# Patient Record
Sex: MALE | Race: WHITE | NOT HISPANIC OR LATINO | Employment: OTHER | URBAN - METROPOLITAN AREA
[De-identification: names, ages, dates, MRNs, and addresses within clinical notes are randomized per-mention and may not be internally consistent; named-entity substitution may affect disease eponyms.]

---

## 2020-01-26 ENCOUNTER — HOSPITAL ENCOUNTER (EMERGENCY)
Facility: CLINIC | Age: 65
Discharge: HOME | End: 2020-01-26
Attending: EMERGENCY MEDICINE | Admitting: EMERGENCY MEDICINE
Payer: MEDICARE

## 2020-01-26 VITALS
WEIGHT: 136 LBS | HEIGHT: 67 IN | BODY MASS INDEX: 21.35 KG/M2 | DIASTOLIC BLOOD PRESSURE: 72 MMHG | RESPIRATION RATE: 16 BRPM | OXYGEN SATURATION: 99 % | SYSTOLIC BLOOD PRESSURE: 134 MMHG | TEMPERATURE: 97.8 F

## 2020-01-26 DIAGNOSIS — R19.7 DIARRHEA, UNSPECIFIED TYPE: ICD-10-CM

## 2020-01-26 LAB
ANION GAP SERPL CALCULATED.3IONS-SCNC: 2 MMOL/L (ref 3–14)
BASOPHILS # BLD AUTO: 0 10E9/L (ref 0–0.2)
BASOPHILS NFR BLD AUTO: 0.1 %
BUN SERPL-MCNC: 18 MG/DL (ref 7–30)
C DIFF TOX B STL QL: POSITIVE
CALCIUM SERPL-MCNC: 9 MG/DL (ref 8.5–10.1)
CHLORIDE SERPL-SCNC: 107 MMOL/L (ref 94–109)
CO2 SERPL-SCNC: 29 MMOL/L (ref 20–32)
CREAT SERPL-MCNC: 0.67 MG/DL (ref 0.66–1.25)
DIFFERENTIAL METHOD BLD: ABNORMAL
EOSINOPHIL # BLD AUTO: 0.3 10E9/L (ref 0–0.7)
EOSINOPHIL NFR BLD AUTO: 2.2 %
ERYTHROCYTE [DISTWIDTH] IN BLOOD BY AUTOMATED COUNT: 14.9 % (ref 10–15)
GFR SERPL CREATININE-BSD FRML MDRD: >90 ML/MIN/{1.73_M2}
GLUCOSE SERPL-MCNC: 95 MG/DL (ref 70–99)
HCT VFR BLD AUTO: 37.1 % (ref 40–53)
HGB BLD-MCNC: 12.1 G/DL (ref 13.3–17.7)
IMM GRANULOCYTES # BLD: 0 10E9/L (ref 0–0.4)
IMM GRANULOCYTES NFR BLD: 0.3 %
LYMPHOCYTES # BLD AUTO: 0.9 10E9/L (ref 0.8–5.3)
LYMPHOCYTES NFR BLD AUTO: 7.6 %
MCH RBC QN AUTO: 29.4 PG (ref 26.5–33)
MCHC RBC AUTO-ENTMCNC: 32.6 G/DL (ref 31.5–36.5)
MCV RBC AUTO: 90 FL (ref 78–100)
MONOCYTES # BLD AUTO: 1.3 10E9/L (ref 0–1.3)
MONOCYTES NFR BLD AUTO: 10.8 %
NEUTROPHILS # BLD AUTO: 9.4 10E9/L (ref 1.6–8.3)
NEUTROPHILS NFR BLD AUTO: 79 %
NRBC # BLD AUTO: 0 10*3/UL
NRBC BLD AUTO-RTO: 0 /100
PLATELET # BLD AUTO: 291 10E9/L (ref 150–450)
POTASSIUM SERPL-SCNC: 4.1 MMOL/L (ref 3.4–5.3)
RBC # BLD AUTO: 4.12 10E12/L (ref 4.4–5.9)
SODIUM SERPL-SCNC: 138 MMOL/L (ref 133–144)
SPECIMEN SOURCE: ABNORMAL
WBC # BLD AUTO: 11.9 10E9/L (ref 4–11)

## 2020-01-26 PROCEDURE — 99284 EMERGENCY DEPT VISIT MOD MDM: CPT | Mod: 25

## 2020-01-26 PROCEDURE — 80048 BASIC METABOLIC PNL TOTAL CA: CPT | Performed by: EMERGENCY MEDICINE

## 2020-01-26 PROCEDURE — 87493 C DIFF AMPLIFIED PROBE: CPT | Performed by: EMERGENCY MEDICINE

## 2020-01-26 PROCEDURE — 25800030 ZZH RX IP 258 OP 636: Performed by: EMERGENCY MEDICINE

## 2020-01-26 PROCEDURE — 85025 COMPLETE CBC W/AUTO DIFF WBC: CPT | Performed by: EMERGENCY MEDICINE

## 2020-01-26 PROCEDURE — 96360 HYDRATION IV INFUSION INIT: CPT

## 2020-01-26 RX ADMIN — SODIUM CHLORIDE 1000 ML: 9 INJECTION, SOLUTION INTRAVENOUS at 14:54

## 2020-01-26 ASSESSMENT — ENCOUNTER SYMPTOMS
FEVER: 0
DIARRHEA: 1
RECTAL PAIN: 0
COUGH: 0
BLOOD IN STOOL: 0
VOMITING: 0
ABDOMINAL PAIN: 0

## 2020-01-26 ASSESSMENT — MIFFLIN-ST. JEOR: SCORE: 1370.52

## 2020-01-26 NOTE — ED NOTES
Bed: ED20  Expected date: 1/26/20  Expected time: 2:01 PM  Means of arrival: Ambulance  Comments:  425 64m loose stools   ETA 1412

## 2020-01-26 NOTE — ED TRIAGE NOTES
"Patient prolonging discharge process.  Frequently using up to commode or urinal.  Then laying down.  Saying he had to use the restroom but then he did not.  Patient was in no apparent distress.  A very small liquidy stool was sent to the lab.  Patient ambulatory, independent.  Gait steady.    Needing frequent reminding to stay on task.  Patient asking on he can get home.  Then tells me the phone number he calls for his transport.  Then tells me does not know how to get home.  Reminds him he told me how he normally gets around.  Then oh, ok.  Patient seems to be purposefully prolonging his stay.  Unsure why.  Does not actually request any specific needs.  Asked patient if needs anything.  States \"no, but you seem to be needing something\".  Gently reminded patient he is in the ED & lots of patients always need to be seen.  States \"oh, that makes sense ma'am\".  Patient kind, pleasant during entire ED stay...  Instructed patient he will be called if he has any abnormal results from his stool sample.  States understanding.   "

## 2020-01-26 NOTE — ED PROVIDER NOTES
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 He underwent cystoscopy with urethral stent and retrograde pyelogram on 1/21 at [facility]. He who presents with diarrhea that began this afternoon. The patient states that he was recently placed on antibiotics, Cefpodoxime, after the procedure. He states that he has had 4-5 loose stools this afternoon, but had normal stools this morning. He denies any fever, nausea, cough, abdominal pain, or blood in stool or urine.   He reports that he called the nurse line regarding his diarrhea and they recommended he go to the emergency room.  He arrived by EMS.      Allergies:  Asprin  Acetaminophen  Diphenhydramine    Medications:    Cefpodoxime  Pyridium  Ditropan  Senokot  Cogentin  Zocor  Flomax  Vistril    Past Medical History:    Hydronephrosis of left kidney  Encounter for antineoplastic chemotherapy  Liver lesion  Bone lesion   Pulmonary nodules  Schizoaffective disorder, bipolar type  Dyslipidemia  Cataracts  Anemia  Hx of carcinoma of bladder  Stricture of male urethra  CAD    Past Surgical History:    Facial reconstruction  Bladder transurethral tumor resection   Cystoscopy with retrogrades  Urethral stent  Urethral dilation  Bladder transurethral tumor resection  Cystoscopy with retrogrades  Soo cath placement  Cystoscopy flexible  Bladder biopsy    Family History:    History reviewed. No pertinent family history.     Social History:  Smoking status: Former  Alcohol use: No  Drug use: No  The patient presents to the emergency department via EMS alone  PCP: Ricco Mcclure  Marital Status:  Single [1]      Review of Systems   Constitutional: Negative for fever.   Respiratory: Negative for cough.    Gastrointestinal: Positive for diarrhea. Negative for abdominal pain, blood in stool, rectal pain and vomiting.   All other systems reviewed and are  "negative.      Physical Exam     Patient Vitals for the past 24 hrs:   BP Temp Temp src Heart Rate Resp SpO2 Height Weight   01/26/20 1417 134/72 97.8  F (36.6  C) Oral 91 16 99 % 1.702 m (5' 7\") 61.7 kg (136 lb)     Physical Exam   Constitutional:  Patient is oriented to person, place, and time. They appear well-developed and well-nourished.   HENT:   Mouth/Throat:   Oropharynx is clear and moist.   Eyes:    Conjunctivae normal and EOM are normal. Pupils are equal, round, and reactive to light.   Neck:    Normal range of motion.   Cardiovascular: Normal rate, regular rhythm and normal heart sounds.  Exam reveals no gallop and no friction rub.  No murmur heard.  Pulmonary/Chest:  Effort normal and breath sounds normal. Patient has no wheezes. Patient has no rales.   Abdominal:   Soft. Bowel sounds are normal. Patient exhibits no mass. There is no tenderness. There is no rebound and no guarding.   Musculoskeletal:  Normal range of motion. Patient exhibits no edema.   Neurological:   Patient is alert and oriented to person, place, and time. Patient has normal strength. No cranial nerve deficit or sensory deficit. GCS 15 patient has a speak stutter.   Skin:   Skin is warm and dry. No rash noted. No erythema.   Psychiatric:   Patient has a normal mood a      Emergency Department Course   Laboratory:  Laboratory findings were communicated with the patient who voiced understanding of the findings.    Clostridium difficile toxin B PCR: pending    CBC: WBC 11.9 (H), HGB 12.1 (L) o/w WNL ()    BMP: Anion Gap: 2 (L) WNL (Creatinine 0.67)    Interventions:  1454 NS 1L IV Bolus    Emergency Department Course:  Past medical records, nursing notes, and vitals reviewed.  1410: I performed an exam of the patient and obtained history, as documented above.     IV inserted and blood drawn.    1545: I rechecked the patient. I reviewed the results with the Patient and answered all related questions prior to discharge. "     Findings and plan explained to the Patient. Patient discharged home with instructions regarding supportive care, medications, and reasons to return. The importance of close follow-up was reviewed.  Impression & Plan   Medical Decision Making:  Harpreet Mcelroy is a 64 year old male presenting with diarrhea that just started this afternoon, he did have normal bowel movements this morning. He has no abdominal pain and he is urinating without difficulty after his recent cystoscopy. The nurse line apparently told him to come to the emergency department. On my exam here he is vitally normal, he has no pain. Basic blood work was obtained and he has no evidence of renal failure or electrolyte abnormalities. His WBC count is a little elevated and hemoglobin is a little down, but nothing critical. I suspect this is antibiotic associated diarrhea, Vs C Diff.  There is nothing that he acutely needs to be in the hospital for. He did provide a stool sample and was advised that if this lab was normal, we will call him tomorrow. He will call his PCP tomorrow for follow up. He was hold his antibiotics at this time.     Diagnosis:    ICD-10-CM    1. Diarrhea, unspecified type R19.7 Clostridium difficile toxin B PCR       Disposition:  Discharged to home.    Kathryn Cerna  1/26/2020    EMERGENCY DEPARTMENT  Scribe Disclosure:  I, Kathryn Cerna, am serving as a scribe at 2:10 PM on 1/26/2020 to document services personally performed by Zandra Tovar MD based on my observations and the provider's statements to me.        Zandra Tovar MD  01/28/20 0619

## 2020-01-26 NOTE — ED AVS SNAPSHOT
Emergency Department  [address] [place] [address]  Phone:  [phone]  Fax:  [phone]                                    Harpreet Mcelroy   MRN: [id number]    Department:   Emergency Department   Date of Visit:  1/26/2020           After Visit Summary Signature Page    I have received my discharge instructions, and my questions have been answered. I have discussed any challenges I see with this plan with the nurse or doctor.    ..........................................................................................................................................  Patient/Patient Representative Signature      ..........................................................................................................................................  Patient Representative Print Name and Relationship to Patient    ..................................................               ................................................  Date                                   Time    ..........................................................................................................................................  Reviewed by Signature/Title    ...................................................              ..............................................  Date                                               Time          22EPIC Rev 08/18

## 2020-01-26 NOTE — DISCHARGE INSTRUCTIONS
Follow-up with your doctor tomorrow.  Your diarrhea is most likely associated with your antibiotics.  The stool sample is to tell us whether you have something called C. difficile which is a bacteria that requires different antibiotics.

## 2020-01-27 ENCOUNTER — TELEPHONE (OUTPATIENT)
Dept: EMERGENCY MEDICINE | Facility: CLINIC | Age: 65
End: 2020-01-27

## 2020-01-27 DIAGNOSIS — A04.72 COLITIS DUE TO CLOSTRIDIUM DIFFICILE: ICD-10-CM

## 2020-01-27 RX ORDER — VANCOMYCIN HYDROCHLORIDE 125 MG/1
125 CAPSULE ORAL 4 TIMES DAILY
Qty: 56 CAPSULE | Refills: 0 | Status: SHIPPED | OUTPATIENT
Start: 2020-01-27 | End: 2020-02-10

## 2020-01-27 NOTE — TELEPHONE ENCOUNTER
[facility] [Adult-Male]    Oral ED lab result protocol used  Clostridium difficile    Reason for call  Notify of lab results, assess symptoms,  review ED providers recommendations/discharge instructions (if necessary) and advise per ED lab result f/u protocol    Lab Result (including Rx patient on, if applicable)  Final Clostridium difficile toxin B PCR is POSITIVE.  Toxin producing Clostridium difficile target DNA sequences detected, presumed positive for Clostridium difficile toxin B.   Rx (Flagyl or Vancomycin) prescribed upon discharge from the Oral ED/UC:  No  If No Rx, advise and/or treat per Oral ED Lab Result protocol.    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 He underwent cystoscopy with urethral stent and retrograde pyelogram on 1/21 at [facility]. He who presents with diarrhea that began this afternoon. The patient states that he was recently placed on antibiotics, Cefpodoxime. He states that he has had 4-5 loose stools, but had normal stools this morning. He denies any fever, nausea, cough, abdominal pain, or blood in stool.    Significant Medical hx, if applicable (i.e. CKD, diabetes) See above   Allergies Allergies   Allergen Reactions     Aspirin       Weight, if applicable Wt Readings from Last 2 Encounters:   01/26/20 61.7 kg (136 lb)   06/29/15 57.2 kg (126 lb)      Coumadin/Warfarin [Yes /No] No   Creatinine Level (mg/dl) Creatinine   Date Value Ref Range Status   01/26/2020 0.67 0.66 - 1.25 mg/dL Final      Creatinine clearance (ml/min), if applicable Serum creatinine: 0.67 mg/dL 01/26/20 1450  Estimated creatinine clearance: 98.5 mL/min   ED providers Impression and Plan (applicable information) Harpreet Mcelroy is a 64  year old male presenting with diarrhea that just started this afternoon, he did have normal bowel movements this morning. He has no abdominal pain and he is urinating without difficulty after his recent cystoscopy. The nurse line apparently told him to come to the emergency department. On my exam here he is vitally normal, he has no pain. Basic blood work was obtained and he has no evidence of renal failure or electrolyte abnormalities. His WBC count is a little elevated and hemoglobin is a little down, but nothing critical. I suspect this is antibiotic associated diarrhea, not CDIF There is nothing that he acutely needs to be in the hospital for. He did provide a stool sample and was advised that if this lab was normal, we will call him tomorrow. He will call his PCP tomorrow for follow up. He was hold his antibiotics at this time.    ED diagnosis Diarrhea, unspecified type   ED provider Zandra Tovar MD      RN Assessment (Patient s current Symptoms), include time called.  [Insert Left message here if message left]  11:43AM: Patient returned call. (Patient is very hard to understand over the phone). He states he is still having diarrhea, but less than yesterday. States that he called his clinic today and has an appointment to see his PCP tomorrow.     RN Recommendations/Instructions per [place] ED lab result protocol  Patient notified of lab result and treatment recommendations.  Rx for Vancomycin (VANCOCIN HCL) 125 MG capsule, 1 capsule (125 mg) by mouth 4 times daily for 14 days sent to [Pharmacy - [facility] in [place]].  RN reviewed information about Clostridium Difficile from protocol patient instructions and Epic reference.  Patient states he has not had cdiff before.   Advised to keep his appointment with his PCP for tomorrow morning and to return to the ED if symptoms worsen or change or he has any other concerns.   Patient states understanding of the information given and has no further  questions.        Please Contact your PCP clinic or return to the Emergency department if your:    Symptoms worsen or other concerning symptom's.    PCP follow-up Questions asked: YES         [RN Name]  Adriana Ordoñez RN  [facility] RN  Lung Nodule and ED Lab Result RN  Epic pool (ED late result f/u RN): P [id number]  FV INCIDENTAL RADIOLOGY F/U NURSES: P [id number]  # [phone]      Copy of Lab result  Clostridium difficile toxin B PCR [[id number]] (Order [id number])   Order Requisition     Clostridium difficile toxin B PCR (Order #[id number]) on 1/26/20   Exam Information     Exam Date Exam Time Accession # Results    1/26/20  4:31 PM [id number]    Specimen Information: Feces        Component Value Flag Ref Range Units Status Collected Lab   Specimen Description Feces     Final 01/26/2020  4:31 PM [facility]   C Diff Toxin B PCR Positive  Abnormal   NEG^Negative  Final 01/26/2020  4:31 PM 51   Comment:   Positive: Toxin producing C. difficile target DNA sequences detected, presumed    positive for C. difficile toxin B. C. difficile (Requires Enteric Isolation).       Clostridium difficile (Requires Enteric Isolation)   FDA approved assay performed using [employer] GeneXpert real-time PCR.

## 2020-07-12 ENCOUNTER — APPOINTMENT (OUTPATIENT)
Dept: ULTRASOUND IMAGING | Facility: CLINIC | Age: 65
End: 2020-07-12
Attending: EMERGENCY MEDICINE
Payer: MEDICARE

## 2020-07-12 ENCOUNTER — APPOINTMENT (OUTPATIENT)
Dept: CT IMAGING | Facility: CLINIC | Age: 65
End: 2020-07-12
Attending: EMERGENCY MEDICINE
Payer: MEDICARE

## 2020-07-12 ENCOUNTER — HOSPITAL ENCOUNTER (EMERGENCY)
Facility: CLINIC | Age: 65
Discharge: NURSING, REHAB OR LONG-TERM CARE | End: 2020-07-12
Attending: EMERGENCY MEDICINE | Admitting: EMERGENCY MEDICINE
Payer: MEDICARE

## 2020-07-12 VITALS
DIASTOLIC BLOOD PRESSURE: 79 MMHG | HEART RATE: 88 BPM | OXYGEN SATURATION: 99 % | SYSTOLIC BLOOD PRESSURE: 142 MMHG | TEMPERATURE: 97.5 F | RESPIRATION RATE: 16 BRPM

## 2020-07-12 DIAGNOSIS — R07.89 ATYPICAL CHEST PAIN: ICD-10-CM

## 2020-07-12 LAB
ANION GAP SERPL CALCULATED.3IONS-SCNC: 7 MMOL/L (ref 3–14)
BASOPHILS # BLD AUTO: 0 10E9/L (ref 0–0.2)
BASOPHILS NFR BLD AUTO: 0.2 %
BUN SERPL-MCNC: 14 MG/DL (ref 7–30)
CALCIUM SERPL-MCNC: 9 MG/DL (ref 8.5–10.1)
CHLORIDE SERPL-SCNC: 106 MMOL/L (ref 94–109)
CO2 SERPL-SCNC: 27 MMOL/L (ref 20–32)
CREAT SERPL-MCNC: 0.77 MG/DL (ref 0.66–1.25)
D DIMER PPP FEU-MCNC: 0.9 UG/ML FEU (ref 0–0.5)
DIFFERENTIAL METHOD BLD: NORMAL
EOSINOPHIL # BLD AUTO: 0.2 10E9/L (ref 0–0.7)
EOSINOPHIL NFR BLD AUTO: 2.6 %
ERYTHROCYTE [DISTWIDTH] IN BLOOD BY AUTOMATED COUNT: 14.7 % (ref 10–15)
GFR SERPL CREATININE-BSD FRML MDRD: >90 ML/MIN/{1.73_M2}
GLUCOSE SERPL-MCNC: 88 MG/DL (ref 70–99)
HCT VFR BLD AUTO: 42.6 % (ref 40–53)
HGB BLD-MCNC: 13.5 G/DL (ref 13.3–17.7)
IMM GRANULOCYTES # BLD: 0 10E9/L (ref 0–0.4)
IMM GRANULOCYTES NFR BLD: 0.2 %
LYMPHOCYTES # BLD AUTO: 1 10E9/L (ref 0.8–5.3)
LYMPHOCYTES NFR BLD AUTO: 11.4 %
MCH RBC QN AUTO: 29.7 PG (ref 26.5–33)
MCHC RBC AUTO-ENTMCNC: 31.7 G/DL (ref 31.5–36.5)
MCV RBC AUTO: 94 FL (ref 78–100)
MONOCYTES # BLD AUTO: 0.7 10E9/L (ref 0–1.3)
MONOCYTES NFR BLD AUTO: 7.5 %
NEUTROPHILS # BLD AUTO: 7 10E9/L (ref 1.6–8.3)
NEUTROPHILS NFR BLD AUTO: 78.1 %
NRBC # BLD AUTO: 0 10*3/UL
NRBC BLD AUTO-RTO: 0 /100
PLATELET # BLD AUTO: 291 10E9/L (ref 150–450)
POTASSIUM SERPL-SCNC: 3.7 MMOL/L (ref 3.4–5.3)
RBC # BLD AUTO: 4.54 10E12/L (ref 4.4–5.9)
SODIUM SERPL-SCNC: 140 MMOL/L (ref 133–144)
TROPONIN I SERPL-MCNC: <0.015 UG/L (ref 0–0.04)
TROPONIN I SERPL-MCNC: <0.015 UG/L (ref 0–0.04)
WBC # BLD AUTO: 8.9 10E9/L (ref 4–11)

## 2020-07-12 PROCEDURE — 25000125 ZZHC RX 250: Performed by: EMERGENCY MEDICINE

## 2020-07-12 PROCEDURE — 25000128 H RX IP 250 OP 636: Performed by: EMERGENCY MEDICINE

## 2020-07-12 PROCEDURE — 93005 ELECTROCARDIOGRAM TRACING: CPT

## 2020-07-12 PROCEDURE — 80048 BASIC METABOLIC PNL TOTAL CA: CPT | Performed by: EMERGENCY MEDICINE

## 2020-07-12 PROCEDURE — 99285 EMERGENCY DEPT VISIT HI MDM: CPT | Mod: 25

## 2020-07-12 PROCEDURE — 93970 EXTREMITY STUDY: CPT

## 2020-07-12 PROCEDURE — 71275 CT ANGIOGRAPHY CHEST: CPT

## 2020-07-12 PROCEDURE — 85025 COMPLETE CBC W/AUTO DIFF WBC: CPT | Performed by: EMERGENCY MEDICINE

## 2020-07-12 PROCEDURE — 85379 FIBRIN DEGRADATION QUANT: CPT | Performed by: EMERGENCY MEDICINE

## 2020-07-12 PROCEDURE — 84484 ASSAY OF TROPONIN QUANT: CPT | Performed by: EMERGENCY MEDICINE

## 2020-07-12 RX ORDER — ACETAMINOPHEN 325 MG/1
650 TABLET ORAL ONCE
Status: DISCONTINUED | OUTPATIENT
Start: 2020-07-12 | End: 2020-07-12 | Stop reason: HOSPADM

## 2020-07-12 RX ORDER — IOPAMIDOL 755 MG/ML
500 INJECTION, SOLUTION INTRAVASCULAR ONCE
Status: COMPLETED | OUTPATIENT
Start: 2020-07-12 | End: 2020-07-12

## 2020-07-12 RX ADMIN — IOPAMIDOL 62 ML: 755 INJECTION, SOLUTION INTRAVENOUS at 10:42

## 2020-07-12 RX ADMIN — SODIUM CHLORIDE 84 ML: 9 INJECTION, SOLUTION INTRAVENOUS at 10:42

## 2020-07-12 ASSESSMENT — ENCOUNTER SYMPTOMS
COUGH: 1
FEVER: 0
SHORTNESS OF BREATH: 1

## 2020-07-12 NOTE — ED NOTES
Patient given boxed lunch. Patient walked to bathroom and back with cane. Awaiting wheelchair ride home.

## 2020-07-12 NOTE — DISCHARGE INSTRUCTIONS
Consider using a warm pack to the chest wall or ibuprofen if you are having persistent pain.    If pain is worsening or changing or if you develop new concerns you should return to the emergency department.  Otherwise, follow-up with your primary care provider this week to ensure you are still doing well.

## 2020-07-12 NOTE — ED PROVIDER NOTES
History     Chief Complaint:  Chest Pain    The history is provided by the patient.      Harpreet Mcelroy is a 64 year old male with a history of mental illness and bladder cancer who presents with chest pain. Mr. Mcelroy developed sharp, central chest pain just after taking a shower this morning. His pain is a 2/10 while resting and increases to 8/10 with movement. He has also had some slight shortness of breath.  He has had weeks of cough which he believes is related to a pollen allergy. He has had no fever. He has leg swelling. Today his left leg is more swollen than the right which he remarks is unusual as his right leg is generally more swollen than his left. He has no further concerns during initial interview.    Allergies:  Aspirin  Pollen  Acetaminophen  Diphenhydramine  Lactose    Medications:    Naprosyn  Cogentin  Zyprexa  Zocor  Pyridium  Ditropan  Senokot    Past Medical History:    Anxiety  Bipolar 1 disorder  Cancer  Depressive disorder  Coronary artery disease   Anemia  Bladder cancer  Pulmonary nodules  Bone lesion  Liver lesion  Paranoid schizophrenia    Past Surgical History:    Facial reconstruction  Bladder tumor resection x2  Cystoscopy x5  Ureteral stent placement  Urethral dilatation x5  Port-a-cath placement  Bladder biopsy    Family History:    Sister: schizophrenia    Social History:  The patient was accompanied to the ED by EMS.  Smoking Status: Former  Smokeless Tobacco: Never  Alcohol Use: No  Marital Status:  Single   Lives in [facility].  Ambulates with a cane.    Review of Systems   Constitutional: Negative for fever.   Respiratory: Positive for cough and shortness of breath.    Cardiovascular: Positive for chest pain and leg swelling.   All other systems reviewed and are negative.    Physical Exam     Patient Vitals for the past 24 hrs:   BP Temp Temp src Pulse Heart Rate Resp SpO2   07/12/20 1310 (!) 142/79 -- -- 88 -- 16 99 %   07/12/20 1200 (!) 148/71 -- --  84 -- -- 99 %   07/12/20 1145 (!) 143/78 -- -- 91 -- -- 98 %   07/12/20 1115 (!) 158/86 -- -- 85 -- -- 99 %   07/12/20 1000 (!) 159/88 -- -- 91 94 -- 99 %   07/12/20 0945 (!) 159/86 -- -- 97 100 -- 97 %   07/12/20 0930 (!) 159/81 -- -- 92 89 20 98 %   07/12/20 0915 (!) 152/83 -- -- 87 88 11 98 %   07/12/20 0900 (!) 166/88 -- -- 91 89 12 98 %   07/12/20 0848 (!) 167/88 97.5  F (36.4  C) Oral -- 91 16 97 %     Physical Exam  General: Well-developed and well-nourished. Well appearing middle aged  man. Cooperative.  Head:  Atraumatic.  Eyes:  Conjunctivae, lids, and sclerae are normal.  Neck:  Supple. Normal range of motion.  CV:  Regular rate and rhythm. Normal heart sounds with no murmurs, rubs, or gallops detected.  Resp:  No respiratory distress. Clear to auscultation bilaterally without decreased breath sounds, wheezing, rales, or rhonchi.  GI:  Soft. Non-distended. Non-tender.    MS:  Normal ROM.  Trace right lower extremity edema with 1-2+ left lower extremity edema.  Very mild tenderness to palpation across the mid anterior chest. Right anterior superior chest wall Port-a-Cath.  Skin:  Warm. Non-diaphoretic. No pallor.  Neuro:  Awake. A&Ox3. Normal strength. Dysarthric speech.  Psych: Normal mood and affect.   Vitals reviewed.    Emergency Department Course   EKG  Indication: Chest pain  Time: 0850  Rate 92 bpm. DC interval 164. QRS duration 118. QT/QTc 390/482.   Normal sinus rhythm  Right bundle branch block    No acute ST changes.  No significant changes as compared to prior, dated 01/16/20.  Incomplete right bundle branch block present on EKG in CareEverywhere 1/16/20.     Imaging:  Radiographic findings were communicated with the patient who voiced understanding of the findings.    US Lower Extremity Venous Duplex Bilateral  No DVT demonstrated.  Reading per radiology.    CT Chest Pulmonary Embolism W Contrast  1. No pulmonary embolism demonstrated.  2. No thoracic aortic dissection or aneurysm.    Reading per radiology.    Laboratory:  CBC: WNL. (WBC 8.9, HGB 13.5, )   BMP: AWNL (Creatinine 0.77)    Troponin (Collected 0854): <0.015  Troponin (Collected 1115): <0.015     D Dimer (Collected 0854): 0.9 (H)     Emergency Department Course:  Past medical records, nursing notes, and vitals reviewed.    0905 I performed an exam of the patient as documented above.     EKG obtained in the ED, see results above.   IV was inserted and blood was drawn for laboratory testing, results above.  The patient was sent for a lower extremity US and chest CT while in the emergency department, results above.     1149 I rechecked the patient and discussed the results of his workup thus far. He reports that his chest pain is improved and states that he only feels it when hunching over. He is comfortable with being discharged to home.     1227 I rechecked the patient who reported that he was feeling a little lightheaded, but improved.    Harpreet passed a road test without significant lightheadedness or dizziness.    Findings and plan explained to the patient. Patient discharged home with instructions regarding supportive care, medications, and reasons to return. The importance of close follow-up was reviewed.     I personally reviewed the laboratory and imaging results with the patient and answered all related questions prior to discharge.    Impression & Plan      Medical Decision Making:  Harpreet is a 64-year-old man who developed chest pain earlier this morning that is worsened with movement.  He has some associated shortness of breath, but denies other concerns aside from a cough which he attributes to a pollen allergy and has been present for weeks.  On exam, he appears well with minimal reproducible chest wall tenderness.  He does have lower extremity edema with left greater than right.  He states this is abnormal for him.  Fortunately, EKG is reassuring without acute ST changes or arrhythmias.  2 troponins obtained 2  hours apart are both negative.  He has quite atypical pain which actually improved during his ER course without intervention and ACS is considered highly unlikely.  At the time of discharge he states pain is present only when he hunches forward.  Fortunately, and despite his lower extremity edema, there are no DVTs demonstrated on ultrasound.  D-dimer was minimally elevated so he was also sent for CT pulmonary embolism scan.  This reveals no PE, aortic pathology, or other issues such as pneumonia.  The remainder of his laboratory studies are unremarkable.  He required no interventions while under my care and on repeat evaluation is comfortable with plan for discharge as he is overall feeling improved.  He did remark he had some lightheadedness though, again, his work-up is unremarkable and he was able to pass a road test assisted only by his cane without significant lightheadedness or concerns.  I recommended he consider a warm pack to his chest wall or ibuprofen if he has persistent pain though he understands to return if he is having significant worsening or change in pain or if he develops new concerns.  Otherwise he will follow-up with his primary care provider.  All questions answered.    Diagnosis:    ICD-10-CM    1. Atypical chest pain  R07.89        Disposition:  discharged home    Discharge Medications:  None      Pancho SOTELO, am serving as a scribe on 7/12/2020 at 9:07 AM to personally document services performed by Julissa Burgess MD based on my observations and the provider's statements to me.   Pancho Cuevas  7/12/2020   [facility] EMERGENCY DEPARTMENT       Julissa Burgess MD  07/12/20 1500

## 2020-07-12 NOTE — ED AVS SNAPSHOT
[facility] Emergency Department  [address]  [facility] [address]  Phone:  [phone]  Fax:  [phone]                                    Harpreet Mcelroy   MRN: [id number]    Department:  [facility] Emergency Department   Date of Visit:  7/12/2020           After Visit Summary Signature Page    I have received my discharge instructions, and my questions have been answered. I have discussed any challenges I see with this plan with the nurse or doctor.    ..........................................................................................................................................  Patient/Patient Representative Signature      ..........................................................................................................................................  Patient Representative Print Name and Relationship to Patient    ..................................................               ................................................  Date                                   Time    ..........................................................................................................................................  Reviewed by Signature/Title    ...................................................              ..............................................  Date                                               Time          22EPIC Rev 08/18

## 2020-07-12 NOTE — ED TRIAGE NOTES
Patient arrives via EMS from care facility with c/o chest pain that started this AM after taking a shower. Patient reports the pain is intermittent and more so with movement. Chest pain reproducible with rub midsternal. VSS. A&Ox4.

## 2020-07-13 ENCOUNTER — RECORDS - HEALTHEAST (OUTPATIENT)
Dept: LAB | Facility: CLINIC | Age: 65
End: 2020-07-13

## 2020-07-13 LAB — INTERPRETATION ECG - MUSE: NORMAL

## 2020-07-14 LAB
ALBUMIN SERPL-MCNC: 3.8 G/DL (ref 3.5–5)
ALP SERPL-CCNC: 131 U/L (ref 45–120)
ALT SERPL W P-5'-P-CCNC: 20 U/L (ref 0–45)
ANION GAP SERPL CALCULATED.3IONS-SCNC: 5 MMOL/L (ref 5–18)
AST SERPL W P-5'-P-CCNC: 21 U/L (ref 0–40)
BASOPHILS # BLD AUTO: 0 THOU/UL (ref 0–0.2)
BASOPHILS NFR BLD AUTO: 0 % (ref 0–2)
BILIRUB SERPL-MCNC: 0.4 MG/DL (ref 0–1)
BUN SERPL-MCNC: 13 MG/DL (ref 8–22)
CALCIUM SERPL-MCNC: 10 MG/DL (ref 8.5–10.5)
CHLORIDE BLD-SCNC: 99 MMOL/L (ref 98–107)
CHOLEST SERPL-MCNC: 155 MG/DL
CO2 SERPL-SCNC: 27 MMOL/L (ref 22–31)
CREAT SERPL-MCNC: 0.79 MG/DL (ref 0.7–1.3)
EOSINOPHIL # BLD AUTO: 0.3 THOU/UL (ref 0–0.4)
EOSINOPHIL NFR BLD AUTO: 4 % (ref 0–6)
ERYTHROCYTE [DISTWIDTH] IN BLOOD BY AUTOMATED COUNT: 15.3 % (ref 11–14.5)
FASTING STATUS PATIENT QL REPORTED: NORMAL
GFR SERPL CREATININE-BSD FRML MDRD: >60 ML/MIN/1.73M2
GLUCOSE BLD-MCNC: 69 MG/DL (ref 70–125)
HBA1C MFR BLD: 5.2 %
HCT VFR BLD AUTO: 38.9 % (ref 40–54)
HDLC SERPL-MCNC: 68 MG/DL
HGB BLD-MCNC: 12.2 G/DL (ref 14–18)
LDLC SERPL CALC-MCNC: 75 MG/DL
LYMPHOCYTES # BLD AUTO: 1.1 THOU/UL (ref 0.8–4.4)
LYMPHOCYTES NFR BLD AUTO: 16 % (ref 20–40)
MCH RBC QN AUTO: 29.6 PG (ref 27–34)
MCHC RBC AUTO-ENTMCNC: 31.4 G/DL (ref 32–36)
MCV RBC AUTO: 94 FL (ref 80–100)
MONOCYTES # BLD AUTO: 0.7 THOU/UL (ref 0–0.9)
MONOCYTES NFR BLD AUTO: 10 % (ref 2–10)
NEUTROPHILS # BLD AUTO: 4.8 THOU/UL (ref 2–7.7)
NEUTROPHILS NFR BLD AUTO: 69 % (ref 50–70)
PLATELET # BLD AUTO: 307 THOU/UL (ref 140–440)
PMV BLD AUTO: 10.3 FL (ref 8.5–12.5)
POTASSIUM BLD-SCNC: 3.5 MMOL/L (ref 3.5–5)
PROT SERPL-MCNC: 7.4 G/DL (ref 6–8)
PROT SERPL-MCNC: 7.4 G/DL (ref 6–8)
RBC # BLD AUTO: 4.12 MILL/UL (ref 4.4–6.2)
SODIUM SERPL-SCNC: 131 MMOL/L (ref 136–145)
T4 TOTAL - HISTORICAL: 11.5 UG/DL (ref 4.5–13)
TRIGL SERPL-MCNC: 58 MG/DL
TSH SERPL DL<=0.005 MIU/L-ACNC: 1.2 UIU/ML (ref 0.3–5)
WBC: 6.9 THOU/UL (ref 4–11)

## 2020-08-16 ENCOUNTER — RECORDS - HEALTHEAST (OUTPATIENT)
Dept: LAB | Facility: CLINIC | Age: 65
End: 2020-08-16

## 2020-08-16 LAB
ALBUMIN UR-MCNC: ABNORMAL MG/DL
APPEARANCE UR: ABNORMAL
BACTERIA #/AREA URNS HPF: ABNORMAL HPF
BILIRUB UR QL STRIP: NEGATIVE
COLOR UR AUTO: YELLOW
GLUCOSE UR STRIP-MCNC: NEGATIVE MG/DL
HGB UR QL STRIP: ABNORMAL
KETONES UR STRIP-MCNC: NEGATIVE MG/DL
LEUKOCYTE ESTERASE UR QL STRIP: ABNORMAL
NITRATE UR QL: NEGATIVE
PH UR STRIP: 6.5 [PH] (ref 4.5–8)
RBC #/AREA URNS AUTO: ABNORMAL HPF
SP GR UR STRIP: 1.03 (ref 1–1.03)
SQUAMOUS #/AREA URNS AUTO: ABNORMAL LPF
UROBILINOGEN UR STRIP-ACNC: ABNORMAL
WBC #/AREA URNS AUTO: >100 HPF
WBC CLUMPS #/AREA URNS HPF: PRESENT /[HPF]

## 2020-10-13 ENCOUNTER — RECORDS - HEALTHEAST (OUTPATIENT)
Dept: LAB | Facility: CLINIC | Age: 65
End: 2020-10-13

## 2020-10-15 LAB — BACTERIA SPEC CULT: NORMAL

## 2020-11-28 ENCOUNTER — HOSPITAL ENCOUNTER (EMERGENCY)
Facility: CLINIC | Age: 65
Discharge: HOME | End: 2020-11-28
Attending: EMERGENCY MEDICINE | Admitting: EMERGENCY MEDICINE
Payer: MEDICARE

## 2020-11-28 VITALS
OXYGEN SATURATION: 98 % | TEMPERATURE: 98.4 F | HEART RATE: 118 BPM | RESPIRATION RATE: 16 BRPM | DIASTOLIC BLOOD PRESSURE: 95 MMHG | SYSTOLIC BLOOD PRESSURE: 149 MMHG

## 2020-11-28 DIAGNOSIS — T83.9XXA PROBLEM WITH FOLEY CATHETER, INITIAL ENCOUNTER (H): ICD-10-CM

## 2020-11-28 LAB
APTT PPP: 28 SEC (ref 22–37)
BASOPHILS # BLD AUTO: 0 10E9/L (ref 0–0.2)
BASOPHILS NFR BLD AUTO: 0.3 %
DIFFERENTIAL METHOD BLD: ABNORMAL
EOSINOPHIL # BLD AUTO: 0.1 10E9/L (ref 0–0.7)
EOSINOPHIL NFR BLD AUTO: 2.3 %
ERYTHROCYTE [DISTWIDTH] IN BLOOD BY AUTOMATED COUNT: 14.2 % (ref 10–15)
HCT VFR BLD AUTO: 35.3 % (ref 40–53)
HGB BLD-MCNC: 11.3 G/DL (ref 13.3–17.7)
IMM GRANULOCYTES # BLD: 0 10E9/L (ref 0–0.4)
IMM GRANULOCYTES NFR BLD: 0.3 %
INR PPP: 0.99 (ref 0.86–1.14)
LYMPHOCYTES # BLD AUTO: 1.1 10E9/L (ref 0.8–5.3)
LYMPHOCYTES NFR BLD AUTO: 17 %
MCH RBC QN AUTO: 29.6 PG (ref 26.5–33)
MCHC RBC AUTO-ENTMCNC: 32 G/DL (ref 31.5–36.5)
MCV RBC AUTO: 92 FL (ref 78–100)
MONOCYTES # BLD AUTO: 0.5 10E9/L (ref 0–1.3)
MONOCYTES NFR BLD AUTO: 8.8 %
NEUTROPHILS # BLD AUTO: 4.4 10E9/L (ref 1.6–8.3)
NEUTROPHILS NFR BLD AUTO: 71.3 %
NRBC # BLD AUTO: 0 10*3/UL
NRBC BLD AUTO-RTO: 0 /100
PLATELET # BLD AUTO: 363 10E9/L (ref 150–450)
RBC # BLD AUTO: 3.82 10E12/L (ref 4.4–5.9)
WBC # BLD AUTO: 6.2 10E9/L (ref 4–11)

## 2020-11-28 PROCEDURE — 99283 EMERGENCY DEPT VISIT LOW MDM: CPT

## 2020-11-28 PROCEDURE — 85610 PROTHROMBIN TIME: CPT | Performed by: EMERGENCY MEDICINE

## 2020-11-28 PROCEDURE — 85730 THROMBOPLASTIN TIME PARTIAL: CPT | Performed by: EMERGENCY MEDICINE

## 2020-11-28 PROCEDURE — 85025 COMPLETE CBC W/AUTO DIFF WBC: CPT | Performed by: EMERGENCY MEDICINE

## 2020-11-28 ASSESSMENT — ENCOUNTER SYMPTOMS
COLOR CHANGE: 1
COUGH: 0
FEVER: 0
VOMITING: 0
CHILLS: 0
SHORTNESS OF BREATH: 0
NAUSEA: 0

## 2020-11-28 NOTE — ED AVS SNAPSHOT
[facility]  [address]  [facility] [address]  Phone: [phone]  Fax: [phone]                                    Harpreet Mcelroy   MRN: [id number]    Department: [facility]   Date of Visit: 11/28/2020           After Visit Summary Signature Page    I have received my discharge instructions, and my questions have been answered. I have discussed any challenges I see with this plan with the nurse or doctor.    ..........................................................................................................................................  Patient/Patient Representative Signature      ..........................................................................................................................................  Patient Representative Print Name and Relationship to Patient    ..................................................               ................................................  Date                                   Time    ..........................................................................................................................................  Reviewed by Signature/Title    ...................................................              ..............................................  Date                                               Time          22EPIC Rev 08/18

## 2020-11-28 NOTE — ED NOTES
Replaced pt's oscar catheter leg bag and leg adhesive device.   Instructed on use, all questions answered at this time. Pt verbalized understanding.

## 2020-11-28 NOTE — ED TRIAGE NOTES
Patient presents to the ED reporting a crack in the leg bag of his catheter. Also states the adhesive of the catheter securing device is no longer sticking to his thigh. Denies other complaints.

## 2020-11-28 NOTE — ED PROVIDER NOTES
History     Chief Complaint:  Catheter Problem    The history is provided by the patient (room 20).     Harpreet Mcelroy is a 65 year old male with a history of bladder cancer, no longer undergoing chemotherapy, s/p bladder surgery resulting in a catheter being placed, and CAD, who presents for evaluation of a crake at the top of his leg bag and adhesive of the securing device no longer sticking to his leg. Patient was also noted to have dry, cracked skin on his hands, shins, and feet since yesterday.    Patient denies fever, chills, cough, shortness of breath, nausea, vomiting, or any other symptoms.     Allergies:  Lactose  Aspirin  Cucumber Extract  Diphenhydramine  Pollen Extract  Acetaminophen    Medications:   Naproxen  Ferrous sulfate  Zyprexa  Zocor  Flomax  Cogentin  Zyprexa    Medical History:   CAD  Ureteral obstruction  Carcinoma of urinary bladder  Cataracts  Dyslipidemia  Schizoaffective disorder  Pulmonary nodules  Bone lesion  Liver lesion  Hydronephrosis of left kidney  Anxiety  Bipolar 1 disorder  Depression    Surgical History   Facial reconstruction  Bladder transurethral tumor resection x2  Cystoscopy with retrogrades x2  Ureteral stent placement  Urethral dilation x5  Soo-cath placement  Cystoscopy flexible  Bladder biopsy  Cystoscopy rigid x5  Urethrotomy perineal    Family History:   No past pertinent family history.    Social History:  Smoking status: Former  Alcohol use: Negative  Drug use: Negative  Marital Status: Single  PCP: [facility]     Review of Systems   Constitutional: Negative for chills and fever.   Respiratory: Negative for cough and shortness of breath.    Gastrointestinal: Negative for nausea and vomiting.   Genitourinary:        (+) catheter problem   Skin: Positive for color change and rash (bilateral hands, shins, and feet).         Physical Exam     Patient Vitals for the past 24 hrs:   BP Temp Pulse Resp SpO2   11/28/20 1630 -- -- -- 16 --   11/28/20  1529 (!) 149/95 98.4  F (36.9  C) 118 12 98 %       Physical Exam  Constitutional: Alert, leg bag is leaking.   HENT:    Nose: Nose normal.    Mouth/Throat: Oropharynx is clear, mucous membranes are moist  Eyes: EOM are normal. Pupils are equal, round, and reactive to light.   CV: Regular rate and rhythm, no murmurs, rubs or gallops.  Resp: Clear lungs to auscultation, all lung fields. Normal respiratory effort.   GI: Soft, non-distended. There is no tenderness. No rebound or guarding.  MSK: Normal range of motion. No deformity.  Neurological:   A/Ox3  5/5 strength is symmetric to the upper and lower extremities;   Sensation intact to light touch throughout the upper and lower extremities;   Skin: Skin is warm and dry. Petechiae to bilateral hands, shins, and feet.    Emergency Department Course   Laboratory:  Laboratory findings were communicated with the patient who voiced understanding of the findings.    CBC: WBC: 6.2, HGB: 11.3 (L), PLT: 363    INR: 0.99  PTT: 28     Emergency Department Course:  Past medical records, nursing notes, and vitals reviewed.    3:35 PM I performed an exam of the patient as documented above.     IV was inserted and blood was drawn for laboratory testing, results above.    1620 I rechecked the patient and discussed the results of his workup thus far.     Findings and plan explained to the Patient. Patient discharged home with instructions regarding supportive care, medications, and reasons to return. The importance of close follow-up was reviewed.     I personally reviewed the laboratory results with the Patient and answered all related questions prior to discharge.     Impression & Plan   Medical Decision Makin year old male who comes in because his leg bag is leaking; however, his skin started to have a rash yesterday as well. Leg bag replaced and is no longer leaking. Labs including CBC, INR, PTT obtained due to concern for petechiae and thrombocytopenia. Platelet count and  coags normal. Patient feeling well. Patient discharged home. Patient expressed understanding and was in agreement with the plan and discharge instructions which included reasons to return and follow-up.      Diagnosis:    ICD-10-CM    1. Problem with Hernandez catheter, initial encounter (H)  T83.9XXA        Disposition:  Discharged to home.    Scribe Disclosure:  DEEPAK, Kanika Anne, am serving as a scribe on 11/28/2020 at 3:35 PM to personally document services performed by Lb Arora DO based on my observations and the provider's statements to me.      Lb Arora DO  11/28/20 7546

## 2021-05-26 ENCOUNTER — RECORDS - HEALTHEAST (OUTPATIENT)
Dept: LAB | Facility: CLINIC | Age: 66
End: 2021-05-26

## 2021-05-26 LAB
SARS-COV-2 PCR COMMENT: NORMAL
SARS-COV-2 RNA SPEC QL NAA+PROBE: NEGATIVE
SARS-COV-2 VIRUS SPECIMEN SOURCE: NORMAL

## 2021-06-07 ENCOUNTER — RECORDS - HEALTHEAST (OUTPATIENT)
Dept: LAB | Facility: CLINIC | Age: 66
End: 2021-06-07

## 2021-06-11 ENCOUNTER — RECORDS - HEALTHEAST (OUTPATIENT)
Dept: LAB | Facility: CLINIC | Age: 66
End: 2021-06-11

## 2021-06-11 LAB
ALBUMIN UR-MCNC: NEGATIVE G/DL
APPEARANCE UR: CLEAR
BACTERIA #/AREA URNS HPF: ABNORMAL /[HPF]
BILIRUB UR QL STRIP: NEGATIVE
COLOR UR AUTO: ABNORMAL
GLUCOSE UR STRIP-MCNC: NEGATIVE MG/DL
HGB UR QL STRIP: ABNORMAL
KETONES UR STRIP-MCNC: NEGATIVE MG/DL
LEUKOCYTE ESTERASE UR QL STRIP: ABNORMAL
NITRATE UR QL: NEGATIVE
PH UR STRIP: 7 [PH] (ref 5–8)
RBC URINE: 3 HPF
SP GR UR STRIP: 1 (ref 1–1.03)
SQUAMOUS EPITHELIAL: 0 /HPF
UROBILINOGEN UR STRIP-ACNC: ABNORMAL
WBC URINE: 31 HPF

## 2021-06-13 LAB — BACTERIA SPEC CULT: NORMAL

## 2021-11-15 PROCEDURE — U0005 INFEC AGEN DETEC AMPLI PROBE: HCPCS | Mod: ORL | Performed by: UROLOGY

## 2021-11-16 ENCOUNTER — LAB REQUISITION (OUTPATIENT)
Dept: LAB | Facility: CLINIC | Age: 66
End: 2021-11-16
Payer: COMMERCIAL

## 2021-11-16 DIAGNOSIS — Z03.818 ENCOUNTER FOR OBSERVATION FOR SUSPECTED EXPOSURE TO OTHER BIOLOGICAL AGENTS RULED OUT: ICD-10-CM

## 2021-11-17 LAB
SARS-COV-2 RNA RESP QL NAA+PROBE: NORMAL
SARS-COV-2 RNA RESP QL NAA+PROBE: NOT DETECTED

## 2021-12-01 ENCOUNTER — HOSPITAL ENCOUNTER (EMERGENCY)
Facility: CLINIC | Age: 66
Discharge: HOME | End: 2021-12-01
Attending: PHYSICIAN ASSISTANT | Admitting: PHYSICIAN ASSISTANT
Payer: COMMERCIAL

## 2021-12-01 VITALS
OXYGEN SATURATION: 97 % | SYSTOLIC BLOOD PRESSURE: 151 MMHG | HEIGHT: 68 IN | DIASTOLIC BLOOD PRESSURE: 103 MMHG | BODY MASS INDEX: 25.16 KG/M2 | RESPIRATION RATE: 18 BRPM | WEIGHT: 166 LBS | TEMPERATURE: 98.1 F | HEART RATE: 101 BPM

## 2021-12-01 DIAGNOSIS — T83.9XXA FOLEY CATHETER PROBLEM, INITIAL ENCOUNTER (H): ICD-10-CM

## 2021-12-01 PROCEDURE — 99283 EMERGENCY DEPT VISIT LOW MDM: CPT

## 2021-12-01 PROCEDURE — 51702 INSERT TEMP BLADDER CATH: CPT

## 2021-12-01 ASSESSMENT — MIFFLIN-ST. JEOR: SCORE: 1512.47

## 2021-12-01 NOTE — ED PROVIDER NOTES
"  History     Chief Complaint:  Catheter Problem      HPI   Harpreet Mcelroy is a 66 year old male who presents with catheter problem.  The patient has had a catheter in for several weeks secondary to bladder cancer.  He underwent cystoscopy with urethral stent placement on 11/18.  No other recent bladder or pelvic surgeries.  His current catheter had been leaking at home.  By the time of my evaluation nursing has already replaced this and he has a new catheter in which is draining well.  He denies fever, chills, vomiting, back pain, pelvic or abdominal pain.    Review of Systems   All other systems reviewed and are negative.    Allergies:  Lactose  Aspirin  Cucumber Extract  Diphenhydramine  Pollen Extract  Acetaminophen      Medications:    Cogentin  Hydroxyzine  Olanzapine  Loperamide    Past Medical History:    Past Medical History:   Diagnosis Date     Anxiety      Bipolar 1 disorder (H)      Cancer (H)      Depressive disorder      Psychiatric diagnosis      There are no problems to display for this patient.       Past Surgical History:    Cystoscopy w/bladder biopsy  TURP  Facial reconstruction    Family History:    Depression  Bladder cancer    Social History:  Presents alone    Physical Exam     Patient Vitals for the past 24 hrs:   BP Temp Temp src Pulse Resp SpO2 Height Weight   12/01/21 1402 (!) 151/103 98.1  F (36.7  C) Oral 101 18 97 % 1.727 m (5' 8\") 75.3 kg (166 lb)       Physical Exam  General: Awake, alert, pleasant, non-toxic.  Head:  Scalp is NC/AT  Eyes:  Conjunctiva normal, PERRL  ENT:  The external nose and ears are normal.   Neck:  Normal range of motion without rigidity.  CV:  Regular rate and rhythm    No pathologic murmur, rubs, or gallops.  Resp:  Breath sounds are clear bilaterally.     Non-labored, no retractions or accessory muscle use  Abdomen: Abdomen is soft, no distension, no tenderness, no masses. No CVA tenderness.  :  New oscar in place at meatus.  Light yellow urine " draining well into leg bag.  No bleeding, or leakage around oscar.  MS:  No lower extremity edema or asymmetric calf swelling.   Skin:  Warm and dry, No rash or lesions noted.  Neuro: Alert and oriented x3.  GCS 15 No gross motor deficits.  No facial asymmetry.  Psych: Awake. Alert. Normal affect. Appropriate interactions.    Emergency Department Course     Emergency Department Course:    Reviewed:  I reviewed nursing notes, vitals, past history and care everywhere    Assessments:  1500 I obtained history and examined the patient as noted above.     Disposition:  The patient was discharged to home.    Impression & Plan      Medical Decision Makin-year-old male presents with catheter bag leakage problem.  By the time of my evaluation nursing is already replaced this and he is a new Oscar in place which is draining well.  He recently had a ureteral stent exchange though no other recent surgeries or contraindications to replacement that I can find.  He does not have any symptoms of UTI.  There is no indication for lab work or imaging.  His catheter is functioning and draining well.  Return precautions given for new or worsening symptoms.  He already has follow-up with his urologist on Friday scheduled.    Diagnosis:    ICD-10-CM    1. Oscar catheter problem, initial encounter (H)  T83.9XXA        Discharge Medications:  New Prescriptions    No medications on file         Scribe Disclosure:  Michael SOTELO PA-C, am serving as a scribe at 2:55 PM on 2021 to document services personally performed by Michael Juarez PA-C based on my observations and the provider's statements to me.      Michael Juarez PA-C  21 0932

## 2021-12-01 NOTE — ED TRIAGE NOTES
Pt presents for evaluation of urinary leg bag leaking since yesterday. Pt noticed his bed wet from a tear in the leg bag and has also had the oscar catheter tube come off of the port on the leg beg. Catheter is supposed to come out on Friday.

## 2022-03-24 ENCOUNTER — LAB REQUISITION (OUTPATIENT)
Dept: LAB | Facility: CLINIC | Age: 67
End: 2022-03-24
Payer: COMMERCIAL

## 2022-03-24 DIAGNOSIS — R30.0 DYSURIA: ICD-10-CM

## 2022-03-24 PROCEDURE — 87086 URINE CULTURE/COLONY COUNT: CPT | Mod: ORL | Performed by: UROLOGY

## 2022-03-26 LAB — BACTERIA UR CULT: NORMAL

## 2022-05-25 ENCOUNTER — LAB REQUISITION (OUTPATIENT)
Dept: LAB | Facility: CLINIC | Age: 67
End: 2022-05-25
Payer: COMMERCIAL

## 2022-05-25 DIAGNOSIS — Z79.899 OTHER LONG TERM (CURRENT) DRUG THERAPY: ICD-10-CM

## 2022-05-26 LAB
ALBUMIN SERPL-MCNC: 3.4 G/DL (ref 3.5–5)
ALP SERPL-CCNC: 130 U/L (ref 45–120)
ALT SERPL W P-5'-P-CCNC: 18 U/L (ref 0–45)
ANION GAP SERPL CALCULATED.3IONS-SCNC: 7 MMOL/L (ref 5–18)
AST SERPL W P-5'-P-CCNC: 15 U/L (ref 0–40)
BASOPHILS # BLD AUTO: 0 10E3/UL (ref 0–0.2)
BASOPHILS NFR BLD AUTO: 0 %
BILIRUB SERPL-MCNC: 0.3 MG/DL (ref 0–1)
BUN SERPL-MCNC: 19 MG/DL (ref 8–22)
CALCIUM SERPL-MCNC: 9.3 MG/DL (ref 8.5–10.5)
CHLORIDE BLD-SCNC: 107 MMOL/L (ref 98–107)
CHOLEST SERPL-MCNC: 139 MG/DL
CO2 SERPL-SCNC: 29 MMOL/L (ref 22–31)
CREAT SERPL-MCNC: 0.92 MG/DL (ref 0.7–1.3)
EOSINOPHIL # BLD AUTO: 0.3 10E3/UL (ref 0–0.7)
EOSINOPHIL NFR BLD AUTO: 4 %
ERYTHROCYTE [DISTWIDTH] IN BLOOD BY AUTOMATED COUNT: 14.5 % (ref 10–15)
FASTING STATUS PATIENT QL REPORTED: NORMAL
FASTING STATUS PATIENT QL REPORTED: NORMAL
GFR SERPL CREATININE-BSD FRML MDRD: >90 ML/MIN/1.73M2
GLUCOSE BLD-MCNC: 86 MG/DL (ref 70–125)
GLUCOSE BLD-MCNC: 86 MG/DL (ref 70–125)
HBA1C MFR BLD: 5.1 %
HCT VFR BLD AUTO: 40.1 % (ref 40–53)
HDLC SERPL-MCNC: 46 MG/DL
HGB BLD-MCNC: 12.8 G/DL (ref 13.3–17.7)
IMM GRANULOCYTES # BLD: 0 10E3/UL
IMM GRANULOCYTES NFR BLD: 0 %
LDLC SERPL CALC-MCNC: 74 MG/DL
LYMPHOCYTES # BLD AUTO: 1.4 10E3/UL (ref 0.8–5.3)
LYMPHOCYTES NFR BLD AUTO: 20 %
MCH RBC QN AUTO: 29.5 PG (ref 26.5–33)
MCHC RBC AUTO-ENTMCNC: 31.9 G/DL (ref 31.5–36.5)
MCV RBC AUTO: 92 FL (ref 78–100)
MONOCYTES # BLD AUTO: 0.5 10E3/UL (ref 0–1.3)
MONOCYTES NFR BLD AUTO: 7 %
NEUTROPHILS # BLD AUTO: 4.9 10E3/UL (ref 1.6–8.3)
NEUTROPHILS NFR BLD AUTO: 69 %
NRBC # BLD AUTO: 0 10E3/UL
NRBC BLD AUTO-RTO: 0 /100
PLATELET # BLD AUTO: 269 10E3/UL (ref 150–450)
POTASSIUM BLD-SCNC: 4.3 MMOL/L (ref 3.5–5)
PROT SERPL-MCNC: 6.5 G/DL (ref 6–8)
RBC # BLD AUTO: 4.34 10E6/UL (ref 4.4–5.9)
SODIUM SERPL-SCNC: 143 MMOL/L (ref 136–145)
T4 FREE SERPL-MCNC: 1 NG/DL (ref 0.7–1.8)
TRIGL SERPL-MCNC: 95 MG/DL
TSH SERPL DL<=0.005 MIU/L-ACNC: 1.26 UIU/ML (ref 0.3–5)
WBC # BLD AUTO: 7.1 10E3/UL (ref 4–11)

## 2022-05-26 PROCEDURE — 85025 COMPLETE CBC W/AUTO DIFF WBC: CPT | Mod: ORL | Performed by: NURSE PRACTITIONER

## 2022-05-26 PROCEDURE — 84443 ASSAY THYROID STIM HORMONE: CPT | Mod: ORL | Performed by: NURSE PRACTITIONER

## 2022-05-26 PROCEDURE — 84439 ASSAY OF FREE THYROXINE: CPT | Mod: ORL | Performed by: NURSE PRACTITIONER

## 2022-05-26 PROCEDURE — P9603 ONE-WAY ALLOW PRORATED MILES: HCPCS | Mod: ORL | Performed by: NURSE PRACTITIONER

## 2022-05-26 PROCEDURE — 80053 COMPREHEN METABOLIC PANEL: CPT | Mod: ORL | Performed by: NURSE PRACTITIONER

## 2022-05-26 PROCEDURE — 83036 HEMOGLOBIN GLYCOSYLATED A1C: CPT | Mod: ORL | Performed by: NURSE PRACTITIONER

## 2022-05-26 PROCEDURE — 36415 COLL VENOUS BLD VENIPUNCTURE: CPT | Mod: ORL | Performed by: NURSE PRACTITIONER

## 2022-05-26 PROCEDURE — 80061 LIPID PANEL: CPT | Mod: ORL | Performed by: NURSE PRACTITIONER

## 2023-02-15 ENCOUNTER — LAB REQUISITION (OUTPATIENT)
Dept: LAB | Facility: CLINIC | Age: 68
End: 2023-02-15
Payer: COMMERCIAL

## 2023-02-15 DIAGNOSIS — R31.9 HEMATURIA, UNSPECIFIED: ICD-10-CM

## 2023-02-16 PROCEDURE — 81001 URINALYSIS AUTO W/SCOPE: CPT | Mod: ORL | Performed by: FAMILY MEDICINE

## 2023-02-16 PROCEDURE — 87086 URINE CULTURE/COLONY COUNT: CPT | Mod: ORL | Performed by: FAMILY MEDICINE

## 2023-04-03 ENCOUNTER — LAB REQUISITION (OUTPATIENT)
Dept: LAB | Facility: CLINIC | Age: 68
End: 2023-04-03
Payer: COMMERCIAL

## 2023-04-03 DIAGNOSIS — R30.0 DYSURIA: ICD-10-CM

## 2023-04-03 LAB
ALBUMIN UR-MCNC: 30 MG/DL
APPEARANCE UR: ABNORMAL
BACTERIA #/AREA URNS HPF: ABNORMAL /HPF
BILIRUB UR QL STRIP: NEGATIVE
COLOR UR AUTO: YELLOW
GLUCOSE UR STRIP-MCNC: NEGATIVE MG/DL
HGB UR QL STRIP: ABNORMAL
KETONES UR STRIP-MCNC: NEGATIVE MG/DL
LEUKOCYTE ESTERASE UR QL STRIP: ABNORMAL
MUCOUS THREADS #/AREA URNS LPF: PRESENT /LPF
NITRATE UR QL: NEGATIVE
PH UR STRIP: 6.5 [PH] (ref 5–7)
RBC URINE: 26 /HPF
SP GR UR STRIP: 1.01 (ref 1–1.03)
SQUAMOUS EPITHELIAL: 1 /HPF
TRANSITIONAL EPI: 8 /HPF
UROBILINOGEN UR STRIP-MCNC: NORMAL MG/DL
WBC CLUMPS #/AREA URNS HPF: PRESENT /HPF
WBC URINE: >182 /HPF

## 2023-04-03 PROCEDURE — 81001 URINALYSIS AUTO W/SCOPE: CPT | Mod: ORL | Performed by: FAMILY MEDICINE

## 2023-04-03 PROCEDURE — 87086 URINE CULTURE/COLONY COUNT: CPT | Mod: ORL | Performed by: FAMILY MEDICINE

## 2023-04-04 LAB — BACTERIA UR CULT: NORMAL

## 2023-05-31 ENCOUNTER — LAB REQUISITION (OUTPATIENT)
Dept: LAB | Facility: CLINIC | Age: 68
End: 2023-05-31
Payer: COMMERCIAL

## 2023-05-31 DIAGNOSIS — Z79.899 OTHER LONG TERM (CURRENT) DRUG THERAPY: ICD-10-CM

## 2023-06-01 LAB
ALBUMIN SERPL BCG-MCNC: 4 G/DL (ref 3.5–5.2)
ALP SERPL-CCNC: 99 U/L (ref 40–129)
ALT SERPL W P-5'-P-CCNC: 21 U/L (ref 10–50)
ANION GAP SERPL CALCULATED.3IONS-SCNC: 13 MMOL/L (ref 7–15)
AST SERPL W P-5'-P-CCNC: 22 U/L (ref 10–50)
BASOPHILS # BLD AUTO: 0 10E3/UL (ref 0–0.2)
BASOPHILS NFR BLD AUTO: 0 %
BILIRUB SERPL-MCNC: 0.2 MG/DL
BUN SERPL-MCNC: 23.7 MG/DL (ref 8–23)
CALCIUM SERPL-MCNC: 9.7 MG/DL (ref 8.8–10.2)
CHLORIDE SERPL-SCNC: 102 MMOL/L (ref 98–107)
CHOLEST SERPL-MCNC: 144 MG/DL
CREAT SERPL-MCNC: 1.06 MG/DL (ref 0.67–1.17)
DEPRECATED HCO3 PLAS-SCNC: 25 MMOL/L (ref 22–29)
EOSINOPHIL # BLD AUTO: 0.3 10E3/UL (ref 0–0.7)
EOSINOPHIL NFR BLD AUTO: 3 %
ERYTHROCYTE [DISTWIDTH] IN BLOOD BY AUTOMATED COUNT: 15.1 % (ref 10–15)
GFR SERPL CREATININE-BSD FRML MDRD: 77 ML/MIN/1.73M2
GLUCOSE SERPL-MCNC: 112 MG/DL (ref 70–99)
HBA1C MFR BLD: 5.6 %
HCT VFR BLD AUTO: 39.2 % (ref 40–53)
HDLC SERPL-MCNC: 48 MG/DL
HGB BLD-MCNC: 11.9 G/DL (ref 13.3–17.7)
IMM GRANULOCYTES # BLD: 0 10E3/UL
IMM GRANULOCYTES NFR BLD: 0 %
LDLC SERPL CALC-MCNC: 66 MG/DL
LYMPHOCYTES # BLD AUTO: 1.9 10E3/UL (ref 0.8–5.3)
LYMPHOCYTES NFR BLD AUTO: 23 %
MCH RBC QN AUTO: 29 PG (ref 26.5–33)
MCHC RBC AUTO-ENTMCNC: 30.4 G/DL (ref 31.5–36.5)
MCV RBC AUTO: 95 FL (ref 78–100)
MONOCYTES # BLD AUTO: 0.6 10E3/UL (ref 0–1.3)
MONOCYTES NFR BLD AUTO: 7 %
NEUTROPHILS # BLD AUTO: 5.5 10E3/UL (ref 1.6–8.3)
NEUTROPHILS NFR BLD AUTO: 67 %
NONHDLC SERPL-MCNC: 96 MG/DL
NRBC # BLD AUTO: 0 10E3/UL
NRBC BLD AUTO-RTO: 0 /100
PLATELET # BLD AUTO: 293 10E3/UL (ref 150–450)
POTASSIUM SERPL-SCNC: 4.6 MMOL/L (ref 3.4–5.3)
PROT SERPL-MCNC: 7.2 G/DL (ref 6.4–8.3)
RBC # BLD AUTO: 4.11 10E6/UL (ref 4.4–5.9)
SODIUM SERPL-SCNC: 140 MMOL/L (ref 136–145)
T4 FREE SERPL-MCNC: 1.5 NG/DL (ref 0.9–1.7)
TRIGL SERPL-MCNC: 150 MG/DL
TSH SERPL DL<=0.005 MIU/L-ACNC: 1.38 UIU/ML (ref 0.3–4.2)
WBC # BLD AUTO: 8.3 10E3/UL (ref 4–11)

## 2023-06-01 PROCEDURE — 84439 ASSAY OF FREE THYROXINE: CPT | Mod: ORL | Performed by: NURSE PRACTITIONER

## 2023-06-01 PROCEDURE — P9604 ONE-WAY ALLOW PRORATED TRIP: HCPCS | Mod: ORL | Performed by: NURSE PRACTITIONER

## 2023-06-01 PROCEDURE — 84443 ASSAY THYROID STIM HORMONE: CPT | Mod: ORL | Performed by: NURSE PRACTITIONER

## 2023-06-01 PROCEDURE — 36415 COLL VENOUS BLD VENIPUNCTURE: CPT | Mod: ORL | Performed by: NURSE PRACTITIONER

## 2023-06-01 PROCEDURE — 85025 COMPLETE CBC W/AUTO DIFF WBC: CPT | Mod: ORL | Performed by: NURSE PRACTITIONER

## 2023-06-01 PROCEDURE — 80061 LIPID PANEL: CPT | Mod: ORL | Performed by: NURSE PRACTITIONER

## 2023-06-01 PROCEDURE — 80053 COMPREHEN METABOLIC PANEL: CPT | Mod: ORL | Performed by: NURSE PRACTITIONER

## 2023-06-01 PROCEDURE — 83036 HEMOGLOBIN GLYCOSYLATED A1C: CPT | Mod: ORL | Performed by: NURSE PRACTITIONER

## 2023-07-20 ENCOUNTER — LAB REQUISITION (OUTPATIENT)
Dept: LAB | Facility: CLINIC | Age: 68
End: 2023-07-20
Payer: COMMERCIAL

## 2023-07-20 DIAGNOSIS — R30.0 DYSURIA: ICD-10-CM

## 2023-07-20 DIAGNOSIS — R35.0 FREQUENCY OF MICTURITION: ICD-10-CM

## 2023-07-20 LAB
ALBUMIN UR-MCNC: 20 MG/DL
APPEARANCE UR: ABNORMAL
BILIRUB UR QL STRIP: NEGATIVE
COLOR UR AUTO: ABNORMAL
GLUCOSE UR STRIP-MCNC: NEGATIVE MG/DL
HGB UR QL STRIP: ABNORMAL
KETONES UR STRIP-MCNC: NEGATIVE MG/DL
LEUKOCYTE ESTERASE UR QL STRIP: ABNORMAL
NITRATE UR QL: NEGATIVE
PH UR STRIP: 7 [PH] (ref 5–7)
RBC URINE: 4 /HPF
SP GR UR STRIP: 1.01 (ref 1–1.03)
SPERM #/AREA URNS HPF: PRESENT /HPF
UROBILINOGEN UR STRIP-MCNC: NORMAL MG/DL
WBC CLUMPS #/AREA URNS HPF: PRESENT /HPF
WBC URINE: >182 /HPF

## 2023-07-20 PROCEDURE — 87086 URINE CULTURE/COLONY COUNT: CPT | Mod: ORL | Performed by: FAMILY MEDICINE

## 2023-07-20 PROCEDURE — 81001 URINALYSIS AUTO W/SCOPE: CPT | Mod: ORL | Performed by: FAMILY MEDICINE

## 2023-07-22 LAB — BACTERIA UR CULT: NORMAL

## 2023-07-23 ENCOUNTER — HOSPITAL ENCOUNTER (EMERGENCY)
Facility: CLINIC | Age: 68
Discharge: HOME | End: 2023-07-23
Attending: EMERGENCY MEDICINE | Admitting: EMERGENCY MEDICINE
Payer: COMMERCIAL

## 2023-07-23 VITALS
HEIGHT: 69 IN | HEART RATE: 105 BPM | DIASTOLIC BLOOD PRESSURE: 76 MMHG | RESPIRATION RATE: 28 BRPM | TEMPERATURE: 98 F | SYSTOLIC BLOOD PRESSURE: 141 MMHG | OXYGEN SATURATION: 96 % | BODY MASS INDEX: 24.73 KG/M2 | WEIGHT: 167 LBS

## 2023-07-23 DIAGNOSIS — N30.00 ACUTE CYSTITIS WITHOUT HEMATURIA: ICD-10-CM

## 2023-07-23 DIAGNOSIS — R45.1 AGITATION: ICD-10-CM

## 2023-07-23 LAB
ALBUMIN UR-MCNC: 30 MG/DL
ANION GAP SERPL CALCULATED.3IONS-SCNC: 11 MMOL/L (ref 7–15)
APPEARANCE UR: ABNORMAL
BACTERIA #/AREA URNS HPF: ABNORMAL /HPF
BASOPHILS # BLD AUTO: 0 10E3/UL (ref 0–0.2)
BASOPHILS NFR BLD AUTO: 0 %
BILIRUB UR QL STRIP: NEGATIVE
BUN SERPL-MCNC: 19.9 MG/DL (ref 8–23)
CALCIUM SERPL-MCNC: 9.5 MG/DL (ref 8.8–10.2)
CHLORIDE SERPL-SCNC: 104 MMOL/L (ref 98–107)
COLOR UR AUTO: YELLOW
CREAT SERPL-MCNC: 0.97 MG/DL (ref 0.67–1.17)
DEPRECATED HCO3 PLAS-SCNC: 27 MMOL/L (ref 22–29)
EOSINOPHIL # BLD AUTO: 0.1 10E3/UL (ref 0–0.7)
EOSINOPHIL NFR BLD AUTO: 1 %
ERYTHROCYTE [DISTWIDTH] IN BLOOD BY AUTOMATED COUNT: 14.5 % (ref 10–15)
GFR SERPL CREATININE-BSD FRML MDRD: 86 ML/MIN/1.73M2
GLUCOSE SERPL-MCNC: 112 MG/DL (ref 70–99)
GLUCOSE UR STRIP-MCNC: NEGATIVE MG/DL
HCT VFR BLD AUTO: 36.2 % (ref 40–53)
HGB BLD-MCNC: 11.8 G/DL (ref 13.3–17.7)
HGB UR QL STRIP: ABNORMAL
HOLD SPECIMEN: NORMAL
HOLD SPECIMEN: NORMAL
IMM GRANULOCYTES # BLD: 0 10E3/UL
IMM GRANULOCYTES NFR BLD: 0 %
KETONES UR STRIP-MCNC: NEGATIVE MG/DL
LEUKOCYTE ESTERASE UR QL STRIP: ABNORMAL
LYMPHOCYTES # BLD AUTO: 1.2 10E3/UL (ref 0.8–5.3)
LYMPHOCYTES NFR BLD AUTO: 12 %
MCH RBC QN AUTO: 29.8 PG (ref 26.5–33)
MCHC RBC AUTO-ENTMCNC: 32.6 G/DL (ref 31.5–36.5)
MCV RBC AUTO: 91 FL (ref 78–100)
MONOCYTES # BLD AUTO: 0.8 10E3/UL (ref 0–1.3)
MONOCYTES NFR BLD AUTO: 8 %
NEUTROPHILS # BLD AUTO: 7.6 10E3/UL (ref 1.6–8.3)
NEUTROPHILS NFR BLD AUTO: 79 %
NITRATE UR QL: NEGATIVE
NRBC # BLD AUTO: 0 10E3/UL
NRBC BLD AUTO-RTO: 0 /100
PH UR STRIP: 7 [PH] (ref 5–7)
PLATELET # BLD AUTO: 288 10E3/UL (ref 150–450)
POTASSIUM SERPL-SCNC: 3.9 MMOL/L (ref 3.4–5.3)
RBC # BLD AUTO: 3.96 10E6/UL (ref 4.4–5.9)
RBC URINE: 14 /HPF
SODIUM SERPL-SCNC: 142 MMOL/L (ref 136–145)
SP GR UR STRIP: 1 (ref 1–1.03)
UROBILINOGEN UR STRIP-MCNC: NORMAL MG/DL
WBC # BLD AUTO: 9.7 10E3/UL (ref 4–11)
WBC CLUMPS #/AREA URNS HPF: PRESENT /HPF
WBC URINE: >182 /HPF

## 2023-07-23 PROCEDURE — 93005 ELECTROCARDIOGRAM TRACING: CPT

## 2023-07-23 PROCEDURE — 250N000013 HC RX MED GY IP 250 OP 250 PS 637: Performed by: EMERGENCY MEDICINE

## 2023-07-23 PROCEDURE — 80048 BASIC METABOLIC PNL TOTAL CA: CPT | Performed by: EMERGENCY MEDICINE

## 2023-07-23 PROCEDURE — 81001 URINALYSIS AUTO W/SCOPE: CPT | Performed by: EMERGENCY MEDICINE

## 2023-07-23 PROCEDURE — 87086 URINE CULTURE/COLONY COUNT: CPT | Performed by: EMERGENCY MEDICINE

## 2023-07-23 PROCEDURE — 36415 COLL VENOUS BLD VENIPUNCTURE: CPT | Performed by: EMERGENCY MEDICINE

## 2023-07-23 PROCEDURE — 99284 EMERGENCY DEPT VISIT MOD MDM: CPT

## 2023-07-23 PROCEDURE — 85025 COMPLETE CBC W/AUTO DIFF WBC: CPT | Performed by: EMERGENCY MEDICINE

## 2023-07-23 RX ORDER — CEPHALEXIN 500 MG/1
500 CAPSULE ORAL ONCE
Status: COMPLETED | OUTPATIENT
Start: 2023-07-23 | End: 2023-07-23

## 2023-07-23 RX ORDER — CEPHALEXIN 500 MG/1
500 CAPSULE ORAL 2 TIMES DAILY
Qty: 14 CAPSULE | Refills: 0 | Status: SHIPPED | OUTPATIENT
Start: 2023-07-23 | End: 2023-07-30

## 2023-07-23 RX ADMIN — CEPHALEXIN 500 MG: 500 CAPSULE ORAL at 17:11

## 2023-07-23 RX ADMIN — MICONAZOLE NITRATE: 20 POWDER TOPICAL at 15:23

## 2023-07-23 ASSESSMENT — ACTIVITIES OF DAILY LIVING (ADL)
ADLS_ACUITY_SCORE: 35
ADLS_ACUITY_SCORE: 35
ADLS_ACUITY_SCORE: 39

## 2023-07-23 NOTE — ED TRIAGE NOTES
"Pt presents via EMS for evaluation of agitation. Pt resides in a AL facility. Hx of schizophrenia. Per staff, pt has been refusing cares, locking doors and not acting like himself since yesterday. No known injuries. Pt has a hx of speech and gait abnormalities and schizophrenia. EMS tried to call family, but no answer. EMS spoke to on-call RN and was given approval to bring pt in for evaluation. Pt c/o generalized pain with movement. Denies urinary symptoms. When asked why he is here, pt states \"because they wouldn't listen to me\". Then when asked again pt states \"for a med change\". On-call RN number is [phone].      "

## 2023-07-23 NOTE — ED PROVIDER NOTES
"  History     Chief Complaint:  Agitation       HPI   Harpreet Mcelroy is a 67 year old male with a past medical history significant for schizophrenia, urethral stricture, bladder cancer who presents to the ED via/accompanied by EMS with a chief complaint of agitation not listening to staff.  Patient reports that he desponded watch TV and wanted to be left alone and got upset because the staff and was sent.  He denies any other complaints such as chest pain, shortness of breath, abdominal pain..    History somewhat limited given speech impediment.    Independent Historian: History provided by EMS and the patient.    Review of External Notes: See MDM    ROS:  Review of Systems  Full ROS completed and negative other than pertinent positives and negatives noted in HPI    Allergies:  Lactose  Aspirin  Cucumber Extract  Diphenhydramine  Pollen Extract  Acetaminophen     Medications:    cephALEXin (KEFLEX) 500 MG capsule        Past Medical History:    Past Medical History:   Diagnosis Date     Anxiety      Bipolar 1 disorder (H)      Cancer (H)      Depressive disorder      Psychiatric diagnosis        Past Surgical History:    No past surgical history on file.     Family History:    family history is not on file.    Social History:   reports that he has quit smoking. He uses smokeless tobacco. He reports that he does not currently use alcohol. He reports that he does not use drugs.  PCP: [facility]     Physical Exam     Patient Vitals for the past 24 hrs:   BP Temp Temp src Pulse Resp SpO2 Height Weight   07/23/23 1800 (!) 141/76 -- -- 95 15 -- -- --   07/23/23 1700 (!) 145/91 -- -- 94 12 96 % -- --   07/23/23 1530 -- -- -- -- 29 98 % -- --   07/23/23 1520 -- -- -- 108 -- 96 % -- --   07/23/23 1450 -- -- -- -- 20 -- -- --   07/23/23 1333 (!) 152/89 -- -- 106 20 -- -- --   07/23/23 1142 (!) 168/107 98  F (36.7  C) Oral 117 20 99 % 1.753 m (5' 9\") 75.8 kg (167 lb)        Physical Exam  Constitutional: " Well developed, nontox appearance  Head: Atraumatic.   Mouth/Throat: Oropharynx is clear and moist.   Neck:  no stridor  Eyes: no scleral icterus  Cardiovascular: RRR, 2+ bilat radial pulses  Pulmonary/Chest: nml resp effort  Abdominal: ND, soft, NT, no rebound or guarding   Ext: Warm, well perfused, no edema  Neurological: A&O, symmetric facies, moves ext x4  Skin: Skin is warm and dry.   Psychiatric: Calm and cooperative, quality watching TV, interactive on exam  Nursing note and vitals reviewed.    Emergency Department Course   ECG:  ECG results from 07/23/23   EKG 12 lead     Value    Systolic Blood Pressure     Diastolic Blood Pressure     Ventricular Rate 110    Atrial Rate 110    WA Interval 172    QRS Duration 108        QTc 492    P Axis 74    R AXIS 52    T Axis 37    Interpretation ECG      Sinus tachycardia  Otherwise normal ECG  When compared with ECG of 12-JUL-2020 08:50,  Right bundle branch block is no longer Present         Imaging:  No orders to display        Report per radiology unless otherwise specified in report or noted in MDM    Laboratory:  Labs Ordered and Resulted from Time of ED Arrival to Time of ED Departure   ROUTINE UA WITH MICROSCOPIC REFLEX TO CULTURE - Abnormal       Result Value    Color Urine Yellow      Appearance Urine Slightly Cloudy (*)     Glucose Urine Negative      Bilirubin Urine Negative      Ketones Urine Negative      Specific Gravity Urine 1.005      Blood Urine Moderate (*)     pH Urine 7.0      Protein Albumin Urine 30 (*)     Urobilinogen Urine Normal      Nitrite Urine Negative      Leukocyte Esterase Urine Large (*)     Bacteria Urine Few (*)     WBC Clumps Urine Present (*)     RBC Urine 14 (*)     WBC Urine >182 (*)    BASIC METABOLIC PANEL - Abnormal    Sodium 142      Potassium 3.9      Chloride 104      Carbon Dioxide (CO2) 27      Anion Gap 11      Urea Nitrogen 19.9      Creatinine 0.97      Calcium 9.5      Glucose 112 (*)     GFR Estimate 86      CBC WITH PLATELETS AND DIFFERENTIAL - Abnormal    WBC Count 9.7      RBC Count 3.96 (*)     Hemoglobin 11.8 (*)     Hematocrit 36.2 (*)     MCV 91      MCH 29.8      MCHC 32.6      RDW 14.5      Platelet Count 288      % Neutrophils 79      % Lymphocytes 12      % Monocytes 8      % Eosinophils 1      % Basophils 0      % Immature Granulocytes 0      NRBCs per 100 WBC 0      Absolute Neutrophils 7.6      Absolute Lymphocytes 1.2      Absolute Monocytes 0.8      Absolute Eosinophils 0.1      Absolute Basophils 0.0      Absolute Immature Granulocytes 0.0      Absolute NRBCs 0.0     URINE CULTURE        Procedures       Emergency Department Course & Assessments:             Interventions:  Medications   miconazole (MICATIN) 2 % powder ( Topical $Given 23 1525)   cephALEXin (KEFLEX) capsule 500 mg (500 mg Oral $Given 23 7968)        Independent Interpretation (X-rays, CTs, rhythm strip):  See MDM    Consultations/Discussion of Management or Tests:  None    Social Determinants of Health affecting care:  See MDM    Disposition:  The patient was discharged to home.     Impression & Plan    CMS Diagnoses: None  Medical Decision Makin year old male presenting w/ mild agitation    Social determinants affecting patient's health include:  Schizophrenia and age increasing risk for presentation to the emergency department     I reviewed medical records from  for medicine office visit on 3/2/2023    DDx includes transient behavioral disturbance NOS, electrolyte abnormality, infection such as UTI.  Patient appears calm and cooperative in the emergency department he has no complaints.  He reports that he want to be left alone and the staff was not listening at his living facility.  He is not appear acutely delirious.  Doubt meningitis, encephalitis, acute CVA.  Labs significant for urinalysis concerning for infection but the patient has had similar appearing urinalyses with no bacterial isolate.  Given his age, he  will be treated with Keflex with urine culture pending.  Given his calm and cooperative demeanor in the emergency department, I do not feel emergent evaluation by DEC is indicated and he is likely safe for discharge back to his living facility.  Nursing staff spoke to the patient's living facility are comfortable taking him back.  The patient is subsequently discharged with a prescription for Keflex. Written recommendations given regarding follow up with PCP and return to the emergency department as needed for new or worsening symptoms.  Pt counseled on all results, disposition and diagnosis.  They are understanding and agreeable to plan. Patient discharged in stable condition.          Critical Care time:  was 0 minutes for this patient excluding procedures.      Diagnosis:    ICD-10-CM    1. Agitation  R45.1       2. Acute cystitis without hematuria  N30.00            Discharge Medications:  New Prescriptions    CEPHALEXIN (KEFLEX) 500 MG CAPSULE    Take 1 capsule (500 mg) by mouth 2 times daily for 7 days        7/23/2023   David Meyer MD Vaughn, Christopher E, MD  07/23/23 5399

## 2023-07-24 LAB
ATRIAL RATE - MUSE: 110 BPM
DIASTOLIC BLOOD PRESSURE - MUSE: NORMAL MMHG
INTERPRETATION ECG - MUSE: NORMAL
P AXIS - MUSE: 74 DEGREES
PR INTERVAL - MUSE: 172 MS
QRS DURATION - MUSE: 108 MS
QT - MUSE: 364 MS
QTC - MUSE: 492 MS
R AXIS - MUSE: 52 DEGREES
SYSTOLIC BLOOD PRESSURE - MUSE: NORMAL MMHG
T AXIS - MUSE: 37 DEGREES
VENTRICULAR RATE- MUSE: 110 BPM

## 2023-07-25 LAB — BACTERIA UR CULT: NORMAL

## 2024-04-24 ENCOUNTER — LAB REQUISITION (OUTPATIENT)
Dept: LAB | Facility: CLINIC | Age: 69
End: 2024-04-24
Payer: COMMERCIAL

## 2024-04-24 DIAGNOSIS — Z79.899 OTHER LONG TERM (CURRENT) DRUG THERAPY: ICD-10-CM

## 2024-04-25 LAB
BASOPHILS # BLD AUTO: 0 10E3/UL (ref 0–0.2)
BASOPHILS NFR BLD AUTO: 1 %
EOSINOPHIL # BLD AUTO: 0.2 10E3/UL (ref 0–0.7)
EOSINOPHIL NFR BLD AUTO: 4 %
ERYTHROCYTE [DISTWIDTH] IN BLOOD BY AUTOMATED COUNT: 14.8 % (ref 10–15)
HBA1C MFR BLD: 5.5 %
HCT VFR BLD AUTO: 35.7 % (ref 40–53)
HGB BLD-MCNC: 11.4 G/DL (ref 13.3–17.7)
IMM GRANULOCYTES # BLD: 0 10E3/UL
IMM GRANULOCYTES NFR BLD: 0 %
LYMPHOCYTES # BLD AUTO: 1.5 10E3/UL (ref 0.8–5.3)
LYMPHOCYTES NFR BLD AUTO: 24 %
MCH RBC QN AUTO: 29.1 PG (ref 26.5–33)
MCHC RBC AUTO-ENTMCNC: 31.9 G/DL (ref 31.5–36.5)
MCV RBC AUTO: 91 FL (ref 78–100)
MONOCYTES # BLD AUTO: 0.5 10E3/UL (ref 0–1.3)
MONOCYTES NFR BLD AUTO: 8 %
NEUTROPHILS # BLD AUTO: 4 10E3/UL (ref 1.6–8.3)
NEUTROPHILS NFR BLD AUTO: 63 %
NRBC # BLD AUTO: 0 10E3/UL
NRBC BLD AUTO-RTO: 0 /100
PLATELET # BLD AUTO: 246 10E3/UL (ref 150–450)
RBC # BLD AUTO: 3.92 10E6/UL (ref 4.4–5.9)
WBC # BLD AUTO: 6.3 10E3/UL (ref 4–11)

## 2024-04-25 PROCEDURE — P9604 ONE-WAY ALLOW PRORATED TRIP: HCPCS | Mod: ORL | Performed by: NURSE PRACTITIONER

## 2024-04-25 PROCEDURE — 84439 ASSAY OF FREE THYROXINE: CPT | Mod: ORL | Performed by: NURSE PRACTITIONER

## 2024-04-25 PROCEDURE — 36415 COLL VENOUS BLD VENIPUNCTURE: CPT | Mod: ORL | Performed by: NURSE PRACTITIONER

## 2024-04-25 PROCEDURE — 85025 COMPLETE CBC W/AUTO DIFF WBC: CPT | Mod: ORL | Performed by: NURSE PRACTITIONER

## 2024-04-25 PROCEDURE — 80053 COMPREHEN METABOLIC PANEL: CPT | Mod: ORL | Performed by: NURSE PRACTITIONER

## 2024-04-25 PROCEDURE — 80061 LIPID PANEL: CPT | Mod: ORL | Performed by: NURSE PRACTITIONER

## 2024-04-25 PROCEDURE — 84443 ASSAY THYROID STIM HORMONE: CPT | Mod: ORL | Performed by: NURSE PRACTITIONER

## 2024-04-25 PROCEDURE — 83036 HEMOGLOBIN GLYCOSYLATED A1C: CPT | Mod: ORL | Performed by: NURSE PRACTITIONER

## 2024-04-26 LAB
ALBUMIN SERPL BCG-MCNC: 3.9 G/DL (ref 3.5–5.2)
ALP SERPL-CCNC: 93 U/L (ref 40–150)
ALT SERPL W P-5'-P-CCNC: 20 U/L (ref 0–70)
ANION GAP SERPL CALCULATED.3IONS-SCNC: 10 MMOL/L (ref 7–15)
AST SERPL W P-5'-P-CCNC: 20 U/L (ref 0–45)
BILIRUB SERPL-MCNC: 0.3 MG/DL
BUN SERPL-MCNC: 22 MG/DL (ref 8–23)
CALCIUM SERPL-MCNC: 9.5 MG/DL (ref 8.8–10.2)
CHLORIDE SERPL-SCNC: 109 MMOL/L (ref 98–107)
CHOLEST SERPL-MCNC: 122 MG/DL
CREAT SERPL-MCNC: 1.13 MG/DL (ref 0.67–1.17)
DEPRECATED HCO3 PLAS-SCNC: 27 MMOL/L (ref 22–29)
EGFRCR SERPLBLD CKD-EPI 2021: 71 ML/MIN/1.73M2
FASTING STATUS PATIENT QL REPORTED: NO
GLUCOSE SERPL-MCNC: 80 MG/DL (ref 70–99)
HDLC SERPL-MCNC: 48 MG/DL
LDLC SERPL CALC-MCNC: 50 MG/DL
NONHDLC SERPL-MCNC: 74 MG/DL
POTASSIUM SERPL-SCNC: 4.3 MMOL/L (ref 3.4–5.3)
PROT SERPL-MCNC: 6.5 G/DL (ref 6.4–8.3)
SODIUM SERPL-SCNC: 146 MMOL/L (ref 135–145)
T4 FREE SERPL-MCNC: 1.33 NG/DL (ref 0.9–1.7)
TRIGL SERPL-MCNC: 122 MG/DL
TSH SERPL DL<=0.005 MIU/L-ACNC: 1.71 UIU/ML (ref 0.3–4.2)

## 2024-05-23 ENCOUNTER — LAB REQUISITION (OUTPATIENT)
Dept: LAB | Facility: CLINIC | Age: 69
End: 2024-05-23
Payer: COMMERCIAL

## 2024-05-23 DIAGNOSIS — R39.89 OTHER SYMPTOMS AND SIGNS INVOLVING THE GENITOURINARY SYSTEM: ICD-10-CM

## 2024-05-23 LAB
ALBUMIN UR-MCNC: 50 MG/DL
APPEARANCE UR: ABNORMAL
BACTERIA #/AREA URNS HPF: ABNORMAL /HPF
BILIRUB UR QL STRIP: NEGATIVE
COLOR UR AUTO: ABNORMAL
GLUCOSE UR STRIP-MCNC: NEGATIVE MG/DL
HGB UR QL STRIP: ABNORMAL
KETONES UR STRIP-MCNC: NEGATIVE MG/DL
LEUKOCYTE ESTERASE UR QL STRIP: ABNORMAL
NITRATE UR QL: NEGATIVE
PH UR STRIP: 7.5 [PH] (ref 5–7)
RBC URINE: 22 /HPF
SP GR UR STRIP: 1.01 (ref 1–1.03)
UROBILINOGEN UR STRIP-MCNC: NORMAL MG/DL
WBC CLUMPS #/AREA URNS HPF: PRESENT /HPF
WBC URINE: >182 /HPF

## 2024-05-23 PROCEDURE — 81001 URINALYSIS AUTO W/SCOPE: CPT | Mod: ORL | Performed by: UROLOGY

## 2024-06-05 ENCOUNTER — MEDICAL CORRESPONDENCE (OUTPATIENT)
Dept: HEALTH INFORMATION MANAGEMENT | Facility: CLINIC | Age: 69
End: 2024-06-05

## 2024-06-05 ENCOUNTER — APPOINTMENT (OUTPATIENT)
Dept: CT IMAGING | Facility: CLINIC | Age: 69
DRG: 885 | End: 2024-06-05
Attending: STUDENT IN AN ORGANIZED HEALTH CARE EDUCATION/TRAINING PROGRAM
Payer: COMMERCIAL

## 2024-06-05 ENCOUNTER — HOSPITAL ENCOUNTER (INPATIENT)
Facility: CLINIC | Age: 69
LOS: 11 days | Discharge: NURSING, REHAB OR LONG-TERM CARE | DRG: 885 | End: 2024-06-18
Attending: EMERGENCY MEDICINE | Admitting: HOSPITALIST
Payer: COMMERCIAL

## 2024-06-05 DIAGNOSIS — F30.9 MANIA (H): ICD-10-CM

## 2024-06-05 DIAGNOSIS — N13.39 OTHER HYDRONEPHROSIS: ICD-10-CM

## 2024-06-05 DIAGNOSIS — N39.0 ACUTE UTI: ICD-10-CM

## 2024-06-05 LAB
ALBUMIN SERPL BCG-MCNC: 4.1 G/DL (ref 3.5–5.2)
ALP SERPL-CCNC: 96 U/L (ref 40–150)
ALT SERPL W P-5'-P-CCNC: 25 U/L (ref 0–70)
ANION GAP SERPL CALCULATED.3IONS-SCNC: 14 MMOL/L (ref 7–15)
AST SERPL W P-5'-P-CCNC: 23 U/L (ref 0–45)
BASOPHILS # BLD AUTO: 0 10E3/UL (ref 0–0.2)
BASOPHILS NFR BLD AUTO: 0 %
BILIRUB SERPL-MCNC: 0.2 MG/DL
BUN SERPL-MCNC: 21.2 MG/DL (ref 8–23)
CALCIUM SERPL-MCNC: 10.1 MG/DL (ref 8.8–10.2)
CHLORIDE SERPL-SCNC: 101 MMOL/L (ref 98–107)
CREAT SERPL-MCNC: 1.08 MG/DL (ref 0.67–1.17)
DEPRECATED HCO3 PLAS-SCNC: 26 MMOL/L (ref 22–29)
EGFRCR SERPLBLD CKD-EPI 2021: 75 ML/MIN/1.73M2
EOSINOPHIL # BLD AUTO: 0.2 10E3/UL (ref 0–0.7)
EOSINOPHIL NFR BLD AUTO: 2 %
ERYTHROCYTE [DISTWIDTH] IN BLOOD BY AUTOMATED COUNT: 14.5 % (ref 10–15)
ETHANOL SERPL-MCNC: <0.01 G/DL
GLUCOSE SERPL-MCNC: 112 MG/DL (ref 70–99)
HCT VFR BLD AUTO: 35.9 % (ref 40–53)
HGB BLD-MCNC: 11.9 G/DL (ref 13.3–17.7)
IMM GRANULOCYTES # BLD: 0 10E3/UL
IMM GRANULOCYTES NFR BLD: 0 %
LYMPHOCYTES # BLD AUTO: 1.4 10E3/UL (ref 0.8–5.3)
LYMPHOCYTES NFR BLD AUTO: 18 %
MCH RBC QN AUTO: 29.5 PG (ref 26.5–33)
MCHC RBC AUTO-ENTMCNC: 33.1 G/DL (ref 31.5–36.5)
MCV RBC AUTO: 89 FL (ref 78–100)
MONOCYTES # BLD AUTO: 0.6 10E3/UL (ref 0–1.3)
MONOCYTES NFR BLD AUTO: 8 %
NEUTROPHILS # BLD AUTO: 5.6 10E3/UL (ref 1.6–8.3)
NEUTROPHILS NFR BLD AUTO: 72 %
NRBC # BLD AUTO: 0 10E3/UL
NRBC BLD AUTO-RTO: 0 /100
PLATELET # BLD AUTO: 298 10E3/UL (ref 150–450)
POTASSIUM SERPL-SCNC: 3.5 MMOL/L (ref 3.4–5.3)
PROT SERPL-MCNC: 7.1 G/DL (ref 6.4–8.3)
RBC # BLD AUTO: 4.04 10E6/UL (ref 4.4–5.9)
SODIUM SERPL-SCNC: 141 MMOL/L (ref 135–145)
WBC # BLD AUTO: 7.8 10E3/UL (ref 4–11)

## 2024-06-05 PROCEDURE — 250N000013 HC RX MED GY IP 250 OP 250 PS 637: Performed by: STUDENT IN AN ORGANIZED HEALTH CARE EDUCATION/TRAINING PROGRAM

## 2024-06-05 PROCEDURE — 96365 THER/PROPH/DIAG IV INF INIT: CPT

## 2024-06-05 PROCEDURE — 93005 ELECTROCARDIOGRAM TRACING: CPT

## 2024-06-05 PROCEDURE — 99222 1ST HOSP IP/OBS MODERATE 55: CPT | Performed by: NURSE PRACTITIONER

## 2024-06-05 PROCEDURE — 96372 THER/PROPH/DIAG INJ SC/IM: CPT | Performed by: STUDENT IN AN ORGANIZED HEALTH CARE EDUCATION/TRAINING PROGRAM

## 2024-06-05 PROCEDURE — G0378 HOSPITAL OBSERVATION PER HR: HCPCS

## 2024-06-05 PROCEDURE — 82040 ASSAY OF SERUM ALBUMIN: CPT | Performed by: EMERGENCY MEDICINE

## 2024-06-05 PROCEDURE — 250N000011 HC RX IP 250 OP 636: Performed by: STUDENT IN AN ORGANIZED HEALTH CARE EDUCATION/TRAINING PROGRAM

## 2024-06-05 PROCEDURE — 82077 ASSAY SPEC XCP UR&BREATH IA: CPT | Performed by: EMERGENCY MEDICINE

## 2024-06-05 PROCEDURE — 36415 COLL VENOUS BLD VENIPUNCTURE: CPT | Performed by: EMERGENCY MEDICINE

## 2024-06-05 PROCEDURE — 96375 TX/PRO/DX INJ NEW DRUG ADDON: CPT

## 2024-06-05 PROCEDURE — 250N000011 HC RX IP 250 OP 636

## 2024-06-05 PROCEDURE — 99285 EMERGENCY DEPT VISIT HI MDM: CPT | Mod: 25

## 2024-06-05 PROCEDURE — 258N000003 HC RX IP 258 OP 636: Performed by: STUDENT IN AN ORGANIZED HEALTH CARE EDUCATION/TRAINING PROGRAM

## 2024-06-05 PROCEDURE — 74176 CT ABD & PELVIS W/O CONTRAST: CPT

## 2024-06-05 PROCEDURE — 82374 ASSAY BLOOD CARBON DIOXIDE: CPT | Performed by: EMERGENCY MEDICINE

## 2024-06-05 PROCEDURE — 96361 HYDRATE IV INFUSION ADD-ON: CPT

## 2024-06-05 PROCEDURE — 85004 AUTOMATED DIFF WBC COUNT: CPT | Performed by: EMERGENCY MEDICINE

## 2024-06-05 RX ORDER — OLANZAPINE 10 MG/2ML
10 INJECTION, POWDER, FOR SOLUTION INTRAMUSCULAR ONCE
Status: COMPLETED | OUTPATIENT
Start: 2024-06-05 | End: 2024-06-05

## 2024-06-05 RX ORDER — LORAZEPAM 2 MG/ML
1 INJECTION INTRAMUSCULAR ONCE
Status: COMPLETED | OUTPATIENT
Start: 2024-06-05 | End: 2024-06-05

## 2024-06-05 RX ORDER — OLANZAPINE 10 MG/2ML
INJECTION, POWDER, FOR SOLUTION INTRAMUSCULAR
Status: COMPLETED
Start: 2024-06-05 | End: 2024-06-05

## 2024-06-05 RX ORDER — LEVOFLOXACIN 500 MG/1
500 TABLET, FILM COATED ORAL ONCE
Status: DISCONTINUED | OUTPATIENT
Start: 2024-06-05 | End: 2024-06-05

## 2024-06-05 RX ORDER — LEVOFLOXACIN 5 MG/ML
500 INJECTION, SOLUTION INTRAVENOUS ONCE
Status: COMPLETED | OUTPATIENT
Start: 2024-06-05 | End: 2024-06-05

## 2024-06-05 RX ORDER — DROPERIDOL 2.5 MG/ML
5 INJECTION, SOLUTION INTRAMUSCULAR; INTRAVENOUS ONCE
Status: COMPLETED | OUTPATIENT
Start: 2024-06-05 | End: 2024-06-05

## 2024-06-05 RX ORDER — OLANZAPINE 5 MG/1
10 TABLET, ORALLY DISINTEGRATING ORAL ONCE
Status: COMPLETED | OUTPATIENT
Start: 2024-06-05 | End: 2024-06-05

## 2024-06-05 RX ADMIN — OLANZAPINE 10 MG: 10 INJECTION, POWDER, FOR SOLUTION INTRAMUSCULAR at 13:52

## 2024-06-05 RX ADMIN — DROPERIDOL 5 MG: 2.5 INJECTION, SOLUTION INTRAMUSCULAR; INTRAVENOUS at 16:13

## 2024-06-05 RX ADMIN — LEVOFLOXACIN 500 MG: 500 INJECTION, SOLUTION INTRAVENOUS at 22:08

## 2024-06-05 RX ADMIN — SODIUM CHLORIDE 1000 ML: 9 INJECTION, SOLUTION INTRAVENOUS at 17:29

## 2024-06-05 RX ADMIN — LORAZEPAM 1 MG: 2 INJECTION INTRAMUSCULAR; INTRAVENOUS at 18:17

## 2024-06-05 ASSESSMENT — ACTIVITIES OF DAILY LIVING (ADL)
ADLS_ACUITY_SCORE: 35

## 2024-06-05 NOTE — ED TRIAGE NOTES
Pt comes from [facility] facility. Per EMS, Pt lives on the independent side and but receives some help. Pt has a history paranoid schizophrenia and was recently diagnosed with a UTI.      EMS was called because Pt was verbally hostile toward staff.     When EMS arrived, Pt was initially cooperative. However, he eventually did lock himself in his apartment and force was needed to open the door.     Pt did arrive in 4 point restraints from EMS. Prior to receiving report from EMS, Pt was kicking.       [facility] [place] reports they do not know when he last took psych meds or when he last took ABX for a UTI.

## 2024-06-05 NOTE — ED NOTES
"RN went to place cardia monitor on Pt. He is yelling \"get outta here. You are aren't a doctor. I know who you are. You're Anish Taylor.\"     RN tried to place monitor on Pt. He is pulling blanket over his chest and is not allowing access to chest.     Dr. Nicole updated.   "

## 2024-06-05 NOTE — ED NOTES
"Pt is continuing to yell at staff \"Fuck you faggot. Get out of my room.\" He will not allow a cardiac monitor to be placed. The sitter outside the room has also tired to place cardiac leads. Pt will has delayed his care, I have tried to explain this to him. No evidence of learning.     When the Pt is not stimulated, he is fine. If you talk/interact with him, he is verbally hostile and fighting restraints.   "

## 2024-06-05 NOTE — ED PROVIDER NOTES
Emergency Department Note      History of Present Illness     Chief Complaint  Agitation    HPI  Harpreet Mcelroy is a 68 year old male with history of schizophrenia and bipolar disorder as well as ureteral obstruction s/p ureteral stent, hydronephrosis, perineal urethrostomy who presents via EMS for evaluation of altered mental status. Per EMS, the patient has been acting manic and has stopped taking his prescription of Levaquin for a UTI. Before being placed in a room, he was given 10 mg Zyprexa due to being combative.     Independent Historian  None    Review of External Notes  May 24, 2024, urology note for episodes of jaki presumably secondary to urinary tract infection.  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  CAD  Hydronephrosis, left  Liver lesion  Pulmonary nodules  Schizoaffective disorder  Ureteral obstruction     Medications  Atorvastatin  Benztropine  Buspirone  Bisacodyl  Hydroxyzine  Loperamide  Levofloxacin     Surgical History   Facial reconstruction, post traumatic injury  Transurethral resection of bladder tumor  Cystoscopy with stent, left  Cataract, left    Physical Exam   Patient Vitals for the past 24 hrs:   BP Pulse Resp SpO2   06/05/24 2202 -- -- -- 96 %   06/05/24 1800 -- -- 20 --   06/05/24 1758 (!) 158/106 -- -- 98 %   06/05/24 1700 (!) 141/73 98 -- 96 %   06/05/24 1633 -- -- -- 97 %   06/05/24 1632 -- -- -- 97 %   06/05/24 1631 -- -- -- 97 %   06/05/24 1629 -- -- -- 97 %   06/05/24 1628 -- -- -- 96 %   06/05/24 1627 -- -- -- 98 %   06/05/24 1626 -- -- -- 96 %   06/05/24 1619 (!) 148/74 98 -- 96 %   06/05/24 1559 (!) 140/75 98 -- 98 %   06/05/24 1507 -- -- -- 95 %   06/05/24 1506 -- -- -- 97 %   06/05/24 1505 -- -- -- 96 %   06/05/24 1503 -- -- -- 97 %   06/05/24 1502 -- -- -- 97 %   06/05/24 1501 -- -- -- 98 %   06/05/24 1459 -- -- -- 97 %   06/05/24 1430  (!) 151/75 -- -- 98 %   06/05/24 1424 -- -- 20 --   06/05/24 1422 -- 106 -- 96 %   06/05/24 1421 -- -- -- 93 %   06/05/24 1420 -- -- -- 94 %   06/05/24 1419 -- -- -- 92 %   06/05/24 1417 -- -- -- 96 %   06/05/24 1416 -- -- -- 94 %   06/05/24 1415 -- -- -- 96 %   06/05/24 1414 -- -- -- 98 %   06/05/24 1413 -- -- -- 97 %   06/05/24 1412 -- -- -- 93 %   06/05/24 1410 -- -- -- 94 %   06/05/24 1409 -- -- -- 95 %   06/05/24 1408 -- -- -- 95 %   06/05/24 1407 -- -- -- 93 %   06/05/24 1405 -- -- -- 97 %   06/05/24 1404 -- -- -- 98 %   06/05/24 1400 -- -- 16 --     Physical Exam  GENERAL: Patient agitated, disheveled, verbally abusive.  In restraints.  HEAD: Atraumatic.  NECK: No rigidity  EYE: PERRL  CV: RRR, no murmurs rubs or gallops  PULM: CTAB with good aeration; no retractions, rales, rhonchi, or wheezing  ABD: Soft, nontender, nondistended, no guarding  DERM: No rash. Skin warm and dry  EXTREMITY: Moving all extremities without difficulty.  Psych: Not cooperative.    Diagnostics   Lab Results   Labs Ordered and Resulted from Time of ED Arrival to Time of ED Departure   COMPREHENSIVE METABOLIC PANEL - Abnormal       Result Value    Sodium 141      Potassium 3.5      Carbon Dioxide (CO2) 26      Anion Gap 14      Urea Nitrogen 21.2      Creatinine 1.08      GFR Estimate 75      Calcium 10.1      Chloride 101      Glucose 112 (*)     Alkaline Phosphatase 96      AST 23      ALT 25      Protein Total 7.1      Albumin 4.1      Bilirubin Total 0.2     CBC WITH PLATELETS AND DIFFERENTIAL - Abnormal    WBC Count 7.8      RBC Count 4.04 (*)     Hemoglobin 11.9 (*)     Hematocrit 35.9 (*)     MCV 89      MCH 29.5      MCHC 33.1      RDW 14.5      Platelet Count 298      % Neutrophils 72      % Lymphocytes 18      % Monocytes 8      % Eosinophils 2      % Basophils 0      % Immature Granulocytes 0      NRBCs per 100 WBC 0      Absolute Neutrophils 5.6      Absolute Lymphocytes 1.4      Absolute Monocytes 0.6      Absolute  Eosinophils 0.2      Absolute Basophils 0.0      Absolute Immature Granulocytes 0.0      Absolute NRBCs 0.0     ETHYL ALCOHOL LEVEL - Normal    Alcohol ethyl <0.01     ROUTINE UA WITH MICROSCOPIC REFLEX TO CULTURE       Imaging  CT Abdomen Pelvis w/o Contrast   Final Result   IMPRESSION:    1.  Severe bilateral hydroureteronephrosis with an indwelling appropriately positioned double-J ureteral stent on the left. The left ureter remains markedly dilated to the distal left ureter. Left-sided hydronephrosis and hydroureter could be due to a    renal stricture. The right ureter remains dilated to the ureterovesical orifice. There are no urinary tract stones.   2.  Left renal atrophy with left cortical renal scarring.           ECG  ECG taken at 1615, ECG read at 1519  Sinus rhythm with premature atrial complexes  Incomplete right bundle branch block   Rate 95 bpm. WI interval 184 ms. QRS duration 108 ms. QT/QTc 358/449 ms. P-R-T axes 79 49 49.     Independent Interpretation  None  ED Course    Medications Administered  Medications   levofloxacin (LEVAQUIN) infusion 500 mg (500 mg Intravenous $New Bag 6/5/24 2208)   OLANZapine (zyPREXA) injection 10 mg (10 mg Intramuscular $Given 6/5/24 1352)   droPERidol (INAPSINE) injection 5 mg (5 mg Intramuscular $Given 6/5/24 1613)   OLANZapine zydis (zyPREXA) ODT tab 10 mg (10 mg Oral Not Given 6/5/24 1729)   sodium chloride 0.9% BOLUS 1,000 mL (0 mLs Intravenous Stopped 6/5/24 1818)   LORazepam (ATIVAN) injection 1 mg (1 mg Intravenous $Given 6/5/24 1817)       Procedures  Procedures     Discussion of Management  Admitting HospitalistUsha for Young  UrologyTye    Social Determinants of Health adding to complexity of care  None    ED Course  ED Course as of 06/05/24 2229 Wed Jun 05, 2024   1405 I obtained history and examined the patient as noted above.    1909 I attempted to call all of the patient's listed contacts and did not reach anyone.   2028 I spoke with   Tye from urology.   2138 I spoke with Shu Kerr APC for Dr. Levine of the hospitalist service regarding admission.     Medical Decision Making / Diagnosis   CMS Diagnoses: None    MIPS     None    MDM  Harpreet Mcelroy is a 68 year old male with history of bipolar disorder, schizophrenia, recurrent UTI and ureteral stent and hydronephrosis, presents with jaki.  Differential diagnosis-considered infection, medication noncompliance, acute psychosis, and others.  Patient was agitated and verbally hostile.  He required physical restraints.  Patient was given olanzapine but was persistently agitated so given droperidol.  Was persistently agitated so was given Ativan and subsequently calmed down.  Patient has recurrent history of jaki episodes triggered by untreated UTI.  Patient is supposed to be on levofloxacin but has not been taking it.  Given IV levofloxacin here and IV fluids.  Ordered CT scan showing appropriately placed ureteral stent on the left but he has severe bilateral hydroureteronephrosis.  Luckily, patient is spontaneously voiding.  Postvoid residuals in the 300s but that was a significant period time after his last urination.  I spoke with urology from [place] who states that patient has a perineal urethra so he cannot have a Hernandez placed through the penile urethra, it must go through the perineal urethra if he requires it due to retention.  Current plan is for serial bladder scans and serial creatinines to ensure no obstruction.  If patient is obstructing then Hernandez will be required.    In regards to his jaki, consulted DEC.  Ultimately discussed with hospitalist team due to patient requiring medical treatment in addition to his psychiatric treatment.      Turned over to Dr. Vásquez pending hospitalist team assessment.     Disposition  The patient was admitted to the hospital.     ICD-10 Codes:    ICD-10-CM    1. Acute UTI  N39.0       2. Other hydronephrosis  N13.39       3. Jaki (H)   F30.9              Scribe Disclosure:  I, Amy Martinez, am serving as a scribe at 3:51 PM on 6/5/2024 to document services personally performed by Jae Nicole MD based on my observations and the provider's statements to me.        Jae Nicole MD  06/05/24 6241

## 2024-06-05 NOTE — ED NOTES
"Pt asked sitter to for RN. I went into room. Pt yelled \"get lost.\" And then he tried to raise his arms, he is restrained, as though he wanted to hit me.     "

## 2024-06-06 LAB
ANION GAP SERPL CALCULATED.3IONS-SCNC: 8 MMOL/L (ref 7–15)
ATRIAL RATE - MUSE: 95 BPM
BUN SERPL-MCNC: 19.4 MG/DL (ref 8–23)
CALCIUM SERPL-MCNC: 8.7 MG/DL (ref 8.8–10.2)
CHLORIDE SERPL-SCNC: 107 MMOL/L (ref 98–107)
CREAT SERPL-MCNC: 0.89 MG/DL (ref 0.67–1.17)
DEPRECATED HCO3 PLAS-SCNC: 26 MMOL/L (ref 22–29)
DIASTOLIC BLOOD PRESSURE - MUSE: NORMAL MMHG
EGFRCR SERPLBLD CKD-EPI 2021: >90 ML/MIN/1.73M2
ERYTHROCYTE [DISTWIDTH] IN BLOOD BY AUTOMATED COUNT: 14.9 % (ref 10–15)
GLUCOSE BLDC GLUCOMTR-MCNC: 90 MG/DL (ref 70–99)
GLUCOSE SERPL-MCNC: 105 MG/DL (ref 70–99)
HCT VFR BLD AUTO: 32.1 % (ref 40–53)
HGB BLD-MCNC: 10.5 G/DL (ref 13.3–17.7)
INTERPRETATION ECG - MUSE: NORMAL
MCH RBC QN AUTO: 29 PG (ref 26.5–33)
MCHC RBC AUTO-ENTMCNC: 32.7 G/DL (ref 31.5–36.5)
MCV RBC AUTO: 89 FL (ref 78–100)
P AXIS - MUSE: 79 DEGREES
PLATELET # BLD AUTO: 244 10E3/UL (ref 150–450)
POTASSIUM SERPL-SCNC: 3.7 MMOL/L (ref 3.4–5.3)
PR INTERVAL - MUSE: 184 MS
QRS DURATION - MUSE: 108 MS
QT - MUSE: 358 MS
QTC - MUSE: 449 MS
R AXIS - MUSE: 49 DEGREES
RBC # BLD AUTO: 3.62 10E6/UL (ref 4.4–5.9)
SODIUM SERPL-SCNC: 141 MMOL/L (ref 135–145)
SYSTOLIC BLOOD PRESSURE - MUSE: NORMAL MMHG
T AXIS - MUSE: 49 DEGREES
VENTRICULAR RATE- MUSE: 95 BPM
WBC # BLD AUTO: 6.6 10E3/UL (ref 4–11)

## 2024-06-06 PROCEDURE — 250N000013 HC RX MED GY IP 250 OP 250 PS 637: Performed by: NURSE PRACTITIONER

## 2024-06-06 PROCEDURE — 250N000013 HC RX MED GY IP 250 OP 250 PS 637: Performed by: INTERNAL MEDICINE

## 2024-06-06 PROCEDURE — 36415 COLL VENOUS BLD VENIPUNCTURE: CPT | Performed by: NURSE PRACTITIONER

## 2024-06-06 PROCEDURE — 96376 TX/PRO/DX INJ SAME DRUG ADON: CPT

## 2024-06-06 PROCEDURE — 258N000003 HC RX IP 258 OP 636: Mod: JZ | Performed by: NURSE PRACTITIONER

## 2024-06-06 PROCEDURE — 250N000011 HC RX IP 250 OP 636: Mod: JZ | Performed by: NURSE PRACTITIONER

## 2024-06-06 PROCEDURE — 250N000013 HC RX MED GY IP 250 OP 250 PS 637: Performed by: HOSPITALIST

## 2024-06-06 PROCEDURE — 96372 THER/PROPH/DIAG INJ SC/IM: CPT | Performed by: HOSPITALIST

## 2024-06-06 PROCEDURE — 96361 HYDRATE IV INFUSION ADD-ON: CPT

## 2024-06-06 PROCEDURE — 85027 COMPLETE CBC AUTOMATED: CPT | Performed by: NURSE PRACTITIONER

## 2024-06-06 PROCEDURE — G0378 HOSPITAL OBSERVATION PER HR: HCPCS

## 2024-06-06 PROCEDURE — 250N000011 HC RX IP 250 OP 636: Mod: JZ | Performed by: HOSPITALIST

## 2024-06-06 PROCEDURE — 99233 SBSQ HOSP IP/OBS HIGH 50: CPT | Performed by: HOSPITALIST

## 2024-06-06 PROCEDURE — 80048 BASIC METABOLIC PNL TOTAL CA: CPT | Performed by: NURSE PRACTITIONER

## 2024-06-06 PROCEDURE — 82962 GLUCOSE BLOOD TEST: CPT

## 2024-06-06 RX ORDER — FLUTICASONE PROPIONATE 50 MCG
1 SPRAY, SUSPENSION (ML) NASAL DAILY
Status: DISCONTINUED | OUTPATIENT
Start: 2024-06-06 | End: 2024-06-18 | Stop reason: HOSPADM

## 2024-06-06 RX ORDER — NALOXONE HYDROCHLORIDE 0.4 MG/ML
0.2 INJECTION, SOLUTION INTRAMUSCULAR; INTRAVENOUS; SUBCUTANEOUS
Status: DISCONTINUED | OUTPATIENT
Start: 2024-06-06 | End: 2024-06-18 | Stop reason: HOSPADM

## 2024-06-06 RX ORDER — OLANZAPINE 10 MG/1
20 TABLET ORAL AT BEDTIME
Status: DISCONTINUED | OUTPATIENT
Start: 2024-06-06 | End: 2024-06-06

## 2024-06-06 RX ORDER — BUSPIRONE HYDROCHLORIDE 15 MG/1
30 TABLET ORAL 2 TIMES DAILY
Status: DISCONTINUED | OUTPATIENT
Start: 2024-06-06 | End: 2024-06-18 | Stop reason: HOSPADM

## 2024-06-06 RX ORDER — BUSPIRONE HYDROCHLORIDE 10 MG/1
30 TABLET ORAL 2 TIMES DAILY
Status: DISCONTINUED | OUTPATIENT
Start: 2024-06-06 | End: 2024-06-06

## 2024-06-06 RX ORDER — BENZTROPINE MESYLATE 1 MG/1
2 TABLET ORAL AT BEDTIME
Status: DISCONTINUED | OUTPATIENT
Start: 2024-06-06 | End: 2024-06-08

## 2024-06-06 RX ORDER — AMOXICILLIN 250 MG
2 CAPSULE ORAL 2 TIMES DAILY PRN
Status: DISCONTINUED | OUTPATIENT
Start: 2024-06-06 | End: 2024-06-18 | Stop reason: HOSPADM

## 2024-06-06 RX ORDER — TRAMADOL HYDROCHLORIDE 50 MG/1
50 TABLET ORAL EVERY 6 HOURS PRN
Status: DISCONTINUED | OUTPATIENT
Start: 2024-06-06 | End: 2024-06-06

## 2024-06-06 RX ORDER — TAMSULOSIN HYDROCHLORIDE 0.4 MG/1
0.4 CAPSULE ORAL DAILY
Status: DISCONTINUED | OUTPATIENT
Start: 2024-06-06 | End: 2024-06-18 | Stop reason: HOSPADM

## 2024-06-06 RX ORDER — MULTIPLE VITAMINS W/ MINERALS TAB 9MG-400MCG
1 TAB ORAL DAILY
COMMUNITY

## 2024-06-06 RX ORDER — OLANZAPINE 10 MG/1
10 TABLET ORAL 2 TIMES DAILY
COMMUNITY

## 2024-06-06 RX ORDER — POLYETHYLENE GLYCOL 3350 17 G/17G
17 POWDER, FOR SOLUTION ORAL 2 TIMES DAILY PRN
Status: DISCONTINUED | OUTPATIENT
Start: 2024-06-06 | End: 2024-06-18 | Stop reason: HOSPADM

## 2024-06-06 RX ORDER — LEVOFLOXACIN 500 MG/1
500 TABLET, FILM COATED ORAL DAILY
Status: ON HOLD | COMMUNITY
End: 2024-06-18

## 2024-06-06 RX ORDER — LEVOFLOXACIN 5 MG/ML
500 INJECTION, SOLUTION INTRAVENOUS EVERY 24 HOURS
Status: DISCONTINUED | OUTPATIENT
Start: 2024-06-06 | End: 2024-06-11 | Stop reason: ALTCHOICE

## 2024-06-06 RX ORDER — PROCHLORPERAZINE MALEATE 5 MG
5 TABLET ORAL EVERY 6 HOURS PRN
Status: DISCONTINUED | OUTPATIENT
Start: 2024-06-06 | End: 2024-06-18 | Stop reason: HOSPADM

## 2024-06-06 RX ORDER — TAMSULOSIN HYDROCHLORIDE 0.4 MG/1
0.4 CAPSULE ORAL DAILY
COMMUNITY

## 2024-06-06 RX ORDER — FLUTICASONE PROPIONATE 50 MCG
1 SPRAY, SUSPENSION (ML) NASAL DAILY
COMMUNITY

## 2024-06-06 RX ORDER — OLANZAPINE 10 MG/1
10 TABLET ORAL 2 TIMES DAILY
Status: DISCONTINUED | OUTPATIENT
Start: 2024-06-06 | End: 2024-06-06

## 2024-06-06 RX ORDER — ATORVASTATIN CALCIUM 40 MG/1
40 TABLET, FILM COATED ORAL DAILY
COMMUNITY

## 2024-06-06 RX ORDER — LORAZEPAM 0.5 MG/1
0.5 TABLET ORAL EVERY 4 HOURS PRN
Status: DISCONTINUED | OUTPATIENT
Start: 2024-06-06 | End: 2024-06-18 | Stop reason: HOSPADM

## 2024-06-06 RX ORDER — HYDRALAZINE HYDROCHLORIDE 20 MG/ML
10 INJECTION INTRAMUSCULAR; INTRAVENOUS EVERY 4 HOURS PRN
Status: DISCONTINUED | OUTPATIENT
Start: 2024-06-06 | End: 2024-06-18 | Stop reason: HOSPADM

## 2024-06-06 RX ORDER — ONDANSETRON 4 MG/1
4 TABLET, ORALLY DISINTEGRATING ORAL EVERY 6 HOURS PRN
Status: DISCONTINUED | OUTPATIENT
Start: 2024-06-06 | End: 2024-06-18 | Stop reason: HOSPADM

## 2024-06-06 RX ORDER — DOCUSATE SODIUM 100 MG/1
100 CAPSULE, LIQUID FILLED ORAL 2 TIMES DAILY
Status: DISCONTINUED | OUTPATIENT
Start: 2024-06-06 | End: 2024-06-18 | Stop reason: HOSPADM

## 2024-06-06 RX ORDER — SENNOSIDES 8.6 MG
1 TABLET ORAL 2 TIMES DAILY
COMMUNITY

## 2024-06-06 RX ORDER — ENOXAPARIN SODIUM 100 MG/ML
40 INJECTION SUBCUTANEOUS EVERY 24 HOURS
Status: DISCONTINUED | OUTPATIENT
Start: 2024-06-06 | End: 2024-06-11

## 2024-06-06 RX ORDER — OLANZAPINE 10 MG/1
20 TABLET ORAL AT BEDTIME
Status: DISCONTINUED | OUTPATIENT
Start: 2024-06-06 | End: 2024-06-10

## 2024-06-06 RX ORDER — HYDRALAZINE HYDROCHLORIDE 10 MG/1
10 TABLET, FILM COATED ORAL EVERY 4 HOURS PRN
Status: DISCONTINUED | OUTPATIENT
Start: 2024-06-06 | End: 2024-06-18 | Stop reason: HOSPADM

## 2024-06-06 RX ORDER — AMOXICILLIN 250 MG
1 CAPSULE ORAL 2 TIMES DAILY PRN
Status: DISCONTINUED | OUTPATIENT
Start: 2024-06-06 | End: 2024-06-18 | Stop reason: HOSPADM

## 2024-06-06 RX ORDER — NALOXONE HYDROCHLORIDE 0.4 MG/ML
0.4 INJECTION, SOLUTION INTRAMUSCULAR; INTRAVENOUS; SUBCUTANEOUS
Status: DISCONTINUED | OUTPATIENT
Start: 2024-06-06 | End: 2024-06-18 | Stop reason: HOSPADM

## 2024-06-06 RX ORDER — OLANZAPINE 10 MG/1
10 TABLET ORAL 2 TIMES DAILY
Status: DISCONTINUED | OUTPATIENT
Start: 2024-06-06 | End: 2024-06-10

## 2024-06-06 RX ORDER — BUSPIRONE HYDROCHLORIDE 30 MG/1
30 TABLET ORAL 2 TIMES DAILY
COMMUNITY

## 2024-06-06 RX ORDER — NAPROXEN 250 MG/1
250 TABLET ORAL DAILY PRN
Status: ON HOLD | COMMUNITY
End: 2024-06-18

## 2024-06-06 RX ORDER — SODIUM CHLORIDE, SODIUM LACTATE, POTASSIUM CHLORIDE, CALCIUM CHLORIDE 600; 310; 30; 20 MG/100ML; MG/100ML; MG/100ML; MG/100ML
INJECTION, SOLUTION INTRAVENOUS CONTINUOUS
Status: DISCONTINUED | OUTPATIENT
Start: 2024-06-06 | End: 2024-06-11

## 2024-06-06 RX ORDER — BENZTROPINE MESYLATE 2 MG/1
2 TABLET ORAL AT BEDTIME
Status: ON HOLD | COMMUNITY
End: 2024-06-18

## 2024-06-06 RX ORDER — SENNOSIDES 8.6 MG
1 TABLET ORAL 2 TIMES DAILY
Status: DISCONTINUED | OUTPATIENT
Start: 2024-06-06 | End: 2024-06-18 | Stop reason: HOSPADM

## 2024-06-06 RX ORDER — IBUPROFEN 400 MG/1
400 TABLET, FILM COATED ORAL EVERY 6 HOURS PRN
Status: DISPENSED | OUTPATIENT
Start: 2024-06-06 | End: 2024-06-06

## 2024-06-06 RX ORDER — LORAZEPAM 2 MG/ML
.5-1 INJECTION INTRAMUSCULAR EVERY 4 HOURS PRN
Status: DISCONTINUED | OUTPATIENT
Start: 2024-06-06 | End: 2024-06-18 | Stop reason: HOSPADM

## 2024-06-06 RX ORDER — BENZTROPINE MESYLATE 1 MG/1
2 TABLET ORAL AT BEDTIME
Status: DISCONTINUED | OUTPATIENT
Start: 2024-06-06 | End: 2024-06-06

## 2024-06-06 RX ORDER — OLANZAPINE 20 MG/1
20 TABLET ORAL AT BEDTIME
Status: ON HOLD | COMMUNITY
End: 2024-06-18

## 2024-06-06 RX ORDER — DOCUSATE SODIUM 100 MG/1
100 CAPSULE, LIQUID FILLED ORAL 2 TIMES DAILY
COMMUNITY

## 2024-06-06 RX ORDER — ONDANSETRON 2 MG/ML
4 INJECTION INTRAMUSCULAR; INTRAVENOUS EVERY 6 HOURS PRN
Status: DISCONTINUED | OUTPATIENT
Start: 2024-06-06 | End: 2024-06-18 | Stop reason: HOSPADM

## 2024-06-06 RX ORDER — IBUPROFEN 400 MG/1
400 TABLET, FILM COATED ORAL EVERY 6 HOURS PRN
Status: DISCONTINUED | OUTPATIENT
Start: 2024-06-06 | End: 2024-06-06

## 2024-06-06 RX ORDER — HYDROXYZINE HYDROCHLORIDE 10 MG/1
10 TABLET, FILM COATED ORAL EVERY 6 HOURS PRN
Status: DISCONTINUED | OUTPATIENT
Start: 2024-06-06 | End: 2024-06-18 | Stop reason: HOSPADM

## 2024-06-06 RX ORDER — PROCHLORPERAZINE 25 MG
12.5 SUPPOSITORY, RECTAL RECTAL EVERY 12 HOURS PRN
Status: DISCONTINUED | OUTPATIENT
Start: 2024-06-06 | End: 2024-06-18 | Stop reason: HOSPADM

## 2024-06-06 RX ORDER — NAPROXEN 250 MG/1
250 TABLET ORAL DAILY PRN
Status: DISCONTINUED | OUTPATIENT
Start: 2024-06-06 | End: 2024-06-08

## 2024-06-06 RX ADMIN — SENNOSIDES 1 TABLET: 8.6 TABLET, FILM COATED ORAL at 11:42

## 2024-06-06 RX ADMIN — SODIUM CHLORIDE, POTASSIUM CHLORIDE, SODIUM LACTATE AND CALCIUM CHLORIDE: 600; 310; 30; 20 INJECTION, SOLUTION INTRAVENOUS at 00:46

## 2024-06-06 RX ADMIN — SODIUM CHLORIDE, POTASSIUM CHLORIDE, SODIUM LACTATE AND CALCIUM CHLORIDE: 600; 310; 30; 20 INJECTION, SOLUTION INTRAVENOUS at 22:08

## 2024-06-06 RX ADMIN — BUSPIRONE HYDROCHLORIDE 30 MG: 15 TABLET ORAL at 11:42

## 2024-06-06 RX ADMIN — SENNOSIDES 1 TABLET: 8.6 TABLET, FILM COATED ORAL at 20:58

## 2024-06-06 RX ADMIN — TRAMADOL HYDROCHLORIDE 50 MG: 50 TABLET, COATED ORAL at 08:45

## 2024-06-06 RX ADMIN — OLANZAPINE 20 MG: 10 TABLET, FILM COATED ORAL at 21:51

## 2024-06-06 RX ADMIN — TAMSULOSIN HYDROCHLORIDE 0.4 MG: 0.4 CAPSULE ORAL at 11:42

## 2024-06-06 RX ADMIN — SODIUM CHLORIDE, POTASSIUM CHLORIDE, SODIUM LACTATE AND CALCIUM CHLORIDE: 600; 310; 30; 20 INJECTION, SOLUTION INTRAVENOUS at 11:43

## 2024-06-06 RX ADMIN — LEVOFLOXACIN 500 MG: 500 INJECTION, SOLUTION INTRAVENOUS at 21:01

## 2024-06-06 RX ADMIN — OLANZAPINE 10 MG: 10 TABLET, FILM COATED ORAL at 20:58

## 2024-06-06 RX ADMIN — DOCUSATE SODIUM 100 MG: 100 CAPSULE, LIQUID FILLED ORAL at 20:58

## 2024-06-06 RX ADMIN — BENZTROPINE MESYLATE 2 MG: 1 TABLET ORAL at 21:51

## 2024-06-06 RX ADMIN — LORAZEPAM 0.5 MG: 0.5 TABLET ORAL at 08:54

## 2024-06-06 RX ADMIN — LORAZEPAM 0.5 MG: 0.5 TABLET ORAL at 14:00

## 2024-06-06 RX ADMIN — BUSPIRONE HYDROCHLORIDE 30 MG: 15 TABLET ORAL at 20:58

## 2024-06-06 RX ADMIN — ENOXAPARIN SODIUM 40 MG: 40 INJECTION SUBCUTANEOUS at 14:01

## 2024-06-06 RX ADMIN — DOCUSATE SODIUM 100 MG: 100 CAPSULE, LIQUID FILLED ORAL at 11:42

## 2024-06-06 RX ADMIN — IBUPROFEN 400 MG: 400 TABLET, FILM COATED ORAL at 03:49

## 2024-06-06 RX ADMIN — OLANZAPINE 10 MG: 10 TABLET, FILM COATED ORAL at 11:42

## 2024-06-06 ASSESSMENT — ACTIVITIES OF DAILY LIVING (ADL)
ADLS_ACUITY_SCORE: 45
ADLS_ACUITY_SCORE: 46
ADLS_ACUITY_SCORE: 31
ADLS_ACUITY_SCORE: 50
ADLS_ACUITY_SCORE: 47
ADLS_ACUITY_SCORE: 35
ADLS_ACUITY_SCORE: 46
ADLS_ACUITY_SCORE: 38
ADLS_ACUITY_SCORE: 47
ADLS_ACUITY_SCORE: 50
ADLS_ACUITY_SCORE: 45
ADLS_ACUITY_SCORE: 50
ADLS_ACUITY_SCORE: 46
ADLS_ACUITY_SCORE: 50
ADLS_ACUITY_SCORE: 38
ADLS_ACUITY_SCORE: 50
ADLS_ACUITY_SCORE: 50
ADLS_ACUITY_SCORE: 46
ADLS_ACUITY_SCORE: 35

## 2024-06-06 NOTE — ED NOTES
Patient has not been seen by MD on medical unit.   It is not know if this patient will need to stay on medical unit.  We would not be able to make full recommendations for disposition if patient is in need of staying on medical unit at this time.       DEC order remains open.  ED will call when patient condition more fully vetted for next disposition opportunity (stay in medical vs discharge options) .

## 2024-06-06 NOTE — ED NOTES
[facility]  ED Nurse Handoff Report    ED Chief complaint: Agitation  . ED Diagnosis:   Final diagnoses:   Acute UTI   Other hydronephrosis   Carol (H)       Allergies:   Allergies   Allergen Reactions    Lactose Anxiety     Lactose intolerance    Aspirin     Cucumber Extract Unknown     pickles    Diphenhydramine Other (See Comments)     Lock jaw. Could not open mouth    Pollen Extract Other (See Comments)     Sneezing and congestion    Acetaminophen Rash     Fixed drug reaction       Code Status: Full Code    Activity level - Baseline/Home:  standby.  Activity Level - Current:   assist of 1.   Lift room needed: No.   Bariatric: No   Needed: No   Isolation: No.   Infection: Not Applicable.     Respiratory status: Room air    Vital Signs (within 30 minutes):   Vitals:    06/05/24 1700 06/05/24 1758 06/05/24 1800 06/05/24 2202   BP: (!) 141/73 (!) 158/106     Pulse: 98      Resp:   20    SpO2: 96% 98%  96%       Cardiac Rhythm:  ,      Pain level:    Patient confused: Yes.   Patient Falls Risk: bed/chair alarm on, nonskid shoes/slippers when out of bed, arm band in place, patient and family education, assistive device/personal items within reach, activity supervised, mobility aid in reach, room door open, and toileting schedule implemented.   Elimination Status: Has voided     Patient Report - Initial Complaint: Agitation/AMS.   Focused Assessment: Harpreet Mcelroy is a 68 year old male with history of schizophrenia and bipolar disorder as well as ureteral obstruction s/p ureteral stent, hydronephrosis, perineal urethrostomy who presents via EMS for evaluation of altered mental status. Per EMS, the patient has been acting manic and has stopped taking his prescription of Levaquin for a UTI. Before being placed in a room, he was given 10 mg Zyprexa due to being combative.     Abnormal Results:   Labs Ordered and Resulted from Time of ED Arrival to Time of ED Departure    COMPREHENSIVE METABOLIC PANEL - Abnormal       Result Value    Sodium 141      Potassium 3.5      Carbon Dioxide (CO2) 26      Anion Gap 14      Urea Nitrogen 21.2      Creatinine 1.08      GFR Estimate 75      Calcium 10.1      Chloride 101      Glucose 112 (*)     Alkaline Phosphatase 96      AST 23      ALT 25      Protein Total 7.1      Albumin 4.1      Bilirubin Total 0.2     CBC WITH PLATELETS AND DIFFERENTIAL - Abnormal    WBC Count 7.8      RBC Count 4.04 (*)     Hemoglobin 11.9 (*)     Hematocrit 35.9 (*)     MCV 89      MCH 29.5      MCHC 33.1      RDW 14.5      Platelet Count 298      % Neutrophils 72      % Lymphocytes 18      % Monocytes 8      % Eosinophils 2      % Basophils 0      % Immature Granulocytes 0      NRBCs per 100 WBC 0      Absolute Neutrophils 5.6      Absolute Lymphocytes 1.4      Absolute Monocytes 0.6      Absolute Eosinophils 0.2      Absolute Basophils 0.0      Absolute Immature Granulocytes 0.0      Absolute NRBCs 0.0     ETHYL ALCOHOL LEVEL - Normal    Alcohol ethyl <0.01     ROUTINE UA WITH MICROSCOPIC REFLEX TO CULTURE        CT Abdomen Pelvis w/o Contrast   Final Result   IMPRESSION:    1.  Severe bilateral hydroureteronephrosis with an indwelling appropriately positioned double-J ureteral stent on the left. The left ureter remains markedly dilated to the distal left ureter. Left-sided hydronephrosis and hydroureter could be due to a    renal stricture. The right ureter remains dilated to the ureterovesical orifice. There are no urinary tract stones.   2.  Left renal atrophy with left cortical renal scarring.             Treatments provided: See MAR  Family Comments: n/a  OBS brochure/video discussed/provided to patient:  Yes  ED Medications:   Medications   OLANZapine (zyPREXA) injection 10 mg (10 mg Intramuscular $Given 6/5/24 4868)   droPERidol (INAPSINE) injection 5 mg (5 mg Intramuscular $Given 6/5/24 1613)   OLANZapine zydis (zyPREXA) ODT tab 10 mg (10 mg Oral Not Given  6/5/24 1729)   sodium chloride 0.9% BOLUS 1,000 mL (0 mLs Intravenous Stopped 6/5/24 1818)   LORazepam (ATIVAN) injection 1 mg (1 mg Intravenous $Given 6/5/24 1817)   levofloxacin (LEVAQUIN) infusion 500 mg (0 mg Intravenous Stopped 6/5/24 2307)       Drips infusing:  No  For the majority of the shift this patient was Green  Interventions performed were n/a.    Sepsis treatment initiated: No    Cares/treatment/interventions/medications to be completed following ED care: n/a    ED Nurse Name: Patt Clayton RN  11:15 PM    RECEIVING UNIT ED HANDOFF REVIEW    Above ED Nurse Handoff Report was reviewed: Yes  Reviewed by: Masha Pretty RN on June 6, 2024 at 2:11 AM   I Shi called the ED to inform them the note was read: Yes

## 2024-06-06 NOTE — CONSULTS
Care Management Initial Consult    General Information  Assessment completed with: Care Team Member, Family, RN Janee,  Bessie  Type of CM/SW Visit: Initial Assessment    Primary Care Provider verified and updated as needed: Yes   Readmission within the last 30 days:     Reason for Consult: discharge planning  Advance Care Planning: Advance Care Planning Reviewed: no concerns identified        Communication Assessment  Patient's communication style: spoken language (English or Bilingual)    Hearing Difficulty or Deaf: no   Wear Glasses or Blind: no    Cognitive  Cognitive/Neuro/Behavioral: .WDL except, orientation  Level of Consciousness: confused  Arousal Level: opens eyes spontaneously  Orientation: disoriented to, place, time, situation  Mood/Behavior: agitated  Best Language: 0 - No aphasia  Speech: spontaneous, slurred    Living Environment:   People in home: facility resident     Current living Arrangements: assisted living  Name of Facility: [facility]   Able to return to prior arrangements: yes     Family/Social Support:  Care provided by: other (see comments) ([facility] staff)  Provides care for: no one, unable/limited ability to care for self  Marital Status: Single             Description of Support System: Uninvolved    Support Assessment: Lacks necessary supervision and assistance    Current Resources:   Patient receiving home care services: No    Employment/Financial:  Employment Status: retired      Financial Concerns: none   Referral to Financial Worker: No     Does the patient's insurance plan have a 3 day qualifying hospital stay waiver?  No    Lifestyle & Psychosocial Needs:  Social Determinants of Health     Food Insecurity: No Food Insecurity (3/2/2023)    [facility], [facility]    Hunger Vital Sign     Worried About Running Out of Food in the Last Year: Never true     Ran Out of Food in the Last Year: Never true   Depression: Not at risk (3/2/2023)     "Received from [facility], [facility]    PHQ-2     PHQ-2 Subtotal: 0   Housing Stability: Not on file   Tobacco Use: Medium Risk (2/14/2024)    Received from [facility]    Patient History     Smoking Tobacco Use: Former     Smokeless Tobacco Use: Never     Passive Exposure: Not on file   Financial Resource Strain: Not on file   Alcohol Use: Not on file   Transportation Needs: Not on file   Physical Activity: Not on file   Interpersonal Safety: Not on file   Stress: Not on file   Social Connections: Not on file   Health Literacy: Not on file     Functional Status:  Prior to admission patient needed assistance:   Patient was admitted under observation status for UTI, Carol.     Patient not appropriate for SW assessment this date. HIRAM placed call to [facility], [phone], spoke with RN Janee. She reports that patient was resided in [facility] since March of 2020. Patient receives assistance with housekeeping, meals, medication administration, and bathing. She reports that patient's baseline is quiet and reserved. They have noticed a significant decline in patient's MH symptoms within the past week where patient pocketing/hiding medications and then ultimately refusing meds altogether, accusing the facility of poisoning him. They have followed up with patient's OP Psychiatrist with no new updated orders or recs.     HIRAM placed call to patient's contacts. Arianna ([phone]) listed as relation \"other\", number not in services. Patient's secondary contacted is Vanessa listed as \"other\" ([phone]), VM is for a [facility] through [facility]. Did not leave .     Facility has pt's siblings Bessie and Liu Mcelroy listed in their records, [phone]. Facility RN was going to check if they have POA/HCD paperwork on file and send to SW if so. HIRAM placed call to [phone], spoke with pt's sister Bessie who lives with patient's brother Liu. Was not able to obtain any additional information. " Pt's sister reports that they live in [place] and speak to patient on the phone occasionally but don't visit patient at his facility and haven't seen him in quite some time.    Psych consult pending.     Mental Health Status:  Mental Health Status: No Current Concerns       Chemical Dependency Status:  Chemical Dependency Status: No Current Concerns           Values/Beliefs:  Spiritual, Cultural Beliefs, Anabaptism Practices, Values that affect care: yes             Additional Information:  Plan: Awaiting psych eval and recs. SW will continue to follow and assist with discharge planning as needed.     LIANNA Cat, [facility]   Inpatient Care Coordination  [facility]   [phone]

## 2024-06-06 NOTE — H&P
[facility]    History and Physical - Hospitalist Service       Date of Admission:  6/5/2024    Assessment & Plan      Harpreet Mcelroy is a 68 year old male admitted on 6/5/2024 AMS and UTI. PMHx significant for schizophrenia and bipolar disorder as well as ureteral obstruction s/p ureteral stent, hydronephrosis, perineal urethrostomy. Per EMS, the patient has been acting manic and has stopped taking his prescription of Levaquin for a UTI. Pt required droperiodol, ativan, and zyprexa given in ED for behaviors. [facility] was consulted by the ER physician who recommended serial creatinines and bladder scans.     #Urinary tract infection  with history of ureteral obstruction, s/p stent, hydronephrosis, and perineal urethrostomy.- Non-adherent with PTA Levaquin.  Followed by [facility].  Due for stent exchange on 8/13/2024.   - Continue IV Levaquin 500 mg daily (Levaquin likely choosen based on history of Stenotrophomonas maltophilia).   - Obtain urine sample for UA and reflex culture.  - Bladder scan q 6 hours and PRN  - Continue tamsulosin.  - If PVR >500 mL, place oscar catheter  - CBC and BMP in AM    #Delirium in the setting of UTI.  Hx of schizophrenia and bipolar disorder.    - Bedside attendant  - Allow sleep throughout night if VSS  - PRN Ativan for anxiety, agitation  - Delirium precautions  - Fall precautions  - Awaiting medication reconciliation.  Appears to be on buspirone 30 mg PO BID, olanzapine 10 mg po bid and 20 mg at HS, benztropine 2 mg at HS, and hydroxyzine 10 mg QID prn anxiety. Will hold the above tonight but plan on resuming in the AM.  Have ordered PRN medications for delirium/agitation.        Diet:  Regular diet. IVF. Replete electrolytes as needed.  DVT Prophylaxis: Pneumatic Compression Devices  Oscar Catheter: Not present  Lines: None     Cardiac Monitoring: ACTIVE order. Indication: QTc prolonging medication (48 hours)  Code Status:  Full  "code    Clinically Significant Risk Factors Present on Admission                                         Disposition Plan  per clinical course.    Medically Ready for Discharge: Anticipated Today         The patient's care was discussed with the Bedside Nurse and Patient.    ANGIE Gallegos  [facility]  ECHO Guaman CNP   Hospitalist Service  [facility]  Securely message with Vocera (more info)  Text page via [employer] Paging/Directory         Attestation: 6/5/2024 1747  \"I was present with the student who participated in the service and in the documentation of the note. I have verified the history and personally performed the physical exam and medical decision-making. I agree with the assessment and plan of care as documented in the note.\"     ECHO Guaman CNP    -------------------------------------------------------------------------------------  Chief Complaint   Altered mental status    History is obtained from the electronic health record and emergency department physician    History of Present Illness   Harpreet Mcelroy is a 68 year old male with history of schizophrenia and bipolar disorder as well as ureteral obstruction s/p ureteral stent, hydronephrosis, perineal urethrostomy who presented to  ED for evaluation of altered mental status. Per EMS, the patient has been acting manic and has stopped taking his prescription of Levaquin for a UTI. Pt required droperiodol, ativan, and zyprexa given in ED.     Ed course:   Imaging:  CT Abd/Pelvis IMPRESSION:   \"1.  Severe bilateral hydroureteronephrosis with an indwelling appropriately positioned double-J ureteral stent on the left. The left ureter remains markedly dilated to the distal left ureter. Left-sided hydronephrosis and hydroureter could be due to a   renal stricture. The right ureter remains dilated to the ureterovesical orifice. There are no urinary tract stones.  2.  Left renal atrophy with " "left cortical renal scarring.\"      Past Medical History    Past Medical History:   Diagnosis Date    Anxiety     Bipolar 1 disorder (H)     Cancer (H)     Depressive disorder     Psychiatric diagnosis        Past Surgical History   No past surgical history on file.    Prior to Admission Medications   None        Review of Systems    The 10 point Review of Systems is negative other than noted in the HPI or here.     Social History   I have reviewed this patient's social history and updated it with pertinent information if needed.  Social History     Tobacco Use    Smoking status: Former    Smokeless tobacco: Current   Substance Use Topics    Alcohol use: Not Currently    Drug use: Never         Family History     Unable to obtain due to: altered mental status      Allergies   Allergies   Allergen Reactions    Lactose Anxiety     Lactose intolerance    Aspirin     Cucumber Extract Unknown     pickles    Diphenhydramine Other (See Comments)     Lock jaw. Could not open mouth    Pollen Extract Other (See Comments)     Sneezing and congestion    Acetaminophen Rash     Fixed drug reaction        Physical Exam   Vital Signs:     BP: (!) 158/106 Pulse: 98   Resp: 20 SpO2: 96 % O2 Device: None (Room air)    Weight: 0 lbs 0 oz    GEN:  Resting in bed. Drowsy. Awakens to voice. NAD. Not engaging in conversation.  NECK: Supple, no mass or thyromegaly   CV:  Regular rate and rhythm, no murmur or JVD.  S1 + S2 noted, no S3 or S4.  LUNGS: Clear to auscultation bilaterally without rales/rhonchi/wheezing/retractions.  Symmetric chest rise on inhalation noted.  ABD: Active bowel sounds, soft, non-tender/non-distended.  No rebound/guarding/rigidity.  SKIN: Dry to touch, no exanthems noted in the visualized areas.  Neurologic: Grossly intact,non focal.   Neuropsychiatric: unable to assess  General:   Orientation: unable to assess      Medical Decision Making       60 MINUTES SPENT BY ME on the date of service doing chart review, " history, exam, documentation & further activities per the note.      Data     I have personally reviewed the following data over the past 24 hrs:    7.8  \   11.9 (L)   / 298     141 101 21.2 /  112 (H)   3.5 26 1.08 \     ALT: 25 AST: 23 AP: 96 TBILI: 0.2   ALB: 4.1 TOT PROTEIN: 7.1 LIPASE: N/A       Imaging results reviewed over the past 24 hrs:   Recent Results (from the past 24 hour(s))   CT Abdomen Pelvis w/o Contrast    Narrative    EXAM: CT ABDOMEN PELVIS W/O CONTRAST  LOCATION: [facility]  DATE: 6/5/2024    INDICATION: History of recurrent ureteral stent issues. Stopped taking antibiotics. Evaluate for stent issue vs infection.  COMPARISON: None.  TECHNIQUE: CT scan of the abdomen and pelvis was performed without IV contrast. Multiplanar reformats were obtained. Dose reduction techniques were used.  CONTRAST: None.    FINDINGS:   LOWER CHEST: Normal.    HEPATOBILIARY: 7 mm low-dense lesion involves the left lobe of the liver which is too small to characterize but likely a cyst or hemangioma.    PANCREAS: Fatty infiltration of the pancreas.    SPLEEN: Normal.    ADRENAL GLANDS: Normal.    KIDNEYS/BLADDER: Left-sided double-J ureteral stents in place, in appropriate position. There is moderate to severe bilateral hydroureteronephrosis without obstructing stone. The left ureter is dilated to the distal left ureter and the right ureter is   dilated to the ureterovesical orifice. There is some atrophy and cortical scarring involving the left kidney.    BOWEL: Moderate amount of stool is present throughout the colon. No bowel obstruction or bowel inflammation. There is no evidence of appendicitis.    LYMPH NODES: Normal.    VASCULATURE: Normal caliber aorta with diffuse atherosclerotic calcification.    PELVIC ORGANS: Normal.    MUSCULOSKELETAL: No suspicious osseous lesions.      Impression    IMPRESSION:   1.  Severe bilateral hydroureteronephrosis with an indwelling appropriately  positioned double-J ureteral stent on the left. The left ureter remains markedly dilated to the distal left ureter. Left-sided hydronephrosis and hydroureter could be due to a   renal stricture. The right ureter remains dilated to the ureterovesical orifice. There are no urinary tract stones.  2.  Left renal atrophy with left cortical renal scarring.

## 2024-06-06 NOTE — PHARMACY-ADMISSION MEDICATION HISTORY
Pharmacist Admission Medication History    Admission medication history is complete. The information provided in this note is only as accurate as the sources available at the time of the update.    Information Source(s): Hospital records and CareEverywhere/SureScripts via N/A    Pertinent Information: Medication list was updated to the best of writer's ability. Patient unwilling to participate in interview. His facility stated that he manages his medications and they don't have a MAR. This list was based on most recent clinic/hospital visits, and sure scripts data. Last doses are unknown.     Changes made to PTA medication list:  Added: All medications  Deleted: None  Changed: None    Allergies reviewed with patient and updates made in EHR: unable to assess    Medication History Completed By: Robinson Dawkins [facility] 6/6/2024 11:13 AM    PTA Med List   Medication Sig Last Dose    atorvastatin (LIPITOR) 40 MG tablet Take 40 mg by mouth daily Unknown    benztropine (COGENTIN) 2 MG tablet Take 2 mg by mouth at bedtime Unknown    busPIRone HCl (BUSPAR) 30 MG tablet Take 30 mg by mouth 2 times daily Unknown    docusate sodium (COLACE) 100 MG capsule Take 100 mg by mouth 2 times daily Unknown    fluticasone (FLONASE) 50 MCG/ACT nasal spray Spray 1 spray into both nostrils daily Unknown    levofloxacin (LEVAQUIN) 500 MG tablet Take 500 mg by mouth daily Unknown    multivitamin w/minerals (THERA-VIT-M) tablet Take 1 tablet by mouth daily Unknown    naproxen (NAPROSYN) 250 MG tablet Take 250 mg by mouth daily as needed for moderate pain Unknown    OLANZapine (ZYPREXA) 10 MG tablet Take 10 mg by mouth 2 times daily Unknown    OLANZapine (ZYPREXA) 20 MG tablet Take 20 mg by mouth at bedtime Unknown    sennosides (SENOKOT) 8.6 MG tablet Take 1 tablet by mouth 2 times daily Unknown    tamsulosin (FLOMAX) 0.4 MG capsule Take 0.4 mg by mouth daily Unknown

## 2024-06-06 NOTE — CONSULTS
6/5/2024  Harprete Mcelroy 1955     Writer consulted with ED RN/staff,  on this date at 8:30pm. It was determined that pt would not benefit from assessment at this time due to Pt unable able to participate in assessment. Patient is sleeping and unable to participate in the assessment despite ED staff trying to wake him up and encourage him to speak with writer.     ED will call DEC at [phone] when pt is ready, alert and able to participate in assessment.       Faustina Moreno, CATSW

## 2024-06-06 NOTE — PLAN OF CARE
"Goal Outcome Evaluation:      Plan of Care Reviewed With: patient    Overall Patient Progress: no changeOverall Patient Progress: no change    Outcome Evaluation: Pt c/o low back pain. Tramodol given    Pt is alert to self, confused.  Pt is hard to understand, garbled speech and no teeth makes it hard. Pt is tolerating regular diet. Incontinent of bladder, bladder scanned 586 trying to get pt on toilet to urinate in hat, not cooperating. Will try again. LR at 100 ml/hr. DEC to see pt today in person, by end of day.    Problem: Adult Inpatient Plan of Care  Goal: Plan of Care Review  Description: The Plan of Care Review/Shift note should be completed every shift.  The Outcome Evaluation is a brief statement about your assessment that the patient is improving, declining, or no change.  This information will be displayed automatically on your shift  note.  Outcome: Not Progressing  Flowsheets (Taken 6/6/2024 1113)  Outcome Evaluation: Pt c/o low back pain. Tramodol given  Plan of Care Reviewed With: patient  Overall Patient Progress: no change  Goal: Patient-Specific Goal (Individualized)  Description: You can add care plan individualizations to a care plan. Examples of Individualization might be:  \"Parent requests to be called daily at 9am for status\", \"I have a hard time hearing out of my right ear\", or \"Do not touch me to wake me up as it startles  me\".  Outcome: Not Progressing  Goal: Absence of Hospital-Acquired Illness or Injury  Outcome: Not Progressing  Intervention: Identify and Manage Fall Risk  Recent Flowsheet Documentation  Taken 6/6/2024 0915 by Justus Foote, RN  Safety Promotion/Fall Prevention:   activity supervised   bedside attendant   assistive device/personal items within reach   room door open   lighting adjusted   clutter free environment maintained   supervised activity   safety round/check completed   nonskid shoes/slippers when out of bed   mobility aid in reach  Intervention: Prevent Skin " Injury  Recent Flowsheet Documentation  Taken 6/6/2024 0845 by Justus Foote, RN  Body Position: position changed independently  Intervention: Prevent Infection  Recent Flowsheet Documentation  Taken 6/6/2024 0845 by Justus Foote, RN  Infection Prevention:   hand hygiene promoted   rest/sleep promoted  Goal: Optimal Comfort and Wellbeing  Outcome: Not Progressing  Goal: Readiness for Transition of Care  Outcome: Not Progressing     Problem: Seclusion/Restraint, Behavioral  Goal: Seclusion/Behavioral Restraint Goal: Absence of Harm or Injury  Outcome: Not Progressing  Intervention: Protect Skin and Joint Integrity  Recent Flowsheet Documentation  Taken 6/6/2024 0845 by Justus Foote, RN  Body Position: position changed independently     Problem: Urinary Diversion  Goal: Effective Urinary Elimination  Outcome: Not Progressing

## 2024-06-06 NOTE — PROGRESS NOTES
"Trying to get pt up on the toilet to void, for UA. Pt getting agitated because he has to repeat himself. He said \"Quit badgering me\" will try again in a few mins    Went to put in Hernandez pt had voided a large amount and small BM. Cleaned up and changed.  Did another bladder scanned for 586.      "

## 2024-06-06 NOTE — ED NOTES
6/5/2024  Harpreet Mcelroy 1955     Writer consulted with ED provider Dr. Levine, and Charge Nurse,  on this date at  11:00 AM. It was determined that pt would not benefit from assessment at this time due to provider requesting that pt be on by on-site provider, due to pt speech being difficult to hear/understand. Provider and Nurse aware of delay to pt being seen, due to wanting an on-site provider. If no longer wanting in-person, pt may be seen by telemedicine DEC .     ED will call DEC at [phone] when pt is ready and able to participate in assessment.       Edith Jose, [facility]

## 2024-06-06 NOTE — PLAN OF CARE
"Goal Outcome Evaluation:      Plan of Care Reviewed With: patient    Overall Patient Progress: no changeOverall Patient Progress: no change    Outcome Evaluation: Pt c/o low back pain. Tramodol given    Pt is alert to self, confused.  Pt is hard to understand, garbled speech and no teeth makes it hard. Pt is tolerating regular diet. Incontinent of bladder, bladder scanned 586 trying to get pt on toilet to urinate in hat, not cooperating. Will try again. LR at 100 ml/hr. DEC to see pt today.      Problem: Adult Inpatient Plan of Care  Goal: Plan of Care Review  Description: The Plan of Care Review/Shift note should be completed every shift.  The Outcome Evaluation is a brief statement about your assessment that the patient is improving, declining, or no change.  This information will be displayed automatically on your shift  note.  Outcome: Not Progressing  Flowsheets (Taken 6/6/2024 1113)  Outcome Evaluation: Pt c/o low back pain. Tramodol given  Plan of Care Reviewed With: patient  Overall Patient Progress: no change  Goal: Patient-Specific Goal (Individualized)  Description: You can add care plan individualizations to a care plan. Examples of Individualization might be:  \"Parent requests to be called daily at 9am for status\", \"I have a hard time hearing out of my right ear\", or \"Do not touch me to wake me up as it startles  me\".  Outcome: Not Progressing  Goal: Absence of Hospital-Acquired Illness or Injury  Outcome: Not Progressing  Intervention: Identify and Manage Fall Risk  Recent Flowsheet Documentation  Taken 6/6/2024 0845 by Justus Foote, RN  Safety Promotion/Fall Prevention:   activity supervised   bedside attendant   assistive device/personal items within reach   room door open   lighting adjusted   clutter free environment maintained   supervised activity   safety round/check completed   nonskid shoes/slippers when out of bed   mobility aid in reach  Intervention: Prevent Skin Injury  Recent Flowsheet " Documentation  Taken 6/6/2024 0845 by Justus Foote, RN  Body Position: position changed independently  Intervention: Prevent Infection  Recent Flowsheet Documentation  Taken 6/6/2024 0845 by Justus Foote, RN  Infection Prevention:   hand hygiene promoted   rest/sleep promoted  Goal: Optimal Comfort and Wellbeing  Outcome: Not Progressing  Goal: Readiness for Transition of Care  Outcome: Not Progressing     Problem: Seclusion/Restraint, Behavioral  Goal: Seclusion/Behavioral Restraint Goal: Absence of Harm or Injury  Outcome: Not Progressing  Intervention: Protect Skin and Joint Integrity  Recent Flowsheet Documentation  Taken 6/6/2024 0845 by Justus Foote, RN  Body Position: position changed independently     Problem: Urinary Diversion  Goal: Effective Urinary Elimination  Outcome: Not Progressing

## 2024-06-06 NOTE — ED PROVIDER NOTES
Hx BPD, kidney infections with stent in place, was supposed to be on abx but stopped which worsens his jaki. Now has severe bilateral hydronephrosis,  reviewed with Urology, watching if voiding spontaneously, if retaining then needs oscar.     Liu Vásquez MD  [employer]  10:09 PM 06/05/24        Liu Vásquez MD  06/06/24 0047

## 2024-06-06 NOTE — CONSULTS
DEC Consult Order placed by Silver Levine MD, at 0726 today.      Medical record reviewed, including care management initial consult today by LIANNA Cat.      Consulted with Clinical Supervisor FRANCES Real. Pt not appropriate for DEC assessment today due to confusion and disorientation most likely related to acute medical condition.     IP Psychiatry consult ordered. Contacted [facility] Psychiatry coordinator who advised that a consulting psychiatrist is next expected to be on-site at [facility] Saturday, 6/8.   Limited availability for psychiatry consults via telehealth Friday, 6/8. Call [phone] with questions.     Discussed in-person with charge RN on [facility] OBS (second floor). Discussed via phone with Dr. Silver Levine.     DEC Consult acknowledged and completed.     ANA SANCHEZ M.Ed., [facility], LADC  Licensed Mental Health Professional  Triage and Transition Services - [phone]    [facility] workstation [phone]  [facility] iPhone [phone]

## 2024-06-06 NOTE — PROGRESS NOTES
Cross Cover    Called for back pain and request for apap  Ordered apap and alerted to allergy, d/w RN who d/w patient who confirmed apap allergy (can't sleep when he takes it), requesting ibuprofen     Renal function wnl   Her with UTI - complicated in setting of hx of ureteral obstruction with stent   CT with appropriately positioned bilateral stents but positive for bilateral hydroureter     Single dose ibuprofen ordered for tonight

## 2024-06-06 NOTE — PROGRESS NOTES
"[facility]    Medicine Progress Note - Hospitalist Service    Date of Admission:  6/5/2024    Assessment & Plan   Harpreet Mcelroy is a 68 year old male with history of schizophrenia and bipolar disorder, invasive bladder cancer, ureteral obstruction s/p ureteral stent, hydronephrosis, perineal urethrostomy, lives at [facility] admitted on 6/5/2024 with behavioral changes and UTI (after his paranoia caused him to stop taking his antibiotics)    Urinary tract infection, complicated    - was prescribed Levaquin by his [facility] group recently (Stenotrophomonas maltophilia in past)    - I do not see any recent cultures    - UA done here, but no culture, I have ordered this    - will continue Levaquin    - per his care facility, they found out he had been hiding his meds and was not taking them due to paranoia of them being poisoned      Invasive bladder cancer  history of ureteral obstruction  s/p stent, hydronephrosis, and perineal urethrostomy    - follows at [facility]    - last stent exchange was in March, due on 8/13/2024    - [facility] was consulted by the ER physician who recommended serial creatinines and bladder scans    - cont flomax    Acute paranoia  Schizophrenia  Bipolar disorder    - I spoke with his facility: patient has had increasing paranoia x 1 month    - sometimes this is related to a UTI    - they have contacted his Psychiatrist multiple times who recommended they \"monitor\"    - has not been taking meds due to thinking they are \"poson\"    - pt required droperiodol, ativan, and zyprexa given in ED for behaviors    - has sitter    - cont home meds (buspirone 30 mg PO BID, olanzapine 10 mg po bid and 20 mg at HS, benztropine 2 mg at HS, and hydroxyzine 10 mg QID prn anxiety)    - Psych/DEC to see     Invasive bladder cancer    - managed with bladder-sparing trimodal therapy, bulbar urethral stricture    - s/p perineal urethrostomy, and left hydronephrosis " managed with a chronic indwelling ureteral stent     - follows at [facility]    - last stent exchange was in March, next in August    Back pain    - chronic    - uses naprosyn at home, cont    Called his facility and spoke to nurse. They state he does not have family involved. I called his contacts. One was a wrong number. The other was a nurse     Observation Goals: -diagnostic tests and consults completed and resulted, -vital signs normal or at patient baseline, -adequate pain control on oral analgesics, -returns to baseline functional status, -safe disposition plan has been identified, Nurse to notify provider when observation goals have been met and patient is ready for discharge.  Diet: Regular Diet Adult    DVT Prophylaxis: Enoxaparin (Lovenox) SQ  Hernandez Catheter: Not present  Lines: None     Cardiac Monitoring: None  Code Status: Full Code      Clinically Significant Risk Factors Present on Admission                     # Anemia: based on hgb <11      Disposition Plan     Medically Ready for Discharge: Ready Now. Needs Psych plan  Silver Levine MD  Hospitalist Service  [facility]  Securely message with [email or web address] (more info)  Text page via [employer] Paging/Directory   ______________________________________________________________________    Interval History   I assumed care of the patient today.  He is in the bed.  He is calm.  He is difficult to understand because he speaks quickly and he mumbles.  He states he has some back pain.  He tells me is chronic.  No other new symptoms.  He is eating and drinking.  He tells me he wants coffee.  He tells me having to repeat himself makes him anxious.    Physical Exam   Vital Signs: Temp: 97.6  F (36.4  C) Temp src: Oral BP: 130/69 Pulse: 81   Resp: 16 SpO2: 98 % O2 Device: None (Room air)    Weight: 0 lbs 0 oz    Constitutional: awake, alert, cooperative, no apparent distress, and appears stated age  Eyes: Lids and lashes normal, pupils equal, round and  reactive to light, extra ocular muscles intact, sclera clear, conjunctiva normal  ENT: missing teeth  Respiratory: No increased work of breathing, good air exchange, clear to auscultation bilaterally, no crackles or wheezing  Cardiovascular: Normal apical impulse, regular rate and rhythm, normal S1 and S2, no S3 or S4, and no murmur noted  GI: No scars, normal bowel sounds, soft, non-distended, non-tender, no masses palpated, no hepatosplenomegally    Medical Decision Making       40 MINUTES SPENT BY ME on the date of service doing chart review, history, exam, documentation & further activities per the note.      Data     I have personally reviewed the following data over the past 24 hrs:    6.6  \   10.5 (L)   / 244     141 107 19.4 /  105 (H)   3.7 26 0.89 \     ALT: 25 AST: 23 AP: 96 TBILI: 0.2   ALB: 4.1 TOT PROTEIN: 7.1 LIPASE: N/A       Imaging results reviewed over the past 24 hrs:   Recent Results (from the past 24 hour(s))   CT Abdomen Pelvis w/o Contrast    Narrative    EXAM: CT ABDOMEN PELVIS W/O CONTRAST  LOCATION: [facility]  DATE: 6/5/2024    INDICATION: History of recurrent ureteral stent issues. Stopped taking antibiotics. Evaluate for stent issue vs infection.  COMPARISON: None.  TECHNIQUE: CT scan of the abdomen and pelvis was performed without IV contrast. Multiplanar reformats were obtained. Dose reduction techniques were used.  CONTRAST: None.    FINDINGS:   LOWER CHEST: Normal.    HEPATOBILIARY: 7 mm low-dense lesion involves the left lobe of the liver which is too small to characterize but likely a cyst or hemangioma.    PANCREAS: Fatty infiltration of the pancreas.    SPLEEN: Normal.    ADRENAL GLANDS: Normal.    KIDNEYS/BLADDER: Left-sided double-J ureteral stents in place, in appropriate position. There is moderate to severe bilateral hydroureteronephrosis without obstructing stone. The left ureter is dilated to the distal left ureter and the right ureter is   dilated  to the ureterovesical orifice. There is some atrophy and cortical scarring involving the left kidney.    BOWEL: Moderate amount of stool is present throughout the colon. No bowel obstruction or bowel inflammation. There is no evidence of appendicitis.    LYMPH NODES: Normal.    VASCULATURE: Normal caliber aorta with diffuse atherosclerotic calcification.    PELVIC ORGANS: Normal.    MUSCULOSKELETAL: No suspicious osseous lesions.      Impression    IMPRESSION:   1.  Severe bilateral hydroureteronephrosis with an indwelling appropriately positioned double-J ureteral stent on the left. The left ureter remains markedly dilated to the distal left ureter. Left-sided hydronephrosis and hydroureter could be due to a   renal stricture. The right ureter remains dilated to the ureterovesical orifice. There are no urinary tract stones.  2.  Left renal atrophy with left cortical renal scarring.

## 2024-06-06 NOTE — PLAN OF CARE
[facility]    ED Boarding Nurse Handoff Addendum Report:    Date/time: 6/6/2024, 2:09 AM    Activity Level: in bed    Fall Risk: Yes:  sitter at bedside    Active Infusions: LR @ 100 mL    Current Meds Due: see MAR    Current care needs:     Oxygen requirements (liters/min and/or FiO2): no    Respiratory status: Room air    Vital signs (within last 30 minutes):    Vitals:    06/05/24 1758 06/05/24 1800 06/05/24 2202 06/06/24 0200   BP: (!) 158/106      Pulse:    80   Resp:  20  18   SpO2: 98%  96% 98%       Focused assessment within last 30 minutes:    Received pt around 0028. Pt has been asleep. Blood sugar was 90. Sitter at bedside.      ED Boarding Nurse name: Zachariah Coombs RN

## 2024-06-06 NOTE — PLAN OF CARE
ROOM # 229    Living Situation [facility]  Facility name:  : ?    Activity level at baseline: Ind  Activity level on admit: Ax2    Who will be transporting you at discharge:     Patient registered to observation; given Patient Bill of Rights; given the opportunity to ask questions about observation status and their plan of care.  Patient has been oriented to the observation room, bathroom and call light is in place.    Discussed discharge goals and expectations with patient/family.

## 2024-06-06 NOTE — PLAN OF CARE
"  Goal Outcome Evaluation:      Plan of Care Reviewed With: patient      Pt is Alert to self only. Pt cooperative but irritable at times. Sitter at bedside. Regular diet. LR running @ 100 ml/hr. Ibuprofen given for lower back pain. Pt incontinent of urine. . Consults: DEC     Outcome Evaluation: Ibuprofen given for back pain      Problem: Adult Inpatient Plan of Care  Goal: Plan of Care Review  Description: The Plan of Care Review/Shift note should be completed every shift.  The Outcome Evaluation is a brief statement about your assessment that the patient is improving, declining, or no change.  This information will be displayed automatically on your shift  note.  Outcome: Progressing  Flowsheets (Taken 6/6/2024 0536)  Outcome Evaluation: Ibuprofen given for back pain  Plan of Care Reviewed With: patient  Goal: Patient-Specific Goal (Individualized)  Description: You can add care plan individualizations to a care plan. Examples of Individualization might be:  \"Parent requests to be called daily at 9am for status\", \"I have a hard time hearing out of my right ear\", or \"Do not touch me to wake me up as it startles  me\".  Outcome: Progressing  Goal: Absence of Hospital-Acquired Illness or Injury  Outcome: Progressing  Goal: Optimal Comfort and Wellbeing  Outcome: Progressing  Goal: Readiness for Transition of Care  Outcome: Progressing     "

## 2024-06-06 NOTE — PHARMACY-CONSULT NOTE
Pharmacy Delirium Chart Review    Upon chart review, the following medications may contribute to possible patient delirium:    Lorazepam/benzos: Confusion/delirium  Tramadol/opioids: Confusion/delirium  Benztropine: Confusion   Buspirone: Confusion (2% incidence)  Olanzapine: Confusion (4% incidence)    Please consult unit pharmacist with further questions.    KRISTINA VARGAS RP

## 2024-06-07 PROCEDURE — 250N000011 HC RX IP 250 OP 636: Mod: JZ | Performed by: NURSE PRACTITIONER

## 2024-06-07 PROCEDURE — 99233 SBSQ HOSP IP/OBS HIGH 50: CPT | Performed by: HOSPITALIST

## 2024-06-07 PROCEDURE — 96372 THER/PROPH/DIAG INJ SC/IM: CPT | Performed by: HOSPITALIST

## 2024-06-07 PROCEDURE — 120N000001 HC R&B MED SURG/OB

## 2024-06-07 PROCEDURE — G0378 HOSPITAL OBSERVATION PER HR: HCPCS

## 2024-06-07 PROCEDURE — 250N000013 HC RX MED GY IP 250 OP 250 PS 637: Performed by: NURSE PRACTITIONER

## 2024-06-07 PROCEDURE — 96361 HYDRATE IV INFUSION ADD-ON: CPT

## 2024-06-07 PROCEDURE — 250N000013 HC RX MED GY IP 250 OP 250 PS 637: Performed by: HOSPITALIST

## 2024-06-07 PROCEDURE — 250N000011 HC RX IP 250 OP 636: Mod: JZ | Performed by: HOSPITALIST

## 2024-06-07 PROCEDURE — 258N000003 HC RX IP 258 OP 636: Mod: JZ | Performed by: NURSE PRACTITIONER

## 2024-06-07 RX ADMIN — OLANZAPINE 10 MG: 10 TABLET, FILM COATED ORAL at 19:46

## 2024-06-07 RX ADMIN — LORAZEPAM 0.5 MG: 0.5 TABLET ORAL at 14:11

## 2024-06-07 RX ADMIN — SODIUM CHLORIDE, POTASSIUM CHLORIDE, SODIUM LACTATE AND CALCIUM CHLORIDE: 600; 310; 30; 20 INJECTION, SOLUTION INTRAVENOUS at 09:11

## 2024-06-07 RX ADMIN — BUSPIRONE HYDROCHLORIDE 30 MG: 15 TABLET ORAL at 11:17

## 2024-06-07 RX ADMIN — DOCUSATE SODIUM 100 MG: 100 CAPSULE, LIQUID FILLED ORAL at 11:17

## 2024-06-07 RX ADMIN — OLANZAPINE 10 MG: 10 TABLET, FILM COATED ORAL at 11:17

## 2024-06-07 RX ADMIN — BUSPIRONE HYDROCHLORIDE 30 MG: 15 TABLET ORAL at 19:46

## 2024-06-07 RX ADMIN — OLANZAPINE 20 MG: 10 TABLET, FILM COATED ORAL at 22:27

## 2024-06-07 RX ADMIN — SODIUM CHLORIDE, POTASSIUM CHLORIDE, SODIUM LACTATE AND CALCIUM CHLORIDE: 600; 310; 30; 20 INJECTION, SOLUTION INTRAVENOUS at 19:49

## 2024-06-07 RX ADMIN — BENZTROPINE MESYLATE 2 MG: 1 TABLET ORAL at 22:26

## 2024-06-07 RX ADMIN — SENNOSIDES 1 TABLET: 8.6 TABLET, FILM COATED ORAL at 11:16

## 2024-06-07 RX ADMIN — ENOXAPARIN SODIUM 40 MG: 40 INJECTION SUBCUTANEOUS at 14:11

## 2024-06-07 RX ADMIN — TAMSULOSIN HYDROCHLORIDE 0.4 MG: 0.4 CAPSULE ORAL at 11:18

## 2024-06-07 RX ADMIN — LEVOFLOXACIN 500 MG: 500 INJECTION, SOLUTION INTRAVENOUS at 21:24

## 2024-06-07 ASSESSMENT — ACTIVITIES OF DAILY LIVING (ADL)
ADLS_ACUITY_SCORE: 50
ADLS_ACUITY_SCORE: 49
ADLS_ACUITY_SCORE: 50
ADLS_ACUITY_SCORE: 49
ADLS_ACUITY_SCORE: 45
ADLS_ACUITY_SCORE: 50
ADLS_ACUITY_SCORE: 45
ADLS_ACUITY_SCORE: 50
ADLS_ACUITY_SCORE: 45
ADLS_ACUITY_SCORE: 50
ADLS_ACUITY_SCORE: 45
ADLS_ACUITY_SCORE: 50
ADLS_ACUITY_SCORE: 45
ADLS_ACUITY_SCORE: 49
ADLS_ACUITY_SCORE: 45
ADLS_ACUITY_SCORE: 50
ADLS_ACUITY_SCORE: 49
ADLS_ACUITY_SCORE: 49
ADLS_ACUITY_SCORE: 45

## 2024-06-07 NOTE — UTILIZATION REVIEW
Admission Status; Secondary Review Determination       Under the authority of the Utilization Management Committee, the utilization review process indicated a secondary review on the above patient. The review outcome is based on review of the medical records, discussions with staff, and applying clinical experience noted on the date of the review.     (x) Inpatient Status Appropriate - This patient's medical care is consistent with medical management for inpatient care and reasonable inpatient medical practice.     RATIONALE FOR DETERMINATION      Patient requires inpatient admission versus short stay observation or outpatient treatment for the following reasons:         The Patient is 67 y/o  man with PMHx significant for schizophrenia and bipolar disorder, invasive bladder cancer, ureteral obstruction s/p ureteral stent, hydronephrosis, perineal urethrostomy, lives at [facility] admitted on 6/5/2024 with behavioral changes and UTI.    The mental health has been declining for the last month, the patient has been hiding the medications because he is thinking they are poisoned.  During this hospitalization he required sitter one-to-one and he continues to refuse cares and medications.  Psychiatry consult is pending    The patient has a complicated UTI in top of his chronic urology problems: Invasive bladder cancer, history of ureteral obstruction, s/p stent, hydronephrosis, and perineal urethrostomy for which she is requiring IV Levaquin      68-year-old man with complicated UTI in top of chronic urologic problems requires IV antibiotics/Levaquin and he also requires sitter one-to-one because he is refusing care and medications secondary to worsening symptoms of schizophrenia and bipolar disorder.    The expected length of stay at the time of admission was more than 2 nights because of the severity of illness, intensity of service provided, and risk for adverse outcome. Inpatient admission is appropriate.      Dr Silver Levine Notified     This document was produced using voice recognition software       The information on this document is developed by the utilization review team in order for the business office to ensure compliance. This only denotes the appropriateness of proper admission status and does not reflect the quality of care rendered.   The definitions of Inpatient Status and Observation Status used in making the determination above are those provided in the CMS Coverage Manual, Chapter 1 and Chapter 6, section 70.4.   Sincerely,   MARGARITO MICHAEL MD   Utilization Review  Physician Advisor  [facility]

## 2024-06-07 NOTE — PROGRESS NOTES
"[facility]    Medicine Progress Note - Hospitalist Service    Date of Admission:  6/5/2024    Assessment & Plan   Harpreet Mcelroy is a 68 year old male with history of schizophrenia and bipolar disorder, invasive bladder cancer, ureteral obstruction s/p ureteral stent, hydronephrosis, perineal urethrostomy, lives at [facility] admitted on 6/5/2024 with behavioral changes and UTI (after his paranoia caused him to stop taking his antibiotics)    Medically stable  Primary issues are Psych: paranoia in regards to different staff trying to poison him with food/meds    Urinary tract infection, complicated    - was prescribed Levaquin by his [facility] group recently (Stenotrophomonas maltophilia in past)    - I do not see any recent cultures    - UA done here, but no culture, I have ordered this (we have not been able to collect due to incontinence, un cooperation, and altered anatomy)     - will continue Levaquin (Day 3)    - per his care facility, they found out he had been hiding his meds and was not taking them due to paranoia of them being poisoned      Invasive bladder cancer  history of ureteral obstruction  s/p stent, hydronephrosis, and perineal urethrostomy    - follows at [facility]    - last stent exchange was in March, due on 8/13/2024    - [facility] was consulted by the ER physician who recommended serial creatinines and bladder scans    - cont flomax    - patient does retain some urine, but is incontinent. He has altered anatomy so Urology would need to place oscar if we needed one. I would just check occasional POVs and not worry about retention unless he stops urinating/has >750cc in his bladder    Acute paranoia  Schizophrenia  Bipolar disorder    - I spoke with his facility: patient has had increasing paranoia x 1 month    - sometimes this is related to a UTI    - they have contacted his Psychiatrist multiple times who recommended they \"monitor\"    - has not been " "taking meds due to thinking they are \"poison\"    - pt required droperiodol, ativan, and zyprexa given in ED for behaviors    - cont home meds (buspirone 30 mg PO BID, olanzapine 10 mg po bid and 20 mg at HS, benztropine 2 mg at HS, and hydroxyzine 10 mg QID prn anxiety)    - sitter discontinued    - Psych needs to see patient. I called them They do not have a provider to see him today     Invasive bladder cancer    - managed with bladder-sparing trimodal therapy, bulbar urethral stricture    - s/p perineal urethrostomy, and left hydronephrosis managed with a chronic indwelling ureteral stent     - follows at [facility]    - last stent exchange was in March, next in August    Back pain    - chronic    - uses naprosyn at home, cont    Called his facility and spoke to nurse. They state he does not have family involved. I called his contacts. One was a wrong number. The other was a nurse     Observation Goals: -diagnostic tests and consults completed and resulted, -vital signs normal or at patient baseline, -adequate pain control on oral analgesics, -returns to baseline functional status, -safe disposition plan has been identified, Nurse to notify provider when observation goals have been met and patient is ready for discharge.  Diet: Mechanical/Dental Soft Diet    DVT Prophylaxis: Enoxaparin (Lovenox) SQ  Hernandez Catheter: Not present  Lines: None     Cardiac Monitoring: None  Code Status: Full Code      Clinically Significant Risk Factors Present on Admission                     # Anemia: based on hgb <11      Disposition Plan     Medically Ready for Discharge: Ready Now. Needs Psych plan  Silver Levine MD  Hospitalist Service  [facility]  Securely message with [employer] (more info)  Text page via [employer] Paging/Directory   ______________________________________________________________________    Interval History     Patient in bed with arms crossed. He mumbles so he is difficult to understand. He tells " "me he is not going to eat or drink because nobody has allowed him to \"get cleaned up\". I indicated that I spoke to his nurse who had reported to me he wanted to and agreed to eat. He tells me I don't understand and asks me to leave.     Physical Exam   Vital Signs: Temp: 97.7  F (36.5  C) Temp src: Oral BP: 122/59 Pulse: 78   Resp: 18 SpO2: 97 % O2 Device: None (Room air)    Weight: 163 lbs 12.83 oz    Constitutional: awake, alert,   Eyes: Lids and lashes normal, pupils equal, round and reactive to light, extra ocular muscles intact, sclera clear, conjunctiva normal  ENT: missing teeth    Medical Decision Making       40 MINUTES SPENT BY ME on the date of service doing chart review, history, exam, documentation & further activities per the note.      Data         Imaging results reviewed over the past 24 hrs:   No results found for this or any previous visit (from the past 24 hour(s)).    "

## 2024-06-07 NOTE — PLAN OF CARE
PRIMARY DIAGNOSIS: AMS  OUTPATIENT/OBSERVATION GOALS TO BE MET BEFORE DISCHARGE:  ADLs back to baseline: No    Activity and level of assistance: assist x 1    Pain status: Improved-controlled with oral pain medications.    Return to near baseline physical activity: No     Discharge Planner Nurse   Safe discharge environment identified: No  Barriers to discharge: Yes       Entered by: Lacy Crawford RN 06/07/2024 12:01 AM    Patient is resting comfortably in bed, disoriented to place, time, and situation, incontinent of urine, IVF @ 100, psych following       Please review provider order for any additional goals.   Nurse to notify provider when observation goals have been met and patient is ready for discharge.

## 2024-06-07 NOTE — PLAN OF CARE
PRIMARY DIAGNOSIS: AMS, UTI  OUTPATIENT/OBSERVATION GOALS TO BE MET BEFORE DISCHARGE:  ADLs back to baseline: No    Activity and level of assistance: assist x 1    Pain status: Pain free.    Return to near baseline physical activity: No     Discharge Planner Nurse   Safe discharge environment identified: No  Barriers to discharge: Yes       Entered by: Lacy Crawford RN 06/07/2024 5:41 AM    Patient resting comfortably in bed, currently denies pain, pleasantly confused, incontinent of urine, IVF @ 100, psych following, SW following       Please review provider order for any additional goals.   Nurse to notify provider when observation goals have been met and patient is ready for discharge.

## 2024-06-07 NOTE — PLAN OF CARE
"PRIMARY DIAGNOSIS: Altered Mental Status  OUTPATIENT/OBSERVATION GOALS TO BE MET BEFORE DISCHARGE:  ADLs back to baseline: No    Activity and level of assistance: Assist x 1 W/G    Pain status: Pain free.    Return to near baseline physical activity: No     Discharge Planner Nurse   Safe discharge environment identified: No  Barriers to discharge: Yes       Entered by: Sheila Snow RN 06/06/2024     Patient A & O x 1, self only. Lung Sounds CTA, BS active, LBM this evening 6/6. Pt is assist x 2 with W/G/lift. Tolerating regular diet and po meds. Denies pain/SOB/NV, patient is very confused and forgetful. Mental Health saw patient and recommended pycodey to eval him, psych will be on site 6/8. PIV to right arm infusing LR @ 100 ml/hr. Patient incontinent of bladder x 2 large, beddings had to be changed, PVR 65 ml. Will continue to monitor and provide supportive cares.    /56 (BP Location: Left arm)   Pulse 84   Temp 97.9  F (36.6  C) (Oral)   Resp 18   Wt 74.3 kg (163 lb 12.8 oz)   SpO2 95%   BMI 24.19 kg/m       Please review provider order for any additional goals.   Nurse to notify provider when observation goals have been met and patient is ready for discharge.    Problem: Adult Inpatient Plan of Care  Goal: Plan of Care Review  Description: The Plan of Care Review/Shift note should be completed every shift.  The Outcome Evaluation is a brief statement about your assessment that the patient is improving, declining, or no change.  This information will be displayed automatically on your shift  note.  6/6/2024 2336 by Sheila Snow, RN  Outcome: Progressing  6/6/2024 2153 by Sheila Snow, RN  Outcome: Progressing  Goal: Patient-Specific Goal (Individualized)  Description: You can add care plan individualizations to a care plan. Examples of Individualization might be:  \"Parent requests to be called daily at 9am for status\", \"I have a hard time hearing out of my right ear\", or \"Do not touch me to " "wake me up as it startles  me\".  6/6/2024 2336 by Sheila Snow RN  Outcome: Progressing  6/6/2024 2153 by Sheila Snow RN  Outcome: Progressing  Goal: Absence of Hospital-Acquired Illness or Injury  6/6/2024 2336 by Sheila Snow RN  Outcome: Progressing  6/6/2024 2153 by Sheila Snow RN  Outcome: Progressing  Intervention: Identify and Manage Fall Risk  Recent Flowsheet Documentation  Taken 6/6/2024 2027 by Sheila Snow RN  Safety Promotion/Fall Prevention:   activity supervised   clutter free environment maintained   lighting adjusted   mobility aid in reach   nonskid shoes/slippers when out of bed   room near nurse's station  Intervention: Prevent Skin Injury  Recent Flowsheet Documentation  Taken 6/6/2024 2027 by Sheila Snow RN  Body Position: position changed independently  Intervention: Prevent and Manage VTE (Venous Thromboembolism) Risk  Recent Flowsheet Documentation  Taken 6/6/2024 2027 by Sheila Snow RN  VTE Prevention/Management: SCDs (sequential compression devices) off  Intervention: Prevent Infection  Recent Flowsheet Documentation  Taken 6/6/2024 2027 by Sheila Snow RN  Infection Prevention:   rest/sleep promoted   hand hygiene promoted   cohorting utilized   personal protective equipment utilized  Goal: Optimal Comfort and Wellbeing  6/6/2024 2336 by Sheila Snow RN  Outcome: Progressing  6/6/2024 2153 by Sheila Snow RN  Outcome: Progressing  Goal: Readiness for Transition of Care  6/6/2024 2336 by Sheila Snow RN  Outcome: Progressing  6/6/2024 2153 by Sheila Snow RN  Outcome: Progressing     Problem: Seclusion/Restraint, Behavioral  Goal: Seclusion/Behavioral Restraint Goal: Absence of Harm or Injury  6/6/2024 2336 by Sheila Snow RN  Outcome: Progressing  6/6/2024 2153 by Sheila Snow RN  Outcome: Progressing  Intervention: Protect Skin and Joint Integrity  Recent Flowsheet Documentation  Taken 6/6/2024 2027 by Gwendolyn" Sheila PITTMAN, RN  Body Position: position changed independently     Problem: Urinary Diversion  Goal: Effective Urinary Elimination  6/6/2024 2336 by Sheila Snow, RN  Outcome: Progressing  6/6/2024 2153 by Sheila Snow, RN  Outcome: Progressing

## 2024-06-08 LAB
ANION GAP SERPL CALCULATED.3IONS-SCNC: 10 MMOL/L (ref 7–15)
BUN SERPL-MCNC: 14.5 MG/DL (ref 8–23)
CALCIUM SERPL-MCNC: 9 MG/DL (ref 8.8–10.2)
CHLORIDE SERPL-SCNC: 109 MMOL/L (ref 98–107)
CREAT SERPL-MCNC: 1.08 MG/DL (ref 0.67–1.17)
DEPRECATED HCO3 PLAS-SCNC: 24 MMOL/L (ref 22–29)
EGFRCR SERPLBLD CKD-EPI 2021: 75 ML/MIN/1.73M2
GLUCOSE SERPL-MCNC: 84 MG/DL (ref 70–99)
POTASSIUM SERPL-SCNC: 4.1 MMOL/L (ref 3.4–5.3)
SODIUM SERPL-SCNC: 143 MMOL/L (ref 135–145)

## 2024-06-08 PROCEDURE — 250N000013 HC RX MED GY IP 250 OP 250 PS 637: Performed by: HOSPITALIST

## 2024-06-08 PROCEDURE — 36415 COLL VENOUS BLD VENIPUNCTURE: CPT | Performed by: HOSPITALIST

## 2024-06-08 PROCEDURE — 250N000011 HC RX IP 250 OP 636: Mod: JZ | Performed by: PHYSICIAN ASSISTANT

## 2024-06-08 PROCEDURE — 250N000013 HC RX MED GY IP 250 OP 250 PS 637: Performed by: PHYSICIAN ASSISTANT

## 2024-06-08 PROCEDURE — 80048 BASIC METABOLIC PNL TOTAL CA: CPT | Performed by: HOSPITALIST

## 2024-06-08 PROCEDURE — 99233 SBSQ HOSP IP/OBS HIGH 50: CPT | Performed by: PHYSICIAN ASSISTANT

## 2024-06-08 PROCEDURE — 250N000013 HC RX MED GY IP 250 OP 250 PS 637: Performed by: NURSE PRACTITIONER

## 2024-06-08 PROCEDURE — 250N000013 HC RX MED GY IP 250 OP 250 PS 637: Performed by: PSYCHIATRY & NEUROLOGY

## 2024-06-08 PROCEDURE — 120N000001 HC R&B MED SURG/OB

## 2024-06-08 PROCEDURE — 250N000011 HC RX IP 250 OP 636: Mod: JZ | Performed by: NURSE PRACTITIONER

## 2024-06-08 RX ORDER — IBUPROFEN 400 MG/1
400 TABLET, FILM COATED ORAL EVERY 6 HOURS PRN
Status: DISCONTINUED | OUTPATIENT
Start: 2024-06-08 | End: 2024-06-18 | Stop reason: HOSPADM

## 2024-06-08 RX ORDER — BENZTROPINE MESYLATE 1 MG/1
1 TABLET ORAL AT BEDTIME
Status: DISCONTINUED | OUTPATIENT
Start: 2024-06-08 | End: 2024-06-18 | Stop reason: HOSPADM

## 2024-06-08 RX ORDER — HALOPERIDOL 5 MG/ML
2 INJECTION INTRAMUSCULAR 3 TIMES DAILY
Status: DISCONTINUED | OUTPATIENT
Start: 2024-06-08 | End: 2024-06-11

## 2024-06-08 RX ADMIN — SENNOSIDES 1 TABLET: 8.6 TABLET, FILM COATED ORAL at 10:09

## 2024-06-08 RX ADMIN — BENZTROPINE MESYLATE 1 MG: 1 TABLET ORAL at 21:58

## 2024-06-08 RX ADMIN — SENNOSIDES 1 TABLET: 8.6 TABLET, FILM COATED ORAL at 19:51

## 2024-06-08 RX ADMIN — HYDROXYZINE HYDROCHLORIDE 10 MG: 10 TABLET ORAL at 20:55

## 2024-06-08 RX ADMIN — LEVOFLOXACIN 500 MG: 500 INJECTION, SOLUTION INTRAVENOUS at 20:55

## 2024-06-08 RX ADMIN — OLANZAPINE 10 MG: 10 TABLET, FILM COATED ORAL at 19:50

## 2024-06-08 RX ADMIN — IBUPROFEN 400 MG: 400 TABLET ORAL at 17:01

## 2024-06-08 RX ADMIN — BUSPIRONE HYDROCHLORIDE 30 MG: 15 TABLET ORAL at 10:09

## 2024-06-08 RX ADMIN — TAMSULOSIN HYDROCHLORIDE 0.4 MG: 0.4 CAPSULE ORAL at 10:10

## 2024-06-08 RX ADMIN — BUSPIRONE HYDROCHLORIDE 30 MG: 15 TABLET ORAL at 19:50

## 2024-06-08 RX ADMIN — LORAZEPAM 0.5 MG: 0.5 TABLET ORAL at 11:52

## 2024-06-08 RX ADMIN — OLANZAPINE 20 MG: 10 TABLET, FILM COATED ORAL at 21:58

## 2024-06-08 RX ADMIN — HALOPERIDOL LACTATE 2 MG: 5 INJECTION, SOLUTION INTRAMUSCULAR at 17:01

## 2024-06-08 RX ADMIN — OLANZAPINE 10 MG: 10 TABLET, FILM COATED ORAL at 10:10

## 2024-06-08 RX ADMIN — DOCUSATE SODIUM 100 MG: 100 CAPSULE, LIQUID FILLED ORAL at 19:51

## 2024-06-08 ASSESSMENT — ACTIVITIES OF DAILY LIVING (ADL)
ADLS_ACUITY_SCORE: 45
ADLS_ACUITY_SCORE: 45
ADLS_ACUITY_SCORE: 46
ADLS_ACUITY_SCORE: 45

## 2024-06-08 NOTE — PLAN OF CARE
"Shift from [id number]     Inpatient Progress Note:  For complete assessment see flow sheet documentation.     Orientation: AO to self  Pain status: Pt noted some pain to low back, improved with repositioning  Activity: A1 in bed, A2 with walker out of bed  Resp: WDL  Cardiac: WDL  GI: WDL  : Incontinent  LDA: PIV in L AC  Infusions:  mL/hr    Diet: Mechanical soft diet  Safety: bed alarm on, call light within reach  Consults: Psych  Discharge Plan: TBD    Goal Outcome Evaluation:      Plan of Care Reviewed With: patient    Overall Patient Progress: no changeOverall Patient Progress: no change    Outcome Evaluation: Pt notes low back pain, pleasant, taking medication.    Problem: Adult Inpatient Plan of Care  Goal: Plan of Care Review  Description: The Plan of Care Review/Shift note should be completed every shift.  The Outcome Evaluation is a brief statement about your assessment that the patient is improving, declining, or no change.  This information will be displayed automatically on your shift  note.  6/7/2024 2332 by Sera Reyez RN  Outcome: Progressing  6/7/2024 2331 by Sera Reyez RN  Outcome: Progressing  Flowsheets (Taken 6/7/2024 2331)  Outcome Evaluation: Pt notes low back pain, pleasant, taking medication.  Plan of Care Reviewed With: patient  Overall Patient Progress: no change  Goal: Patient-Specific Goal (Individualized)  Description: You can add care plan individualizations to a care plan. Examples of Individualization might be:  \"Parent requests to be called daily at 9am for status\", \"I have a hard time hearing out of my right ear\", or \"Do not touch me to wake me up as it startles  me\".  6/7/2024 2332 by Sera Reyez, RN  Outcome: Progressing  6/7/2024 2331 by Sera Reyez, RN  Outcome: Progressing  Goal: Absence of Hospital-Acquired Illness or Injury  6/7/2024 2332 by Sera Reyez RN  Outcome: Progressing  6/7/2024 2331 by Sera Reyez, RN  Outcome: " Progressing  Intervention: Identify and Manage Fall Risk  Recent Flowsheet Documentation  Taken 6/7/2024 1613 by Sera Reyez RN  Safety Promotion/Fall Prevention:   activity supervised   assistive device/personal items within reach   clutter free environment maintained   increased rounding and observation   lighting adjusted   mobility aid in reach   nonskid shoes/slippers when out of bed   safety round/check completed  Intervention: Prevent Skin Injury  Recent Flowsheet Documentation  Taken 6/7/2024 1613 by Sera Reyez RN  Body Position: supine, head elevated  Intervention: Prevent and Manage VTE (Venous Thromboembolism) Risk  Recent Flowsheet Documentation  Taken 6/7/2024 1613 by Sera Reyez RN  VTE Prevention/Management: SCDs (sequential compression devices) off  Intervention: Prevent Infection  Recent Flowsheet Documentation  Taken 6/7/2024 1613 by Sera Reyez RN  Infection Prevention: rest/sleep promoted  Goal: Optimal Comfort and Wellbeing  6/7/2024 2332 by Sera Reyez RN  Outcome: Progressing  6/7/2024 2331 by Sera Reyez RN  Outcome: Progressing  Goal: Readiness for Transition of Care  6/7/2024 2332 by Sera Reyez RN  Outcome: Progressing  6/7/2024 2331 by Sera Reyez RN  Outcome: Progressing     Problem: Seclusion/Restraint, Behavioral  Goal: Seclusion/Behavioral Restraint Goal: Absence of Harm or Injury  6/7/2024 2332 by Sera Reyez RN  Outcome: Progressing  6/7/2024 2331 by Sera Reyez, RN  Outcome: Progressing  Intervention: Protect Skin and Joint Integrity  Recent Flowsheet Documentation  Taken 6/7/2024 1613 by Sera Reyez RN  Body Position: supine, head elevated     Problem: Urinary Diversion  Goal: Effective Urinary Elimination  6/7/2024 2332 by Sera Reyez RN  Outcome: Progressing  6/7/2024 2331 by Sera Reyez RN  Outcome: Progressing

## 2024-06-08 NOTE — PROGRESS NOTES
"Patient refusing all morning medication. States the nurse is an \"imposter\" and trying to give the patient \"dope\".  Writer encouraged patient to take his medication. Patient continued to get agitated and verbalizing distrust of staff.   "

## 2024-06-08 NOTE — PLAN OF CARE
"Care from [id number]      Inpatient Progress Note:  For complete assessment see flow sheet documentation.       /59 (BP Location: Left arm)   Pulse 75   Temp 98.9  F (37.2  C) (Oral)   Resp 17   Wt 74.3 kg (163 lb 12.8 oz)   SpO2 96%   BMI 24.19 kg/m         Neuro: Pt refused all assessments from the writer, asked for the writer to leave the room and to stop bothering him.   Vital Signs: Refused   Pain/Comfort: Denied any pain   Diet/po intake: Tolerating a regular diet   Output: Incontinent of urine and stool, did allow NA to help with brief change,   Activity/Ambulation: Has not been out of bed on this shift, was changed ax of 1 in bed.   Pertinent assessments: Refused all assessments   Infusions:    Major Shift Events: Refused male nurse and male support staff in room.  Plan (Upcoming Events): SW following obtaining guardianship.       Will continue with POC.          Will continue to monitor and provide cares.      Goal Outcome Evaluation:                        Problem: Adult Inpatient Plan of Care  Goal: Plan of Care Review  Description: The Plan of Care Review/Shift note should be completed every shift.  The Outcome Evaluation is a brief statement about your assessment that the patient is improving, declining, or no change.  This information will be displayed automatically on your shift  note.  Outcome: Not Progressing  Goal: Patient-Specific Goal (Individualized)  Description: You can add care plan individualizations to a care plan. Examples of Individualization might be:  \"Parent requests to be called daily at 9am for status\", \"I have a hard time hearing out of my right ear\", or \"Do not touch me to wake me up as it startles  me\".  Outcome: Not Progressing  Goal: Absence of Hospital-Acquired Illness or Injury  Outcome: Not Progressing  Goal: Optimal Comfort and Wellbeing  Outcome: Not Progressing  Goal: Readiness for Transition of Care  Outcome: Not Progressing     "

## 2024-06-08 NOTE — PROGRESS NOTES
"[facility]  Hospitalist Progress Note  Aisha Orantes PA-C 06/08/2024    Reason for Stay (Diagnosis): paranoia          Assessment and Plan:      Harpreet Mcelroy is a 68 year old male with history of schizophrenia and bipolar disorder, invasive bladder cancer, ureteral obstruction s/p ureteral stent, hydronephrosis, perineal urethrostomy, lives at [facility] admitted on 6/5/2024 with behavioral changes and UTI (after his paranoia caused him to stop taking his antibiotics)     Medically stable  Primary issues are Psych: paranoia in regards to different staff trying to poison him with food/meds  Seen by psyche 6/8 - recommend scheduled IV haldol TID to see if he would be more compliant to take psyche meds   (I did change this cont with hold parameter if pt is too sedated or becomes consistently compliant with med taking. See Psyche notes)     Acute paranoia  Schizophrenia  Bipolar disorder    - I spoke with his facility: patient has had increasing paranoia x 1 month    - sometimes this is related to a UTI    - they have contacted his Psychiatrist multiple times who recommended they \"monitor\"    - has not been taking meds due to thinking they are \"poison\"    - pt required droperiodol, ativan, and zyprexa given in ED for behaviors    - cont home meds (buspirone 30 mg PO BID, olanzapine 10 mg po bid and 20 mg at HS, benztropine 2 mg at HS, and hydroxyzine 10 mg QID prn anxiety)    - sitter discontinued    - Psych saw pt, recommend scheduled IV haldol to see if he would be more complaint. Possibly change to oral haldol as outpt?     - Recommended possible geriatric psyche but wonder if his MS would improved after UTI is treated (however I'm not so confident that will happen)     Urinary tract infection, complicated    - was prescribed Levaquin by his [facility] recently (Stenotrophomonas maltophilia in past)    - I do not see any recent cultures    - UA done here, but no culture, I have " ordered this (we have not been able to collect due to incontinence, un cooperation, and altered anatomy)     - will continue Levaquin (Day 4/10)    - per his care facility, they found out he had been hiding his meds and was not taking them due to paranoia of them being poisoned      history of ureteral obstruction  s/p stent, hydronephrosis, and perineal urethrostomy    - [place] [facility] [place]    - last stent exchange was in March, due on 8/13/2024    - [facility] was consulted by the ER physician who recommended serial creatinines and bladder scans    - cont flomax    - patient does retain some urine, but is incontinent. He has altered anatomy so Urology would need to place oscar if we needed one. I would just check occasional POVs and not worry about retention unless he stops urinating/has >750cc in his bladder     Invasive bladder cancer    - managed with bladder-sparing trimodal therapy, bulbar urethral stricture    - s/p perineal urethrostomy, and left hydronephrosis managed with a chronic indwelling ureteral stent     - [place] at [facility]    - last stent exchange was in March, next in August     Back pain    - chronic    - uses naprosyn at home, cont     Called his facility and spoke to nurse. They state he does not have family involved. I called his contacts. One was a wrong number. The other was a nurse      Diet: Mechanical/Dental Soft Diet    DVT Prophylaxis: Enoxaparin (Lovenox) SQ  Oscar Catheter: Not present  Lines: None     Cardiac Monitoring: None  Code Status: Full Code          Clinically Significant Risk Factors Present on Admission                    # Anemia: based on hgb <11      Disposition Plan     Medically Ready for Discharge: Ready Now. Strongly believe that he should go to Geriatric psyche for further medication adjustment         Interval History (Subjective):      Gets very suspicious of some ppl  Thinks his nurse is trying to poison him today  Calm and accepting to different staff                    Physical Exam:      Last Vital Signs:  /64 (BP Location: Left arm)   Pulse 80   Temp 98  F (36.7  C) (Oral)   Resp 17   Wt 74.3 kg (163 lb 12.8 oz)   SpO2 97%   BMI 24.19 kg/m      Constitutional: Awake, alert, cooperative with me, no apparent distress     Respiratory: Clear to auscultation bilaterally, no crackles or wheezing   Cardiovascular: Regular rate and rhythm, normal S1 and S2, and no murmur noted   Abdomen: Normal bowel sounds, soft, non-distended, non-tender   Skin: No rashes, no cyanosis, dry to touch   Neuro: Alert and oriented x3, no weakness, numbness, memory loss   Extremities: No edema, normal range of motion   Other(s):        All other systems: Negative          Medications:      All current medications were reviewed with changes reflected in problem list.         Data:      All new lab and imaging data was reviewed.   Labs:       Lab Results   Component Value Date     06/08/2024     06/06/2024     06/05/2024     07/12/2020     01/26/2020    Lab Results   Component Value Date    CHLORIDE 109 06/08/2024    CHLORIDE 107 06/06/2024    CHLORIDE 101 06/05/2024    CHLORIDE 107 05/26/2022    CHLORIDE 99 07/14/2020    CHLORIDE 106 07/12/2020    CHLORIDE 107 01/26/2020    Lab Results   Component Value Date    BUN 14.5 06/08/2024    BUN 19.4 06/06/2024    BUN 21.2 06/05/2024    BUN 19 05/26/2022    BUN 13 07/14/2020    BUN 14 07/12/2020    BUN 18 01/26/2020      Lab Results   Component Value Date    POTASSIUM 4.1 06/08/2024    POTASSIUM 3.7 06/06/2024    POTASSIUM 3.5 06/05/2024    POTASSIUM 4.3 05/26/2022    POTASSIUM 3.5 07/14/2020    POTASSIUM 3.7 07/12/2020    POTASSIUM 4.1 01/26/2020    Lab Results   Component Value Date    CO2 24 06/08/2024    CO2 26 06/06/2024    CO2 26 06/05/2024    CO2 29 05/26/2022    CO2 27 07/14/2020    CO2 27 07/12/2020    CO2 29 01/26/2020    Lab Results   Component Value Date    CR 1.08 06/08/2024    CR 0.89 06/06/2024     CR 1.08 06/05/2024    CR 0.77 07/12/2020    CR 0.67 01/26/2020        Recent Labs   Lab 06/06/24  0610 06/05/24  1416   WBC 6.6 7.8   HGB 10.5* 11.9*   HCT 32.1* 35.9*   MCV 89 89    298      Imaging:   Results for orders placed or performed during the hospital encounter of 06/05/24   CT Abdomen Pelvis w/o Contrast    Narrative    EXAM: CT ABDOMEN PELVIS W/O CONTRAST  LOCATION: [facility]  DATE: 6/5/2024    INDICATION: History of recurrent ureteral stent issues. Stopped taking antibiotics. Evaluate for stent issue vs infection.  COMPARISON: None.  TECHNIQUE: CT scan of the abdomen and pelvis was performed without IV contrast. Multiplanar reformats were obtained. Dose reduction techniques were used.  CONTRAST: None.    FINDINGS:   LOWER CHEST: Normal.    HEPATOBILIARY: 7 mm low-dense lesion involves the left lobe of the liver which is too small to characterize but likely a cyst or hemangioma.    PANCREAS: Fatty infiltration of the pancreas.    SPLEEN: Normal.    ADRENAL GLANDS: Normal.    KIDNEYS/BLADDER: Left-sided double-J ureteral stents in place, in appropriate position. There is moderate to severe bilateral hydroureteronephrosis without obstructing stone. The left ureter is dilated to the distal left ureter and the right ureter is   dilated to the ureterovesical orifice. There is some atrophy and cortical scarring involving the left kidney.    BOWEL: Moderate amount of stool is present throughout the colon. No bowel obstruction or bowel inflammation. There is no evidence of appendicitis.    LYMPH NODES: Normal.    VASCULATURE: Normal caliber aorta with diffuse atherosclerotic calcification.    PELVIC ORGANS: Normal.    MUSCULOSKELETAL: No suspicious osseous lesions.      Impression    IMPRESSION:   1.  Severe bilateral hydroureteronephrosis with an indwelling appropriately positioned double-J ureteral stent on the left. The left ureter remains markedly dilated to the distal left  ureter. Left-sided hydronephrosis and hydroureter could be due to a   renal stricture. The right ureter remains dilated to the ureterovesical orifice. There are no urinary tract stones.  2.  Left renal atrophy with left cortical renal scarring.

## 2024-06-08 NOTE — CONSULTS
"Psychiatry Initial Consultation    ID/HPI:  The patient is a 69 yo with h/o SADO, bipolar type, Dementia , multiple medical issues including past bladder ca, who is resident of [facility] and sent in for one month of decline in function, increased memory issues, and recent change in behavior, refusal of medications including an ABX for his UTI.  Had f/unit(s) with urology due to hydronephrosis.  He became agitated in ED , was given droperidol, ativan and zyprexa Im . Expresses that the staff at NH was trying to 'poison' him .  He has no current pain complaint, had eaten this a.m. 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  He could not identify his contacts as sisters vs friends.  He denies depression , \"I'm good'.  Limited historian.      Past Psychiatric History:  No known suicide attempts, CD.  Has h/o SADO, has been on disability.  Has h/o past delirium.     PMH:  TURP, bladder ca, CAD, anemia    Allergies aspirin, benadryl tylenol    VSS Temp 989, Pulse 80, /64, RR 17, Weight 74.3kg    FH unknown    SH as above.  No listed guardian.  He is full code    MSE:  Alert, sitting up in bed, bit kyphotic.  Pleasant with me.  Oriented to June for month but not year \"I don't know\".  No apparent hallucinations.  No significant tremor.  No depression. Insight and Judgement impaired.  He is unclear why he is in hospital.  Paranoia continues with fear of poisoning.  No expressed s/I or h/I.   No SIB.    Diagnoses SADO, bipolar type, Neurocognitive Disorder, senile onset with behavior problems, rule out superimposed delirium with UTI, on ABX    Plan:  Added short term IV haldol scheduled and hopefully will more consistently take his zyprexa.  Observe for improvement.  Consider change over to PO haldol vs seroquel.  Transfer to geriatric psychiatry may be option depending on his level of nursing needs.  Call prn.  Reconsult psychiatry Monday or prn.         "

## 2024-06-09 PROCEDURE — 250N000011 HC RX IP 250 OP 636: Mod: JZ | Performed by: HOSPITALIST

## 2024-06-09 PROCEDURE — 250N000013 HC RX MED GY IP 250 OP 250 PS 637: Performed by: PSYCHIATRY & NEUROLOGY

## 2024-06-09 PROCEDURE — 99232 SBSQ HOSP IP/OBS MODERATE 35: CPT | Performed by: HOSPITALIST

## 2024-06-09 PROCEDURE — 250N000011 HC RX IP 250 OP 636: Performed by: PHYSICIAN ASSISTANT

## 2024-06-09 PROCEDURE — 250N000013 HC RX MED GY IP 250 OP 250 PS 637: Performed by: PHYSICIAN ASSISTANT

## 2024-06-09 PROCEDURE — 120N000001 HC R&B MED SURG/OB

## 2024-06-09 PROCEDURE — 250N000011 HC RX IP 250 OP 636: Mod: JZ | Performed by: NURSE PRACTITIONER

## 2024-06-09 PROCEDURE — 258N000003 HC RX IP 258 OP 636: Mod: JZ | Performed by: NURSE PRACTITIONER

## 2024-06-09 PROCEDURE — 250N000013 HC RX MED GY IP 250 OP 250 PS 637: Performed by: HOSPITALIST

## 2024-06-09 PROCEDURE — 250N000013 HC RX MED GY IP 250 OP 250 PS 637: Performed by: NURSE PRACTITIONER

## 2024-06-09 RX ADMIN — LEVOFLOXACIN 500 MG: 500 INJECTION, SOLUTION INTRAVENOUS at 21:46

## 2024-06-09 RX ADMIN — OLANZAPINE 10 MG: 10 TABLET, FILM COATED ORAL at 19:49

## 2024-06-09 RX ADMIN — TAMSULOSIN HYDROCHLORIDE 0.4 MG: 0.4 CAPSULE ORAL at 11:11

## 2024-06-09 RX ADMIN — BUSPIRONE HYDROCHLORIDE 30 MG: 15 TABLET ORAL at 11:11

## 2024-06-09 RX ADMIN — BENZTROPINE MESYLATE 1 MG: 1 TABLET ORAL at 21:46

## 2024-06-09 RX ADMIN — HYDROXYZINE HYDROCHLORIDE 10 MG: 10 TABLET ORAL at 05:48

## 2024-06-09 RX ADMIN — HALOPERIDOL LACTATE 2 MG: 5 INJECTION, SOLUTION INTRAMUSCULAR at 18:43

## 2024-06-09 RX ADMIN — HALOPERIDOL LACTATE 2 MG: 5 INJECTION, SOLUTION INTRAMUSCULAR at 08:29

## 2024-06-09 RX ADMIN — OLANZAPINE 10 MG: 10 TABLET, FILM COATED ORAL at 11:11

## 2024-06-09 RX ADMIN — ENOXAPARIN SODIUM 40 MG: 40 INJECTION SUBCUTANEOUS at 19:50

## 2024-06-09 RX ADMIN — IBUPROFEN 400 MG: 400 TABLET ORAL at 03:28

## 2024-06-09 RX ADMIN — SODIUM CHLORIDE, POTASSIUM CHLORIDE, SODIUM LACTATE AND CALCIUM CHLORIDE: 600; 310; 30; 20 INJECTION, SOLUTION INTRAVENOUS at 14:40

## 2024-06-09 RX ADMIN — BUSPIRONE HYDROCHLORIDE 30 MG: 15 TABLET ORAL at 19:49

## 2024-06-09 RX ADMIN — OLANZAPINE 20 MG: 10 TABLET, FILM COATED ORAL at 21:46

## 2024-06-09 RX ADMIN — HALOPERIDOL LACTATE 2 MG: 5 INJECTION, SOLUTION INTRAMUSCULAR at 21:45

## 2024-06-09 RX ADMIN — IBUPROFEN 400 MG: 400 TABLET ORAL at 13:24

## 2024-06-09 RX ADMIN — SODIUM CHLORIDE, POTASSIUM CHLORIDE, SODIUM LACTATE AND CALCIUM CHLORIDE: 600; 310; 30; 20 INJECTION, SOLUTION INTRAVENOUS at 02:47

## 2024-06-09 ASSESSMENT — ACTIVITIES OF DAILY LIVING (ADL)
ADLS_ACUITY_SCORE: 53
ADLS_ACUITY_SCORE: 46
ADLS_ACUITY_SCORE: 49
ADLS_ACUITY_SCORE: 49
ADLS_ACUITY_SCORE: 47
ADLS_ACUITY_SCORE: 49
ADLS_ACUITY_SCORE: 46
ADLS_ACUITY_SCORE: 47
ADLS_ACUITY_SCORE: 46
ADLS_ACUITY_SCORE: 49
ADLS_ACUITY_SCORE: 46
ADLS_ACUITY_SCORE: 49
ADLS_ACUITY_SCORE: 49
ADLS_ACUITY_SCORE: 53
ADLS_ACUITY_SCORE: 46
ADLS_ACUITY_SCORE: 49
ADLS_ACUITY_SCORE: 53
ADLS_ACUITY_SCORE: 53
ADLS_ACUITY_SCORE: 49
ADLS_ACUITY_SCORE: 46

## 2024-06-09 NOTE — PROGRESS NOTES
"[facility]    Medicine Progress Note - Hospitalist Service    Date of Admission:  6/5/2024    Assessment & Plan   Harpreet Mcelroy is a 68 year old male with history of schizophrenia and bipolar disorder, invasive bladder cancer, ureteral obstruction s/p ureteral stent, hydronephrosis, perineal urethrostomy, lives at [facility] admitted on 6/5/2024 with behavioral changes and UTI (after his paranoia caused him to stop taking his antibiotics)     Medically stable  Primary issues are Psych: paranoia in regards to different staff trying to poison him with food/meds  Seen by psyche 6/8 - recommend scheduled IV haldol TID to see if he would be more compliant to take psyche meds   (I did change this cont with hold parameter if pt is too sedated or becomes consistently compliant with med taking. See Psyche notes)      Acute paranoia  Schizophrenia  Bipolar disorder    - his facility reports: patient has had increasing paranoia x 1 month    - sometimes this is related to a UTI    - they have contacted his Psychiatrist multiple times who recommended they \"monitor\"    - has not been taking meds due to thinking they are \"poison\"    - pt required droperiodol, ativan, and zyprexa given in ED for behaviors    - cont home meds (buspirone 30 mg PO BID, olanzapine 10 mg po bid and 20 mg at HS, benztropine 2 mg at HS, and hydroxyzine 10 mg QID prn anxiety)    - sitter discontinued    - Psych saw pt, recommend scheduled IV haldol to see if he would be more complaint. Possibly change to oral haldol as outpt?     - Recommended possible geriatric psyche but wonder if his MS would improved after UTI is treated      Urinary tract infection, complicated    - was prescribed Levaquin by his [facility] recently (Stenotrophomonas maltophilia in past)    - No recent cultures available    - UA done here, but not culture    - continue Levaquin (Day 5/10)    - per his care facility, they found out he had been " hiding his meds and was not taking them due to paranoia of them being poisoned      history of ureteral obstruction  s/p stent, hydronephrosis, and perineal urethrostomy    - [place] [facility]    - last stent exchange was in March, due on 8/13/2024    - [facility] was consulted by the ER physician who recommended serial creatinines and bladder scans    - cont flomax    - patient does retain some urine, but is incontinent. He has altered anatomy so Urology would need to place oscar if we needed one. I would just check occasional POVs and not worry about retention unless he stops urinating/has >750cc in his bladder     Invasive bladder cancer    - managed with bladder-sparing trimodal therapy, bulbar urethral stricture    - s/p perineal urethrostomy, and left hydronephrosis managed with a chronic indwelling ureteral stent     - follows at [facility]    - last stent exchange was in March, next in August     Back pain    - chronic    - uses naprosyn at home, cont           Diet: Mechanical/Dental Soft Diet    DVT Prophylaxis: Enoxaparin (Lovenox) SQ  Oscar Catheter: Not present  Lines: None     Cardiac Monitoring: None  Code Status: Full Code      Clinically Significant Risk Factors             # Financial/Environmental Concerns: none         Disposition Plan     Medically Ready for Discharge: Anticipated in 2-4 Days             Bill Lovett MD  Hospitalist Service  [facility]  Securely message with [employer] (more info)  Text page via [facility] Paging/Directory   ______________________________________________________________________    Interval History   Denies symptoms.  He is having breakfast at the moment of my visit. During nighttime no incident.  Has had good eye contact with me, cooperative and friendly.    Physical Exam   Vital Signs: Temp: 98  F (36.7  C) Temp src: Oral BP: (!) 147/74 Pulse: 84   Resp: 16 SpO2: 98 % O2 Device: None (Room air)    Weight: 163 lbs 12.83  oz    Constitutional: awake, alert, cooperative, no apparent distress, and appears stated age  Respiratory: No increased work of breathing, good air exchange, clear to auscultation bilaterally, no crackles or wheezing  Cardiovascular: Normal apical impulse, regular rate and rhythm, normal S1 and S2, no S3 or S4, and no murmur noted    Medical Decision Making       35 MINUTES SPENT BY ME on the date of service doing chart review, history, exam, documentation & further activities per the note.      Data         Imaging results reviewed over the past 24 hrs:   No results found for this or any previous visit (from the past 24 hour(s)).

## 2024-06-09 NOTE — PLAN OF CARE
19:00-07:30    Patient was compliant with care, took medications without any issues. No paranoia noted. Encouraged oral fluid intake when awake. Asleep in between cares. On continuous IV fluids. Incontinent of urine. Psychiatry following,    Goal Outcome Evaluation:      Plan of Care Reviewed With: patient    Overall Patient Progress: improvingOverall Patient Progress: improving    Outcome Evaluation: patient verbalized he's feeling better      Problem: Adult Inpatient Plan of Care  Goal: Plan of Care Review  Description: The Plan of Care Review/Shift note should be completed every shift.  The Outcome Evaluation is a brief statement about your assessment that the patient is improving, declining, or no change.  This information will be displayed automatically on your shift  note.  Flowsheets (Taken 6/9/2024 0594)  Outcome Evaluation: patient verbalized he's feeling better  Plan of Care Reviewed With: patient  Overall Patient Progress: improving  Goal: Absence of Hospital-Acquired Illness or Injury  Intervention: Identify and Manage Fall Risk  Recent Flowsheet Documentation  Taken 6/8/2024 2034 by Sybil Maher RN  Safety Promotion/Fall Prevention: activity supervised  Intervention: Prevent Skin Injury  Recent Flowsheet Documentation  Taken 6/9/2024 0249 by Sybil Maher RN  Body Position: position changed independently  Taken 6/8/2024 2206 by Sybil Maher RN  Body Position:   refuses positioning   position maintained  Intervention: Prevent and Manage VTE (Venous Thromboembolism) Risk  Recent Flowsheet Documentation  Taken 6/8/2024 2034 by Sybil Maher RN  VTE Prevention/Management: SCDs (sequential compression devices) off  Intervention: Prevent Infection  Recent Flowsheet Documentation  Taken 6/8/2024 2034 by Sybil Maher RN  Infection Prevention:   rest/sleep promoted   single patient room provided  Goal: Optimal Comfort and Wellbeing  Intervention: Monitor Pain and Promote  Comfort  Recent Flowsheet Documentation  Taken 6/8/2024 2055 by Sybil Maher, RN  Pain Management Interventions: medication (see MAR)     Problem: Seclusion/Restraint, Behavioral  Goal: Seclusion/Behavioral Restraint Goal: Absence of Harm or Injury  Intervention: Protect Skin and Joint Integrity  Recent Flowsheet Documentation  Taken 6/9/2024 0249 by Sybil Maher, RN  Body Position: position changed independently  Taken 6/8/2024 2206 by Sybil Maher RN  Body Position:   refuses positioning   position maintained

## 2024-06-09 NOTE — PROGRESS NOTES
"Patient refusing AM medication due to mistrust toward staff. Haldol given at 0830. Multiple attempts to revisit AM medications. Patient continues to refuse medication stating \"go to [facility], stop doping me\". Writer to continue offering medications.      Update 1115 - After multiple attempts by different staff members, 0800 medications were administered.   "

## 2024-06-09 NOTE — PLAN OF CARE
"INPATIENT CARE PLAN PROGRESS NOTE 8565-1972:     Patient refusing many nursing cares. Assessment limited by patient cooperation.  Patient favors certain staff for cares and compliance with medication.  Incontinent of B&B, turns in bed, A-1.  Ibuprofen given for lower back pain.  Psychiatry following.        /65 (BP Location: Left arm)   Pulse 78   Temp 98  F (36.7  C) (Oral)   Resp 16   Wt 74.3 kg (163 lb 12.8 oz)   SpO2 96%   BMI 24.19 kg/m          Plan of Care Reviewed With: patient    Overall Patient Progress: no changeOverall Patient Progress: no change    Outcome Evaluation: Scheduled Haldol given to increase compliance of taking PO medications.      Problem: Adult Inpatient Plan of Care  Goal: Plan of Care Review  Description: The Plan of Care Review/Shift note should be completed every shift.  The Outcome Evaluation is a brief statement about your assessment that the patient is improving, declining, or no change.  This information will be displayed automatically on your shift  note.  Outcome: Progressing  Flowsheets (Taken 6/8/2024 1904)  Outcome Evaluation: Scheduled Haldol given to increase compliance of taking PO medications.  Plan of Care Reviewed With: patient  Overall Patient Progress: no change  Goal: Patient-Specific Goal (Individualized)  Description: You can add care plan individualizations to a care plan. Examples of Individualization might be:  \"Parent requests to be called daily at 9am for status\", \"I have a hard time hearing out of my right ear\", or \"Do not touch me to wake me up as it startles  me\".  Outcome: Progressing  Goal: Absence of Hospital-Acquired Illness or Injury  Outcome: Progressing  Intervention: Identify and Manage Fall Risk  Recent Flowsheet Documentation  Taken 6/8/2024 0800 by Elaina Garcia RN  Safety Promotion/Fall Prevention:   activity supervised   assistive device/personal items within reach   clutter free environment maintained   increased rounding and " observation   lighting adjusted   mobility aid in reach   nonskid shoes/slippers when out of bed   safety round/check completed  Intervention: Prevent Skin Injury  Recent Flowsheet Documentation  Taken 6/8/2024 0800 by Elaina Garcia RN  Body Position: supine, head elevated  Intervention: Prevent and Manage VTE (Venous Thromboembolism) Risk  Recent Flowsheet Documentation  Taken 6/8/2024 0800 by Elaina Garcia RN  VTE Prevention/Management: SCDs (sequential compression devices) off  Goal: Optimal Comfort and Wellbeing  Outcome: Progressing  Intervention: Monitor Pain and Promote Comfort  Recent Flowsheet Documentation  Taken 6/8/2024 1655 by Elaina Garcia RN  Pain Management Interventions:   medication (see MAR)   cold applied  Taken 6/8/2024 1056 by Elaina Garcia RN  Pain Management Interventions:   declines   repositioned  Goal: Readiness for Transition of Care  Outcome: Progressing

## 2024-06-10 LAB
ANION GAP SERPL CALCULATED.3IONS-SCNC: 8 MMOL/L (ref 7–15)
BASOPHILS # BLD AUTO: 0 10E3/UL (ref 0–0.2)
BASOPHILS NFR BLD AUTO: 1 %
BUN SERPL-MCNC: 15.3 MG/DL (ref 8–23)
CALCIUM SERPL-MCNC: 9.2 MG/DL (ref 8.8–10.2)
CHLORIDE SERPL-SCNC: 111 MMOL/L (ref 98–107)
CREAT SERPL-MCNC: 0.94 MG/DL (ref 0.67–1.17)
DEPRECATED HCO3 PLAS-SCNC: 25 MMOL/L (ref 22–29)
EGFRCR SERPLBLD CKD-EPI 2021: 89 ML/MIN/1.73M2
EOSINOPHIL # BLD AUTO: 0.5 10E3/UL (ref 0–0.7)
EOSINOPHIL NFR BLD AUTO: 8 %
ERYTHROCYTE [DISTWIDTH] IN BLOOD BY AUTOMATED COUNT: 15.2 % (ref 10–15)
GLUCOSE SERPL-MCNC: 97 MG/DL (ref 70–99)
HCT VFR BLD AUTO: 35.5 % (ref 40–53)
HGB BLD-MCNC: 11.4 G/DL (ref 13.3–17.7)
IMM GRANULOCYTES # BLD: 0 10E3/UL
IMM GRANULOCYTES NFR BLD: 0 %
LYMPHOCYTES # BLD AUTO: 1.3 10E3/UL (ref 0.8–5.3)
LYMPHOCYTES NFR BLD AUTO: 21 %
MCH RBC QN AUTO: 29.1 PG (ref 26.5–33)
MCHC RBC AUTO-ENTMCNC: 32.1 G/DL (ref 31.5–36.5)
MCV RBC AUTO: 91 FL (ref 78–100)
MONOCYTES # BLD AUTO: 0.6 10E3/UL (ref 0–1.3)
MONOCYTES NFR BLD AUTO: 10 %
NEUTROPHILS # BLD AUTO: 3.7 10E3/UL (ref 1.6–8.3)
NEUTROPHILS NFR BLD AUTO: 60 %
NRBC # BLD AUTO: 0 10E3/UL
NRBC BLD AUTO-RTO: 0 /100
PLATELET # BLD AUTO: 260 10E3/UL (ref 150–450)
POTASSIUM SERPL-SCNC: 4.1 MMOL/L (ref 3.4–5.3)
RBC # BLD AUTO: 3.92 10E6/UL (ref 4.4–5.9)
SODIUM SERPL-SCNC: 144 MMOL/L (ref 135–145)
WBC # BLD AUTO: 6.1 10E3/UL (ref 4–11)

## 2024-06-10 PROCEDURE — 85025 COMPLETE CBC W/AUTO DIFF WBC: CPT | Performed by: HOSPITALIST

## 2024-06-10 PROCEDURE — 250N000011 HC RX IP 250 OP 636: Mod: JZ | Performed by: NURSE PRACTITIONER

## 2024-06-10 PROCEDURE — 250N000013 HC RX MED GY IP 250 OP 250 PS 637: Performed by: HOSPITALIST

## 2024-06-10 PROCEDURE — 250N000013 HC RX MED GY IP 250 OP 250 PS 637: Performed by: PHYSICIAN ASSISTANT

## 2024-06-10 PROCEDURE — 80048 BASIC METABOLIC PNL TOTAL CA: CPT | Performed by: HOSPITALIST

## 2024-06-10 PROCEDURE — 36415 COLL VENOUS BLD VENIPUNCTURE: CPT | Performed by: HOSPITALIST

## 2024-06-10 PROCEDURE — 120N000001 HC R&B MED SURG/OB

## 2024-06-10 PROCEDURE — 99232 SBSQ HOSP IP/OBS MODERATE 35: CPT | Performed by: HOSPITALIST

## 2024-06-10 PROCEDURE — 250N000013 HC RX MED GY IP 250 OP 250 PS 637: Performed by: PSYCHIATRY & NEUROLOGY

## 2024-06-10 PROCEDURE — 250N000011 HC RX IP 250 OP 636: Mod: JZ | Performed by: HOSPITALIST

## 2024-06-10 PROCEDURE — 99233 SBSQ HOSP IP/OBS HIGH 50: CPT

## 2024-06-10 PROCEDURE — 250N000011 HC RX IP 250 OP 636: Mod: JZ | Performed by: PHYSICIAN ASSISTANT

## 2024-06-10 PROCEDURE — 258N000003 HC RX IP 258 OP 636: Mod: JZ | Performed by: NURSE PRACTITIONER

## 2024-06-10 RX ORDER — OLANZAPINE 10 MG/1
10 TABLET ORAL AT BEDTIME
Status: DISCONTINUED | OUTPATIENT
Start: 2024-06-11 | End: 2024-06-18 | Stop reason: HOSPADM

## 2024-06-10 RX ORDER — OLANZAPINE 10 MG/1
10 TABLET ORAL 2 TIMES DAILY
Status: DISCONTINUED | OUTPATIENT
Start: 2024-06-11 | End: 2024-06-18 | Stop reason: HOSPADM

## 2024-06-10 RX ADMIN — OLANZAPINE 10 MG: 10 TABLET, FILM COATED ORAL at 21:26

## 2024-06-10 RX ADMIN — HALOPERIDOL LACTATE 2 MG: 5 INJECTION, SOLUTION INTRAMUSCULAR at 08:21

## 2024-06-10 RX ADMIN — BENZTROPINE MESYLATE 1 MG: 1 TABLET ORAL at 21:27

## 2024-06-10 RX ADMIN — SODIUM CHLORIDE, POTASSIUM CHLORIDE, SODIUM LACTATE AND CALCIUM CHLORIDE: 600; 310; 30; 20 INJECTION, SOLUTION INTRAVENOUS at 04:14

## 2024-06-10 RX ADMIN — HALOPERIDOL LACTATE 2 MG: 5 INJECTION, SOLUTION INTRAMUSCULAR at 13:58

## 2024-06-10 RX ADMIN — LEVOFLOXACIN 500 MG: 500 INJECTION, SOLUTION INTRAVENOUS at 21:33

## 2024-06-10 RX ADMIN — BUSPIRONE HYDROCHLORIDE 30 MG: 15 TABLET ORAL at 08:17

## 2024-06-10 RX ADMIN — OLANZAPINE 20 MG: 10 TABLET, FILM COATED ORAL at 21:27

## 2024-06-10 RX ADMIN — OLANZAPINE 10 MG: 10 TABLET, FILM COATED ORAL at 08:17

## 2024-06-10 RX ADMIN — SODIUM CHLORIDE, POTASSIUM CHLORIDE, SODIUM LACTATE AND CALCIUM CHLORIDE: 600; 310; 30; 20 INJECTION, SOLUTION INTRAVENOUS at 13:58

## 2024-06-10 RX ADMIN — BUSPIRONE HYDROCHLORIDE 30 MG: 15 TABLET ORAL at 21:27

## 2024-06-10 RX ADMIN — TRAMADOL HYDROCHLORIDE 25 MG: 50 TABLET, COATED ORAL at 10:56

## 2024-06-10 RX ADMIN — ENOXAPARIN SODIUM 40 MG: 40 INJECTION SUBCUTANEOUS at 21:37

## 2024-06-10 RX ADMIN — MENTHOL 1 PATCH: 205.5 PATCH TOPICAL at 11:55

## 2024-06-10 RX ADMIN — IBUPROFEN 400 MG: 400 TABLET ORAL at 21:50

## 2024-06-10 RX ADMIN — TAMSULOSIN HYDROCHLORIDE 0.4 MG: 0.4 CAPSULE ORAL at 08:25

## 2024-06-10 ASSESSMENT — ACTIVITIES OF DAILY LIVING (ADL)
TOILETING: 1-->ASSISTANCE (EQUIPMENT/PERSON) NEEDED (NOT DEVELOPMENTALLY APPROPRIATE)
ADLS_ACUITY_SCORE: 53
ADLS_ACUITY_SCORE: 57
DIFFICULTY_COMMUNICATING: NO
WALKING_OR_CLIMBING_STAIRS_DIFFICULTY: YES
WALKING_OR_CLIMBING_STAIRS: AMBULATION DIFFICULTY, REQUIRES EQUIPMENT;AMBULATION DIFFICULTY, ASSISTANCE 1 PERSON
WEAR_GLASSES_OR_BLIND: NO
EATING/SWALLOWING: OTHER (SEE COMMENTS)
DRESSING/BATHING_DIFFICULTY: YES
CONCENTRATING,_REMEMBERING_OR_MAKING_DECISIONS_DIFFICULTY: YES
ADLS_ACUITY_SCORE: 53
ADLS_ACUITY_SCORE: 57
ADLS_ACUITY_SCORE: 57
TOILETING_ISSUES: YES
ADLS_ACUITY_SCORE: 57
FALL_HISTORY_WITHIN_LAST_SIX_MONTHS: NO
TOILETING: 1-->ASSISTANCE (EQUIPMENT/PERSON) NEEDED
ADLS_ACUITY_SCORE: 57
ADLS_ACUITY_SCORE: 53
DRESSING/BATHING: BATHING DIFFICULTY, REQUIRES EQUIPMENT;BATHING DIFFICULTY, ASSISTANCE 1 PERSON
ADLS_ACUITY_SCORE: 57
ADLS_ACUITY_SCORE: 57
EQUIPMENT_CURRENTLY_USED_AT_HOME: WALKER, ROLLING
ADLS_ACUITY_SCORE: 57
ADLS_ACUITY_SCORE: 53
ADLS_ACUITY_SCORE: 57
ADLS_ACUITY_SCORE: 57
ADLS_ACUITY_SCORE: 53
DIFFICULTY_EATING/SWALLOWING: YES
HEARING_DIFFICULTY_OR_DEAF: NO
ADLS_ACUITY_SCORE: 57
ADLS_ACUITY_SCORE: 53
DOING_ERRANDS_INDEPENDENTLY_DIFFICULTY: OTHER (SEE COMMENTS)
ADLS_ACUITY_SCORE: 53
CHANGE_IN_FUNCTIONAL_STATUS_SINCE_ONSET_OF_CURRENT_ILLNESS/INJURY: YES
TOILETING_ASSISTANCE: TOILETING DIFFICULTY, REQUIRES EQUIPMENT;TOILETING DIFFICULTY, ASSISTANCE 1 PERSON
ADLS_ACUITY_SCORE: 57
ADLS_ACUITY_SCORE: 53
ADLS_ACUITY_SCORE: 53

## 2024-06-10 ASSESSMENT — ABNORMAL INVOLUNTARY MOVEMENT SCALE (AIMS)
AIMS_SEVERITY: NONE, NORMAL
AIMS_PATIENT_INCAPACITATION: NONE, NORMAL
LIPS_PARIETAL: NONE, NORMAL
JAW: NONE, NORMAL
TONGUE: NONE, NORMAL
NECK_SHOULDER_HIPS: NONE, NORMAL
AIMS_DISAPPEAR_SLEEPING: NO
LOWER_BODY_EXTREMITIES: NONE, NORMAL
FACIAL_EXPRESSION_MUSCLES: NONE, NORMAL
CURRENT_PROBLEMS_TEETH_DENTURES: NO
AIMS_PATIENT_AWARENESS: NO AWARENESS
UPPER_BODY_EXTREMITIES: NONE, NORMAL
EDENTIA: NO

## 2024-06-10 NOTE — PLAN OF CARE
"Patient was alert and oriented x4 this morning and very pleasant and then around 1000 became very very  paranoid. Patient took his 0800 medications, but not trusting anyone now. Patient requested something for pain and this writer said, \"Do you want Ibuprofen?\" Patient states,  \"I am allergic to that.\" Patient then states he is allergic to Tylenol. This writer asked patient if he would like Ultram for his back pain and patient states, \"You're an imposter! You are not who you are! Step away from my bed and get away from me!\" Showed patient my badge and explained to him that I am a nurse, but patient responds \"You're an imposter!\" Updated Dr. Lovett.    1048 walked back into patients room with support staff and support staff introduced this writer to patient and patient back to nice and friendly.   BP (!) 148/76 (BP Location: Left arm)   Pulse 72   Temp 97.9  F (36.6  C) (Oral)   Resp 17   Wt 74.3 kg (163 lb 12.8 oz)   SpO2 98%   BMI 24.19 kg/m      Goal Outcome Evaluation:           Overall Patient Progress: no changeOverall Patient Progress: no change    Outcome Evaluation: very paranoid      "

## 2024-06-10 NOTE — PLAN OF CARE
"Shift from [id number]     Inpatient Progress Note:  For complete assessment see flow sheet documentation.     Orientation: AO to self  Pain status: Pt noted some pain to low back, improved with repositioning  Activity: A1 in bed, A2 with walker out of bed  Resp: WDL  Cardiac: WDL  GI: Incontinent  : Incontinent  LDA: PIV in L AC  Infusions:  mL/hr    Diet: Mechanical soft diet  Safety: bed alarm on, call light within reach  Consults: Psych  Discharge Plan: TBD    Goal Outcome Evaluation:      Plan of Care Reviewed With: patient    Overall Patient Progress: no changeOverall Patient Progress: no change    Outcome Evaluation: Pt was compliant with cares and medications this shift.    Problem: Adult Inpatient Plan of Care  Goal: Plan of Care Review  Description: The Plan of Care Review/Shift note should be completed every shift.  The Outcome Evaluation is a brief statement about your assessment that the patient is improving, declining, or no change.  This information will be displayed automatically on your shift  note.  Outcome: Progressing  Flowsheets (Taken 6/10/2024 0619)  Outcome Evaluation: Pt was compliant with cares and medications this shift.  Plan of Care Reviewed With: patient  Overall Patient Progress: no change  Goal: Patient-Specific Goal (Individualized)  Description: You can add care plan individualizations to a care plan. Examples of Individualization might be:  \"Parent requests to be called daily at 9am for status\", \"I have a hard time hearing out of my right ear\", or \"Do not touch me to wake me up as it startles  me\".  Outcome: Progressing  Goal: Absence of Hospital-Acquired Illness or Injury  Outcome: Progressing  Intervention: Identify and Manage Fall Risk  Recent Flowsheet Documentation  Taken 6/9/2024 1949 by Sera Reyez, RN  Safety Promotion/Fall Prevention:   activity supervised   assistive device/personal items within reach   clutter free environment maintained   increased rounding and " observation   nonskid shoes/slippers when out of bed   lighting adjusted   mobility aid in reach   safety round/check completed  Intervention: Prevent Skin Injury  Recent Flowsheet Documentation  Taken 6/9/2024 1949 by Sera Reyez RN  Body Position:   supine, head elevated   weight shifting  Intervention: Prevent and Manage VTE (Venous Thromboembolism) Risk  Recent Flowsheet Documentation  Taken 6/9/2024 1949 by Sera Reyez, RN  VTE Prevention/Management: SCDs (sequential compression devices) off  Intervention: Prevent Infection  Recent Flowsheet Documentation  Taken 6/9/2024 1949 by Sera Reyez, RN  Infection Prevention:   rest/sleep promoted   single patient room provided  Goal: Optimal Comfort and Wellbeing  Outcome: Progressing  Intervention: Monitor Pain and Promote Comfort  Recent Flowsheet Documentation  Taken 6/9/2024 1949 by Sera Reyez RN  Pain Management Interventions: repositioned  Goal: Readiness for Transition of Care  Outcome: Progressing     Problem: Seclusion/Restraint, Behavioral  Goal: Seclusion/Behavioral Restraint Goal: Absence of Harm or Injury  Outcome: Progressing  Intervention: Protect Skin and Joint Integrity  Recent Flowsheet Documentation  Taken 6/9/2024 1949 by Sera Reyez, RN  Body Position:   supine, head elevated   weight shifting     Problem: Urinary Diversion  Goal: Effective Urinary Elimination  Outcome: Progressing

## 2024-06-10 NOTE — PROVIDER NOTIFICATION
Patient has been calm and cooperative. Patient has been taking medications with no issues. Patient is incontinent of bladder and bowel and is a check and change PRN. 2 assist with walker and gait belt when up. Levaquin for UTI. Icy hot patch placed on lower back for back pain 8/10. Ultram also given for back pain. LR infusing at 100 ml/hr.  Plan: Psych to see.   BP (!) 154/78 (BP Location: Left arm)   Pulse 71   Temp 98  F (36.7  C) (Oral)   Resp 16   Wt 74.3 kg (163 lb 12.8 oz)   SpO2 98%   BMI 24.19 kg/m

## 2024-06-10 NOTE — CONSULTS
"      Psychiatry Consultation; Follow up              Reason for Consult, requesting source:    Psychosis follow-up     Requesting source: Silver Levine    Labs and imaging reviewed, attempted to call RN x 2 but was unable to reach them. Paged Dr. Lovett with recommendations.                Interim history:    Per initial psych consult by Dr. Cooper on 6/8/2024:  The patient is a 67 yo with h/o SADO, bipolar type, Dementia , multiple medical issues including past bladder ca, who is resident of [facility] and sent in for one month of decline in function, increased memory issues, and recent change in behavior, refusal of medications including an ABX for his UTI. Had f/unit(s) with urology due to hydronephrosis. He became agitated in ED , was given droperidol, ativan and zyprexa Im . Expresses that the staff at NH was trying to 'poison' him . He has no current pain complaint, had eaten this a.m. 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 He could not identify his contacts as sisters vs friends. He denies depression , \"I'm good'. Limited historian.      Plan:  Added short term IV haldol scheduled and hopefully will more consistently take his zyprexa.  Observe for improvement.  Consider change over to PO haldol vs seroquel.  Transfer to geriatric psychiatry may be option depending on his level of nursing needs.  Call prn.  Reconsult psychiatry Monday or prn.       Psychiatry asked to reconsult for psychosis follow-up. Today, the patient was sitting in bed when I went to meet with him. He was eating lunch. He tells me he is \"good.\" He says he lives at a facility. He denies SI/HI/AH/VH. When asked about his medications he says that \"she was trying to give me street drugs.\" When asked if he still thought this was the case, he says \"no, that's fixed now.\" He says he wants to eat his lunch and I can come back tomorrow if he wants to talk more. Per record review, " patient did appear to initially refuse medications the morning of 6/9/24 however was agreeable to take them following multiple attempts by staff. It is noted that patient appears to favor various staff which at times aids and hinders medication compliance. Today, patient has been compliant with medications however does appear to be confused at times. Paranoia appears to wax and wane. Overall patient does appear to be less agitated when compared to initial presentation on 6/5/2024.        Current Medications:     Current Facility-Administered Medications   Medication Dose Route Frequency Provider Last Rate Last Admin    benztropine (COGENTIN) tablet 1 mg  1 mg Oral At Bedtime Ana Lilia Cooper MD   1 mg at 06/09/24 2146    busPIRone (BUSPAR) tablet 30 mg  30 mg Oral BID Silver Levine MD   30 mg at 06/10/24 0817    docusate sodium (COLACE) capsule 100 mg  100 mg Oral BID Silver Levine MD   100 mg at 06/08/24 1951    enoxaparin ANTICOAGULANT (LOVENOX) injection 40 mg  40 mg Subcutaneous Q24H Silver Levine MD   40 mg at 06/09/24 1950    [Held by provider] fluticasone (FLONASE) 50 MCG/ACT spray 1 spray  1 spray Both Nostrils Daily Silver Levine MD        haloperidol lactate (HALDOL) injection 2 mg  2 mg Intravenous TID Aisha Orantes PA-C   2 mg at 06/10/24 1358    levofloxacin (LEVAQUIN) infusion 500 mg  500 mg Intravenous Q24H Kendrick Luis APRN  mL/hr at 06/09/24 2146 500 mg at 06/09/24 2146    OLANZapine (zyPREXA) tablet 10 mg  10 mg Oral BID Silver Levine MD   10 mg at 06/10/24 0817    OLANZapine (zyPREXA) tablet 20 mg  20 mg Oral At Bedtime Silver Levine MD   20 mg at 06/09/24 2146    sennosides (SENOKOT) tablet 1 tablet  1 tablet Oral BID Silver Levine MD   1 tablet at 06/08/24 1951    tamsulosin (FLOMAX) capsule 0.4 mg  0.4 mg Oral Daily Silver Levine MD   0.4 mg at 06/10/24 0825              MSE:   Appearance: adequately groomed, dressed in hospital  "scrubs, and appeared as age stated  Attitude:  somewhat cooperative  Eye Contact:  poor , focused on eating   Mood:   \"I'm good\"  Affect:  mood congruent and intensity is blunted  Speech:  mumbling  Psychomotor Behavior:  no evidence of tardive dyskinesia, dystonia, or tics  Thought Process:  linear  Associations:  no loose associations  Thought Content:  no evidence of suicidal ideation or homicidal ideation and patient did report feeling as though staff were trying to give him street drugs in the past, denies this currently yet paranoia cannot be definitely ruled out   Insight:  limited  Judgement:  limited  Oriented to:   person, place- could not identify which hospital he is at   Attention Span and Concentration:  limited  Recent and Remote Memory:  limited    Vital signs:  Temp: 98  F (36.7  C) Temp src: Oral BP: (!) 154/78 Pulse: 71   Resp: 16 SpO2: 98 % O2 Device: None (Room air)     Weight: 74.3 kg (163 lb 12.8 oz) (Taken from 3/2024)  Estimated body mass index is 24.19 kg/m  as calculated from the following:    Height as of 7/23/23: 1.753 m (5' 9\").    Weight as of this encounter: 74.3 kg (163 lb 12.8 oz).              DSM-5 Diagnosis:     Schizoaffective disorder, bipolar type   Medication nonadherence  Paranoia   UTI with delirium   Neurocognitive disorder            Assessment:   Patient is a 68 year old male with the above noted dx in addition to hx of invasive bladder cancer, ureteral obstruction s/p ureteral stent, hydronephrosis, and perineal urethrostomy who presented to the ED on 6/5/24 from his assisted living with worsening paranoia in the context of a UTI. Record review indicates that patient's paranoia caused him to stop taking his medication, psychotropics and antibiotics further worsening his UTI. Rapid onset of psychosis sx is likely a combination of psychotropic medication nonadherence coupled with delirium from UTI. Patient was initially quite agitated however this does seem to have " gradually improved. Patient remains paranoid at times and with vacillating confusion. Patient has been more compliant with medications and is no longer requiring a sitter. Patient was initially assessed by Dr. Cooper and IV haldol was ordered to assist with medication compliance. Today, patient was pleasant and appears less paranoid when compared to previous reports. Patient is on a total daily dose of 40mg of olanzapine which does seem high for his age in addition to addition of haldol three times daily.  Will reduce olanzapine to 30mg total daily dosing.  from 6/5/24. Given that haldol has been scheduled 3x daily, updated EKG should be obtained when able. Anticipate mentation will continue to clear with additional antibiotic and olanzapine dosing. Ideally will decrease haldol as mentation clears to previous medication regimen and limit polypharmacy. If patient returns to baseline, consider return to previous living facility. If patient does not continue to improve, psychiatry can assist with reassessment to determine if patient would benefit from inpatient geriatric psychiatry stabilization.               Summary of Recommendations:   Patient is currently receiving 40mg of olanzapine total per day. This is higher than the usual recommended daily dosing of 30mg especially given his age. Given that patient is now also taking haldol, will reduce dosing to 10mg three time daily.   Continue IV haldol 2mg three times daily targeting paranoia, can switch to oral medication if needed.   Should haldol seem ineffective, consider trial of seroquel or risperidone   Obtain updated EKG when feasible as haldol and polypharmacy with antipsychotic medications can lead to prolonged QTC  Please reconsult psych if patient does not return to baseline to determine if inpatient geriatric psych placement is warranted vs returning to living facility and following with outpatient supports    ECHO Barragan CNP  Consult/Liaison  "[facility]    Contact information available via [facility] Paging/Directory  If I am not available, then [facility] CL line ([phone]) should know who is covering our consult service.   \"Much or all of the text in this note was generated through the use of Dragon Dictate voice to text software. Errors in spelling or words which appear to be out of contact are unintentional, may be present due having escaped editing\"          "

## 2024-06-10 NOTE — PLAN OF CARE
"Goal Outcome Evaluation:    Pt Confused, occasionally refusing cares. Alarm on for safety. HR reg, pulses palpable. RA, afebrile. Tolerating soft,  no N/V. Incontinent of urine, brief in place. Psych following. IVF infusing.      Plan of Care Reviewed With: patient    Overall Patient Progress: no changeOverall Patient Progress: no change    Outcome Evaluation: Continues to be confused    Problem: Adult Inpatient Plan of Care  Goal: Plan of Care Review  Description: The Plan of Care Review/Shift note should be completed every shift.  The Outcome Evaluation is a brief statement about your assessment that the patient is improving, declining, or no change.  This information will be displayed automatically on your shift  note.  6/10/2024 1857 by Hali Ibarra RN  Outcome: Not Progressing  6/10/2024 1857 by Hali Ibarra RN  Outcome: Not Progressing  Flowsheets (Taken 6/10/2024 1857)  Outcome Evaluation: Continues to be confused  Plan of Care Reviewed With: patient  Overall Patient Progress: no change  6/10/2024 1853 by Hali Ibarra RN  Outcome: Not Progressing  Flowsheets (Taken 6/10/2024 1853)  Outcome Evaluation: Pt continues to be confused  Plan of Care Reviewed With: patient  Overall Patient Progress: no change  6/10/2024 1851 by Hali Ibarra RN  Outcome: Not Progressing  Goal: Patient-Specific Goal (Individualized)  Description: You can add care plan individualizations to a care plan. Examples of Individualization might be:  \"Parent requests to be called daily at 9am for status\", \"I have a hard time hearing out of my right ear\", or \"Do not touch me to wake me up as it startles  me\".  6/10/2024 1857 by Hali Ibarra, RN  Outcome: Not Progressing  6/10/2024 1857 by Hali Ibarra RN  Outcome: Not Progressing  6/10/2024 1853 by Hali Ibarra RN  Outcome: Not Progressing  6/10/2024 1851 by Hali Ibarra RN  Outcome: Not Progressing  Goal: Absence of Hospital-Acquired " Illness or Injury  6/10/2024 1857 by Hali Ibarra RN  Outcome: Not Progressing  6/10/2024 1857 by Hali Ibarra RN  Outcome: Not Progressing  6/10/2024 1853 by Hali Ibarra RN  Outcome: Not Progressing  6/10/2024 1851 by Hali Ibarra RN  Outcome: Not Progressing  Intervention: Identify and Manage Fall Risk  Recent Flowsheet Documentation  Taken 6/10/2024 1605 by Hali Ibarra RN  Safety Promotion/Fall Prevention:   activity supervised   lighting adjusted   clutter free environment maintained   safety round/check completed   nonskid shoes/slippers when out of bed   mobility aid in reach   patient and family education  Intervention: Prevent Skin Injury  Recent Flowsheet Documentation  Taken 6/10/2024 1605 by Hali Ibarra RN  Body Position: supine  Intervention: Prevent Infection  Recent Flowsheet Documentation  Taken 6/10/2024 1605 by Hali Ibarra RN  Infection Prevention: rest/sleep promoted  Goal: Optimal Comfort and Wellbeing  6/10/2024 1857 by Hali Ibarra RN  Outcome: Not Progressing  6/10/2024 1857 by Hali Ibarra RN  Outcome: Not Progressing  6/10/2024 1853 by Hali Ibarra RN  Outcome: Not Progressing  6/10/2024 1851 by Hali Ibarra RN  Outcome: Not Progressing  Goal: Readiness for Transition of Care  6/10/2024 1857 by Hali Ibarra RN  Outcome: Not Progressing  6/10/2024 1857 by Hali Ibarra RN  Outcome: Not Progressing  6/10/2024 1853 by Hali Ibarra RN  Outcome: Not Progressing  6/10/2024 1851 by Hali Ibarra RN  Outcome: Not Progressing     Problem: Seclusion/Restraint, Behavioral  Goal: Seclusion/Behavioral Restraint Goal: Absence of Harm or Injury  6/10/2024 1857 by Hali Ibarra RN  Outcome: Not Progressing  6/10/2024 1857 by Hali Ibarra RN  Outcome: Not Progressing  6/10/2024 1853 by Hali Ibarra RN  Outcome: Not Progressing  6/10/2024 1851 by Hali Ibarra RN  Outcome: Not  Progressing  Intervention: Protect Skin and Joint Integrity  Recent Flowsheet Documentation  Taken 6/10/2024 1605 by Hali Ibarra, RN  Body Position: supine     Problem: Urinary Diversion  Goal: Effective Urinary Elimination  6/10/2024 1857 by Hali Ibarra RN  Outcome: Not Progressing  6/10/2024 1857 by Hali Ibarra RN  Outcome: Not Progressing  6/10/2024 1853 by Hali Ibarra RN  Outcome: Not Progressing  6/10/2024 1851 by Hali Ibarra RN  Outcome: Not Progressing     Problem: Comorbidity Management  Goal: Maintenance of Behavioral Health Symptom Control  6/10/2024 1857 by Hali Ibarra RN  Outcome: Not Progressing  6/10/2024 1857 by Hali Ibarra RN  Outcome: Not Progressing  6/10/2024 1853 by Hali Ibarra RN  Outcome: Not Progressing  Intervention: Maintain Behavioral Health Symptom Control  Recent Flowsheet Documentation  Taken 6/10/2024 1605 by Hali Ibarra RN  Medication Review/Management: medications reviewed

## 2024-06-10 NOTE — PROGRESS NOTES
"[facility]    Medicine Progress Note - Hospitalist Service    Date of Admission:  6/5/2024    Assessment & Plan   Harpreet Mcelroy is a 68 year old male with history of schizophrenia and bipolar disorder, invasive bladder cancer, ureteral obstruction s/p ureteral stent, hydronephrosis, perineal urethrostomy, lives at [facility] admitted on 6/5/2024 with behavioral changes and UTI (after his paranoia caused him to stop taking his antibiotics)     Medically stable  Primary issues are Psych: paranoia in regards to different staff trying to poison him with food/meds  Seen by psyche 6/8 - recommend scheduled IV haldol TID to see if he would be more compliant to take psyche meds   (I did change this cont with hold parameter if pt is too sedated or becomes consistently compliant with med taking. See Psyche notes)      Acute paranoia  Schizophrenia  Bipolar disorder    - his facility reports: patient has had increasing paranoia x 1 month    - sometimes this is related to a UTI    - they have contacted his Psychiatrist multiple times who recommended they \"monitor\"    - has not been taking meds due to thinking they are \"poison\"    - pt required droperiodol, ativan, and zyprexa given in ED for behaviors    - cont home meds (buspirone 30 mg PO BID, olanzapine 10 mg po bid and 20 mg at HS, benztropine 2 mg at HS, and hydroxyzine 10 mg QID prn anxiety)    - sitter discontinued    - Psych saw pt, recommend scheduled IV haldol to see if he would be more complaint. Possibly change to oral haldol as outpt?     - Recommended possible geriatric psyche but wonder if his MS would improved after UTI is treated      -Consult for follow-up requested today as per psych plan.    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)    - No recent cultures available    - UA done here, but not culture    - continue Levaquin (Day " 5/10)    - per his care facility, they found out he had been hiding his meds and was not taking them due to paranoia of them being poisoned      history of ureteral obstruction  s/p stent, hydronephrosis, and perineal urethrostomy    - follows at [facility]    - last stent exchange was in March, due on 8/13/2024    - [facility] was consulted by the ER physician who recommended serial creatinines and bladder scans    - cont flomax    - patient does retain some urine, but is incontinent. He has altered anatomy so Urology would need to place oscar if we needed one. I would just check occasional POVs and not worry about retention unless he stops urinating/has >750cc in his bladder     Invasive bladder cancer    - managed with bladder-sparing trimodal therapy, bulbar urethral stricture    - s/p perineal urethrostomy, and left hydronephrosis managed with a chronic indwelling ureteral stent     - follows at [facility]    - last stent exchange was in March, next in August     Back pain    - chronic    - uses naprosyn at home, cont    -Reports pain is worsening, I offered to menthol patch and as needed tramadol.  He accepted.           Diet: Mechanical/Dental Soft Diet    DVT Prophylaxis: Enoxaparin (Lovenox) SQ  Oscar Catheter: Not present  Lines: None     Cardiac Monitoring: None  Code Status: Full Code      Clinically Significant Risk Factors             # Financial/Environmental Concerns: none         Disposition Plan     Medically Ready for Discharge: Anticipated in 2-4 Days             Bill Lovett MD  Hospitalist Service  [facility]  Securely message with [employer] (more info)  Text page via [employer] Paging/Directory   ______________________________________________________________________    Interval History   Complaining of worsening of his left loin/lumbar pain.  This morning he has been mistrustful of the staff. During nighttime no incident.  We had a friendly.,  He agreed to try tramadol and  menthol patch for pain       6Physical Exam   Vital Signs: Temp: 97.9  F (36.6  C) Temp src: Oral BP: (!) 148/76 Pulse: 72   Resp: 17 SpO2: 98 % O2 Device: None (Room air)    Weight: 163 lbs 12.83 oz    Constitutional: awake, alert, cooperative, no apparent distress, and appears stated age  Respiratory: No increased work of breathing, good air exchange, clear to auscultation bilaterally, no crackles or wheezing  Cardiovascular: Normal apical impulse, regular rate and rhythm, normal S1 and S2, no S3 or S4, and no murmur noted    Medical Decision Making       36 MINUTES SPENT BY ME on the date of service doing chart review, history, exam, documentation & further activities per the note.      Data     I have personally reviewed the following data over the past 24 hrs:    6.1  \   11.4 (L)   / 260     144 111 (H) 15.3 /  97   4.1 25 0.94 \       Imaging results reviewed over the past 24 hrs:   No results found for this or any previous visit (from the past 24 hour(s)).

## 2024-06-10 NOTE — PLAN OF CARE
"INPATIENT CARE PLAN PROGRESS NOTE [id number]:      Patient continues to refuse many nursing cares. Assessment limited by patient cooperation.  Patient continues to favor certain staff for cares and compliance with medication. Scheduled IV Haldol given x 2 to help patient with medication compliance (Zyprexa).  Incontinent of B&B, turns in bed, A-1.  Ibuprofen, ICE packs and repositioning given for lower back pain.  Psychiatry following.        Goal Outcome Evaluation:    Overall Patient Progress: no changeOverall Patient Progress: no change    Outcome Evaluation: Patient continues to refuse nursing cares and medicaitons.              Problem: Adult Inpatient Plan of Care  Goal: Plan of Care Review  Description: The Plan of Care Review/Shift note should be completed every shift.  The Outcome Evaluation is a brief statement about your assessment that the patient is improving, declining, or no change.  This information will be displayed automatically on your shift  note.  Outcome: Not Progressing  Flowsheets (Taken 6/9/2024 1916)  Outcome Evaluation: Patient continues to refuse nursing cares and medicaitons.  Plan of Care Reviewed With: patient  Overall Patient Progress: no change  Goal: Patient-Specific Goal (Individualized)  Description: You can add care plan individualizations to a care plan. Examples of Individualization might be:  \"Parent requests to be called daily at 9am for status\", \"I have a hard time hearing out of my right ear\", or \"Do not touch me to wake me up as it startles  me\".  Outcome: Not Progressing  Goal: Absence of Hospital-Acquired Illness or Injury  Outcome: Not Progressing  Intervention: Identify and Manage Fall Risk  Recent Flowsheet Documentation  Taken 6/9/2024 0800 by Elaina Garcia RN  Safety Promotion/Fall Prevention:   activity supervised   assistive device/personal items within reach   clutter free environment maintained   increased rounding and observation   lighting adjusted   mobility " aid in reach   nonskid shoes/slippers when out of bed   safety round/check completed  Intervention: Prevent Skin Injury  Recent Flowsheet Documentation  Taken 6/9/2024 0800 by Elaina Garcia RN  Body Position: supine, head elevated  Intervention: Prevent and Manage VTE (Venous Thromboembolism) Risk  Recent Flowsheet Documentation  Taken 6/9/2024 0800 by Elaina Garcia RN  VTE Prevention/Management: SCDs (sequential compression devices) off  Goal: Optimal Comfort and Wellbeing  Outcome: Not Progressing  Goal: Readiness for Transition of Care  Outcome: Not Progressing

## 2024-06-11 ENCOUNTER — APPOINTMENT (OUTPATIENT)
Dept: PHYSICAL THERAPY | Facility: CLINIC | Age: 69
DRG: 885 | End: 2024-06-11
Attending: NURSE PRACTITIONER
Payer: COMMERCIAL

## 2024-06-11 PROCEDURE — 250N000013 HC RX MED GY IP 250 OP 250 PS 637: Performed by: HOSPITALIST

## 2024-06-11 PROCEDURE — 250N000013 HC RX MED GY IP 250 OP 250 PS 637: Performed by: PSYCHIATRY & NEUROLOGY

## 2024-06-11 PROCEDURE — 250N000013 HC RX MED GY IP 250 OP 250 PS 637: Performed by: NURSE PRACTITIONER

## 2024-06-11 PROCEDURE — 250N000013 HC RX MED GY IP 250 OP 250 PS 637

## 2024-06-11 PROCEDURE — 93010 ELECTROCARDIOGRAM REPORT: CPT | Performed by: INTERNAL MEDICINE

## 2024-06-11 PROCEDURE — 97530 THERAPEUTIC ACTIVITIES: CPT | Mod: GP | Performed by: PHYSICAL THERAPIST

## 2024-06-11 PROCEDURE — 120N000001 HC R&B MED SURG/OB

## 2024-06-11 PROCEDURE — 99232 SBSQ HOSP IP/OBS MODERATE 35: CPT | Performed by: NURSE PRACTITIONER

## 2024-06-11 PROCEDURE — 97161 PT EVAL LOW COMPLEX 20 MIN: CPT | Mod: GP | Performed by: PHYSICAL THERAPIST

## 2024-06-11 PROCEDURE — 250N000013 HC RX MED GY IP 250 OP 250 PS 637: Performed by: PHYSICIAN ASSISTANT

## 2024-06-11 RX ORDER — LEVOFLOXACIN 500 MG/1
500 TABLET, FILM COATED ORAL DAILY
Status: DISCONTINUED | OUTPATIENT
Start: 2024-06-11 | End: 2024-06-14

## 2024-06-11 RX ORDER — HALOPERIDOL 1 MG/1
2 TABLET ORAL 3 TIMES DAILY
Status: DISCONTINUED | OUTPATIENT
Start: 2024-06-11 | End: 2024-06-18 | Stop reason: HOSPADM

## 2024-06-11 RX ORDER — HALOPERIDOL 5 MG/ML
2 INJECTION INTRAMUSCULAR 3 TIMES DAILY
Status: DISCONTINUED | OUTPATIENT
Start: 2024-06-11 | End: 2024-06-18 | Stop reason: HOSPADM

## 2024-06-11 RX ADMIN — BUSPIRONE HYDROCHLORIDE 30 MG: 15 TABLET ORAL at 09:00

## 2024-06-11 RX ADMIN — OLANZAPINE 10 MG: 10 TABLET, FILM COATED ORAL at 17:31

## 2024-06-11 RX ADMIN — HALOPERIDOL 2 MG: 1 TABLET ORAL at 12:22

## 2024-06-11 RX ADMIN — BUSPIRONE HYDROCHLORIDE 30 MG: 15 TABLET ORAL at 19:06

## 2024-06-11 RX ADMIN — HALOPERIDOL 2 MG: 1 TABLET ORAL at 19:06

## 2024-06-11 RX ADMIN — HYDROXYZINE HYDROCHLORIDE 10 MG: 10 TABLET ORAL at 12:23

## 2024-06-11 RX ADMIN — TAMSULOSIN HYDROCHLORIDE 0.4 MG: 0.4 CAPSULE ORAL at 09:00

## 2024-06-11 RX ADMIN — MENTHOL 1 PATCH: 205.5 PATCH TOPICAL at 12:28

## 2024-06-11 RX ADMIN — OLANZAPINE 10 MG: 10 TABLET, FILM COATED ORAL at 22:28

## 2024-06-11 RX ADMIN — TRAMADOL HYDROCHLORIDE 25 MG: 50 TABLET, COATED ORAL at 09:00

## 2024-06-11 RX ADMIN — OLANZAPINE 10 MG: 10 TABLET, FILM COATED ORAL at 09:00

## 2024-06-11 RX ADMIN — TRAMADOL HYDROCHLORIDE 25 MG: 50 TABLET, COATED ORAL at 17:31

## 2024-06-11 RX ADMIN — LEVOFLOXACIN 500 MG: 500 TABLET, FILM COATED ORAL at 09:00

## 2024-06-11 RX ADMIN — IBUPROFEN 400 MG: 400 TABLET ORAL at 19:06

## 2024-06-11 RX ADMIN — HYDROXYZINE HYDROCHLORIDE 10 MG: 10 TABLET ORAL at 17:31

## 2024-06-11 RX ADMIN — BENZTROPINE MESYLATE 1 MG: 1 TABLET ORAL at 22:28

## 2024-06-11 ASSESSMENT — ACTIVITIES OF DAILY LIVING (ADL)
ADLS_ACUITY_SCORE: 57
ADLS_ACUITY_SCORE: 57
ADLS_ACUITY_SCORE: 58
ADLS_ACUITY_SCORE: 59
ADLS_ACUITY_SCORE: 57
ADLS_ACUITY_SCORE: 58
ADLS_ACUITY_SCORE: 59
ADLS_ACUITY_SCORE: 58
ADLS_ACUITY_SCORE: 57
ADLS_ACUITY_SCORE: 57
ADLS_ACUITY_SCORE: 58
ADLS_ACUITY_SCORE: 59

## 2024-06-11 NOTE — PLAN OF CARE
Care from [id number]    Inpatient Progress Note:  For complete assessment see flow sheet documentation.    BP (!) 147/77 (BP Location: Left arm)   Pulse 73   Temp 97.7  F (36.5  C) (Oral)   Resp 14   Wt 74.3 kg (163 lb 12.8 oz)   SpO2 98%   BMI 24.19 kg/m         Patient currently resting comfortably in bed, denies pain at this time, incontinent of urine and stool, lost IV access over night- refused to have new IV placed at this time. Psych following, SW also following, patient exhibiting paranoia at times      Problem: Adult Inpatient Plan of Care  Goal: Absence of Hospital-Acquired Illness or Injury  Intervention: Identify and Manage Fall Risk  Recent Flowsheet Documentation  Taken 6/11/2024 0045 by Lacy Crawford RN  Safety Promotion/Fall Prevention: activity supervised  Intervention: Prevent Skin Injury  Recent Flowsheet Documentation  Taken 6/11/2024 0045 by Lacy Crawford RN  Body Position: position changed independently  Intervention: Prevent Infection  Recent Flowsheet Documentation  Taken 6/11/2024 0045 by Lacy Crawford RN  Infection Prevention: rest/sleep promoted    Problem: Seclusion/Restraint, Behavioral  Goal: Seclusion/Behavioral Restraint Goal: Absence of Harm or Injury  Intervention: Protect Skin and Joint Integrity  Recent Flowsheet Documentation  Taken 6/11/2024 0045 by Lacy Crawford RN  Body Position: position changed independently     Problem: Comorbidity Management  Goal: Maintenance of Behavioral Health Symptom Control  Intervention: Maintain Behavioral Health Symptom Control  Recent Flowsheet Documentation  Taken 6/11/2024 0045 by Lacy Crawford RN  Medication Review/Management: medications reviewed

## 2024-06-11 NOTE — PROGRESS NOTES
Care Management Follow Up    Length of Stay (days): 4    Expected Discharge Date: 06/11/2024     Concerns to be Addressed: discharge planning     Patient plan of care discussed at interdisciplinary rounds: Yes    Anticipated Discharge Disposition:  Return to [facility]    Referrals Placed by CM/SW:  none  Private pay costs discussed: Not applicable    Additional Information:  SW following for discharge planning.     Pt has been seen by psych on 6/8 and 6/10. Psych provided medication recs. No clear recommendation for inpatient psych at this time.     Per RN admission note, pt is independent at baseline. Current nursing notes indicate pt is an Ax2. Discussed with bedside RN who states plan is to switch from IV to oral meds this afternoon.      left for JULIO CÉSAR Weller p:[phone] at [facility] requesting return call to discuss discharge plan.     ADDENDUM:     Received return call from Janee p:[phone], director of nursing at [facility]. Janee states she is the one that needs to approve of pt returning and she is out today. Janee has concerns about pt's mobility as they are penitentiary and he is independent at baseline. HIRAM noted PT has been consulted to evaluate pt. Janee requested to be kept updated on recommendations.     Social work will continue to follow and assist with discharge planning as needed.    AUGUSTINE Felix, W  Care Coordination  [phone]    AUGUSTINE Sawyer

## 2024-06-11 NOTE — PROGRESS NOTES
"[facility]    Medicine Progress Note - Hospitalist Service    Date of Admission:  6/5/2024    Assessment & Plan   Harpreet Mcelroy is a 68 year old male with history of schizophrenia and bipolar disorder, invasive bladder cancer, ureteral obstruction s/p ureteral stent, hydronephrosis, perineal urethrostomy, lives at [facility] admitted on 6/5/2024 with behavioral changes and UTI (after his paranoia caused him to stop taking his antibiotics)       Interval events:  Tolerating PO well.  Encourage PO medications.      Acute paranoia  Schizophrenia  Bipolar disorder  His facility reports: patient has had increasing paranoia x 1 month. Sometimes this is related to a UTI. They have contacted his Psychiatrist multiple times who recommended they \"monitor.\" He has not been taking meds due to thinking they are \"poison.\"  Pt required droperiodol, ativan, and zyprexa given in ED for behaviors. Continue home meds (buspirone 30 mg PO BID, olanzapine 10 mg po bid and 20 mg at HS, benztropine 2 mg at HS, and hydroxyzine 10 mg QID prn anxiety). Sitter discontinued more than 24 hours.   - Change IV Haldol to PO Haldol.  Check EKG if he will allow given several medications can cause QTc prolongation.      Urinary tract infection, complicated. He was prescribed Levaquin by his [facility] group recently (Stenotrophomonas maltophilia in past).  Per his care facility, they found out he had been hiding his meds and was not taking them due to paranoia of them being poisoned. No recent cultures available. UA done here, but not culture.  - Continue Levaquin (Day 6/10)    History of ureteral obstruction  s/p stent, hydronephrosis, and perineal urethrostomy  Follows at [facility]. Last stent exchange was in March, due on 8/13/2024.  [facility] was consulted by the ER physician who recommended serial creatinines and bladder scans  - Continue flomax       Invasive bladder cancer. Managed with " bladder-sparing trimodal therapy, bulbar urethral stricture. S/P perineal urethrostomy, and left hydronephrosis managed with a chronic indwelling ureteral stent. Follows at [facility]  - last stent exchange was in March, next in August     Back pain. Chronic uses naprosyn at home, cont  - PRN tramadol.          Diet: Mechanical/Dental Soft Diet    DVT Prophylaxis: discontinue Lovenox.  Mechanical DVTp.  OOB to chair and ambulate TID.    Hernandez Catheter: Not present  Lines: None     Cardiac Monitoring: None  Code Status: Full Code      Clinically Significant Risk Factors             # Financial/Environmental Concerns: none         Disposition Plan     Medically Ready for Discharge: Anticipated in 2-4 Days             ECHO Guaman CNP  Hospitalist Service  [facility]  Securely message with [employer] (more info)  Text page via [employer] Paging/Directory   ______________________________________________________________________    Interval History   No acute events overnight.  VSS.  Resting comfortably.   Denies any new issues.       6Physical Exam   Vital Signs: Temp: 97.6  F (36.4  C) Temp src: Oral BP: (!) 141/69 Pulse: 78   Resp: 16 SpO2: 98 % O2 Device: None (Room air)    Weight: 163 lbs 12.83 oz    Constitutional: awake, alert, cooperative, no apparent distress, and appears stated age  Respiratory: No increased work of breathing, good air exchange, clear to auscultation bilaterally, no crackles or wheezing  Cardiovascular: Normal apical impulse, regular rate and rhythm, normal S1 and S2, no S3 or S4, and no murmur noted    Medical Decision Making       30 MINUTES SPENT BY ME on the date of service doing chart review, history, exam, documentation & further activities per the note.      Data         Imaging results reviewed over the past 24 hrs:   No results found for this or any previous visit (from the past 24 hour(s)).

## 2024-06-11 NOTE — PLAN OF CARE
"Patient is alert, but confused. VS WNL and documented on the FS. Lung sounds clear. Active bowel sounds with LBM this morning. Incontinent of both urine and bowel. SL. Mechanical soft diet (needs someone to feed him). Rating back pain an 8/10 and was treated with Ultram this morning. Tried to get patient up to ambulate, but was to weak (NP placed order for PT). Patient continues to have paranoia with staff. Continues on Levaquin for a UTI. Patient has been compliant with taking medications.   Plan: PT consult.     BP (!) 147/77 (BP Location: Left arm)   Pulse 73   Temp 97.7  F (36.5  C) (Oral)   Resp 14   Wt 74.3 kg (163 lb 12.8 oz)   SpO2 98%   BMI 24.19 kg/m      Problem: Adult Inpatient Plan of Care  Goal: Plan of Care Review  Description: The Plan of Care Review/Shift note should be completed every shift.  The Outcome Evaluation is a brief statement about your assessment that the patient is improving, declining, or no change.  This information will be displayed automatically on your shift  note.  Outcome: Progressing  Flowsheets (Taken 6/11/2024 1203)  Outcome Evaluation: still having paranoia  Overall Patient Progress: no change  Goal: Patient-Specific Goal (Individualized)  Description: You can add care plan individualizations to a care plan. Examples of Individualization might be:  \"Parent requests to be called daily at 9am for status\", \"I have a hard time hearing out of my right ear\", or \"Do not touch me to wake me up as it startles  me\".  Outcome: Progressing  Goal: Absence of Hospital-Acquired Illness or Injury  Outcome: Progressing  Intervention: Identify and Manage Fall Risk  Recent Flowsheet Documentation  Taken 6/11/2024 1100 by Marzena Goodman, RN  Safety Promotion/Fall Prevention:   activity supervised   nonskid shoes/slippers when out of bed  Intervention: Prevent Skin Injury  Recent Flowsheet Documentation  Taken 6/11/2024 0900 by Marzena Goodman, RN  Body Position: position changed " independently  Intervention: Prevent and Manage VTE (Venous Thromboembolism) Risk  Recent Flowsheet Documentation  Taken 6/11/2024 1100 by Marzena Goodman RN  VTE Prevention/Management: SCDs (sequential compression devices) off  Intervention: Prevent Infection  Recent Flowsheet Documentation  Taken 6/11/2024 1100 by Marzena Goodman RN  Infection Prevention: rest/sleep promoted  Goal: Optimal Comfort and Wellbeing  Outcome: Progressing  Intervention: Monitor Pain and Promote Comfort  Recent Flowsheet Documentation  Taken 6/11/2024 0900 by Marzena Goodman RN  Pain Management Interventions: (Ultram) medication (see MAR)  Goal: Readiness for Transition of Care  Outcome: Progressing   Goal Outcome Evaluation:           Overall Patient Progress: no changeOverall Patient Progress: no change    Outcome Evaluation: still having paranoia

## 2024-06-11 NOTE — PROGRESS NOTES
06/11/24 1300   Appointment Info   Signing Clinician's Name / Credentials (PT) Alisa Nichols, PT   Living Environment   Living Environment Comments Pt states he lives in a house with his wife and family.  Per chart pt lives at [place] [facility]   Self-Care   Current Activity Tolerance fair   Equipment Currently Used at Home cane, straight;walker, rolling   Fall history within last six months yes   Number of times patient has fallen within last six months 2   Activity/Exercise/Self-Care Comment Pt unreliable historian.  Writer unsure of PLOF.  Pt states he has a cane and walker.   General Information   Onset of Illness/Injury or Date of Surgery 06/05/24   Referring Physician Dr. Kendrick Luis   Patient/Family Therapy Goals Statement (PT) Pt didn't state   Pertinent History of Current Problem (include personal factors and/or comorbidities that impact the POC) Per chart review: Harpreet Mcelroy is a 68 year old male with history of schizophrenia and bipolar disorder, invasive bladder cancer, ureteral obstruction s/p ureteral stent, hydronephrosis, perineal urethrostomy, lives at [facility] admitted on 6/5/2024 with behavioral changes and UTI (after his paranoia caused him to stop taking his antibiotics)   Existing Precautions/Restrictions fall   General Observations Pt lying in bed.  Vitals in supine: /74, HR 79bpm, SpO2 98% on RA   Cognition   Affect/Mental Status (Cognition) flat/blunted affect   Orientation Status (Cognition) oriented to;person;place;time   Follows Commands (Cognition) follows one-step commands;50-74% accuracy   Safety Deficit (Cognition) moderate deficit;at risk behavior observed;impulsivity;insight into deficits/self-awareness;judgment;problem-solving   Cognitive Status Comments Pt oriented to date, being in hospital and name.  Pt needing lots of cueing to sequence all mobility.  Decreased safety and impulsivity noted with pt attempting to stand with therapist not next to pt.   Pain  Assessment   Patient Currently in Pain Yes, see Vital Sign flowsheet  (R hip and back pain - pt reports from prior falls)   Integumentary/Edema   Integumentary/Edema Comments See nurses notes, age related skin changes   Posture    Posture Forward head position;Protracted shoulders   Range of Motion (ROM)   ROM Comment tight hamstrings B with R SLR to ~ 60 deg and L to 45 deg   Strength (Manual Muscle Testing)   Strength Comments Global mms weakness and deconditioning.  B hip flex 3/5   Bed Mobility   Comment, (Bed Mobility) sup > sit Min A   Transfers   Comment, (Transfers) sit <> stand to FWW Min A   Gait/Stairs (Locomotion)   Comment, (Gait/Stairs) Pt took 4-5 small steps at FWW to pivot to chair with Min A at trunk   Balance   Balance Comments Impaired dynamic standing balance requiring B UE support on FWW and CGA at trunk for safe mobility   Sensory Examination   Sensory Perception Comments Pt reports numbness andk tingling B hands   Coordination   Coordination no deficits were identified   Muscle Tone   Muscle Tone no deficits were identified   Clinical Impression   Criteria for Skilled Therapeutic Intervention Yes, treatment indicated   PT Diagnosis (PT) Impaired functional mobility   Influenced by the following impairments Decreased strength and activity tolerance, impaired balance, impaired cognition, low back and R hip pain   Functional limitations due to impairments Increased falls risk, assisted mobility and ADLs, unable to amb functional household distances   Clinical Presentation (PT Evaluation Complexity) stable   Clinical Presentation Rationale Pt medically stable with multiple comorbidities   Clinical Decision Making (Complexity) low complexity   Planned Therapy Interventions (PT) balance training;bed mobility training;gait training;home exercise program;patient/family education;ROM (range of motion);strengthening;stretching;transfer training;progressive activity/exercise;risk factor education;home  program guidelines   Risk & Benefits of therapy have been explained evaluation/treatment results reviewed;care plan/treatment goals reviewed;risks/benefits reviewed;current/potential barriers reviewed;participants voiced agreement with care plan;participants included;patient   PT Total Evaluation Time   PT Farnaz Low Complexity Minutes (89607) 10   Physical Therapy Goals   PT Frequency 5x/week   PT Predicted Duration/Target Date for Goal Attainment 06/14/24   PT Goals Bed Mobility;Transfers;Gait   PT: Bed Mobility Supervision/stand-by assist;Supine to/from sit   PT: Transfers Supervision/stand-by assist;Sit to/from stand;Assistive device   PT: Gait Supervision/stand-by assist;Rolling walker;100 feet   PT Discharge Planning   PT Plan Progress bed mobility and gait distance with WC in tow, repeated STS, LE strengthening   PT Discharge Recommendation (DC Rec) Transitional Care Facility   PT Rationale for DC Rec Pt poor historian. Per chart pt lives at [facility].   Pt appears to be below his reported baseline.  Pt currently requires Min/Mod A with bed mobility and Min A for transfers and amb 5' forward/back with FWW.   Pt demonstrates decreased safety awareness and impulsivity with mobility.   Recommend  Memory care TCU to increase functional strength and progress safety and independence with all mobility and determine best long term discharge  location as he may be more appropriate in a memory care setting.   PT Brief overview of current status Min/Mod A bed mobility,  Min A stand pivot with FWW to chair   Total Session Time   Total Session Time (sum of timed and untimed services) 10

## 2024-06-11 NOTE — PLAN OF CARE
"Pt A&Ox3, disoriented to situation. VSS on RA. Incontinent of urine. IV levaquin given.   Problem: Adult Inpatient Plan of Care  Goal: Plan of Care Review  Description: The Plan of Care Review/Shift note should be completed every shift.  The Outcome Evaluation is a brief statement about your assessment that the patient is improving, declining, or no change.  This information will be displayed automatically on your shift  note.  Outcome: Not Progressing  Flowsheets (Taken 6/10/2024 2306)  Outcome Evaluation: calm and compliant with medicastions.  Plan of Care Reviewed With: patient  Goal: Patient-Specific Goal (Individualized)  Description: You can add care plan individualizations to a care plan. Examples of Individualization might be:  \"Parent requests to be called daily at 9am for status\", \"I have a hard time hearing out of my right ear\", or \"Do not touch me to wake me up as it startles  me\".  Outcome: Not Progressing  Goal: Absence of Hospital-Acquired Illness or Injury  Outcome: Not Progressing  Intervention: Identify and Manage Fall Risk  Recent Flowsheet Documentation  Taken 6/10/2024 2100 by Masha Pretty, RN  Safety Promotion/Fall Prevention:   activity supervised   safety round/check completed   nonskid shoes/slippers when out of bed  Goal: Optimal Comfort and Wellbeing  Outcome: Not Progressing  Goal: Readiness for Transition of Care  Outcome: Not Progressing       Goal Outcome Evaluation:      Plan of Care Reviewed With: patient          Outcome Evaluation: calm and compliant with medicastions.      "

## 2024-06-12 LAB
ATRIAL RATE - MUSE: 88 BPM
DIASTOLIC BLOOD PRESSURE - MUSE: NORMAL MMHG
INTERPRETATION ECG - MUSE: NORMAL
P AXIS - MUSE: 80 DEGREES
PR INTERVAL - MUSE: 158 MS
QRS DURATION - MUSE: 104 MS
QT - MUSE: 378 MS
QTC - MUSE: 457 MS
R AXIS - MUSE: 46 DEGREES
SYSTOLIC BLOOD PRESSURE - MUSE: NORMAL MMHG
T AXIS - MUSE: 46 DEGREES
VENTRICULAR RATE- MUSE: 88 BPM

## 2024-06-12 PROCEDURE — 250N000013 HC RX MED GY IP 250 OP 250 PS 637

## 2024-06-12 PROCEDURE — 99232 SBSQ HOSP IP/OBS MODERATE 35: CPT | Performed by: NURSE PRACTITIONER

## 2024-06-12 PROCEDURE — 250N000013 HC RX MED GY IP 250 OP 250 PS 637: Performed by: PHYSICIAN ASSISTANT

## 2024-06-12 PROCEDURE — 250N000013 HC RX MED GY IP 250 OP 250 PS 637: Performed by: NURSE PRACTITIONER

## 2024-06-12 PROCEDURE — 120N000001 HC R&B MED SURG/OB

## 2024-06-12 PROCEDURE — 250N000013 HC RX MED GY IP 250 OP 250 PS 637: Performed by: HOSPITALIST

## 2024-06-12 PROCEDURE — 250N000013 HC RX MED GY IP 250 OP 250 PS 637: Performed by: PSYCHIATRY & NEUROLOGY

## 2024-06-12 RX ADMIN — LEVOFLOXACIN 500 MG: 500 TABLET, FILM COATED ORAL at 08:23

## 2024-06-12 RX ADMIN — OLANZAPINE 10 MG: 10 TABLET, FILM COATED ORAL at 08:24

## 2024-06-12 RX ADMIN — TRAMADOL HYDROCHLORIDE 25 MG: 50 TABLET, COATED ORAL at 14:39

## 2024-06-12 RX ADMIN — OLANZAPINE 10 MG: 10 TABLET, FILM COATED ORAL at 21:46

## 2024-06-12 RX ADMIN — HALOPERIDOL 2 MG: 1 TABLET ORAL at 17:46

## 2024-06-12 RX ADMIN — BENZTROPINE MESYLATE 1 MG: 1 TABLET ORAL at 21:46

## 2024-06-12 RX ADMIN — HALOPERIDOL 2 MG: 1 TABLET ORAL at 05:33

## 2024-06-12 RX ADMIN — IBUPROFEN 400 MG: 400 TABLET ORAL at 02:11

## 2024-06-12 RX ADMIN — DOCUSATE SODIUM 100 MG: 100 CAPSULE, LIQUID FILLED ORAL at 20:03

## 2024-06-12 RX ADMIN — HALOPERIDOL 2 MG: 1 TABLET ORAL at 12:20

## 2024-06-12 RX ADMIN — OLANZAPINE 10 MG: 10 TABLET, FILM COATED ORAL at 16:05

## 2024-06-12 RX ADMIN — DOCUSATE SODIUM 100 MG: 100 CAPSULE, LIQUID FILLED ORAL at 08:24

## 2024-06-12 RX ADMIN — HYDROXYZINE HYDROCHLORIDE 10 MG: 10 TABLET ORAL at 14:39

## 2024-06-12 RX ADMIN — BUSPIRONE HYDROCHLORIDE 30 MG: 15 TABLET ORAL at 20:03

## 2024-06-12 RX ADMIN — HYDROXYZINE HYDROCHLORIDE 10 MG: 10 TABLET ORAL at 02:11

## 2024-06-12 RX ADMIN — SENNOSIDES 1 TABLET: 8.6 TABLET, FILM COATED ORAL at 08:23

## 2024-06-12 RX ADMIN — TAMSULOSIN HYDROCHLORIDE 0.4 MG: 0.4 CAPSULE ORAL at 08:23

## 2024-06-12 RX ADMIN — BUSPIRONE HYDROCHLORIDE 30 MG: 15 TABLET ORAL at 08:23

## 2024-06-12 ASSESSMENT — ACTIVITIES OF DAILY LIVING (ADL)
ADLS_ACUITY_SCORE: 57
ADLS_ACUITY_SCORE: 57
ADLS_ACUITY_SCORE: 61
ADLS_ACUITY_SCORE: 61
ADLS_ACUITY_SCORE: 56
ADLS_ACUITY_SCORE: 61
ADLS_ACUITY_SCORE: 57
ADLS_ACUITY_SCORE: 56
ADLS_ACUITY_SCORE: 56
ADLS_ACUITY_SCORE: 57
ADLS_ACUITY_SCORE: 57
ADLS_ACUITY_SCORE: 56
ADLS_ACUITY_SCORE: 57
ADLS_ACUITY_SCORE: 56
ADLS_ACUITY_SCORE: 61
ADLS_ACUITY_SCORE: 61
ADLS_ACUITY_SCORE: 56
ADLS_ACUITY_SCORE: 57
ADLS_ACUITY_SCORE: 61
ADLS_ACUITY_SCORE: 57
ADLS_ACUITY_SCORE: 57

## 2024-06-12 NOTE — PROGRESS NOTES
"[facility]    Medicine Progress Note - Hospitalist Service    Date of Admission:  6/5/2024    Assessment & Plan   Harpreet Mcelroy is a 68 year old male with history of schizophrenia and bipolar disorder, invasive bladder cancer, ureteral obstruction s/p ureteral stent, hydronephrosis, perineal urethrostomy, lives at [facility] admitted on 6/5/2024 with behavioral changes and UTI (after his paranoia caused him to stop taking his antibiotics)       Interval events:  Tolerating PO well.  Encourage PO medications.  Pending discharge to TCU.     Acute paranoia  Schizophrenia  Bipolar disorder  His facility reports: patient has had increasing paranoia x 1 month. Sometimes this is related to a UTI. They have contacted his Psychiatrist multiple times who recommended they \"monitor.\" He has not been taking meds due to thinking they are \"poison.\"  Pt required droperiodol, ativan, and zyprexa given in ED for behaviors. Continue home meds (buspirone 30 mg PO BID, olanzapine 10 mg po bid and 20 mg at HS, benztropine 2 mg at HS, and hydroxyzine 10 mg QID prn anxiety). Sitter discontinued more than 24 hours.   - Tolerating PO Haldol.  Repeat EKG with normal QTc.      Urinary tract infection, complicated. He was prescribed Levaquin by his [facility] group recently (Stenotrophomonas maltophilia in past).  Per his care facility, they found out he had been hiding his meds and was not taking them due to paranoia of them being poisoned. No recent cultures available. UA done here, but not culture.  - Continue Levaquin (Day 7/10)    History of ureteral obstruction  s/p stent, hydronephrosis, and perineal urethrostomy  Follows at [facility]. Last stent exchange was in March, due on 8/13/2024.  [facility] was consulted by the ER physician who recommended serial creatinines and bladder scans  - Continue flomax       Invasive bladder cancer. Managed with bladder-sparing trimodal therapy, bulbar urethral " stricture. S/P perineal urethrostomy, and left hydronephrosis managed with a chronic indwelling ureteral stent. Follows at [facility]  - last stent exchange was in March, next in August     Back pain. Chronic uses naprosyn at home, cont  - PRN tramadol.          Diet: Mechanical/Dental Soft Diet    DVT Prophylaxis: discontinue Lovenox.  Mechanical DVTp.  OOB to chair and ambulate TID.    Hernandez Catheter: Not present  Lines: None     Cardiac Monitoring: None  Code Status: Full Code      Clinically Significant Risk Factors             # Financial/Environmental Concerns: none         Disposition Plan  medically stable for discharge when TCU found.     Medically Ready for Discharge: Ready Now             ECHO Guaman CNP  Hospitalist Service  [facility]  Securely message with [employer] (more info)  Text page via "[employer] Paging/Directory   ______________________________________________________________________    Interval History   No acute events overnight.  VSS.  Resting comfortably.   Denies any new issues.  Pending discharge to TCU.       6Physical Exam   Vital Signs: Temp: 98.1  F (36.7  C) Temp src: Axillary BP: 136/89 Pulse: 114   Resp: 16 SpO2: 99 % O2 Device: None (Room air)    Weight: 163 lbs 12.83 oz    Constitutional: awake, alert, cooperative, no apparent distress, and appears stated age  Respiratory: No increased work of breathing, good air exchange, clear to auscultation bilaterally, no crackles or wheezing  Cardiovascular: Normal apical impulse, regular rate and rhythm, normal S1 and S2, no S3 or S4, and no murmur noted  Abd:  Soft, non -tender, non distended. NABS.    Ext:  DUNHAM. CMS intact. CR < 3 seconds.    Neuro: AxOx2-3.  FC.  Agitated at times but redirectable and safe.     Medical Decision Making       35 MINUTES SPENT BY ME on the date of service doing chart review, history, exam, documentation & further activities per the note.      Data         Imaging results reviewed over  the past 24 hrs:   No results found for this or any previous visit (from the past 24 hour(s)).

## 2024-06-12 NOTE — PLAN OF CARE
"Goal Outcome Evaluation:      Plan of Care Reviewed With: patient          Outcome Evaluation: TCU. Up in chair with 1 assist gait belt walker. Incontinent of large amounts of urine. Attempted external cath but it did not work well.  Incontinent of stool. Lower glute area down to upper back of thighs has blotchy blanchable redness. Mepi to coccyx to prevent breakdown. Needs encouragement to get up and 2 assist to walk steps in room. Has been pleasant with RN today.      Problem: Adult Inpatient Plan of Care  Goal: Plan of Care Review  Description: The Plan of Care Review/Shift note should be completed every shift.  The Outcome Evaluation is a brief statement about your assessment that the patient is improving, declining, or no change.  This information will be displayed automatically on your shift  note.  Outcome: Progressing  Flowsheets (Taken 6/12/2024 1810)  Outcome Evaluation: TCU. Up in chair with 1 assist gait belt walker. Incontinent of large amounts of urine. Attempted external cath but it did not work well.  Incontinent of stool. Lower glute area down to upper back of thighs has blotchy blanchable redness. Mepi to coccyx to prevent breakdown. Needs encouragement to get up and 2 assist to walk steps in room. Has been pleasant with RN today.  Plan of Care Reviewed With: patient  Goal: Patient-Specific Goal (Individualized)  Description: You can add care plan individualizations to a care plan. Examples of Individualization might be:  \"Parent requests to be called daily at 9am for status\", \"I have a hard time hearing out of my right ear\", or \"Do not touch me to wake me up as it startles  me\".  Outcome: Progressing  Goal: Absence of Hospital-Acquired Illness or Injury  Outcome: Progressing  Intervention: Identify and Manage Fall Risk  Recent Flowsheet Documentation  Taken 6/12/2024 1018 by Carol Evans RN  Safety Promotion/Fall Prevention:   activity supervised   nonskid shoes/slippers when out of bed   " safety round/check completed  Intervention: Prevent Skin Injury  Recent Flowsheet Documentation  Taken 6/12/2024 1018 by Carol Evans, RN  Device Skin Pressure Protection: absorbent pad utilized/changed  Intervention: Prevent and Manage VTE (Venous Thromboembolism) Risk  Recent Flowsheet Documentation  Taken 6/12/2024 1018 by Carol Evans, RN  VTE Prevention/Management: SCDs (sequential compression devices) off  Goal: Optimal Comfort and Wellbeing  Outcome: Progressing  Goal: Readiness for Transition of Care  Outcome: Progressing     Problem: Seclusion/Restraint, Behavioral  Goal: Seclusion/Behavioral Restraint Goal: Absence of Harm or Injury  Outcome: Progressing     Problem: Urinary Diversion  Goal: Effective Urinary Elimination  Outcome: Progressing     Problem: Comorbidity Management  Goal: Maintenance of Behavioral Health Symptom Control  Outcome: Progressing

## 2024-06-12 NOTE — PROGRESS NOTES
Care Management Follow Up    Expected Discharge Date: 06/13/2024     Concerns to be Addressed: Discharge planning     Patient plan of care discussed at interdisciplinary rounds: Yes    Anticipated Discharge Disposition: TCU     Anticipated Discharge Services: PT/OT     Patient/family educated on Medicare website which has current facility and service quality ratings: Yes   Education Provided on the Discharge Plan: Yes   Patient/Family in Agreement with the Plan: Yes     Referrals Placed by CM/SW: Skilled Nursing Facility   Private pay costs discussed: Not applicable    Additional Information:  PT has evaluated patient and recommend TCU. SW met with patient and discussed recs. He appears agreeable to TCU. Referrals sent.     SW placed call to [facility], [phone]. Left VM for JULIO CÉSAR Weller with an update on discharge plans.     SW will continue to follow.     1310: [facility] has clinically accepted. Patient updated. [id number]. Patient will require an OBRA Level 2 screening prior to discharge. SW emailed [facility] contact for screenings, they will assign the case for assessment in 24-48 hours. SW attempted to update patient's sister Bessie via phone, [phone]. Phone kept ringing and no option to leave voicemail.    LIANNA Cat, [facility]   Inpatient Care Coordination  [facility]   [phone]

## 2024-06-12 NOTE — PLAN OF CARE
"  Pt A&O, disoriented to situation. VSS. Mech soft diet. Pt stating multiple times \"he is dehydrated\", having increase thirst. Pt incontinent of urine. Encouraged pt to use urinal or to try and get up to bathroom. PT recommending TCU. SW following.     Goal Outcome Evaluation:      Plan of Care Reviewed With: patient    Overall Patient Progress: no changeOverall Patient Progress: no change    Outcome Evaluation: PT recommending TCU.      Problem: Adult Inpatient Plan of Care  Goal: Plan of Care Review  Description: The Plan of Care Review/Shift note should be completed every shift.  The Outcome Evaluation is a brief statement about your assessment that the patient is improving, declining, or no change.  This information will be displayed automatically on your shift  note.  Outcome: Progressing  Flowsheets (Taken 6/12/2024 0615)  Outcome Evaluation: PT recommending TCU.  Plan of Care Reviewed With: patient  Overall Patient Progress: no change  Goal: Patient-Specific Goal (Individualized)  Description: You can add care plan individualizations to a care plan. Examples of Individualization might be:  \"Parent requests to be called daily at 9am for status\", \"I have a hard time hearing out of my right ear\", or \"Do not touch me to wake me up as it startles  me\".  Outcome: Progressing  Goal: Absence of Hospital-Acquired Illness or Injury  Outcome: Progressing  Intervention: Identify and Manage Fall Risk  Recent Flowsheet Documentation  Taken 6/12/2024 0000 by Masha Pretty RN  Safety Promotion/Fall Prevention:   activity supervised   nonskid shoes/slippers when out of bed   safety round/check completed  Intervention: Prevent Skin Injury  Recent Flowsheet Documentation  Taken 6/12/2024 0000 by Masha Pretty RN  Device Skin Pressure Protection: absorbent pad utilized/changed  Intervention: Prevent and Manage VTE (Venous Thromboembolism) Risk  Recent Flowsheet Documentation  Taken 6/12/2024 0000 by Masha Pretty RN  VTE " Prevention/Management: SCDs (sequential compression devices) off  Goal: Optimal Comfort and Wellbeing  Outcome: Progressing  Goal: Readiness for Transition of Care  Outcome: Progressing

## 2024-06-12 NOTE — PLAN OF CARE
"PRIMARY DIAGNOSIS: Carol  OUTPATIENT/OBSERVATION GOALS TO BE MET BEFORE DISCHARGE:  ADLs back to baseline: No    Activity and level of assistance: Ax1 with walker    Pain status: Improved-controlled with oral pain medications.    Return to near baseline physical activity: No     Discharge Planner Nurse   Safe discharge environment identified: No  Barriers to discharge: Yes       Entered by: Fatou Ortega RN 06/11/2024 9:32 PM     Please review provider order for any additional goals.   Nurse to notify provider when observation goals have been met and patient is ready for discharge.    Temp: 97.8  F (36.6  C) Temp src: Oral BP: (!) 150/82 Pulse: 84   Resp: 16 SpO2: 98 % O2 Device: None (Room air)       Disoriented to situation. Up Ax1 with walker and gait belt. Incontinent of both urine and stool requiring frequent changes. No IV access. Complained of right hip pain, prn atarax/ultram/ibuprofen given- effective. Plan- PT recommending TCU- SW following for discharge planning, oral levaquin daily.     Problem: Adult Inpatient Plan of Care  Goal: Plan of Care Review  Description: The Plan of Care Review/Shift note should be completed every shift.  The Outcome Evaluation is a brief statement about your assessment that the patient is improving, declining, or no change.  This information will be displayed automatically on your shift  note.  Outcome: Progressing  Goal: Patient-Specific Goal (Individualized)  Description: You can add care plan individualizations to a care plan. Examples of Individualization might be:  \"Parent requests to be called daily at 9am for status\", \"I have a hard time hearing out of my right ear\", or \"Do not touch me to wake me up as it startles  me\".  Outcome: Progressing  Goal: Absence of Hospital-Acquired Illness or Injury  Outcome: Progressing  Intervention: Identify and Manage Fall Risk  Recent Flowsheet Documentation  Taken 6/11/2024 1711 by Fatou Ortega RN  Safety Promotion/Fall Prevention:   " safety round/check completed   nonskid shoes/slippers when out of bed  Goal: Optimal Comfort and Wellbeing  Outcome: Progressing  Intervention: Monitor Pain and Promote Comfort  Recent Flowsheet Documentation  Taken 6/11/2024 1956 by Fatou Ortega, RN  Pain Management Interventions: declines  Taken 6/11/2024 1857 by Fatou Ortega, RN  Pain Management Interventions: medication (see MAR)  Taken 6/11/2024 1718 by Fatou Ortega RN  Pain Management Interventions: medication (see MAR)  Goal: Readiness for Transition of Care  Outcome: Progressing     Problem: Seclusion/Restraint, Behavioral  Goal: Seclusion/Behavioral Restraint Goal: Absence of Harm or Injury  Outcome: Progressing     Problem: Urinary Diversion  Goal: Effective Urinary Elimination  Outcome: Progressing     Problem: Comorbidity Management  Goal: Maintenance of Behavioral Health Symptom Control  Outcome: Progressing  Intervention: Maintain Behavioral Health Symptom Control  Recent Flowsheet Documentation  Taken 6/11/2024 1719 by Fatou Ortega, RN  Medication Review/Management: medications reviewed

## 2024-06-13 PROCEDURE — 250N000013 HC RX MED GY IP 250 OP 250 PS 637: Performed by: HOSPITALIST

## 2024-06-13 PROCEDURE — 120N000001 HC R&B MED SURG/OB

## 2024-06-13 PROCEDURE — 250N000013 HC RX MED GY IP 250 OP 250 PS 637: Performed by: PHYSICIAN ASSISTANT

## 2024-06-13 PROCEDURE — 250N000013 HC RX MED GY IP 250 OP 250 PS 637

## 2024-06-13 PROCEDURE — 250N000013 HC RX MED GY IP 250 OP 250 PS 637: Performed by: PSYCHIATRY & NEUROLOGY

## 2024-06-13 PROCEDURE — 250N000013 HC RX MED GY IP 250 OP 250 PS 637: Performed by: NURSE PRACTITIONER

## 2024-06-13 PROCEDURE — 99232 SBSQ HOSP IP/OBS MODERATE 35: CPT | Performed by: NURSE PRACTITIONER

## 2024-06-13 RX ADMIN — OLANZAPINE 10 MG: 10 TABLET, FILM COATED ORAL at 15:43

## 2024-06-13 RX ADMIN — BUSPIRONE HYDROCHLORIDE 30 MG: 15 TABLET ORAL at 19:33

## 2024-06-13 RX ADMIN — IBUPROFEN 400 MG: 400 TABLET ORAL at 19:33

## 2024-06-13 RX ADMIN — OLANZAPINE 10 MG: 10 TABLET, FILM COATED ORAL at 08:34

## 2024-06-13 RX ADMIN — LEVOFLOXACIN 500 MG: 500 TABLET, FILM COATED ORAL at 08:34

## 2024-06-13 RX ADMIN — HALOPERIDOL 2 MG: 1 TABLET ORAL at 11:49

## 2024-06-13 RX ADMIN — TRAMADOL HYDROCHLORIDE 25 MG: 50 TABLET, COATED ORAL at 21:46

## 2024-06-13 RX ADMIN — HALOPERIDOL 2 MG: 1 TABLET ORAL at 17:57

## 2024-06-13 RX ADMIN — TAMSULOSIN HYDROCHLORIDE 0.4 MG: 0.4 CAPSULE ORAL at 08:34

## 2024-06-13 RX ADMIN — OLANZAPINE 10 MG: 10 TABLET, FILM COATED ORAL at 21:46

## 2024-06-13 RX ADMIN — TRAMADOL HYDROCHLORIDE 25 MG: 50 TABLET, COATED ORAL at 06:48

## 2024-06-13 RX ADMIN — BENZTROPINE MESYLATE 1 MG: 1 TABLET ORAL at 21:47

## 2024-06-13 RX ADMIN — TRAMADOL HYDROCHLORIDE 25 MG: 50 TABLET, COATED ORAL at 15:43

## 2024-06-13 RX ADMIN — DOCUSATE SODIUM 100 MG: 100 CAPSULE, LIQUID FILLED ORAL at 08:34

## 2024-06-13 RX ADMIN — HALOPERIDOL 2 MG: 1 TABLET ORAL at 05:29

## 2024-06-13 RX ADMIN — SENNOSIDES 1 TABLET: 8.6 TABLET, FILM COATED ORAL at 08:34

## 2024-06-13 RX ADMIN — IBUPROFEN 400 MG: 400 TABLET ORAL at 12:07

## 2024-06-13 RX ADMIN — BUSPIRONE HYDROCHLORIDE 30 MG: 15 TABLET ORAL at 08:34

## 2024-06-13 ASSESSMENT — ACTIVITIES OF DAILY LIVING (ADL)
ADLS_ACUITY_SCORE: 61
ADLS_ACUITY_SCORE: 60
ADLS_ACUITY_SCORE: 59
ADLS_ACUITY_SCORE: 60
ADLS_ACUITY_SCORE: 59
ADLS_ACUITY_SCORE: 60
ADLS_ACUITY_SCORE: 59
ADLS_ACUITY_SCORE: 60
ADLS_ACUITY_SCORE: 59
ADLS_ACUITY_SCORE: 60
ADLS_ACUITY_SCORE: 59
ADLS_ACUITY_SCORE: 59
ADLS_ACUITY_SCORE: 60
ADLS_ACUITY_SCORE: 59
ADLS_ACUITY_SCORE: 60
ADLS_ACUITY_SCORE: 59
ADLS_ACUITY_SCORE: 59

## 2024-06-13 NOTE — PROGRESS NOTES
Care Management Follow Up    Expected Discharge Date: 06/17/2024     Concerns to be Addressed: Discharge planning     Patient plan of care discussed at interdisciplinary rounds: Yes    Anticipated Discharge Disposition: [facility]     Anticipated Discharge Services: PT/OT      Patient/family educated on Medicare website which has current facility and service quality ratings: Yes   Education Provided on the Discharge Plan: Yes   Patient/Family in Agreement with the Plan: Yes     Referrals Placed by CM/SW: Skilled Nursing   Private pay costs discussed: Not applicable    Additional Information:  Plan for [facility] pending OBRA level 2 screening. Awaiting call from [facility] when this is scheduled to be completed.  WC vs stretcher to be arranged at discharge.     JULIO CÉSAR Casillas from [facility] visited pt in hospital today. Met with SW and updated that they can accept pt back after TCU when medically stable.    SW will continue to follow.     1605: Left VM for Fernanda ATKINS at [facility] requesting an update on OBRA level 2 screening    LIANNA Cat, [facility]   Inpatient Care Coordination  [facility]   [phone]

## 2024-06-13 NOTE — PLAN OF CARE
"Care from [id number]    Inpatient Progress Note:  For complete assessment see flow sheet documentation.    BP (!) 148/76 (BP Location: Left arm)   Pulse 80   Temp 98.6  F (37  C) (Oral)   Resp 20   Wt 74.3 kg (163 lb 12.8 oz)   SpO2 97%   BMI 24.19 kg/m       Patient with intermittent confusion, currently denies pain, up with assist x 2, incontinent of urine and stool, SW following for placement      Goal Outcome Evaluation:      Plan of Care Reviewed With: patient    Overall Patient Progress: improving    Outcome Evaluation: PT rec TCU      Problem: Adult Inpatient Plan of Care  Goal: Plan of Care Review  Description: The Plan of Care Review/Shift note should be completed every shift.  The Outcome Evaluation is a brief statement about your assessment that the patient is improving, declining, or no change.  This information will be displayed automatically on your shift  note.  Outcome: Progressing  Flowsheets (Taken 6/13/2024 0604)  Outcome Evaluation: PT rec TCU  Plan of Care Reviewed With: patient  Overall Patient Progress: improving  Goal: Patient-Specific Goal (Individualized)  Description: You can add care plan individualizations to a care plan. Examples of Individualization might be:  \"Parent requests to be called daily at 9am for status\", \"I have a hard time hearing out of my right ear\", or \"Do not touch me to wake me up as it startles  me\".  Outcome: Progressing  Goal: Absence of Hospital-Acquired Illness or Injury  Outcome: Progressing  Intervention: Identify and Manage Fall Risk  Recent Flowsheet Documentation  Taken 6/13/2024 0100 by Lacy Crawford, RN  Safety Promotion/Fall Prevention: activity supervised  Intervention: Prevent Infection  Recent Flowsheet Documentation  Taken 6/13/2024 0100 by Lacy Crawford, RN  Infection Prevention: rest/sleep promoted  Goal: Optimal Comfort and Wellbeing  Outcome: Progressing  Goal: Readiness for Transition of Care  Outcome: Progressing     Problem: " Seclusion/Restraint, Behavioral  Goal: Seclusion/Behavioral Restraint Goal: Absence of Harm or Injury  Outcome: Progressing     Problem: Urinary Diversion  Goal: Effective Urinary Elimination  Outcome: Progressing     Problem: Comorbidity Management  Goal: Maintenance of Behavioral Health Symptom Control  Outcome: Progressing  Intervention: Maintain Behavioral Health Symptom Control  Recent Flowsheet Documentation  Taken 6/13/2024 0100 by Lacy Crawford, RN  Medication Review/Management: medications reviewed

## 2024-06-13 NOTE — PLAN OF CARE
"Goal Outcome Evaluation:      Plan of Care Reviewed With: patient    Overall Patient Progress: improvingOverall Patient Progress: improving    Outcome Evaluation: Await discharge plan    BP (!) 150/86 (BP Location: Left arm)   Pulse 85   Temp 98.6  F (37  C) (Oral)   Resp 18   Wt 74.3 kg (163 lb 12.8 oz)   SpO2 96%   BMI 24.19 kg/m      On RA.    Pertinent Assessments: Disoriented to situation. Speech difficult to understand at times. Endorses BLE numbness/tingling. Inc of bowel and bladder. Up with 1A gb/w. Denies pain this shift.   Major Shift Events: None  Treatment Plan: TCU accepted patient, await [facility] assessment. CM following. Pain mgmt as needed.                                                                         Problem: Adult Inpatient Plan of Care  Goal: Plan of Care Review  Description: The Plan of Care Review/Shift note should be completed every shift.  The Outcome Evaluation is a brief statement about your assessment that the patient is improving, declining, or no change.  This information will be displayed automatically on your shift  note.  Outcome: Progressing  Flowsheets (Taken 6/13/2024 1059)  Outcome Evaluation: Await discharge plan  Plan of Care Reviewed With: patient  Overall Patient Progress: improving  Goal: Patient-Specific Goal (Individualized)  Description: You can add care plan individualizations to a care plan. Examples of Individualization might be:  \"Parent requests to be called daily at 9am for status\", \"I have a hard time hearing out of my right ear\", or \"Do not touch me to wake me up as it startles  me\".  Outcome: Progressing  Goal: Absence of Hospital-Acquired Illness or Injury  Outcome: Progressing  Intervention: Identify and Manage Fall Risk  Recent Flowsheet Documentation  Taken 6/13/2024 0166 by Amy Puente, RN  Safety Promotion/Fall Prevention:   activity supervised   nonskid shoes/slippers when out of bed   patient and family education   supervised " activity   safety round/check completed  Intervention: Prevent Skin Injury  Recent Flowsheet Documentation  Taken 6/13/2024 0827 by Amy Puente RN  Body Position: position changed independently  Intervention: Prevent and Manage VTE (Venous Thromboembolism) Risk  Recent Flowsheet Documentation  Taken 6/13/2024 0827 by Amy Puente RN  VTE Prevention/Management: SCDs (sequential compression devices) off  Intervention: Prevent Infection  Recent Flowsheet Documentation  Taken 6/13/2024 0827 by Amy Puente RN  Infection Prevention: rest/sleep promoted  Goal: Optimal Comfort and Wellbeing  Outcome: Progressing  Goal: Readiness for Transition of Care  Outcome: Progressing     Problem: Seclusion/Restraint, Behavioral  Goal: Seclusion/Behavioral Restraint Goal: Absence of Harm or Injury  Outcome: Progressing  Intervention: Protect Skin and Joint Integrity  Recent Flowsheet Documentation  Taken 6/13/2024 0827 by Amy Puente RN  Body Position: position changed independently     Problem: Urinary Diversion  Goal: Effective Urinary Elimination  Outcome: Progressing     Problem: Comorbidity Management  Goal: Maintenance of Behavioral Health Symptom Control  Outcome: Progressing  Intervention: Maintain Behavioral Health Symptom Control  Recent Flowsheet Documentation  Taken 6/13/2024 0827 by Amy Puente RN  Medication Review/Management: medications reviewed

## 2024-06-13 NOTE — PROGRESS NOTES
"[facility]    Medicine Progress Note - Hospitalist Service    Date of Admission:  6/5/2024    Assessment & Plan   Harpreet Mcelroy is a 68 year old male with history of schizophrenia and bipolar disorder, invasive bladder cancer, ureteral obstruction s/p ureteral stent, hydronephrosis, perineal urethrostomy, lives at [facility] admitted on 6/5/2024 with behavioral changes and UTI (after his paranoia caused him to stop taking his antibiotics)       Interval events:  Tolerating PO well.  Encourage PO medications.  Pending discharge to TCU.     Acute paranoia  Schizophrenia  Bipolar disorder  His facility reports: patient has had increasing paranoia x 1 month. Sometimes this is related to a UTI. They have contacted his Psychiatrist multiple times who recommended they \"monitor.\" He has not been taking meds due to thinking they are \"poison.\"  Pt required droperiodol, ativan, and zyprexa given in ED for behaviors. Continue home meds (buspirone 30 mg PO BID, olanzapine 10 mg po bid and 20 mg at HS, benztropine 2 mg at HS, and hydroxyzine 10 mg QID prn anxiety). Sitter discontinued more than 24 hours.   - Tolerating PO Haldol.  Repeat EKG with normal QTc.      Urinary tract infection, complicated. He was prescribed Levaquin by his [facility] group recently (Stenotrophomonas maltophilia in past).  Per his care facility, they found out he had been hiding his meds and was not taking them due to paranoia of them being poisoned. No recent cultures available. UA done here, but not culture.  - Continue Levaquin (Day 7/10)    History of ureteral obstruction  s/p stent, hydronephrosis, and perineal urethrostomy  Follows at [facility]. Last stent exchange was in March, due on 8/13/2024.  [facility] [facility] was consulted by the ER physician who recommended serial creatinines and bladder scans  - Continue flomax       Invasive bladder cancer. Managed with bladder-sparing trimodal therapy, bulbar urethral " stricture. S/P perineal urethrostomy, and left hydronephrosis managed with a chronic indwelling ureteral stent. Follows at [facility]  - last stent exchange was in March, next in August     Back pain. Chronic uses naprosyn at home, cont  - PRN tramadol.       Diet: Mechanical/Dental Soft Diet    DVT Prophylaxis: discontinue Lovenox.  Mechanical DVTp.  OOB to chair and ambulate TID.    Hernandez Catheter: Not present  Lines: None     Cardiac Monitoring: None  Code Status: Full Code      Clinically Significant Risk Factors             # Financial/Environmental Concerns: none         Disposition Plan  medically stable for discharge when TCU found.     Medically Ready for Discharge: Ready Now    ECHO Delgado CNP  Hospitalist Service  [facility]  Securely message with [employer] (more info)  Text page via [employer] Paging/Directory   ______________________________________________________________________    Interval History   No acute events overnight.  VSS.  Resting comfortably.   Denies any new issues.  Pending discharge to TCU.       6Physical Exam   Vital Signs: Temp: (!) 96.6  F (35.9  C) Temp src: Axillary BP: (!) 142/67 Pulse: 80   Resp: 16 SpO2: 99 % O2 Device: None (Room air)    Weight: 163 lbs 12.83 oz    Constitutional: awake, alert, cooperative, no apparent distress, and appears stated age  Respiratory: No increased work of breathing, good air exchange, clear to auscultation bilaterally, no crackles or wheezing  Cardiovascular: Normal apical impulse, regular rate and rhythm, normal S1 and S2, no S3 or S4, and no murmur noted  Abd:  Soft, non -tender, non distended. NABS.    Ext:  DUNHAM. CMS intact. CR < 3 seconds.    Neuro: AxOx2-3.  FC.  Agitated at times but redirectable and safe.     Medical Decision Making       35 MINUTES SPENT BY ME on the date of service doing chart review, history, exam, documentation & further activities per the note.      Data         Imaging results reviewed over  the past 24 hrs:   No results found for this or any previous visit (from the past 24 hour(s)).

## 2024-06-14 ENCOUNTER — APPOINTMENT (OUTPATIENT)
Dept: PHYSICAL THERAPY | Facility: CLINIC | Age: 69
DRG: 885 | End: 2024-06-14
Payer: COMMERCIAL

## 2024-06-14 LAB
ALBUMIN SERPL BCG-MCNC: 3.4 G/DL (ref 3.5–5.2)
ALP SERPL-CCNC: 80 U/L (ref 40–150)
ALT SERPL W P-5'-P-CCNC: 48 U/L (ref 0–70)
ANION GAP SERPL CALCULATED.3IONS-SCNC: 12 MMOL/L (ref 7–15)
AST SERPL W P-5'-P-CCNC: ABNORMAL U/L
BILIRUB SERPL-MCNC: 0.2 MG/DL
BUN SERPL-MCNC: 24.6 MG/DL (ref 8–23)
CALCIUM SERPL-MCNC: 9.3 MG/DL (ref 8.8–10.2)
CHLORIDE SERPL-SCNC: 101 MMOL/L (ref 98–107)
CREAT SERPL-MCNC: 1.02 MG/DL (ref 0.67–1.17)
DEPRECATED HCO3 PLAS-SCNC: 24 MMOL/L (ref 22–29)
EGFRCR SERPLBLD CKD-EPI 2021: 81 ML/MIN/1.73M2
ERYTHROCYTE [DISTWIDTH] IN BLOOD BY AUTOMATED COUNT: 14.7 % (ref 10–15)
GLUCOSE SERPL-MCNC: 89 MG/DL (ref 70–99)
HCT VFR BLD AUTO: 35.6 % (ref 40–53)
HGB BLD-MCNC: 11.7 G/DL (ref 13.3–17.7)
MAGNESIUM SERPL-MCNC: 2 MG/DL (ref 1.7–2.3)
MCH RBC QN AUTO: 29.4 PG (ref 26.5–33)
MCHC RBC AUTO-ENTMCNC: 32.9 G/DL (ref 31.5–36.5)
MCV RBC AUTO: 89 FL (ref 78–100)
PLATELET # BLD AUTO: 263 10E3/UL (ref 150–450)
POTASSIUM SERPL-SCNC: 4.7 MMOL/L (ref 3.4–5.3)
PROT SERPL-MCNC: 6.4 G/DL (ref 6.4–8.3)
RBC # BLD AUTO: 3.98 10E6/UL (ref 4.4–5.9)
SODIUM SERPL-SCNC: 137 MMOL/L (ref 135–145)
WBC # BLD AUTO: 7.6 10E3/UL (ref 4–11)

## 2024-06-14 PROCEDURE — 250N000013 HC RX MED GY IP 250 OP 250 PS 637: Performed by: HOSPITALIST

## 2024-06-14 PROCEDURE — 83735 ASSAY OF MAGNESIUM: CPT | Performed by: NURSE PRACTITIONER

## 2024-06-14 PROCEDURE — 85014 HEMATOCRIT: CPT | Performed by: NURSE PRACTITIONER

## 2024-06-14 PROCEDURE — 250N000013 HC RX MED GY IP 250 OP 250 PS 637

## 2024-06-14 PROCEDURE — 250N000013 HC RX MED GY IP 250 OP 250 PS 637: Performed by: PSYCHIATRY & NEUROLOGY

## 2024-06-14 PROCEDURE — 36415 COLL VENOUS BLD VENIPUNCTURE: CPT | Performed by: NURSE PRACTITIONER

## 2024-06-14 PROCEDURE — 82040 ASSAY OF SERUM ALBUMIN: CPT | Performed by: NURSE PRACTITIONER

## 2024-06-14 PROCEDURE — 250N000013 HC RX MED GY IP 250 OP 250 PS 637: Performed by: NURSE PRACTITIONER

## 2024-06-14 PROCEDURE — 99232 SBSQ HOSP IP/OBS MODERATE 35: CPT | Performed by: NURSE PRACTITIONER

## 2024-06-14 PROCEDURE — 97530 THERAPEUTIC ACTIVITIES: CPT | Mod: GP

## 2024-06-14 PROCEDURE — 120N000001 HC R&B MED SURG/OB

## 2024-06-14 RX ORDER — LEVOFLOXACIN 500 MG/1
500 TABLET, FILM COATED ORAL DAILY
Status: COMPLETED | OUTPATIENT
Start: 2024-06-15 | End: 2024-06-15

## 2024-06-14 RX ADMIN — OLANZAPINE 10 MG: 10 TABLET, FILM COATED ORAL at 16:16

## 2024-06-14 RX ADMIN — HALOPERIDOL 2 MG: 1 TABLET ORAL at 05:22

## 2024-06-14 RX ADMIN — LEVOFLOXACIN 500 MG: 500 TABLET, FILM COATED ORAL at 07:38

## 2024-06-14 RX ADMIN — OLANZAPINE 10 MG: 10 TABLET, FILM COATED ORAL at 22:28

## 2024-06-14 RX ADMIN — TRAMADOL HYDROCHLORIDE 25 MG: 50 TABLET, COATED ORAL at 05:22

## 2024-06-14 RX ADMIN — TAMSULOSIN HYDROCHLORIDE 0.4 MG: 0.4 CAPSULE ORAL at 07:38

## 2024-06-14 RX ADMIN — BUSPIRONE HYDROCHLORIDE 30 MG: 15 TABLET ORAL at 07:38

## 2024-06-14 RX ADMIN — DOCUSATE SODIUM 100 MG: 100 CAPSULE, LIQUID FILLED ORAL at 22:28

## 2024-06-14 RX ADMIN — SENNOSIDES 1 TABLET: 8.6 TABLET, FILM COATED ORAL at 22:29

## 2024-06-14 RX ADMIN — HALOPERIDOL 2 MG: 1 TABLET ORAL at 11:18

## 2024-06-14 RX ADMIN — HALOPERIDOL 2 MG: 1 TABLET ORAL at 17:52

## 2024-06-14 RX ADMIN — BUSPIRONE HYDROCHLORIDE 30 MG: 15 TABLET ORAL at 22:29

## 2024-06-14 RX ADMIN — TRAMADOL HYDROCHLORIDE 25 MG: 50 TABLET, COATED ORAL at 22:30

## 2024-06-14 RX ADMIN — OLANZAPINE 10 MG: 10 TABLET, FILM COATED ORAL at 07:38

## 2024-06-14 RX ADMIN — BENZTROPINE MESYLATE 1 MG: 1 TABLET ORAL at 22:29

## 2024-06-14 ASSESSMENT — ACTIVITIES OF DAILY LIVING (ADL)
ADLS_ACUITY_SCORE: 59
ADLS_ACUITY_SCORE: 60
ADLS_ACUITY_SCORE: 59
ADLS_ACUITY_SCORE: 60
ADLS_ACUITY_SCORE: 59
ADLS_ACUITY_SCORE: 60
ADLS_ACUITY_SCORE: 59
ADLS_ACUITY_SCORE: 60
ADLS_ACUITY_SCORE: 60
ADLS_ACUITY_SCORE: 59
ADLS_ACUITY_SCORE: 60
ADLS_ACUITY_SCORE: 59
ADLS_ACUITY_SCORE: 60

## 2024-06-14 NOTE — PROGRESS NOTES
Care Management Follow Up    Expected Discharge Date: 06/17/2024     Concerns to be Addressed: Discharge planning     Patient plan of care discussed at interdisciplinary rounds: Yes    Anticipated Discharge Disposition: [facility]     Anticipated Discharge Services: PT/OT    Patient/family educated on Medicare website which has current facility and service quality ratings: Yes   Education Provided on the Discharge Plan: Yes   Patient/Family in Agreement with the Plan: Yes     Referrals Placed by CM/SW: Skilled Nursing Facility   Private pay costs discussed: transportation costs    Additional Information:  Plan for [facility], pending OBRA Level II screening.  WC transport to be arranged at discharge.    SW received call back from Ashanti ATKINS at [facility] ([phone]), stating that she has not yet received PAS information from the senior linkage line to be able to schedule the assessment for the OBRA level 2 screening. They advised that SW follow up with [facility]. Ashanti requested H&P and Psych consult note be sent to her at [email or web address]. to expedite assessment. HIRAM sent via secure email.     HIRAM placed call to the [facility], [phone], spoke with representative who states that because patient is on waivered services, the information had to be faxed from [facility] to [facility] who then may transfer the case to [facility] to complete. They advised that SW follow up with supervisor Adriana at [facility], [phone]. VM left requesting return call.     SW will continue to follow.     1410: SW received call back from Adriana at [facility]. They state that they have also not received the information from the [facility], and that [facility] will not be the ones completing the assessment and that SW should follow up with the [facility] to have the information sent to [facility]. HIRAM placed follow up call to  [facility] Linkage Line, [phone] for further assistance. They will consult with their supervisor and return call to      1445: HIRAM received call back from Noreen with the Senior Linkage Line ([phone]). They report that the screening information has to be faxed to [facility] as they are the [facility] of financial responsibility. If [facility] declines the assessment, they can send the request to [facility]. [facility] Line reports that it is against their policy to send the information directly to [facility]. They have faxed the request for the 3rd time to [facility] at (f) [phone].     HIRAM placed follow up call to Adriana at [facility], [phone]. Requested that she call  back with confirmation that she received the fax from the senior linkage line, and confirmation when she sends the information to [facility]    LIANNA Cat, [facility]   Inpatient Care Coordination  [facility]   [phone]

## 2024-06-14 NOTE — PLAN OF CARE
Patient has been pleasant and cooperative all day. No behaviors noted. VS WNL and documented on the FS. Lung sounds clear. LBM today. Incontinent of urine and is a check and change PRN. Soft diet and tolerating. 1 assist to change.   Plan: [facility] pending OBRA LEVEL II screening  BP (!) 145/88 (BP Location: Left arm)   Pulse 91   Temp 98.1  F (36.7  C) (Oral)   Resp 20   Wt 74.3 kg (163 lb 12.8 oz)   SpO2 99%   BMI 24.19 kg/m      Goal Outcome Evaluation:      Plan of Care Reviewed With: patient    Overall Patient Progress: no changeOverall Patient Progress: no change    Outcome Evaluation: need a level 2 screening

## 2024-06-14 NOTE — PROGRESS NOTES
"[facility]    Medicine Progress Note - Hospitalist Service    Date of Admission:  6/5/2024    Assessment & Plan   Harpreet Mcelroy is a 68 year old male with history of schizophrenia and bipolar disorder, invasive bladder cancer, ureteral obstruction s/p ureteral stent, hydronephrosis, perineal urethrostomy, lives at [facility] admitted on 6/5/2024 with behavioral changes and UTI (after his paranoia caused him to stop taking his antibiotics)       Interval events:  Tolerating PO well.  Encourage PO medications.  Pending discharge to TCU.     Acute paranoia  Schizophrenia  Bipolar disorder  His facility reports: patient has had increasing paranoia x 1 month. Sometimes this is related to a UTI. They have contacted his Psychiatrist multiple times who recommended they \"monitor.\" He has not been taking meds due to thinking they are \"poison.\"  Pt required droperiodol, ativan, and zyprexa given in ED for behaviors. Continue home meds (buspirone 30 mg PO BID, olanzapine 10 mg po bid and 20 mg at HS, benztropine 2 mg at HS, and hydroxyzine 10 mg QID prn anxiety). Sitter discontinued more than 24 hours.   - Tolerating PO Haldol.  Repeat EKG with normal QTc.      Urinary tract infection, complicated. He was prescribed Levaquin by his [facility] recently (Stenotrophomonas maltophilia in past).  Per his care facility, they found out he had been hiding his meds and was not taking them due to paranoia of them being poisoned. No recent cultures available. UA done here, but not culture.  - Treated with Levaquin from 6/5/24 - 6/15/24    History of ureteral obstruction  s/p stent, hydronephrosis, and perineal urethrostomy  Follows at [facility]. Last stent exchange was in March, due on 8/13/2024.  [facility] was consulted by the ER physician who recommended serial creatinines and bladder scans  - Continue flomax    Invasive bladder cancer. Managed with bladder-sparing trimodal therapy, " bulbar urethral stricture. S/P perineal urethrostomy, and left hydronephrosis managed with a chronic indwelling ureteral stent. [place] [facility]  - last stent exchange was in March, next in August 2024     Back pain. Chronic uses naprosyn at home, cont  - PRN tramadol.       Diet: Mechanical/Dental Soft Diet    DVT Prophylaxis: discontinue Lovenox.  Mechanical DVT prophylaxis.  OOB to chair and ambulate TID.    Hernandez Catheter: Not present  Lines: None     Cardiac Monitoring: None  Code Status: Full Code      Clinically Significant Risk Factors             # Financial/Environmental Concerns: none         Disposition Plan  medically stable for discharge when TCU found.     Medically Ready for Discharge: Ready Now    ECHO Delgado [facility]  Hospitalist Service  [facility]  Securely message with [employer] (more info)  Text page via [employer] Paging/Directory   ______________________________________________________________________    Interval History   No acute events overnight.  VSS.  Resting comfortably.   Denies any new issues.  Pending discharge to TCU.       6Physical Exam   Vital Signs: Temp: 97.6  F (36.4  C) Temp src: Oral BP: 127/83 Pulse: 101   Resp: 18 SpO2: 99 % O2 Device: None (Room air)    Weight: 163 lbs 12.83 oz    Constitutional: awake, alert, cooperative, no apparent distress, and appears stated age  Respiratory: No increased work of breathing, good air exchange, clear to auscultation bilaterally, no crackles or wheezing  Cardiovascular: Normal apical impulse, regular rate and rhythm, normal S1 and S2, no S3 or S4, and no murmur noted  Abd:  Soft, non -tender, non distended. NABS.    Ext:  DUNHAM. CMS intact. CR < 3 seconds.    Neuro: AxOx2-3.  FC.  Agitated at times but redirectable and safe.     Medical Decision Making       35 MINUTES SPENT BY ME on the date of service doing chart review, history, exam, documentation & further activities per the note.      Data     I have  personally reviewed the following data over the past 24 hrs:    7.6  \   11.7 (L)   / 263     137 101 24.6 (H) /  89   4.7 24 1.02 \     ALT: 48 AST: N/A AP: 80 TBILI: 0.2   ALB: 3.4 (L) TOT PROTEIN: 6.4 LIPASE: N/A       Imaging results reviewed over the past 24 hrs:   No results found for this or any previous visit (from the past 24 hour(s)).

## 2024-06-14 NOTE — PLAN OF CARE
"Shift from [id number]     Inpatient Progress Note:  For complete assessment see flow sheet documentation.     Orientation: AO to self  Pain status: Pt noted some pain to low back, improved with repositioning  Activity: A1 in bed, A2 with walker out of bed  Resp: WDL  Cardiac: WDL  GI: Incontinent  : Incontinent  LDA: No PIV in place  Infusions: SL  Diet: Mechanical soft diet  Safety: bed alarm on, call light within reach  Consults: PT  Discharge Plan: TCU when county assessment complete    Goal Outcome Evaluation:      Plan of Care Reviewed With: patient    Overall Patient Progress: improvingOverall Patient Progress: improving    Outcome Evaluation: Awaiting Country assessment    Problem: Adult Inpatient Plan of Care  Goal: Plan of Care Review  Description: The Plan of Care Review/Shift note should be completed every shift.  The Outcome Evaluation is a brief statement about your assessment that the patient is improving, declining, or no change.  This information will be displayed automatically on your shift  note.  Outcome: Progressing  Flowsheets (Taken 6/10/2024 0619)  Outcome Evaluation: Pt was compliant with cares and medications this shift.  Plan of Care Reviewed With: patient  Overall Patient Progress: no change  Goal: Patient-Specific Goal (Individualized)  Description: You can add care plan individualizations to a care plan. Examples of Individualization might be:  \"Parent requests to be called daily at 9am for status\", \"I have a hard time hearing out of my right ear\", or \"Do not touch me to wake me up as it startles  me\".  Outcome: Progressing  Goal: Absence of Hospital-Acquired Illness or Injury  Outcome: Progressing  Intervention: Identify and Manage Fall Risk  Recent Flowsheet Documentation  Taken 6/9/2024 1949 by Sera Reyez RN  Safety Promotion/Fall Prevention:   activity supervised   assistive device/personal items within reach   clutter free environment maintained   increased rounding and " observation   nonskid shoes/slippers when out of bed   lighting adjusted   mobility aid in reach   safety round/check completed  Intervention: Prevent Skin Injury  Recent Flowsheet Documentation  Taken 6/9/2024 1949 by Sera Reyez RN  Body Position:   supine, head elevated   weight shifting  Intervention: Prevent and Manage VTE (Venous Thromboembolism) Risk  Recent Flowsheet Documentation  Taken 6/9/2024 1949 by Sera Reyez, RN  VTE Prevention/Management: SCDs (sequential compression devices) off  Intervention: Prevent Infection  Recent Flowsheet Documentation  Taken 6/9/2024 1949 by Sera Reyez, RN  Infection Prevention:   rest/sleep promoted   single patient room provided  Goal: Optimal Comfort and Wellbeing  Outcome: Progressing  Intervention: Monitor Pain and Promote Comfort  Recent Flowsheet Documentation  Taken 6/9/2024 1949 by Sera Reyez RN  Pain Management Interventions: repositioned  Goal: Readiness for Transition of Care  Outcome: Progressing     Problem: Seclusion/Restraint, Behavioral  Goal: Seclusion/Behavioral Restraint Goal: Absence of Harm or Injury  Outcome: Progressing  Intervention: Protect Skin and Joint Integrity  Recent Flowsheet Documentation  Taken 6/9/2024 1949 by Sera Reyez, RN  Body Position:   supine, head elevated   weight shifting     Problem: Urinary Diversion  Goal: Effective Urinary Elimination  Outcome: Progressing

## 2024-06-14 NOTE — PLAN OF CARE
"Care from [id number]    Inpatient Progress Note:  For complete assessment see flow sheet documentation.    /67 (BP Location: Left arm)   Pulse 77   Temp 97.5  F (36.4  C) (Oral)   Resp 16   Wt 74.3 kg (163 lb 12.8 oz)   SpO2 98%   BMI 24.19 kg/m         Patient with intermittent confusion, tramadol given for back pain, incontinent of urine and stool, waiting on level 2 screening from the [facility] for TCU placement       Goal Outcome Evaluation:      Plan of Care Reviewed With: patient    Overall Patient Progress: improving    Outcome Evaluation: Waiting on placement      Problem: Seclusion/Restraint, Behavioral  Goal: Seclusion/Behavioral Restraint Goal: Absence of Harm or Injury  Outcome: Progressing     Problem: Urinary Diversion  Goal: Effective Urinary Elimination  Outcome: Progressing     Problem: Comorbidity Management  Goal: Maintenance of Behavioral Health Symptom Control  Outcome: Progressing  Intervention: Maintain Behavioral Health Symptom Control  Recent Flowsheet Documentation  Taken 6/14/2024 0100 by Lacy Crawford RN  Medication Review/Management: medications reviewed     Problem: Adult Inpatient Plan of Care  Goal: Plan of Care Review  Description: The Plan of Care Review/Shift note should be completed every shift.  The Outcome Evaluation is a brief statement about your assessment that the patient is improving, declining, or no change.  This information will be displayed automatically on your shift  note.  Outcome: Progressing  Flowsheets (Taken 6/14/2024 0607)  Outcome Evaluation: Waiting on placement  Plan of Care Reviewed With: patient  Overall Patient Progress: improving  Goal: Patient-Specific Goal (Individualized)  Description: You can add care plan individualizations to a care plan. Examples of Individualization might be:  \"Parent requests to be called daily at 9am for status\", \"I have a hard time hearing out of my right ear\", or \"Do not touch me to wake me up as it " "startles  me\".  Outcome: Progressing  Goal: Absence of Hospital-Acquired Illness or Injury  Outcome: Progressing  Intervention: Identify and Manage Fall Risk  Recent Flowsheet Documentation  Taken 6/14/2024 0100 by Lacy Crawford RN  Safety Promotion/Fall Prevention: activity supervised  Intervention: Prevent Infection  Recent Flowsheet Documentation  Taken 6/14/2024 0100 by Lacy Crawford, RN  Infection Prevention: rest/sleep promoted  Goal: Optimal Comfort and Wellbeing  Outcome: Progressing  Intervention: Monitor Pain and Promote Comfort  Recent Flowsheet Documentation  Taken 6/14/2024 0522 by Lacy Crawford, RN  Pain Management Interventions: medication (see MAR)  Goal: Readiness for Transition of Care  Outcome: Progressing         "

## 2024-06-14 NOTE — PROGRESS NOTES
BRIEF NUTRITION NOTE    Chart reviewed for LOS.   Reviewed wt hx, I/Os, medications, labs, and skin.   Pt is documented to be eating 100% of meals for most of their hospital stay, no intake documentation over the past couple of days. Pt is consistently ordering 3 meals/day and reported to be tolerating diet.   Wt appears to be stable, no wounds or edema is documented.   Noted pt is medically stable and ready to discharge to TCU once able to find placement.     RD will continue to stayed signed off at this time. RD will re-evaluate patient in 7-10 days per policy guidelines, unless consulted sooner.       Coni Yan MS, RD, LD  Clinical Dietitian  3rd floor/ICU: [phone]  All other floors: [phone]  Weekend/holiday: [phone]  Office: [phone]

## 2024-06-15 PROCEDURE — 250N000013 HC RX MED GY IP 250 OP 250 PS 637: Performed by: HOSPITALIST

## 2024-06-15 PROCEDURE — 120N000001 HC R&B MED SURG/OB

## 2024-06-15 PROCEDURE — 250N000013 HC RX MED GY IP 250 OP 250 PS 637: Performed by: PHYSICIAN ASSISTANT

## 2024-06-15 PROCEDURE — 250N000013 HC RX MED GY IP 250 OP 250 PS 637: Performed by: PSYCHIATRY & NEUROLOGY

## 2024-06-15 PROCEDURE — 250N000013 HC RX MED GY IP 250 OP 250 PS 637

## 2024-06-15 PROCEDURE — 250N000013 HC RX MED GY IP 250 OP 250 PS 637: Performed by: NURSE PRACTITIONER

## 2024-06-15 PROCEDURE — 93010 ELECTROCARDIOGRAM REPORT: CPT | Performed by: INTERNAL MEDICINE

## 2024-06-15 PROCEDURE — 99232 SBSQ HOSP IP/OBS MODERATE 35: CPT | Performed by: NURSE PRACTITIONER

## 2024-06-15 RX ADMIN — BUSPIRONE HYDROCHLORIDE 30 MG: 15 TABLET ORAL at 08:40

## 2024-06-15 RX ADMIN — HALOPERIDOL 2 MG: 1 TABLET ORAL at 18:00

## 2024-06-15 RX ADMIN — QUETIAPINE FUMARATE 12.5 MG: 25 TABLET ORAL at 15:20

## 2024-06-15 RX ADMIN — OLANZAPINE 10 MG: 10 TABLET, FILM COATED ORAL at 15:19

## 2024-06-15 RX ADMIN — TRAMADOL HYDROCHLORIDE 25 MG: 50 TABLET, COATED ORAL at 06:53

## 2024-06-15 RX ADMIN — IBUPROFEN 400 MG: 400 TABLET ORAL at 21:26

## 2024-06-15 RX ADMIN — SENNOSIDES 1 TABLET: 8.6 TABLET, FILM COATED ORAL at 21:25

## 2024-06-15 RX ADMIN — DOCUSATE SODIUM 100 MG: 100 CAPSULE, LIQUID FILLED ORAL at 21:26

## 2024-06-15 RX ADMIN — LEVOFLOXACIN 500 MG: 500 TABLET, FILM COATED ORAL at 08:40

## 2024-06-15 RX ADMIN — BENZTROPINE MESYLATE 1 MG: 1 TABLET ORAL at 21:25

## 2024-06-15 RX ADMIN — HYDROXYZINE HYDROCHLORIDE 10 MG: 10 TABLET ORAL at 12:23

## 2024-06-15 RX ADMIN — TAMSULOSIN HYDROCHLORIDE 0.4 MG: 0.4 CAPSULE ORAL at 08:40

## 2024-06-15 RX ADMIN — BUSPIRONE HYDROCHLORIDE 30 MG: 15 TABLET ORAL at 21:25

## 2024-06-15 RX ADMIN — OLANZAPINE 10 MG: 10 TABLET, FILM COATED ORAL at 08:40

## 2024-06-15 RX ADMIN — TRAMADOL HYDROCHLORIDE 25 MG: 50 TABLET, COATED ORAL at 21:25

## 2024-06-15 RX ADMIN — LORAZEPAM 0.5 MG: 0.5 TABLET ORAL at 12:24

## 2024-06-15 RX ADMIN — HALOPERIDOL 2 MG: 1 TABLET ORAL at 06:53

## 2024-06-15 RX ADMIN — HALOPERIDOL 2 MG: 1 TABLET ORAL at 11:51

## 2024-06-15 RX ADMIN — TRAMADOL HYDROCHLORIDE 25 MG: 50 TABLET, COATED ORAL at 15:20

## 2024-06-15 RX ADMIN — OLANZAPINE 10 MG: 10 TABLET, FILM COATED ORAL at 21:25

## 2024-06-15 ASSESSMENT — ACTIVITIES OF DAILY LIVING (ADL)
ADLS_ACUITY_SCORE: 55

## 2024-06-15 NOTE — PROGRESS NOTES
"[facility]    Medicine Progress Note - Hospitalist Service    Date of Admission:  6/5/2024    Assessment & Plan   Harpreet Mcelroy is a 68 year old male with history of schizophrenia and bipolar disorder, invasive bladder cancer, ureteral obstruction s/p ureteral stent, hydronephrosis, perineal urethrostomy, lives at [facility] admitted on 6/5/2024 with behavioral changes and UTI (after his paranoia caused him to stop taking his antibiotics)       Interval events:  Tolerating PO well.  Encourage PO medications.  Pending discharge to TCU, waiting level II assessment.     Acute paranoia  Schizophrenia  Bipolar disorder  His facility reports: patient has had increasing paranoia x 1 month. Sometimes this is related to a UTI. They have contacted his Psychiatrist multiple times who recommended they \"monitor.\" He has not been taking meds due to thinking they are \"poison.\"  Pt required droperiodol, ativan, and zyprexa given in ED for behaviors. Continue home meds (buspirone 30 mg PO BID, olanzapine 10 mg po bid and 20 mg at HS, benztropine 2 mg at HS, and hydroxyzine 10 mg QID prn anxiety). Sitter discontinued more than 24 hours.   - Tolerating PO Haldol.  Repeat EKG with normal QTc.      Urinary tract infection, complicated. He was prescribed Levaquin by his [facility] recently (Stenotrophomonas maltophilia in past).  Per his care facility, they found out he had been hiding his meds and was not taking them due to paranoia of them being poisoned. No recent cultures available. UA done here, but not culture.  - Treated with Levaquin from 6/5/24 - 6/15/24    History of ureteral obstruction  s/p stent, hydronephrosis, and perineal urethrostomy  Follows at [facility]. Last stent exchange was in March, due on 8/13/2024.  [facility] was consulted by the ER physician who recommended serial creatinines and bladder scans  - Continue flomax    Invasive bladder cancer. Managed with " bladder-sparing trimodal therapy, bulbar urethral stricture. S/P perineal urethrostomy, and left hydronephrosis managed with a chronic indwelling ureteral stent. Follows at [facility]  - last stent exchange was in March, next in August 2024     Back pain. Chronic uses naprosyn at home, cont  - PRN tramadol.       Diet: Mechanical/Dental Soft Diet    DVT Prophylaxis: discontinue Lovenox.  Mechanical DVT prophylaxis.  OOB to chair and ambulate TID.    Hernandez Catheter: Not present  Lines: None     Cardiac Monitoring: None  Code Status: Full Code      Clinically Significant Risk Factors             # Financial/Environmental Concerns: none         Disposition Plan  medically stable for discharge when TCU found.     Medically Ready for Discharge: Ready Now    ECHO Delgado [facility]  Hospitalist Service  [facility]  Securely message with [employer] (more info)  Text page via [facility] Paging/Directory   ______________________________________________________________________    Interval History   No acute events overnight.  VSS.  Resting comfortably.   Denies any new issues.  Pending discharge to TCU.       6Physical Exam   Vital Signs: Temp: 97.5  F (36.4  C) Temp src: Oral BP: 133/74 Pulse: 81   Resp: 20 SpO2: 99 % O2 Device: None (Room air)    Weight: 163 lbs 12.83 oz    Constitutional: awake, alert, cooperative, no apparent distress, and appears stated age  Respiratory: No increased work of breathing, good air exchange, clear to auscultation bilaterally, no crackles or wheezing  Cardiovascular: Normal apical impulse, regular rate and rhythm, normal S1 and S2, no S3 or S4, and no murmur noted  Abd:  Soft, non -tender, non distended. NABS.    Ext:  DUNHAM. CMS intact. CR < 3 seconds.    Neuro: AxOx2-3.  FC.  Agitated at times but redirectable and safe.     Medical Decision Making       35 MINUTES SPENT BY ME on the date of service doing chart review, history, exam, documentation & further activities per the  note.      Data         Imaging results reviewed over the past 24 hrs:   No results found for this or any previous visit (from the past 24 hour(s)).

## 2024-06-15 NOTE — PLAN OF CARE
"Patient confused this morning. Patient states he is having bolts going thru his body and has been trying to remove them. Patient has been doing a lot of stretching and does not like to be disrupted (stretching can last 2 hrs or longer). Lung sounds clear. LBM yesterday. Incontinent of urine and is a check and change PRN. Soft diet and tolerating. 1 assist to change. 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 When patient is up patient will stop and say he is \"frozen\" and have to wait for him to be \"unfrozen\" to move again. Ativan given PRN for the visual and auditory hallucinations.   Plan: [facility] pending OBRA LEVEL II screening  Problem: Adult Inpatient Plan of Care  Goal: Plan of Care Review  Description: The Plan of Care Review/Shift note should be completed every shift.  The Outcome Evaluation is a brief statement about your assessment that the patient is improving, declining, or no change.  This information will be displayed automatically on your shift  note.  Outcome: Progressing  Flowsheets (Taken 6/15/2024 1227)  Outcome Evaluation: placement  Plan of Care Reviewed With: patient  Overall Patient Progress: no change  Goal: Patient-Specific Goal (Individualized)  Description: You can add care plan individualizations to a care plan. Examples of Individualization might be:  \"Parent requests to be called daily at 9am for status\", \"I have a hard time hearing out of my right ear\", or \"Do not touch me to wake me up as it startles  me\".  Outcome: Progressing  Goal: Absence of Hospital-Acquired Illness or Injury  Outcome: Progressing  Intervention: Identify and Manage Fall Risk  Recent Flowsheet Documentation  Taken 6/15/2024 1016 by Marzena Goodman, RN  Safety Promotion/Fall Prevention: activity supervised  Intervention: Prevent Skin Injury  Recent Flowsheet Documentation  Taken 6/15/2024 1016 by Marzena Goodman, RN  Device Skin Pressure Protection: absorbent pad utilized/changed  Taken " 6/15/2024 0900 by Marzena Goodman RN  Body Position: position changed independently  Intervention: Prevent and Manage VTE (Venous Thromboembolism) Risk  Recent Flowsheet Documentation  Taken 6/15/2024 1016 by Marzena Goodman RN  VTE Prevention/Management: patient refused intervention  Intervention: Prevent Infection  Recent Flowsheet Documentation  Taken 6/15/2024 1016 by Marzena Goodman RN  Infection Prevention: rest/sleep promoted  Goal: Optimal Comfort and Wellbeing  Outcome: Progressing  Goal: Readiness for Transition of Care  Outcome: Progressing   Goal Outcome Evaluation:      Plan of Care Reviewed With: patient    Overall Patient Progress: no changeOverall Patient Progress: no change    Outcome Evaluation: placement

## 2024-06-15 NOTE — PLAN OF CARE
"Goal Outcome Evaluation:      Plan of Care Reviewed With: patient    Overall Patient Progress: no changeOverall Patient Progress: no change    Outcome Evaluation: awaiting placement      Awaiting TCU. Pt continues to state he is very thirsty & drinks a lot of water,  Incontinent of large amounts of urine, check & change q2hrs & prn. Lower glute area down to upper back of thighs has blotchy, raised, blanchable redness. Speech garbled, A&Ox3, unaware of situation. SW following for TCU placement, will need level 2 screening by [facility] prior to discharging. Will cont w/ current POC.       Problem: Adult Inpatient Plan of Care  Goal: Plan of Care Review  Description: The Plan of Care Review/Shift note should be completed every shift.  The Outcome Evaluation is a brief statement about your assessment that the patient is improving, declining, or no change.  This information will be displayed automatically on your shift  note.  Outcome: Progressing  Flowsheets (Taken 6/15/2024 0532)  Outcome Evaluation: awaiting placement  Plan of Care Reviewed With: patient  Overall Patient Progress: no change  Goal: Patient-Specific Goal (Individualized)  Description: You can add care plan individualizations to a care plan. Examples of Individualization might be:  \"Parent requests to be called daily at 9am for status\", \"I have a hard time hearing out of my right ear\", or \"Do not touch me to wake me up as it startles  me\".  Outcome: Progressing  Goal: Absence of Hospital-Acquired Illness or Injury  Outcome: Progressing  Intervention: Identify and Manage Fall Risk  Recent Flowsheet Documentation  Taken 6/14/2024 2230 by Eileen Monsivais, RN  Safety Promotion/Fall Prevention: activity supervised  Intervention: Prevent Skin Injury  Recent Flowsheet Documentation  Taken 6/14/2024 2230 by Eileen Monsivais, RN  Body Position: (restless, moving around in bed) position changed independently  Goal: Optimal Comfort and Wellbeing  Outcome: " Progressing  Intervention: Monitor Pain and Promote Comfort  Recent Flowsheet Documentation  Taken 6/14/2024 2230 by Eileen Monsivais, RN  Pain Management Interventions: medication (see MAR)  Goal: Readiness for Transition of Care  Outcome: Progressing     Problem: Seclusion/Restraint, Behavioral  Goal: Seclusion/Behavioral Restraint Goal: Absence of Harm or Injury  Outcome: Progressing  Intervention: Protect Skin and Joint Integrity  Recent Flowsheet Documentation  Taken 6/14/2024 2230 by Eileen Monsivais, RN  Body Position: (restless, moving around in bed) position changed independently     Problem: Urinary Diversion  Goal: Effective Urinary Elimination  Outcome: Progressing     Problem: Comorbidity Management  Goal: Maintenance of Behavioral Health Symptom Control  Outcome: Progressing  Intervention: Maintain Behavioral Health Symptom Control  Recent Flowsheet Documentation  Taken 6/14/2024 2230 by Eileen Monsivais, RN  Medication Review/Management: medications reviewed

## 2024-06-16 PROCEDURE — 250N000013 HC RX MED GY IP 250 OP 250 PS 637: Performed by: HOSPITALIST

## 2024-06-16 PROCEDURE — 250N000013 HC RX MED GY IP 250 OP 250 PS 637: Performed by: NURSE PRACTITIONER

## 2024-06-16 PROCEDURE — 250N000013 HC RX MED GY IP 250 OP 250 PS 637: Performed by: PHYSICIAN ASSISTANT

## 2024-06-16 PROCEDURE — 250N000013 HC RX MED GY IP 250 OP 250 PS 637: Performed by: PSYCHIATRY & NEUROLOGY

## 2024-06-16 PROCEDURE — 120N000001 HC R&B MED SURG/OB

## 2024-06-16 PROCEDURE — 99232 SBSQ HOSP IP/OBS MODERATE 35: CPT | Performed by: NURSE PRACTITIONER

## 2024-06-16 PROCEDURE — 250N000013 HC RX MED GY IP 250 OP 250 PS 637

## 2024-06-16 RX ADMIN — OLANZAPINE 10 MG: 10 TABLET, FILM COATED ORAL at 16:11

## 2024-06-16 RX ADMIN — MENTHOL 1 PATCH: 205.5 PATCH TOPICAL at 13:42

## 2024-06-16 RX ADMIN — HYDROXYZINE HYDROCHLORIDE 10 MG: 10 TABLET ORAL at 13:42

## 2024-06-16 RX ADMIN — BUSPIRONE HYDROCHLORIDE 30 MG: 15 TABLET ORAL at 07:31

## 2024-06-16 RX ADMIN — TRAMADOL HYDROCHLORIDE 25 MG: 50 TABLET, COATED ORAL at 21:36

## 2024-06-16 RX ADMIN — QUETIAPINE FUMARATE 12.5 MG: 25 TABLET ORAL at 21:36

## 2024-06-16 RX ADMIN — HYDROXYZINE HYDROCHLORIDE 10 MG: 10 TABLET ORAL at 07:30

## 2024-06-16 RX ADMIN — HALOPERIDOL 2 MG: 1 TABLET ORAL at 18:28

## 2024-06-16 RX ADMIN — BENZTROPINE MESYLATE 1 MG: 1 TABLET ORAL at 21:35

## 2024-06-16 RX ADMIN — IBUPROFEN 400 MG: 400 TABLET ORAL at 21:36

## 2024-06-16 RX ADMIN — TRAMADOL HYDROCHLORIDE 25 MG: 50 TABLET, COATED ORAL at 07:30

## 2024-06-16 RX ADMIN — OLANZAPINE 10 MG: 10 TABLET, FILM COATED ORAL at 21:36

## 2024-06-16 RX ADMIN — TAMSULOSIN HYDROCHLORIDE 0.4 MG: 0.4 CAPSULE ORAL at 07:30

## 2024-06-16 RX ADMIN — OLANZAPINE 10 MG: 10 TABLET, FILM COATED ORAL at 07:30

## 2024-06-16 RX ADMIN — HALOPERIDOL 2 MG: 1 TABLET ORAL at 05:50

## 2024-06-16 RX ADMIN — TRAMADOL HYDROCHLORIDE 25 MG: 50 TABLET, COATED ORAL at 13:42

## 2024-06-16 RX ADMIN — SENNOSIDES 1 TABLET: 8.6 TABLET, FILM COATED ORAL at 21:36

## 2024-06-16 RX ADMIN — DOCUSATE SODIUM 100 MG: 100 CAPSULE, LIQUID FILLED ORAL at 21:36

## 2024-06-16 RX ADMIN — BUSPIRONE HYDROCHLORIDE 30 MG: 15 TABLET ORAL at 21:35

## 2024-06-16 RX ADMIN — HALOPERIDOL 2 MG: 1 TABLET ORAL at 12:05

## 2024-06-16 ASSESSMENT — ACTIVITIES OF DAILY LIVING (ADL)
ADLS_ACUITY_SCORE: 55

## 2024-06-16 NOTE — PROGRESS NOTES
"[facility]    Medicine Progress Note - Hospitalist Service    Date of Admission:  6/5/2024    Assessment & Plan   Harpreet Mcelroy is a 68 year old male with history of schizophrenia and bipolar disorder, invasive bladder cancer, ureteral obstruction s/p ureteral stent, hydronephrosis, perineal urethrostomy, lives at [facility] admitted on 6/5/2024 with behavioral changes and UTI (after his paranoia caused him to stop taking his antibiotics)      Interval events:  Tolerating PO well.  Encourage PO medications.  Pending discharge to TCU, waiting level II assessment.     Acute paranoia  Schizophrenia  Bipolar disorder  His facility reports: patient has had increasing paranoia x 1 month. Sometimes this is related to a UTI. They have contacted his Psychiatrist multiple times who recommended they \"monitor.\" He has not been taking meds due to thinking they are \"poison.\"  Pt required droperiodol, ativan, and zyprexa given in ED for behaviors. Continue home meds (buspirone 30 mg PO BID, olanzapine 10 mg po bid and 20 mg at HS, benztropine 2 mg at HS, and hydroxyzine 10 mg QID prn anxiety). Sitter discontinued on 6/10/24. Repeat EKG showed normal QTc.  Tolerating PO Haldol.    -Ativan as needed for agitation     Urinary tract infection, complicated. He was prescribed Levaquin by his [facility] group recently (Stenotrophomonas maltophilia in past).  Per his care facility, they found out he had been hiding his meds and was not taking them due to paranoia of them being poisoned. No recent cultures available. UA done here, but not culture.  - Treated with Levaquin from 6/5/24 - 6/15/24    History of ureteral obstruction  s/p stent, hydronephrosis, and perineal urethrostomy  Follows at [facility]. Last stent exchange was in March, due on 8/13/2024.  [facility] was consulted by the ER physician who recommended serial creatinines and bladder scans  - Continue flomax    Invasive bladder cancer. " Managed with bladder-sparing trimodal therapy, bulbar urethral stricture. S/P perineal urethrostomy, and left hydronephrosis managed with a chronic indwelling ureteral stent. Follows at [facility]  - last stent exchange was in March, next in August 2024     Back pain. Chronic uses naprosyn at home, cont  - PRN tramadol.       Diet: Mechanical/Dental Soft Diet    DVT Prophylaxis: discontinue Lovenox.  Mechanical DVT prophylaxis.  OOB to chair and ambulate TID.    Hernandez Catheter: Not present  Lines: None     Cardiac Monitoring: None  Code Status: Full Code      Clinically Significant Risk Factors             # Financial/Environmental Concerns: none         Disposition Plan  medically stable for discharge when TCU found.     Medically Ready for Discharge: Ready Now    ECHO Delgado CNP  Hospitalist Service  [facility]  Securely message with [employer] (more info)  Text page via [employer] Paging/Directory   ______________________________________________________________________    Interval History   No acute events overnight.  VSS.  Resting comfortably.   Denies any new issues.  Pending discharge to TCU.     6Physical Exam   Vital Signs: Temp: 97.9  F (36.6  C) Temp src: Oral BP: (!) 145/68 Pulse: 78   Resp: 16 SpO2: 99 % O2 Device: None (Room air)    Weight: 163 lbs 12.83 oz    Constitutional: awake, alert, cooperative, no apparent distress, and appears stated age  Respiratory: No increased work of breathing, good air exchange, clear to auscultation bilaterally, no crackles or wheezing  Cardiovascular: Normal apical impulse, regular rate and rhythm, normal S1 and S2, no S3 or S4, and no murmur noted  Abd:  Soft, non -tender, non distended. NABS.    Ext:  DUNHAM. CMS intact. CR < 3 seconds.    Neuro: AxOx2-3.  FC.  Agitated at times but redirectable and safe.     Medical Decision Making       35 MINUTES SPENT BY ME on the date of service doing chart review, history, exam, documentation & further  activities per the note.      Data         Imaging results reviewed over the past 24 hrs:   No results found for this or any previous visit (from the past 24 hour(s)).

## 2024-06-16 NOTE — PLAN OF CARE
"Patient confused and just mumbling (hard to understand).  VS WNL and documented on the FS. Lung sounds clear and patient is on RA. LBM yesterday. Incontinent of urine and is a check and change PRN. Soft diet and tolerating. 1 assist to change. 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 Patient needs lots of encouragement to get up and moving. PT to see today.  /68 (BP Location: Left arm, Patient Position: Semi-Ballard's, Cuff Size: Adult Regular)   Pulse 76   Temp 97.6  F (36.4  C) (Oral)   Resp 16   Wt 74.3 kg (163 lb 12.8 oz)   SpO2 99%   BMI 24.19 kg/m       Problem: Adult Inpatient Plan of Care  Goal: Plan of Care Review  Description: The Plan of Care Review/Shift note should be completed every shift.  The Outcome Evaluation is a brief statement about your assessment that the patient is improving, declining, or no change.  This information will be displayed automatically on your shift  note.  Outcome: Progressing  Flowsheets (Taken 6/16/2024 0740)  Outcome Evaluation: PT county assessment  Plan of Care Reviewed With: patient  Overall Patient Progress: no change  Goal: Patient-Specific Goal (Individualized)  Description: You can add care plan individualizations to a care plan. Examples of Individualization might be:  \"Parent requests to be called daily at 9am for status\", \"I have a hard time hearing out of my right ear\", or \"Do not touch me to wake me up as it startles  me\".  Outcome: Progressing  Goal: Absence of Hospital-Acquired Illness or Injury  Outcome: Progressing  Intervention: Identify and Manage Fall Risk  Recent Flowsheet Documentation  Taken 6/16/2024 0730 by Marzena Goodman, RN  Safety Promotion/Fall Prevention:   activity supervised   patient and family education   nonskid shoes/slippers when out of bed   assistive device/personal items within reach   mobility aid in reach  Intervention: Prevent Skin Injury  Recent Flowsheet Documentation  Taken 6/16/2024 0730 by Marzena Goodman, " RN  Body Position: position changed independently  Device Skin Pressure Protection: absorbent pad utilized/changed  Intervention: Prevent Infection  Recent Flowsheet Documentation  Taken 6/16/2024 0730 by Marzena Goodman RN  Infection Prevention:   rest/sleep promoted   single patient room provided  Goal: Optimal Comfort and Wellbeing  Outcome: Progressing  Intervention: Monitor Pain and Promote Comfort  Recent Flowsheet Documentation  Taken 6/16/2024 0730 by Marzena Goodman RN  Pain Management Interventions: (Ultram) medication (see MAR)  Goal: Readiness for Transition of Care  Outcome: Progressing   Goal Outcome Evaluation:      Plan of Care Reviewed With: patient    Overall Patient Progress: no changeOverall Patient Progress: no change    Outcome Evaluation: PT county assessment

## 2024-06-16 NOTE — PLAN OF CARE
"Incontinent of large amounts of urine, check & change q2hrs & prn. Lower glute area down to upper back of thighs has blotchy, raised, blanchable redness. A&Ox3, unaware of situation. Pain meds given for back & neck pain w/ good results, pt states pain is \"good\" this morning. SW following for TCU placement, will need level 2 screening by [facility] prior to discharging. Will cont w/ current POC.        Goal Outcome Evaluation:      Plan of Care Reviewed With: patient    Overall Patient Progress: improvingOverall Patient Progress: improving    Outcome Evaluation: awaiting [facility] assessment & placement for TCU      Problem: Adult Inpatient Plan of Care  Goal: Plan of Care Review  Description: The Plan of Care Review/Shift note should be completed every shift.  The Outcome Evaluation is a brief statement about your assessment that the patient is improving, declining, or no change.  This information will be displayed automatically on your shift  note.  Outcome: Progressing  Flowsheets (Taken 6/16/2024 0621)  Outcome Evaluation: awaiting [facility] assessment & placement for TCU  Plan of Care Reviewed With: patient  Overall Patient Progress: improving  Goal: Patient-Specific Goal (Individualized)  Description: You can add care plan individualizations to a care plan. Examples of Individualization might be:  \"Parent requests to be called daily at 9am for status\", \"I have a hard time hearing out of my right ear\", or \"Do not touch me to wake me up as it startles  me\".  Outcome: Progressing  Goal: Absence of Hospital-Acquired Illness or Injury  Outcome: Progressing  Intervention: Identify and Manage Fall Risk  Recent Flowsheet Documentation  Taken 6/15/2024 2125 by Eileen Monsivais, RN  Safety Promotion/Fall Prevention:   activity supervised   increased rounding and observation   clutter free environment maintained   patient and family education   supervised activity  Intervention: Prevent Skin Injury  Recent Flowsheet " Documentation  Taken 6/15/2024 2144 by Eileen Monsivais, RN  Body Position: position changed independently  Taken 6/15/2024 2125 by Eileen Monsivais, RN  Body Position: position changed independently  Goal: Optimal Comfort and Wellbeing  Outcome: Progressing  Intervention: Monitor Pain and Promote Comfort  Recent Flowsheet Documentation  Taken 6/15/2024 2125 by Eileen Monsivais, RN  Pain Management Interventions: medication (see MAR)  Goal: Readiness for Transition of Care  Outcome: Progressing     Problem: Urinary Diversion  Goal: Effective Urinary Elimination  Outcome: Progressing     Problem: Comorbidity Management  Goal: Maintenance of Behavioral Health Symptom Control  Outcome: Progressing  Intervention: Maintain Behavioral Health Symptom Control  Recent Flowsheet Documentation  Taken 6/15/2024 2125 by Eileen Monsivais, RN  Medication Review/Management: medications reviewed

## 2024-06-17 ENCOUNTER — APPOINTMENT (OUTPATIENT)
Dept: PHYSICAL THERAPY | Facility: CLINIC | Age: 69
DRG: 885 | End: 2024-06-17
Payer: COMMERCIAL

## 2024-06-17 LAB
ALBUMIN SERPL BCG-MCNC: 3.5 G/DL (ref 3.5–5.2)
ALP SERPL-CCNC: 83 U/L (ref 40–150)
ALT SERPL W P-5'-P-CCNC: 27 U/L (ref 0–70)
ANION GAP SERPL CALCULATED.3IONS-SCNC: 13 MMOL/L (ref 7–15)
AST SERPL W P-5'-P-CCNC: 20 U/L (ref 0–45)
ATRIAL RATE - MUSE: 84 BPM
BILIRUB SERPL-MCNC: 0.2 MG/DL
BUN SERPL-MCNC: 32 MG/DL (ref 8–23)
CALCIUM SERPL-MCNC: 9 MG/DL (ref 8.8–10.2)
CHLORIDE SERPL-SCNC: 101 MMOL/L (ref 98–107)
CREAT SERPL-MCNC: 1.11 MG/DL (ref 0.67–1.17)
DEPRECATED HCO3 PLAS-SCNC: 22 MMOL/L (ref 22–29)
DIASTOLIC BLOOD PRESSURE - MUSE: NORMAL MMHG
EGFRCR SERPLBLD CKD-EPI 2021: 73 ML/MIN/1.73M2
ERYTHROCYTE [DISTWIDTH] IN BLOOD BY AUTOMATED COUNT: 15.2 % (ref 10–15)
GLUCOSE SERPL-MCNC: 72 MG/DL (ref 70–99)
HCT VFR BLD AUTO: 35.6 % (ref 40–53)
HGB BLD-MCNC: 11.5 G/DL (ref 13.3–17.7)
INTERPRETATION ECG - MUSE: NORMAL
MAGNESIUM SERPL-MCNC: 2.1 MG/DL (ref 1.7–2.3)
MCH RBC QN AUTO: 29.5 PG (ref 26.5–33)
MCHC RBC AUTO-ENTMCNC: 32.3 G/DL (ref 31.5–36.5)
MCV RBC AUTO: 91 FL (ref 78–100)
P AXIS - MUSE: 85 DEGREES
PLATELET # BLD AUTO: 263 10E3/UL (ref 150–450)
POTASSIUM SERPL-SCNC: 4.4 MMOL/L (ref 3.4–5.3)
PR INTERVAL - MUSE: 176 MS
PROT SERPL-MCNC: 6.5 G/DL (ref 6.4–8.3)
QRS DURATION - MUSE: 104 MS
QT - MUSE: 392 MS
QTC - MUSE: 463 MS
R AXIS - MUSE: 55 DEGREES
RBC # BLD AUTO: 3.9 10E6/UL (ref 4.4–5.9)
SODIUM SERPL-SCNC: 136 MMOL/L (ref 135–145)
SYSTOLIC BLOOD PRESSURE - MUSE: NORMAL MMHG
T AXIS - MUSE: 44 DEGREES
VENTRICULAR RATE- MUSE: 84 BPM
WBC # BLD AUTO: 6 10E3/UL (ref 4–11)

## 2024-06-17 PROCEDURE — 250N000013 HC RX MED GY IP 250 OP 250 PS 637: Performed by: NURSE PRACTITIONER

## 2024-06-17 PROCEDURE — 250N000013 HC RX MED GY IP 250 OP 250 PS 637: Performed by: HOSPITALIST

## 2024-06-17 PROCEDURE — 99232 SBSQ HOSP IP/OBS MODERATE 35: CPT | Performed by: PHYSICIAN ASSISTANT

## 2024-06-17 PROCEDURE — 80053 COMPREHEN METABOLIC PANEL: CPT | Performed by: NURSE PRACTITIONER

## 2024-06-17 PROCEDURE — 250N000013 HC RX MED GY IP 250 OP 250 PS 637: Performed by: PHYSICIAN ASSISTANT

## 2024-06-17 PROCEDURE — 83735 ASSAY OF MAGNESIUM: CPT | Performed by: NURSE PRACTITIONER

## 2024-06-17 PROCEDURE — 85041 AUTOMATED RBC COUNT: CPT | Performed by: NURSE PRACTITIONER

## 2024-06-17 PROCEDURE — 36415 COLL VENOUS BLD VENIPUNCTURE: CPT | Performed by: NURSE PRACTITIONER

## 2024-06-17 PROCEDURE — 250N000013 HC RX MED GY IP 250 OP 250 PS 637

## 2024-06-17 PROCEDURE — 250N000013 HC RX MED GY IP 250 OP 250 PS 637: Performed by: PSYCHIATRY & NEUROLOGY

## 2024-06-17 PROCEDURE — 97530 THERAPEUTIC ACTIVITIES: CPT | Mod: GP

## 2024-06-17 PROCEDURE — 120N000001 HC R&B MED SURG/OB

## 2024-06-17 RX ADMIN — HALOPERIDOL 2 MG: 1 TABLET ORAL at 18:24

## 2024-06-17 RX ADMIN — QUETIAPINE FUMARATE 12.5 MG: 25 TABLET ORAL at 21:24

## 2024-06-17 RX ADMIN — IBUPROFEN 400 MG: 400 TABLET ORAL at 09:46

## 2024-06-17 RX ADMIN — OLANZAPINE 10 MG: 10 TABLET, FILM COATED ORAL at 16:38

## 2024-06-17 RX ADMIN — SENNOSIDES 1 TABLET: 8.6 TABLET, FILM COATED ORAL at 09:46

## 2024-06-17 RX ADMIN — TRAMADOL HYDROCHLORIDE 25 MG: 50 TABLET, COATED ORAL at 21:24

## 2024-06-17 RX ADMIN — DOCUSATE SODIUM 100 MG: 100 CAPSULE, LIQUID FILLED ORAL at 09:47

## 2024-06-17 RX ADMIN — OLANZAPINE 10 MG: 10 TABLET, FILM COATED ORAL at 21:24

## 2024-06-17 RX ADMIN — SENNOSIDES 1 TABLET: 8.6 TABLET, FILM COATED ORAL at 21:24

## 2024-06-17 RX ADMIN — OLANZAPINE 10 MG: 10 TABLET, FILM COATED ORAL at 09:46

## 2024-06-17 RX ADMIN — DOCUSATE SODIUM 100 MG: 100 CAPSULE, LIQUID FILLED ORAL at 21:24

## 2024-06-17 RX ADMIN — BUSPIRONE HYDROCHLORIDE 30 MG: 15 TABLET ORAL at 09:47

## 2024-06-17 RX ADMIN — BUSPIRONE HYDROCHLORIDE 30 MG: 15 TABLET ORAL at 21:24

## 2024-06-17 RX ADMIN — TAMSULOSIN HYDROCHLORIDE 0.4 MG: 0.4 CAPSULE ORAL at 09:47

## 2024-06-17 RX ADMIN — BENZTROPINE MESYLATE 1 MG: 1 TABLET ORAL at 21:24

## 2024-06-17 ASSESSMENT — ACTIVITIES OF DAILY LIVING (ADL)
ADLS_ACUITY_SCORE: 55
ADLS_ACUITY_SCORE: 56
ADLS_ACUITY_SCORE: 58
ADLS_ACUITY_SCORE: 56
ADLS_ACUITY_SCORE: 58
ADLS_ACUITY_SCORE: 56
ADLS_ACUITY_SCORE: 58
ADLS_ACUITY_SCORE: 56
ADLS_ACUITY_SCORE: 58
ADLS_ACUITY_SCORE: 58
ADLS_ACUITY_SCORE: 56
ADLS_ACUITY_SCORE: 58
ADLS_ACUITY_SCORE: 55
ADLS_ACUITY_SCORE: 58
ADLS_ACUITY_SCORE: 58
ADLS_ACUITY_SCORE: 56
ADLS_ACUITY_SCORE: 56
ADLS_ACUITY_SCORE: 55
ADLS_ACUITY_SCORE: 55
ADLS_ACUITY_SCORE: 56
ADLS_ACUITY_SCORE: 55
ADLS_ACUITY_SCORE: 58
ADLS_ACUITY_SCORE: 58

## 2024-06-17 NOTE — PLAN OF CARE
"Goal Outcome Evaluation:      Plan of Care Reviewed With: patient    Overall Patient Progress: no changeOverall Patient Progress: no change    Outcome Evaluation: VS monitored, garbled speech, up w/A1 and ww and gb, incontinent bladder, pt calm and cooperative, took pills fine, needs encouragement for OOB activity and to do things himself independently, awaiting [facility] assessment.    Problem: Adult Inpatient Plan of Care  Goal: Plan of Care Review  Description: The Plan of Care Review/Shift note should be completed every shift.  The Outcome Evaluation is a brief statement about your assessment that the patient is improving, declining, or no change.  This information will be displayed automatically on your shift  note.  Outcome: Progressing  Flowsheets (Taken 6/16/2024 2011)  Outcome Evaluation: VS monitored, garbled speech, up w/A1 and ww and gb, incontinent bladder, pt calm and cooperative, took pills fine, needs encouragement for OOB activity and to do things himself independently, awaiting [facility] assessment.  Plan of Care Reviewed With: patient  Overall Patient Progress: no change  Goal: Patient-Specific Goal (Individualized)  Description: You can add care plan individualizations to a care plan. Examples of Individualization might be:  \"Parent requests to be called daily at 9am for status\", \"I have a hard time hearing out of my right ear\", or \"Do not touch me to wake me up as it startles  me\".  Outcome: Progressing  Goal: Absence of Hospital-Acquired Illness or Injury  Outcome: Progressing  Intervention: Identify and Manage Fall Risk  Recent Flowsheet Documentation  Taken 6/16/2024 1615 by Yaneli Turner RN  Safety Promotion/Fall Prevention:   activity supervised   assistive device/personal items within reach   clutter free environment maintained   lighting adjusted   mobility aid in reach   nonskid shoes/slippers when out of bed   patient and family education   room organization consistent   safety round/check " completed   supervised activity   treat reversible contributory factors   treat underlying cause  Intervention: Prevent Skin Injury  Recent Flowsheet Documentation  Taken 6/16/2024 1615 by Yaneli Turner RN  Skin Protection: adhesive use limited  Device Skin Pressure Protection:   absorbent pad utilized/changed   adhesive use limited   tubing/devices free from skin contact  Intervention: Prevent and Manage VTE (Venous Thromboembolism) Risk  Recent Flowsheet Documentation  Taken 6/16/2024 1615 by Yaneli Turner RN  VTE Prevention/Management: patient refused intervention  Intervention: Prevent Infection  Recent Flowsheet Documentation  Taken 6/16/2024 1615 by Yaneli Turner RN  Infection Prevention:   hand hygiene promoted   rest/sleep promoted   single patient room provided  Goal: Optimal Comfort and Wellbeing  Outcome: Progressing  Goal: Readiness for Transition of Care  Outcome: Progressing     Problem: Urinary Diversion  Goal: Effective Urinary Elimination  Outcome: Progressing     Problem: Comorbidity Management  Goal: Maintenance of Behavioral Health Symptom Control  Outcome: Progressing  Intervention: Maintain Behavioral Health Symptom Control  Recent Flowsheet Documentation  Taken 6/16/2024 1615 by Yaneli Turner RN  Medication Review/Management: medications reviewed

## 2024-06-17 NOTE — PROGRESS NOTES
"[facility]    Medicine Progress Note - Hospitalist Service    Date of Admission:  6/5/2024    Assessment & Plan   Harpreet Mcelroy is a 68 year old male with history of schizophrenia and bipolar disorder, invasive bladder cancer, ureteral obstruction s/p ureteral stent, hydronephrosis, perineal urethrostomy, lives at [facility] admitted on 6/5/2024 with behavioral changes and UTI (after his paranoia caused him to stop taking his antibiotics)      Interval events:  Tolerating PO well.  Encourage PO medications.  Pending discharge to TCU, waiting level II assessment. No complaints today.    Acute paranoia  Schizophrenia  Bipolar disorder  His facility reports: patient has had increasing paranoia x 1 month. Sometimes this is related to a UTI. They have contacted his Psychiatrist multiple times who recommended they \"monitor.\" He has not been taking meds due to thinking they are \"poison.\"  Pt required droperiodol, ativan, and zyprexa given in ED for behaviors. Continue home meds (buspirone 30 mg PO BID, olanzapine 10 mg po bid and 20 mg at HS, benztropine 2 mg at HS, and hydroxyzine 10 mg QID prn anxiety). Sitter discontinued on 6/10/24. Repeat EKG showed normal QTc.  Tolerating PO Haldol.    -Ativan as needed for agitation     Urinary tract infection, complicated. He was prescribed Levaquin by his [facility] group recently (Stenotrophomonas maltophilia in past).  Per his care facility, they found out he had been hiding his meds and was not taking them due to paranoia of them being poisoned. No recent cultures available. UA done here, but not culture.  - Treated with Levaquin from 6/5/24 - 6/15/24    History of ureteral obstruction  s/p stent, hydronephrosis, and perineal urethrostomy  Follows at [facility]. Last stent exchange was in March, due on 8/13/2024.  [facility] group was consulted by the ER physician who recommended serial creatinines and bladder scans  - Continue " flomax    Invasive bladder cancer. Managed with bladder-sparing trimodal therapy, bulbar urethral stricture. S/P perineal urethrostomy, and left hydronephrosis managed with a chronic indwelling ureteral stent. Follows at [facility]  - last stent exchange was in March, next in August 2024     Back pain. Chronic uses naprosyn at home, cont  - PRN tramadol.       Diet: Mechanical/Dental Soft Diet    DVT Prophylaxis: discontinue Lovenox.  Mechanical DVT prophylaxis.  OOB to chair and ambulate TID.    Hernandez Catheter: Not present  Lines: None     Cardiac Monitoring: None  Code Status: Full Code            Diet: Mechanical/Dental Soft Diet    DVT Prophylaxis: Ambulate every shift  Hernandez Catheter: Not present  Lines: None     Cardiac Monitoring: None  Code Status: Full Code      Clinically Significant Risk Factors              # Hypoalbuminemia: Lowest albumin = 3.4 g/dL at 6/14/2024  8:15 AM, will monitor as appropriate                      # Financial/Environmental Concerns: none         Disposition Plan     Medically Ready for Discharge: Ready Now           The patient's care was discussed with the Bedside Nurse, Care Coordinator/, and Patient.    Farrah Long PA-C  Hospitalist Service  [facility]  Securely message with [employer] (more info)  Text page via [employer] Paging/Directory   ______________________________________________________________________    Interval History   No events overnight. Patient has no complaints for me    Physical Exam   Vital Signs: Temp: 97.4  F (36.3  C) Temp src: Oral BP: 111/70 Pulse: 77   Resp: 18 SpO2: 98 % O2 Device: None (Room air)    Weight: 163 lbs 12.83 oz    General Appearance: Alert and orientated x 3  Respiratory: CTAB  Cardiovascular: RRR without murmur  GI: Bowel sounds are present without tenderness  Skin: No rash or open sores      Medical Decision Making       45 MINUTES SPENT BY ME on the date of service doing chart review, history, exam,  documentation & further activities per the note.      Data     I have personally reviewed the following data over the past 24 hrs:    6.0  \   11.5 (L)   / 263     136 101 32.0 (H) /  72   4.4 22 1.11 \     ALT: 27 AST: 20 AP: 83 TBILI: 0.2   ALB: 3.5 TOT PROTEIN: 6.5 LIPASE: N/A       Imaging results reviewed over the past 24 hrs:   No results found for this or any previous visit (from the past 24 hour(s)).

## 2024-06-17 NOTE — PROGRESS NOTES
Care Management Follow Up    Length of Stay (days): 10    Expected Discharge Date: 06/20/2024     Concerns to be Addressed: discharge planning     Patient plan of care discussed at interdisciplinary rounds: Yes    Anticipated Discharge Disposition:  [facility]     Anticipated Discharge Services:  PT/OT  Anticipated Discharge DME:      Patient/Family in Agreement with the Plan:  yes    Referrals Placed by CM/SW:  skilled nursing facilities  Private pay costs discussed: transportation costs    Additional Information:  SW following for discharge planning and TCU placement.     Pt has been accepted at [facility], pending the county completing the Obra Level 2 screening assessment. Appears there is an issue with which [facility], [place] vs [place], should complete the screening. Appears since [place] is the [facility] of financial responsibility, they need to complete it or formally decline and then forward it to [facility].     HIRAM left VM for Adriana at [facility] p:[phone] inquiring on update - if she has received the fax from [facility] and if they plan to do the assessment or if they have sent it to [facility].     This case has been escalated to leadership for further assistance.     ADDENDUM @ 1110:     Received VM from Adriana at [facility] stating she sent the referral information to [facility] on Friday, 6/14.     HIRAM sent f/u email to Fernanda Octavio with [facility] inquiring if she has received the referral.     ADDENDUM @ 1400:    Received return email from Fernanda Octavio with [facility]. She has not yet received obra level 2 referral from [facility].     HIRAM placed call to [facility] p:[phone] and spoke with Gris. HIRAM inquired if now that [facility] has deferred the referral to [place], and [facility] clearly has fax issues, if the [facility] could send the referral information to [facility]. Per Gris, they cannot do that. It must come from [place].      HIRAM left another  for Adriana with [facility] requesting she re-send the referral information to [facility].     ADDENDUM @ 4017:    Received email from Fernanda Cunningham with [facility]. She has now received a copy and it is with their intake for processing. Once it is processed, she will assign it to a worker. Now that they have received the referral, [facility] technically has 9 days to complete it.     ADDENDUM @ 6195:    Received update that Do from [facility] has been assigned the obra level 2 screening. Per Do, pt is known to them therefore an in-person assessment is not needed. She plans to have the referral completed by tomorrow morning.     IHRAM spoke with Sarita in admissions at [facility]. They can accept tomorrow once they receive a copy of the assessment.     HIRAM to schedule transport.     Social work will continue to follow and assist with discharge planning as needed.    AUGUSTINE Felix, [facility]  Care Coordination  [phone]    AUGUSTINE Sawyer

## 2024-06-17 NOTE — PLAN OF CARE
"Incontinent of large amounts of urine, check & change q2hrs & prn. Lower glute area down to upper back of thighs has blotchy, raised, blanchable redness. A&Ox3, unaware of situation. Pain meds given for back & neck pain w/ good results. SW following for TCU placement, will need level 2 screening by county prior to discharging. Will cont w/ current POC.     Goal Outcome Evaluation:      Plan of Care Reviewed With: patient    Overall Patient Progress: no changeOverall Patient Progress: no change    Outcome Evaluation: awaiting placement      Problem: Adult Inpatient Plan of Care  Goal: Plan of Care Review  Description: The Plan of Care Review/Shift note should be completed every shift.  The Outcome Evaluation is a brief statement about your assessment that the patient is improving, declining, or no change.  This information will be displayed automatically on your shift  note.  Outcome: Progressing  Flowsheets (Taken 6/17/2024 0637)  Outcome Evaluation: awaiting placement  Plan of Care Reviewed With: patient  Overall Patient Progress: no change  Goal: Patient-Specific Goal (Individualized)  Description: You can add care plan individualizations to a care plan. Examples of Individualization might be:  \"Parent requests to be called daily at 9am for status\", \"I have a hard time hearing out of my right ear\", or \"Do not touch me to wake me up as it startles  me\".  Outcome: Progressing  Goal: Absence of Hospital-Acquired Illness or Injury  Outcome: Progressing  Intervention: Identify and Manage Fall Risk  Recent Flowsheet Documentation  Taken 6/16/2024 2135 by Eileen Monsivais, RN  Safety Promotion/Fall Prevention:   safety round/check completed   room organization consistent   room door open   clutter free environment maintained  Intervention: Prevent Skin Injury  Recent Flowsheet Documentation  Taken 6/16/2024 2136 by Eileen Monsivais, RN  Body Position:   position changed independently   supine, head elevated  Taken " 6/16/2024 2135 by Eileen Monsivais, RN  Body Position: position changed independently  Goal: Optimal Comfort and Wellbeing  Outcome: Progressing  Intervention: Monitor Pain and Promote Comfort  Recent Flowsheet Documentation  Taken 6/16/2024 2136 by Eileen Monsivais, RN  Pain Management Interventions: medication (see MAR)  Goal: Readiness for Transition of Care  Outcome: Progressing     Problem: Urinary Diversion  Goal: Effective Urinary Elimination  Outcome: Progressing     Problem: Comorbidity Management  Goal: Maintenance of Behavioral Health Symptom Control  Outcome: Progressing  Intervention: Maintain Behavioral Health Symptom Control  Recent Flowsheet Documentation  Taken 6/16/2024 2135 by Eileen Monsivais, RN  Medication Review/Management: medications reviewed

## 2024-06-17 NOTE — PLAN OF CARE
"INPATIENT CARE PLAN PROGRESS NOTE 0700 - 1500:     Pt A&O to self and place. Speech soft, intermittently garbled/incomprehensible. C/o int neck and back discomfort, PRN ibuprofen given. Tolerating mech soft diet. Pleasant, cooperative/compliant taking meds w/ this writer. Incontinence cares provided PRN. Up Ax1 w/ gait belt and walker, activity encouraged. Plan for discharge to TCU pending county assessment. SW following.    Goal Outcome Evaluation:      Plan of Care Reviewed With: patient    Overall Patient Progress: no changeOverall Patient Progress: no change    Outcome Evaluation: Disposition pending      Problem: Adult Inpatient Plan of Care  Goal: Plan of Care Review  Description: The Plan of Care Review/Shift note should be completed every shift.  The Outcome Evaluation is a brief statement about your assessment that the patient is improving, declining, or no change.  This information will be displayed automatically on your shift  note.  Outcome: Progressing  Flowsheets (Taken 6/17/2024 0525)  Outcome Evaluation: Disposition pending  Plan of Care Reviewed With: patient  Overall Patient Progress: no change  Goal: Patient-Specific Goal (Individualized)  Description: You can add care plan individualizations to a care plan. Examples of Individualization might be:  \"Parent requests to be called daily at 9am for status\", \"I have a hard time hearing out of my right ear\", or \"Do not touch me to wake me up as it startles  me\".  Outcome: Progressing  Goal: Absence of Hospital-Acquired Illness or Injury  Outcome: Progressing  Goal: Optimal Comfort and Wellbeing  Outcome: Progressing  Goal: Readiness for Transition of Care  Outcome: Progressing     "

## 2024-06-18 VITALS
TEMPERATURE: 97.4 F | HEART RATE: 75 BPM | OXYGEN SATURATION: 97 % | SYSTOLIC BLOOD PRESSURE: 124 MMHG | DIASTOLIC BLOOD PRESSURE: 66 MMHG | BODY MASS INDEX: 24.19 KG/M2 | WEIGHT: 163.8 LBS | RESPIRATION RATE: 15 BRPM

## 2024-06-18 PROCEDURE — 250N000013 HC RX MED GY IP 250 OP 250 PS 637: Performed by: HOSPITALIST

## 2024-06-18 PROCEDURE — 250N000013 HC RX MED GY IP 250 OP 250 PS 637: Performed by: PHYSICIAN ASSISTANT

## 2024-06-18 PROCEDURE — 99239 HOSP IP/OBS DSCHRG MGMT >30: CPT | Performed by: NURSE PRACTITIONER

## 2024-06-18 PROCEDURE — 250N000013 HC RX MED GY IP 250 OP 250 PS 637

## 2024-06-18 PROCEDURE — 250N000013 HC RX MED GY IP 250 OP 250 PS 637: Performed by: NURSE PRACTITIONER

## 2024-06-18 RX ORDER — LORAZEPAM 0.5 MG/1
0.5 TABLET ORAL EVERY 4 HOURS PRN
Qty: 15 TABLET | Refills: 0 | Status: SHIPPED | OUTPATIENT
Start: 2024-06-18

## 2024-06-18 RX ORDER — HYDRALAZINE HYDROCHLORIDE 10 MG/1
10 TABLET, FILM COATED ORAL EVERY 4 HOURS PRN
DISCHARGE
Start: 2024-06-18

## 2024-06-18 RX ORDER — HYDROXYZINE HYDROCHLORIDE 10 MG/1
10 TABLET, FILM COATED ORAL EVERY 6 HOURS PRN
DISCHARGE
Start: 2024-06-18

## 2024-06-18 RX ORDER — ONDANSETRON 4 MG/1
4 TABLET, ORALLY DISINTEGRATING ORAL EVERY 6 HOURS PRN
DISCHARGE
Start: 2024-06-18

## 2024-06-18 RX ORDER — OLANZAPINE 10 MG/1
10 TABLET ORAL AT BEDTIME
DISCHARGE
Start: 2024-06-18

## 2024-06-18 RX ORDER — IBUPROFEN 400 MG/1
400 TABLET, FILM COATED ORAL EVERY 6 HOURS PRN
DISCHARGE
Start: 2024-06-18

## 2024-06-18 RX ORDER — BENZTROPINE MESYLATE 1 MG/1
1 TABLET ORAL AT BEDTIME
DISCHARGE
Start: 2024-06-18

## 2024-06-18 RX ORDER — POLYETHYLENE GLYCOL 3350 17 G/17G
17 POWDER, FOR SOLUTION ORAL DAILY
DISCHARGE
Start: 2024-06-18

## 2024-06-18 RX ORDER — HALOPERIDOL 2 MG/1
2 TABLET ORAL 3 TIMES DAILY PRN
DISCHARGE
Start: 2024-06-18

## 2024-06-18 RX ORDER — TRAMADOL HYDROCHLORIDE 25 MG/1
25 TABLET, COATED ORAL EVERY 6 HOURS PRN
Qty: 10 TABLET | Refills: 0 | Status: SHIPPED | OUTPATIENT
Start: 2024-06-18

## 2024-06-18 RX ADMIN — HYDROXYZINE HYDROCHLORIDE 10 MG: 10 TABLET ORAL at 04:48

## 2024-06-18 RX ADMIN — TAMSULOSIN HYDROCHLORIDE 0.4 MG: 0.4 CAPSULE ORAL at 09:58

## 2024-06-18 RX ADMIN — OLANZAPINE 10 MG: 10 TABLET, FILM COATED ORAL at 09:59

## 2024-06-18 RX ADMIN — TRAMADOL HYDROCHLORIDE 25 MG: 50 TABLET, COATED ORAL at 04:48

## 2024-06-18 RX ADMIN — HYDROXYZINE HYDROCHLORIDE 10 MG: 10 TABLET ORAL at 12:27

## 2024-06-18 RX ADMIN — IBUPROFEN 400 MG: 400 TABLET ORAL at 10:00

## 2024-06-18 RX ADMIN — IBUPROFEN 400 MG: 400 TABLET ORAL at 00:09

## 2024-06-18 RX ADMIN — SENNOSIDES 1 TABLET: 8.6 TABLET, FILM COATED ORAL at 09:58

## 2024-06-18 RX ADMIN — BUSPIRONE HYDROCHLORIDE 30 MG: 15 TABLET ORAL at 09:58

## 2024-06-18 RX ADMIN — HALOPERIDOL 2 MG: 1 TABLET ORAL at 06:18

## 2024-06-18 ASSESSMENT — ACTIVITIES OF DAILY LIVING (ADL)
ADLS_ACUITY_SCORE: 56
ADLS_ACUITY_SCORE: 55
ADLS_ACUITY_SCORE: 56
ADLS_ACUITY_SCORE: 56

## 2024-06-18 NOTE — PLAN OF CARE
INPATIENT CARE PLAN PROGRESS NOTE & DISCHARGE 0700 - 1400:    Pt A&O to self and place. Speech soft, intermittently garbled/incomprehensible. C/o int neck and back discomfort, PRN ibuprofen given. Tolerating mech soft diet. Pleasant, cooperative/compliant taking meds w/ this writer. Incontinence cares provided PRN. Up Ax1 w/ gait belt and walker, activity encouraged. Plan for discharge to TCU this afternoon, 4400-4539.    Patient's After Visit Summary was reviewed with patient.   Patient verbalized understanding of After Visit Summary, recommended follow up and was given an opportunity to ask questions.   Discharge medications sent home with patient/family: YES - hard copy rx for tramadol & ativan sent with IATF packet with transport tech to TCU.  Discharged with transport tech.

## 2024-06-18 NOTE — PLAN OF CARE
"Physical Therapy Discharge Summary    Reason for therapy discharge:    Discharged to transitional care facility.    Progress towards therapy goal(s). See goals on Care Plan in [facility] electronic health record for goal details.  Goals not met.  Barriers to achieving goals:   discharge from facility.    Therapy recommendation(s):    Continued therapy is recommended.  Rationale/Recommendations:  Per prior PT recommendation, \"Patient performing short distance ambulation with CGA and FWW, no overt loss of balance but needing cues for walker management. Patient continues to have poor motivation for OOB mobility. Anticipate discharge to TCU vs transition to long term care given cognition and need for assist with all ADLs and mobility\".      "

## 2024-06-18 NOTE — PLAN OF CARE
"Vitals are Temp: 97.7  F (36.5  C) Temp src: Oral BP: 119/71 Pulse: 81   Resp: 16 SpO2: 97 %.  Patient is Disorientated to, Time, and Situation. They are 1 Assist with Gait Belt and Walker.  Pt is a mechanical soft diet.  They are complaining of 5/10 pain in their back.  Ultram given for pain.  Patient has no IV access. Denies nausea/dizziness/SOB. Patient mistrustful and suspicious of staff, increasing anxiety, PRN Seroquel administered. Incontinent of bladder. Redness to buttocks, sacral Mepilex in place.  SW following. Plan is to discharge to TCU when [facility] assessment completed, possibly tomorrow.     Goal Outcome Evaluation:      Plan of Care Reviewed With: patient    Overall Patient Progress: no change    Outcome Evaluation: TCU placement pending [facility] assessment. Posssible discharge 6/18.      Problem: Adult Inpatient Plan of Care  Goal: Plan of Care Review  Description: The Plan of Care Review/Shift note should be completed every shift.  The Outcome Evaluation is a brief statement about your assessment that the patient is improving, declining, or no change.  This information will be displayed automatically on your shift  note.  Outcome: Progressing  Flowsheets (Taken 6/17/2024 2225)  Outcome Evaluation: TCU placement pending [facility] assessment. Posssible discharge 6/18.  Plan of Care Reviewed With: patient  Overall Patient Progress: no change  Goal: Patient-Specific Goal (Individualized)  Description: You can add care plan individualizations to a care plan. Examples of Individualization might be:  \"Parent requests to be called daily at 9am for status\", \"I have a hard time hearing out of my right ear\", or \"Do not touch me to wake me up as it startles  me\".  Outcome: Progressing  Goal: Absence of Hospital-Acquired Illness or Injury  Outcome: Progressing  Intervention: Identify and Manage Fall Risk  Recent Flowsheet Documentation  Taken 6/17/2024 2124 by Kalli Sanchez RN  Safety Promotion/Fall " Prevention:   activity supervised   assistive device/personal items within reach   clutter free environment maintained   nonskid shoes/slippers when out of bed   room near nurse's station   safety round/check completed   supervised activity  Intervention: Prevent Skin Injury  Recent Flowsheet Documentation  Taken 6/17/2024 2124 by Kalli Sanchez RN  Body Position: position changed independently  Intervention: Prevent and Manage VTE (Venous Thromboembolism) Risk  Recent Flowsheet Documentation  Taken 6/17/2024 2124 by Kalli Sanchez RN  VTE Prevention/Management: SCDs (sequential compression devices) off  Goal: Optimal Comfort and Wellbeing  Outcome: Progressing  Intervention: Monitor Pain and Promote Comfort  Recent Flowsheet Documentation  Taken 6/17/2024 2124 by Kalil Sanchez RN  Pain Management Interventions: medication (see MAR)  Goal: Readiness for Transition of Care  Outcome: Progressing     Problem: Urinary Diversion  Goal: Effective Urinary Elimination  Outcome: Progressing     Problem: Comorbidity Management  Goal: Maintenance of Behavioral Health Symptom Control  Outcome: Progressing  Intervention: Maintain Behavioral Health Symptom Control  Recent Flowsheet Documentation  Taken 6/17/2024 2124 by Kalli Sanchez RN  Medication Review/Management: medications reviewed

## 2024-06-18 NOTE — DISCHARGE SUMMARY
[facility]  Hospitalist Discharge Summary      Date of Admission:  6/5/2024  Date of Discharge:  6/18/2024  Discharging Provider: ECHO Guaman CNP  Discharge Service: Hospitalist Service    Discharge Diagnoses   See below    Clinically Significant Risk Factors          Follow-ups Needed After Discharge   Follow-up Appointments     Follow Up and recommended labs and tests      Follow up with [facility] home physician.  No follow up labs or test are   needed.  Follow up with primary care provider in 3-4 weeks.  The following   labs/tests are recommended: BMP and CBC.  Follow up with [facility] as scheduled.  .            Unresulted Labs Ordered in the Past 30 Days of this Admission       No orders found from 5/6/2024 to 6/6/2024.        These results will be followed up by NA    Discharge Disposition   Discharged to home  Condition at discharge: Stable    Hospital Course   Discharge Diagnosis:        Acute paranoia    Schizophrenia    Bipolar disorder    Complicated urinary tract infection    History of ureteral obstruction s/p ureteral stent    Hydronephrosis    Invasive bladder cancer    Chronic back pain      History of Present Illness:  Harpreet Mcelroy, a 68-year-old male with a complex medical history including schizophrenia, bipolar disorder, invasive bladder cancer, ureteral obstruction s/p ureteral stent, hydronephrosis, and perineal urethrostomy, was admitted on 6/5/2024 from [facility] due to behavioral changes and a suspected urinary tract infection (UTI). It was reported by his facility that he had been experiencing increasing paranoia over the past month, which sometimes correlates with UTIs. The patient had not been taking his prescribed medications due to paranoia about them being poisoned.      Hospital Course:        Acute Paranoia, Schizophrenia, and Bipolar Disorder: The patient presented with significant paranoia and behavioral changes. He required  droperidol, lorazepam (Ativan), and olanzapine (Zyprexa) in the Emergency Department for acute management of his symptoms. Home medications were continued: buspirone 30 mg PO BID, olanzapine 10 mg PO BID and 20 mg at bedtime, benztropine 2 mg at bedtime, and hydroxyzine 10 mg QID PRN for anxiety. A sitter was discontinued on 6/10/2024. A repeat EKG showed a normal QTc interval. The patient tolerated oral haloperidol (Haldol) well. Of note, he does better with female providers and staff and reassurance.  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 Hydroxyzine 10 mg q6hrs PRN anxiety is also available. Benzotripine was decreased to 1 mg at HS.  Scheduled medications also include olanzapine 10 mg at HS and 10 mg BID during the day, buspirone 30 mg BID.     Urinary Tract Infection: The patient was treated empirically with Levaquin from 6/5/2024 to 6/15/2024 for a suspected UTI. He has a history of Stenotrophomonas maltophilia infection. It was discovered that he had been hiding his medications due to his paranoia. No recent cultures were available, but a urinalysis was performed during this admission, 5/23/24 and demonstrated large LE but and blood but negative nitrites.    History of Ureteral Obstruction and Hydronephrosis: The patient has a history of ureteral obstruction, managed with a stent, and left hydronephrosis. His last stent exchange was in March 2024, with the next exchange due on 8/13/2024. The [facility]rology group was consulted, and they recommended serial creatinine measurements and bladder scans. The patient was advised to continue taking Flomax. Of note, he has a hypospadias in the event that he needs catheterization.  No issues during the course of hospitalization.        Invasive Bladder Cancer: The  patient is under the care of [facility] for invasive bladder cancer, managed with bladder-sparing trimodal therapy. He has a chronic indwelling ureteral stent. The next stent exchange is scheduled for August 2024.      Chronic Back Pain: The patient has chronic back pain managed with naproxen at home. Tramadol was prescribed as needed for pain.        Discharge Medications: As noted below.       Follow-Up:    Psychiatry: Follow up with the patient's psychiatrist to monitor and adjust medications as needed.    Urology: Follow up at [facility] for stent exchange on 8/13/2024 and continue serial creatinine and bladder scans as recommended.    Primary Care: Regular follow-up with primary care physician for ongoing management of chronic conditions and medication compliance.      Discharge Instructions:    Educate the patient and caregivers about the importance of medication adherence and strategies to manage paranoia related to medication.    Monitor for signs and symptoms of UTI and behavioral changes.    Ensure the patient follows up with all scheduled appointments and adheres to prescribed medications.    Contact healthcare providers if there are any concerns or worsening symptoms.      Condition at Discharge: Stable      Discharge to: TCU    Consultations This Hospital Stay   DIAGNOSTIC EVALUATION CENTER (DEC) ASSESSMENT ORDER  PHARMACY IP CONSULT  CARE MANAGEMENT / SOCIAL WORK IP CONSULT  PSYCHIATRY IP CONSULT  PSYCHIATRY IP CONSULT  PHYSICAL THERAPY ADULT IP CONSULT  PHYSICAL THERAPY ADULT IP CONSULT  OCCUPATIONAL THERAPY ADULT IP CONSULT    Code Status   Full Code    Time Spent on this Encounter   I, ECHO Guaman CNP, personally saw the patient today and spent greater than 30 minutes discharging this patient.       ECHO Guaman CNP  [facility] OBSERVATION DEPT  [facility] [address]  Phone:  [phone]  ______________________________________________________________________    Physical Exam   Vital Signs: Temp: 97.4  F (36.3  C) Temp src: Oral BP: 124/66 Pulse: 75   Resp: 15 SpO2: 97 % O2 Device: None (Room air)    Weight: 163 lbs 12.83 oz    Constitutional: awake, alert, cooperative, no apparent distress, and appears stated age  Respiratory: No increased work of breathing, good air exchange, clear to auscultation bilaterally, no crackles or wheezing  Cardiovascular: Normal apical impulse, regular rate and rhythm, normal S1 and S2, no S3 or S4, and no murmur noted          Primary Care Physician   [facility]    Discharge Orders      General info for SNF    Length of Stay Estimate: Short Term Care: Estimated # of Days <30  Condition at Discharge: Stable  Level of care:skilled   Rehabilitation Potential: Fair  Admission H&P remains valid and up-to-date: Yes  Recent Chemotherapy: N/A  Use Nursing Home Standing Orders: Yes     Mantoux instructions    Give two-step Mantoux (PPD) Per Facility Policy Yes     Follow Up and recommended labs and tests    Follow up with Nursing home physician.  No follow up labs or test are needed.  Follow up with primary care provider in 3-4 weeks.  The following labs/tests are recommended: BMP and CBC.  Follow up with [facility] as scheduled.  .     Reason for your hospital stay    Schizophrenia  Bipolar disorder  UTI  Bladder cancer     Activity - Up with nursing assistance     Full Code     Physical Therapy Adult Consult    Evaluate and treat as clinically indicated.    Reason:  failure to thrive.  Weakness     Occupational Therapy Adult Consult    Evaluate and treat as clinically indicated.    Reason:  Failure to thrive.  Weakness.     Fall precautions     Diet    Follow this diet upon discharge: Orders Placed This Encounter      Mechanical/Dental Soft Diet       Significant Results and Procedures   Most Recent 3 CBC's:  Recent Labs   Lab Test  06/17/24  1145 06/14/24  0815 06/10/24  0612   WBC 6.0 7.6 6.1   HGB 11.5* 11.7* 11.4*   MCV 91 89 91    263 260     Most Recent 3 BMP's:  Recent Labs   Lab Test 06/17/24  1145 06/14/24  0815 06/10/24  0612    137 144   POTASSIUM 4.4 4.7 4.1   CHLORIDE 101 101 111*   CO2 22 24 25   BUN 32.0* 24.6* 15.3   CR 1.11 1.02 0.94   ANIONGAP 13 12 8   STEPHY 9.0 9.3 9.2   GLC 72 89 97     Most Recent 2 LFT's:  Recent Labs   Lab Test 06/17/24  1145 06/14/24  0815 06/05/24  1416   AST 20  --  23   ALT 27 48 25   ALKPHOS 83 80 96   BILITOTAL 0.2 0.2 0.2     7-Day Micro Results       No results found for the last 168 hours.          Most Recent TSH and T4:  Recent Labs   Lab Test 04/25/24  0746   TSH 1.71   T4 1.33     Most Recent 6 glucoses:  Recent Labs   Lab Test 06/17/24  1145 06/14/24  0815 06/10/24  0612 06/08/24  0532 06/06/24  0610 06/06/24  0028   GLC 72 89 97 84 105* 90     Most Recent Urinalysis:  Recent Labs   Lab Test 05/23/24  1500 02/16/23  0900 06/11/21  2000   COLOR Orange*   < > Light Yellow   APPEARANCE Cloudy*   < > Clear   URINEGLC Negative   < > Negative   URINEBILI Negative   < > Negative   URINEKETONE Negative   < > Negative   SG 1.008   < > 1.005   UBLD Large*   < > 0.1 mg/dL*   URINEPH 7.5*   < > 7.0   PROTEIN 50*   < > Negative   UROBILINOGEN  --   --  <2.0 mg/dL   NITRITE Negative   < > Negative   LEUKEST Large*   < > 250 Michelle/uL*   RBCU 22*   < > 3*   WBCU >182*   < > 31*    < > = values in this interval not displayed.   ,   Results for orders placed or performed during the hospital encounter of 06/05/24   CT Abdomen Pelvis w/o Contrast    Narrative    EXAM: CT ABDOMEN PELVIS W/O CONTRAST  LOCATION: [facility]  DATE: 6/5/2024    INDICATION: History of recurrent ureteral stent issues. Stopped taking antibiotics. Evaluate for stent issue vs infection.  COMPARISON: None.  TECHNIQUE: CT scan of the abdomen and pelvis was performed without IV contrast. Multiplanar reformats  were obtained. Dose reduction techniques were used.  CONTRAST: None.    FINDINGS:   LOWER CHEST: Normal.    HEPATOBILIARY: 7 mm low-dense lesion involves the left lobe of the liver which is too small to characterize but likely a cyst or hemangioma.    PANCREAS: Fatty infiltration of the pancreas.    SPLEEN: Normal.    ADRENAL GLANDS: Normal.    KIDNEYS/BLADDER: Left-sided double-J ureteral stents in place, in appropriate position. There is moderate to severe bilateral hydroureteronephrosis without obstructing stone. The left ureter is dilated to the distal left ureter and the right ureter is   dilated to the ureterovesical orifice. There is some atrophy and cortical scarring involving the left kidney.    BOWEL: Moderate amount of stool is present throughout the colon. No bowel obstruction or bowel inflammation. There is no evidence of appendicitis.    LYMPH NODES: Normal.    VASCULATURE: Normal caliber aorta with diffuse atherosclerotic calcification.    PELVIC ORGANS: Normal.    MUSCULOSKELETAL: No suspicious osseous lesions.      Impression    IMPRESSION:   1.  Severe bilateral hydroureteronephrosis with an indwelling appropriately positioned double-J ureteral stent on the left. The left ureter remains markedly dilated to the distal left ureter. Left-sided hydronephrosis and hydroureter could be due to a   renal stricture. The right ureter remains dilated to the ureterovesical orifice. There are no urinary tract stones.  2.  Left renal atrophy with left cortical renal scarring.         Discharge Medications   Current Discharge Medication List        START taking these medications    Details   haloperidol (HALDOL) 2 MG tablet Take 1 tablet (2 mg) by mouth 3 times daily as needed for agitation    Associated Diagnoses: Carol (H)      hydrALAZINE (APRESOLINE) 10 MG tablet Take 1 tablet (10 mg) by mouth every 4 hours as needed for high blood pressure (for systolic BP greater than 180 mmHg)    Associated Diagnoses:  Carol (H)      hydrOXYzine HCl (ATARAX) 10 MG tablet Take 1 tablet (10 mg) by mouth every 6 hours as needed for itching or anxiety    Associated Diagnoses: Carol (H)      ibuprofen (ADVIL/MOTRIN) 400 MG tablet Take 1 tablet (400 mg) by mouth every 6 hours as needed for inflammatory pain    Associated Diagnoses: Carol (H)      LORazepam (ATIVAN) 0.5 MG tablet Take 1 tablet (0.5 mg) by mouth every 4 hours as needed for muscle spasms, nausea or pain  Qty: 15 tablet, Refills: 0    Associated Diagnoses: Carol (H)      melatonin 1 MG TABS tablet Take 1 tablet (1 mg) by mouth nightly as needed for sleep    Associated Diagnoses: Carol (H)      menthol (ICY HOT) 5 % PTCH Apply 1 patch topically every 8 hours as needed for muscle soreness    Associated Diagnoses: Carol (H)      ondansetron (ZOFRAN ODT) 4 MG ODT tab Take 1 tablet (4 mg) by mouth every 6 hours as needed for nausea or vomiting    Associated Diagnoses: Carol (H)      polyethylene glycol (MIRALAX) 17 GM/Dose powder Take 17 g by mouth daily    Associated Diagnoses: Carol (H)      traMADol 25 MG TABS tablet Take 1 tablet (25 mg) by mouth every 6 hours as needed for moderate pain  Qty: 10 tablet, Refills: 0    Associated Diagnoses: Carol (H)           CONTINUE these medications which have CHANGED    Details   benztropine (COGENTIN) 1 MG tablet Take 1 tablet (1 mg) by mouth at bedtime    Comments: Dose reduced from 2mg to 1mg HS.  Associated Diagnoses: Carol (H)      !! OLANZapine (ZYPREXA) 10 MG tablet Take 1 tablet (10 mg) by mouth at bedtime    Associated Diagnoses: Carol (H)       !! - Potential duplicate medications found. Please discuss with provider.        CONTINUE these medications which have NOT CHANGED    Details   atorvastatin (LIPITOR) 40 MG tablet Take 40 mg by mouth daily      busPIRone HCl (BUSPAR) 30 MG tablet Take 30 mg by mouth 2 times daily      docusate sodium (COLACE) 100 MG capsule Take 100 mg by mouth 2 times daily      fluticasone (FLONASE) 50  MCG/ACT nasal spray Spray 1 spray into both nostrils daily      multivitamin w/minerals (THERA-VIT-M) tablet Take 1 tablet by mouth daily      !! OLANZapine (ZYPREXA) 10 MG tablet Take 10 mg by mouth 2 times daily      sennosides (SENOKOT) 8.6 MG tablet Take 1 tablet by mouth 2 times daily      tamsulosin (FLOMAX) 0.4 MG capsule Take 0.4 mg by mouth daily       !! - Potential duplicate medications found. Please discuss with provider.        STOP taking these medications       levofloxacin (LEVAQUIN) 500 MG tablet Comments:   Reason for Stopping:         naproxen (NAPROSYN) 250 MG tablet Comments:   Reason for Stopping:             Allergies   Allergies   Allergen Reactions    Lactose Anxiety     Lactose intolerance    Aspirin     Cucumber Extract Unknown     pickles    Diphenhydramine Other (See Comments)     Lock jaw. Could not open mouth    Pollen Extract Other (See Comments)     Sneezing and congestion    Acetaminophen Rash     Fixed drug reaction

## 2024-06-18 NOTE — PLAN OF CARE
"Shift from 3753-1232     Inpatient Progress Note:  For complete assessment see flow sheet documentation.     Orientation: AO x3 disoriented to situation  Pain status: Pt noted mild pain to low back  Activity: A1-2 with walker out of bed  Resp: WDL  Cardiac: WDL  GI: Incontinent  : Incontinent  LDA: No PIV in place  Infusions: SL  Diet: Mechanical soft diet  Safety: bed alarm on, call light within reach, chair alarm in place  Consults: PT  Discharge Plan: TCU when Community Health assessment complete     Goal Outcome Evaluation:      Plan of Care Reviewed With: patient    Overall Patient Progress: no changeOverall Patient Progress: no change    Outcome Evaluation: Pending Community Health assessment    Problem: Adult Inpatient Plan of Care  Goal: Plan of Care Review  Description: The Plan of Care Review/Shift note should be completed every shift.  The Outcome Evaluation is a brief statement about your assessment that the patient is improving, declining, or no change.  This information will be displayed automatically on your shift  note.  Outcome: Progressing  Flowsheets (Taken 6/17/2024 2000)  Outcome Evaluation: Pending Community Health assessment  Plan of Care Reviewed With: patient  Overall Patient Progress: no change  Goal: Patient-Specific Goal (Individualized)  Description: You can add care plan individualizations to a care plan. Examples of Individualization might be:  \"Parent requests to be called daily at 9am for status\", \"I have a hard time hearing out of my right ear\", or \"Do not touch me to wake me up as it startles  me\".  Outcome: Progressing  Goal: Absence of Hospital-Acquired Illness or Injury  Outcome: Progressing  Intervention: Identify and Manage Fall Risk  Recent Flowsheet Documentation  Taken 6/17/2024 1638 by Sera Reyez RN  Safety Promotion/Fall Prevention:   activity supervised   assistive device/personal items within reach   clutter free environment maintained   increased rounding and observation   lighting " adjusted   mobility aid in reach   nonskid shoes/slippers when out of bed   safety round/check completed  Intervention: Prevent Skin Injury  Recent Flowsheet Documentation  Taken 6/17/2024 1638 by Sera Reyez RN  Body Position: position maintained  Intervention: Prevent and Manage VTE (Venous Thromboembolism) Risk  Recent Flowsheet Documentation  Taken 6/17/2024 1638 by Sera Reyez, RN  VTE Prevention/Management: SCDs (sequential compression devices) off  Goal: Optimal Comfort and Wellbeing  Outcome: Progressing  Intervention: Monitor Pain and Promote Comfort  Recent Flowsheet Documentation  Taken 6/17/2024 1638 by Sera Reyez, RN  Pain Management Interventions: declines  Goal: Readiness for Transition of Care  Outcome: Progressing     Problem: Urinary Diversion  Goal: Effective Urinary Elimination  Outcome: Progressing     Problem: Comorbidity Management  Goal: Maintenance of Behavioral Health Symptom Control  Outcome: Progressing

## 2024-06-18 NOTE — PLAN OF CARE
"2034-0211    Inpatient Progress Note:     /72 (BP Location: Left arm)   Pulse 80   Temp 97.5  F (36.4  C) (Oral)   Resp 16   Wt 74.3 kg (163 lb 12.8 oz)   SpO2 98%   BMI 24.19 kg/m         Orientation: A&Ox2, disoriented to time and situation. Comes from [place]. Hx of BP, schizophrenia.  Pain status: Pt complains of 4/10 pain in back, Advil given.   Activity: Ax1 W GB   Resp: WDL, denies SOB  Cardiac: WDL, denies chest pain  :  X, incontinent of bladder  Skin: Mepilex of sacral.  LDA: No IV  [facility]  Consults: SW following  Discharge Plan: Plan to discharge to [facility] when county assessment completed. Possible discharge today,     Will continue to monitor and provide cares.     Estefania Herrera RN       Problem: Adult Inpatient Plan of Care  Goal: Plan of Care Review  Description: The Plan of Care Review/Shift note should be completed every shift.  The Outcome Evaluation is a brief statement about your assessment that the patient is improving, declining, or no change.  This information will be displayed automatically on your shift  note.  Outcome: Progressing  Flowsheets (Taken 6/18/2024 0335)  [facility]  Plan of Care Reviewed With: patient  Overall Patient Progress: improving  Goal: Patient-Specific Goal (Individualized)  Description: You can add care plan individualizations to a care plan. Examples of Individualization might be:  \"Parent requests to be called daily at 9am for status\", \"I have a hard time hearing out of my right ear\", or \"Do not touch me to wake me up as it startles  me\".  Outcome: Progressing  Goal: Absence of Hospital-Acquired Illness or Injury  Outcome: Progressing  Goal: Optimal Comfort and Wellbeing  Outcome: Progressing  Goal: Readiness for Transition of Care  Outcome: Progressing     Problem: Urinary Diversion  Goal: Effective Urinary Elimination  Outcome: Progressing     Problem: Comorbidity Management  Goal: Maintenance of " Behavioral Health Symptom Control  Outcome: Progressing   Goal Outcome Evaluation:      Plan of Care Reviewed With: patient    Overall Patient Progress: improvingOverall Patient Progress: improving    Outcome Evaluation: HIRAM TORRES

## 2024-06-18 NOTE — PROGRESS NOTES
Care Management Discharge Note    Discharge Date: 06/18/2024     Discharge Disposition:  [facility]    Discharge Services: PT/OT     Discharge DME:      Discharge Transportation: [facility]     Private pay costs discussed: transportation costs    PAS Confirmation Code:  [id number]     Education Provided on the Discharge Plan:  yes  Persons Notified of Discharge Plans: pt, nursing, NP, TCU admissions Sarita  Patient/Family in Agreement with the Plan:  yes    Handoff Referral Completed: No    Additional Information:  HIRAM following for discharge planning.     Updated by Do with [facility] that pt's [place] level 2 screening has been completed and a copy was faxed to the TCU.     Spoke with Sarita in admissions at [facility] who confirmed she received the assessment and they are able to accept pt today.     HIRAM arranged [facility] w/c ride for today between [id number].     Updated pt, nursing, and Sarita.     Signed discharge orders faxed to TCU.     AUGUSTINE Felix, LSW  Care Coordination  [phone]    AUGUSTINE Sawyer

## 2024-06-20 NOTE — PROGRESS NOTES
SW received VM from preadmission screening intake @ [facility], [phone], wanting to confirm patient's discharge date from [facility]. Returned call and left VM

## 2024-07-10 ENCOUNTER — LAB REQUISITION (OUTPATIENT)
Dept: LAB | Facility: CLINIC | Age: 69
End: 2024-07-10
Payer: COMMERCIAL

## 2024-07-10 DIAGNOSIS — N13.30 UNSPECIFIED HYDRONEPHROSIS: ICD-10-CM

## 2024-07-10 LAB
ALBUMIN UR-MCNC: 10 MG/DL
APPEARANCE UR: ABNORMAL
BACTERIA #/AREA URNS HPF: ABNORMAL /HPF
BILIRUB UR QL STRIP: NEGATIVE
COLOR UR AUTO: ABNORMAL
GLUCOSE UR STRIP-MCNC: NEGATIVE MG/DL
HGB UR QL STRIP: ABNORMAL
KETONES UR STRIP-MCNC: NEGATIVE MG/DL
LEUKOCYTE ESTERASE UR QL STRIP: ABNORMAL
NITRATE UR QL: NEGATIVE
PH UR STRIP: 7 [PH] (ref 5–7)
RBC URINE: 1 /HPF
SP GR UR STRIP: 1 (ref 1–1.03)
SQUAMOUS EPITHELIAL: <1 /HPF
UROBILINOGEN UR STRIP-MCNC: NORMAL MG/DL
WBC CLUMPS #/AREA URNS HPF: PRESENT /HPF
WBC URINE: >182 /HPF

## 2024-07-10 PROCEDURE — 87086 URINE CULTURE/COLONY COUNT: CPT | Mod: ORL | Performed by: UROLOGY

## 2024-07-10 PROCEDURE — 81001 URINALYSIS AUTO W/SCOPE: CPT | Mod: ORL | Performed by: UROLOGY

## 2024-07-12 LAB — BACTERIA UR CULT: NORMAL

## 2024-07-16 PROCEDURE — 81001 URINALYSIS AUTO W/SCOPE: CPT | Mod: ORL | Performed by: UROLOGY

## 2024-07-17 ENCOUNTER — LAB REQUISITION (OUTPATIENT)
Dept: LAB | Facility: CLINIC | Age: 69
End: 2024-07-17
Payer: COMMERCIAL

## 2024-07-17 DIAGNOSIS — Z87.440 PERSONAL HISTORY OF URINARY (TRACT) INFECTIONS: ICD-10-CM

## 2024-07-17 LAB
ALBUMIN UR-MCNC: NEGATIVE MG/DL
APPEARANCE UR: ABNORMAL
BILIRUB UR QL STRIP: NEGATIVE
COLOR UR AUTO: ABNORMAL
GLUCOSE UR STRIP-MCNC: NEGATIVE MG/DL
HGB UR QL STRIP: ABNORMAL
KETONES UR STRIP-MCNC: NEGATIVE MG/DL
LEUKOCYTE ESTERASE UR QL STRIP: ABNORMAL
NITRATE UR QL: NEGATIVE
PH UR STRIP: 7.5 [PH] (ref 5–7)
RBC URINE: <1 /HPF
SP GR UR STRIP: 1 (ref 1–1.03)
SQUAMOUS EPITHELIAL: <1 /HPF
TRANSITIONAL EPI: <1 /HPF
UROBILINOGEN UR STRIP-MCNC: NORMAL MG/DL
WBC URINE: 69 /HPF

## 2024-07-18 ENCOUNTER — LAB REQUISITION (OUTPATIENT)
Dept: LAB | Facility: CLINIC | Age: 69
End: 2024-07-18
Payer: COMMERCIAL

## 2024-07-18 DIAGNOSIS — Z87.440 PERSONAL HISTORY OF URINARY (TRACT) INFECTIONS: ICD-10-CM

## 2024-07-18 LAB
ALBUMIN UR-MCNC: 10 MG/DL
APPEARANCE UR: ABNORMAL
BACTERIA #/AREA URNS HPF: ABNORMAL /HPF
BILIRUB UR QL STRIP: NEGATIVE
COLOR UR AUTO: ABNORMAL
GLUCOSE UR STRIP-MCNC: NEGATIVE MG/DL
HGB UR QL STRIP: ABNORMAL
KETONES UR STRIP-MCNC: NEGATIVE MG/DL
LEUKOCYTE ESTERASE UR QL STRIP: ABNORMAL
MUCOUS THREADS #/AREA URNS LPF: PRESENT /LPF
NITRATE UR QL: NEGATIVE
PH UR STRIP: 7.5 [PH] (ref 5–7)
RBC URINE: 6 /HPF
SP GR UR STRIP: 1 (ref 1–1.03)
UROBILINOGEN UR STRIP-MCNC: NORMAL MG/DL
WBC CLUMPS #/AREA URNS HPF: PRESENT /HPF
WBC URINE: >182 /HPF

## 2024-07-18 PROCEDURE — 87086 URINE CULTURE/COLONY COUNT: CPT | Mod: ORL | Performed by: UROLOGY

## 2024-07-18 PROCEDURE — 81001 URINALYSIS AUTO W/SCOPE: CPT | Mod: ORL | Performed by: UROLOGY

## 2024-07-20 LAB — BACTERIA UR CULT: NORMAL

## 2024-09-11 ENCOUNTER — LAB REQUISITION (OUTPATIENT)
Dept: LAB | Facility: CLINIC | Age: 69
End: 2024-09-11
Payer: COMMERCIAL

## 2024-09-11 DIAGNOSIS — R30.9 PAINFUL MICTURITION, UNSPECIFIED: ICD-10-CM

## 2024-09-11 DIAGNOSIS — N39.0 URINARY TRACT INFECTION, SITE NOT SPECIFIED: ICD-10-CM

## 2024-09-12 ENCOUNTER — LAB REQUISITION (OUTPATIENT)
Dept: LAB | Facility: CLINIC | Age: 69
End: 2024-09-12
Payer: COMMERCIAL

## 2024-09-12 DIAGNOSIS — N39.0 URINARY TRACT INFECTION, SITE NOT SPECIFIED: ICD-10-CM

## 2024-09-12 DIAGNOSIS — R30.9 PAINFUL MICTURITION, UNSPECIFIED: ICD-10-CM

## 2024-09-12 LAB
ALBUMIN UR-MCNC: 30 MG/DL
APPEARANCE UR: ABNORMAL
BACTERIA #/AREA URNS HPF: ABNORMAL /HPF
BILIRUB UR QL STRIP: NEGATIVE
COLOR UR AUTO: YELLOW
GLUCOSE UR STRIP-MCNC: NEGATIVE MG/DL
HGB UR QL STRIP: ABNORMAL
KETONES UR STRIP-MCNC: NEGATIVE MG/DL
LEUKOCYTE ESTERASE UR QL STRIP: ABNORMAL
NITRATE UR QL: POSITIVE
PH UR STRIP: 7.5 [PH] (ref 5–7)
RBC URINE: 37 /HPF
SP GR UR STRIP: 1.01 (ref 1–1.03)
UROBILINOGEN UR STRIP-MCNC: NORMAL MG/DL
WBC CLUMPS #/AREA URNS HPF: PRESENT /HPF
WBC URINE: >182 /HPF

## 2024-09-12 PROCEDURE — 81001 URINALYSIS AUTO W/SCOPE: CPT | Mod: ORL | Performed by: PHYSICIAN ASSISTANT

## 2024-09-12 PROCEDURE — 87086 URINE CULTURE/COLONY COUNT: CPT | Mod: ORL | Performed by: PHYSICIAN ASSISTANT

## 2024-09-12 PROCEDURE — 87186 SC STD MICRODIL/AGAR DIL: CPT | Mod: ORL | Performed by: PHYSICIAN ASSISTANT

## 2024-09-14 LAB — BACTERIA UR CULT: ABNORMAL

## 2024-11-06 ENCOUNTER — LAB REQUISITION (OUTPATIENT)
Dept: LAB | Facility: CLINIC | Age: 69
End: 2024-11-06
Payer: COMMERCIAL

## 2024-11-06 DIAGNOSIS — R30.0 DYSURIA: ICD-10-CM

## 2024-11-06 LAB
ALBUMIN UR-MCNC: NEGATIVE MG/DL
APPEARANCE UR: CLEAR
BACTERIA #/AREA URNS HPF: ABNORMAL /HPF
BILIRUB UR QL STRIP: NEGATIVE
COLOR UR AUTO: ABNORMAL
GLUCOSE UR STRIP-MCNC: NEGATIVE MG/DL
HGB UR QL STRIP: ABNORMAL
KETONES UR STRIP-MCNC: NEGATIVE MG/DL
LEUKOCYTE ESTERASE UR QL STRIP: ABNORMAL
NITRATE UR QL: NEGATIVE
PH UR STRIP: 7.5 [PH] (ref 5–7)
RBC URINE: 3 /HPF
SP GR UR STRIP: 1 (ref 1–1.03)
UROBILINOGEN UR STRIP-MCNC: NORMAL MG/DL
WBC URINE: 150 /HPF

## 2024-11-06 PROCEDURE — 87086 URINE CULTURE/COLONY COUNT: CPT | Mod: ORL | Performed by: PHYSICIAN ASSISTANT

## 2024-11-06 PROCEDURE — 81001 URINALYSIS AUTO W/SCOPE: CPT | Mod: ORL | Performed by: PHYSICIAN ASSISTANT

## 2024-11-08 LAB — BACTERIA UR CULT: NORMAL

## 2024-12-19 ENCOUNTER — HOSPITAL ENCOUNTER (EMERGENCY)
Facility: CLINIC | Age: 69
End: 2024-12-19
Attending: SOCIAL WORKER
Payer: COMMERCIAL

## 2024-12-19 ENCOUNTER — APPOINTMENT (OUTPATIENT)
Dept: CT IMAGING | Facility: CLINIC | Age: 69
End: 2024-12-19
Attending: SOCIAL WORKER
Payer: COMMERCIAL

## 2024-12-19 ENCOUNTER — APPOINTMENT (OUTPATIENT)
Dept: GENERAL RADIOLOGY | Facility: CLINIC | Age: 69
End: 2024-12-19
Attending: SOCIAL WORKER
Payer: COMMERCIAL
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
## 2024-12-19 DIAGNOSIS — M25.551 RIGHT HIP PAIN: ICD-10-CM

## 2024-12-19 LAB
ANION GAP SERPL CALCULATED.3IONS-SCNC: 15 MMOL/L (ref 7–15)
ATRIAL RATE - MUSE: 82 BPM
BASOPHILS # BLD AUTO: 0 10E3/UL (ref 0–0.2)
BASOPHILS NFR BLD AUTO: 0 %
BUN SERPL-MCNC: 25.4 MG/DL (ref 8–23)
CALCIUM SERPL-MCNC: 9.6 MG/DL (ref 8.8–10.4)
CHLORIDE SERPL-SCNC: 106 MMOL/L (ref 98–107)
CREAT SERPL-MCNC: 0.99 MG/DL (ref 0.67–1.17)
DIASTOLIC BLOOD PRESSURE - MUSE: NORMAL MMHG
EGFRCR SERPLBLD CKD-EPI 2021: 83 ML/MIN/1.73M2
EOSINOPHIL # BLD AUTO: 0.1 10E3/UL (ref 0–0.7)
EOSINOPHIL NFR BLD AUTO: 2 %
ERYTHROCYTE [DISTWIDTH] IN BLOOD BY AUTOMATED COUNT: 15 % (ref 10–15)
GLUCOSE SERPL-MCNC: 101 MG/DL (ref 70–99)
HCO3 SERPL-SCNC: 22 MMOL/L (ref 22–29)
HCT VFR BLD AUTO: 34.3 % (ref 40–53)
HGB BLD-MCNC: 11.5 G/DL (ref 13.3–17.7)
IMM GRANULOCYTES # BLD: 0.1 10E3/UL
IMM GRANULOCYTES NFR BLD: 1 %
INTERPRETATION ECG - MUSE: NORMAL
LYMPHOCYTES # BLD AUTO: 1.1 10E3/UL (ref 0.8–5.3)
LYMPHOCYTES NFR BLD AUTO: 16 %
MCH RBC QN AUTO: 29 PG (ref 26.5–33)
MCHC RBC AUTO-ENTMCNC: 33.5 G/DL (ref 31.5–36.5)
MCV RBC AUTO: 87 FL (ref 78–100)
MONOCYTES # BLD AUTO: 0.6 10E3/UL (ref 0–1.3)
MONOCYTES NFR BLD AUTO: 9 %
NEUTROPHILS # BLD AUTO: 5.2 10E3/UL (ref 1.6–8.3)
NEUTROPHILS NFR BLD AUTO: 74 %
NRBC # BLD AUTO: 0 10E3/UL
NRBC BLD AUTO-RTO: 0 /100
P AXIS - MUSE: 52 DEGREES
PLAT MORPH BLD: NORMAL
PLATELET # BLD AUTO: 185 10E3/UL (ref 150–450)
POTASSIUM SERPL-SCNC: 4 MMOL/L (ref 3.4–5.3)
PR INTERVAL - MUSE: 158 MS
QRS DURATION - MUSE: 114 MS
QT - MUSE: 418 MS
QTC - MUSE: 488 MS
R AXIS - MUSE: 58 DEGREES
RBC # BLD AUTO: 3.96 10E6/UL (ref 4.4–5.9)
RBC MORPH BLD: NORMAL
SODIUM SERPL-SCNC: 143 MMOL/L (ref 135–145)
SYSTOLIC BLOOD PRESSURE - MUSE: NORMAL MMHG
T AXIS - MUSE: 44 DEGREES
VENTRICULAR RATE- MUSE: 82 BPM
WBC # BLD AUTO: 7.1 10E3/UL (ref 4–11)

## 2024-12-19 PROCEDURE — 85004 AUTOMATED DIFF WBC COUNT: CPT | Performed by: SOCIAL WORKER

## 2024-12-19 PROCEDURE — 70450 CT HEAD/BRAIN W/O DYE: CPT

## 2024-12-19 PROCEDURE — 73502 X-RAY EXAM HIP UNI 2-3 VIEWS: CPT

## 2024-12-19 PROCEDURE — 96360 HYDRATION IV INFUSION INIT: CPT | Mod: 59

## 2024-12-19 PROCEDURE — 258N000003 HC RX IP 258 OP 636: Performed by: EMERGENCY MEDICINE

## 2024-12-19 PROCEDURE — 80048 BASIC METABOLIC PNL TOTAL CA: CPT | Performed by: SOCIAL WORKER

## 2024-12-19 PROCEDURE — 70496 CT ANGIOGRAPHY HEAD: CPT

## 2024-12-19 PROCEDURE — 99285 EMERGENCY DEPT VISIT HI MDM: CPT | Mod: 25

## 2024-12-19 PROCEDURE — 85041 AUTOMATED RBC COUNT: CPT | Performed by: SOCIAL WORKER

## 2024-12-19 PROCEDURE — 250N000011 HC RX IP 250 OP 636: Performed by: SOCIAL WORKER

## 2024-12-19 PROCEDURE — 36415 COLL VENOUS BLD VENIPUNCTURE: CPT | Performed by: SOCIAL WORKER

## 2024-12-19 RX ORDER — IOPAMIDOL 755 MG/ML
500 INJECTION, SOLUTION INTRAVASCULAR ONCE
Status: COMPLETED | OUTPATIENT
Start: 2024-12-19 | End: 2024-12-19

## 2024-12-19 RX ADMIN — SODIUM CHLORIDE 1000 ML: 9 INJECTION, SOLUTION INTRAVENOUS at 21:36

## 2024-12-19 RX ADMIN — IOPAMIDOL 66 ML: 755 INJECTION, SOLUTION INTRAVENOUS at 17:10

## 2024-12-19 ASSESSMENT — ACTIVITIES OF DAILY LIVING (ADL)
ADLS_ACUITY_SCORE: 62
ADLS_ACUITY_SCORE: 62
ADLS_ACUITY_SCORE: 58
ADLS_ACUITY_SCORE: 62
ADLS_ACUITY_SCORE: 62
ADLS_ACUITY_SCORE: 58
ADLS_ACUITY_SCORE: 58
ADLS_ACUITY_SCORE: 62
ADLS_ACUITY_SCORE: 62
ADLS_ACUITY_SCORE: 58

## 2024-12-19 ASSESSMENT — COLUMBIA-SUICIDE SEVERITY RATING SCALE - C-SSRS
2. HAVE YOU ACTUALLY HAD ANY THOUGHTS OF KILLING YOURSELF IN THE PAST MONTH?: NO
1. IN THE PAST MONTH, HAVE YOU WISHED YOU WERE DEAD OR WISHED YOU COULD GO TO SLEEP AND NOT WAKE UP?: NO
6. HAVE YOU EVER DONE ANYTHING, STARTED TO DO ANYTHING, OR PREPARED TO DO ANYTHING TO END YOUR LIFE?: NO

## 2024-12-19 NOTE — ED PROVIDER NOTES
"  Emergency Department Note      History of Present Illness     Chief Complaint   Fall      HPI   Harpreet Mcelroy is a 69 year old male with history of hyperlipidemia, schizophrenia, borderline personality disorder, anxiety, carcinoma of bladder, ureteral stent in place of L kidney,  who presents to the ED via EMS for evaluation of a reported fall. The patient reports that he took a fall overnight after tripping over a wheelchair. He endorses right-sided hip pain. History is significantly limited due to the patient's speech.  He is intermittently intelligible and does follow commands.  He states that normally he walks with a cane or wheelchair.    Independent Historian   LUCAS William at facility: Please see ED course     Review of External Notes   I reviewed the office visit from 11/6/2024: \" D: LUCAS William is calling back again from the patients assisted living. She states that they faxed over the UC results again and she is checking the status of the antibiotic.   She states that the patient is agitated, incontinent x3 today and has pain with urination and generalized body pain. A: Contacted LUCAS Villaseñor from [facility] and she states that they have not received a fax with the results. She states that their fax machine is working but she will check it again.   R: Nataliia states that their fax machine and been having faxes going in and out also and they received a confirmation when they sent the fax. She will resend once more. FAX number verified. \"    Past Medical History     Medical History and Problem List   Anxiety  Bipolar 1 disorder  Carcinoma of bladder  Depressive disorder  CAD  Iron deficiency anemia  Hyperlipidemia    Medications   Atorvastatin  Cogentin  Buspar  Colace  Haloperidol  Hydralazine  Hydroxyzine  Lorazepam  Senokot  Tamsulosin  Tramadol   Zyprexa  Oxybutynin  Naproxen  Lamictal    Surgical History   The patient has no pertinent past surgical history.     Physical Exam     Patient Vitals for " the past 24 hrs:   BP Temp Temp src Pulse Resp SpO2   12/19/24 1704 137/77 -- -- -- -- --   12/19/24 1649 (!) 154/83 -- -- -- -- --   12/19/24 1634 (!) 157/86 -- -- -- -- --   12/19/24 1622 (!) 155/81 -- -- -- -- --   12/19/24 1607 (!) 168/87 -- -- -- -- --   12/19/24 1552 (!) 153/75 -- -- -- -- --   12/19/24 1537 (!) 147/87 -- -- -- -- --   12/19/24 1516 (!) 158/80 -- -- 85 -- --   12/19/24 1341 (!) 159/88 -- -- -- -- --   12/19/24 1326 (!) 165/86 -- -- -- -- --   12/19/24 1311 (!) 162/91 -- -- -- -- 100 %   12/19/24 1256 (!) 168/85 -- -- -- -- 100 %   12/19/24 1241 (!) 153/91 -- -- -- -- 100 %   12/19/24 1226 (!) 169/92 -- -- -- -- --   12/19/24 0903 (!) 177/89 98  F (36.7  C) Temporal 82 20 100 %     Physical Exam   General: Overall stable and nontoxic appearing  HEENT: Conjunctiva clear. PERRL.  Dry dark appearing fluids at the corner of the mouth and on his shirt.  No obvious signs of trauma over his head.  Neuro: Alert. ? Droop of right side of the face. Appears to be consistent with his picture on his packet from the facility.  No pronator drift.  Pupils equal round and reactive.  No nystagmus.  No neglect.  Speech is very hard to understand.   strength is equal bilaterally.  Dorsiflexion plantarflexion intact and equal bilaterally.  Patient is able to lift his legs off the bed equally.  Oriented to hospital, month, year  CV: Regular rate, regular rhythm, radial and DP pulses equal  Respiratory: No signs of respiratory distress, lungs clear to auscultation bilaterally   Abdomen: Soft, without rigidity or rebound throughout.  No grimacing with palpation.  MSK: No lower extremity swelling or tenderness. No TTP over the hip. No edema or erythema over the right hip.     Diagnostics     Lab Results   Labs Ordered and Resulted from Time of ED Arrival to Time of ED Departure   BASIC METABOLIC PANEL - Abnormal       Result Value    Sodium 143      Potassium 4.0      Chloride 106      Carbon Dioxide (CO2) 22       Anion Gap 15      Urea Nitrogen 25.4 (*)     Creatinine 0.99      GFR Estimate 83      Calcium 9.6      Glucose 101 (*)    CBC WITH PLATELETS AND DIFFERENTIAL - Abnormal    WBC Count 7.1      RBC Count 3.96 (*)     Hemoglobin 11.5 (*)     Hematocrit 34.3 (*)     MCV 87      MCH 29.0      MCHC 33.5      RDW 15.0      Platelet Count 185      % Neutrophils 74      % Lymphocytes 16      % Monocytes 9      % Eosinophils 2      % Basophils 0      % Immature Granulocytes 1      NRBCs per 100 WBC 0      Absolute Neutrophils 5.2      Absolute Lymphocytes 1.1      Absolute Monocytes 0.6      Absolute Eosinophils 0.1      Absolute Basophils 0.0      Absolute Immature Granulocytes 0.1      Absolute NRBCs 0.0     RBC AND PLATELET MORPHOLOGY    RBC Morphology Confirmed RBC Indices      Platelet Assessment        Value: Automated Count Confirmed. Platelet morphology is normal.   ROUTINE UA WITH MICROSCOPIC REFLEX TO CULTURE       Imaging   XR Pelvis w Hip Right 1 View   Final Result   Impression:      1.  Negative right hip and pelvis radiographs. No displaced fracture.   No nondisplaced fracture visualized. Normal hip joint alignment and   spacing. Mild-moderate degenerative disc and facet changes in the   lower lumbar spine.      LATA QUIROZ MD            SYSTEM ID:  [id number]      Head CT w/o contrast   Final Result   IMPRESSION:   No acute intracranial abnormality.         WANDY STOVALL MD            SYSTEM ID:  [id number]      CTA Head Neck with Contrast    (Results Pending)       EKG   EKG 1456  Sinus rhythm with incomplete RBBB  Appears unchanged from EKG from Miah 15 2024  Rate 82       Independent Interpretation   CT Head: No intracranial hemorrhage or midline shift.  X-ray pelvis/hip: No obvious fracture or dislocation      ED Course      Medications Administered   Medications - No data to display    Procedures   Procedures     Discussion of Management   None    ED Course   ED Course as of  12/19/24 1803   Thu Dec 19, 2024   1048 I obtained history and examined the patient as noted above.    1436 Spoke to Nataliia at facility.  She was not there when patient was brought to the ER.  She was not taking care of him overnight.  Per the staff report, patient may be self-reported seizure-like activity and was unable to stand.  There was no fall mentioned in the written staff report.  Patient called ambulance himself.  Nataliia notes that when patient when patient gets very anxious, he starts mumbling.  He does normally wear dentures.  She states that normally he walks with a cane, although yesterday she saw him in a wheelchair.. No report of recent illnesses, fever or vomiting. Nataliia says that pt was seen with a wheelchair yesterday.  She notes that at baseline he seems to lean to the left side and favors his left side.   1600 EKG 1456  Sinus rhythm witincomplete bundle branch block  Rate 82   Qtc 488       Additional Documentation  None    Medical Decision Making / Diagnosis     CMS Diagnoses: None    MIPS   None    MDM   Harpreet Mcelroy is a 69 year old male with history of bipolar disorder, anxiety, carcinoma of the bladder, anemia, who presents to the emergency department for possible fall versus seizure versus altered mental status.  On examination, patient is oriented x 3 and certainly able to follow commands although I have a very hard time discerning what he is saying.  He does not have his dentures in and it is very unclear to me whether this is baseline or not, as reportedly from EMS this was his baseline per staff that they met and per conversation with RN he does have noted speech difficulties when he becomes anxious.  He was able to stand and pivot with EMS.  There is no evidence of fracture or dislocation of the pelvis or hip.  He is able to move his extremities equally and there is no pronator drift present. CT head here without any evidence of bleed or acute findings and CTA shows  no acute findings either.  Laboratory workup is overall unremarkable.  He is still pending UA.  His EKG is unchanged from previous and he denies any chest pain to suggest ACS.  Anticipate if patient is able to ambulate at his baseline and his UA does not show convincing signs of infection, he can discharge back to his facility.    Disposition   Care of the patient was transferred to my colleague Dr. Gutierrez pending UA and ambulatory trial.      Diagnosis     ICD-10-CM    1. Fall, initial encounter  W19.XXXA       2. Slurred speech  R47.81            Discharge Medications   New Prescriptions    No medications on file         Scribe Disclosure:  [employer], am serving as a scribe at 10:14 AM on 12/19/2024 to document services personally performed by Haley Clifford MD based on my observations and the provider's statements to me.        Haley Clifford MD  12/19/24 1724       Haley Clifford MD  12/19/24 1803       Haley Clifford MD  12/19/24 1804

## 2024-12-19 NOTE — ED TRIAGE NOTES
[facility] arrives from [facility] in  via EMS. Pt had a witnessed fall this am. Not on thinners did not hit head. Pt is now endorsing R hip pain, no deformity noted per EMS. Was able to stand and pivot for EMS without issue. Pt has facial droop and slurred speech at baseline per facility staff. ABCS intact. BG 94 for EMS.     BP (!) 177/89   Pulse 82   Temp 98  F (36.7  C) (Temporal)   Resp 20   SpO2 100%        Triage Assessment (Adult)       Row Name 12/19/24 0902          Triage Assessment    Airway WDL WDL        Respiratory WDL    Respiratory WDL WDL        Peripheral/Neurovascular WDL    Peripheral Neurovascular WDL WDL        Cognitive/Neuro/Behavioral WDL    Cognitive/Neuro/Behavioral WDL WDL

## 2024-12-19 NOTE — DISCHARGE INSTRUCTIONS
You were seen in the emergency department after a fall or possible seizure.  Your exam and workup was overall reassuring, we did not see signs of an acute emergency at this time.    Please schedule a follow-up appointment with your primary care doctor within the week.    Come back to the emergency department if you start to have any fevers, pain with urination, inability to urinate, chest pain, if you have weakness on one side of your body, if you have what looks like seizure like activity, if you have vomiting and cannot keep anything down, or any other concerning symptoms.

## 2024-12-20 VITALS
TEMPERATURE: 97.8 F | SYSTOLIC BLOOD PRESSURE: 156 MMHG | OXYGEN SATURATION: 100 % | RESPIRATION RATE: 18 BRPM | DIASTOLIC BLOOD PRESSURE: 83 MMHG | HEART RATE: 77 BPM

## 2024-12-20 LAB
ATRIAL RATE - MUSE: 82 BPM
DIASTOLIC BLOOD PRESSURE - MUSE: NORMAL MMHG
INTERPRETATION ECG - MUSE: NORMAL
P AXIS - MUSE: 52 DEGREES
PR INTERVAL - MUSE: 158 MS
QRS DURATION - MUSE: 114 MS
QT - MUSE: 418 MS
QTC - MUSE: 488 MS
R AXIS - MUSE: 58 DEGREES
SYSTOLIC BLOOD PRESSURE - MUSE: NORMAL MMHG
T AXIS - MUSE: 44 DEGREES
VENTRICULAR RATE- MUSE: 82 BPM

## 2024-12-20 NOTE — ED PROVIDER NOTES
Patient is handed off to me pending urinalysis.    Patient was able to get up out of bed and take about 3 steps with a cane.  He does also have a wheelchair at his care home that he can use.    On reevaluation, patient has urinated into his diaper.  No urine was able to be obtained via catheterization.  Straight cath was attempted, but not successful.  Patient was bladder scanned and was found to have 450 mL.    On reevaluation, his diaper is soaking wet and his bladder scan shows that he has 350 mL in his bladder.  I think that he is able to urinate.  He is placed on a male pure wick. I have low suspicion for UTI, especially since his fall was mechanical.     He is given a liter of fluid.  I did review his chart, and patient did have a cystoscopy on 8/13/2024 for the proximal urethra was patent but tight.  They did use a 22 [place] scope.    After the fluid bolus, patient's bladder scanned and is 470 mL.  He is stood up at bedside.  Patient can stand and pivot without difficulty. He urinates on his own again.    I think because this was a mechanical fall, the patient is able to urinate and has urinated in his diaper here in the emergency department I do not think that he needs to stay for urinalysis. I also do not think he needs a oscar catheter. He will be advised that if he has fevers, pain with urination to return.  He can also follow-up with urology.  Patient is discharged back to his care home.         Renee Gutierrez MD  12/19/24 3191       Renee Gutierrez MD  12/19/24 8407

## 2024-12-20 NOTE — ED NOTES
Report given to Berenice ZAVALA ([phone]) at [facility]. No questions or concerns from this RN at this time. Rn aware that this pt will be returning via wheelchair.

## 2024-12-20 NOTE — ED NOTES
This RN and ED tech stood pt up and had him attempt to void for approximately 10 minutes. Pt unable to void. Bladder scan >470ml.

## 2024-12-23 ENCOUNTER — APPOINTMENT (OUTPATIENT)
Dept: CT IMAGING | Facility: CLINIC | Age: 69
End: 2024-12-23
Attending: EMERGENCY MEDICINE
Payer: COMMERCIAL

## 2024-12-23 ENCOUNTER — HOSPITAL ENCOUNTER (OUTPATIENT)
Facility: CLINIC | Age: 69
Setting detail: OBSERVATION
End: 2024-12-23
Attending: EMERGENCY MEDICINE | Admitting: INTERNAL MEDICINE
Payer: COMMERCIAL

## 2024-12-23 ENCOUNTER — APPOINTMENT (OUTPATIENT)
Dept: MRI IMAGING | Facility: CLINIC | Age: 69
End: 2024-12-23
Attending: EMERGENCY MEDICINE
Payer: COMMERCIAL

## 2024-12-23 DIAGNOSIS — G47.00 INSOMNIA, UNSPECIFIED TYPE: ICD-10-CM

## 2024-12-23 DIAGNOSIS — F20.9 SCHIZOPHRENIA, UNSPECIFIED TYPE (H): ICD-10-CM

## 2024-12-23 DIAGNOSIS — R47.81 SLURRED SPEECH: ICD-10-CM

## 2024-12-23 DIAGNOSIS — N40.0 BENIGN PROSTATIC HYPERPLASIA, UNSPECIFIED WHETHER LOWER URINARY TRACT SYMPTOMS PRESENT: ICD-10-CM

## 2024-12-23 DIAGNOSIS — F31.9 BIPOLAR AFFECTIVE DISORDER, REMISSION STATUS UNSPECIFIED (H): Primary | ICD-10-CM

## 2024-12-23 DIAGNOSIS — K59.00 CONSTIPATION, UNSPECIFIED CONSTIPATION TYPE: ICD-10-CM

## 2024-12-23 DIAGNOSIS — R11.0 NAUSEA: ICD-10-CM

## 2024-12-23 DIAGNOSIS — R29.6 FALLS FREQUENTLY: ICD-10-CM

## 2024-12-23 LAB
ALBUMIN SERPL BCG-MCNC: 4.1 G/DL (ref 3.5–5.2)
ALP SERPL-CCNC: 134 U/L (ref 40–150)
ALT SERPL W P-5'-P-CCNC: 138 U/L (ref 0–70)
AMMONIA PLAS-SCNC: 13 UMOL/L (ref 16–60)
ANION GAP SERPL CALCULATED.3IONS-SCNC: 14 MMOL/L (ref 7–15)
AST SERPL W P-5'-P-CCNC: ABNORMAL U/L
ATRIAL RATE - MUSE: 64 BPM
BASOPHILS # BLD AUTO: 0 10E3/UL (ref 0–0.2)
BASOPHILS NFR BLD AUTO: 0 %
BILIRUB SERPL-MCNC: 0.3 MG/DL
BUN SERPL-MCNC: 24.5 MG/DL (ref 8–23)
CALCIUM SERPL-MCNC: 9.7 MG/DL (ref 8.8–10.4)
CHLORIDE SERPL-SCNC: 101 MMOL/L (ref 98–107)
CREAT SERPL-MCNC: 0.98 MG/DL (ref 0.67–1.17)
DIASTOLIC BLOOD PRESSURE - MUSE: NORMAL MMHG
EGFRCR SERPLBLD CKD-EPI 2021: 84 ML/MIN/1.73M2
EOSINOPHIL # BLD AUTO: 0.5 10E3/UL (ref 0–0.7)
EOSINOPHIL NFR BLD AUTO: 9 %
ERYTHROCYTE [DISTWIDTH] IN BLOOD BY AUTOMATED COUNT: 15.9 % (ref 10–15)
GLUCOSE SERPL-MCNC: 98 MG/DL (ref 70–99)
HCO3 SERPL-SCNC: 24 MMOL/L (ref 22–29)
HCT VFR BLD AUTO: 34 % (ref 40–53)
HGB BLD-MCNC: 11.3 G/DL (ref 13.3–17.7)
IMM GRANULOCYTES # BLD: 0 10E3/UL
IMM GRANULOCYTES NFR BLD: 0 %
INR PPP: 0.96 (ref 0.85–1.15)
INTERPRETATION ECG - MUSE: NORMAL
LYMPHOCYTES # BLD AUTO: 1.2 10E3/UL (ref 0.8–5.3)
LYMPHOCYTES NFR BLD AUTO: 20 %
MCH RBC QN AUTO: 29.7 PG (ref 26.5–33)
MCHC RBC AUTO-ENTMCNC: 33.2 G/DL (ref 31.5–36.5)
MCV RBC AUTO: 89 FL (ref 78–100)
MONOCYTES # BLD AUTO: 0.5 10E3/UL (ref 0–1.3)
MONOCYTES NFR BLD AUTO: 8 %
NEUTROPHILS # BLD AUTO: 3.7 10E3/UL (ref 1.6–8.3)
NEUTROPHILS NFR BLD AUTO: 62 %
NRBC # BLD AUTO: 0 10E3/UL
NRBC BLD AUTO-RTO: 0 /100
P AXIS - MUSE: 80 DEGREES
PLATELET # BLD AUTO: 151 10E3/UL (ref 150–450)
POTASSIUM SERPL-SCNC: 4.4 MMOL/L (ref 3.4–5.3)
PR INTERVAL - MUSE: 196 MS
PROT SERPL-MCNC: 7.2 G/DL (ref 6.4–8.3)
QRS DURATION - MUSE: 128 MS
QT - MUSE: 458 MS
QTC - MUSE: 472 MS
R AXIS - MUSE: 57 DEGREES
RBC # BLD AUTO: 3.81 10E6/UL (ref 4.4–5.9)
SODIUM SERPL-SCNC: 139 MMOL/L (ref 135–145)
SYSTOLIC BLOOD PRESSURE - MUSE: NORMAL MMHG
T AXIS - MUSE: 41 DEGREES
TSH SERPL DL<=0.005 MIU/L-ACNC: 3 UIU/ML (ref 0.3–4.2)
VENTRICULAR RATE- MUSE: 64 BPM
WBC # BLD AUTO: 5.9 10E3/UL (ref 4–11)

## 2024-12-23 PROCEDURE — 99285 EMERGENCY DEPT VISIT HI MDM: CPT | Mod: 25

## 2024-12-23 PROCEDURE — 99223 1ST HOSP IP/OBS HIGH 75: CPT | Performed by: INTERNAL MEDICINE

## 2024-12-23 PROCEDURE — G0378 HOSPITAL OBSERVATION PER HR: HCPCS

## 2024-12-23 PROCEDURE — 70553 MRI BRAIN STEM W/O & W/DYE: CPT

## 2024-12-23 PROCEDURE — 70498 CT ANGIOGRAPHY NECK: CPT

## 2024-12-23 PROCEDURE — 85004 AUTOMATED DIFF WBC COUNT: CPT | Performed by: EMERGENCY MEDICINE

## 2024-12-23 PROCEDURE — 250N000011 HC RX IP 250 OP 636: Performed by: EMERGENCY MEDICINE

## 2024-12-23 PROCEDURE — 93005 ELECTROCARDIOGRAM TRACING: CPT

## 2024-12-23 PROCEDURE — A9585 GADOBUTROL INJECTION: HCPCS | Performed by: EMERGENCY MEDICINE

## 2024-12-23 PROCEDURE — 250N000009 HC RX 250: Performed by: EMERGENCY MEDICINE

## 2024-12-23 PROCEDURE — 255N000002 HC RX 255 OP 636: Performed by: EMERGENCY MEDICINE

## 2024-12-23 PROCEDURE — 70450 CT HEAD/BRAIN W/O DYE: CPT

## 2024-12-23 PROCEDURE — 80048 BASIC METABOLIC PNL TOTAL CA: CPT | Performed by: EMERGENCY MEDICINE

## 2024-12-23 PROCEDURE — 36415 COLL VENOUS BLD VENIPUNCTURE: CPT | Performed by: INTERNAL MEDICINE

## 2024-12-23 PROCEDURE — 84443 ASSAY THYROID STIM HORMONE: CPT | Performed by: EMERGENCY MEDICINE

## 2024-12-23 PROCEDURE — 36415 COLL VENOUS BLD VENIPUNCTURE: CPT | Performed by: EMERGENCY MEDICINE

## 2024-12-23 PROCEDURE — 85610 PROTHROMBIN TIME: CPT | Performed by: EMERGENCY MEDICINE

## 2024-12-23 PROCEDURE — 82374 ASSAY BLOOD CARBON DIOXIDE: CPT | Performed by: EMERGENCY MEDICINE

## 2024-12-23 PROCEDURE — 72125 CT NECK SPINE W/O DYE: CPT

## 2024-12-23 PROCEDURE — 82140 ASSAY OF AMMONIA: CPT | Performed by: INTERNAL MEDICINE

## 2024-12-23 RX ORDER — NAPROXEN 250 MG/1
250 TABLET ORAL 2 TIMES DAILY PRN
Status: ON HOLD | COMMUNITY
End: 2024-12-25

## 2024-12-23 RX ORDER — OXYBUTYNIN CHLORIDE 5 MG/1
5 TABLET ORAL 3 TIMES DAILY PRN
Status: ON HOLD | COMMUNITY
End: 2024-12-25

## 2024-12-23 RX ORDER — AMOXICILLIN 250 MG
1 CAPSULE ORAL 2 TIMES DAILY PRN
Status: ACTIVE | OUTPATIENT
Start: 2024-12-23

## 2024-12-23 RX ORDER — HYDROXYZINE HYDROCHLORIDE 10 MG/1
10 TABLET, FILM COATED ORAL
Status: ON HOLD | COMMUNITY

## 2024-12-23 RX ORDER — LOPERAMIDE HYDROCHLORIDE 2 MG/1
2 TABLET ORAL PRN
Status: ON HOLD | COMMUNITY

## 2024-12-23 RX ORDER — BISACODYL 10 MG
10 SUPPOSITORY, RECTAL RECTAL DAILY PRN
Status: ON HOLD | COMMUNITY

## 2024-12-23 RX ORDER — ONDANSETRON 2 MG/ML
4 INJECTION INTRAMUSCULAR; INTRAVENOUS EVERY 6 HOURS PRN
Status: ACTIVE | OUTPATIENT
Start: 2024-12-23

## 2024-12-23 RX ORDER — BENZTROPINE MESYLATE 1 MG/1
2 TABLET ORAL AT BEDTIME
Status: ON HOLD | COMMUNITY

## 2024-12-23 RX ORDER — HYDRALAZINE HYDROCHLORIDE 10 MG/1
10 TABLET, FILM COATED ORAL EVERY 4 HOURS PRN
Status: ACTIVE | OUTPATIENT
Start: 2024-12-23

## 2024-12-23 RX ORDER — IOPAMIDOL 755 MG/ML
500 INJECTION, SOLUTION INTRAVASCULAR ONCE
Status: COMPLETED | OUTPATIENT
Start: 2024-12-23 | End: 2024-12-23

## 2024-12-23 RX ORDER — POLYETHYLENE GLYCOL 3350 17 G/17G
17 POWDER, FOR SOLUTION ORAL 2 TIMES DAILY PRN
Status: ACTIVE | OUTPATIENT
Start: 2024-12-23

## 2024-12-23 RX ORDER — OLANZAPINE 20 MG/1
20 TABLET ORAL AT BEDTIME
Status: ON HOLD | COMMUNITY
End: 2024-12-25

## 2024-12-23 RX ORDER — GUAIFENESIN 200 MG/10ML
200 LIQUID ORAL EVERY 4 HOURS PRN
Status: ON HOLD | COMMUNITY

## 2024-12-23 RX ORDER — HYDRALAZINE HYDROCHLORIDE 20 MG/ML
10 INJECTION INTRAMUSCULAR; INTRAVENOUS EVERY 4 HOURS PRN
Status: ACTIVE | OUTPATIENT
Start: 2024-12-23

## 2024-12-23 RX ORDER — OLANZAPINE 5 MG/1
5 TABLET ORAL EVERY MORNING
Status: ON HOLD | COMMUNITY
End: 2024-12-25

## 2024-12-23 RX ORDER — AMOXICILLIN 250 MG
2 CAPSULE ORAL 2 TIMES DAILY PRN
Status: ACTIVE | OUTPATIENT
Start: 2024-12-23

## 2024-12-23 RX ORDER — GADOBUTROL 604.72 MG/ML
7.5 INJECTION INTRAVENOUS ONCE
Status: COMPLETED | OUTPATIENT
Start: 2024-12-23 | End: 2024-12-23

## 2024-12-23 RX ORDER — ONDANSETRON 4 MG/1
4 TABLET, ORALLY DISINTEGRATING ORAL EVERY 6 HOURS PRN
Status: ACTIVE | OUTPATIENT
Start: 2024-12-23

## 2024-12-23 RX ORDER — LAMOTRIGINE 100 MG/1
100 TABLET ORAL DAILY
Status: ON HOLD | COMMUNITY

## 2024-12-23 RX ORDER — PROCHLORPERAZINE MALEATE 5 MG/1
5 TABLET ORAL EVERY 6 HOURS PRN
Status: ACTIVE | OUTPATIENT
Start: 2024-12-23

## 2024-12-23 RX ORDER — LOPERAMIDE HYDROCHLORIDE 2 MG/1
2 CAPSULE ORAL 4 TIMES DAILY PRN
Status: ON HOLD | COMMUNITY

## 2024-12-23 RX ADMIN — GADOBUTROL 7.5 ML: 604.72 INJECTION INTRAVENOUS at 15:38

## 2024-12-23 RX ADMIN — SODIUM CHLORIDE 80 ML: 9 INJECTION, SOLUTION INTRAVENOUS at 13:12

## 2024-12-23 RX ADMIN — IOPAMIDOL 67 ML: 755 INJECTION, SOLUTION INTRAVENOUS at 13:12

## 2024-12-23 ASSESSMENT — ACTIVITIES OF DAILY LIVING (ADL)
ADLS_ACUITY_SCORE: 58
ADLS_ACUITY_SCORE: 53
ADLS_ACUITY_SCORE: 59
ADLS_ACUITY_SCORE: 58
ADLS_ACUITY_SCORE: 53
ADLS_ACUITY_SCORE: 53
ADLS_ACUITY_SCORE: 58

## 2024-12-23 NOTE — ED NOTES
[facility]  ED Nurse Handoff Report    ED Chief complaint: Fall  . ED Diagnosis:   Final diagnoses:   Slurred speech   Falls frequently       Allergies:   Allergies   Allergen Reactions    Lactose Anxiety     Lactose intolerance    Aspirin     Cucumber Extract Unknown     pickles    Diphenhydramine Other (See Comments)     Lock jaw. Could not open mouth    Pollen Extract Other (See Comments)     Sneezing and congestion    Acetaminophen Rash     Fixed drug reaction       Code Status: Full Code    Activity level - Baseline/Home:  standby.  Activity Level - Current:   in bed. Patient has not walked  Lift room needed: No.   Bariatric: No   Needed: No   Isolation: No.   Infection: Not Applicable.     Respiratory status: Room air    Vital Signs (within 30 minutes):   Vitals:    12/23/24 1024 12/23/24 1453   BP: (!) 158/76    Pulse: 66    Resp: 18    Temp:  (!) 89  F (31.7  C)   TempSrc:  Rectal   SpO2: 98%        Cardiac Rhythm:  ,      Pain level:    Patient confused: No.   Patient Falls Risk: nonskid shoes/slippers when out of bed, arm band in place, patient and family education, and assistive device/personal items within reach.   Elimination Status: Has voided, patient is incontinent, primofit on    Patient Report - Initial Complaint: Falls, weakness  Focused Assessment:     Neuro CognitiveCognitive/Neuro/Behavioral WDL: .WDL except (Pt found with slurred speech and falls. Assisted care states this is worse than normal. Pt able to follow commands at bedside.)Level of Consciousness: alertArousal Level: opens eyes spontaneouslySpeech: incoherentMood/Behavior: calm; cooperative       Abnormal Results:   Labs Ordered and Resulted from Time of ED Arrival to Time of ED Departure   COMPREHENSIVE METABOLIC PANEL - Abnormal       Result Value    Sodium 139      Potassium 4.4      Carbon Dioxide (CO2) 24      Anion Gap 14      Urea Nitrogen 24.5 (*)     Creatinine 0.98      GFR Estimate 84       Calcium 9.7      Chloride 101      Glucose 98      Alkaline Phosphatase 134      AST         (*)     Protein Total 7.2      Albumin 4.1      Bilirubin Total 0.3     CBC WITH PLATELETS AND DIFFERENTIAL - Abnormal    WBC Count 5.9      RBC Count 3.81 (*)     Hemoglobin 11.3 (*)     Hematocrit 34.0 (*)     MCV 89      MCH 29.7      MCHC 33.2      RDW 15.9 (*)     Platelet Count 151      % Neutrophils 62      % Lymphocytes 20      % Monocytes 8      % Eosinophils 9      % Basophils 0      % Immature Granulocytes 0      NRBCs per 100 WBC 0      Absolute Neutrophils 3.7      Absolute Lymphocytes 1.2      Absolute Monocytes 0.5      Absolute Eosinophils 0.5      Absolute Basophils 0.0      Absolute Immature Granulocytes 0.0      Absolute NRBCs 0.0     INR - Normal    INR 0.96     TSH WITH FREE T4 REFLEX - Normal    TSH 3.00     ROUTINE UA WITH MICROSCOPIC REFLEX TO CULTURE   AMMONIA        CTA Head Neck w Contrast   Final Result   IMPRESSION: No vascular occlusion findings intracranially. No   hemodynamically significant stenosis in the neck.         AMELIE MICHEL MD            SYSTEM ID:  [id number]      CT Cervical Spine w/o Contrast   Final Result   IMPRESSION: No acute cervical spine fracture.         AMELIE MICHEL MD            SYSTEM ID:  [id number]      CT Head w/o Contrast   Final Result   IMPRESSION:   No acute intracranial abnormality.         AMELIE MICHEL MD            SYSTEM ID:  [id number]      MR Brain w/o & w Contrast    (Results Pending)       Treatments provided: CT/MRI, UA  Family Comments: RN has attempted to contact patients siblings with no answer, have talked with Nataliia ZAVALA from facility  OBS brochure/video discussed/provided to patient:  Yes  ED Medications:   Medications   senna-docusate (SENOKOT-S/PERICOLACE) 8.6-50 MG per tablet 1 tablet (has no administration in time range)     Or   senna-docusate (SENOKOT-S/PERICOLACE) 8.6-50 MG per tablet 2 tablet (has no administration in time range)    ondansetron (ZOFRAN ODT) ODT tab 4 mg (has no administration in time range)     Or   ondansetron (ZOFRAN) injection 4 mg (has no administration in time range)   prochlorperazine (COMPAZINE) injection 5 mg (has no administration in time range)     Or   prochlorperazine (COMPAZINE) tablet 5 mg (has no administration in time range)   hydrALAZINE (APRESOLINE) tablet 10 mg (has no administration in time range)     Or   hydrALAZINE (APRESOLINE) injection 10 mg (has no administration in time range)   melatonin tablet 1 mg (has no administration in time range)   polyethylene glycol (MIRALAX) Packet 17 g (has no administration in time range)   CT SCAN FLUSH (80 mLs Intravenous $Given 12/23/24 1312)   iopamidol (ISOVUE-370) solution 500 mL (67 mLs Intravenous $Given 12/23/24 1312)       Drips infusing:  No  For the majority of the shift this patient was Green.   Interventions performed were N/A.    Sepsis treatment initiated: No    Cares/treatment/interventions/medications to be completed following ED care: Per MD order set, still need UA    ED Nurse Name: Mini Baker RN  2:58 PM     RECEIVING UNIT ED HANDOFF REVIEW    Above ED Nurse Handoff Report was reviewed: Yes  Reviewed by: Sweta Benton RN on December 23, 2024 at 3:18 PM   DEEPAK Osullivan called the ED to inform them the note was read: Yes

## 2024-12-23 NOTE — ED PROVIDER NOTES
Emergency Department Note      History of Present Illness     Chief Complaint   Fall      HPI   Harpreet Mcelroy is a 69 year old male with a history of hyperlipidemia, schizophrenia, borderline personality disorder, anxiety, carcinoma of bladder, and ureteral stent in place of L kidney who presents to the ED for an evaluation following a fall. The patient was recently seen in ED for reported fall. Since then, patient is reported to have fallen several times at the [facility]. He normally is able to ambulate with walker. Patient has garbled speech at baseline.     Per staff from [facility], they performed an assessment on Saturday 12/21 to which they could somewhat understand what the patient was saying. Today, they performed another assessment and couldn't understand him. Did note they he was still able to follow commands. They also report 3 unwitnessed falls since Friday 12/20. They were unsure if the patient hit his head and state he was always found in a sitting position. Report that they dispense medications, patient is not a blood thinner. Normally, patient walks with cane but within the past few days he hasn't been walking much.    Independent Historian   History is significantly limited due to the patient's slurred speech. He is able to follow commands.     Review of External Notes   Reviewed ED note from 12/19/2024 where patient was seen for falls. Noted that patient has slurred speech when anxious.     Past Medical History     Medical History and Problem List   Anxiety  Bipolar 1 disorder  Depressive disorder  Schizophrenia  Urinary retention  Carcinoma of bladder  Anemia  CAD  Hyperlipidemia     Medications   Atorvastatin  Benztropine  Buspirone  Docusate sodium  Haldol  Hydralazine  Hydroxyzine  Lorazepam  Zyprexa  Zofran  Senokot  Tamsulosin  Tramadol    Surgical History   Facial reconstruction  Bladder transurethral tumor resection  Cystoscopy  Ureteral stent  Cataract  extraction      Physical Exam     Patient Vitals for the past 24 hrs:   BP Pulse Resp SpO2   12/23/24 1024 (!) 158/76 66 18 98 %     Physical Exam  Constitutional:       Appearance: He is well-developed.   HENT:      Right Ear: External ear normal.      Left Ear: External ear normal.      Mouth/Throat:      Mouth: Mucous membranes are moist.      Pharynx: Oropharynx is clear. No oropharyngeal exudate or posterior oropharyngeal erythema.   Eyes:      General: No scleral icterus.     Extraocular Movements: Extraocular movements intact.      Conjunctiva/sclera: Conjunctivae normal.      Pupils: Pupils are equal, round, and reactive to light.   Neck:      Comments: No midline C spine TTP  Cardiovascular:      Rate and Rhythm: Normal rate and regular rhythm.      Heart sounds: Normal heart sounds. No murmur heard.     No friction rub. No gallop.   Pulmonary:      Effort: Pulmonary effort is normal. No respiratory distress.      Breath sounds: Normal breath sounds. No stridor. No wheezing, rhonchi or rales.   Abdominal:      General: Bowel sounds are normal. There is no distension.      Palpations: Abdomen is soft. There is no mass.      Tenderness: There is no abdominal tenderness.   Musculoskeletal:         General: Normal range of motion.      Cervical back: Normal range of motion and neck supple.   Skin:     General: Skin is warm and dry.      Capillary Refill: Capillary refill takes less than 2 seconds.      Findings: No rash.   Neurological:      Mental Status: He is alert.      Sensory: No sensory deficit.      Motor: No weakness.      Comments: Slurred speech. No facial asymmetry. Follows all commands. All other CN WNL.           Diagnostics     Lab Results   Labs Ordered and Resulted from Time of ED Arrival to Time of ED Departure   COMPREHENSIVE METABOLIC PANEL - Abnormal       Result Value    Sodium 139      Potassium 4.4      Carbon Dioxide (CO2) 24      Anion Gap 14      Urea Nitrogen 24.5 (*)     Creatinine  0.98      GFR Estimate 84      Calcium 9.7      Chloride 101      Glucose 98      Alkaline Phosphatase 134      AST         (*)     Protein Total 7.2      Albumin 4.1      Bilirubin Total 0.3     CBC WITH PLATELETS AND DIFFERENTIAL - Abnormal    WBC Count 5.9      RBC Count 3.81 (*)     Hemoglobin 11.3 (*)     Hematocrit 34.0 (*)     MCV 89      MCH 29.7      MCHC 33.2      RDW 15.9 (*)     Platelet Count 151      % Neutrophils 62      % Lymphocytes 20      % Monocytes 8      % Eosinophils 9      % Basophils 0      % Immature Granulocytes 0      NRBCs per 100 WBC 0      Absolute Neutrophils 3.7      Absolute Lymphocytes 1.2      Absolute Monocytes 0.5      Absolute Eosinophils 0.5      Absolute Basophils 0.0      Absolute Immature Granulocytes 0.0      Absolute NRBCs 0.0     INR - Normal    INR 0.96     TSH WITH FREE T4 REFLEX - Normal    TSH 3.00     ROUTINE UA WITH MICROSCOPIC REFLEX TO CULTURE       Imaging   CTA Head Neck w Contrast   Final Result   IMPRESSION: No vascular occlusion findings intracranially. No   hemodynamically significant stenosis in the neck.         AMELIE MICHEL MD            SYSTEM ID:  [id number]      CT Cervical Spine w/o Contrast   Final Result   IMPRESSION: No acute cervical spine fracture.         AMELIE MICHEL MD            SYSTEM ID:  [id number]      CT Head w/o Contrast   Final Result   IMPRESSION:   No acute intracranial abnormality.         AMELIE MICHEL MD            SYSTEM ID:  [id number]      MR Brain w/o & w Contrast    (Results Pending)       EKG   ECG taken at 1136, ECG read at 1143  Normal sinus rhythm  Nonspecific intraventricular block  Abnormal ECG   No prior ECG compared   Rate 64 bpm. MS interval 196 ms. QRS duration 128 ms. QT/QTc 458/472 ms. P-R-T axes 80 57 41.    Independent Interpretation   None    ED Course      Medications Administered   Medications   CT SCAN FLUSH (80 mLs Intravenous $Given 12/23/24 1312)   iopamidol (ISOVUE-370) solution 500 mL (67 mLs  Intravenous $Given 12/23/24 1312)       Procedures   Procedures     Discussion of Management   Admitting Hospitalist,     ED Course   ED Course as of 12/23/24 1408   Mon Dec 23, 2024   1020 I obtained history and examined the patient as noted above.     1122 Received more information from Care Facility regarding the patient.    1358 I spoke with Hospitalist services (Jay) regarding the patient for admission.        Additional Documentation  Social Determinants of Health: None    Medical Decision Making / Diagnosis     CMS Diagnoses: None    MIPS       None    MDM   Harpreet Mcelroy is a 69 year old male who presents from assisted living for above symptoms.  Patient has slurred speech and I cannot understand him on exam.  He does follow all commands and did not have any focal motor finding.  There is no facial asymmetry.  He denies pain.  Given his worsening symptoms, CT imaging was performed and repeated.  His imaging from 4 days ago did not show anything acute.  At this point repeat CT was also stable.  MRI is ordered.  Due to patient's frequent fall, he cannot return to assisted living situation.  Patient will require admission for placement at minimum.  We will see what the MRI looks like.  Possible to this may presents as a new stroke.  Patient will require admission.  Patient is admitted to the hospitalist service.  Inpatient team will follow up on MRI results.    Disposition   The patient was admitted to the hospital for observation.     Diagnosis     ICD-10-CM    1. Slurred speech  R47.81       2. Falls frequently  R29.6                Scribe Disclosure:  I, Marlee Lo, am serving as a scribe at 10:22 AM on 12/23/2024 to document services personally performed by Lisseth Harris MD based on my observations and the provider's statements to me.        Lisseth Harris MD  12/23/24 1412

## 2024-12-23 NOTE — H&P
[facility]    History and Physical - Hospitalist Service       Date of Admission:  12/23/2024  Primary Care Physician   [facility]  CONSULTANTS: Physical therapy, Occupational Therapy    Assessment & Plan      Harpreet Mcelroy is a 69 year old male who has a very complex psychiatric history with a history of being bipolar, anxiety, depression, schizophrenia, borderline personality, history of paranoia who also has a history of bladder cancer and ureteral stent ( Last replaced in 8/2024 from what I can tell in care everywhere at [facility]), hypertension, hyperlipidemia, previous noncompliance with medications is presenting from his [facility] with recurrent falls and worsening slurred speech.  Epic reviewed at length as the patient is unable to give me any kind of story.  Per the emergency room, patient has been falling more at his living facility being found on the floor.  He is following commands but has had worsening slurred speech.  Patient is actually moving all extremities.  He is able to move his head in all directions without pain.  Does not complain of really any symptoms.  Was just in the emergency room 12/19/2024 with falls and had a workup at that time that was negative.  In the emergency room here his vital signs are stable if not a little hypertensive.  He has no fever.  His BMP is normal, TSH is normal, and does not have a leukocytosis.  He underwent a head CT, CT of the C-spine, and CT angiogram of the head all of which were normal.  Patient has a brain MRI pending.  He also has a urinalysis pending which I would assume is going to be abnormal though I am not convinced that he has any symptoms of a UTI.  I expect his culture to probably be abnormal given his ureteral stent.  Patient is being admitted to observation.  Pharmacy to reconcile medications.  I suspect the polypharmacy has some underlying cause in his mentation.       Slurred speech  recurrent falls    Patient is presenting with worsening slurred speech at his living facility.  Not sure the etiology.  He is moving all extremities and I doubt a stroke at this time.  Polypharmacy could be playing a role and there is no signs or symptoms of infection.  He has no signs or symptoms of meningitis.  His ALT is slightly elevated so I will send off an ammonia.  Brain MRI is already been ordered in the emergency room.  I will hold all of his medicines overnight hopefully his symptoms will improve.  May need to get neurology involved depending on the results of the MRI. If a stroke not acute so no need to rush for stroke team. Symptoms have been going on for days Consider psych to see about medications.  Ca is also normal.     Recurrent falls  Physical therapy and occupational therapy to see. Is suppose to use a cane or walker and I am not sure about his compliance.  Polypharmacy could be playing a role. Stop his statin.  Consider changing his psych medications.      Polypharmacy  Pharm D to reconcile.  Will hold his home medications and see if his symptoms improve.  Consider getting psych to see to limit his psych medications.  I think stopping his statin is reasonable too.  He also has been on ditropan in the past and not sure if he still is taking but that can cause side effects too    BPH, bladder cancer, ureteral stent  Has been on flomax, ditropan in  the past. Pharm D to reconcile.  Has a stent in place, last replaced at [facility] 8/2024 that ii can see. Urine analysis is pending and I expect it to be abnormal, that does not mean he is infected.  Holding off on antibiotics.  No fever nor leukocytosis.  An infection would not explain slurred speech    Hypertension  Have as needed hydralazine    Hyperlipidemia, elevated ALT  Has been on a statin, will stop for now given his elevated Lfts and recurrent falls.   Risk may outweigh the benefit at this point.       Bipolar, anxiety, depression, schizophrenia,  paranoia history, borderline personality  This is his biggest issue.  Has been on buspar, haldol, atarax, atrivan, zyprexa, lamictal.  Not sure what he is actively taking.  Pharm d to see. Depending on what is found, may need to get psych involved to help wean down his medications if possible as they could be causing side effects.  He does not have a fever and vitals are stable, I doubt serotonin syndrome.     History of medication noncompliance  Patient in 6/2024, Kendrick Luis note, patient stopped his medications as he was paranoid.  I am not sure if he is even taking his medications now.  This puts him at risk for readmission, morbidity, and early mortality.       4Ms:  Polypharmacy: See above, patient is on many medications I suspect her contributing to side effects  Mentation:    Capacity unsure, as following commands but speech is slurred  Social support: lives a [facility], not sure of family dynamic  Mobility:  per chart uses a cane/walker  What is importmant:  N/A    Discussed plan of care with Dr Harris in the emergency room   Diet: Regular Diet Adult    DVT Prophylaxis: Pneumatic Compression Devices  Hernandez Catheter: Not present  Lines: None     Cardiac Monitoring: None    RESTRAINTS: not indicated  Code Status: Full Code per previous orders in Epic, patient unable to tell me himself his wishes but given his age will assume FULL code        This document was created using voice recognition technology.  Please excuse any typographical errors that may have occurred.  Please call with any questions.          Clinically Significant Risk Factors Present on Admission                 # Financial/Environmental Concerns:           Disposition Plan      Expected Discharge Date: 12/24/2024                  Yousif Thompson MD  Hospitalist Service    Securely message with [employer] (more info)  Text page via [facility] Paging/Directory   Medically Ready for Discharge: Anticipated Tomorrow  depending on resolution of symptoms      Length of stay: Anticipate less than 2  midnight hospitalization for evaluation and treatment of slurred speech,. falls          ______________________________________________________________________    Chief Complaint   Slurred speech, falls    History is not able to be obtained from the patient due to poor speech  Epic reviewed    History of Present Illness   Harpreet Mcelroy is a 69 year old male who has a very complex psychiatric history with a history of being bipolar, anxiety, depression, schizophrenia, borderline personality, history of paranoia who also has a history of bladder cancer and ureteral stent ( Last replaced in 8/2024 from what I can tell in care everywhere at [facility]), hypertension, hyperlipidemia, previous noncompliance with medications is presenting from his [facility] center with recurrent falls and worsening slurred speech.  Epic reviewed at length as the patient is unable to give me any kind of story.  Per the emergency room, patient has been falling more at his living facility being found on the floor.  He is following commands but has had worsening slurred speech.  Patient is actually moving all extremities.  He is able to move his head in all directions without pain.  Does not complain of really any symptoms.  Was just in the emergency room 12/19/2024 with falls and had a workup at that time that was negative.  In the emergency room here his vital signs are stable if not a little hypertensive.  He has no fever.  His BMP is normal, TSH is normal, and does not have a leukocytosis.  He underwent a head CT, CT of the C-spine, and CT angiogram of the head all of which were normal.  Patient has a brain MRI pending.  He also has a urinalysis pending which I would assume is going to be abnormal though I am not convinced that he has any symptoms of a UTI.  I expect his culture to probably be abnormal given his ureteral stent.  Patient is being admitted  to observation.  Pharmacy to reconcile medications.  I suspect the polypharmacy has some underlying cause in his mentation.    Past Medical History    Past Medical History:   Diagnosis Date    Anxiety     Bipolar 1 disorder (H)     Cancer (H)     Depressive disorder     Psychiatric diagnosis        Past Surgical History   No past surgical history on file.    Prior to Admission Medications    pharmacy to reconcile  Prior to Admission Medications   Prescriptions Last Dose Informant Patient Reported? Taking?   OLANZapine (ZYPREXA) 10 MG tablet 12/23/2024 Morning Other Yes Yes   Sig: Take 10 mg by mouth 2 times daily   OLANZapine (ZYPREXA) 20 MG tablet 12/22/2024 Bedtime  Yes Yes   Sig: Take 20 mg by mouth at bedtime. Take with 10mg tablet for a total HS dose of 30mg   OLANZapine (ZYPREXA) 5 MG tablet 12/23/2024 Morning  Yes Yes   Sig: Take 5 mg by mouth every morning. Take with 10mg tablet for a total AM dose of 15mg   artificial tears OINT ophthalmic ointment 12/22/2024 Bedtime  Yes Yes   Sig: Place Into the left eye at bedtime.   atorvastatin (LIPITOR) 40 MG tablet 12/22/2024 Evening Other Yes Yes   Sig: Take 40 mg by mouth daily   benztropine (COGENTIN) 1 MG tablet 12/22/2024 Evening  Yes Yes   Sig: Take 2 mg by mouth at bedtime.   bisacodyl (DULCOLAX) 10 MG suppository Unknown  Yes Yes   Sig: Place 10 mg rectally daily as needed for constipation.   busPIRone HCl (BUSPAR) 30 MG tablet 12/23/2024 Morning Other Yes Yes   Sig: Take 30 mg by mouth 2 times daily   docusate sodium (COLACE) 100 MG capsule Unknown Other Yes No   Sig: Take 100 mg by mouth 2 times daily as needed for constipation.   fluticasone (FLONASE) 50 MCG/ACT nasal spray Unknown Other Yes Yes   Sig: Spray 1 spray into both nostrils daily as needed.   guaiFENesin (ROBITUSSIN) 20 mg/mL liquid Past Month  Yes Yes   Sig: Take 200 mg by mouth every 4 hours as needed for cough.   hydrOXYzine HCl (ATARAX) 10 MG tablet Past Month  No Yes   Sig: Take 1 tablet  (10 mg) by mouth every 6 hours as needed for itching or anxiety   hydrOXYzine HCl (ATARAX) 10 MG tablet 12/22/2024 Noon  Yes Yes   Sig: Take 10 mg by mouth daily at 2 pm.   lamoTRIgine (LAMICTAL) 100 MG tablet 12/23/2024 Morning  Yes Yes   Sig: Take 100 mg by mouth daily.   loperamide (IMODIUM A-D) 2 MG tablet Unknown  Yes Yes   Sig: Take 2 mg by mouth as needed for diarrhea.   loperamide (IMODIUM) 2 MG capsule Unknown  Yes Yes   Sig: Take 2 mg by mouth 4 times daily as needed for diarrhea.   magnesium hydroxide (MILK OF MAGNESIA) 400 MG/5ML suspension Past Week  Yes Yes   Sig: Take 30 mLs by mouth daily as needed for constipation or heartburn.   menthol (ICY HOT) 5 % PTCH   No No   Sig: Apply 1 patch topically every 8 hours as needed for muscle soreness   menthol-zinc oxide (CALMOSEPTINE) 0.44-20.6 % OINT ointment Unknown  Yes Yes   Sig: Apply topically 4 times daily as needed for skin protection.   mineral oil enema Unknown  Yes Yes   Sig: Place 1 enema rectally once.   naproxen (NAPROSYN) 250 MG tablet Past Month  Yes Yes   Sig: Take 250 mg by mouth 2 times daily as needed for moderate pain.   oxyBUTYnin (DITROPAN) 5 MG tablet Past Week  Yes Yes   Sig: Take 5 mg by mouth 3 times daily as needed for bladder spasms.   polyethylene glycol (MIRALAX) 17 GM/Dose powder 12/23/2024 Morning  No Yes   Sig: Take 17 g by mouth daily   sennosides (SENOKOT) 8.6 MG tablet 12/23/2024 Morning Other Yes Yes   Sig: Take 1 tablet by mouth 2 times daily   tamsulosin (FLOMAX) 0.4 MG capsule 12/23/2024 Morning Other Yes Yes   Sig: Take 0.4 mg by mouth daily      Facility-Administered Medications: None        Allergies   Allergies   Allergen Reactions    Lactose Anxiety     Lactose intolerance    Aspirin     Cucumber Extract Unknown     pickles    Diphenhydramine Other (See Comments)     Lock jaw. Could not open mouth    Pollen Extract Other (See Comments)     Sneezing and congestion    Acetaminophen Rash     Fixed drug reaction         REVIEW OF SYSTEMS:  A comprehensive review of systems was Unobtainable due to the patient's speech    Physical Exam   Vital Signs:     BP: (!) 158/76 Pulse: 66   Resp: 18 SpO2: 98 % O2 Device: None (Room air)    Weight: 0 lbs 0 oz    General appearance: Patient is alert and unable to test orientation due to slurred speech, no apparent distress, pleasant and conversing normally, speaking in full sentences, appears older than  stated age, lying in bed in the emergency room, following commands appropriately  HEENT:  Atraumatic/normal cephalic, EOMI, Pupils equally round and reactive to light, sclera non-icteric, Mucous membranes are moist  NECK:  supple without bruit or lymphadenopathy  RESPIRATORY: Clear to auscultation bilateral, good air movement  CARDIOVASCULAR: Regular rate and rhythm, normal S1/S2, no murmurs  GASTROINTESTINAL: Non-distended, non-tender, soft, bowel sounds present throughout  NEUROLOGIC:  Cranial nerves II-XII intact, without any focal deficits,  moving all extremities to command,  strength is intact, patient having for slurred speech mumbling at times unable to understand where he says  EXTREMITIES:  Moves all extremities, no clubbing, cyanosis, nor edema  :  Mary not present         Data     I have personally reviewed the following data over the past 24 hrs:    5.9  \   11.3 (L)   / 151     139 101 24.5 (H) /  98   4.4 24 0.98 \     ALT: 138 (H) AST: N/A AP: 134 TBILI: 0.3   ALB: 4.1 TOT PROTEIN: 7.2 LIPASE: N/A     TSH: 3.00 T4: N/A A1C: N/A     INR:  0.96 PTT:  N/A   D-dimer:  N/A Fibrinogen:  N/A       Imaging:   Results for orders placed or performed during the hospital encounter of 12/23/24   CT Head w/o Contrast    Narrative    CT SCAN OF THE HEAD WITHOUT CONTRAST   12/23/2024 1:14 PM     HISTORY: fall    TECHNIQUE:  Axial images of the head and coronal reformations without  IV contrast material. Radiation dose for this scan was reduced using  automated exposure control,  adjustment of the mA and/or kV according  to patient size, or iterative reconstruction technique.    COMPARISON: 12/19/2024    FINDINGS: Stable prominent CSF space along the left frontal apex. No  acute intracranial hemorrhage. No hydrocephalus or extra-axial  hemorrhage. Mild chronic small vessel ischemic changes. Moderate  global cortical volume loss. Empty sella. No acute calvarial fracture.      Impression    IMPRESSION:   No acute intracranial abnormality.      AMELIE MICHEL MD         SYSTEM ID:  [id number]   CT Cervical Spine w/o Contrast    Narrative    CT CERVICAL SPINE WITHOUT CONTRAST 12/23/2024 1:15 PM     HISTORY: fall     TECHNIQUE: Axial images of the cervical spine were obtained without  intravenous contrast. Multiplanar reformations were performed.   Radiation dose for this scan was reduced using automated exposure  control, adjustment of the mA and/or kV according to patient size, or  iterative reconstruction technique.    COMPARISON: None.    FINDINGS: Vertebral body heights are maintained. Multilevel loss of  disc height in the cervical spine. Straightening of the normal  cervical curvature. No jumped or perched facets. Spinous processes are  intact. Dens interval and craniocervical junction are unremarkable.  Straightening of the normal cervical curvature. Multilevel anterior  osteophytic changes are present. No traumatic listhesis findings.    Visualized paraspinous tissues: Lung apices are unremarkable. Soft  tissues of the neck are grossly unremarkable.      Impression    IMPRESSION: No acute cervical spine fracture.      AMELIE MICHEL MD         SYSTEM ID:  [id number]   CTA Head Neck w Contrast    Narrative    CT ANGIOGRAM OF THE HEAD AND NECK WITH CONTRAST  12/23/2024 1:16 PM     HISTORY: slurred speech for 3 days    TECHNIQUE:  CT angiography with an injection of 67mL [id number]-[id number] IV  with scans through the head and neck. Images were transferred to a  separate 3-D workstation where multiplanar  reformations and 3-D images  were created. Estimates of carotid stenoses are made relative to the  distal internal carotid artery diameters except as noted. Radiation  dose for this scan was reduced using automated exposure control,  adjustment of the mA and/or kV according to patient size, or iterative  reconstruction technique.    COMPARISON: None.     CT HEAD FINDINGS: No contrast enhancing lesions. Cerebral blood flow  is grossly normal.     CT ANGIOGRAM HEAD FINDINGS: Fetal origin of the right PCA. The petrous  and cavernous segments of the internal carotid arteries are  unremarkable. Mildly hypoplastic right A1 segment. Azygos MINA.    CT ANGIOGRAM NECK FINDINGS:   2 vessel aortic arch     Right carotid artery: The right common and internal carotid arteries  are patent. Mild atheromatous changes involve the origin of the right  internal carotid artery without hemodynamically significant stenosis.     Left carotid artery: The left common and internal carotid arteries are  patent. Mild atheromatous changes involve the origin of the left  internal carotid artery without hemodynamically significant stenosis.     Vertebral arteries: Vertebral arteries are patent without evidence of  dissection. No significant stenosis. The vertebral artery on the left  is dominant. Diminutive right vertebral artery.    Other findings: None.       Impression    IMPRESSION: No vascular occlusion findings intracranially. No  hemodynamically significant stenosis in the neck.      AMELIE MICHEL MD         SYSTEM ID:  [id number]     Procedures: MRI pending of the brain that is already  I have personally have reviewed the patient's most up to date radiologic exams, labs, orders, and medications myself

## 2024-12-23 NOTE — ED TRIAGE NOTES
Pt arrives via EMS from a care facility. EMS states he was seen 5 days ago for a fall and since discharge has had decreased mobility with multiple falls. Staff states he has garbled speech at baseline, but has been declining since some time on Saturday. Speech is very garbled at bedside, RN cannot understand anything the patient is saying. Patient is alert and able to follow commands. VSS en route. EMS is unsure if the patient hit his head/is on a blood thinner/gives himself his own medications.      Triage Assessment (Adult)       Row Name 12/23/24 1026          Triage Assessment    Airway WDL WDL        Respiratory WDL    Respiratory WDL WDL        Peripheral/Neurovascular WDL    Peripheral Neurovascular WDL WDL        Cognitive/Neuro/Behavioral WDL    Cognitive/Neuro/Behavioral WDL X     Level of Consciousness alert     Arousal Level opens eyes spontaneously     Speech garbled;slurred

## 2024-12-23 NOTE — PHARMACY-ADMISSION MEDICATION HISTORY
Pharmacy Intern Admission Medication History    Admission medication history is complete. The information provided in this note is only as accurate as the sources available at the time of the update.    Information Source(s): [facility]- [phone]) MAR via fax     Pertinent Information:    - Olanzapine 15mg Qam (10mg + 5mg tablet) and 30mg at bedtime (10mg +20mg tablet)     Changes made to PTA medication list:  Added: Olanzapine 5mg QAM, Hydroxyzine scheduled, Lamotrigine, Systane, Imodium, Dulcolax suppository, Menthol ointment, Fleet enema, Robitussin, Milk of Mg, Naproxen, Oxybutynin  Deleted: Haloperidol, Hydralazine, Ibuprofen, Lorazepam, Melatonin, Multivitamin, Zofran, Tramadol  Changed:   Olanzapine 10mg HS-> 20mg HS   Docusate 100mg: BID-> BID PRN   Flonase daily-> PRN  Benztropine 1m tab daily-> 2 tab daily     Allergies reviewed with patient and updates made in EHR: no    Medication History Completed By: Domitila Mna RPH 2024 2:29 PM    PTA Med List   Medication Sig Last Dose/Taking    artificial tears OINT ophthalmic ointment Place Into the left eye at bedtime. 2024 Bedtime    atorvastatin (LIPITOR) 40 MG tablet Take 40 mg by mouth daily 2024 Evening    benztropine (COGENTIN) 1 MG tablet Take 2 mg by mouth at bedtime. 2024 Evening    bisacodyl (DULCOLAX) 10 MG suppository Place 10 mg rectally daily as needed for constipation. Unknown    busPIRone HCl (BUSPAR) 30 MG tablet Take 30 mg by mouth 2 times daily 2024 Morning    fluticasone (FLONASE) 50 MCG/ACT nasal spray Spray 1 spray into both nostrils daily as needed. Unknown    guaiFENesin (ROBITUSSIN) 20 mg/mL liquid Take 200 mg by mouth every 4 hours as needed for cough. Past Month    hydrOXYzine HCl (ATARAX) 10 MG tablet Take 10 mg by mouth daily at 2 pm. 2024 Noon    hydrOXYzine HCl (ATARAX) 10 MG tablet Take 1 tablet (10 mg) by mouth every 6 hours as needed for itching or anxiety Past Month     lamoTRIgine (LAMICTAL) 100 MG tablet Take 100 mg by mouth daily. 12/23/2024 Morning    loperamide (IMODIUM A-D) 2 MG tablet Take 2 mg by mouth as needed for diarrhea. Unknown    loperamide (IMODIUM) 2 MG capsule Take 2 mg by mouth 4 times daily as needed for diarrhea. Unknown    magnesium hydroxide (MILK OF MAGNESIA) 400 MG/5ML suspension Take 30 mLs by mouth daily as needed for constipation or heartburn. Past Week    menthol-zinc oxide (CALMOSEPTINE) 0.44-20.6 % OINT ointment Apply topically 4 times daily as needed for skin protection. Unknown    mineral oil enema Place 1 enema rectally once. Unknown    naproxen (NAPROSYN) 250 MG tablet Take 250 mg by mouth 2 times daily as needed for moderate pain. Past Month    OLANZapine (ZYPREXA) 10 MG tablet Take 10 mg by mouth 2 times daily 12/23/2024 Morning    OLANZapine (ZYPREXA) 20 MG tablet Take 20 mg by mouth at bedtime. Take with 10mg tablet for a total HS dose of 30mg 12/22/2024 Bedtime    OLANZapine (ZYPREXA) 5 MG tablet Take 5 mg by mouth every morning. Take with 10mg tablet for a total AM dose of 15mg 12/23/2024 Morning    oxyBUTYnin (DITROPAN) 5 MG tablet Take 5 mg by mouth 3 times daily as needed for bladder spasms. Past Week    polyethylene glycol (MIRALAX) 17 GM/Dose powder Take 17 g by mouth daily 12/23/2024 Morning    sennosides (SENOKOT) 8.6 MG tablet Take 1 tablet by mouth 2 times daily 12/23/2024 Morning    tamsulosin (FLOMAX) 0.4 MG capsule Take 0.4 mg by mouth daily 12/23/2024 Morning

## 2024-12-23 NOTE — PLAN OF CARE
ROOM # 223    Living Situation (if not independent, order SW consult):  Facility name: comes from [facility]   : Pt unable to say.     Activity level at baseline: walk with cane   Activity level on admit: x2   Who will be transporting you at discharge: Pt unable to say. Staff at facility recommend TCU because pt is not at baseline.     Patient registered to observation; given Patient Bill of Rights; given the opportunity to ask questions about observation status and their plan of care.  Patient has been oriented to the observation room, bathroom and call light is in place.    Discussed discharge goals and expectations with patient/family.

## 2024-12-23 NOTE — ED NOTES
RN spoke with LUCAS William from [facility] facility. She states the patient has had multiple falls in the past 5 days, each one is unwitnessed. Denies blood thinners. States the patient typically ambulates with a cane, has been unable to get out of bed without multiple people. Staff also noticed more slurred speech today. Last neuro exam was on Saturday, which was unremarkable then. Staff states he needs a TCU because he is not at his baseline. Staff has not been able to reach patients emergency contacts. RN also attempted Bessie and Lyle numbers with no answer. Nataliia currently trying to reach  to help with POA.

## 2024-12-24 PROCEDURE — G0378 HOSPITAL OBSERVATION PER HR: HCPCS

## 2024-12-24 PROCEDURE — 99233 SBSQ HOSP IP/OBS HIGH 50: CPT | Performed by: NURSE PRACTITIONER

## 2024-12-24 PROCEDURE — 250N000013 HC RX MED GY IP 250 OP 250 PS 637: Performed by: NURSE PRACTITIONER

## 2024-12-24 RX ORDER — LAMOTRIGINE 100 MG/1
100 TABLET ORAL DAILY
Status: DISPENSED | OUTPATIENT
Start: 2024-12-24

## 2024-12-24 RX ORDER — HYDROXYZINE HYDROCHLORIDE 10 MG/1
10 TABLET, FILM COATED ORAL DAILY
Status: DISPENSED | OUTPATIENT
Start: 2024-12-24

## 2024-12-24 RX ORDER — OLANZAPINE 10 MG/1
20 TABLET ORAL AT BEDTIME
Status: DISPENSED | OUTPATIENT
Start: 2024-12-24

## 2024-12-24 RX ORDER — BUSPIRONE HYDROCHLORIDE 15 MG/1
15 TABLET ORAL 2 TIMES DAILY
Status: DISPENSED | OUTPATIENT
Start: 2024-12-24

## 2024-12-24 RX ORDER — OLANZAPINE 5 MG/1
5 TABLET ORAL EVERY MORNING
Status: DISCONTINUED | OUTPATIENT
Start: 2024-12-24 | End: 2024-12-24

## 2024-12-24 RX ORDER — TAMSULOSIN HYDROCHLORIDE 0.4 MG/1
0.4 CAPSULE ORAL DAILY
Status: DISPENSED | OUTPATIENT
Start: 2024-12-24

## 2024-12-24 RX ORDER — HYDROXYZINE HYDROCHLORIDE 10 MG/1
10 TABLET, FILM COATED ORAL EVERY 6 HOURS PRN
Status: DISPENSED | OUTPATIENT
Start: 2024-12-24

## 2024-12-24 RX ORDER — ATORVASTATIN CALCIUM 40 MG/1
40 TABLET, FILM COATED ORAL DAILY
Status: DISCONTINUED | OUTPATIENT
Start: 2024-12-24 | End: 2024-12-24

## 2024-12-24 RX ORDER — BENZTROPINE MESYLATE 1 MG/1
2 TABLET ORAL AT BEDTIME
Status: DISPENSED | OUTPATIENT
Start: 2024-12-24

## 2024-12-24 RX ADMIN — HYDROXYZINE HYDROCHLORIDE 10 MG: 10 TABLET, FILM COATED ORAL at 13:11

## 2024-12-24 RX ADMIN — OLANZAPINE 7.5 MG: 5 TABLET, FILM COATED ORAL at 13:10

## 2024-12-24 RX ADMIN — OLANZAPINE 20 MG: 10 TABLET, FILM COATED ORAL at 22:36

## 2024-12-24 RX ADMIN — BUSPIRONE HYDROCHLORIDE 15 MG: 15 TABLET ORAL at 13:07

## 2024-12-24 RX ADMIN — BUSPIRONE HYDROCHLORIDE 15 MG: 15 TABLET ORAL at 19:55

## 2024-12-24 RX ADMIN — TAMSULOSIN HYDROCHLORIDE 0.4 MG: 0.4 CAPSULE ORAL at 13:07

## 2024-12-24 RX ADMIN — LAMOTRIGINE 100 MG: 100 TABLET ORAL at 13:08

## 2024-12-24 RX ADMIN — BENZTROPINE MESYLATE 2 MG: 1 TABLET ORAL at 22:36

## 2024-12-24 ASSESSMENT — ACTIVITIES OF DAILY LIVING (ADL)
ADLS_ACUITY_SCORE: 63
ADLS_ACUITY_SCORE: 61
ADLS_ACUITY_SCORE: 63
ADLS_ACUITY_SCORE: 53
ADLS_ACUITY_SCORE: 63
ADLS_ACUITY_SCORE: 53
ADLS_ACUITY_SCORE: 53
ADLS_ACUITY_SCORE: 63
ADLS_ACUITY_SCORE: 61
ADLS_ACUITY_SCORE: 63
ADLS_ACUITY_SCORE: 60
ADLS_ACUITY_SCORE: 53
ADLS_ACUITY_SCORE: 60
ADLS_ACUITY_SCORE: 63
DEPENDENT_IADLS:: INDEPENDENT
ADLS_ACUITY_SCORE: 61
ADLS_ACUITY_SCORE: 60
ADLS_ACUITY_SCORE: 63
ADLS_ACUITY_SCORE: 53
ADLS_ACUITY_SCORE: 53
ADLS_ACUITY_SCORE: 60
ADLS_ACUITY_SCORE: 61
ADLS_ACUITY_SCORE: 63
ADLS_ACUITY_SCORE: 61

## 2024-12-24 NOTE — PLAN OF CARE
PRIMARY DIAGNOSIS: GENERALIZED WEAKNESS    OUTPATIENT/OBSERVATION GOALS TO BE MET BEFORE DISCHARGE  1. Orthostatic performed: No    2. Tolerating PO medications:  NA    3. Return to near baseline physical activity: No    4. Cleared for discharge by consultants (if involved): No    Discharge Planner Nurse   Safe discharge environment identified: Yes  Barriers to discharge: Yes       Entered by: Aubrey Vazquez RN 12/24/2024 9:55 AM   BP (!) 143/65 (BP Location: Right arm)   Pulse 89   Temp (!) 96.5  F (35.8  C) (Temporal)   Resp 18   SpO2 98%   Pt is confused and hard to understand what pt saying due to slurred speech, SL, external cath in place, has scratch on upper and lower extremities, up assist one with gait belt and walker, reported no pain, needs order food and feeder.  Please review provider order for any additional goals.   Nurse to notify provider when observation goals have been met and patient is ready for discharge.Goal Outcome Evaluation:      Plan of Care Reviewed With: patient    Overall Patient Progress: no changeOverall Patient Progress: no change

## 2024-12-24 NOTE — PLAN OF CARE
PRIMARY DIAGNOSIS: SLURRED SPEECH/ GENERALIZED WEAKNESS     OUTPATIENT/OBSERVATION GOALS TO BE MET BEFORE DISCHARGE    1. Orthostatic performed: N/A     2. Tolerating PO medications: No medication given      3. Return to near baseline physical activity: No, pt has not been out of bed     4. Cleared for discharge by consultants (if involved): No        Discharge Planner Nurse  Safe discharge environment identified: No  Barriers to discharge: Yes       Entered by: King Lang RN 12/24/2024         Please review provider order for any additional goals.   Nurse to notify provider when observation goals have been met and patient is ready for discharge.     Patient is alert, unable to assess orientation due to slurred speech. Unable to assess level of pain, patient does not appear to be in pain. Patient has a bear warmer due to low body temperature. Regular diet. Redness at groin and perineum, red dots at bottom. Dressing on left shin intact. Purewick in place, did not suction output, all output ended up on the chester, UA pending. Patient was changed and made comfortable in bed.      Goal Outcome Evaluation:      Plan of Care Reviewed With: patient    Overall Patient Progress: no changeOverall Patient Progress: no change

## 2024-12-24 NOTE — PLAN OF CARE
PRIMARY DIAGNOSIS: SLURRED SPEECH/ GENERALIZED WEAKNESS     OUTPATIENT/OBSERVATION GOALS TO BE MET BEFORE DISCHARGE    1. Orthostatic performed: N/A     2. Tolerating PO medications: No medication given      3. Return to near baseline physical activity: No, pt has not been out of bed     4. Cleared for discharge by consultants (if involved): No        Discharge Planner Nurse  Safe discharge environment identified: No  Barriers to discharge: Yes       Entered by: King Lang RN 12/24/2024         Please review provider order for any additional goals.   Nurse to notify provider when observation goals have been met and patient is ready for discharge.     Patient is alert, unable to assess orientation due to slurred speech. Unable to assess level of pain, patient does not appear to be in pain. Patient has a bear warmer due to low body temperature. Regular diet. Redness at groin and perineum, red dots at bottom. Dressing on left shin intact. Purewick in place, did not suction output, all output ended up on the chester, UA pending. Patient is sleeping well, will continue to monitor.      Goal Outcome Evaluation:      Plan of Care Reviewed With: patient    Overall Patient Progress: no changeOverall Patient Progress: no change

## 2024-12-24 NOTE — PLAN OF CARE
PRIMARY DIAGNOSIS: SLURRED SPEECH/ GENERALIZED WEAKNESS    OUTPATIENT/OBSERVATION GOALS TO BE MET BEFORE DISCHARGE  1. Orthostatic performed: N/A    2. Tolerating PO medications: No medication given     3. Return to near baseline physical activity: No, pt has not been out of bed    4. Cleared for discharge by consultants (if involved): No    Discharge Planner Nurse   Safe discharge environment identified: No  Barriers to discharge: Yes       Entered by: King Lang RN 12/24/2024      Please review provider order for any additional goals.   Nurse to notify provider when observation goals have been met and patient is ready for discharge.    Patient is alert, unable to assess orientation due to slurred speech. Unable to assess level of pain, patient does not appear to be in pain. Patient has a bear warmer due to low body temperature. Regular diet. Redness at groin and perineum, red dots at bottom. Purewick in place, did not suction output, all output ended up on the chester, UA pending.     Goal Outcome Evaluation:      Plan of Care Reviewed With: patient    Overall Patient Progress: no changeOverall Patient Progress: no change

## 2024-12-24 NOTE — PROGRESS NOTES
[facility]    Medicine Progress Note - Hospitalist Service    Date of Admission:  12/23/2024    Assessment & Plan   Harpreet Mcelroy is a 69 year old male who has a very complex psychiatric history with a history of being bipolar, anxiety, depression, schizophrenia, borderline personality, history of paranoia who also has a history of bladder cancer and ureteral stent ( Last replaced in 8/2024 from what I can tell in care everywhere at [facility]), hypertension, hyperlipidemia, previous noncompliance with medications is presenting from his [facility] center with recurrent falls and worsening slurred speech.  Epic reviewed at length as the patient is unable to give me any kind of story.  Per the emergency room, patient has been falling more at his living facility being found on the floor.  He is following commands but has had worsening slurred speech.  Patient is actually moving all extremities.  He is able to move his head in all directions without pain.  Does not complain of really any symptoms.  Was just in the emergency room 12/19/2024 with falls and had a workup at that time that was negative.  In the emergency room here his vital signs are stable if not a little hypertensive.  He has no fever.  His BMP is normal, TSH is normal, and does not have a leukocytosis.  He underwent a head CT, CT of the C-spine, and CT angiogram of the head all of which were normal.  Patient has a brain MRI pending.  He also has a urinalysis pending which I would assume is going to be abnormal though I am not convinced that he has any symptoms of a UTI.  I expect his culture to probably be abnormal given his ureteral stent.  Patient is being admitted to observation.  Pharmacy to reconcile medications.  I suspect the polypharmacy has some underlying cause in his mentation.     Acute medical issues:  #Slurred speech  #Recurrent falls  #Polypharmacy    He is well known to me from admission earlier this year. He  presents from his care facility with worsening slurred speech.  Work up in the ED is overall reassuring -- no acute evidence of infection or CVA.  Likely multifactorial with concern for polypharmacy.   CT and MRI brain overall reassuring.  No evidence of acute CVA.  No meningeal s/s.  This morning he is more awake and interactive.  PTA medications were initially held.     Plan:  -- Admit to Obs  -- Likely related to polypharmacy.  Will reduce medications: decrease buspirone 15 mg BID, change Zyprexa to 7.5 mg in the AM and to 20 mg at HS.  Continue Cogentin.  Will monitor changes and reevaluate.  Keep today.  Depending on response may need to have Psychiatry weigh in.  Stop statin.  If Psychiatry needed will be able to see on 12/26/2024.  Continue other medications.  -- Will have nursing ambulate patient.  If needed will consult PT but will hold off.     ---------------------------------------------------------------  Chronic medical issue:   #BPH  #Bladder cancer  #Hx ureteral stent:    PTA Flomax, ditropan in the past.  Has a stent and it appears that it was last replaced by [facility] [place]24.  UA pending.  However, not exhibiting any symptoms to suggest acute infection.  Presuming that UA and UC will be abnormal.  No fever nor leukocytosis   -- Recommend holding off on abx until clear objective s/s of infection are demonstrated  -- Continue tamsulosin scheduled and decrease oxybutynin to 2.5 mg TID PRN (was 5 mg TID PRN).    # HTN: VSS.  PRN hydralazine.   #Bipolar, ARLYN/schizophrenia, paranoia, BPD: This is his rate limiting issue.  No evidence of serotonin syndrome.   -- Continue benztropine, decrease buspirone to 15 mg BID (was 30 mg BID), continue lamotrigine 100 mg daily, olanzapine reduced to 20 mg at HS (was 30 mg at HS), and decrease daily dose from 15 mg in the am to 7.5 mg. Continue PRN hydroxyzine.   #Hx of medication noncompliance: This too has been an issue in the past.  Appears to be living in a  facility -- [facility] and they manage medications.      Observation Goals: -diagnostic tests and consults completed and resulted, -vital signs normal or at patient baseline, -returns to baseline functional status, -safe disposition plan has been identified, Nurse to notify provider when observation goals have been met and patient is ready for discharge.  Diet: Regular Diet Adult    DVT Prophylaxis: Low Risk/Ambulatory with no VTE prophylaxis indicated  Hernandez Catheter: Not present  Lines: None     Cardiac Monitoring: None  Code Status: Full Code      Clinically Significant Risk Factors Present on Admission                                 # Financial/Environmental Concerns:           Social Drivers of Health    Food Insecurity: Unknown (12/23/2024)    Food Insecurity     Within the past 12 months, did you worry that your food would run out before you got money to buy more?: Patient unable to answer     Within the past 12 months, did the food you bought just not last and you didn t have money to get more?: Patient unable to answer   Housing Stability: Unknown (12/23/2024)    Housing Stability     Do you have housing? : Patient unable to answer     Are you worried about losing your housing?: Patient unable to answer   Tobacco Use: Medium Risk (8/5/2024)    Received from [facility]    Patient History     Smoking Tobacco Use: Former     Smokeless Tobacco Use: Never   Financial Resource Strain: Unknown (12/23/2024)    Financial Resource Strain     Within the past 12 months, have you or your family members you live with been unable to get utilities (heat, electricity) when it was really needed?: Patient unable to answer   Transportation Needs: Unknown (12/23/2024)    Transportation Needs     Within the past 12 months, has lack of transportation kept you from medical appointments, getting your medicines, non-medical meetings or appointments, work, or from getting things that you need?: Patient unable to  answer          Disposition Plan     Medically Ready for Discharge: Anticipated in 2-4 Days           The patient's care was discussed with the Bedside Nurse, Care Coordinator/, and Patient.    [facility] [employer]  Hospitalist Service  [facility]  Securely message with [email or web address] (more info)  Text page via [facility] Paging/Directory   ______________________________________________________________________    Interval History   Assumed care of patient.  He is well known to me from previous admission.   More awake and alert. Follows commands.    No CP or SOB.      Physical Exam   Vital Signs: Temp: (!) 96.5  F (35.8  C) Temp src: Temporal BP: (!) 143/65 Pulse: 89   Resp: 18 SpO2: 98 % O2 Device: None (Room air)    Weight: 0 lbs 0 oz    GEN:   Alert, oriented x 2, appears comfortable, NAD.  NECK:   Supple ,no mass or thyromegaly   HEENT:  Normocephalic/atraumatic, no scleral icterus, no nasal discharge, mouth moist.  CV:   Regular rate and rhythm, no murmur or JVD.  S1 + S2 noted, no S3 or S4.  LUNGS:   Clear to auscultation bilaterally without rales/rhonchi/wheezing/retractions.  Symmetric chest rise on inhalation noted.  ABD:   Active bowel sounds, soft, non-tender/non-distended.  No rebound/guarding/rigidity.  EXT:   No edema.  No cyanosis.  No joint synovitis noted.  SKIN:   Dry to touch, no exanthems noted in the visualized areas.  Neurologic: Grossly intact,non focal. Spine:   Neuropsychiatric:  General: normal, calm and normal eye contact  Level of consciousness: alert / normal  Affect: normal  Orientation: oriented to self, place,       Medical Decision Making       60 MINUTES SPENT BY ME on the date of service doing chart review, history, exam, documentation & further activities per the note.      Data   ------------------------- PAST 24 HR DATA REVIEWED -----------------------------------------------        Imaging results reviewed over the past 24 hrs:   Recent Results  (from the past 24 hours)   CT Head w/o Contrast    Narrative    CT SCAN OF THE HEAD WITHOUT CONTRAST   12/23/2024 1:14 PM     HISTORY: fall    TECHNIQUE:  Axial images of the head and coronal reformations without  IV contrast material. Radiation dose for this scan was reduced using  automated exposure control, adjustment of the mA and/or kV according  to patient size, or iterative reconstruction technique.    COMPARISON: 12/19/2024    FINDINGS: Stable prominent CSF space along the left frontal apex. No  acute intracranial hemorrhage. No hydrocephalus or extra-axial  hemorrhage. Mild chronic small vessel ischemic changes. Moderate  global cortical volume loss. Empty sella. No acute calvarial fracture.      Impression    IMPRESSION:   No acute intracranial abnormality.      AMELIE MICHEL MD         SYSTEM ID:  [id number]   CT Cervical Spine w/o Contrast    Narrative    CT CERVICAL SPINE WITHOUT CONTRAST 12/23/2024 1:15 PM     HISTORY: fall     TECHNIQUE: Axial images of the cervical spine were obtained without  intravenous contrast. Multiplanar reformations were performed.   Radiation dose for this scan was reduced using automated exposure  control, adjustment of the mA and/or kV according to patient size, or  iterative reconstruction technique.    COMPARISON: None.    FINDINGS: Vertebral body heights are maintained. Multilevel loss of  disc height in the cervical spine. Straightening of the normal  cervical curvature. No jumped or perched facets. Spinous processes are  intact. Dens interval and craniocervical junction are unremarkable.  Straightening of the normal cervical curvature. Multilevel anterior  osteophytic changes are present. No traumatic listhesis findings.    Visualized paraspinous tissues: Lung apices are unremarkable. Soft  tissues of the neck are grossly unremarkable.      Impression    IMPRESSION: No acute cervical spine fracture.      AMELIE MICHEL MD         SYSTEM ID:  [id number]   CTA Head Neck w  Contrast    Narrative    CT ANGIOGRAM OF THE HEAD AND NECK WITH CONTRAST  12/23/2024 1:16 PM     HISTORY: slurred speech for 3 days    TECHNIQUE:  CT angiography with an injection of 67mL [id number]-[id number] IV  with scans through the head and neck. Images were transferred to a  separate 3-D workstation where multiplanar reformations and 3-D images  were created. Estimates of carotid stenoses are made relative to the  distal internal carotid artery diameters except as noted. Radiation  dose for this scan was reduced using automated exposure control,  adjustment of the mA and/or kV according to patient size, or iterative  reconstruction technique.    COMPARISON: None.     CT HEAD FINDINGS: No contrast enhancing lesions. Cerebral blood flow  is grossly normal.     CT ANGIOGRAM HEAD FINDINGS: Fetal origin of the right PCA. The petrous  and cavernous segments of the internal carotid arteries are  unremarkable. Mildly hypoplastic right A1 segment. Azygos MINA.    CT ANGIOGRAM NECK FINDINGS:   2 vessel aortic arch     Right carotid artery: The right common and internal carotid arteries  are patent. Mild atheromatous changes involve the origin of the right  internal carotid artery without hemodynamically significant stenosis.     Left carotid artery: The left common and internal carotid arteries are  patent. Mild atheromatous changes involve the origin of the left  internal carotid artery without hemodynamically significant stenosis.     Vertebral arteries: Vertebral arteries are patent without evidence of  dissection. No significant stenosis. The vertebral artery on the left  is dominant. Diminutive right vertebral artery.    Other findings: None.       Impression    IMPRESSION: No vascular occlusion findings intracranially. No  hemodynamically significant stenosis in the neck.      AMELIE MICHEL MD         SYSTEM ID:  [id number]   MR Brain w/o & w Contrast    Narrative    MRI BRAIN WITHOUT AND WITH CONTRAST  12/23/2024 4:12 PM      HISTORY:  slurred speech     TECHNIQUE:  Multiplanar, multisequence MRI of the brain without and  with 7.5mL Gadavist     COMPARISON: CT brain from the same day.     FINDINGS: No restricted diffusion to suggest acute ischemia. Stable  asymmetric global cortical volume loss with prominent CSF space near  the left frontal cranial apex. Mild chronic small vessel ischemic  changes. Major vascular flow voids are unremarkable. Midline  structures demonstrate an empty sella configuration. Mild degenerative  changes at the craniocervical junction.    Postcontrast imaging demonstrates no abnormal enhancement.  Susceptibility imaging is negative.    Scattered anterior ethmoid sinus disease. Mastoids are clear.      Impression    IMPRESSION:  No restricted diffusion to suggest acute ischemia. No  abnormal enhancement. Chronic changes as detailed above.      AMELIE MICHEL MD         SYSTEM ID:  [id number]

## 2024-12-24 NOTE — CONSULTS
Care Management Initial Consult    General Information  Assessment completed with: Care Team Member,    Type of CM/SW Visit: Initial Assessment    Primary Care Provider verified and updated as needed: Yes   Readmission within the last 30 days: no previous admission in last 30 days      Reason for Consult: discharge planning  Advance Care Planning:            Communication Assessment  Patient's communication style: spoken language (English or Bilingual)    Hearing Difficulty or Deaf: no   Wear Glasses or Blind: no (No pt does look when you at you when you talk to them.)    Cognitive  Cognitive/Neuro/Behavioral: .WDL except, orientation  Level of Consciousness: alert  Arousal Level: opens eyes spontaneously  Orientation:  (slurred speeech,unable to understand pt)  Mood/Behavior: calm, cooperative     Speech: slurred, incoherent    Living Environment:   People in home: facility resident     Current living Arrangements: assisted living      Able to return to prior arrangements: no       Family/Social Support:  Care provided by: self, other (see comments)  Provides care for: no one, unable/limited ability to care for self  Marital Status: Single  Support system: Facility resident(s)/Staff          Description of Support System: Involved    Support Assessment: Lacks adequate emotional support    Current Resources:   Patient receiving home care services: No        Community Resources:    Equipment currently used at home: cane, straight  Supplies currently used at home:      Employment/Financial:  Employment Status:          Financial Concerns:             Does the patient's insurance plan have a 3 day qualifying hospital stay waiver?  Yes     Which insurance plan 3 day waiver is available? Alternative insurance waiver    Will the waiver be used for post-acute placement? Undetermined at this time    Lifestyle & Psychosocial Needs:  Social Drivers of Health     Food Insecurity: Unknown (12/23/2024)    Food Insecurity      Within the past 12 months, did you worry that your food would run out before you got money to buy more?: Patient unable to answer     Within the past 12 months, did the food you bought just not last and you didn t have money to get more?: Patient unable to answer   Depression: Not at risk (3/2/2023)    [facility]    PHQ-2     PHQ-2 Subtotal: 0   Housing Stability: Unknown (12/23/2024)    Housing Stability     Do you have housing? : Patient unable to answer     Are you worried about losing your housing?: Patient unable to answer   Tobacco Use: Medium Risk (8/5/2024)    Received from [facility]    Patient History     Smoking Tobacco Use: Former     Smokeless Tobacco Use: Never     Passive Exposure: Not on file   Financial Resource Strain: Unknown (12/23/2024)    Financial Resource Strain     Within the past 12 months, have you or your family members you live with been unable to get utilities (heat, electricity) when it was really needed?: Patient unable to answer   Alcohol Use: Not on file   Transportation Needs: Unknown (12/23/2024)    Transportation Needs     Within the past 12 months, has lack of transportation kept you from medical appointments, getting your medicines, non-medical meetings or appointments, work, or from getting things that you need?: Patient unable to answer   Physical Activity: Not on file   Interpersonal Safety: Not on file   Stress: Not on file   Social Connections: Not on file   Health Literacy: Not on file       Functional Status:  Prior to admission patient needed assistance:   Dependent ADLs:: Ambulation-cane  Dependent IADLs:: Independent  Assesssment of Functional Status: Not at baseline with ADL Functioning    Mental Health Status:          Chemical Dependency Status:                Values/Beliefs:  Spiritual, Cultural Beliefs, Synagogue Practices, Values that affect care:                 Discussed  Partnership in Safe Discharge  "Planning  document with patient/family: No    Additional Information:    SW spoke with RN at [facility] (P: [phone] F: [phone]) who reports that pt is independent with a cane at baseline. Pt receives assist with medications and showers. RN reports that they cannot accommodate AX-1 for pt and would need pt to go to TCU if not back to baseline. They state that slurred speech is normal for pt but it was far more difficult to understand him in the past few days. They report that they have no contacts for the pt except a sister who does not answer the phone. Pt has a [facility] [employer] Charleen ARORA (P: [phone]) who takes the pt on outings a couple of days per week. If pt is back to baseline he can return before 1400 during the week, not on [employer]. They use [facility] in [place].     SW met with pt at the bedside who was very difficult to understand. Pt did answer questions with yes/no and told this writer to \"have a [place]ry [place]\". He did say that SW could contact any family.     SW attempted to call pt sister Bessie (P: [phone]), was not able to leave .     Next Steps: Follow for discharge planning.     Gladis Levine/LIANNA Richard, [facility]  Inpatient Care Coordination  Emergency Room /Float  [phone]    Gladis Levine, [facility]     "

## 2024-12-24 NOTE — PLAN OF CARE
PRIMARY DIAGNOSIS: GENERALIZED WEAKNESS    OUTPATIENT/OBSERVATION GOALS TO BE MET BEFORE DISCHARGE  1. Orthostatic performed: N/A    2. Tolerating PO medications: No, did not give any medication     3. Return to near baseline physical activity: No    4. Cleared for discharge by consultants (if involved): No    Discharge Planner Nurse   Safe discharge environment identified: No  Barriers to discharge: Yes          Please review provider order for any additional goals.   Nurse to notify provider when observation goals have been met and patient is ready for discharge.     Please review provider order for any additional goals.   Nurse to notify provider when observation goals have been met and patient is ready for discharge.    Goal Outcome Evaluation:      Plan of Care Reviewed With: patient    Overall Patient Progress: no changeOverall Patient Progress: no change    Outcome Evaluation: Unable to assess. Pt pain is unable to assess but pt does make faces. Pt on tele. Pt does have some red spots on skin. Pt does have extrnal catheter on for UA. Pt is regular diet. Pt does have a bandage on left shin. Pt is nonverbal and at base line does slurred speech.

## 2024-12-24 NOTE — PLAN OF CARE
PRIMARY DIAGNOSIS: GENERALIZED WEAKNESS    OUTPATIENT/OBSERVATION GOALS TO BE MET BEFORE DISCHARGE  1. Orthostatic performed: No    2. Tolerating PO medications:  NA    3. Return to near baseline physical activity: No    4. Cleared for discharge by consultants (if involved): No    Discharge Planner Nurse   Safe discharge environment identified: Yes  Barriers to discharge: Yes       Entered by: Aubrey Vazquez RN 12/24/2024 5:22 PM   /60 (BP Location: Left arm)   Pulse 83   Temp 98.3  F (36.8  C) (Oral)   Resp 16   SpO2 97%   Pt is confused and hard to understand what pt saying due to slurred speech, SL, has scratch on upper and lower extremities, up assist one with gait belt and walker, ambulated in the hole way and tolerated well, reported pain and scheduled pain med's were given, needs order food and feeder.  Please review provider order for any additional goals.   Nurse to notify provider when observation goals have been met and patient is ready for discharge.Goal Outcome Evaluation:      Plan of Care Reviewed With: patient    Overall Patient Progress: no changeOverall Patient Progress: no change

## 2024-12-25 PROCEDURE — 250N000013 HC RX MED GY IP 250 OP 250 PS 637: Performed by: NURSE PRACTITIONER

## 2024-12-25 PROCEDURE — G0378 HOSPITAL OBSERVATION PER HR: HCPCS

## 2024-12-25 PROCEDURE — 99233 SBSQ HOSP IP/OBS HIGH 50: CPT | Performed by: NURSE PRACTITIONER

## 2024-12-25 RX ORDER — OLANZAPINE 7.5 MG/1
7.5 TABLET, FILM COATED ORAL DAILY
Qty: 30 TABLET | Refills: 3 | Status: SHIPPED | OUTPATIENT
Start: 2024-12-26

## 2024-12-25 RX ORDER — OXYBUTYNIN CHLORIDE 2.5 MG/1
2.5 TABLET ORAL 3 TIMES DAILY PRN
Qty: 30 TABLET | Refills: 1 | Status: SHIPPED | OUTPATIENT
Start: 2024-12-25

## 2024-12-25 RX ORDER — POLYETHYLENE GLYCOL 3350 17 G/17G
17 POWDER, FOR SOLUTION ORAL DAILY
Qty: 510 G | Status: SHIPPED | OUTPATIENT
Start: 2024-12-25

## 2024-12-25 RX ORDER — BUSPIRONE HYDROCHLORIDE 30 MG/1
15 TABLET ORAL 2 TIMES DAILY
Status: SHIPPED
Start: 2024-12-25

## 2024-12-25 RX ORDER — ONDANSETRON 4 MG/1
4 TABLET, ORALLY DISINTEGRATING ORAL EVERY 6 HOURS PRN
Qty: 30 TABLET | Refills: 1 | Status: SHIPPED | OUTPATIENT
Start: 2024-12-25

## 2024-12-25 RX ORDER — OLANZAPINE 20 MG/1
20 TABLET ORAL AT BEDTIME
Qty: 30 TABLET | Refills: 3 | Status: SHIPPED | OUTPATIENT
Start: 2024-12-25

## 2024-12-25 RX ADMIN — HYDROXYZINE HYDROCHLORIDE 10 MG: 10 TABLET, FILM COATED ORAL at 11:32

## 2024-12-25 RX ADMIN — HYDROXYZINE HYDROCHLORIDE 10 MG: 10 TABLET, FILM COATED ORAL at 14:18

## 2024-12-25 RX ADMIN — LAMOTRIGINE 100 MG: 100 TABLET ORAL at 07:43

## 2024-12-25 RX ADMIN — BUSPIRONE HYDROCHLORIDE 15 MG: 15 TABLET ORAL at 21:08

## 2024-12-25 RX ADMIN — TAMSULOSIN HYDROCHLORIDE 0.4 MG: 0.4 CAPSULE ORAL at 07:42

## 2024-12-25 RX ADMIN — OLANZAPINE 7.5 MG: 5 TABLET, FILM COATED ORAL at 07:43

## 2024-12-25 RX ADMIN — BENZTROPINE MESYLATE 2 MG: 1 TABLET ORAL at 22:35

## 2024-12-25 RX ADMIN — OLANZAPINE 20 MG: 10 TABLET, FILM COATED ORAL at 22:35

## 2024-12-25 RX ADMIN — BUSPIRONE HYDROCHLORIDE 15 MG: 15 TABLET ORAL at 07:42

## 2024-12-25 ASSESSMENT — ACTIVITIES OF DAILY LIVING (ADL)
ADLS_ACUITY_SCORE: 63
ADLS_ACUITY_SCORE: 63
ADLS_ACUITY_SCORE: 69
ADLS_ACUITY_SCORE: 63
ADLS_ACUITY_SCORE: 65
ADLS_ACUITY_SCORE: 63
ADLS_ACUITY_SCORE: 69
ADLS_ACUITY_SCORE: 65
ADLS_ACUITY_SCORE: 63
ADLS_ACUITY_SCORE: 65

## 2024-12-25 NOTE — PLAN OF CARE
"Goal Outcome Evaluation:      Plan of Care Reviewed With: patient    Overall Patient Progress: no changeOverall Patient Progress: no change     PRIMARY DIAGNOSIS: GENERALIZED WEAKNESS    OUTPATIENT/OBSERVATION GOALS TO BE MET BEFORE DISCHARGE  1. Orthostatic performed: No    2. Tolerating PO medications: Yes    3. Return to near baseline physical activity: No    4. Cleared for discharge by consultants (if involved): No    Discharge Planner Nurse   Safe discharge environment identified: No  Barriers to discharge: Yes       Entered by: Aubrey Vazquez RN 12/25/2024 5:58 PM   /55 (BP Location: Left arm)   Pulse 76   Temp 97.3  F (36.3  C) (Oral)   Resp 18   SpO2 98%   Pt is confused and hard to understand what pt saying due to slurred speech, SL, has scratch on upper and lower extremities, up assist one with gait belt and walker, needs help with ordering food, ambulated in hole way with walker and gait belt tolerated well.    Please review provider order for any additional goals.   Nurse to notify provider when observation goals have been met and patient is ready for discharge.      Problem: Adult Inpatient Plan of Care  Goal: Plan of Care Review  Description: The Plan of Care Review/Shift note should be completed every shift.  The Outcome Evaluation is a brief statement about your assessment that the patient is improving, declining, or no change.  This information will be displayed automatically on your shift  note.  Outcome: Progressing  Flowsheets (Taken 12/25/2024 0849)  Plan of Care Reviewed With: patient  Overall Patient Progress: no change  Goal: Patient-Specific Goal (Individualized)  Description: You can add care plan individualizations to a care plan. Examples of Individualization might be:  \"Parent requests to be called daily at 9am for status\", \"I have a hard time hearing out of my right ear\", or \"Do not touch me to wake me up as it startles  me\".  Outcome: Progressing  Goal: Absence of " Hospital-Acquired Illness or Injury  Outcome: Progressing  Intervention: Identify and Manage Fall Risk  Recent Flowsheet Documentation  Taken 12/25/2024 0800 by Aubrey Vazquez RN  Safety Promotion/Fall Prevention:   activity supervised   safety round/check completed   room organization consistent   clutter free environment maintained   patient and family education   nonskid shoes/slippers when out of bed   room near nurse's station  Intervention: Prevent Skin Injury  Recent Flowsheet Documentation  Taken 12/25/2024 0800 by Aubrey Vazquez RN  Body Position: supine, head elevated  Intervention: Prevent and Manage VTE (Venous Thromboembolism) Risk  Recent Flowsheet Documentation  Taken 12/25/2024 0800 by Aubrey Vazquez RN  VTE Prevention/Management: SCDs off (sequential compression devices)  Intervention: Prevent Infection  Recent Flowsheet Documentation  Taken 12/25/2024 0800 by Aubrey Vazquez RN  Infection Prevention:   hand hygiene promoted   rest/sleep promoted   single patient room provided   equipment surfaces disinfected  Goal: Optimal Comfort and Wellbeing  Outcome: Progressing  Goal: Readiness for Transition of Care  Outcome: Progressing

## 2024-12-25 NOTE — PLAN OF CARE
"Goal Outcome Evaluation:      Plan of Care Reviewed With: patient    Overall Patient Progress: no changeOverall Patient Progress: no change     PRIMARY DIAGNOSIS: GENERALIZED WEAKNESS    OUTPATIENT/OBSERVATION GOALS TO BE MET BEFORE DISCHARGE  1. Orthostatic performed: No    2. Tolerating PO medications: Yes    3. Return to near baseline physical activity: No    4. Cleared for discharge by consultants (if involved): No    Discharge Planner Nurse   Safe discharge environment identified: No  Barriers to discharge: Yes       Entered by: Aubrey Vazquez RN 12/25/2024 8:49 AM   /57 (BP Location: Right arm, Patient Position: Supine, Cuff Size: Adult Regular)   Pulse 67   Temp (!) 93.8  F (34.3  C) (Oral)   Resp 16   SpO2 98%   Pt is confused and hard to understand what pt saying due to slurred speech, SL, has scratch on upper and lower extremities, up assist one with gait belt and walker, needs help with ordering food.   Please review provider order for any additional goals.   Nurse to notify provider when observation goals have been met and patient is ready for discharge.      Problem: Adult Inpatient Plan of Care  Goal: Plan of Care Review  Description: The Plan of Care Review/Shift note should be completed every shift.  The Outcome Evaluation is a brief statement about your assessment that the patient is improving, declining, or no change.  This information will be displayed automatically on your shift  note.  Outcome: Progressing  Flowsheets (Taken 12/25/2024 0849)  Plan of Care Reviewed With: patient  Overall Patient Progress: no change  Goal: Patient-Specific Goal (Individualized)  Description: You can add care plan individualizations to a care plan. Examples of Individualization might be:  \"Parent requests to be called daily at 9am for status\", \"I have a hard time hearing out of my right ear\", or \"Do not touch me to wake me up as it startles  me\".  Outcome: Progressing  Goal: Absence of " Hospital-Acquired Illness or Injury  Outcome: Progressing  Intervention: Identify and Manage Fall Risk  Recent Flowsheet Documentation  Taken 12/25/2024 0800 by Aubrey Vazquez RN  Safety Promotion/Fall Prevention:   activity supervised   safety round/check completed   room organization consistent   clutter free environment maintained   patient and family education   nonskid shoes/slippers when out of bed   room near nurse's station  Intervention: Prevent Skin Injury  Recent Flowsheet Documentation  Taken 12/25/2024 0800 by Aubrey Vazquez RN  Body Position: supine, head elevated  Intervention: Prevent and Manage VTE (Venous Thromboembolism) Risk  Recent Flowsheet Documentation  Taken 12/25/2024 0800 by Aubrey Vazquez RN  VTE Prevention/Management: SCDs off (sequential compression devices)  Intervention: Prevent Infection  Recent Flowsheet Documentation  Taken 12/25/2024 0800 by Aubrey Vazquez RN  Infection Prevention:   hand hygiene promoted   rest/sleep promoted   single patient room provided   equipment surfaces disinfected  Goal: Optimal Comfort and Wellbeing  Outcome: Progressing  Goal: Readiness for Transition of Care  Outcome: Progressing

## 2024-12-25 NOTE — PLAN OF CARE
"Goal Outcome Evaluation:      Plan of Care Reviewed With: patient    Overall Patient Progress: no changeOverall Patient Progress: no change     PRIMARY DIAGNOSIS: GENERALIZED WEAKNESS    OUTPATIENT/OBSERVATION GOALS TO BE MET BEFORE DISCHARGE  1. Orthostatic performed: No    2. Tolerating PO medications: Yes    3. Return to near baseline physical activity: No    4. Cleared for discharge by consultants (if involved): No    Discharge Planner Nurse   Safe discharge environment identified: No  Barriers to discharge: Yes       Entered by: Aubrey Vazquez RN 12/25/2024 11:57 AM   /55 (BP Location: Left arm)   Pulse 74   Temp 98.2  F (36.8  C) (Oral)   Resp 18   SpO2 99%   Pt is confused and hard to understand what pt saying due to slurred speech, SL, has scratch on upper and lower extremities, up assist one with gait belt and walker, needs help with ordering food, ambulated in hole way with walker and gait belt tolerated well.    Please review provider order for any additional goals.   Nurse to notify provider when observation goals have been met and patient is ready for discharge.      Problem: Adult Inpatient Plan of Care  Goal: Plan of Care Review  Description: The Plan of Care Review/Shift note should be completed every shift.  The Outcome Evaluation is a brief statement about your assessment that the patient is improving, declining, or no change.  This information will be displayed automatically on your shift  note.  Outcome: Progressing  Flowsheets (Taken 12/25/2024 0849)  Plan of Care Reviewed With: patient  Overall Patient Progress: no change  Goal: Patient-Specific Goal (Individualized)  Description: You can add care plan individualizations to a care plan. Examples of Individualization might be:  \"Parent requests to be called daily at 9am for status\", \"I have a hard time hearing out of my right ear\", or \"Do not touch me to wake me up as it startles  me\".  Outcome: Progressing  Goal: Absence of " Hospital-Acquired Illness or Injury  Outcome: Progressing  Intervention: Identify and Manage Fall Risk  Recent Flowsheet Documentation  Taken 12/25/2024 0800 by Aubrey Vazquez RN  Safety Promotion/Fall Prevention:   activity supervised   safety round/check completed   room organization consistent   clutter free environment maintained   patient and family education   nonskid shoes/slippers when out of bed   room near nurse's station  Intervention: Prevent Skin Injury  Recent Flowsheet Documentation  Taken 12/25/2024 0800 by Aubrey Vazquez RN  Body Position: supine, head elevated  Intervention: Prevent and Manage VTE (Venous Thromboembolism) Risk  Recent Flowsheet Documentation  Taken 12/25/2024 0800 by Aubrey Vazquez RN  VTE Prevention/Management: SCDs off (sequential compression devices)  Intervention: Prevent Infection  Recent Flowsheet Documentation  Taken 12/25/2024 0800 by Aubrey Vazquez RN  Infection Prevention:   hand hygiene promoted   rest/sleep promoted   single patient room provided   equipment surfaces disinfected  Goal: Optimal Comfort and Wellbeing  Outcome: Progressing  Goal: Readiness for Transition of Care  Outcome: Progressing

## 2024-12-25 NOTE — PLAN OF CARE
PRIMARY DIAGNOSIS: WORSENING SLURRED SPEECH   OUTPATIENT/OBSERVATION GOALS TO BE MET BEFORE DISCHARGE:  1. Pain Status: Improved-controlled with oral pain medications.    2. Return to near baseline physical activity: No    3. Cleared for discharge by consultants (if involved): No    Discharge Planner Nurse   Safe discharge environment identified: Yes  Barriers to discharge: Yes       Entered by: DEMETRI BLAKCWELL RN 12/25/2024 12:24 AM   Vital signs:  Temp: 98.4  F (36.9  C) Temp src: Oral BP: (!) 154/65 Pulse: 80   Resp: 16 SpO2: 97 % O2 Device: None (Room air)Please review provider order for any additional goals.   Nurse to notify provider when observation goals have been met and patient is ready for discharge.Goal Outcome Evaluation:      Plan of Care Reviewed With: patient    Overall Patient Progress: no changeOverall Patient Progress: no change    Outcome Evaluation: Unable to assess orientation. unable to understand what pt is talking due to slurred speech. calm and coopreative with care. Right knee abrasion/bleeding. Dressing changed. Assist of x1 with a cane and gait belt. Regular diet. Good appetite. PIV saline locked. Bed/chair alarm on. Call light in reach.     Problem: Adult Inpatient Plan of Care  Goal: Plan of Care Review  Description: The Plan of Care Review/Shift note should be completed every shift.  The Outcome Evaluation is a brief statement about your assessment that the patient is improving, declining, or no change.  This information will be displayed automatically on your shift  note.  12/25/2024 0024 by Demetri Blackwell RN  Outcome: Progressing  Flowsheets (Taken 12/25/2024 0024)  Plan of Care Reviewed With: patient  Overall Patient Progress: no change  12/24/2024 2104 by Demetri Blackwell RN  Outcome: Progressing  Flowsheets (Taken 12/24/2024 2104)  Outcome Evaluation: Unable to assess orientation. unable to understand what pt is talking due to slurred speech. calm and coopreative  "with care.  Plan of Care Reviewed With: patient  Overall Patient Progress: no change  Goal: Patient-Specific Goal (Individualized)  Description: You can add care plan individualizations to a care plan. Examples of Individualization might be:  \"Parent requests to be called daily at 9am for status\", \"I have a hard time hearing out of my right ear\", or \"Do not touch me to wake me up as it startles  me\".  12/25/2024 0024 by Sigifredo Hamilton RN  Outcome: Progressing  12/24/2024 2104 by Sigifredo Hamilton RN  Outcome: Progressing  Goal: Absence of Hospital-Acquired Illness or Injury  12/25/2024 0024 by Sigifredo Hamilton RN  Outcome: Progressing  12/24/2024 2104 by Sigifredo Hamilton RN  Outcome: Progressing  Intervention: Identify and Manage Fall Risk  Recent Flowsheet Documentation  Taken 12/24/2024 2100 by Sigifredo Hamilton RN  Safety Promotion/Fall Prevention:   activity supervised   clutter free environment maintained   increased rounding and observation   increase visualization of patient   lighting adjusted   mobility aid in reach   nonskid shoes/slippers when out of bed   room door open   room near nurse's station   room organization consistent   safety round/check completed  Intervention: Prevent and Manage VTE (Venous Thromboembolism) Risk  Recent Flowsheet Documentation  Taken 12/24/2024 2100 by Sigifredo Hamilton RN  VTE Prevention/Management: SCDs off (sequential compression devices)  Goal: Optimal Comfort and Wellbeing  12/25/2024 0024 by Sigifredo Hamilton RN  Outcome: Progressing  12/24/2024 2104 by Sigifredo Hamilton RN  Outcome: Progressing  Goal: Readiness for Transition of Care  12/25/2024 0024 by Sigifredo Hamilton RN  Outcome: Progressing  12/24/2024 2104 by Sigifredo Hamilton RN  Outcome: Progressing         "

## 2024-12-25 NOTE — PLAN OF CARE
PRIMARY DIAGNOSIS: Worsening Slurred Speech  OUTPATIENT/OBSERVATION GOALS TO BE MET BEFORE DISCHARGE:  1. Pain Status: Improved-controlled with oral pain medications.    2. Return to near baseline physical activity: No    3. Cleared for discharge by consultants (if involved): No    Discharge Planner Nurse   Safe discharge environment identified: Yes  Barriers to discharge: Yes       Entered by: DEMETRI BLACKWELL RN 12/25/2024 4:44 AM   Vital signs:  Temp: 98.5  F (36.9  C) Temp src: Oral BP: 119/60 Pulse: 71   Resp: 16 SpO2: 96 % O2 Device: None (Room air) Please review provider order for any additional goals.   Nurse to notify provider when observation goals have been met and patient is ready for discharge.Goal Outcome Evaluation:      Plan of Care Reviewed With: patient    Overall Patient Progress: no changeOverall Patient Progress: no change     Unable to assess orientation. unable to understand what pt is saying due to slurred speech. calm and coopreative with care. Right knee abrasion/bleeding. Dressing changed. Assist of x1 with a cane and gait belt. PT eval pending. Regular diet. Good appetite. PIV saline locked. Bed/chair alarm on. Call light in reach.     Problem: Adult Inpatient Plan of Care  Goal: Plan of Care Review  Description: The Plan of Care Review/Shift note should be completed every shift.  The Outcome Evaluation is a brief statement about your assessment that the patient is improving, declining, or no change.  This information will be displayed automatically on your shift  note.  12/25/2024 0444 by Demetri Blackwell RN  Outcome: Progressing  Flowsheets (Taken 12/25/2024 0444)  Plan of Care Reviewed With: patient  Overall Patient Progress: no change  12/25/2024 0024 by Demetri Blackwell RN  Outcome: Progressing  Flowsheets (Taken 12/25/2024 0024)  Plan of Care Reviewed With: patient  Overall Patient Progress: no change  12/24/2024 2104 by Demetri Blackwell RN  Outcome:  "Progressing  Flowsheets (Taken 12/24/2024 2104)  Outcome Evaluation: Unable to assess orientation. unable to understand what pt is talking due to slurred speech. calm and coopreative with care.  Plan of Care Reviewed With: patient  Overall Patient Progress: no change  Goal: Patient-Specific Goal (Individualized)  Description: You can add care plan individualizations to a care plan. Examples of Individualization might be:  \"Parent requests to be called daily at 9am for status\", \"I have a hard time hearing out of my right ear\", or \"Do not touch me to wake me up as it startles  me\".  12/25/2024 0444 by Sigifredo Hamilton RN  Outcome: Progressing  12/25/2024 0024 by Sigifredo Hamilton RN  Outcome: Progressing  12/24/2024 2104 by Sigifredo Hamilton RN  Outcome: Progressing  Goal: Absence of Hospital-Acquired Illness or Injury  12/25/2024 0444 by Sigifredo Hamilton RN  Outcome: Progressing  12/25/2024 0024 by Sigifredo Hamilton RN  Outcome: Progressing  12/24/2024 2104 by Sigifredo Hamilton RN  Outcome: Progressing  Intervention: Identify and Manage Fall Risk  Recent Flowsheet Documentation  Taken 12/24/2024 2100 by Sigifredo Hamilton RN  Safety Promotion/Fall Prevention:   activity supervised   clutter free environment maintained   increased rounding and observation   increase visualization of patient   lighting adjusted   mobility aid in reach   nonskid shoes/slippers when out of bed   room door open   room near nurse's station   room organization consistent   safety round/check completed  Intervention: Prevent and Manage VTE (Venous Thromboembolism) Risk  Recent Flowsheet Documentation  Taken 12/24/2024 2100 by Sigifredo Hamilton RN  VTE Prevention/Management: SCDs off (sequential compression devices)  Goal: Optimal Comfort and Wellbeing  12/25/2024 0444 by Sigifredo Hamilton RN  Outcome: Progressing  12/25/2024 0024 by Sigifredo Hamilton RN  Outcome: Progressing  12/24/2024 2104 by Alfonso" Sigifredo ATKINS RN  Outcome: Progressing  Goal: Readiness for Transition of Care  12/25/2024 0444 by Sigifredo Hamilton RN  Outcome: Progressing  12/25/2024 0024 by Sigifredo Hamilton RN  Outcome: Progressing  12/24/2024 2104 by Sigifredo Hamilton RN  Outcome: Progressing

## 2024-12-25 NOTE — PLAN OF CARE
PRIMARY DIAGNOSIS: WORSENING SLURRED SPEECH  OUTPATIENT/OBSERVATION GOALS TO BE MET BEFORE DISCHARGE:  1. Pain Status: Improved-controlled with oral pain medications.    2. Return to near baseline physical activity: No    3. Cleared for discharge by consultants (if involved): No    Discharge Planner Nurse   Safe discharge environment identified: Yes  Barriers to discharge: Yes       Entered by: DEMETRI BLACKWELL RN 12/24/2024    Vital signs:  Temp: 98.1  F (36.7  C) Temp src: Oral BP: 119/65 Pulse: 85   Resp: 16 SpO2: 96 % O2 Device: None (Room air) Please review provider order for any additional goals.   Nurse to notify provider when observation goals have been met and patient is ready for discharge.Goal Outcome Evaluation:      Plan of Care Reviewed With: patient    Overall Patient Progress: no changeOverall Patient Progress: no change     Incontinent of bladder.Outcome Evaluation: Unable to assess orientation. unable to understand what pt is saying  due to slurred speech. calm and coopreative with care. Right knee abrasion/bleeding. Dressing changed. Assist of x1 with a cane and gait belt. Regular diet. Good appetite. PIV saline locked. Bed/chair alarm on. Call light in reach.     Problem: Adult Inpatient Plan of Care  Goal: Plan of Care Review  Description: The Plan of Care Review/Shift note should be completed every shift.  The Outcome Evaluation is a brief statement about your assessment that the patient is improving, declining, or no change.  This information will be displayed automatically on your shift  note.  Outcome: Progressing  Flowsheets (Taken 12/24/2024 2104)  Outcome Evaluation: Unable to assess orientation. unable to understand what pt is talking due to slurred speech. calm and coopreative with care.  Plan of Care Reviewed With: patient  Overall Patient Progress: no change  Goal: Patient-Specific Goal (Individualized)  Description: You can add care plan individualizations to a care plan.  "Examples of Individualization might be:  \"Parent requests to be called daily at 9am for status\", \"I have a hard time hearing out of my right ear\", or \"Do not touch me to wake me up as it startles  me\".  Outcome: Progressing  Goal: Absence of Hospital-Acquired Illness or Injury  Outcome: Progressing  Goal: Optimal Comfort and Wellbeing  Outcome: Progressing  Goal: Readiness for Transition of Care  Outcome: Progressing           "

## 2024-12-26 ENCOUNTER — APPOINTMENT (OUTPATIENT)
Dept: PHYSICAL THERAPY | Facility: CLINIC | Age: 69
End: 2024-12-26
Attending: NURSE PRACTITIONER
Payer: COMMERCIAL

## 2024-12-26 VITALS
OXYGEN SATURATION: 97 % | SYSTOLIC BLOOD PRESSURE: 116 MMHG | TEMPERATURE: 97.3 F | HEART RATE: 91 BPM | DIASTOLIC BLOOD PRESSURE: 68 MMHG | RESPIRATION RATE: 18 BRPM

## 2024-12-26 PROCEDURE — 250N000013 HC RX MED GY IP 250 OP 250 PS 637: Performed by: PHYSICIAN ASSISTANT

## 2024-12-26 PROCEDURE — 250N000013 HC RX MED GY IP 250 OP 250 PS 637: Performed by: NURSE PRACTITIONER

## 2024-12-26 PROCEDURE — 99232 SBSQ HOSP IP/OBS MODERATE 35: CPT | Performed by: PHYSICIAN ASSISTANT

## 2024-12-26 PROCEDURE — G0378 HOSPITAL OBSERVATION PER HR: HCPCS

## 2024-12-26 PROCEDURE — 97161 PT EVAL LOW COMPLEX 20 MIN: CPT | Mod: GP | Performed by: PHYSICAL THERAPIST

## 2024-12-26 RX ORDER — NALOXONE HYDROCHLORIDE 0.4 MG/ML
0.4 INJECTION, SOLUTION INTRAMUSCULAR; INTRAVENOUS; SUBCUTANEOUS
Status: ACTIVE | OUTPATIENT
Start: 2024-12-26

## 2024-12-26 RX ORDER — LIDOCAINE 4 G/G
1 PATCH TOPICAL
Status: ACTIVE | OUTPATIENT
Start: 2024-12-27

## 2024-12-26 RX ORDER — NALOXONE HYDROCHLORIDE 0.4 MG/ML
0.2 INJECTION, SOLUTION INTRAMUSCULAR; INTRAVENOUS; SUBCUTANEOUS
Status: ACTIVE | OUTPATIENT
Start: 2024-12-26

## 2024-12-26 RX ADMIN — LAMOTRIGINE 100 MG: 100 TABLET ORAL at 08:21

## 2024-12-26 RX ADMIN — TAMSULOSIN HYDROCHLORIDE 0.4 MG: 0.4 CAPSULE ORAL at 08:21

## 2024-12-26 RX ADMIN — OLANZAPINE 7.5 MG: 5 TABLET, FILM COATED ORAL at 08:21

## 2024-12-26 RX ADMIN — BUSPIRONE HYDROCHLORIDE 15 MG: 15 TABLET ORAL at 08:21

## 2024-12-26 RX ADMIN — HYDROXYZINE HYDROCHLORIDE 10 MG: 10 TABLET, FILM COATED ORAL at 12:32

## 2024-12-26 RX ADMIN — BENZTROPINE MESYLATE 2 MG: 1 TABLET ORAL at 21:45

## 2024-12-26 RX ADMIN — DICLOFENAC SODIUM 2 G: 10 GEL TOPICAL at 17:44

## 2024-12-26 RX ADMIN — BUSPIRONE HYDROCHLORIDE 15 MG: 15 TABLET ORAL at 20:14

## 2024-12-26 RX ADMIN — DICLOFENAC SODIUM 2 G: 10 GEL TOPICAL at 20:15

## 2024-12-26 RX ADMIN — DICLOFENAC SODIUM 2 G: 10 GEL TOPICAL at 12:30

## 2024-12-26 RX ADMIN — HYDROXYZINE HYDROCHLORIDE 10 MG: 10 TABLET, FILM COATED ORAL at 05:57

## 2024-12-26 RX ADMIN — OLANZAPINE 20 MG: 10 TABLET, FILM COATED ORAL at 21:45

## 2024-12-26 ASSESSMENT — ACTIVITIES OF DAILY LIVING (ADL)
ADLS_ACUITY_SCORE: 69

## 2024-12-26 NOTE — PROGRESS NOTES
"   12/26/24 0842   Appointment Info   Signing Clinician's Name / Credentials (PT) Linette Kunz DPT   Quick Adds   Quick Adds Certification   Living Environment   Living Environment Comments With pt's baseline slurred speech, difficult to gather all PLOF info; per SW call to pt's facility \"pt is independent with a cane at baseline. Pt receives assist with medications and showers\"   Self-Care   Usual Activity Tolerance moderate   Current Activity Tolerance fair   Equipment Currently Used at Home cane, straight   Fall history within last six months yes   Number of times patient has fallen within last six months   (unknown but per H&P has had recurrent falls)   General Information   Onset of Illness/Injury or Date of Surgery 12/23/24   Referring Physician Kendrick Luis APRN CNP   Patient/Family Therapy Goals Statement (PT) none stated   Pertinent History of Current Problem (include personal factors and/or comorbidities that impact the POC) 69 year old male who has a very complex psychiatric history with a history of being bipolar, anxiety, depression, schizophrenia, borderline personality, history of paranoia who also has a history of bladder cancer and ureteral stent (last replaced in 8/2024 from what we can tell in care everywhere at [facility]), hypertension, hyperlipidemia, previous noncompliance with medications is presenting from his [facility] center with recurrent falls and worsening slurred speech   Existing Precautions/Restrictions fall   General Observations sitting in recliner, NAD   Cognition   Affect/Mental Status (Cognition) unable/difficult to assess   Orientation Status (Cognition) oriented x 3   Follows Commands (Cognition) follows one-step commands   Pain Assessment   Patient Currently in Pain   (reports no pain at rest, with ambulation, points to R hip and states \"pain unbearable\")   Integumentary/Edema   Integumentary/Edema no deficits were identifed   Posture    Posture Forward head " position;Protracted shoulders;Kyphosis;Scoliosis   Range of Motion (ROM)   Range of Motion ROM is WFL   Strength (Manual Muscle Testing)   Strength (Manual Muscle Testing) Deficits observed during functional mobility   Bed Mobility   Comment, (Bed Mobility) in chair at beginning and end of session, not assessed   Transfers   Comment, (Transfers) SBA with SPC, inc time/effort to stand   Gait/Stairs (Locomotion)   [place] Level (Gait) contact guard   Assistive Device (Gait) cane, straight   Distance in Feet (Gait) 12   Pattern (Gait) step-to   Balance   Balance Comments Fair, no overt LOB/lateral path deviation noted but reaching out for external support   Clinical Impression   Criteria for Skilled Therapeutic Intervention Yes, treatment indicated   PT Diagnosis (PT) decreased functional mobility   Influenced by the following impairments pain, weakness, impaired balance   Functional limitations due to impairments bed mobility (anticipate), transfers, ambulation   Clinical Presentation (PT Evaluation Complexity) stable   Clinical Presentation Rationale clinical judgement   Clinical Decision Making (Complexity) low complexity   Planned Therapy Interventions (PT) balance training;bed mobility training;gait training;home exercise program;neuromuscular re-education;patient/family education;strengthening;stretching;transfer training;progressive activity/exercise;risk factor education;home program guidelines   Risk & Benefits of therapy have been explained evaluation/treatment results reviewed;care plan/treatment goals reviewed;risks/benefits reviewed;current/potential barriers reviewed;participants voiced agreement with care plan;participants included;patient   PT Total Evaluation Time   PT Eval, Low Complexity Minutes (50819) 14   Therapy Certification   Start of care date 12/26/24   Certification date from 12/26/24   Certification date to 12/31/24   Medical Diagnosis falls, slurred speech   Physical Therapy Goals    PT Frequency 5x/week   PT Predicted Duration/Target Date for Goal Attainment 12/31/24   PT Goals Bed Mobility;Transfers;Gait   PT: Bed Mobility Independent;Supine to/from sit   PT: Transfers Modified independent;Sit to/from stand;Bed to/from chair;Assistive device   PT: Gait Modified independent;Assistive device;100 feet   Interventions   Interventions Quick Adds   (no tx initiated as pt would not participate any further beyond eval d/t R hip pain)   PT Discharge Planning   PT Plan assess bed mobility, inc amb distance (2WW vs SPC?)   PT Discharge Recommendation (DC Rec) Transitional Care Facility   PT Rationale for DC Rec Pt is below baseline for mobility.  Only tolerated ~12' ambulation with SPC and CGA.  Further mobility limited by pt's c/o R hip pain (PA notified).  Rec TCU as per SW note, pt needs to be IND with SPC to return to his facility.   PT Brief overview of current status CGA with 2WW, mobility as pt tolerates   Physical Therapy Time and Intention   Total Session Time (sum of timed and untimed services) 14         [facility]                                                                                   OUTPATIENT PHYSICAL THERAPY    PLAN OF TREATMENT FOR OUTPATIENT REHABILITATION   Patient's Last Name, First Name, CARLIEJennaHarpreet Echeverria YOB: 1955   Provider's Name   [facility]   Medical Record No.  [id number]     Onset Date: 12/23/24 Start of Care Date: 12/26/24     Medical Diagnosis:  falls, slurred speech               PT Diagnosis:  decreased functional mobility Certification Dates:  From: 12/26/24  To: 12/31/24       See note for plan of treatment, functional goals, and certification details.    I CERTIFY THE NEED FOR THESE SERVICES FURNISHED UNDER        THIS PLAN OF TREATMENT AND WHILE UNDER MY CARE (Physician co-signature of this document indicates review and certification of the therapy plan).

## 2024-12-26 NOTE — PLAN OF CARE
"Goal Outcome Evaluation:      Plan of Care Reviewed With: patient    Overall Patient Progress: no changeOverall Patient Progress: no change       BP (!) 150/73 (BP Location: Right arm)   Pulse 72   Temp (!) 96.1  F (35.6  C) (Oral)   Resp 20   SpO2 98%     PRIMARY DIAGNOSIS: GENERALIZED WEAKNESS    OUTPATIENT/OBSERVATION GOALS TO BE MET BEFORE DISCHARGE  1. Orthostatic performed: Yes:          Lying Orthostatic BP: 138/69         Sitting Orthostatic BP: 136/67         Standing Orthostatic BP: 143/70     2. Tolerating PO medications: Yes    3. Return to near baseline physical activity: No    4. Cleared for discharge by consultants (if involved): No    Discharge Planner Nurse   Safe discharge environment identified: Yes  Barriers to discharge: Yes       Entered by: Aubrey Vazquez RN 12/26/2024 5:28 PM   Pt is confused with slurred speech, SL, has scratch on upper and lower extremities, up assist one with gait belt and walker, needs help with ordering food, PT recommending to TCU, SW following.   Please review provider order for any additional goals.   Nurse to notify provider when observation goals have been met and patient is ready for discharge.      Problem: Adult Inpatient Plan of Care  Goal: Plan of Care Review  Description: The Plan of Care Review/Shift note should be completed every shift.  The Outcome Evaluation is a brief statement about your assessment that the patient is improving, declining, or no change.  This information will be displayed automatically on your shift  note.  Outcome: Progressing  Flowsheets (Taken 12/26/2024 0948)  Plan of Care Reviewed With: patient  Overall Patient Progress: no change  Goal: Patient-Specific Goal (Individualized)  Description: You can add care plan individualizations to a care plan. Examples of Individualization might be:  \"Parent requests to be called daily at 9am for status\", \"I have a hard time hearing out of my right ear\", or \"Do not touch me to wake me up " "as it startles  me\".  Outcome: Progressing  Goal: Absence of Hospital-Acquired Illness or Injury  Outcome: Progressing  Intervention: Identify and Manage Fall Risk  Recent Flowsheet Documentation  Taken 12/26/2024 0800 by Aubrey Vazquez RN  Safety Promotion/Fall Prevention:   activity supervised   safety round/check completed   room organization consistent   clutter free environment maintained   patient and family education   nonskid shoes/slippers when out of bed   room near nurse's station  Intervention: Prevent Skin Injury  Recent Flowsheet Documentation  Taken 12/26/2024 0800 by Aubrey Vazquez RN  Body Position: supine, head elevated  Intervention: Prevent and Manage VTE (Venous Thromboembolism) Risk  Recent Flowsheet Documentation  Taken 12/26/2024 0800 by Aubrey Vazquez RN  VTE Prevention/Management: SCDs off (sequential compression devices)  Intervention: Prevent Infection  Recent Flowsheet Documentation  Taken 12/26/2024 0800 by Aubrey Vazquez RN  Infection Prevention:   hand hygiene promoted   rest/sleep promoted   single patient room provided   equipment surfaces disinfected  Goal: Optimal Comfort and Wellbeing  Outcome: Progressing  Goal: Readiness for Transition of Care  Outcome: Progressing     "

## 2024-12-26 NOTE — PLAN OF CARE
PRIMARY DIAGNOSIS: Slurred speech  OUTPATIENT/OBSERVATION GOALS TO BE MET BEFORE DISCHARGE:  ADLs back to baseline: No    Activity and level of assistance: Ax1 walker and GB    Pain status: Pain free.    Return to near baseline physical activity: Yes     Discharge Planner Nurse   Safe discharge environment identified: Yes  Barriers to discharge: Yes       Entered by: Iban Bautista RN 12/26/2024 5:33 AM     Please review provider order for any additional goals.   Nurse to notify provider when observation goals have been met and patient is ready for discharge.            Pt orientation BAHMAN d/t speech slurring. Pt IV is saline locked. Pt stood Ax1 with walker at bedside to be changed after incontinence episode. Pt denied pain and is tolerating regular diet. Speech still slurred and mostly not understandable, pt handwriting not effective for communicating either. UA still needed but external catheter not working for pt. PT and SW following. Continue to follow plan of care.     /68 (BP Location: Right arm)   Pulse 82   Temp 97.4  F (36.3  C) (Oral)   Resp 18   SpO2 96%

## 2024-12-26 NOTE — PLAN OF CARE
PRIMARY DIAGNOSIS: Slurred speech  OUTPATIENT/OBSERVATION GOALS TO BE MET BEFORE DISCHARGE:  ADLs back to baseline: No    Activity and level of assistance: Ax1 walker and GB    Pain status: Pain free.    Return to near baseline physical activity: Yes     Discharge Planner Nurse   Safe discharge environment identified: Yes  Barriers to discharge: Yes       Entered by: Iban Bautista RN 12/25/2024 8:55 PM     Please review provider order for any additional goals.   Nurse to notify provider when observation goals have been met and patient is ready for discharge.            Pt orientation BAHMAN d/t speech slurring. Pt IV is saline locked. Pt stood Ax1 with walker at bedside to be changed after incontinence episode. Pt denied pain and is tolerating regular diet. UA still needed but external catheter not working for pt. PT and SW following. Continue to follow plan of care.     /70 (BP Location: Right arm)   Pulse 86   Temp 98  F (36.7  C) (Oral)   Resp 16   SpO2 98%

## 2024-12-26 NOTE — PLAN OF CARE
PRIMARY DIAGNOSIS: Slurred speech   OUTPATIENT/OBSERVATION GOALS TO BE MET BEFORE DISCHARGE:  ADLs back to baseline: No    Activity and level of assistance: Ax1 walker and GB    Pain status: Pain free.    Return to near baseline physical activity: Yes     Discharge Planner Nurse   Safe discharge environment identified: Yes  Barriers to discharge: Yes       Entered by: Iban Bautista RN 12/26/2024 12:38 AM     Please review provider order for any additional goals.   Nurse to notify provider when observation goals have been met and patient is ready for discharge.          Pt orientation BAHMAN d/t speech slurring. Pt IV is saline locked. Pt stood Ax1 with walker at bedside to be changed after incontinence episode. Pt denied pain and is tolerating regular diet. Speech still slurred and mostly not understandable, pt handwriting not effective for communicating either. UA still needed but external catheter not working for pt. PT and SW following. Continue to follow plan of care.     /65 (BP Location: Right arm)   Pulse 85   Temp 96.8  F (36  C) (Tympanic)   Resp 18   SpO2 96%
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
## 2024-12-26 NOTE — PROGRESS NOTES
Care Management Follow Up    Length of Stay (days): 0    Expected Discharge Date: 12/25/2024     Concerns to be Addressed: decision making, discharge planning     Patient plan of care discussed at interdisciplinary rounds: Yes    Anticipated Discharge Disposition:    Anticipated Discharge Services:    Anticipated Discharge DME:      Patient/family educated on Medicare website which has current facility and service quality ratings:    Education Provided on the Discharge Plan: Yes  Patient/Family in Agreement with the Plan:      Referrals Placed by CM/SW: Post Acute Facilities    Handoff Completed: No, handoff not indicated or clinically appropriate    Additional Information:  Per chart review patient would need to be independent to return to his [facility] and is currently requiring an assist of 1 for mobility. CM placed call to [facility] to discuss discharge recs and plan, LVM requesting return call.    CM attempted to call pt sister Bessie ph: [phone], no answer and unable to LVM. Attempted to call Arianna, patient's primary contact and number was out of service. Attempted to call Vanessa, received VM for a [employer] case manger. CM also attempted to call patient's [facility]  Charleen ph: [phone], no answer and left generic VM requesting return call.     PT is recommending TCU for discharge placement at this time, CM updated patient at the bedside. Patient exhibiting slurred speech consistent w charting from this stay, bedside RN confirmed.    Referrals sent to facilities closest to the hospital. Patient's living facility is in [place].    Next Steps: Follow up w senior living, TCU referrals, PAS, arrange discharge transport    Ginger Joshi RN, BSN  Inpatient Care Coordination  [facility]  [phone]

## 2024-12-26 NOTE — PLAN OF CARE
"Goal Outcome Evaluation:      Plan of Care Reviewed With: patient    Overall Patient Progress: no changeOverall Patient Progress: no change       /68 (BP Location: Right arm)   Pulse 72   Temp (!) 95.3  F (35.2  C) (Oral)   Resp 22   SpO2 96%     PRIMARY DIAGNOSIS: GENERALIZED WEAKNESS    OUTPATIENT/OBSERVATION GOALS TO BE MET BEFORE DISCHARGE  1. Orthostatic performed: Yes:          Lying Orthostatic BP: 138/69         Sitting Orthostatic BP: 136/67         Standing Orthostatic BP: 143/70     2. Tolerating PO medications: Yes    3. Return to near baseline physical activity: No    4. Cleared for discharge by consultants (if involved): No    Discharge Planner Nurse   Safe discharge environment identified: Yes  Barriers to discharge: Yes       Entered by: Aubrey Vazquez RN 12/26/2024 2:01 PM   Pt is confused with slurred speech, SL, has scratch on upper and lower extremities, up assist one with gait belt and walker, needs help with ordering food, up in the chair, ambulated in the hole way and tolerated well, PT recommending to TCU, SW consulted.   Please review provider order for any additional goals.   Nurse to notify provider when observation goals have been met and patient is ready for discharge.      Problem: Adult Inpatient Plan of Care  Goal: Plan of Care Review  Description: The Plan of Care Review/Shift note should be completed every shift.  The Outcome Evaluation is a brief statement about your assessment that the patient is improving, declining, or no change.  This information will be displayed automatically on your shift  note.  Outcome: Progressing  Flowsheets (Taken 12/26/2024 0948)  Plan of Care Reviewed With: patient  Overall Patient Progress: no change  Goal: Patient-Specific Goal (Individualized)  Description: You can add care plan individualizations to a care plan. Examples of Individualization might be:  \"Parent requests to be called daily at 9am for status\", \"I have a hard time " "hearing out of my right ear\", or \"Do not touch me to wake me up as it startles  me\".  Outcome: Progressing  Goal: Absence of Hospital-Acquired Illness or Injury  Outcome: Progressing  Intervention: Identify and Manage Fall Risk  Recent Flowsheet Documentation  Taken 12/26/2024 0800 by Aubrey Vazquez RN  Safety Promotion/Fall Prevention:   activity supervised   safety round/check completed   room organization consistent   clutter free environment maintained   patient and family education   nonskid shoes/slippers when out of bed   room near nurse's station  Intervention: Prevent Skin Injury  Recent Flowsheet Documentation  Taken 12/26/2024 0800 by Aubrey Vazquez RN  Body Position: supine, head elevated  Intervention: Prevent and Manage VTE (Venous Thromboembolism) Risk  Recent Flowsheet Documentation  Taken 12/26/2024 0800 by Aubrey Vazquez RN  VTE Prevention/Management: SCDs off (sequential compression devices)  Intervention: Prevent Infection  Recent Flowsheet Documentation  Taken 12/26/2024 0800 by Aubrey Vazquez RN  Infection Prevention:   hand hygiene promoted   rest/sleep promoted   single patient room provided   equipment surfaces disinfected  Goal: Optimal Comfort and Wellbeing  Outcome: Progressing  Goal: Readiness for Transition of Care  Outcome: Progressing     "

## 2024-12-26 NOTE — PLAN OF CARE
"Goal Outcome Evaluation:      Plan of Care Reviewed With: patient    Overall Patient Progress: no changeOverall Patient Progress: no change     /70 (BP Location: Right arm)   Pulse 73   Temp (!) 96.5  F (35.8  C) (Oral)   Resp 18   SpO2 97%   PRIMARY DIAGNOSIS: GENERALIZED WEAKNESS    OUTPATIENT/OBSERVATION GOALS TO BE MET BEFORE DISCHARGE  1. Orthostatic performed: Yes:          Lying Orthostatic BP: 138/69         Sitting Orthostatic BP: 136/67         Standing Orthostatic BP: 143/70     2. Tolerating PO medications: Yes    3. Return to near baseline physical activity: No    4. Cleared for discharge by consultants (if involved): No    Discharge Planner Nurse   Safe discharge environment identified: Yes  Barriers to discharge: Yes       Entered by: Aubrey Vazquez RN 12/26/2024 9:51 AM   Pt is confused with slurred speech, SL, has scratch on upper and lower extremities, up assist one with gait belt and walker, needs help with ordering food, up in the chair, PT recommending to TCU, SW consulted.   Please review provider order for any additional goals.   Nurse to notify provider when observation goals have been met and patient is ready for discharge.      Problem: Adult Inpatient Plan of Care  Goal: Plan of Care Review  Description: The Plan of Care Review/Shift note should be completed every shift.  The Outcome Evaluation is a brief statement about your assessment that the patient is improving, declining, or no change.  This information will be displayed automatically on your shift  note.  Outcome: Progressing  Flowsheets (Taken 12/26/2024 0948)  Plan of Care Reviewed With: patient  Overall Patient Progress: no change  Goal: Patient-Specific Goal (Individualized)  Description: You can add care plan individualizations to a care plan. Examples of Individualization might be:  \"Parent requests to be called daily at 9am for status\", \"I have a hard time hearing out of my right ear\", or \"Do not touch me to " "wake me up as it startles  me\".  Outcome: Progressing  Goal: Absence of Hospital-Acquired Illness or Injury  Outcome: Progressing  Intervention: Identify and Manage Fall Risk  Recent Flowsheet Documentation  Taken 12/26/2024 0800 by Aubrey Vazquez RN  Safety Promotion/Fall Prevention:   activity supervised   safety round/check completed   room organization consistent   clutter free environment maintained   patient and family education   nonskid shoes/slippers when out of bed   room near nurse's station  Intervention: Prevent Skin Injury  Recent Flowsheet Documentation  Taken 12/26/2024 0800 by Aubrey Vazquez RN  Body Position: supine, head elevated  Intervention: Prevent and Manage VTE (Venous Thromboembolism) Risk  Recent Flowsheet Documentation  Taken 12/26/2024 0800 by Aubrey Vazquez RN  VTE Prevention/Management: SCDs off (sequential compression devices)  Intervention: Prevent Infection  Recent Flowsheet Documentation  Taken 12/26/2024 0800 by Aubrey Vazquez RN  Infection Prevention:   hand hygiene promoted   rest/sleep promoted   single patient room provided   equipment surfaces disinfected  Goal: Optimal Comfort and Wellbeing  Outcome: Progressing  Goal: Readiness for Transition of Care  Outcome: Progressing     "

## 2024-12-27 ENCOUNTER — APPOINTMENT (OUTPATIENT)
Dept: PHYSICAL THERAPY | Facility: CLINIC | Age: 69
End: 2024-12-27
Payer: COMMERCIAL

## 2024-12-27 PROCEDURE — 250N000013 HC RX MED GY IP 250 OP 250 PS 637: Performed by: NURSE PRACTITIONER

## 2024-12-27 PROCEDURE — G0378 HOSPITAL OBSERVATION PER HR: HCPCS

## 2024-12-27 PROCEDURE — 250N000013 HC RX MED GY IP 250 OP 250 PS 637: Performed by: PHYSICIAN ASSISTANT

## 2024-12-27 PROCEDURE — 97530 THERAPEUTIC ACTIVITIES: CPT | Mod: GP | Performed by: PHYSICAL THERAPIST

## 2024-12-27 PROCEDURE — 99232 SBSQ HOSP IP/OBS MODERATE 35: CPT | Performed by: INTERNAL MEDICINE

## 2024-12-27 RX ADMIN — TAMSULOSIN HYDROCHLORIDE 0.4 MG: 0.4 CAPSULE ORAL at 08:45

## 2024-12-27 RX ADMIN — LIDOCAINE 1 PATCH: 4 PATCH TOPICAL at 08:44

## 2024-12-27 RX ADMIN — OLANZAPINE 7.5 MG: 5 TABLET, FILM COATED ORAL at 08:45

## 2024-12-27 RX ADMIN — BUSPIRONE HYDROCHLORIDE 15 MG: 15 TABLET ORAL at 20:21

## 2024-12-27 RX ADMIN — OLANZAPINE 20 MG: 10 TABLET, FILM COATED ORAL at 21:47

## 2024-12-27 RX ADMIN — DICLOFENAC SODIUM 2 G: 10 GEL TOPICAL at 08:45

## 2024-12-27 RX ADMIN — BUSPIRONE HYDROCHLORIDE 15 MG: 15 TABLET ORAL at 08:45

## 2024-12-27 RX ADMIN — DICLOFENAC SODIUM 2 G: 10 GEL TOPICAL at 18:15

## 2024-12-27 RX ADMIN — HYDROXYZINE HYDROCHLORIDE 10 MG: 10 TABLET, FILM COATED ORAL at 14:20

## 2024-12-27 RX ADMIN — BENZTROPINE MESYLATE 2 MG: 1 TABLET ORAL at 21:47

## 2024-12-27 RX ADMIN — LAMOTRIGINE 100 MG: 100 TABLET ORAL at 08:45

## 2024-12-27 RX ADMIN — DICLOFENAC SODIUM 2 G: 10 GEL TOPICAL at 21:49

## 2024-12-27 ASSESSMENT — ACTIVITIES OF DAILY LIVING (ADL)
ADLS_ACUITY_SCORE: 65
ADLS_ACUITY_SCORE: 69
ADLS_ACUITY_SCORE: 69
ADLS_ACUITY_SCORE: 65
ADLS_ACUITY_SCORE: 74
ADLS_ACUITY_SCORE: 69
ADLS_ACUITY_SCORE: 69
ADLS_ACUITY_SCORE: 65
ADLS_ACUITY_SCORE: 74
ADLS_ACUITY_SCORE: 74
ADLS_ACUITY_SCORE: 65
ADLS_ACUITY_SCORE: 69
ADLS_ACUITY_SCORE: 69
ADLS_ACUITY_SCORE: 74
ADLS_ACUITY_SCORE: 65
ADLS_ACUITY_SCORE: 65
ADLS_ACUITY_SCORE: 69
ADLS_ACUITY_SCORE: 74

## 2024-12-27 NOTE — PROGRESS NOTES
Care Management Follow Up    Length of Stay (days): 0    Expected Discharge Date: 12/27/2024     Concerns to be Addressed: decision making, discharge planning     Patient plan of care discussed at interdisciplinary rounds: Yes    Anticipated Discharge Disposition:  back to [facility] w  PT  Anticipated Discharge Services:    Anticipated Discharge DME:      Education Provided on the Discharge Plan: Yes  Patient/Family in Agreement with the Plan:  Yes    Referrals Placed by CM/SW: Post Acute Facilities    Handoff Completed: No, handoff not indicated or clinically appropriate    Additional Information:  ASTER spoke with Nataliia RN ph: [phone] at [facility] and provided update that patient's mobility is assist of 1. She confirmed he is independent w a cane at baseline, does not use a wheelchair. She stated they may be able to take patient back as an assist of 1 but she needs to discuss with her . She stated she will call us back.     Addendum 0356: ASTER spoke with Nataliia ZAVALA at [facility] again who shared that the facility can provide an assist of 1 for mobility. PT currently still recs TCU. Hospitalist would still like CM to continue looking for TCU placement at this time for discharge, additional referrals were sent.     Addendum 1923: PT changed recs to home w  PT, CM updated Nataliia RN at [facility] ph: [phone]. She was informed that we anticipate patient will be ready for discharge tomorrow 12/28 and that [facility] was arranged for 12/28 [id number]. Any new meds needed over the weekend should be filled prior to discharge over the weekend as facility cannot get meds from their pharmacy over the weekend.  PT referral was sent w preference for [facility] per [facility] request.     Patient was updated at bedside of discharge time tomorrow back to [facility].     CM updated patient [facility]  Charleen ph: [phone] of anticipated discharge back to his [facility] this weekend.     Call Nithya ZAVALA ph:  [phone] to coordinate discharge back to [facility] tomorrow.     Ginger Joshi RN, BSN  Inpatient Care Coordination  [facility]  [phone]

## 2024-12-27 NOTE — CONSULTS
Care Management [facility] Outpatient Consult    Care management consulted by provider for jail assessment.  CM spoke with provider to discuss jail case. Provider has confirmed patient is medically stable for discharge.    Background information: Pt from [facility]. TCU referrals were pending, PT updated recs 12/27 for return to [facility] with HC    Care team recommended discharge disposition:  [facility] with Home care    Resources needed for discharge: Home care secured (referral sent 12/27)    Discharge plan secured to meet patient's needs?  Yes    Estimated timeline for discharge:   less than 24 hours    Barriers to discharge:    Discussed above with provider & charge RN.      Next steps:     Anticipate patient will discharge within the next 24 hours to [facility] with home care. [facility] transport arranged for [id number] tomorrow.     Anticipate patient will not transition to jail care due to safe discharge plan to [facility] with HC arranged for 12/28. See previous RN CC note 12/27 for discharge planning details.     LIANNA Cat, [facility]   Inpatient Care Coordination     [phone]

## 2024-12-27 NOTE — PLAN OF CARE
PRIMARY DIAGNOSIS: GENERALIZED WEAKNESS    OUTPATIENT/OBSERVATION GOALS TO BE MET BEFORE DISCHARGE  1. Orthostatic performed: N/A    2. Tolerating PO medications: Yes    3. Return to near baseline physical activity: No    4. Cleared for discharge by consultants (if involved): N/A    Discharge Planner Nurse   Safe discharge environment identified: No  Barriers to discharge: Yes       Entered by: Nadia Chacko RN 12/27/2024     Patient is A&Ox2 - disoriented to time and situation. Up with Ax1 + GB and walker. Slurred speech, can be difficult to understand. Regular diet, tray set up. Takes pills one at a time, Volterran gel and lidocaine patch for pain on hip. External cath in place, UA needed. TCU probable on discharge    Please review provider order for any additional goals.   Nurse to notify provider when observation goals have been met and patient is ready for discharge.

## 2024-12-27 NOTE — PLAN OF CARE
PRIMARY DIAGNOSIS: Fall/ Worsening Slurred speech  OUTPATIENT/OBSERVATION GOALS TO BE MET BEFORE DISCHARGE:  1. Pain Status: Improved-controlled with oral pain medications.     2. Return to near baseline physical activity: No     3. Cleared for discharge by consultants (if involved): No        Discharge Planner Nurse  Safe discharge environment identified: Yes  Barriers to discharge: Yes        /68 (BP Location: Right arm)   Pulse 91   Temp 97.3  F (36.3  C) (Oral)   Resp 18   SpO2 97%    Patient is Alert to, Self, and Situation, Difficult to understand verbally due to slurred speech (baseline), but can show nonverbal signs. They are 1 Assist with Gait Belt and Walker.  Pt is a Regular diet- needs help ordering and tray setup.  They are complaining of 3/10 pain in their L hip.  Voltaren gel given for pain.  Patient is Saline locked. Redness blanchable on groin area. PT/SW following- TCU recommended (has to be ind before going back to [facility]). SW sent referrals and trying to get a hold of family and primary contact.      Please review provider order for any additional goals.   Nurse to notify provider when observation goals have been met and patient is ready for discharge.Goal Outcome Evaluation:       Plan of Care Reviewed With: patient     Overall Patient Progress: no changeOverall Patient Progress: no change     Outcome Evaluation: Alert, slurred speech (baseline), difficult to understand verbally, but shows noverbal signs. Calm and coopreative w cares. SW/OT following- TCU  Goal Outcome Evaluation:      Plan of Care Reviewed With: patient    Overall Patient Progress: no changeOverall Patient Progress: no change    Outcome Evaluation: Alert, slurred speech (baseline), difficult to understand verbally, but shows noverbal signs. Calm and coopreative w cares. SW/OT following- TCU

## 2024-12-27 NOTE — PLAN OF CARE
PRIMARY DIAGNOSIS: Fall/ Worsening Slurred speech  OUTPATIENT/OBSERVATION GOALS TO BE MET BEFORE DISCHARGE:  1. Pain Status: Improved-controlled with oral pain medications.    2. Return to near baseline physical activity: No    3. Cleared for discharge by consultants (if involved): No    Discharge Planner Nurse   Safe discharge environment identified: Yes  Barriers to discharge: Yes       /68 (BP Location: Right arm)   Pulse 91   Temp 97.3  F (36.3  C) (Oral)   Resp 18   SpO2 97%    Patient is Alert to, Self, and Situation, Difficult to understand verbally due to slurred speech (baseline), but can show nonverbal signs. They are 1 Assist with Gait Belt and Walker.  Pt is a Regular diet.  They are complaining of 3/10 pain in their L hip.  Voltaren gel given for pain.  Patient is Saline locked.   Please review provider order for any additional goals.   Nurse to notify provider when observation goals have been met and patient is ready for discharge.Goal Outcome Evaluation:      Plan of Care Reviewed With: patient    Overall Patient Progress: no changeOverall Patient Progress: no change    Outcome Evaluation: Alert, slurred speech (baseline), difficult to understand verbally, but shows noverbal signs. Calm and coopreative w cares. SW/OT following- TCU

## 2024-12-27 NOTE — PLAN OF CARE
PRIMARY DIAGNOSIS: Fall/ Worsening Slurred speech  OUTPATIENT/OBSERVATION GOALS TO BE MET BEFORE DISCHARGE:  1. Pain Status: Improved-controlled with oral pain medications.     2. Return to near baseline physical activity: No     3. Cleared for discharge by consultants (if involved): No        Discharge Planner Nurse  Safe discharge environment identified: Yes  Barriers to discharge: Yes        /73 (BP Location: Right arm)   Pulse 69   Temp 96.9  F (36.1  C) (Oral)   Resp 16   SpO2 99%    Patient is Alert to, Self, and Situation, Difficult to understand verbally due to slurred speech (baseline), but can show nonverbal signs. They are 1 Assist with Gait Belt and Walker.  Pt is a Regular diet- needs help ordering and tray setup.  They are complaining of 3/10 pain in their L hip.  Voltaren gel given for pain.  Patient is Saline locked. Redness blanchable on groin area. PT/SW following- TCU recommended (has to be ind before going back to [facility]). SW sent referrals and trying to get a hold of family and primary contact. Still need UA according to orders- pt is incontinent- external cath placed.     Please review provider order for any additional goals.   Nurse to notify provider when observation goals have been met and patient is ready for discharge.Goal Outcome Evaluation:       Plan of Care Reviewed With: patient     Overall Patient Progress: no changeOverall Patient Progress: no change     Outcome Evaluation: Alert, slurred speech (baseline), difficult to understand verbally, but shows noverbal signs. Calm and coopreative w cares. SW/OT following- TCU     Goal Outcome Evaluation:      Plan of Care Reviewed With: patient    Overall Patient Progress: no changeOverall Patient Progress: no change    Outcome Evaluation: Alert, slurred speech (baseline), difficult to understand verbally, but shows noverbal signs. Calm and coopreative w cares. SW/OT following- TCU

## 2024-12-27 NOTE — UTILIZATION REVIEW
Concurrent stay review; Secondary Review Determination - [facility]        Under the authority of the Utilization Management Committee, the utilization review process indicated a secondary review on the above patient.  The review outcome is based on review of the medical records, discussions with staff, and applying clinical experience noted on the date of the review.        (x) Outpatient [facility] status is appropriate       RATIONALE FOR DETERMINATION:     Patient is a 69-year-old male with complex psychiatric history including bipolar, anxiety/depression, schizophrenia, borderline personality, history of paranoia, noncompliance with medications who presented from [facility] with recurrent falls and slurred speech.  Workup for CVA was unremarkable, however physical therapy evaluation revealed patient requiring assistance and recommendation was for TCU.  Social work was consulted for discharge arrangements.  At this point the patient is at his baseline mental status as his medication regimen has been reduced with concern that was contributing to his speech and falling issues.  Currently the patient is waiting TCU and is appropriate for transition to skilled nursing care.    Patient delayed discharge is related to disposition, there is no medical necessity for inpatient admission at the time of this review. If there is a change in patient status, please resend for review.    The information on this document is developed by the utilization review team in order for the business office to ensure compliance.  This only denotes the appropriateness of proper admission status and does not reflect the quality of care rendered.       The definitions of Inpatient Status and Observation Status used in making the determination above are those provided in the CMS Coverage Manual, Chapter 1 and Chapter 6, section 70.4.       Sincerely,    Panfilo Damon DO

## 2024-12-27 NOTE — PLAN OF CARE
PRIMARY DIAGNOSIS: GENERALIZED WEAKNESS    OUTPATIENT/OBSERVATION GOALS TO BE MET BEFORE DISCHARGE  1. Orthostatic performed: N/A    2. Tolerating PO medications: Yes    3. Return to near baseline physical activity: No    4. Cleared for discharge by consultants (if involved): N/A    Discharge Planner Nurse   Safe discharge environment identified: No  Barriers to discharge: Yes       Entered by: Nadia Chacko RN 12/27/2024     Patient is A&Ox2 - disoriented to time and situation. Up with Ax1 + GB and walker. Slurred speech, can be difficult to understand. Regular diet, tray set up. Takes pills one at a time, Volterran gel and lidocaine patch for pain on hip. Incontinent of urine and stool. TCU probable on discharge    Please review provider order for any additional goals.   Nurse to notify provider when observation goals have been met and patient is ready for discharge.

## 2024-12-28 ENCOUNTER — APPOINTMENT (OUTPATIENT)
Dept: ULTRASOUND IMAGING | Facility: CLINIC | Age: 69
End: 2024-12-28
Attending: INTERNAL MEDICINE
Payer: COMMERCIAL

## 2024-12-28 VITALS
HEART RATE: 79 BPM | OXYGEN SATURATION: 98 % | DIASTOLIC BLOOD PRESSURE: 67 MMHG | SYSTOLIC BLOOD PRESSURE: 133 MMHG | TEMPERATURE: 97.4 F | RESPIRATION RATE: 18 BRPM

## 2024-12-28 LAB
ALBUMIN SERPL BCG-MCNC: 3.4 G/DL (ref 3.5–5.2)
ALP SERPL-CCNC: 113 U/L (ref 40–150)
ALT SERPL W P-5'-P-CCNC: 71 U/L (ref 0–70)
ANION GAP SERPL CALCULATED.3IONS-SCNC: 10 MMOL/L (ref 7–15)
AST SERPL W P-5'-P-CCNC: 29 U/L (ref 0–45)
BASOPHILS # BLD AUTO: 0 10E3/UL (ref 0–0.2)
BASOPHILS NFR BLD AUTO: 0 %
BILIRUB DIRECT SERPL-MCNC: <0.2 MG/DL (ref 0–0.3)
BILIRUB SERPL-MCNC: 0.2 MG/DL
BUN SERPL-MCNC: 26.3 MG/DL (ref 8–23)
CALCIUM SERPL-MCNC: 9.6 MG/DL (ref 8.8–10.4)
CHLORIDE SERPL-SCNC: 106 MMOL/L (ref 98–107)
CREAT SERPL-MCNC: 1.19 MG/DL (ref 0.67–1.17)
EGFRCR SERPLBLD CKD-EPI 2021: 67 ML/MIN/1.73M2
EOSINOPHIL # BLD AUTO: 0.4 10E3/UL (ref 0–0.7)
EOSINOPHIL NFR BLD AUTO: 7 %
ERYTHROCYTE [DISTWIDTH] IN BLOOD BY AUTOMATED COUNT: 15.8 % (ref 10–15)
GLUCOSE SERPL-MCNC: 81 MG/DL (ref 70–99)
HCO3 SERPL-SCNC: 24 MMOL/L (ref 22–29)
HCT VFR BLD AUTO: 32.1 % (ref 40–53)
HGB BLD-MCNC: 10.2 G/DL (ref 13.3–17.7)
IMM GRANULOCYTES # BLD: 0 10E3/UL
IMM GRANULOCYTES NFR BLD: 0 %
LYMPHOCYTES # BLD AUTO: 1.5 10E3/UL (ref 0.8–5.3)
LYMPHOCYTES NFR BLD AUTO: 25 %
MAGNESIUM SERPL-MCNC: 2.2 MG/DL (ref 1.7–2.3)
MCH RBC QN AUTO: 28.5 PG (ref 26.5–33)
MCHC RBC AUTO-ENTMCNC: 31.8 G/DL (ref 31.5–36.5)
MCV RBC AUTO: 90 FL (ref 78–100)
MONOCYTES # BLD AUTO: 0.7 10E3/UL (ref 0–1.3)
MONOCYTES NFR BLD AUTO: 11 %
NEUTROPHILS # BLD AUTO: 3.4 10E3/UL (ref 1.6–8.3)
NEUTROPHILS NFR BLD AUTO: 56 %
NRBC # BLD AUTO: 0 10E3/UL
NRBC BLD AUTO-RTO: 0 /100
PHOSPHATE SERPL-MCNC: 3.8 MG/DL (ref 2.5–4.5)
PLATELET # BLD AUTO: 191 10E3/UL (ref 150–450)
POTASSIUM SERPL-SCNC: 4.4 MMOL/L (ref 3.4–5.3)
PROT SERPL-MCNC: 6.4 G/DL (ref 6.4–8.3)
RBC # BLD AUTO: 3.58 10E6/UL (ref 4.4–5.9)
SODIUM SERPL-SCNC: 140 MMOL/L (ref 135–145)
WBC # BLD AUTO: 6 10E3/UL (ref 4–11)

## 2024-12-28 PROCEDURE — 83735 ASSAY OF MAGNESIUM: CPT | Performed by: INTERNAL MEDICINE

## 2024-12-28 PROCEDURE — 36415 COLL VENOUS BLD VENIPUNCTURE: CPT | Performed by: INTERNAL MEDICINE

## 2024-12-28 PROCEDURE — 250N000013 HC RX MED GY IP 250 OP 250 PS 637: Performed by: PHYSICIAN ASSISTANT

## 2024-12-28 PROCEDURE — 82248 BILIRUBIN DIRECT: CPT | Performed by: INTERNAL MEDICINE

## 2024-12-28 PROCEDURE — 80048 BASIC METABOLIC PNL TOTAL CA: CPT | Performed by: INTERNAL MEDICINE

## 2024-12-28 PROCEDURE — G0378 HOSPITAL OBSERVATION PER HR: HCPCS

## 2024-12-28 PROCEDURE — 250N000013 HC RX MED GY IP 250 OP 250 PS 637: Performed by: NURSE PRACTITIONER

## 2024-12-28 PROCEDURE — 76770 US EXAM ABDO BACK WALL COMP: CPT

## 2024-12-28 PROCEDURE — 85004 AUTOMATED DIFF WBC COUNT: CPT | Performed by: INTERNAL MEDICINE

## 2024-12-28 PROCEDURE — 82565 ASSAY OF CREATININE: CPT | Performed by: INTERNAL MEDICINE

## 2024-12-28 PROCEDURE — 99239 HOSP IP/OBS DSCHRG MGMT >30: CPT | Performed by: INTERNAL MEDICINE

## 2024-12-28 PROCEDURE — 82374 ASSAY BLOOD CARBON DIOXIDE: CPT | Performed by: INTERNAL MEDICINE

## 2024-12-28 PROCEDURE — 84100 ASSAY OF PHOSPHORUS: CPT | Performed by: INTERNAL MEDICINE

## 2024-12-28 RX ORDER — OXYBUTYNIN CHLORIDE 2.5 MG/1
2.5 TABLET ORAL 3 TIMES DAILY PRN
Qty: 30 TABLET | Refills: 1 | Status: SHIPPED | OUTPATIENT
Start: 2024-12-28

## 2024-12-28 RX ORDER — OLANZAPINE 7.5 MG/1
7.5 TABLET, FILM COATED ORAL DAILY
Qty: 30 TABLET | Refills: 1 | Status: SHIPPED | OUTPATIENT
Start: 2024-12-28

## 2024-12-28 RX ORDER — POLYETHYLENE GLYCOL 3350 17 G/17G
1 POWDER, FOR SOLUTION ORAL DAILY
Qty: 510 G | Refills: 1 | Status: SHIPPED | OUTPATIENT
Start: 2024-12-28

## 2024-12-28 RX ORDER — OLANZAPINE 20 MG/1
20 TABLET ORAL AT BEDTIME
Qty: 30 TABLET | Refills: 1 | Status: SHIPPED | OUTPATIENT
Start: 2024-12-28

## 2024-12-28 RX ORDER — BUSPIRONE HYDROCHLORIDE 30 MG/1
15 TABLET ORAL 2 TIMES DAILY
COMMUNITY
Start: 2024-12-28

## 2024-12-28 RX ADMIN — BUSPIRONE HYDROCHLORIDE 15 MG: 15 TABLET ORAL at 08:17

## 2024-12-28 RX ADMIN — OLANZAPINE 7.5 MG: 5 TABLET, FILM COATED ORAL at 08:17

## 2024-12-28 RX ADMIN — LAMOTRIGINE 100 MG: 100 TABLET ORAL at 08:17

## 2024-12-28 RX ADMIN — LIDOCAINE 1 PATCH: 4 PATCH TOPICAL at 08:17

## 2024-12-28 RX ADMIN — DICLOFENAC SODIUM 2 G: 10 GEL TOPICAL at 08:21

## 2024-12-28 RX ADMIN — TAMSULOSIN HYDROCHLORIDE 0.4 MG: 0.4 CAPSULE ORAL at 08:17

## 2024-12-28 ASSESSMENT — ACTIVITIES OF DAILY LIVING (ADL)
ADLS_ACUITY_SCORE: 69

## 2024-12-28 NOTE — PLAN OF CARE
"PRIMARY DIAGNOSIS: GENERALIZED WEAKNESS   OUTPATIENT/OBSERVATION GOALS TO BE MET BEFORE DISCHARGE:  1. Pain Status: Pain free.    2. Return to near baseline physical activity: Yes    3. Cleared for discharge by consultants (if involved): Yes    Discharge Planner Nurse   Safe discharge environment identified: Yes  Barriers to discharge: Yes       Entered by: Angie Vera RN 12/28/2024 4:13 AM   Patient alert and oriented x2. Slurred /garbled speech- baseline. Vitally stable on room air. Up stand by assist with rolling walker and gait belt. Intermittent incontinence. No bowel movements overnight. Denies pain. Left peripheral IV saline locked. Denies shortness of breath/trouble breathing. Plan for discharge later this afternoon to care facility.     Please review provider order for any additional goals.   Nurse to notify provider when observation goals have been met and patient is ready for discharge.    Goal Outcome Evaluation:      Plan of Care Reviewed With: patient    Overall Patient Progress: improvingOverall Patient Progress: improving       Problem: Adult Inpatient Plan of Care  Goal: Plan of Care Review  Description: The Plan of Care Review/Shift note should be completed every shift.  The Outcome Evaluation is a brief statement about your assessment that the patient is improving, declining, or no change.  This information will be displayed automatically on your shift  note.  12/28/2024 0412 by Angie Vera RN  Outcome: Progressing  Flowsheets (Taken 12/28/2024 0412)  Plan of Care Reviewed With: patient  Overall Patient Progress: improving  12/27/2024 2240 by Angie Vera RN  Outcome: Progressing  Flowsheets (Taken 12/27/2024 2240)  Plan of Care Reviewed With: patient  Overall Patient Progress: improving  Goal: Patient-Specific Goal (Individualized)  Description: You can add care plan individualizations to a care plan. Examples of Individualization might be:  \"Parent requests to be called daily at 9am for " "status\", \"I have a hard time hearing out of my right ear\", or \"Do not touch me to wake me up as it startles  me\".  12/28/2024 0412 by Angie Vera RN  Outcome: Progressing  12/27/2024 2240 by Angie Vera RN  Outcome: Progressing  Goal: Absence of Hospital-Acquired Illness or Injury  12/28/2024 0412 by Angie Vera RN  Outcome: Progressing  12/27/2024 2240 by Angie Vera RN  Outcome: Progressing  Intervention: Prevent and Manage VTE (Venous Thromboembolism) Risk  Recent Flowsheet Documentation  Taken 12/27/2024 2010 by Angie Vera RN  VTE Prevention/Management: SCDs off (sequential compression devices)  Goal: Optimal Comfort and Wellbeing  12/28/2024 0412 by Angie Vera RN  Outcome: Progressing  12/27/2024 2240 by Angie Vera RN  Outcome: Progressing  Goal: Readiness for Transition of Care  12/28/2024 0412 by Angie Vera RN  Outcome: Progressing  12/27/2024 2240 by Angie Vera RN  Outcome: Progressing         "

## 2024-12-28 NOTE — PROGRESS NOTES
[facility]    Hospitalist Progress Note      Assessment & Plan   Harpreet Mcelroy is a 69 year old man who was admitted on 12/23/2024. PMH significant for bipolar, anxiety, depression, schizophrenia, borderline personality, history of paranoia who also has a history of bladder cancer and ureteral stent (last replaced in 8/2024 from what we can tell in care everywhere at [facility]), hypertension, hyperlipidemia, previous noncompliance with medications who is presenting from his [facility] center with recurrent falls and worsening slurred speech.  Work up overall reassuring without evidence of acute CVA, infectious etiology, etc. Suspect polypharmacy has some underlying cause in his mentation.     #Slurred speech, currently at baseline   #Recurrent falls, right hip pain  #Polypharmacy  He presents from his care facility with worsening slurred speech.  Work up in the ED is overall reassuring -- no acute evidence of infection or CVA.  Likely multifactorial with concern for polypharmacy. CT and MRI brain overall reassuring.  No evidence of acute CVA.  No meningeal s/s.  The morning of 12/25 the patient is more awake and interactive. PTA medications were initially held but resumed at reduced dosages on 12/24/2024.  -- Likely related to polypharmacy.  Will reduce medications: decrease buspirone 15 mg BID, change Zyprexa to 7.5 mg in the AM and to 20 mg at HS.  Continue Cogentin.  Will monitor changes and reevaluate. Continues to remain alert and per RN that took care of patient the day prior his slurred speech is near patient's baseline.  Stop statin.  Recommend discharging on the medication. Previous provider sent prescriptions for medications adjusted on 12/25.  --reports of right hip pain when initially seen in ED on 12/19 and again reported this to PT on 12/26 and stated related to previous accident multiple years ago, x-ray of right hip and pelvis on 12/19 negative for acute  pathology  --trial of topical lidocaine patch, voltaren gel, and will have low dose Tramadol available for severe pain, will monitor if mobility improves with pain control of right hip   --PT consulted. Initially recommended TCU, however, 12/27 patient improved and recommending return to facility with assist of 1 and home PT. Anticipate discharge 12/28.  --SW consulted to assist with discharge planning     #BPH  #Bladder cancer  #Hx ureteral stent:    PTA Flomax, ditropan in the past.  Has a stent and it appears that it was last replaced by [facility] on 8/2024.  Not exhibiting any symptoms to suggest acute infection.  No fever nor leukocytosis.   - UA was never able to be collected due to incontinence. Patient's symptoms above have resolved and no evidence for infection. UA discontinued and no plan for antibiotics.   - Continue tamsulosin. Decreased oxybutynin to 2.5 mg TID PRN (was 5 mg TID PRN).    -- Follow up with Urology PRN.     #HTN: VSS.  PRN hydralazine.     #Bipolar, ARLYN/schizophrenia, paranoia, BPD: This is his rate limiting issue.  No evidence of serotonin syndrome.   -- Continue benztropine, decrease buspirone to 15 mg BID (was 30 mg BID), continue lamotrigine 100 mg daily, olanzapine reduced to 20 mg at HS (was 30 mg at HS), and decrease daily dose from 15 mg in the am to 7.5 mg.   -- Continue PRN hydroxyzine.   -- Follow up with PCP/Psychiatry as OP.    #Hx of medication noncompliance: This too has been an issue in the past.  Appears to be living in a facility -- [facility] and they manage medications.      DVT Prophylaxis: Pneumatic Compression Devices  Code Status: Full Code  Medically Ready for Discharge: Anticipated Tomorrow  Expected discharge: Anticipate return to group home with assist of 1 and home PT tomorrow.     Nahomi Pimentel MD FACP  Hospitalist Service  [facility]      Interval History   12/27 patient improved while working with PT and they are now recommending  return to facility with assist of 1 and home PT. Anticipate discharge 12/28.    -Data reviewed today: I reviewed all new labs and imaging results over the last 24 hours.     Physical Exam   Temp: 98.1  F (36.7  C) Temp src: Oral BP: (!) 156/73 Pulse: 75   Resp: 18 SpO2: 98 % O2 Device: None (Room air)    There were no vitals filed for this visit.  Vital Signs with Ranges  Temp:  [96.9  F (36.1  C)-98.1  F (36.7  C)] 98.1  F (36.7  C)  Pulse:  [63-77] 75  Resp:  [16-18] 18  BP: (128-156)/(69-84) 156/73  SpO2:  [62 %-99 %] 98 %  I/O last 3 completed shifts:  In: 220 [P.O.:220]  Out: -     Constitutional: Pleasant gentleman seen resting in bed. Alert, interactive. Answering questions with slurred speech and short 1-2 word answers. No acute distress.  HEENT: NCAT. EOMI. Moist oral mucosa.  Respiratory: Clear to auscultation bilaterally. No crackles or wheezes.  Cardiovascular: Regular rate and rhythm.  GI: Soft, nontender, nondistended. Normoactive bowel sounds.   Musculoskeletal: Tenderness to lateral right hip with palpation. Good ROM. Otherwise no gross deformities.   Neurologic: Alert. Slurred speech reported to be baseline. Otherwise no focal neurologic deficits. Did not assess gait.      Medications   Current Facility-Administered Medications   Medication Dose Route Frequency Provider Last Rate Last Admin     Current Facility-Administered Medications   Medication Dose Route Frequency Provider Last Rate Last Admin    benztropine (COGENTIN) tablet 2 mg  2 mg Oral At Bedtime Kendrick Luis APRN CNP   2 mg at 12/27/24 2147    busPIRone (BUSPAR) tablet 15 mg  15 mg Oral BID Kendrick Luis APRN CNP   15 mg at 12/27/24 2021    diclofenac (VOLTAREN) 1 % topical gel 2 g  2 g Topical 4x Daily Sharyn Walsh PA-C   2 g at 12/27/24 2149    hydrOXYzine HCl (ATARAX) tablet 10 mg  10 mg Oral Daily Kendrick Luis APRN CNP   10 mg at 12/27/24 1420    lamoTRIgine (LaMICtal) tablet 100 mg  100 mg Oral Daily  Kendrick Luis APRN CNP   100 mg at 12/27/24 0845    Lidocaine (LIDOCARE) 4 % Patch 1 patch  1 patch Transdermal Q24H Sharyn Walsh PA-C   1 patch at 12/27/24 0844    OLANZapine (zyPREXA) tablet 20 mg  20 mg Oral At Bedtime Kendrick Luis APRN CNP   20 mg at 12/27/24 2147    OLANZapine (zyPREXA) tablet 7.5 mg  7.5 mg Oral Daily Kendrick Luis APRN CNP   7.5 mg at 12/27/24 0845    tamsulosin (FLOMAX) capsule 0.4 mg  0.4 mg Oral Daily Kendrick Luis APRN CNP   0.4 mg at 12/27/24 0845       Data   Recent Labs   Lab 12/23/24  1034   WBC 5.9   HGB 11.3*   MCV 89      INR 0.96      POTASSIUM 4.4   CHLORIDE 101   CO2 24   BUN 24.5*   CR 0.98   ANIONGAP 14   STEPHY 9.7   GLC 98   ALBUMIN 4.1   PROTTOTAL 7.2   BILITOTAL 0.3   ALKPHOS 134   *       No results found for this or any previous visit (from the past 24 hours).

## 2024-12-28 NOTE — PLAN OF CARE
"PRIMARY DIAGNOSIS: GENERALIZED WEAKNESS  OUTPATIENT/OBSERVATION GOALS TO BE MET BEFORE DISCHARGE:  ADLs back to baseline: Yes    Activity and level of assistance: Assist of 1 with rolling walker and gait belt     Pain status: Pain free.    Return to near baseline physical activity: Yes     Discharge Planner Nurse   Safe discharge environment identified: Yes  Barriers to discharge: Yes       Entered by: Angie Vera RN 12/27/2024 10:41 PM   Patient alert and oriented x2. Vitally stable on room air. Denies pain. Up in chair this evening. Intermittently incontinent of urine. No bowel movements reported this evening. Denies shortness of breath/trouble breathing. Denies numbness/tingling. No nausea/vomiting noted.     Please review provider order for any additional goals.   Nurse to notify provider when observation goals have been met and patient is ready for discharge.      Goal Outcome Evaluation:      Plan of Care Reviewed With: patient    Overall Patient Progress: improvingOverall Patient Progress: improving       Problem: Adult Inpatient Plan of Care  Goal: Plan of Care Review  Description: The Plan of Care Review/Shift note should be completed every shift.  The Outcome Evaluation is a brief statement about your assessment that the patient is improving, declining, or no change.  This information will be displayed automatically on your shift  note.  Outcome: Progressing  Flowsheets (Taken 12/27/2024 2240)  Plan of Care Reviewed With: patient  Overall Patient Progress: improving  Goal: Patient-Specific Goal (Individualized)  Description: You can add care plan individualizations to a care plan. Examples of Individualization might be:  \"Parent requests to be called daily at 9am for status\", \"I have a hard time hearing out of my right ear\", or \"Do not touch me to wake me up as it startles  me\".  Outcome: Progressing  Goal: Absence of Hospital-Acquired Illness or Injury  Outcome: Progressing  Intervention: Prevent and " Manage VTE (Venous Thromboembolism) Risk  Recent Flowsheet Documentation  Taken 12/27/2024 2010 by Angie Vera RN  VTE Prevention/Management: SCDs off (sequential compression devices)  Goal: Optimal Comfort and Wellbeing  Outcome: Progressing  Goal: Readiness for Transition of Care  Outcome: Progressing

## 2024-12-28 NOTE — PLAN OF CARE
PRIMARY DIAGNOSIS: Gen. Wkns, Fall, Slurred speech  OUTPATIENT/OBSERVATION GOALS TO BE MET BEFORE DISCHARGE:  ADLs back to baseline: No    Activity and level of assistance: Up with maximum assistance. Consider SW and/or PT evaluation.     Pain status: Pain free.    Return to near baseline physical activity: Yes     Discharge Planner Nurse   Safe discharge environment identified: Yes  Barriers to discharge: No       Entered by: Dex Moreno RN 12/28/2024 10:02 AM   Pt is A/Ox3; disoriented to situation. VSS, RA. Ax1 with walker and gaitbelt. PIV SL. Pt denies pain, SOB, dizziness. Speech is garbled but at times can be understood. No BM this morning. Pt is tolerating oral intake on a regular diet. Plan is to discharge this afternoon to [facility]. Will continue to monitor.  Please review provider order for any additional goals.   Nurse to notify provider when observation goals have been met and patient is ready for discharge.

## 2024-12-28 NOTE — PLAN OF CARE
"PRIMARY DIAGNOSIS: GENERALIZED WEAKNESS   OUTPATIENT/OBSERVATION GOALS TO BE MET BEFORE DISCHARGE:  ADLs back to baseline: Yes    Activity and level of assistance: Assist of 1 with rolling walker and gait belt     Pain status: Pain free.    Return to near baseline physical activity: Yes     Discharge Planner Nurse   Safe discharge environment identified: Yes  Barriers to discharge: Yes       Entered by: Angie Vera RN 12/28/2024 12:33 AM   Patient alert and oriented x2. Vitally stable on room air. Denies pain. Intermittently incontinent of urine. No bowel movements reported. Denies shortness of breath/trouble breathing. Denies numbness/tingling. No nausea/vomiting noted.     Please review provider order for any additional goals.   Nurse to notify provider when observation goals have been met and patient is ready for discharge.      Goal Outcome Evaluation:      Plan of Care Reviewed With: patient    Overall Patient Progress: improvingOverall Patient Progress: improving       Problem: Adult Inpatient Plan of Care  Goal: Plan of Care Review  Description: The Plan of Care Review/Shift note should be completed every shift.  The Outcome Evaluation is a brief statement about your assessment that the patient is improving, declining, or no change.  This information will be displayed automatically on your shift  note.  Outcome: Progressing  Flowsheets (Taken 12/27/2024 2240)  Plan of Care Reviewed With: patient  Overall Patient Progress: improving  Goal: Patient-Specific Goal (Individualized)  Description: You can add care plan individualizations to a care plan. Examples of Individualization might be:  \"Parent requests to be called daily at 9am for status\", \"I have a hard time hearing out of my right ear\", or \"Do not touch me to wake me up as it startles  me\".  Outcome: Progressing  Goal: Absence of Hospital-Acquired Illness or Injury  Outcome: Progressing  Intervention: Prevent and Manage VTE (Venous Thromboembolism) " Risk  Recent Flowsheet Documentation  Taken 12/27/2024 2010 by Angie Vera, RN  VTE Prevention/Management: SCDs off (sequential compression devices)  Goal: Optimal Comfort and Wellbeing  Outcome: Progressing  Goal: Readiness for Transition of Care  Outcome: Progressing

## 2024-12-28 NOTE — PLAN OF CARE
PRIMARY DIAGNOSIS: Gen. Wkns, Fall, Slurred speech  OUTPATIENT/OBSERVATION GOALS TO BE MET BEFORE DISCHARGE:  ADLs back to baseline: Yes    Activity and level of assistance: Up with maximum assistance. Consider SW and/or PT evaluation.     Pain status: Pain free.    Return to near baseline physical activity: Yes     Discharge Planner Nurse   Safe discharge environment identified: Yes  Barriers to discharge: No       Entered by: Dex Moreno RN 12/28/2024 1:58 PM   Pt is A/Ox3; disoriented to situation. VSS, RA. Pt is Ax1 with walker and gb. PIV SL. Denies pain, SOB, dizziness. Pt has garbled speech at baseline. Tolerating oral intake on a regular diet. Pt is discharging to [facility] this afternoon.  Please review provider order for any additional goals.   Nurse to notify provider when observation goals have been met and patient is ready for discharge.

## 2024-12-28 NOTE — PROGRESS NOTES
Care Management Discharge Note    Discharge Date: 12/28/2024     Discharge Disposition: Home, Home Care    Discharge Services: None    Discharge DME: None    Discharge Transportation: agency    Private pay costs discussed: transportation costs    Patient/family educated on Medicare website which has current facility and service quality ratings: yes    Education Provided on the Discharge Plan: Yes  Persons Notified of Discharge Plans: Pt  Patient/Family in Agreement with the Plan: yes    Handoff Referral Completed: No, handoff not indicated or clinically appropriate    Additional Information:  Pt discharging back to [facility] today with HC PT through [facility]. Transportation via [facility] transport between [id number]. Called and spoke with [facility] nurse Nithya P: [phone] and confirmed plan. New medications should be filled here and sent with pt at discharge. Will send orders to F: [phone] once complete.     Update 1145: Ride changed to [id number] per conversation with Dr. Knight. Updated MD and bedside RN. HC Speech being added to discharge orders, [facility] is able to accept for both HC PT and SLP. Per provider, Urology recommends OP stent exchange in 1-2 weeks with [facility]rolog[place] [facility] Nithya Serrnao RN BSN   Inpatient Care Coordination  [facility]   Phone [phone]

## 2024-12-28 NOTE — DISCHARGE SUMMARY
[facility]    Discharge Summary  Hospitalist    Date of Admission:  12/23/2024  Date of Discharge:  12/28/2024  Discharging Provider: Nahomi Pimentel MD  Date of Service (when I saw the patient): 12/28/24    Discharge Diagnoses   Slurred speech, currently at baseline   Recurrent falls, right hip pain  Polypharmacy  BPH  Bladder cancer  Status post left ureteral stent  Bilateral severe hydronephrosis, chronic  Hypertension  Bipolar, ARLYN/schizophrenia, paranoia, BPD  Slight increase in creatinine  Hx of medication noncompliance    History of Present Illness   Harpreet Mcelroy is a 69 year old man who was admitted on 12/23/2024. PMH significant for bipolar, anxiety, depression, schizophrenia, borderline personality, history of paranoia who also has a history of bladder cancer and ureteral stent (last replaced in 8/2024 from what we can tell in care everywhere at [facility]), hypertension, hyperlipidemia, previous noncompliance with medications who is presenting from his [facility] center with recurrent falls and worsening slurred speech.  Work up overall reassuring without evidence of acute CVA, infectious etiology, etc. Suspect polypharmacy has some underlying cause in his mentation.    Hospital Course   Harpreet Mcelroy was admitted on 12/23/2024.  The following problems were addressed during his hospitalization:     #Slurred speech, currently at baseline   #Recurrent falls, right hip pain  #Polypharmacy  He presents from his care facility with worsening slurred speech.  Work up in the ED is overall reassuring -- no acute evidence of infection or CVA.  Likely multifactorial with concern for polypharmacy. CT and MRI brain overall reassuring.  No evidence of acute CVA.  No meningeal s/s.  The morning of 12/25 the patient is more awake and interactive. PTA medications were initially held but resumed at reduced dosages on 12/24/2024.  -- Likely related to polypharmacy.    - Reduced and  adjusted some of patient's medications. Decreased buspirone to 15 mg BID (can take 1/2 of 30 mg tab), changed Zyprexa to 7.5 mg in the AM and to 20 mg at HS. Continue Cogentin.   - Speech reported to be near his baseline. Home speech therapy ordered.   - Statin held during hospital stay. Consider resuming outpatient.   - Patient reported right hip pain when initially seen in ED on 12/19 and again reported this to PT on 12/26 and stated related to previous accident multiple years ago, x-ray of right hip and pelvis on 12/19 negative for acute pathology  --trial of topical lidocaine patch, voltaren gel. No ongoing complaints.   --PT consulted. Initially recommended TCU, however, 12/27 patient improved and recommending return to facility with assist of 1 and home PT. Discharging 12/28 back to his assisted with home PT and ST.    #BPH  #Bladder cancer  #Hx ureteral stent:    PTA Flomax, ditropan in the past.  Has a stent and it appears that it was last [place] [facility] [place].  Not exhibiting any symptoms to suggest acute infection.  No fever nor leukocytosis.   - UA was never able to be collected due to incontinence. Patient's symptoms above have resolved and no evidence for infection. UA discontinued and no plan for antibiotics.   - Continue tamsulosin. Decreased oxybutynin to 2.5 mg TID PRN (was 5 mg TID PRN).    -Renal ultrasound obtained 12/28 to ensure no worsening findings.  Does show persistence of severe bilateral hydronephrosis despite presence of left ureteral stent.  This was similar to prior CT 6/5/2024.  Bladder is also distended and contained a small amount of intraluminal debris.  Recommended correlation with urinalysis and consideration of possible stent malfunction.  Discussed with urology who reviewed previous notes. Suspect that patient is due for left ureteral stent exchange with [facility] [place] [place]. Mansoor Whitt. Last stent placed 8/13/2024. Recommend calling [facility] 12/30 to schedule appointment  for exchange in the next 1-2 weeks.  No urgent need for further urologic procedures here at this time and patient can follow-up outpatient in the next 1 to 2 weeks.    Hypertension  VSS.  PRN hydralazine.      Bipolar, ARLYN/schizophrenia, paranoia, BPD  No evidence of serotonin syndrome.   -- Continue benztropine, decrease buspirone to 15 mg BID (was 30 mg BID), continue lamotrigine 100 mg daily, olanzapine reduced to 20 mg at HS (was 30 mg at HS), and decrease daily dose from 15 mg in the am to 7.5 mg.   -- Continue PRN hydroxyzine.   -- Follow up with PCP/Psychiatry as OP.     Slight increase in creatinine  Creatinine 1.19 on day of discharge, which is slightly increased from baseline 0.99 - 1.11. Recommend encouraging PO intake and recheck BMP in next week.     Hx of medication noncompliance: This too has been an issue in the past.  Appears to be living in a facility -- [facility] and they manage medications.     Nahomi Pimentel MD FACP  Hospitalist Service  [facility]      Significant Results and Procedures   Imaging and lab results    Pending Results   N/A    Code Status   Full Code       Primary Care Physician   [facility]    Physical Exam   Temp: 97.4  F (36.3  C) Temp src: Oral BP: 133/67 Pulse: 70   Resp: 18 SpO2: 97 % O2 Device: None (Room air)    There were no vitals filed for this visit.  Vital Signs with Ranges  Temp:  [97.2  F (36.2  C)-98.1  F (36.7  C)] 97.4  F (36.3  C)  Pulse:  [69-75] 70  Resp:  [16-18] 18  BP: (127-156)/(62-73) 133/67  SpO2:  [94 %-100 %] 97 %  I/O last 3 completed shifts:  In: 220 [P.O.:220]  Out: -     Constitutional: Pleasant gentleman seen sitting up in chair. Alert, interactive. Answering questions with slurred speech and short 1-2 word answers. No acute distress. Non-toxic.   HEENT: NCAT. EOMI. Moist oral mucosa.  Respiratory: Clear to auscultation bilaterally. No crackles or wheezes.  Cardiovascular: Regular rate and  rhythm.  GI: Soft, nontender, nondistended. Normoactive bowel sounds.   Musculoskeletal: Tenderness to lateral right hip with palpation. Good ROM. Otherwise no gross deformities.   Neurologic: Alert. Slurred speech reported to be baseline. Otherwise no focal neurologic deficits. Did not assess gait.       Discharge Disposition   Discharged to assisted living  Condition at discharge: Stable    Consultations This Hospital Stay   PHYSICAL THERAPY ADULT IP CONSULT  OCCUPATIONAL THERAPY ADULT IP CONSULT  CARE MANAGEMENT / SOCIAL WORK IP CONSULT  PHYSICAL THERAPY ADULT IP CONSULT  SOCIAL WORK IP CONSULT    Time Spent on this Encounter   I, Nahomi Pimentel MD, personally saw the patient today and spent greater than 30 minutes discharging this patient.    Discharge Orders      Home Care Referral      Activity    Your activity upon discharge: activity as tolerated with assistance     Follow-up and recommended labs and tests     Follow up with primary care provider in the next week for hospital follow-up.  Recommend CBC, BMP, hepatic panel.  Follow up with psychiatry in the next 1-2 weeks regarding medication changes.   Suspect that patient is due for left ureteral stent [place] [facility] [place] [place] Mansoor Whitt. Last stent placed 8/13/2024. Recommend [place] [facility] 12/30 to schedule appointment for exchange in the next 1-2 weeks.     Reason for your hospital stay    You were hospitalized for evaluation of weakness, falls, and slurred speech. Workup negative for a stroke and felt to be related to medications, which have been adjusted. You have had some improvement, but recommend close follow up with primary care and continued physical and speech therapy.   Suspect that you are due for left ureteral stent exchange with [facility] [place] [place] Mansoor Whitt. Last stent placed 8/13/2024. Recommend calling [facility] 12/30 to schedule appointment for exchange in the next 1-2 weeks.     Diet    Follow this diet upon  discharge: Regular diet     Discharge Medications   Current Discharge Medication List        START taking these medications    Details   melatonin 3 MG tablet Take 1 tablet (3 mg) by mouth nightly as needed for sleep.  Qty: 30 tablet, Refills: 1    Associated Diagnoses: Bipolar affective disorder, remission status unspecified (H); Schizophrenia, unspecified type (H); Insomnia, unspecified type           CONTINUE these medications which have CHANGED    Details   busPIRone HCl (BUSPAR) 30 MG tablet Take 0.5 tablets (15 mg) by mouth 2 times daily.    Associated Diagnoses: Bipolar affective disorder, remission status unspecified (H); Schizophrenia, unspecified type (H)      !! OLANZapine (ZYPREXA) 20 MG tablet Take 1 tablet (20 mg) by mouth at bedtime.  Qty: 30 tablet, Refills: 1    Associated Diagnoses: Bipolar affective disorder, remission status unspecified (H); Schizophrenia, unspecified type (H)      !! OLANZapine (ZYPREXA) 7.5 MG tablet Take 1 tablet (7.5 mg) by mouth daily.  Qty: 30 tablet, Refills: 1    Associated Diagnoses: Bipolar affective disorder, remission status unspecified (H); Schizophrenia, unspecified type (H)      oxyBUTYnin (DITROPAN) 2.5 MG tablet Take 1 tablet (2.5 mg) by mouth 3 times daily as needed for bladder spasms.  Qty: 30 tablet, Refills: 1    Associated Diagnoses: Benign prostatic hyperplasia, unspecified whether lower urinary tract symptoms present      polyethylene glycol (MIRALAX) 17 GM/Dose powder Take 17 g (1 Capful) by mouth daily.  Qty: 510 g, Refills: 1    Associated Diagnoses: Constipation, unspecified constipation type       !! - Potential duplicate medications found. Please discuss with provider.        CONTINUE these medications which have NOT CHANGED    Details   artificial tears OINT ophthalmic ointment Place Into the left eye at bedtime.      benztropine (COGENTIN) 1 MG tablet Take 2 mg by mouth at bedtime.      bisacodyl (DULCOLAX) 10 MG suppository Place 10 mg rectally  daily as needed for constipation.      fluticasone (FLONASE) 50 MCG/ACT nasal spray Spray 1 spray into both nostrils daily as needed.      guaiFENesin (ROBITUSSIN) 20 mg/mL liquid Take 200 mg by mouth every 4 hours as needed for cough.      !! hydrOXYzine HCl (ATARAX) 10 MG tablet Take 10 mg by mouth daily at 2 pm.      !! hydrOXYzine HCl (ATARAX) 10 MG tablet Take 1 tablet (10 mg) by mouth every 6 hours as needed for itching or anxiety    Associated Diagnoses: Carol (H)      lamoTRIgine (LAMICTAL) 100 MG tablet Take 100 mg by mouth daily.      loperamide (IMODIUM A-D) 2 MG tablet Take 2 mg by mouth as needed for diarrhea.      loperamide (IMODIUM) 2 MG capsule Take 2 mg by mouth 4 times daily as needed for diarrhea.      magnesium hydroxide (MILK OF MAGNESIA) 400 MG/5ML suspension Take 30 mLs by mouth daily as needed for constipation or heartburn.      menthol-zinc oxide (CALMOSEPTINE) 0.44-20.6 % OINT ointment Apply topically 4 times daily as needed for skin protection.      mineral oil enema Place 1 enema rectally once.      sennosides (SENOKOT) 8.6 MG tablet Take 1 tablet by mouth 2 times daily      tamsulosin (FLOMAX) 0.4 MG capsule Take 0.4 mg by mouth daily      docusate sodium (COLACE) 100 MG capsule Take 100 mg by mouth 2 times daily as needed for constipation.      menthol (ICY HOT) 5 % PTCH Apply 1 patch topically every 8 hours as needed for muscle soreness    Associated Diagnoses: Carol (H)       !! - Potential duplicate medications found. Please discuss with provider.        STOP taking these medications       atorvastatin (LIPITOR) 40 MG tablet Comments:   Reason for Stopping:         naproxen (NAPROSYN) 250 MG tablet Comments:   Reason for Stopping:             Allergies   Allergies   Allergen Reactions    Lactose Anxiety     Lactose intolerance    Aspirin     Cucumber Extract Unknown     pickles    Diphenhydramine Other (See Comments)     Lock jaw. Could not open mouth    Pollen Extract Other (See  Comments)     Sneezing and congestion    Acetaminophen Rash     Fixed drug reaction     Data   Most Recent 3 CBC's:  Recent Labs   Lab Test 12/28/24  0549 12/23/24  1034 12/19/24  1516   WBC 6.0 5.9 7.1   HGB 10.2* 11.3* 11.5*   MCV 90 89 87    151 185      Most Recent 3 BMP's:  Recent Labs   Lab Test 12/28/24  0549 12/23/24  1034 12/19/24  1516    139 143   POTASSIUM 4.4 4.4 4.0   CHLORIDE 106 101 106   CO2 24 24 22   BUN 26.3* 24.5* 25.4*   CR 1.19* 0.98 0.99   ANIONGAP 10 14 15   STEPHY 9.6 9.7 9.6   GLC 81 98 101*     Most Recent 2 LFT's:  Recent Labs   Lab Test 12/28/24  0549 12/23/24  1034 06/17/24  1145   AST 29  --  20   ALT 71* 138* 27   ALKPHOS 113 134 83   BILITOTAL 0.2 0.3 0.2     Most Recent TSH, T4 and A1c Labs:  Recent Labs   Lab Test 12/23/24  1034 04/25/24  0746   TSH 3.00 1.71   T4  --  1.33   A1C  --  5.5       Results for orders placed or performed during the hospital encounter of 12/23/24   CT Head w/o Contrast    Narrative    CT SCAN OF THE HEAD WITHOUT CONTRAST   12/23/2024 1:14 PM     HISTORY: fall    TECHNIQUE:  Axial images of the head and coronal reformations without  IV contrast material. Radiation dose for this scan was reduced using  automated exposure control, adjustment of the mA and/or kV according  to patient size, or iterative reconstruction technique.    COMPARISON: 12/19/2024    FINDINGS: Stable prominent CSF space along the left frontal apex. No  acute intracranial hemorrhage. No hydrocephalus or extra-axial  hemorrhage. Mild chronic small vessel ischemic changes. Moderate  global cortical volume loss. Empty sella. No acute calvarial fracture.      Impression    IMPRESSION:   No acute intracranial abnormality.      AMELIE MICHEL MD         SYSTEM ID:  [id number]   CT Cervical Spine w/o Contrast    Narrative    CT CERVICAL SPINE WITHOUT CONTRAST 12/23/2024 1:15 PM     HISTORY: fall     TECHNIQUE: Axial images of the cervical spine were obtained without  intravenous  contrast. Multiplanar reformations were performed.   Radiation dose for this scan was reduced using automated exposure  control, adjustment of the mA and/or kV according to patient size, or  iterative reconstruction technique.    COMPARISON: None.    FINDINGS: Vertebral body heights are maintained. Multilevel loss of  disc height in the cervical spine. Straightening of the normal  cervical curvature. No jumped or perched facets. Spinous processes are  intact. Dens interval and craniocervical junction are unremarkable.  Straightening of the normal cervical curvature. Multilevel anterior  osteophytic changes are present. No traumatic listhesis findings.    Visualized paraspinous tissues: Lung apices are unremarkable. Soft  tissues of the neck are grossly unremarkable.      Impression    IMPRESSION: No acute cervical spine fracture.      AMELIE MICHEL MD         SYSTEM ID:  [id number]   CTA Head Neck w Contrast    Narrative    CT ANGIOGRAM OF THE HEAD AND NECK WITH CONTRAST  12/23/2024 1:16 PM     HISTORY: slurred speech for 3 days    TECHNIQUE:  CT angiography with an injection of 67mL [id number]-[id number] IV  with scans through the head and neck. Images were transferred to a  separate 3-D workstation where multiplanar reformations and 3-D images  were created. Estimates of carotid stenoses are made relative to the  distal internal carotid artery diameters except as noted. Radiation  dose for this scan was reduced using automated exposure control,  adjustment of the mA and/or kV according to patient size, or iterative  reconstruction technique.    COMPARISON: None.     CT HEAD FINDINGS: No contrast enhancing lesions. Cerebral blood flow  is grossly normal.     CT ANGIOGRAM HEAD FINDINGS: Fetal origin of the right PCA. The petrous  and cavernous segments of the internal carotid arteries are  unremarkable. Mildly hypoplastic right A1 segment. Azygos MINA.    CT ANGIOGRAM NECK FINDINGS:   2 vessel aortic arch     Right carotid artery:  The right common and internal carotid arteries  are patent. Mild atheromatous changes involve the origin of the right  internal carotid artery without hemodynamically significant stenosis.     Left carotid artery: The left common and internal carotid arteries are  patent. Mild atheromatous changes involve the origin of the left  internal carotid artery without hemodynamically significant stenosis.     Vertebral arteries: Vertebral arteries are patent without evidence of  dissection. No significant stenosis. The vertebral artery on the left  is dominant. Diminutive right vertebral artery.    Other findings: None.       Impression    IMPRESSION: No vascular occlusion findings intracranially. No  hemodynamically significant stenosis in the neck.      AMELIE MICHEL MD         SYSTEM ID:  [id number]   MR Brain w/o & w Contrast    Narrative    MRI BRAIN WITHOUT AND WITH CONTRAST  12/23/2024 4:12 PM     HISTORY:  slurred speech     TECHNIQUE:  Multiplanar, multisequence MRI of the brain without and  with 7.5mL Gadavist     COMPARISON: CT brain from the same day.     FINDINGS: No restricted diffusion to suggest acute ischemia. Stable  asymmetric global cortical volume loss with prominent CSF space near  the left frontal cranial apex. Mild chronic small vessel ischemic  changes. Major vascular flow voids are unremarkable. Midline  structures demonstrate an empty sella configuration. Mild degenerative  changes at the craniocervical junction.    Postcontrast imaging demonstrates no abnormal enhancement.  Susceptibility imaging is negative.    Scattered anterior ethmoid sinus disease. Mastoids are clear.      Impression    IMPRESSION:  No restricted diffusion to suggest acute ischemia. No  abnormal enhancement. Chronic changes as detailed above.      AMELIE MICHEL MD         SYSTEM ID:  [id number]   US Renal Complete Non-Vascular    Narrative    EXAM: US RENAL COMPLETE NON-VASCULAR  LOCATION: [facility]  DATE:  12/28/2024    INDICATION: Rule out obstruction. History of bladder cancer and ureteral stent.  COMPARISON: CT abdomen pelvis 6/5/2024.  TECHNIQUE: Routine Bilateral Renal and Bladder Ultrasound.    FINDINGS:    RIGHT KIDNEY: 13.2 x 6.0 x 5.5 cm. Normal cortical thickness and echogenicity. Severe hydronephrosis. No sonographically detectable calculi.    LEFT KIDNEY: 8.5 x 4.1 x 3.1 cm. Diffuse cortical thinning. Normal parenchymal echogenicity. Severe hydronephrosis and proximal ureterectasis. Partially visualized stent throughout the ureter. No sonographically detectable calculi.    BLADDER: Distal end of the left ureteral stent is within the bladder. Bladder is distended and contains intraluminal debris. No discrete bladder mass is visualized.      Impression    IMPRESSION:    1.  Severe bilateral hydronephrosis despite presence of a left ureteral stent, similar to the prior CT on 6/5/2024. Correlate for possible stent malfunction.    2.  Bladder is distended and contains a small amount of intraluminal debris. Correlate with urinalysis.    3.  No sonographically detectable renal calculi.    4.  Chronic asymmetric left renal cortical atrophy.

## 2024-12-28 NOTE — PLAN OF CARE
Patient's After Visit Summary was reviewed with patient. Packet sent to facility.  Patient verbalized understanding of After Visit Summary, recommended follow up and was given an opportunity to ask questions.   Discharge medications sent home with patient/family: YES   Discharged with transport tech

## 2024-12-28 NOTE — PLAN OF CARE
PRIMARY DIAGNOSIS: GENERALIZED WEAKNESS    OUTPATIENT/OBSERVATION GOALS TO BE MET BEFORE DISCHARGE  1. Orthostatic performed: Yes:          Lying Orthostatic BP: 138/69         Sitting Orthostatic BP: 136/67         Standing Orthostatic BP: 143/70     2. Tolerating PO medications: Yes    3. Return to near baseline physical activity: Yes    4. Cleared for discharge by consultants (if involved): Yes    Discharge Planner Nurse   Safe discharge environment identified: Yes  Barriers to discharge: No       Entered by: Kanika Nash RN 12/27/2024      Please review provider order for any additional goals.   Nurse to notify provider when observation goals have been met and patient is ready for discharge.    Goal Outcome Evaluation:      Plan of Care Reviewed With: patient    Overall Patient Progress: improvingOverall Patient Progress: improving    Outcome Evaluation: A&O/at cognitive baseline; althea and cooperative with cares. OT/SW following. Plans to return to [facility] w/ HC tomorrow, 12/28. Up w/ SBA w/ gait belt and walker; up to chair for meals. Incontinence cares provided PRN. Voltaren gel to R hip as scheduled.

## 2024-12-31 ENCOUNTER — PATIENT OUTREACH (OUTPATIENT)
Dept: CARE COORDINATION | Facility: CLINIC | Age: 69
End: 2024-12-31
Payer: COMMERCIAL

## 2024-12-31 NOTE — PROGRESS NOTES
Connected Care Resource Center:   [facility] Resource [facility] Contact  [facility]/Voicemail     Clinical Data: Post-Discharge Outreach     Outreach attempted x 2.  Left message on patient's voicemail, providing [facility]'s central phone number of [phone] ([phone]) for questions/concerns and/or to schedule an appt with an [facility] provider, if they do not have a PCP.      Plan:  [facility] will do no further outreaches at this time.       MAGO Schmitz  Connected Care Resource [place], [facility]    *Connected Care Resource Team does NOT follow patient ongoing. Referrals are identified based on internal discharge reports and the outreach is to ensure patient has an understanding of their discharge instructions.

## 2025-03-27 ENCOUNTER — APPOINTMENT (OUTPATIENT)
Dept: CT IMAGING | Facility: CLINIC | Age: 70
End: 2025-03-27
Attending: EMERGENCY MEDICINE
Payer: COMMERCIAL

## 2025-03-27 ENCOUNTER — APPOINTMENT (OUTPATIENT)
Dept: CT IMAGING | Facility: CLINIC | Age: 70
DRG: 885 | End: 2025-03-27
Attending: EMERGENCY MEDICINE
Payer: COMMERCIAL

## 2025-03-27 ENCOUNTER — HOSPITAL ENCOUNTER (EMERGENCY)
Facility: CLINIC | Age: 70
End: 2025-03-27
Attending: EMERGENCY MEDICINE
Payer: COMMERCIAL

## 2025-03-27 VITALS
SYSTOLIC BLOOD PRESSURE: 185 MMHG | HEART RATE: 109 BPM | TEMPERATURE: 98 F | DIASTOLIC BLOOD PRESSURE: 90 MMHG | RESPIRATION RATE: 22 BRPM | OXYGEN SATURATION: 99 %

## 2025-03-27 DIAGNOSIS — M54.50 CHRONIC RIGHT-SIDED LOW BACK PAIN WITHOUT SCIATICA: ICD-10-CM

## 2025-03-27 DIAGNOSIS — G89.29 CHRONIC RIGHT-SIDED LOW BACK PAIN WITHOUT SCIATICA: ICD-10-CM

## 2025-03-27 DIAGNOSIS — N17.9 AKI (ACUTE KIDNEY INJURY): ICD-10-CM

## 2025-03-27 DIAGNOSIS — R29.6 FALLS FREQUENTLY: ICD-10-CM

## 2025-03-27 DIAGNOSIS — L24.A2 IRRITANT CONTACT DERMATITIS DUE TO INCONTINENCE OF BOTH FECES AND URINE: Primary | ICD-10-CM

## 2025-03-27 DIAGNOSIS — F25.0 SCHIZOAFFECTIVE DISORDER, BIPOLAR TYPE (H): ICD-10-CM

## 2025-03-27 DIAGNOSIS — R46.89 AGGRESSIVE BEHAVIOR: ICD-10-CM

## 2025-03-27 DIAGNOSIS — Z86.59 HX OF SCHIZOPHRENIA: ICD-10-CM

## 2025-03-27 LAB
ALBUMIN SERPL BCG-MCNC: 4.2 G/DL (ref 3.5–5.2)
ALBUMIN UR-MCNC: 10 MG/DL
ALP SERPL-CCNC: 130 U/L (ref 40–150)
ALT SERPL W P-5'-P-CCNC: 13 U/L (ref 0–70)
ANION GAP SERPL CALCULATED.3IONS-SCNC: 16 MMOL/L (ref 7–15)
APPEARANCE UR: ABNORMAL
AST SERPL W P-5'-P-CCNC: 19 U/L (ref 0–45)
ATRIAL RATE - MUSE: 128 BPM
BASOPHILS # BLD AUTO: 0 10E3/UL (ref 0–0.2)
BASOPHILS NFR BLD AUTO: 0 %
BILIRUB SERPL-MCNC: 0.2 MG/DL
BILIRUB UR QL STRIP: NEGATIVE
BUN SERPL-MCNC: 29 MG/DL (ref 8–23)
CALCIUM SERPL-MCNC: 9.6 MG/DL (ref 8.8–10.4)
CHLORIDE SERPL-SCNC: 106 MMOL/L (ref 98–107)
COLOR UR AUTO: ABNORMAL
CREAT SERPL-MCNC: 1.42 MG/DL (ref 0.67–1.17)
DIASTOLIC BLOOD PRESSURE - MUSE: NORMAL MMHG
EGFRCR SERPLBLD CKD-EPI 2021: 54 ML/MIN/1.73M2
EOSINOPHIL # BLD AUTO: 0.3 10E3/UL (ref 0–0.7)
EOSINOPHIL NFR BLD AUTO: 3 %
ERYTHROCYTE [DISTWIDTH] IN BLOOD BY AUTOMATED COUNT: 14.6 % (ref 10–15)
GLUCOSE SERPL-MCNC: 134 MG/DL (ref 70–99)
GLUCOSE UR STRIP-MCNC: NEGATIVE MG/DL
HCO3 SERPL-SCNC: 21 MMOL/L (ref 22–29)
HCT VFR BLD AUTO: 36.3 % (ref 40–53)
HGB BLD-MCNC: 11.9 G/DL (ref 13.3–17.7)
HGB UR QL STRIP: ABNORMAL
IMM GRANULOCYTES # BLD: 0 10E3/UL
IMM GRANULOCYTES NFR BLD: 0 %
INTERPRETATION ECG - MUSE: NORMAL
KETONES UR STRIP-MCNC: NEGATIVE MG/DL
LEUKOCYTE ESTERASE UR QL STRIP: ABNORMAL
LYMPHOCYTES # BLD AUTO: 1.1 10E3/UL (ref 0.8–5.3)
LYMPHOCYTES NFR BLD AUTO: 13 %
MCH RBC QN AUTO: 29 PG (ref 26.5–33)
MCHC RBC AUTO-ENTMCNC: 32.8 G/DL (ref 31.5–36.5)
MCV RBC AUTO: 89 FL (ref 78–100)
MONOCYTES # BLD AUTO: 0.7 10E3/UL (ref 0–1.3)
MONOCYTES NFR BLD AUTO: 8 %
MUCOUS THREADS #/AREA URNS LPF: PRESENT /LPF
NEUTROPHILS # BLD AUTO: 6.5 10E3/UL (ref 1.6–8.3)
NEUTROPHILS NFR BLD AUTO: 76 %
NITRATE UR QL: NEGATIVE
NRBC # BLD AUTO: 0 10E3/UL
NRBC BLD AUTO-RTO: 0 /100
P AXIS - MUSE: 76 DEGREES
PH UR STRIP: 8 [PH] (ref 5–7)
PLATELET # BLD AUTO: 247 10E3/UL (ref 150–450)
POTASSIUM SERPL-SCNC: 3.8 MMOL/L (ref 3.4–5.3)
PR INTERVAL - MUSE: 146 MS
PROT SERPL-MCNC: 7.3 G/DL (ref 6.4–8.3)
QRS DURATION - MUSE: 116 MS
QT - MUSE: 336 MS
QTC - MUSE: 490 MS
R AXIS - MUSE: 49 DEGREES
RBC # BLD AUTO: 4.1 10E6/UL (ref 4.4–5.9)
RBC URINE: 11 /HPF
SODIUM SERPL-SCNC: 143 MMOL/L (ref 135–145)
SP GR UR STRIP: 1.01 (ref 1–1.03)
SQUAMOUS EPITHELIAL: 6 /HPF
SYSTOLIC BLOOD PRESSURE - MUSE: NORMAL MMHG
T AXIS - MUSE: 19 DEGREES
UROBILINOGEN UR STRIP-MCNC: NORMAL MG/DL
VENTRICULAR RATE- MUSE: 128 BPM
WBC # BLD AUTO: 8.5 10E3/UL (ref 4–11)
WBC URINE: 11 /HPF

## 2025-03-27 PROCEDURE — 70450 CT HEAD/BRAIN W/O DYE: CPT

## 2025-03-27 PROCEDURE — 250N000013 HC RX MED GY IP 250 OP 250 PS 637: Performed by: EMERGENCY MEDICINE

## 2025-03-27 PROCEDURE — 81001 URINALYSIS AUTO W/SCOPE: CPT | Performed by: EMERGENCY MEDICINE

## 2025-03-27 PROCEDURE — 93005 ELECTROCARDIOGRAM TRACING: CPT | Mod: RTG

## 2025-03-27 PROCEDURE — 87086 URINE CULTURE/COLONY COUNT: CPT | Performed by: EMERGENCY MEDICINE

## 2025-03-27 PROCEDURE — 74176 CT ABD & PELVIS W/O CONTRAST: CPT

## 2025-03-27 PROCEDURE — 84155 ASSAY OF PROTEIN SERUM: CPT | Performed by: EMERGENCY MEDICINE

## 2025-03-27 PROCEDURE — 96360 HYDRATION IV INFUSION INIT: CPT

## 2025-03-27 PROCEDURE — 258N000003 HC RX IP 258 OP 636: Performed by: EMERGENCY MEDICINE

## 2025-03-27 PROCEDURE — 85004 AUTOMATED DIFF WBC COUNT: CPT | Performed by: EMERGENCY MEDICINE

## 2025-03-27 PROCEDURE — 99285 EMERGENCY DEPT VISIT HI MDM: CPT | Mod: 25

## 2025-03-27 PROCEDURE — 250N000011 HC RX IP 250 OP 636: Performed by: EMERGENCY MEDICINE

## 2025-03-27 PROCEDURE — 36415 COLL VENOUS BLD VENIPUNCTURE: CPT | Performed by: EMERGENCY MEDICINE

## 2025-03-27 PROCEDURE — 93005 ELECTROCARDIOGRAM TRACING: CPT

## 2025-03-27 RX ORDER — OLANZAPINE 10 MG/1
20 TABLET, FILM COATED ORAL AT BEDTIME
Status: DISCONTINUED | OUTPATIENT
Start: 2025-03-27 | End: 2025-03-28

## 2025-03-27 RX ORDER — ALUMINA, MAGNESIA, AND SIMETHICONE 2400; 2400; 240 MG/30ML; MG/30ML; MG/30ML
20 SUSPENSION ORAL EVERY 6 HOURS PRN
COMMUNITY

## 2025-03-27 RX ORDER — BENZTROPINE MESYLATE 1 MG/1
2 TABLET ORAL AT BEDTIME
Status: DISCONTINUED | OUTPATIENT
Start: 2025-03-27 | End: 2025-04-30 | Stop reason: HOSPADM

## 2025-03-27 RX ORDER — OLANZAPINE 5 MG/1
10 TABLET, ORALLY DISINTEGRATING ORAL ONCE
Status: DISCONTINUED | OUTPATIENT
Start: 2025-03-27 | End: 2025-03-27

## 2025-03-27 RX ORDER — PETROLATUM,WHITE/LANOLIN
OINTMENT (GRAM) TOPICAL 4 TIMES DAILY PRN
COMMUNITY

## 2025-03-27 RX ORDER — OLANZAPINE 7.5 MG/1
7.5 TABLET, FILM COATED ORAL 2 TIMES DAILY
Status: ON HOLD | COMMUNITY
End: 2025-04-30

## 2025-03-27 RX ORDER — HALOPERIDOL 5 MG/ML
5 INJECTION INTRAMUSCULAR ONCE
Status: COMPLETED | OUTPATIENT
Start: 2025-03-27 | End: 2025-03-27

## 2025-03-27 RX ORDER — OLANZAPINE 5 MG/1
5 TABLET, FILM COATED ORAL AT BEDTIME
Status: ON HOLD | COMMUNITY
End: 2025-04-30

## 2025-03-27 RX ORDER — NAPROXEN 250 MG/1
250 TABLET ORAL 2 TIMES DAILY PRN
COMMUNITY

## 2025-03-27 RX ORDER — OLANZAPINE 5 MG/1
5 TABLET, ORALLY DISINTEGRATING ORAL 2 TIMES DAILY PRN
Status: DISCONTINUED | OUTPATIENT
Start: 2025-03-27 | End: 2025-03-27

## 2025-03-27 RX ORDER — ONDANSETRON 4 MG/1
4 TABLET, ORALLY DISINTEGRATING ORAL EVERY 6 HOURS PRN
Status: ON HOLD | COMMUNITY
End: 2025-04-30

## 2025-03-27 RX ORDER — HYDROXYZINE HYDROCHLORIDE 10 MG/1
10 TABLET, FILM COATED ORAL ONCE
Status: COMPLETED | OUTPATIENT
Start: 2025-03-27 | End: 2025-03-27

## 2025-03-27 RX ORDER — OLANZAPINE 10 MG/1
20 TABLET, FILM COATED ORAL AT BEDTIME
Status: DISCONTINUED | OUTPATIENT
Start: 2025-03-28 | End: 2025-03-27

## 2025-03-27 RX ORDER — OLANZAPINE 10 MG/2ML
5 INJECTION, POWDER, FOR SOLUTION INTRAMUSCULAR ONCE
Status: COMPLETED | OUTPATIENT
Start: 2025-03-27 | End: 2025-03-27

## 2025-03-27 RX ORDER — OLANZAPINE 5 MG/1
TABLET, ORALLY DISINTEGRATING ORAL
Status: COMPLETED
Start: 2025-03-27 | End: 2025-03-27

## 2025-03-27 RX ORDER — OXYBUTYNIN CHLORIDE 5 MG/1
2.5 TABLET ORAL 3 TIMES DAILY PRN
COMMUNITY

## 2025-03-27 RX ORDER — OLANZAPINE 5 MG/1
5 TABLET, ORALLY DISINTEGRATING ORAL ONCE
Status: COMPLETED | OUTPATIENT
Start: 2025-03-27 | End: 2025-03-27

## 2025-03-27 RX ADMIN — BENZTROPINE MESYLATE 2 MG: 1 TABLET ORAL at 22:45

## 2025-03-27 RX ADMIN — OLANZAPINE 20 MG: 10 TABLET, FILM COATED ORAL at 22:43

## 2025-03-27 RX ADMIN — OLANZAPINE 5 MG: 5 TABLET, ORALLY DISINTEGRATING ORAL at 13:33

## 2025-03-27 RX ADMIN — Medication 3 MG: at 22:42

## 2025-03-27 RX ADMIN — HALOPERIDOL LACTATE 5 MG: 5 INJECTION, SOLUTION INTRAMUSCULAR at 17:46

## 2025-03-27 RX ADMIN — OLANZAPINE 5 MG: 5 TABLET, ORALLY DISINTEGRATING ORAL at 16:29

## 2025-03-27 RX ADMIN — HYDROXYZINE HYDROCHLORIDE 10 MG: 10 TABLET, FILM COATED ORAL at 18:27

## 2025-03-27 RX ADMIN — SODIUM CHLORIDE 1000 ML: 0.9 INJECTION, SOLUTION INTRAVENOUS at 15:22

## 2025-03-27 ASSESSMENT — ACTIVITIES OF DAILY LIVING (ADL)
ADLS_ACUITY_SCORE: 58

## 2025-03-27 ASSESSMENT — COLUMBIA-SUICIDE SEVERITY RATING SCALE - C-SSRS
2. HAVE YOU ACTUALLY HAD ANY THOUGHTS OF KILLING YOURSELF IN THE PAST MONTH?: NO
IS THE PATIENT NOT ABLE TO COMPLETE C-SSRS: UNABLE TO VERBALIZE
6. HAVE YOU EVER DONE ANYTHING, STARTED TO DO ANYTHING, OR PREPARED TO DO ANYTHING TO END YOUR LIFE?: NO
1. IN THE PAST MONTH, HAVE YOU WISHED YOU WERE DEAD OR WISHED YOU COULD GO TO SLEEP AND NOT WAKE UP?: NO

## 2025-03-27 NOTE — ED PROVIDER NOTES
Signed out at change of shift.  In brief he comes from assisted living due to more aggressive behavior with the staff.  History of bipolar disorder and schizophrenia and has had UTIs presenting similarly.  Has a chronic left ureteral stent last exchanged in February through [facility].  Has had oral Zyprexa here.  Fell today but his head CT was fine.  Pending studies include an abdominal CT and urinalysis.  However if not infection then no would have higher clinical concern for decompensated mental health.    5:44 PM patient is sounding more agitated in the room where he has a sitter with him.  He is gesticulating with his arms and when one of the techs comes in that he had previously liked interacting with he states you are crazy.  He has been having some staff that he takes too well and others that he does not.  Has not yet been able to give a urine sample.  Had a lot of urine on the bladder and on his CT scan but then urinated on himself coming back from it.  Is likely to do better with oral or IV medications than IM.  IV Haldol ordered.    7:16 PM reassessed after male mag has not been successful capturing his urination and straight cath did not pass prostate.  Gets agitated when I walk into the room, will not allow even visual assessment of mag or  area and pulls blanket back over himself.  Mag has been repositioned by nursing.      Urinalysis with small quantities of RBC and WBC, much less than recent priors.  This is not unexpected with indwelling catheters and is not diagnostic of infection.  Reflex culture in process.

## 2025-03-27 NOTE — PHARMACY-ADMISSION MEDICATION HISTORY
Pharmacist Admission Medication History    Admission medication history is complete. The information provided in this note is only as accurate as the sources available at the time of the update.    Information Source(s): Facility ([facility]/NH/) medication list/MAR via  MAR    Pertinent Information: None    Changes made to PTA medication list:  Added: A+D oint, olanzapine 5mg  Deleted: senna, icyhot patch, hydroxyzine q6h prn, duplicate Imodium  Changed: buspirone 30mg 1/2 tab bid --> 15mg bid, olanzapine 7.5mg bid, oxybutynin 5mg    Allergies reviewed with patient and updates made in EHR: yes    Medication History Completed By: Jodi Flaherty RPH 3/27/2025 2:09 PM    PTA Med List   Medication Sig Last Dose/Taking    alum & mag hydroxide-simethicone (MAALOX  ES) 400-400-40 MG/5ML SUSP suspension Take 20 mLs by mouth every 6 hours as needed for indigestion. Taking As Needed    artificial tears OINT ophthalmic ointment Place Into the left eye at bedtime. 3/19/2025 Bedtime    benztropine (COGENTIN) 1 MG tablet Take 2 mg by mouth at bedtime. 3/25/2025 Bedtime    bisacodyl (DULCOLAX) 10 MG suppository Place 10 mg rectally daily as needed for constipation. Taking As Needed    busPIRone (BUSPAR) 15 MG tablet Take 15 mg by mouth 2 times daily. 3/27/2025 Morning    docusate sodium (COLACE) 100 MG capsule Take 100 mg by mouth 2 times daily as needed for constipation. Taking As Needed    fluticasone (FLONASE) 50 MCG/ACT nasal spray Spray 1 spray into both nostrils daily as needed. Taking As Needed    guaiFENesin (ROBITUSSIN) 20 mg/mL liquid Take 200 mg by mouth every 4 hours as needed for cough. Taking As Needed    hydrOXYzine HCl (ATARAX) 10 MG tablet Take 10 mg by mouth 2 times daily. Noon and 4PM 3/27/2025 Noon    lamoTRIgine (LAMICTAL) 100 MG tablet Take 100 mg by mouth daily. 3/27/2025 Morning    loperamide (IMODIUM) 2 MG capsule Take 2 mg by mouth 4 times daily as needed for diarrhea. Taking As Needed    magnesium hydroxide  (MILK OF MAGNESIA) 400 MG/5ML suspension Take 30 mLs by mouth daily as needed for constipation or heartburn. Taking As Needed    melatonin 3 MG tablet Take 1 tablet (3 mg) by mouth nightly as needed for sleep. Taking As Needed    menthol-zinc oxide (CALMOSEPTINE) 0.44-20.6 % OINT ointment Apply topically 4 times daily as needed for skin protection. Taking As Needed    mineral oil enema Place 1 enema rectally once. Taking    naproxen (NAPROSYN) 250 MG tablet Take 250 mg by mouth 2 times daily as needed for moderate pain. Taking As Needed    OLANZapine (ZYPREXA) 20 MG tablet Take 1 tablet (20 mg) by mouth at bedtime. 3/25/2025 Bedtime    OLANZapine (ZYPREXA) 5 MG tablet Take 5 mg by mouth at bedtime. Taking    OLANZapine (ZYPREXA) 7.5 MG tablet Take 7.5 mg by mouth 2 times daily. 8AM and noon 3/27/2025 Noon    ondansetron (ZOFRAN ODT) 4 MG ODT tab Take 4 mg by mouth every 6 hours as needed for nausea. Taking As Needed    oxyBUTYnin (DITROPAN) 5 MG tablet Take 2.5 mg by mouth 3 times daily as needed for bladder spasms. Taking As Needed    polyethylene glycol (MIRALAX) 17 GM/Dose powder Take 17 g (1 Capful) by mouth daily. 3/27/2025 Morning    tamsulosin (FLOMAX) 0.4 MG capsule Take 0.4 mg by mouth daily 3/27/2025 Morning    vitamin A & D (BABY) external ointment Apply topically 4 times daily as needed for dry skin or irritation. Taking As Needed

## 2025-03-27 NOTE — ED NOTES
"3/27/2025  Harpreet Mcelroy 1955     Writer consulted with ED provider, Agustin,  on this date at  5:10 PM. It was determined that pt would not benefit from assessment at this time due to Pt unable able to participate in assessment    Patient was unable to partipate, his speech was garbaled and he became agitated as writer asked questions.   He did stated, \"He is no good, he is gonna murpher me\".      ED will call DEC at [phone] when pt is ready and able to participate in assessment.     OR DEC order has been closed at this time.    Carol Parra, CATSW   "

## 2025-03-27 NOTE — ED NOTES
1:1 sitter at bedside, [facility] paperwork given to patient. Pt searched, 1 bag of belongings in DEC office.

## 2025-03-27 NOTE — LETTER
[facility]  [address]  [facility] [address]  [phone]    FACSIMILE TRANSMITTAL SHEET    TO: Teresa  COMPANY: [facility]   FAX NUMBER: [phone]  PHONE NUMBER: [phone]    FROM: MAGO Meza  PHONE: [phone]  DATE: 04/18/25  NUMBER OF PAGES:     _____URGENT _____REVIEW ONLY _____PLEASE COMMENT__X__PLEASE REPLY    NOTES/COMMENTS: Hoping to have this patient assessed for group home placement today. Please let me know once you've reviewed clinicals and would be able to come assess. Thanks.    MAGO Meza  Inpatient Care Coordination   [facility]  [phone]                                        IF YOU DID NOT RECEIVE THE CORRECT NUMBER OF PAGES OR THE FAX DID NOT COME THROUGH CLEARLY, PLEASE CALL THE SENDER     CONFIDENTIALITY STATEMENT: Confidential information that may accompany this transmission contains protected health information under state and federal law and is legally privileged. This information is intended only for the use of the individual or entity named above and may be used only for carrying out treatment, payment or other healthcare operations. The recipient or person responsible for delivering this information is prohibited by law from disclosing this information without proper authorization to any other party, unless required to do so by law or regulation. If you are not the intended recipient, you are hereby notified that any review, dissemination, distribution, or copying of this message is strictly prohibited. If you have received this communication in error, please destroy the materials and contact us immediately by calling the number listed above. No response indicates that the information was received by the appropriate authorized party

## 2025-03-27 NOTE — LETTER
[facility]  [address]  [facility] [address]  [phone]    FACSIMILE TRANSMITTAL SHEET    TO: Alfie  COMPANY: [facility]   FAX NUMBER: [phone]  PHONE NUMBER: [phone]     FROM: MAGO Meza  PHONE: [phone]  DATE: 04/23/25  NUMBER OF PAGES:       _____URGENT _____REVIEW ONLY _____PLEASE COMMENT____PLEASE REPLY    NOTES/COMMENTS: Discharge orders for Harpreet Mcelroy                                      IF YOU DID NOT RECEIVE THE CORRECT NUMBER OF PAGES OR THE FAX DID NOT COME THROUGH CLEARLY, PLEASE CALL THE SENDER     CONFIDENTIALITY STATEMENT: Confidential information that may accompany this transmission contains protected health information under state and federal law and is legally privileged. This information is intended only for the use of the individual or entity named above and may be used only for carrying out treatment, payment or other healthcare operations. The recipient or person responsible for delivering this information is prohibited by law from disclosing this information without proper authorization to any other party, unless required to do so by law or regulation. If you are not the intended recipient, you are hereby notified that any review, dissemination, distribution, or copying of this message is strictly prohibited. If you have received this communication in error, please destroy the materials and contact us immediately by calling the number listed above. No response indicates that the information was received by the appropriate authorized party

## 2025-03-27 NOTE — ED NOTES
3/27/2025  Harpreet Mcelroy 1955     Writer consulted with ED provider, Manan De Luna RN  ,  on this date at  3/27/25:1:20 PM. It was determined that pt would not benefit from assessment at this time due to Pt unable able to participate in assessment.  Pt was not ready now as he was too agitated as he was given some Zyprexa to help calm down.      ED will call DEC at [phone] when pt is ready and able to participate in assessment.     OR DEC order has been closed at this time.    FRANCES Soliman

## 2025-03-27 NOTE — ED NOTES
Bed: ED08  Expected date:   Expected time:   Means of arrival:   Comments:   only   [FreeTextEntry1] : All images and report viewed by me in the office.\par Vitamin D level 5/3/2022: 37.7 \par Culture AFB 10/14/2020: No growth.\par Abd CT 8/28/2020 (lung views): + Extensive bronchiectasis seen at the bases. Predominantly posterior.

## 2025-03-27 NOTE — ED TRIAGE NOTES
PT [facility] where pt began to have agitation and attempting to harm staff with cane, PD and EMS called, pt cooperative with staff upon arrival. PT at baseline per EMS. Tachycardic upon arrival.

## 2025-03-27 NOTE — LETTER
[facility]  [address]  [facility] [address]  [phone]    FACSIMILE TRANSMITTAL SHEET    TO: Alfie  COMPANY: [facility]   FAX NUMBER: [phone]  PHONE NUMBER: [phone]     FROM: MAGO Meza  PHONE: [phone]  DATE: 04/23/25  NUMBER OF PAGES:     _____URGENT _____REVIEW ONLY _____PLEASE COMMENT__XXX__PLEASE REPLY    NOTES/COMMENTS: Clinicals for Harpreet Mcelroy                                   IF YOU DID NOT RECEIVE THE CORRECT NUMBER OF PAGES OR THE FAX DID NOT COME THROUGH CLEARLY, PLEASE CALL THE SENDER     CONFIDENTIALITY STATEMENT: Confidential information that may accompany this transmission contains protected health information under state and federal law and is legally privileged. This information is intended only for the use of the individual or entity named above and may be used only for carrying out treatment, payment or other healthcare operations. The recipient or person responsible for delivering this information is prohibited by law from disclosing this information without proper authorization to any other party, unless required to do so by law or regulation. If you are not the intended recipient, you are hereby notified that any review, dissemination, distribution, or copying of this message is strictly prohibited. If you have received this communication in error, please destroy the materials and contact us immediately by calling the number listed above. No response indicates that the information was received by the appropriate authorized party

## 2025-03-27 NOTE — ED PROVIDER NOTES
Emergency Department Note      History of Present Illness     Chief Complaint   Aggressive Behavior      HPI   Harpreet Mcelroy is a 69 year old male with history of bipolar 1 disorder, schizophrenia, chronic left ureteral stent placement for chronic ureteral obstruction (last changed 2/4/25 with [facility] urology Dr. Whitt) presenting to the emergency department via EMS from [facility] on [facility] for evaluation of aggressive behavior.  Reportedly patient agitated and attempting to harm staff with a cane.  PD and EMS reported they were called.      On my assessment, patient with unintelligible speech yelling occasionally and threatening to throw bedside remote upon provider entering room.    Patient otherwise calm and not aggressively threatening to female RN who was able to place IV and have patient take PO zyprexa.     2:33 PM - Per RN who spoke with patient's facility, patient has had worsening agitation and pressured speech for the last 3 days.  He presents similarly when he has UTI.  He has not had any changes to his psychiatric medications. Patient had a fall today but did not hit head and was able to get up from this.        Independent Historian   None    Review of External Notes   None    Past Medical History     Medical History and Problem List   Past Medical History:   Diagnosis Date    Anxiety     Bipolar 1 disorder (H)     Cancer (H)     Depressive disorder     Psychiatric diagnosis        Medications   alum & mag hydroxide-simethicone (MAALOX  ES) 400-400-40 MG/5ML SUSP suspension  artificial tears OINT ophthalmic ointment  benztropine (COGENTIN) 1 MG tablet  bisacodyl (DULCOLAX) 10 MG suppository  busPIRone (BUSPAR) 15 MG tablet  docusate sodium (COLACE) 100 MG capsule  fluticasone (FLONASE) 50 MCG/ACT nasal spray  guaiFENesin (ROBITUSSIN) 20 mg/mL liquid  hydrOXYzine HCl (ATARAX) 10 MG tablet  lamoTRIgine (LAMICTAL) 100 MG tablet  loperamide (IMODIUM) 2 MG capsule  magnesium  hydroxide (MILK OF MAGNESIA) 400 MG/5ML suspension  melatonin 3 MG tablet  menthol-zinc oxide (CALMOSEPTINE) 0.44-20.6 % OINT ointment  mineral oil enema  naproxen (NAPROSYN) 250 MG tablet  OLANZapine (ZYPREXA) 20 MG tablet  OLANZapine (ZYPREXA) 5 MG tablet  OLANZapine (ZYPREXA) 7.5 MG tablet  ondansetron (ZOFRAN ODT) 4 MG ODT tab  oxyBUTYnin (DITROPAN) 5 MG tablet  polyethylene glycol (MIRALAX) 17 GM/Dose powder  tamsulosin (FLOMAX) 0.4 MG capsule  vitamin A & D (BABY) external ointment        Surgical History   No past surgical history on file.    Physical Exam     Patient Vitals for the past 24 hrs:   BP Temp Temp src Pulse Resp SpO2   03/27/25 1527 (!) 176/92 -- -- 105 -- 99 %   03/27/25 1418 -- -- -- 100 -- 95 %   03/27/25 1408 -- -- -- -- -- 96 %   03/27/25 1358 (!) 159/80 -- -- -- -- 95 %   03/27/25 1328 (!) 157/92 -- -- -- -- 97 %   03/27/25 1246 (!) 150/88 98  F (36.7  C) Temporal (!) 130 22 99 %     Physical Exam  General: the patient is awake; lying in gurney, unintelligible speech yelling at provider upon entry into room  HEENT:  Atraumatic. Conjunctiva normal  Pulmonary:  Normal respiratory effort  Cardiovascular:  Well perfused  Musculoskeletal:  Moving 4 extremities grossly wnl, no deformities    Diagnostics     Lab Results   Labs Ordered and Resulted from Time of ED Arrival to Time of ED Departure   COMPREHENSIVE METABOLIC PANEL - Abnormal       Result Value    Sodium 143      Potassium 3.8      Carbon Dioxide (CO2) 21 (*)     Anion Gap 16 (*)     Urea Nitrogen 29.0 (*)     Creatinine 1.42 (*)     GFR Estimate 54 (*)     Calcium 9.6      Chloride 106      Glucose 134 (*)     Alkaline Phosphatase 130      AST 19      ALT 13      Protein Total 7.3      Albumin 4.2      Bilirubin Total 0.2     CBC WITH PLATELETS AND DIFFERENTIAL - Abnormal    WBC Count 8.5      RBC Count 4.10 (*)     Hemoglobin 11.9 (*)     Hematocrit 36.3 (*)     MCV 89      MCH 29.0      MCHC 32.8      RDW 14.6      Platelet Count 247       % Neutrophils 76      % Lymphocytes 13      % Monocytes 8      % Eosinophils 3      % Basophils 0      % Immature Granulocytes 0      NRBCs per 100 WBC 0      Absolute Neutrophils 6.5      Absolute Lymphocytes 1.1      Absolute Monocytes 0.7      Absolute Eosinophils 0.3      Absolute Basophils 0.0      Absolute Immature Granulocytes 0.0      Absolute NRBCs 0.0     ROUTINE UA WITH MICROSCOPIC REFLEX TO CULTURE       Imaging   CT Head w/o Contrast   Final Result   IMPRESSION:     1.  No evidence of acute intracranial hemorrhage or mass effect.      Abd/pelvis CT no contrast - Stone Protocol    (Results Pending)       EKG   ECG results from 03/27/25   EKG 12 lead     Value    Systolic Blood Pressure     Diastolic Blood Pressure     Ventricular Rate 128    Atrial Rate 128    TX Interval 146    QRS Duration 116        QTc 490    P Axis 76    R AXIS 49    T Axis 19    Interpretation ECG      Sinus tachycardia  Incomplete right bundle branch block  Possible Inferior infarct , age undetermined  Poor R-wave progression ; consider septal infarct, lead placement, or normal variant  Abnormal ECG  When compared with ECG of 23-Dec-2024 11:36,  Vent. rate has increased by  64 bpm  ST no longer elevated in Inferior leads  ST depression has replaced ST elevation in Anterior leads         Independent Interpretation   CT Head: No intracranial hemorrhage.    ED Course      Medications Administered   Medications   sodium chloride 0.9% BOLUS 1,000 mL (1,000 mLs Intravenous $New Bag 3/27/25 1522)   OLANZapine (zyPREXA) injection 5 mg (5 mg Intramuscular Not Given 3/27/25 1344)   OLANZapine zydis (zyPREXA) ODT tab 5 mg (5 mg Oral $Given 3/27/25 1333)       Procedures   Procedures     Discussion of Management   None    ED Course        Additional Documentation  None    Medical Decision Making / Diagnosis     CMS Diagnoses: None    MIPS       None    MDM   Harpreet Mcelroy is a 69 year old male with history of bipolar 1  disorder, schizophrenia and left ureteral obstruction with chronic left ureteral stent placement last changed 2[place] [facility] [place] [place] to the ER from assisted living facility on [facility] for aggressive behavior towards staff over the last 3 days.  Reportedly has had similar presentations in the past secondary to UTI.  He is afebrile, slightly tachycardic but otherwise hemodynamically stable.  Patient verbally aggressive with unintelligible speech toward provider anytime provider tries to enter the room.  He is otherwise calm and cooperative with RN who was able to establish IV and draw labs.  Patient reportedly had a fall today but no head strike.  CT head was obtained without acute pathology.  He is moving all extremities and has no apparent focal deficits.  Initial labs show unremarkable CBC, mild ROBBIN (Cr 1.41, 1.19 2 months ago).  IV fluids were started.  Given history of stent placement, CT abdomen was ordered to evaluate stent placement or signs of acute abnormality.  Patient unable to provide UA during my shift.  DEC has been ordered as well to evaluate for primary decompensation of his schizophrenia.    If UA and CT are otherwise unremarkable, likely primary decompensation of his schizophrenia and may need psych admission pending DEC evaluation.    As needed Zyprexa has been ordered and patient was able to be directed to take oral Zyprexa on arrival.    Disposition   Patient signed out to my partner pending CT abdomen, UA, DEC evaluation.        Diagnosis     ICD-10-CM    1. Aggressive behavior  R46.89       2. Hx of schizophrenia  Z86.59       3. ROBBIN (acute kidney injury)  N17.9            Discharge Medications   New Prescriptions    No medications on file         MD Ramandeep Verdin Edgar Ronald, MD  03/27/25 1616       Daniel Sabillon MD  03/27/25 1616

## 2025-03-27 NOTE — ED NOTES
"Writer spoke to Nataliia(RN) at [facility]. Nataliia says his speech is typicallyeasy to understand although he sometimes will speak quickly. She says last few days he has been speaking less clearly and in a \"garbled\" manor. She explained that he has exhibited this in past when he has had UTI's. Pt had episode where he hit staff with cane. PCP did not want UA done in-house and wanted him to be seen in ED. Nataliia said pt had fall this AM. Fall was insignificant- no head trauma or LOC; pt got right back up and had no complains.  "

## 2025-03-28 ENCOUNTER — TELEPHONE (OUTPATIENT)
Dept: BEHAVIORAL HEALTH | Facility: CLINIC | Age: 70
End: 2025-03-28

## 2025-03-28 PROBLEM — R45.1 AGITATION: Chronic | Status: ACTIVE | Noted: 2025-03-28

## 2025-03-28 PROBLEM — F25.0 SCHIZOAFFECTIVE DISORDER, BIPOLAR TYPE (H): Status: ACTIVE | Noted: 2025-03-28

## 2025-03-28 PROBLEM — R47.81 SLURRED SPEECH: Status: ACTIVE | Noted: 2024-12-23

## 2025-03-28 PROBLEM — R47.81 SLURRED SPEECH: Chronic | Status: ACTIVE | Noted: 2024-12-23

## 2025-03-28 PROBLEM — F39 MOOD DISORDER: Chronic | Status: ACTIVE | Noted: 2025-03-28

## 2025-03-28 PROBLEM — F41.9 ANXIETY: Chronic | Status: ACTIVE | Noted: 2025-03-28

## 2025-03-28 LAB
ATRIAL RATE - MUSE: 109 BPM
DIASTOLIC BLOOD PRESSURE - MUSE: NORMAL MMHG
INTERPRETATION ECG - MUSE: NORMAL
P AXIS - MUSE: 73 DEGREES
PR INTERVAL - MUSE: 166 MS
QRS DURATION - MUSE: 110 MS
QT - MUSE: 370 MS
QTC - MUSE: 498 MS
R AXIS - MUSE: 54 DEGREES
SYSTOLIC BLOOD PRESSURE - MUSE: NORMAL MMHG
T AXIS - MUSE: 32 DEGREES
VENTRICULAR RATE- MUSE: 109 BPM

## 2025-03-28 PROCEDURE — 250N000013 HC RX MED GY IP 250 OP 250 PS 637: Performed by: EMERGENCY MEDICINE

## 2025-03-28 PROCEDURE — 250N000013 HC RX MED GY IP 250 OP 250 PS 637: Performed by: PSYCHIATRY & NEUROLOGY

## 2025-03-28 PROCEDURE — 99417 PROLNG OP E/M EACH 15 MIN: CPT | Performed by: PSYCHIATRY & NEUROLOGY

## 2025-03-28 PROCEDURE — 99205 OFFICE O/P NEW HI 60 MIN: CPT | Performed by: PSYCHIATRY & NEUROLOGY

## 2025-03-28 RX ORDER — HALOPERIDOL 5 MG/ML
5 INJECTION INTRAMUSCULAR 2 TIMES DAILY PRN
Status: DISCONTINUED | OUTPATIENT
Start: 2025-03-28 | End: 2025-04-21

## 2025-03-28 RX ORDER — HYDROXYZINE HYDROCHLORIDE 10 MG/1
10 TABLET, FILM COATED ORAL 2 TIMES DAILY
Status: DISCONTINUED | OUTPATIENT
Start: 2025-03-28 | End: 2025-04-30 | Stop reason: HOSPADM

## 2025-03-28 RX ORDER — BUSPIRONE HYDROCHLORIDE 15 MG/1
15 TABLET ORAL 2 TIMES DAILY
Status: DISCONTINUED | OUTPATIENT
Start: 2025-03-28 | End: 2025-03-28

## 2025-03-28 RX ORDER — LAMOTRIGINE 150 MG/1
150 TABLET ORAL DAILY
Status: DISCONTINUED | OUTPATIENT
Start: 2025-03-29 | End: 2025-04-16

## 2025-03-28 RX ORDER — TAMSULOSIN HYDROCHLORIDE 0.4 MG/1
0.4 CAPSULE ORAL DAILY
Status: DISCONTINUED | OUTPATIENT
Start: 2025-03-28 | End: 2025-04-30 | Stop reason: HOSPADM

## 2025-03-28 RX ORDER — BUSPIRONE HYDROCHLORIDE 10 MG/1
20 TABLET ORAL 2 TIMES DAILY
Status: DISCONTINUED | OUTPATIENT
Start: 2025-03-28 | End: 2025-04-16

## 2025-03-28 RX ORDER — LAMOTRIGINE 100 MG/1
100 TABLET ORAL DAILY
Status: DISCONTINUED | OUTPATIENT
Start: 2025-03-28 | End: 2025-03-28

## 2025-03-28 RX ORDER — HYDROXYZINE HYDROCHLORIDE 10 MG/1
10 TABLET, FILM COATED ORAL 3 TIMES DAILY PRN
Status: DISCONTINUED | OUTPATIENT
Start: 2025-03-28 | End: 2025-04-16

## 2025-03-28 RX ORDER — BUSPIRONE HYDROCHLORIDE 10 MG/1
20 TABLET ORAL DAILY PRN
Status: DISCONTINUED | OUTPATIENT
Start: 2025-03-28 | End: 2025-04-16

## 2025-03-28 RX ORDER — LAMOTRIGINE 25 MG/1
50 TABLET ORAL ONCE
Status: COMPLETED | OUTPATIENT
Start: 2025-03-28 | End: 2025-03-28

## 2025-03-28 RX ORDER — OLANZAPINE 5 MG/1
5 TABLET, FILM COATED ORAL AT BEDTIME
Status: DISCONTINUED | OUTPATIENT
Start: 2025-03-28 | End: 2025-03-28

## 2025-03-28 RX ORDER — RISPERIDONE 0.5 MG/1
1 TABLET, ORALLY DISINTEGRATING ORAL 2 TIMES DAILY PRN
Status: DISCONTINUED | OUTPATIENT
Start: 2025-03-28 | End: 2025-04-30 | Stop reason: HOSPADM

## 2025-03-28 RX ADMIN — LAMOTRIGINE 50 MG: 25 TABLET ORAL at 14:04

## 2025-03-28 RX ADMIN — OLANZAPINE 7.5 MG: 5 TABLET, FILM COATED ORAL at 11:54

## 2025-03-28 RX ADMIN — TAMSULOSIN HYDROCHLORIDE 0.4 MG: 0.4 CAPSULE ORAL at 11:54

## 2025-03-28 RX ADMIN — HYDROXYZINE HYDROCHLORIDE 10 MG: 10 TABLET, FILM COATED ORAL at 11:55

## 2025-03-28 RX ADMIN — BUSPIRONE HYDROCHLORIDE 15 MG: 15 TABLET ORAL at 11:54

## 2025-03-28 RX ADMIN — HYDROXYZINE HYDROCHLORIDE 10 MG: 10 TABLET, FILM COATED ORAL at 17:26

## 2025-03-28 RX ADMIN — LAMOTRIGINE 100 MG: 100 TABLET ORAL at 11:55

## 2025-03-28 ASSESSMENT — ACTIVITIES OF DAILY LIVING (ADL)
ADLS_ACUITY_SCORE: 58

## 2025-03-28 NOTE — ED NOTES
Pt becoming agitated, shouting incomprehensible words, sitter and security able to deescalate patient for a time then patient became agitated again trying to get out of bed.sitter and security staff with patient

## 2025-03-28 NOTE — TELEPHONE ENCOUNTER
R: 10 / [place]    5:55 PM Pt accepted by on call provider Dr. Lambert    6 PM Indicia completed    6:03 PM Unit notified     6:08 PM Faxed commitment paperwork up to unit.
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
## 2025-03-28 NOTE — ED NOTES
IP MH Referral Acuity Rating Score (RARS)    LMHP complete at referral to IP MH, with DEC; and, daily while awaiting IP MH placement. Call score to PPS.  CRITERIA SCORING   New 73 HH and Involuntary for IP MH (not adolescent) 3/3   Boarding over 24 hours 0/1   Vulnerable adult at least 55+ with multiple co morbidities; or, Patient age 11 or under 1/1   Suicide ideation without relief of precipitating factors 0/1   Current plan for suicide 0/1   Current plan for homicide 0/1   Imminent risk or actual attempt to seriously harm another without relief of factors precipitating the attempt 0/1   Severe dysfunction in daily living (ex: complete neglect for self care, extreme disruption in vegetative function, extreme deterioration in social interactions) 1/1   Recent (last 2 weeks) or current physical aggression in the ED 1/1   Restraints or seclusion episode in ED 0/1   Verbal aggression, agitation, yelling, etc., while in the ED 1/1   Active psychosis with psychomotor agitation or catatonia 0/1   Need for constant or near constant redirection (from leaving, from others, etc).  1/1   Intrusive or disruptive behaviors 1/1   TOTAL 9
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
## 2025-03-28 NOTE — ED NOTES
"Pt repeatedly telling nurse he is having a \"heart attack and a seizure\" and then pretending to die. VS taken and at baseline. EKG done and shown to provider, also at baseline.  "

## 2025-03-28 NOTE — ED NOTES
Email received by clinicianBita. Writer faxed commitment documents to stat, and intake. Securely emailed to the [facility].       MAGO Rivers  [facility]  [phone]

## 2025-03-28 NOTE — ED NOTES
Bed: ED06  Expected date:   Expected time:   Means of arrival:   Comments:  15   Partial Purse String (Intermediate) Text: Given the location of the defect and the characteristics of the surrounding skin an intermediate purse string closure was deemed most appropriate.  Undermining was performed circumferentially around the surgical defect.  A purse string suture was then placed and tightened. Wound tension of the circular defect prevented complete closure of the wound.

## 2025-03-28 NOTE — ED NOTES
Breakfast tray provided to pt. Room cleaned. Pt continuously mumbling and yelling but not forming any words/very hard to understand. 1:1 still at the doorway.

## 2025-03-28 NOTE — ED NOTES
3/27/2025  Harpreet Mcelroy 1955     8:08am: Writer consulted with ED. t was determined that pt would not benefit from assessment at this time due to Pt unable able to participate in assessment.     ED will call DEC at [phone] when pt is ready and able to participate in assessment.     8:10am: Emergency room called DEC back to report that patient is willing and able to participate in DEC assessment. DEC will see patient at this time.     Zarina Cuello
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
## 2025-03-28 NOTE — ED NOTES
Patient arrived to room 6 aggravated with garbled unintelligible speech. Sitter at bedside. Video monitoring on.   Pure wik In place

## 2025-03-28 NOTE — CONSULTS
"[facility]     INITIAL PSYCHIATRY   CONSULT     DATE OF SERVICE   3/28/2025       IDENTIFICATION   Harpreet Mcelroy  Age: 69 year old  MRN# [id number]   YOB: 1955   LOS: 0       CHIEF COMPLAINT   \"Die, Die!\"       CONSULT REQUEST BY   Lisseth Harris re: 72-hour hold and aggression       HISTORY OF PRESENT ILLNESS   This is a 69 year old male with documented history of schizoaffective disorder, ARLYN, baseline slurred speech.  Due to the lack of access to outside mental health information and electronic medical records, further information is unknown related to mental health history.  PTA medications include psychiatric medications for management of mood and thought disorder with anxiety.  No concerns of adherence reported.  Now, presenting secondary to signs and symptoms of decompensation to include aggression to staff for the past 3 days.  Placed on 72-hour hold.    Consult placed to psychiatry regarding aggression and 72-hour hold.    In brief, patient presented to this facility's ED by EMS from assisted living facility.  Reportedly, patient demonstrating behaviors from baseline of agitation and aggression to staff.  Onset occurring within the past 3 days and similar to previous episode with UTI and fall.  While in ED, given Zyprexa and demonstrated ongoing behaviors.    DEC re-assessment discussed and reviewed is as follows:  Patient Recommended Care Path: inpatient mental health  Clinical Substantiation:  Pt prsents to ED from assisted living facility after becoming increasingly agitated over the past 3 days resulting in physical aggression toward staff and others at facility, prompting 911 call.  Pt has a hx of Schizoaffective Disorder, bipolar type and anxiety.  Per chart review he's had one previous [facility] hospitalization in 1997 after suicide attempt.  He is currently under the care of a psychiatrist and living facility staff reports he takes his medications as " "prescribed.  Pt has history of changes in behavior which he becomes more paranoid and agitated which sometimes is the result of a urinary infection but other times it's psychiatric in nature.  RN at living facility reports pt has never gotten to this level of agitation where he's becoming physically aggressive and violent toward others.  While in ED pt's peech has mostly been garbled, not being able to make out what patient is saying, along with exhibiting aggression and inappropriate behavior toward staff.  Pt's presentation has not improved in almost 24 hours of being in ED.  Due to patient's history and current presentation, which appears to be a significant change from baseline, it is recommended pt be admitted for inpatient psychiatric hospitalization for further stabilization and safety.    Care coordination performed.  Includes, review of electronic medical record.  On 1/9/2025, PCP visit reviewed for schizoaffective disorder and speech disturbance.  Noted to have psychiatrist in the community, but unable to review records.    Upon interview, the patient is uncooperative on approach.  Evaluation attempted in patient's room in ED.  Patient is not voluntary.  Patient is made aware of plan to coordinate cares with treatment team.    Patient evaluation attempted.  Informed by staff of patient's repeated statements made, consistent with speech disturbance felt to be baseline as per chart review.  Further exacerbated medications behaviors of agitation on approach.  Patient making repeated statements of, \"Die, Die!\"  Unable to obtain further meaningful information directly from patient.  Further information gathered by means of chart review, nursing/physician/DEC report the patient observation.    Despite efforts of reassurance and validation of concerns, unable to satisfy for consultation.  Informed patient of 72-hour hold and no history of mental health diagnosis.  Recommendation to continue PTA medications and " optimize lamotrigine, BuSpar to target symptoms of agitation.  No concerns reported from assisted living facility, regarding adherence to medications and ongoing use of Lamictal with titration.    Plan discussed with treatment team.    Review of external notes and/or information:  I personally reviewed notes from the patient's ED Intake note dated 3/27/2025, PCP note dated 1/9/2025. This provided me with information regarding patient's recent clinical course.     I personally reviewed the patient's chart, including available medication list and available past medical history, past surgical history, family history, and social history.        CHEMICAL DEPENDENCY HISTORY   History   Drug Use Unknown     Social History    Substance and Sexual Activity      Alcohol use: Not Currently    History   Smoking Status    Former   Smokeless Tobacco    Current     Treatment: Unknown  Detox: Unknown  Legal: Unknown    Reviewed in detail and please see DEC Assessment for full details and history.       PAST PSYCHIATRIC HISTORY   Psychiatrist: Unknown community provider.  Therapist: None reported  Case Management: None reported  Past Medications: Details unknown at time of assessment.  Suicide Attempts/Gun Access: Details unknown at time of assessment.    Reviewed in detail and please see DEC Assessment for full details and history.       PAST MEDICAL HISTORY   Past Medical History:   Diagnosis Date    Anxiety     Bipolar 1 disorder (H)     Cancer (H)     Depressive disorder     Psychiatric diagnosis      No past surgical history on file.    Primary Care Provider: [facility]  Medications:   Current Facility-Administered Medications   Medication Dose Route Frequency Provider Last Rate Last Admin    benztropine (COGENTIN) tablet 2 mg  2 mg Oral At Bedtime Aisha Velez MD   2 mg at 03/27/25 2245    melatonin tablet 3 mg  3 mg Oral At Bedtime Aisha Velez MD   3 mg at 03/27/25 2247     OLANZapine (zyPREXA) tablet 20 mg  20 mg Oral At Bedtime Aisha Velez MD   20 mg at 03/27/25 9193     Medications as needed:   Current Facility-Administered Medications   Medication Dose Route Frequency Provider Last Rate Last Admin    midazolam (VERSED) injection 1 mg  1 mg Intravenous Once PRN Aisha Velez MD         ALLERGIES: Lactose, Aspirin, Cucumber extract, Diphenhydramine, Pollen extract, and Acetaminophen    Reviewed in detail and see ED and Intake for full details and history.       MEDICATIONS   Prior to Admission medications    Medication Sig Last Dose Taking? Auth Provider Long Term End Date   alum & mag hydroxide-simethicone (MAALOX  ES) 400-400-40 MG/5ML SUSP suspension Take 20 mLs by mouth every 6 hours as needed for indigestion.  Yes Unknown, Entered By History     artificial tears OINT ophthalmic ointment Place Into the left eye at bedtime. 3/19/2025 Bedtime Yes Unknown, Entered By History     benztropine (COGENTIN) 1 MG tablet Take 2 mg by mouth at bedtime. 3/25/2025 Bedtime Yes Unknown, Entered By History No    bisacodyl (DULCOLAX) 10 MG suppository Place 10 mg rectally daily as needed for constipation.  Yes Unknown, Entered By History     busPIRone (BUSPAR) 15 MG tablet Take 15 mg by mouth 2 times daily. 3/27/2025 Morning Yes Nahomi Knight MD Yes    docusate sodium (COLACE) 100 MG capsule Take 100 mg by mouth 2 times daily as needed for constipation.  Yes Unknown, Entered By History     fluticasone (FLONASE) 50 MCG/ACT nasal spray Spray 1 spray into both nostrils daily as needed.  Yes Unknown, Entered By History     guaiFENesin (ROBITUSSIN) 20 mg/mL liquid Take 200 mg by mouth every 4 hours as needed for cough.  Yes Unknown, Entered By History     hydrOXYzine HCl (ATARAX) 10 MG tablet Take 10 mg by mouth 2 times daily. Noon and 4PM 3/27/2025 Noon Yes Unknown, Entered By History     lamoTRIgine (LAMICTAL) 100 MG tablet Take 100 mg by mouth daily. 3/27/2025 Morning  Yes Unknown, Entered By History Yes    loperamide (IMODIUM) 2 MG capsule Take 2 mg by mouth 4 times daily as needed for diarrhea.  Yes Unknown, Entered By History     magnesium hydroxide (MILK OF MAGNESIA) 400 MG/5ML suspension Take 30 mLs by mouth daily as needed for constipation or heartburn.  Yes Unknown, Entered By History     melatonin 3 MG tablet Take 1 tablet (3 mg) by mouth nightly as needed for sleep.  Yes Nahomi Knight MD     menthol-zinc oxide (CALMOSEPTINE) 0.44-20.6 % OINT ointment Apply topically 4 times daily as needed for skin protection.  Yes Unknown, Entered By History     mineral oil enema Place 1 enema rectally once.  Yes Unknown, Entered By History     naproxen (NAPROSYN) 250 MG tablet Take 250 mg by mouth 2 times daily as needed for moderate pain.  Yes Unknown, Entered By History No    OLANZapine (ZYPREXA) 20 MG tablet Take 1 tablet (20 mg) by mouth at bedtime. 3/25/2025 Bedtime Yes Nahomi Knight MD Yes    OLANZapine (ZYPREXA) 5 MG tablet Take 5 mg by mouth at bedtime.  Yes Unknown, Entered By History Yes    OLANZapine (ZYPREXA) 7.5 MG tablet Take 7.5 mg by mouth 2 times daily. 8AM and noon 3/27/2025 Noon Yes Unknown, Entered By History Yes    ondansetron (ZOFRAN ODT) 4 MG ODT tab Take 4 mg by mouth every 6 hours as needed for nausea.  Yes Unknown, Entered By History No    oxyBUTYnin (DITROPAN) 5 MG tablet Take 2.5 mg by mouth 3 times daily as needed for bladder spasms.  Yes Unknown, Entered By History No    polyethylene glycol (MIRALAX) 17 GM/Dose powder Take 17 g (1 Capful) by mouth daily. 3/27/2025 Morning Yes Nahomi Knight MD No    tamsulosin (FLOMAX) 0.4 MG capsule Take 0.4 mg by mouth daily 3/27/2025 Morning Yes Unknown, Entered By History     vitamin A & D (BABY) external ointment Apply topically 4 times daily as needed for dry skin or irritation.  Yes Unknown, Entered By History          ROS   The 10 point Review of Systems is negative other than noted in the HPI  or here.       FAMILY HISTORY   No family history on file.        SOCIAL HISTORY   Social History     Socioeconomic History    Marital status: Single     Spouse name: Not on file    Number of children: Not on file    Years of education: Not on file    Highest education level: Not on file   Occupational History    Not on file   Tobacco Use    Smoking status: Former    Smokeless tobacco: Current   Substance and Sexual Activity    Alcohol use: Not Currently    Drug use: Never    Sexual activity: Not on file   Other Topics Concern    Not on file   Social History Narrative    Not on file     Social Drivers of Health     Financial Resource Strain: Unknown (12/23/2024)    Financial Resource Strain     Within the past 12 months, have you or your family members you live with been unable to get utilities (heat, electricity) when it was really needed?: Patient unable to answer   Food Insecurity: No Food Insecurity (1/29/2025)    Received from [facility] Vital Sign     Worried About Running Out of Food in the Last Year: Never true     Ran Out of Food in the Last Year: Never true   Transportation Needs: Unknown (12/23/2024)    Transportation Needs     Within the past 12 months, has lack of transportation kept you from medical appointments, getting your medicines, non-medical meetings or appointments, work, or from getting things that you need?: Patient unable to answer   Physical Activity: Not on file   Stress: Not on file   Social Connections: Not on file   Interpersonal Safety: Not on file   Housing Stability: Unknown (12/23/2024)    Housing Stability     Do you have housing? : Patient unable to answer     Are you worried about losing your housing?: Patient unable to answer     Occupation: Unemployed  Marital Status: Single  Living Situation: Living arrangements - the patient resides in an Assisted living facility.       MENTAL STATUS EXAM   Appearance: Uncooperative  Mood:  Mood: Agitated  Affect: guarded   was congruent to speech  Suicidal Ideation: PRESENT / ABSENT: absent   Homicidal Ideation: PRESENT / ABSENT: absent   Thought process: disorganized and no ODILON.  Thought content: significant for preoccupations.   Fund of Knowledge: BAHMAN  Attention/Concentration: Poor  Language ability:  Impaired  Memory:  Impaired  Insight:  limited.  Judgement: limited  Orientation: 0  Psychomotor Behavior: agitated    Muscle Strength and Tone: MuscleStrength: Agitated  Gait and Station:  In bed       PHYSICAL EXAM   Vitals: BP (!) 170/105   Pulse 120   Temp 98  F (36.7  C) (Temporal)   Resp 22   SpO2 100%     Weight:   0 lbs 0 oz    There is no height or weight on file to calculate BMI.    Physical exam as per Daniel Sabillon MD . Dated 3/27/2025:    General: the patient is awake; lying in gurney, unintelligible speech yelling at provider upon entry into room  HEENT:  Atraumatic. Conjunctiva normal  Pulmonary:  Normal respiratory effort  Cardiovascular:  Well perfused  Musculoskeletal:  Moving 4 extremities grossly wnl, no deformities     I have reviewed the physical exam as documented by by the medical team and agree with findings and assessment and have no additional findings to add at this time.       LABS   personally reviewed.   Recent Results (from the past 48 hours)   EKG 12 lead    Collection Time: 03/27/25 12:37 PM   Result Value Ref Range    Systolic Blood Pressure  mmHg    Diastolic Blood Pressure  mmHg    Ventricular Rate 128 BPM    Atrial Rate 128 BPM    PA Interval 146 ms    QRS Duration 116 ms     ms    QTc 490 ms    P Axis 76 degrees    R AXIS 49 degrees    T Axis 19 degrees    Interpretation ECG       Sinus tachycardia  Incomplete right bundle branch block  Possible Inferior infarct , age undetermined  Poor R-wave progression ; consider septal infarct, lead placement, or normal variant  Abnormal ECG  When compared with ECG of 23-Dec-2024 11:36,  Vent. rate has increased by  64 bpm  ST no longer elevated  in Inferior leads  ST depression has replaced ST elevation in Anterior leads     Comprehensive metabolic panel    Collection Time: 03/27/25  1:34 PM   Result Value Ref Range    Sodium 143 135 - 145 mmol/L    Potassium 3.8 3.4 - 5.3 mmol/L    Carbon Dioxide (CO2) 21 (L) 22 - 29 mmol/L    Anion Gap 16 (H) 7 - 15 mmol/L    Urea Nitrogen 29.0 (H) 8.0 - 23.0 mg/dL    Creatinine 1.42 (H) 0.67 - 1.17 mg/dL    GFR Estimate 54 (L) >60 mL/min/1.73m2    Calcium 9.6 8.8 - 10.4 mg/dL    Chloride 106 98 - 107 mmol/L    Glucose 134 (H) 70 - 99 mg/dL    Alkaline Phosphatase 130 40 - 150 U/L    AST 19 0 - 45 U/L    ALT 13 0 - 70 U/L    Protein Total 7.3 6.4 - 8.3 g/dL    Albumin 4.2 3.5 - 5.2 g/dL    Bilirubin Total 0.2 <=1.2 mg/dL   CBC with platelets and differential    Collection Time: 03/27/25  1:34 PM   Result Value Ref Range    WBC Count 8.5 4.0 - 11.0 10e3/uL    RBC Count 4.10 (L) 4.40 - 5.90 10e6/uL    Hemoglobin 11.9 (L) 13.3 - 17.7 g/dL    Hematocrit 36.3 (L) 40.0 - 53.0 %    MCV 89 78 - 100 fL    MCH 29.0 26.5 - 33.0 pg    MCHC 32.8 31.5 - 36.5 g/dL    RDW 14.6 10.0 - 15.0 %    Platelet Count 247 150 - 450 10e3/uL    % Neutrophils 76 %    % Lymphocytes 13 %    % Monocytes 8 %    % Eosinophils 3 %    % Basophils 0 %    % Immature Granulocytes 0 %    NRBCs per 100 WBC 0 <1 /100    Absolute Neutrophils 6.5 1.6 - 8.3 10e3/uL    Absolute Lymphocytes 1.1 0.8 - 5.3 10e3/uL    Absolute Monocytes 0.7 0.0 - 1.3 10e3/uL    Absolute Eosinophils 0.3 0.0 - 0.7 10e3/uL    Absolute Basophils 0.0 0.0 - 0.2 10e3/uL    Absolute Immature Granulocytes 0.0 <=0.4 10e3/uL    Absolute NRBCs 0.0 10e3/uL   UA with Microscopic reflex to Culture    Collection Time: 03/27/25  8:17 PM    Specimen: Urine, Midstream   Result Value Ref Range    Color Urine Light Yellow Colorless, Straw, Light Yellow, Yellow    Appearance Urine Slightly Cloudy (A) Clear    Glucose Urine Negative Negative mg/dL    Bilirubin Urine Negative Negative    Ketones Urine Negative  "Negative mg/dL    Specific Gravity Urine 1.009 1.003 - 1.035    Blood Urine Small (A) Negative    pH Urine 8.0 (H) 5.0 - 7.0    Protein Albumin Urine 10 (A) Negative mg/dL    Urobilinogen Urine Normal Normal mg/dL    Nitrite Urine Negative Negative    Leukocyte Esterase Urine Large (A) Negative    Mucus Urine Present (A) None Seen /LPF    RBC Urine 11 (H) <=2 /HPF    WBC Urine 11 (H) <=5 /HPF    Squamous Epithelials Urine 6 (H) <=1 /HPF   EKG 12 lead    Collection Time: 03/27/25 11:05 PM   Result Value Ref Range    Systolic Blood Pressure  mmHg    Diastolic Blood Pressure  mmHg    Ventricular Rate 109 BPM    Atrial Rate 109 BPM    WV Interval 166 ms    QRS Duration 110 ms     ms    QTc 498 ms    P Axis 73 degrees    R AXIS 54 degrees    T Axis 32 degrees    Interpretation ECG       Sinus tachycardia  Incomplete right bundle branch block  QTcB >= 480 msec  Abnormal ECG  When compared with ECG of 27-Mar-2025 12:37, (unconfirmed)  Borderline criteria for Inferior infarct are no longer Present  Confirmed by - EMERGENCY ROOM, PHYSICIAN ([id number]),  GURU MARY ([id number]) on 3/28/2025 7:25:26 AM       No results found for: \"PHENYTOIN\", \"PHENOBARB\", \"VALPROATE\", \"CBMZ\"       ASSESSMENT   Consult placed to psychiatry regarding aggression.  Occurring in mental history to include schizoaffective disorder, bipolar type and anxiety.  Mental history is unclear and chart review, but presenting below baseline as per assisted living facility leading to behaviors of agitation and aggression.  Requiring placement on a 72-hour hold.  Indication for inpatient psychiatric hospitalization to coordinate cares, medication management, and placement with plans to file MI petition for commitment and [place].       DIAGNOSIS   Principal Problem:    Mood disorder with psychosis    Active Problem List:  Patient Active Problem List   Diagnosis    Carol (H)    Acute UTI    Other hydronephrosis    Slurred speech    Falls frequently    Mood " disorder with psychosis    Agitation    Anxiety          RECOMMENDATIONS   Target psychiatric symptoms and interventions:  Education:  Risks, benefits, and alternatives discussed at length with patient.     Safety/Supervision/Disposition:  Legal:   3/28 (0309):  Placed on a 72-hour hold.  3/28:  Indication to file [facility] Petition for Commitment with [facility].   Placement:  IP MH Admission  Precautions placed:  Routine as per ED; Delirium    Medication Recommendations:   [place] PDMP Reviewed:  Unremarkable  PTA Psychiatric Medications reviewed.  Zyprexa: Continue PTA Zyprexa 7.5 mg every morning, q. noon, and 25 mg at bedtime, psychosis/mood.  Cogentin: Continue PTA Cogentin 2 mg nightly, EPSE.  Lamictal: Increase PTA lamotrigine 100 mg daily to lamotrigine 150 mg daily.  No report of issues of adherence reported prior to admission from [facility].  BuSpar: Increase PTA BuSpar 15 mg twice daily to 20 mg twice daily, anxiety.  Add as needed BuSpar 20 mg daily, anxiety.  PRN Melatonin: Continue PTA as needed melatonin 3 mg at bedtime, sleep.  PRN Atarax: Continue PTA Atarax 10 mg twice daily.  Add as needed Atarax 10 mg 3 times daily, anxiety.  PRN Risperdal ODT:  As needed Risperdal ODT 1 mg bid, mild to moderate agitation.  PRN Haldol IM:  As needed Haldol IM 5 mg bid, severe agitation    Acute Medical Problems and Treatments:  ED Intake (3/27):  Reviewed and discussed with patient.  Qtc 490  Cr 1.42  WBC 11.9  Hematocrit 36.3    Care Coordination:  Treatment plan discussed directly with ED staff.  Please reconsult with Psychiatry as needed. Plan to follow up on 3/31 if still boarding in ED.        Risk Assessment: IP MHAC RISK ASSESSMENT: Patient on precautions    Total encounter time:  A total of  80  minutes spent related to chart review, history and exam, documentation   and further activities as noted above    This note was created with help of Dragon dictation system. Grammatical / typing errors are not  intentional.        Arnel Peres MD - 03/28/2025  - 9:19 AM  Consult/Liaison Psychiatry   [facility]    Please call the [facility] CL line ([phone]) with questions and to determine consult service coverage.

## 2025-03-28 NOTE — PLAN OF CARE
Harpreet Mcelroy  March 28, 2025  Plan of Care Hand-off Note     Patient Recommended Care Path: inpatient mental health    Clinical Substantiation:  Pt prsents to ED from assisted living facility after becoming increasingly agitated over the past 3 days resulting in physical aggression toward staff and others at facility, prompting 911 call.  Pt has a hx of Schizoaffective Disorder, bipolar type and anxiety.  Per chart review he's had one previous [facility] hospitalization in 1997 after suicide attempt.  He is currently under the care of a psychiatrist and living facility staff reports he takes his medications as prescribed.  Pt has history of changes in behavior which he becomes more paranoid and agitated which sometimes is the result of a urinary infection but other times it's psychiatric in nature.  RN at living facility reports pt has never gotten to this level of agitation where he's becoming physically aggressive and violent toward others.  While in ED pt's peech has mostly been garbled, not being able to make out what patient is saying, along with exhibiting aggression and inappropriate behavior toward staff.  Pt's presentation has not improved in almost 24 hours of being in ED.  Due to patient's history and current presentation, which appears to be a significant change from baseline, it is recommended pt be admitted for inpatient psychiatric hospitalization for further stabilization and safety.    Goals for crisis stabilization:  Reduction of symptoms to the point where pt is able to show he can be safe with others.    Next steps for Care Team:  Pt has been placed on inpatient placement list and psychiatric consult order placed for further evaluation of pt's presentation and any additional recommendations.  Nataliia RN from pt's living facility reports they are just across the street and offered to come to the ED if that would be helpful for pt.  She also provided her direct cell phone ([phone]) if there is  trouble getting ahold of other nursing staff after hours.    Treatment Objectives Addressed:   (n/a)    Therapeutic Interventions:   (n/a)    Has a specific means been identified for suicidal.homicide actions: No      Patient coping skills attempted to reduce the crisis:  n/a          Severe psychiatric, behavioral or other comorbid conditions are appropriate for management at inpatient mental health as indicated by at least one of the following: Psychiatric Symptoms, Impaired impulse control, judgement, or insight, Symptoms of impact to function  Severe dysfunction in daily living is present as indicated by at least one of the following: Complete inability to maintain any appropriate aspect of personal responsibility in any adult roles  Situation and expectations are appropriate for inpatient care: Patient is unwilling to participate in treatment voluntarily and requires treatment, Voluntary treatment at lower level of care is not feasible, Around-the-clock medical and nursing care to address symptoms and initiate intervention is required, Patient management/treatment at lower level of care is not feasible or is inappropriate, Need for physical restraint, seclusion, or other involuntary treatment intervention is present  Inpatient mental health services are necessary to meet patient needs and at least one of the following: Specific condition related to admission diagnosis is present and judged likely to further improve at proposed level of care, Specific condition related to admission diagnosis is present and judged likely to deteriorate in absence of treatment at proposed level of care      Collateral contact information:  Nataliia RN at [facility], [phone] (main line), [phone] (Nataliia's cell phone)    Legal Status: 72 hour hold, start time 3/28/25 at 0308                                                                                                                                 Reviewed court  records: yes     Psychiatry Consult: Patient has Psychiatry Consult Order    Jocelyn Kehr Sparby, LPCC, LADC

## 2025-03-29 ENCOUNTER — TELEPHONE (OUTPATIENT)
Dept: BEHAVIORAL HEALTH | Facility: CLINIC | Age: 70
End: 2025-03-29
Payer: COMMERCIAL

## 2025-03-29 LAB — BACTERIA UR CULT: NORMAL

## 2025-03-29 ASSESSMENT — ACTIVITIES OF DAILY LIVING (ADL)
ADLS_ACUITY_SCORE: 58

## 2025-03-29 NOTE — ED NOTES
RN ED Mental Health Handoff Note    72 hour hold    Does patient require 1:1? Yes    Hold and rights been given and documented for patient: Yes    Is the patient in BH scrubs? No     Has the patient been searched? Yes    Is the 15 minute observation tool up to date? Yes    Was patient issued a welcome folder? No -    Room check completed this shift: Yes    PSS3 and [place] Assessment/Reassessment this shift:    C-SSRS ([place])      Date and Time Q1 Wished to be Dead (Past Month) Q2 Suicidal Thoughts (Past Month) Q3 Suicidal Thought Method Q4 Suicidal Intent without Specific Plan Q5 Suicide Intent with Specific Plan Q6 Suicide Behavior (Lifetime) If yes to Q6, within past 3 months? Level of Risk per Screen Level of Risk per Screen User   03/28/25 1321 0-->no 0-->no -- -- -- 0-->no -- -- no risks indicated RN   03/28/25 0929 -- -- -- -- -- 1-->yes -- -- -- JK   03/27/25 1244 0-->no 0-->no -- -- -- 0-->no -- -- no risks indicated RG            Behavioral status of patient: Green    Code 21 called this shift? No    Use of restraints/seclusion this shift? No    Most recent vital signs:      BP: (!) 154/78 Pulse: 87   Resp: 18 SpO2: 97 %        Medications:  Scheduled medication compliance? No - refused all meds    PRN Meds administered this shift? No    Medications   midazolam (VERSED) injection 1 mg (has no administration in time range)   benztropine (COGENTIN) tablet 2 mg (2 mg Oral Not Given 3/28/25 2219)   melatonin tablet 3 mg (3 mg Oral Not Given 3/28/25 2220)   OLANZapine (zyPREXA) tablet 7.5 mg (7.5 mg Oral Not Given 3/29/25 1119)   hydrOXYzine HCl (ATARAX) tablet 10 mg (10 mg Oral Not Given 3/29/25 1119)   oxyBUTYnin (DITROPAN) half-tab 2.5 mg (has no administration in time range)   tamsulosin (FLOMAX) capsule 0.4 mg (0 mg Oral Not Given 3/29/25 5890)   OLANZapine (zyPREXA) tablet 25 mg (25 mg Oral Not Given 3/28/25 1453)   busPIRone (BUSPAR) tablet 20 mg (0 mg Oral Not Given 3/29/25 4695)   busPIRone  (BUSPAR) tablet 20 mg (has no administration in time range)   hydrOXYzine HCl (ATARAX) tablet 10 mg (has no administration in time range)   lamoTRIgine (LaMICtal) tablet 150 mg (0 mg Oral Not Given 3/29/25 0747)   risperiDONE (risperDAL M-TABS) ODT tab 1 mg (has no administration in time range)   haloperidol lactate (HALDOL) injection 5 mg (has no administration in time range)   OLANZapine (zyPREXA) injection 5 mg (5 mg Intramuscular Not Given 3/27/25 1344)   OLANZapine zydis (zyPREXA) ODT tab 5 mg (5 mg Oral $Given 3/27/25 1333)   sodium chloride 0.9% BOLUS 1,000 mL (0 mLs Intravenous Stopped 3/27/25 1746)   OLANZapine zydis (zyPREXA) ODT tab 5 mg (5 mg Oral $Given 3/27/25 1629)   haloperidol lactate (HALDOL) injection 5 mg (5 mg Intravenous $Given 3/27/25 1746)   hydrOXYzine HCl (ATARAX) tablet 10 mg (10 mg Oral $Given 3/27/25 1827)   lamoTRIgine (LaMICtal) tablet 50 mg (50 mg Oral $Given 3/28/25 1404)         ADLs    Meal Provided this shift? Yes    Hygiene items provided? Yes    ADLs completed? Yes    Date of last shower: Unknown    Any significant events this shift? Yes. Details: [facility] called to notify us that there was no bed available on [facility] Geriatric psych floor. Looking at other facilities. [facility] bed could open up in day or two.    Any information that would be helpful in caring for this patient?  NA    Family present/updated? No    Location of patient's belongings: DEC (1 Bag)    Critical Care Minutes:  Does the patient need critical care minutes documented? No

## 2025-03-29 NOTE — TELEPHONE ENCOUNTER
12:41am - Nights CRN declined admission due to pt incontinence. Writer told CRN that incontinence is not exclusionary criteria for the [facility] adult units. CRN feels pt is more appropriate on 3B - this unit cannot accommodate at this time -  and is not a good fit for Station 10. CRN also stated that evening CRN told him not to take the pt, but per intake's notes, the only delay was finding an SIO staff to admit pt. There was no decline noted from evening CRN when he was accepted. CRN stated he does have the SIO staff for the pt.     12:46am - ANS paged    12:49am - ANS made aware of situation. Will update writer with a decision.     12:57am - ANS will try to get the unit to admit pt tonight due to unit having the staff to admit pt and the Private room available. Intake will keep pt in the queue.     4:58am - ANS called intake to inquire about pt transferring to Unit 3B instead due to pt using walker and being incontinent and Station 10 will admit one of their pts currently on the unit. Writer will pass along to day staff to discuss with providers.     R: Patient will remain in queue for Station 10 admission until further instruction

## 2025-03-29 NOTE — ED NOTES
Pt incontinent of urine and stool. Bed linens changed, pericare performed, and clean brief applied to pt. Pt refused to take PO medications and was combative trying to swing on staff and hit staff. RN will continue to monitor pt.

## 2025-03-29 NOTE — ED NOTES
Breakfast tray brought into patient. Pt sitting up in bed eating breakfast, both side rails up. Pt cooperative.

## 2025-03-29 NOTE — ED NOTES
RN ED Mental Health Handoff Note      Does patient require 1:1? No    Hold and rights been given and documented for patient: Yes    Is the patient in  scrubs? Yes    Has the patient been searched? Yes    Is the 15 minute observation tool up to date? Yes    Was patient issued a welcome folder? Yes    Room check completed this shift: Yes    PSS3 and [place] Assessment/Reassessment this shift:    C-SSRS ([place])      Date and Time Q1 Wished to be Dead (Past Month) Q2 Suicidal Thoughts (Past Month) Q3 Suicidal Thought Method Q4 Suicidal Intent without Specific Plan Q5 Suicide Intent with Specific Plan Q6 Suicide Behavior (Lifetime) If yes to Q6, within past 3 months? Level of Risk per Screen Level of Risk per Screen User   03/28/25 1321 0-->no 0-->no -- -- -- 0-->no -- -- no risks indicated RN   03/28/25 0929 -- -- -- -- -- 1-->yes -- -- -- JK   03/27/25 1244 0-->no 0-->no -- -- -- 0-->no -- -- no risks indicated RG            Behavioral status of patient: Yellow. Wouldn't wake up or respond     Code 21 called this shift? No    Use of restraints/seclusion this shift? No    Most recent vital signs:      BP: (!) 166/78 Pulse: 96   Resp: 20 SpO2: 95 % O2 Device: None (Room air)      Medications:  Scheduled medication compliance? No    PRN Meds administered this shift? No    Medications   midazolam (VERSED) injection 1 mg (has no administration in time range)   benztropine (COGENTIN) tablet 2 mg (2 mg Oral Not Given 3/28/25 2219)   melatonin tablet 3 mg (3 mg Oral Not Given 3/28/25 2220)   OLANZapine (zyPREXA) tablet 7.5 mg (7.5 mg Oral Not Given 3/29/25 1119)   hydrOXYzine HCl (ATARAX) tablet 10 mg (0 mg Oral Hold 3/29/25 1830)   oxyBUTYnin (DITROPAN) half-tab 2.5 mg (has no administration in time range)   tamsulosin (FLOMAX) capsule 0.4 mg (0 mg Oral Not Given 3/29/25 0747)   OLANZapine (zyPREXA) tablet 25 mg (25 mg Oral Not Given 3/28/25 5975)   busPIRone (BUSPAR) tablet 20 mg (0 mg Oral Not Given 3/29/25 2689)    busPIRone (BUSPAR) tablet 20 mg (has no administration in time range)   hydrOXYzine HCl (ATARAX) tablet 10 mg (has no administration in time range)   lamoTRIgine (LaMICtal) tablet 150 mg (0 mg Oral Not Given 3/29/25 0785)   risperiDONE (risperDAL M-TABS) ODT tab 1 mg (has no administration in time range)   haloperidol lactate (HALDOL) injection 5 mg (has no administration in time range)   OLANZapine (zyPREXA) injection 5 mg (5 mg Intramuscular Not Given 3/27/25 1344)   OLANZapine zydis (zyPREXA) ODT tab 5 mg (5 mg Oral $Given 3/27/25 1333)   sodium chloride 0.9% BOLUS 1,000 mL (0 mLs Intravenous Stopped 3/27/25 1746)   OLANZapine zydis (zyPREXA) ODT tab 5 mg (5 mg Oral $Given 3/27/25 1629)   haloperidol lactate (HALDOL) injection 5 mg (5 mg Intravenous $Given 3/27/25 1746)   hydrOXYzine HCl (ATARAX) tablet 10 mg (10 mg Oral $Given 3/27/25 1827)   lamoTRIgine (LaMICtal) tablet 50 mg (50 mg Oral $Given 3/28/25 1404)     ADLs    Meal Provided this shift? Yes    Hygiene items provided? Yes    ADLs completed? No partial    Date of last shower: pta    Any significant events this shift? Pt woke up and looked around and talked when dr. Mike came in room    Any information that would be helpful in caring for this patient?   no bed available on [facility] Geriatric psych floor. Looking at other facilities. [facility] bed could open up in day or two.     Family present/updated? No    Location of patient's belongings:     Critical Care Minutes:  Does the patient need critical care minutes documented? No

## 2025-03-29 NOTE — ED NOTES
Pt inc. Of stool and urine, pt turned with 2 staff, linen changed, shirts changed. Pt didn't wake up but seemed to have controlled movement with his arms. Dinner tray continues to sit on table. VSS

## 2025-03-29 NOTE — TELEPHONE ENCOUNTER
R: [place]  Access Inpatient Bed Call Log 3/29/25 at 3:35 PM: Intake has called facilities that have not updated the bed status within the last 12 hours.                             [facility] is at capacity.           [facility] is posting 0 beds. [phone] Per call @8:57am  [facility] is posting 0 beds. Negative covid required.  [facility] is posting 4 beds. Neg covid. No high school/Sobia-psych. [phone] 3:21 PM Per Ariela 4 beds available.  [facility] is posting 0 beds. [phone]  [facility] is posting 0 beds. [phone]  [facility] is posting 2 beds. Negative covid. Per call @8:22am, no beds.  [facility] is posting 0 beds [phone].     [facility] is posting 3 beds. LOW acuity. Ages 12+. Neg covid. [phone] 3:25 PM Per Liberty they have availability to review, no specified #.  [facility] has 1 bed posted. No aggression. Negative Covid. Low acuity.  [facility] ([place] is posting 0 beds. Low acuity only. Neg covid.  [phone]   [facility] is posting 2 beds. Low acuity. No current aggression.   [facility] is posting 0 beds. Negative covid. [phone]  [facility] ([place]) is posting 0 beds available. Negative covid.  [phone].      [facility] is posting 0 beds. Low acuity. 72 HH hold preferred. Sobia unit. Negative covid required. [phone] 3:35 PM Per Sarah they are currently at capacity.  [facility] ([place]) is posting 0 beds. Low acuity only. Neg covid.  [phone] 3:38 PM Per Melissa they are at capacity at all facilities for adults currently.  [facility] [place] is posting 4 beds.  Negative covid required.   Vol only, No history of aggression, violence, or assault. No sexual offenders. No 72 HH holds. [phone]     [facility] is posting 0 beds. Negative covid required.  (Must have the cognitive ability to do  programming. No aggressive or violent behavior or recent HX in the last 2 yrs. MH must be primary.) Always low acuity.  [facility] has 0 beds posted. Negative covid required.  Low acuity only. Violence and aggression capped.  [phone]  [facility] is posting 7 beds. Low acuity, Negative covid required. [phone]  [facility] posting 5 beds. Negative covid required.  [phone]   [facility] is posting 0 beds. Negative covid. LOW acuity. (No lines, drains, or tubes, oxygen, CPAP, IV, etc.) Must Have a Ride Home. [phone] 3:43 PM Per Maria Del Carmen they are currently at capacity d/t staffing shortages.  [facility] is posting 3 beds. Negative covid. (No. lines, drains, or tubes, oxygen, CPAP, IV, etc.) [phone]   [facility] is posting 4 beds. No covid test required. [phone]      Pt remains on the work list pending appropriate bed availability.

## 2025-03-29 NOTE — ED NOTES
Had Dr. Wilkerson come and look in on pt since pt has been so sleepy during this shift and it is such a change in behavior. Said writer walked in with MD and pt opened eyes and responded to MD voice. Pt then reminded he has dinner tray on his table.

## 2025-03-29 NOTE — ED NOTES
Bed placement/Intake called RN to communicate tht no beds are available on [facility] Geriatric/Psych units today. Beds could become available tomorrow or Monday. She explained that they will continue to look at other facilities to complete transfer. MD notified.

## 2025-03-29 NOTE — ED NOTES
RN ED Mental Health Handoff Note    72 hour hold    Does patient require 1:1? Yes    Hold and rights been given and documented for patient: Yes    Is the patient in BH scrubs? No -    Has the patient been searched? Yes    Is the 15 minute observation tool up to date? Yes    Was patient issued a welcome folder? Yes-previous shift    Room check completed this shift: Yes    PSS3 and [place] Assessment/Reassessment this shift:    C-SSRS ([place])      Date and Time Q1 Wished to be Dead (Past Month) Q2 Suicidal Thoughts (Past Month) Q3 Suicidal Thought Method Q4 Suicidal Intent without Specific Plan Q5 Suicide Intent with Specific Plan Q6 Suicide Behavior (Lifetime) If yes to Q6, within past 3 months? Level of Risk per Screen Level of Risk per Screen User   03/28/25 1321 0-->no 0-->no -- -- -- 0-->no -- -- no risks indicated RN   03/28/25 0929 -- -- -- -- -- 1-->yes -- -- -- JK   03/27/25 1244 0-->no 0-->no -- -- -- 0-->no -- -- no risks indicated RG            Behavioral status of patient: Yellow. Pt is yelling and swinging to staff    Code 21 called this shift? No    Use of restraints/seclusion this shift? No    Most recent vital signs:                       Medications:  Scheduled medication compliance? No    PRN Meds administered this shift? N/A    Medications   midazolam (VERSED) injection 1 mg (has no administration in time range)   benztropine (COGENTIN) tablet 2 mg (2 mg Oral Not Given 3/28/25 2219)   melatonin tablet 3 mg (3 mg Oral Not Given 3/28/25 2220)   OLANZapine (zyPREXA) tablet 7.5 mg (7.5 mg Oral Not Given 3/28/25 1212)   hydrOXYzine HCl (ATARAX) tablet 10 mg (10 mg Oral $Given 3/28/25 1726)   oxyBUTYnin (DITROPAN) half-tab 2.5 mg (has no administration in time range)   tamsulosin (FLOMAX) capsule 0.4 mg (0.4 mg Oral $Given 3/28/25 1154)   OLANZapine (zyPREXA) tablet 25 mg (25 mg Oral Not Given 3/28/25 2219)   busPIRone (BUSPAR) tablet 20 mg (20 mg Oral Not Given 3/28/25 2219)   busPIRone (BUSPAR)  tablet 20 mg (has no administration in time range)   hydrOXYzine HCl (ATARAX) tablet 10 mg (has no administration in time range)   lamoTRIgine (LaMICtal) tablet 150 mg (has no administration in time range)   risperiDONE (risperDAL M-TABS) ODT tab 1 mg (has no administration in time range)   haloperidol lactate (HALDOL) injection 5 mg (has no administration in time range)   OLANZapine (zyPREXA) injection 5 mg (5 mg Intramuscular Not Given 3/27/25 1344)   OLANZapine zydis (zyPREXA) ODT tab 5 mg (5 mg Oral $Given 3/27/25 1333)   sodium chloride 0.9% BOLUS 1,000 mL (0 mLs Intravenous Stopped 3/27/25 1746)   OLANZapine zydis (zyPREXA) ODT tab 5 mg (5 mg Oral $Given 3/27/25 1629)   haloperidol lactate (HALDOL) injection 5 mg (5 mg Intravenous $Given 3/27/25 1746)   hydrOXYzine HCl (ATARAX) tablet 10 mg (10 mg Oral $Given 3/27/25 1827)   lamoTRIgine (LaMICtal) tablet 50 mg (50 mg Oral $Given 3/28/25 1404)         ADLs    Meal Provided this shift? No    Hygiene items provided? No    ADLs completed? Yes    Date of last shower: PTA    Any significant events this shift? No    Any information that would be helpful in caring for this patient?      Family present/updated? No    Location of patient's belongings: DEC office    Critical Care Minutes:  Does the patient need critical care minutes documented? No

## 2025-03-29 NOTE — TELEPHONE ENCOUNTER
7:56 AM  Intake received call from unit 10 CRN advising pt is not appropriate and meets exclusionary criteria for their unit d/t 1 assist, needing assistance with ADL's.    8:01 AM Per 3B CRN unable to accommodate due to current unit acuity, pt in queue requiring pvt and cannot take any more SIOs.    8:19 AM Per Almaz RN following Pt does require stand by assist x1 and is incontinent.    8:19 AM No answer to ANS.    8:45 AM Per follow up call to ANS stand by assist x1 will be exclusionary for general  so pt will need to be removed from queue. Also Intake to follow up with 3B on if unit is able to swap.    8:51 AM Per follow up w/CRN unable to accommodate any aggression, vulnerable pt's on unit and high acuity already demanding to manage.     8:53 AM ANS updated. Intake to remove Pt removed from queue.    9:01 AM Almaz RN following notified. WL and admit board updated.    11:05 AM MRN provided to Mario for review.    12:13 PM [facility] declined d/t too high acuity. WL and admit board updated.    R: [place] MH Access Inpatient Bed Call Log 3/29/25 at 8:15 AM: Intake has called facilities that have not updated the bed status within the last 12 hours.                 [facility]          [facility] is at capacity.           [facility] is posting 0 beds. [phone] Per call @8:57am  [facility] is posting 0 beds. Negative covid required.  [facility] is posting 0 beds. Neg covid. No high school/Sobia-psych. [phone]  [facility] [facility] is posting 0 beds. [phone] PER CONRADO AT 1:12 PM NO BEDS AVAILABLE WILL BE REASSESSED 3/30.  [facility] is posting 0 beds. [phone]   [facility] is posting 2 beds. Negative covid. Per call @8:22am, no beds.PT NOT APPROPRIATE D/T UNIT RESTRICTIONS.  [facility] is posting 1 bed [phone]. PER CONRADO AT 1:12 PM NO BEDS AVAILABLE WILL BE REASSESSED 3/30.    [facility] is posting 3 beds. LOW acuity. Ages 12+. Neg covid. [phone] PT  NOT APPROPRIATE D/T ACUITY.  [facility] has 1 bed posted. No aggression. Negative Covid. Low acuity.  PER CONRADO AT 1:12 PM NO BEDS AVAILABLE WILL BE REASSESSED 3/30.  [facility] is posting 1 bed. Low acuity only. Neg covid.  [phone] PER CALL TO MARGARITO AT 11:02 AM NO BEDS AVAILABLE.  [facility] is posting 2 beds. Low acuity. No current aggression.  PT NOT APPROPRIATE D/T CURRENT AGGRESSION.  [facility] is posting 0 beds. Negative covid. [phone]   [facility] ([place] is posting 4 beds available. Negative covid.  [phone]. PER CALL TO MARGARITO AT 11:02 AM NO BEDS AVAILABLE.  [facility] is posting 1 bed. Low acuity. 72 HH hold preferred. Sobia unit. Negative covid required. [phone]   [facility] ([place] is posting 1 bed. Low acuity only. Neg covid.  [phone] PER CALL TO MARGARITO AT 11:02 AM NO BEDS AVAILABLE.  [facility] [place] is posting 3 beds.  Negative covid required.   Vol only, No history of aggression, violence, or assault. No sexual offenders. No 72 HH holds. [phone] PT NOT APPROPRIATE D/T [place] 72 HH.    [facility] is posting 0 beds. Negative covid required.  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 No aggressive or violent behavior or recent HX in the last 2 yrs. MH must be primary.) Always low acuity.    has 0 beds posted. Negative covid required.  Low acuity only. Violence and aggression capped.  [phone]   [facility] is posting 7 beds. Low acuity, Negative covid required. [phone]   [facility] posting 1 bed. Negative covid required.  [phone] 3/29 PT DECLINED D/T TOO HIGH ACUITY.  [facility] [place] is posting 4 beds. Negative covid. LOW acuity. (No lines, drains, or tubes, oxygen, CPAP, IV, etc.) Must Have a Ride Home. [phone]   [facility] is posting 3 beds. Negative  covid. (No. lines, drains, or tubes, oxygen, CPAP, IV, etc.) [phone] Per call, capped for high acuity.   [facility] is posting 10 beds. No covid test required. [phone] Per call @8:19amPT NOT APPROPRIATE D/T [place] 72 HH.    Pt remains on the work list pending appropriate bed availability.

## 2025-03-29 NOTE — ED NOTES
RN ED Mental Health Handoff Note    72 hour hold    Does patient require 1:1? Yes    Hold and rights been given and documented for patient: Yes    Is the patient in BH scrubs? No -pt in own clothing. Wearing a brief d/t incontinence.    Has the patient been searched? Yes    Is the 15 minute observation tool up to date? Yes    Was patient issued a welcome folder? Yes    Room check completed this shift: Yes    PSS3 and [place] Assessment/Reassessment this shift:    C-SSRS ([place])      Date and Time Q1 Wished to be Dead (Past Month) Q2 Suicidal Thoughts (Past Month) Q3 Suicidal Thought Method Q4 Suicidal Intent without Specific Plan Q5 Suicide Intent with Specific Plan Q6 Suicide Behavior (Lifetime) If yes to Q6, within past 3 months? Level of Risk per Screen Level of Risk per Screen User   03/28/25 1321 0-->no 0-->no -- -- -- 0-->no -- -- no risks indicated RN   03/28/25 0929 -- -- -- -- -- 1-->yes -- -- -- DIPTI   03/27/25 1244 0-->no 0-->no -- -- -- 0-->no -- -- no risks indicated RG            Behavioral status of patient: Yellow. Pt yelling and swinging at nursing staff    Code 21 called this shift? No    Use of restraints/seclusion this shift? No    Most recent vital signs:      BP: (!) 170/105 Pulse: 120     SpO2: 100 %        Medications:  Scheduled medication compliance? No    PRN Meds administered this shift? No    Medications   midazolam (VERSED) injection 1 mg (has no administration in time range)   benztropine (COGENTIN) tablet 2 mg (2 mg Oral Not Given 3/28/25 2219)   melatonin tablet 3 mg (3 mg Oral Not Given 3/28/25 2220)   OLANZapine (zyPREXA) tablet 7.5 mg (7.5 mg Oral Not Given 3/28/25 1212)   hydrOXYzine HCl (ATARAX) tablet 10 mg (10 mg Oral $Given 3/28/25 1726)   oxyBUTYnin (DITROPAN) half-tab 2.5 mg (has no administration in time range)   tamsulosin (FLOMAX) capsule 0.4 mg (0.4 mg Oral $Given 3/28/25 2485)   OLANZapine (zyPREXA) tablet 25 mg (25 mg Oral Not Given 3/28/25 3271)   busPIRone  (BUSPAR) tablet 20 mg (20 mg Oral Not Given 3/28/25 2219)   busPIRone (BUSPAR) tablet 20 mg (has no administration in time range)   hydrOXYzine HCl (ATARAX) tablet 10 mg (has no administration in time range)   lamoTRIgine (LaMICtal) tablet 150 mg (has no administration in time range)   risperiDONE (risperDAL M-TABS) ODT tab 1 mg (has no administration in time range)   haloperidol lactate (HALDOL) injection 5 mg (has no administration in time range)   OLANZapine (zyPREXA) injection 5 mg (5 mg Intramuscular Not Given 3/27/25 1344)   OLANZapine zydis (zyPREXA) ODT tab 5 mg (5 mg Oral $Given 3/27/25 1333)   sodium chloride 0.9% BOLUS 1,000 mL (0 mLs Intravenous Stopped 3/27/25 1746)   OLANZapine zydis (zyPREXA) ODT tab 5 mg (5 mg Oral $Given 3/27/25 1629)   haloperidol lactate (HALDOL) injection 5 mg (5 mg Intravenous $Given 3/27/25 1746)   hydrOXYzine HCl (ATARAX) tablet 10 mg (10 mg Oral $Given 3/27/25 1827)   lamoTRIgine (LaMICtal) tablet 50 mg (50 mg Oral $Given 3/28/25 1404)         ADLs    Meal Provided this shift? Yes    Hygiene items provided? Yes    ADLs completed? Yes    Date of last shower: PTA    Any significant events this shift? No    Any information that would be helpful in caring for this patient?  N/a    Family present/updated? Yes    Location of patient's belongings: DEC office    Critical Care Minutes:  Does the patient need critical care minutes documented? Yes

## 2025-03-30 ENCOUNTER — TELEPHONE (OUTPATIENT)
Dept: BEHAVIORAL HEALTH | Facility: CLINIC | Age: 70
End: 2025-03-30
Payer: COMMERCIAL

## 2025-03-30 PROCEDURE — 250N000013 HC RX MED GY IP 250 OP 250 PS 637: Performed by: EMERGENCY MEDICINE

## 2025-03-30 PROCEDURE — 250N000013 HC RX MED GY IP 250 OP 250 PS 637: Performed by: PSYCHIATRY & NEUROLOGY

## 2025-03-30 RX ADMIN — TAMSULOSIN HYDROCHLORIDE 0.4 MG: 0.4 CAPSULE ORAL at 08:32

## 2025-03-30 RX ADMIN — HYDROXYZINE HYDROCHLORIDE 10 MG: 10 TABLET, FILM COATED ORAL at 11:27

## 2025-03-30 RX ADMIN — OLANZAPINE 7.5 MG: 5 TABLET, FILM COATED ORAL at 11:27

## 2025-03-30 RX ADMIN — HYDROXYZINE HYDROCHLORIDE 10 MG: 10 TABLET, FILM COATED ORAL at 14:55

## 2025-03-30 RX ADMIN — OLANZAPINE 7.5 MG: 5 TABLET, FILM COATED ORAL at 08:32

## 2025-03-30 RX ADMIN — HYDROXYZINE HYDROCHLORIDE 10 MG: 10 TABLET, FILM COATED ORAL at 18:01

## 2025-03-30 RX ADMIN — LAMOTRIGINE 150 MG: 150 TABLET ORAL at 08:31

## 2025-03-30 RX ADMIN — BUSPIRONE HYDROCHLORIDE 20 MG: 10 TABLET ORAL at 08:32

## 2025-03-30 RX ADMIN — RISPERIDONE 1 MG: 0.5 TABLET, ORALLY DISINTEGRATING ORAL at 14:55

## 2025-03-30 ASSESSMENT — ACTIVITIES OF DAILY LIVING (ADL)
ADLS_ACUITY_SCORE: 58

## 2025-03-30 NOTE — ED NOTES
RN ED Mental Health Handoff Note       Does patient require 1:1? No     Hold and rights been given and documented for patient: Yes     Is the patient in  scrubs? Yes     Has the patient been searched? Yes     Is the 15 minute observation tool up to date? Yes     Was patient issued a welcome folder? Yes     Room check completed this shift: Yes     PSS3 and [place] Assessment/Reassessment this shift:     C-SSRS ([place])       Date and Time Q1 Wished to be Dead (Past Month) Q2 Suicidal Thoughts (Past Month) Q3 Suicidal Thought Method Q4 Suicidal Intent without Specific Plan Q5 Suicide Intent with Specific Plan Q6 Suicide Behavior (Lifetime) If yes to Q6, within past 3 months? Level of Risk per Screen Level of Risk per Screen User   03/28/25 1321 0-->no 0-->no -- -- -- 0-->no -- -- no risks indicated RN   03/28/25 0929 -- -- -- -- -- 1-->yes -- -- -- JK   03/27/25 1244 0-->no 0-->no -- -- -- 0-->no -- -- no risks indicated RG                Behavioral status of patient: Yellow. Wouldn't wake up or respond      Code 21 called this shift? No     Use of restraints/seclusion this shift? No     Most recent vital signs:      BP: (!) 166/78 Pulse: 96   Resp: 20 SpO2: 95 % O2 Device: None (Room air)       Medications:  Scheduled medication compliance? No     PRN Meds administered this shift? No     Medications   midazolam (VERSED) injection 1 mg (has no administration in time range)   benztropine (COGENTIN) tablet 2 mg (2 mg Oral Not Given 3/28/25 2219)   melatonin tablet 3 mg (3 mg Oral Not Given 3/28/25 2220)   OLANZapine (zyPREXA) tablet 7.5 mg (7.5 mg Oral Not Given 3/29/25 1119)   hydrOXYzine HCl (ATARAX) tablet 10 mg (0 mg Oral Hold 3/29/25 1830)   oxyBUTYnin (DITROPAN) half-tab 2.5 mg (has no administration in time range)   tamsulosin (FLOMAX) capsule 0.4 mg (0 mg Oral Not Given 3/29/25 0747)   OLANZapine (zyPREXA) tablet 25 mg (25 mg Oral Not Given 3/28/25 2210)   busPIRone (BUSPAR) tablet 20 mg (0 mg Oral Not  Given 3/29/25 0746)   busPIRone (BUSPAR) tablet 20 mg (has no administration in time range)   hydrOXYzine HCl (ATARAX) tablet 10 mg (has no administration in time range)   lamoTRIgine (LaMICtal) tablet 150 mg (0 mg Oral Not Given 3/29/25 0747)   risperiDONE (risperDAL M-TABS) ODT tab 1 mg (has no administration in time range)   haloperidol lactate (HALDOL) injection 5 mg (has no administration in time range)   OLANZapine (zyPREXA) injection 5 mg (5 mg Intramuscular Not Given 3/27/25 1344)   OLANZapine zydis (zyPREXA) ODT tab 5 mg (5 mg Oral $Given 3/27/25 1333)   sodium chloride 0.9% BOLUS 1,000 mL (0 mLs Intravenous Stopped 3/27/25 1746)   OLANZapine zydis (zyPREXA) ODT tab 5 mg (5 mg Oral $Given 3/27/25 1629)   haloperidol lactate (HALDOL) injection 5 mg (5 mg Intravenous $Given 3/27/25 1746)   hydrOXYzine HCl (ATARAX) tablet 10 mg (10 mg Oral $Given 3/27/25 1827)   lamoTRIgine (LaMICtal) tablet 50 mg (50 mg Oral $Given 3/28/25 1404)      ADLs     Meal Provided this shift? Yes     Hygiene items provided? Yes     ADLs completed? No partial     Date of last shower: pta     Any significant events this shift? Pt woke up and looked around and talked when dr. Mike came in room     Any information that would be helpful in caring for this patient?   no bed available on [facility] Geriatric psych floor. Looking at other facilities. [facility] bed could open up in day or two.     Family present/updated? No     Location of patient's belongings:      Critical Care Minutes:  Does the patient need critical care minutes documented? No

## 2025-03-30 NOTE — ED NOTES
Patient took noon meds well crushed in applesauce. Pt is calm, cooperative, communicating needs and telling stories.

## 2025-03-30 NOTE — ED PROVIDER NOTES
Signed out received.  No events on my shift.  Patient without any need for sedation.  Continue to require inpatient admission.  He is on the list.  No further information today regarding placement. Signed out to Lisseth Fuentes MD  03/30/25 4451

## 2025-03-30 NOTE — ED NOTES
Patient changed for incontinence, pt able to assist with turning. Tolerated well. Cream applied to buttocks, repositioned to Right side. Sitting up, lunch provided.
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
## 2025-03-30 NOTE — ED NOTES
Pt was incontinent of bladder. Pericare performed and new brief applied to pt. Pt requested water which RN provided. Pt expressed thanks.

## 2025-03-30 NOTE — ED NOTES
RN ED Mental Health Handoff Note    72 hour hold    Does patient require 1:1? No    Hold and rights been given and documented for patient: Yes    Is the patient in BH scrubs? Yes    Has the patient been searched? Yes    Is the 15 minute observation tool up to date? Yes    Was patient issued a welcome folder?     Room check completed this shift: Yes    PSS3 and [place] Assessment/Reassessment this shift:    C-SSRS ([place])      Date and Time Q1 Wished to be Dead (Past Month) Q2 Suicidal Thoughts (Past Month) Q3 Suicidal Thought Method Q4 Suicidal Intent without Specific Plan Q5 Suicide Intent with Specific Plan Q6 Suicide Behavior (Lifetime) If yes to Q6, within past 3 months? Level of Risk per Screen Level of Risk per Screen User   03/28/25 1321 0-->no 0-->no -- -- -- 0-->no -- -- no risks indicated RN   03/28/25 0929 -- -- -- -- -- 1-->yes -- -- -- JK   03/27/25 1244 0-->no 0-->no -- -- -- 0-->no -- -- no risks indicated RG            Behavioral status of patient: Yellow. Several episodes of being verbal hostile with staff    Code 21 called this shift? No    Use of restraints/seclusion this shift? No    Most recent vital signs:      BP: (!) 133/97 Pulse: 83   Resp: 20 SpO2: 100 % O2 Device: None (Room air)      Medications:  Scheduled medication compliance? Yes    PRN Meds administered this shift? Yes    Medications   midazolam (VERSED) injection 1 mg (has no administration in time range)   benztropine (COGENTIN) tablet 2 mg (2 mg Oral Not Given 3/29/25 2137)   melatonin tablet 3 mg (3 mg Oral Not Given 3/29/25 2137)   OLANZapine (zyPREXA) tablet 7.5 mg (7.5 mg Oral $Given 3/30/25 1127)   hydrOXYzine HCl (ATARAX) tablet 10 mg (10 mg Oral $Given 3/30/25 1801)   oxyBUTYnin (DITROPAN) half-tab 2.5 mg (has no administration in time range)   tamsulosin (FLOMAX) capsule 0.4 mg (0.4 mg Oral $Given 3/30/25 0832)   OLANZapine (zyPREXA) tablet 25 mg (25 mg Oral Not Given 3/29/25 1614)   busPIRone (BUSPAR) tablet 20 mg (20  mg Oral $Given 3/30/25 0832)   busPIRone (BUSPAR) tablet 20 mg (has no administration in time range)   hydrOXYzine HCl (ATARAX) tablet 10 mg (10 mg Oral $Given 3/30/25 1455)   lamoTRIgine (LaMICtal) tablet 150 mg (150 mg Oral $Given 3/30/25 0831)   risperiDONE (risperDAL M-TABS) ODT tab 1 mg (1 mg Oral $Given 3/30/25 1455)   haloperidol lactate (HALDOL) injection 5 mg (has no administration in time range)   OLANZapine (zyPREXA) injection 5 mg (5 mg Intramuscular Not Given 3/27/25 1344)   OLANZapine zydis (zyPREXA) ODT tab 5 mg (5 mg Oral $Given 3/27/25 1333)   sodium chloride 0.9% BOLUS 1,000 mL (0 mLs Intravenous Stopped 3/27/25 1746)   OLANZapine zydis (zyPREXA) ODT tab 5 mg (5 mg Oral $Given 3/27/25 1629)   haloperidol lactate (HALDOL) injection 5 mg (5 mg Intravenous $Given 3/27/25 1746)   hydrOXYzine HCl (ATARAX) tablet 10 mg (10 mg Oral $Given 3/27/25 1827)   lamoTRIgine (LaMICtal) tablet 50 mg (50 mg Oral $Given 3/28/25 1404)         ADLs    Meal Provided this shift? Yes    Hygiene items provided? Yes    ADLs completed? Partial    Date of last shower: PTA    Any significant events this shift? Yes. Details: See notes    Any information that would be helpful in caring for this patient?  Pt takes pills crushed in applesauce    Family present/updated? N/A    Location of patient's belongings: DEC office    Critical Care Minutes:  Does the patient need critical care minutes documented? No

## 2025-03-30 NOTE — ED NOTES
Patient changed for urinary incontinence, Alert and oriented X2, calm, cooperative. Took AM medications crushed in applesauce. Currently eating breakfast.
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
## 2025-03-30 NOTE — ED NOTES
"Reapproached pt, pt okay with having brief change and pt calm and cooperative during brief change and conversation. Perineal area cleansed, barrier cream applied, and new brief on. When second staff member comes into room to reposition pt, pt becomes agitated saying \"Get him out of here\". Pt repositioned for comfort and safety and set up with dinner tray. Pt continues to refuse vitals  "

## 2025-03-30 NOTE — ED NOTES
"Brought pt dinner tray. Pt wakes from being asleep. Pt says something about \"wet pants\" but then when offered to help change brief pt says \"Get out of here you're driving me crazy\" and is verbally aggressive. Pt also refuses vitals. Pt's speech slurred and mumbling and difficult to understand. Writer left room and will reapproach later.   "

## 2025-03-30 NOTE — TELEPHONE ENCOUNTER
R: [facility] Inpatient Adult Bed Call Log  3/30/25 @ 1:00am   Intake has called facilities that have not updated their bed status within the last 12 hours.     *METRO:  [place] -- [facility]: @ capacity.  [facility]: POSTING 9 BEDS. No reviews overnight. #[phone]  [place] -- [facility]: @ cap per website. Low acuity. #[phone]  [place] -- [facility]: @ cap per website. Low acuity only. #[phone]  [place] -- [facility]: @ cap per website. #[phone]  [facility]: @ cap per website. #[phone]  [facility]/ beds: POSTING 6 BEDS.  Ages 18-35, Voluntary only, NO aggression/physical or sexual assault/violence hx, or drug abuse. Negative Covid. #[phone]  [place] -- [facility]: @ cap per website. #[phone]  [place] -- [facility]: @ cap per website. #[phone]  [place] -- [facility]: @ cap per website. No reviews overnight. #[phone]    *STATEWIDE (by distance):  [facility]: POSTING 5 BEDS. Mixed unit - Ages 16 & up/Low acuity only. #[phone]  [facility] - POSTING 1 BED. Low acuity, No aggression. #[phone]  [facility] - @ cap per website. #[phone]  [facility] - POSTING 1 BED. Low acuity only. No current aggression. #[phone]  [facility] - @ cap per website. Negative Covid. Lower acuity only. #[phone]  [facility] - POSTING 2 BEDS. Low acuity only. Prefer med-adjustment placements. #[phone]  [place] [facility] - @ cap per website. No aggression. - Only Low Acuity reviews. #[phone]  [facility] - POSTING 2 BEDS. Senior Care Unit, 65+. Low acuity only. #[phone]  [place] - [facility]: POSTING 1 BED. No aggressive behaviors. Do not review overnight. #[phone]  [place] -- [facility]: POSTING 4 BEDS.  No hx of aggression. No sexual offenders. Voluntary patients only. #[phone]  [place] --  [facility]: @ cap per website. Low acuity only. Must be able to do programming. No aggression/violent behavior in 2 years. No CD treatment. #[phone]  [facility]: @ cap per website. Negative Covid test. Must be low acuity ONLY. #[phone]  [facility]: POSTING 7 BEDS. Low acuity. Negative Covid. #[phone]  [facility]: POSTING 5 BEDS. No high acuity available.   [place] - [facility]: POSTING 3 BEDS. No hx of aggression/assault. No lines, drains or tubes. Does not provide detox or CD treatment. Require a confirmed ride upon discharge. #[phone]  [place] -- [facility]: POSTING 3 BEDS. Negative COVID. No medical devices. #[phone]     Pt remains on waitlist pending appropriate placement availability.

## 2025-03-31 ENCOUNTER — TELEPHONE (OUTPATIENT)
Dept: BEHAVIORAL HEALTH | Facility: CLINIC | Age: 70
End: 2025-03-31
Payer: COMMERCIAL

## 2025-03-31 ENCOUNTER — MEDICAL CORRESPONDENCE (OUTPATIENT)
Dept: HEALTH INFORMATION MANAGEMENT | Facility: CLINIC | Age: 70
End: 2025-03-31
Payer: COMMERCIAL

## 2025-03-31 PROBLEM — Z86.59 HX OF SCHIZOPHRENIA: Status: ACTIVE | Noted: 2025-03-31

## 2025-03-31 PROBLEM — N17.9 AKI (ACUTE KIDNEY INJURY): Status: ACTIVE | Noted: 2025-03-31

## 2025-03-31 PROBLEM — R46.89 AGGRESSIVE BEHAVIOR: Status: ACTIVE | Noted: 2025-03-31

## 2025-03-31 PROCEDURE — 99222 1ST HOSP IP/OBS MODERATE 55: CPT | Performed by: STUDENT IN AN ORGANIZED HEALTH CARE EDUCATION/TRAINING PROGRAM

## 2025-03-31 PROCEDURE — 250N000013 HC RX MED GY IP 250 OP 250 PS 637: Performed by: EMERGENCY MEDICINE

## 2025-03-31 PROCEDURE — 99233 SBSQ HOSP IP/OBS HIGH 50: CPT | Performed by: PSYCHIATRY & NEUROLOGY

## 2025-03-31 PROCEDURE — 99418 PROLNG IP/OBS E/M EA 15 MIN: CPT | Performed by: PSYCHIATRY & NEUROLOGY

## 2025-03-31 PROCEDURE — 250N000013 HC RX MED GY IP 250 OP 250 PS 637: Performed by: PSYCHIATRY & NEUROLOGY

## 2025-03-31 PROCEDURE — 250N000009 HC RX 250: Performed by: STUDENT IN AN ORGANIZED HEALTH CARE EDUCATION/TRAINING PROGRAM

## 2025-03-31 PROCEDURE — 120N000001 HC R&B MED SURG/OB

## 2025-03-31 RX ORDER — POLYETHYLENE GLYCOL 3350 17 G/17G
17 POWDER, FOR SOLUTION ORAL 2 TIMES DAILY PRN
Status: DISCONTINUED | OUTPATIENT
Start: 2025-03-31 | End: 2025-04-30 | Stop reason: HOSPADM

## 2025-03-31 RX ORDER — AMOXICILLIN 250 MG
2 CAPSULE ORAL 2 TIMES DAILY PRN
Status: DISCONTINUED | OUTPATIENT
Start: 2025-03-31 | End: 2025-04-30 | Stop reason: HOSPADM

## 2025-03-31 RX ORDER — PROCHLORPERAZINE MALEATE 5 MG/1
5 TABLET ORAL EVERY 6 HOURS PRN
Status: DISCONTINUED | OUTPATIENT
Start: 2025-03-31 | End: 2025-04-30 | Stop reason: HOSPADM

## 2025-03-31 RX ORDER — IBUPROFEN 200 MG
400 TABLET ORAL ONCE
Status: COMPLETED | OUTPATIENT
Start: 2025-03-31 | End: 2025-03-31

## 2025-03-31 RX ORDER — MAGNESIUM HYDROXIDE/ALUMINUM HYDROXICE/SIMETHICONE 120; 1200; 1200 MG/30ML; MG/30ML; MG/30ML
30 SUSPENSION ORAL EVERY 6 HOURS PRN
Status: DISCONTINUED | OUTPATIENT
Start: 2025-03-31 | End: 2025-04-06 | Stop reason: ALTCHOICE

## 2025-03-31 RX ORDER — ONDANSETRON 4 MG/1
4 TABLET, ORALLY DISINTEGRATING ORAL EVERY 6 HOURS PRN
Status: DISCONTINUED | OUTPATIENT
Start: 2025-03-31 | End: 2025-04-30 | Stop reason: HOSPADM

## 2025-03-31 RX ORDER — ONDANSETRON 2 MG/ML
4 INJECTION INTRAMUSCULAR; INTRAVENOUS EVERY 6 HOURS PRN
Status: DISCONTINUED | OUTPATIENT
Start: 2025-03-31 | End: 2025-04-30 | Stop reason: HOSPADM

## 2025-03-31 RX ORDER — AMOXICILLIN 250 MG
1 CAPSULE ORAL 2 TIMES DAILY PRN
Status: DISCONTINUED | OUTPATIENT
Start: 2025-03-31 | End: 2025-04-30 | Stop reason: HOSPADM

## 2025-03-31 RX ORDER — CALCIUM CARBONATE 500 MG/1
1000 TABLET, CHEWABLE ORAL 4 TIMES DAILY PRN
Status: DISCONTINUED | OUTPATIENT
Start: 2025-03-31 | End: 2025-04-30 | Stop reason: HOSPADM

## 2025-03-31 RX ORDER — LIDOCAINE 40 MG/G
CREAM TOPICAL
Status: DISCONTINUED | OUTPATIENT
Start: 2025-03-31 | End: 2025-04-30 | Stop reason: HOSPADM

## 2025-03-31 RX ADMIN — Medication 3 MG: at 21:02

## 2025-03-31 RX ADMIN — HYDROXYZINE HYDROCHLORIDE 10 MG: 10 TABLET, FILM COATED ORAL at 12:11

## 2025-03-31 RX ADMIN — BUSPIRONE HYDROCHLORIDE 20 MG: 10 TABLET ORAL at 08:27

## 2025-03-31 RX ADMIN — TAMSULOSIN HYDROCHLORIDE 0.4 MG: 0.4 CAPSULE ORAL at 08:27

## 2025-03-31 RX ADMIN — BUSPIRONE HYDROCHLORIDE 20 MG: 10 TABLET ORAL at 21:02

## 2025-03-31 RX ADMIN — OLANZAPINE 7.5 MG: 5 TABLET, FILM COATED ORAL at 08:26

## 2025-03-31 RX ADMIN — MINERAL OIL, WHITE PETROLATUM: .03; .94 OINTMENT OPHTHALMIC at 21:03

## 2025-03-31 RX ADMIN — IBUPROFEN 400 MG: 200 TABLET, FILM COATED ORAL at 12:10

## 2025-03-31 RX ADMIN — OLANZAPINE 7.5 MG: 5 TABLET, FILM COATED ORAL at 12:10

## 2025-03-31 RX ADMIN — LAMOTRIGINE 150 MG: 150 TABLET ORAL at 08:26

## 2025-03-31 RX ADMIN — BENZTROPINE MESYLATE 2 MG: 1 TABLET ORAL at 21:03

## 2025-03-31 RX ADMIN — HYDROXYZINE HYDROCHLORIDE 10 MG: 10 TABLET, FILM COATED ORAL at 17:46

## 2025-03-31 RX ADMIN — HYDROXYZINE HYDROCHLORIDE 10 MG: 10 TABLET, FILM COATED ORAL at 08:27

## 2025-03-31 RX ADMIN — OLANZAPINE 25 MG: 10 TABLET, FILM COATED ORAL at 21:02

## 2025-03-31 ASSESSMENT — ACTIVITIES OF DAILY LIVING (ADL)
ADLS_ACUITY_SCORE: 58
ADLS_ACUITY_SCORE: 73
ADLS_ACUITY_SCORE: 58

## 2025-03-31 NOTE — TELEPHONE ENCOUNTER
R: [facility] Inpatient Adult Bed Call Log  3/31/25 @ 1:00am   Intake has called facilities that have not updated their bed status within the last 12 hours.     *METRO:  [place] -- [facility]: @ capacity.  [facility]: POSTING 9 BEDS. No reviews overnight. #[phone]  [place] -- [facility]: @ cap per website. Low acuity. #[phone]  [place] -- [facility]: @ cap per website. Low acuity only. #[phone]  [place] -- [facility]: @ cap per website. #[phone]  [facility]: @ cap per website. #[phone]  [facility]/ beds: POSTING 6 BEDS.  Ages 18-35, Voluntary only, NO aggression/physical or sexual assault/violence hx, or drug abuse. Negative Covid. #[phone]  [place] -- [facility]: @ cap per website. #[phone]  [place] -- [facility]: @ cap per website. #[phone]  [place] -- [facility]: @ cap per website. No reviews overnight. #[phone]    *STATEWIDE (by distance):  [facility]: POSTING 3 BEDS. Mixed unit - Ages 16 & up/Low acuity only. #[phone]  [facility] - POSTING 1 BED. Low acuity, No aggression. #[phone]  [facility] - @ cap per website. #[phone]  [facility] - POSTING 1 BED. Low acuity only. No current aggression. #[phone]  [facility] - @ cap per website. Negative Covid. Lower acuity only. #[phone]  [facility] - POSTING 1 BED. Low acuity only. Prefer med-adjustment placements. #[phone]  [place] [facility] - POSTING 2 BEDS. No aggression. - Only Low Acuity reviews. #[phone]  [facility] - POSTING 2 BEDS. Senior Care Unit, 65+. Low acuity only. #[phone]  [facility]: @ [facility] per website. No aggressive behaviors. Do not review overnight. #[phone]  [place] -- [facility]: POSTING 4 BEDS.  No hx of aggression. No sexual offenders. Voluntary patients only. #[phone]  [place] --  [facility]: @ cap per website. Low acuity only. Must be able to do programming. No aggression/violent behavior in 2 years. No CD treatment. #[phone]  [facility]: @ cap per website. Negative Covid test. Must be low acuity ONLY. #[phone]  [facility]: POSTING 7 BEDS. Low acuity. Negative Covid. #[phone]  [facility]: POSTING 5 BEDS. No high acuity available.   [place] [facility]: POSTING 3 BEDS. No hx of aggression/assault. No lines, drains or tubes. Does not provide detox or CD treatment. Require a confirmed ride upon discharge. #[phone]  [place] -- [facility]: POSTING 1 BED. Negative COVID. No medical devices. #[phone]     Pt remains on waitlist pending appropriate placement availability.

## 2025-03-31 NOTE — TELEPHONE ENCOUNTER
[address] documents in Right Fax @10:54 AM, 10:56 AM and 10:57 AM for Petition for Civil Commitment and [place] submitted.    Updated Worklist.

## 2025-03-31 NOTE — TELEPHONE ENCOUNTER
1:19 PM Per Lyndsey ANGELA pt meets exclusionary criteria d/t     1:20 PM  Intake called [facility] ED informed LUCAS Walker that pt meets exclusionary criteria d/t total cares. MD Antonio can pursue medical admission or have a psych consult.     2:27 PM Intake called ED left a message for MD Mike to call back     2:34 PM Intake spoke with MD Mike - Informed that pt meets exclusionary criteria and will be removed from the [facility] .

## 2025-03-31 NOTE — PROGRESS NOTES
[facility] COURT- PROBATE/MENTAL HEALTH DIVISION  FOURTH ([place])  _____________________________________________________________________________________________________________________________________    WRITTEN REQUEST FOR AUTHORIZATION TO IMPOSE TREATMENT AND REQUEST FOR HEARING    In the Matter of the Civil Commitment of:   Harpreet Mcelroy    :1955       Respondent: Harpreet Mcelroy   DC File No.  [id number]   [place]  File No. __________     I, Arnel Peres MD to the best of my knowledge, information, and belief respectfully represent:    I am a member of the treatment team for the respondent.    2. Respondent was born on 1955.     3. Respondent is presently receiving care and treatment at: [facility]    4. Diagnostic studies performed by Arnel Peres MD on respondent appear to indicate that respondent suffers from:    Schizoaffective disorder, bipolar type;  Anxiety;  Agitation    5.   Arnel Perse MD  has determined neuroleptic medication(s) to be medically necessary.    6.   I have determined that the benefits of administration of the proposed treatment to the respondent outweigh the risks and therefore this procedure is reasonable.    7.   Respondent s written informed consent to the administration of the above procedure has notbeen obtained because of incompetency to make a rational decision regarding the proposedtreatment.    8.The objective of the proposed treatment is to treat the symptoms of the mental illness that interfere with the respondent s ability to function.    9.Petitioner requests that a hearing be scheduled, and that authorization to administer the proposed treatment be granted according to law.     Dated: 2025    Provider s Signature: _________________________  Print Name: Arnel Peres MD Psychiatrist     [facility] EMERGENCY DEPT  [address]  [facility] [address]  [phone]  [phone]

## 2025-03-31 NOTE — ED NOTES
"Nurse introduced self to pt- told nurse to get out of the room. Nurse at 2130 attempted to give meds- pt said' \"get outta her you drug pusher\" followed by other mumbled words. Nurse left room- will continue to monitor pt  "

## 2025-03-31 NOTE — ED NOTES
"Attempted to give pt medication. Pt says \"get the fuck out of here you bitch\" Writer attempted to verbally deescalate but pt continues to refuse meds.   "

## 2025-03-31 NOTE — PROGRESS NOTES
"Triage & Transition Services, Extended Care     Therapy Progress Note    Patient: Harpreet goes by \"Harpreet,\" uses he/him pronouns  Date of Service: March 31, 2025  Site of Service: [facility] Emergency Dept                             ED06  Patient was seen yes  Mode of Assessment: Virtual: AmWell    Presentation Summary: Exchanged greetings with the patient, introduced self and role. The patient commented, \"You're not a male doctor.\" An attempt was made to discuss what brought him into the ED, but he became tangential, speaking about his daughter \"Elena.\"  The patient appeared calm, though his speech was mostly garbled and difficult to understand. He made occasional comments that were inappropriate or out of context and would laugh without clear provocation. While he was able to be redirected in the moment, he did not demonstrate understanding or retention of the information presented to him.    Therapeutic Intervention(s) Provided: Engaged in cognitive restructuring/ reframing, looked at common cognitive distortions and challenged negative thoughts., Engaged in guided discovery, explored patient's perspectives and helped expand them through socratic dialogue., Reviewed healthy living that supports positive mental health, including looking at sleep hygiene, regular movement, nutrition, and regular socialization.    Current Symptoms:  (unable to assess)  (unable to assess)  (unable to assess) displaces blame, impulsive, inattentive  (unable to assess)    Mental Status Exam   Affect: Dramatic, Labile  Appearance: Disheveled  Attention Span/Concentration: Inattentive  Eye Contact: Variable    Fund of Knowledge: Other (please comment) (unable to assess)   Language /Speech Content: Non-Fluent  Language /Speech Volume: Normal  Language /Speech Rate/Productions: Other (please comment) (hard to understand)  Recent Memory: Other (please comment) (unable to assess)  Remote Memory: Other (please comment) " (unable to assess)  Mood: Normal, Euphoric, Irritable  Orientation to Person: Answer (please comment) (unable to assess)   Orientation to Place: Answer (please comment) (unable to assess)  Orientation to Time of Day: Answer (please comment) (unable to assess)  Orientation to Date: Answer (please comment) (unable to assess)     Situation (Do they understand why they are here?): Answer (please comment) (unable to assess)  Psychomotor Behavior: Normal  Thought Content: Other (please comment) (unable to assess)  Thought Form: Tangential, Flight of Ideas    Treatment Objective(s) Addressed: rapport building, orienting the patient to therapy, assessing safety, identifying treatment goals, processing feelings, identifying additional supports, identifying an appropriate aftercare plan    Patient Response to Interventions: acceptance expressed, needs reinforcement, no evidence of understanding    Progress Towards Goals: Patient Reports Symptoms Are: ongoing  Patient Progress Toward Goals: is not making progress  Comment: Patient unable to engage in any meaningful assessment with writer.  Next Step to Work Toward Discharge: symptom stabilization  Symptom Stabilization Comment: Pt continues to present with active psychosis.    Case Management: Case Management Included: collaborating with patient's support system  Details on Collaborating with Patient's Support System: Consulted with C&L psych provider: Dr. Peres  Summary of Interaction: Please read Dr. Peres's consult note from 03/31/25 for more information.    Plan: inpatient mental health  yes provider Dr. Larisa Pardo  no    Clinical Substantiation: Recommendation for inpatient mental health remains unchanged at this time.  The patient presented with disorganized thought processes, tangential speech, and active psychosis. Although calm, his speech was largely garbled and difficult to understand. He made inappropriate or out-of-context remarks and exhibited  unprovoked laughter. While redirectable in the moment, he did not demonstrate comprehension or retention of information. The patient was unable to engage in a meaningful assessment with the writer. Given his ongoing symptoms and impaired insight, inpatient mental health care continues to be clinically indicated. MI PPS submitted to [facility] for review.    Legal Status: Legal Status: 72 Hour Hold  72 Hour Hold - Date/Time Initiated: 3/28/25 0309  72 Hour Hold - Date/Time Ends: 4/02/25 0309    Session Status: Time session started: 0922  Time session ended: 0941  Session Duration (minutes): 22 minutes  Session Number: 1  Anticipated number of sessions or this episode of care: 4    Time Spent: 19 minutes    CPT Code: CPT Codes: 96476 - Psychotherapy (with patient) - 30 (16-37*) min    Diagnosis:   Patient Active Problem List   Diagnosis Code    Carol (H) F30.9    Acute UTI N39.0    Other hydronephrosis N13.39    Slurred speech R47.81    Falls frequently R29.6    Mood disorder with psychosis F39    Agitation R45.1    Anxiety F41.9    Schizoaffective disorder, bipolar type (H) F25.0       Primary Problem This Admission: Active Hospital Problems    Mood disorder with psychosis      Agitation      Anxiety      *Schizoaffective disorder, bipolar type (H)      Slurred speech        RADHA Carpio, [facility] Hospital[facility]   Licensed Mental Health Professional (LMHP), [facility]  [phone]

## 2025-03-31 NOTE — ED NOTES
RN ED Mental Health Handoff Note    72 hour hold    Does patient require 1:1? No    Hold and rights been given and documented for patient: Yes    Is the patient in BH scrubs? Yes    Has the patient been searched? Yes    Is the 15 minute observation tool up to date? Yes    Was patient issued a welcome folder? Yes    Room check completed this shift: Yes    PSS3 and [place] Assessment/Reassessment this shift:    C-SSRS ([place])      Date and Time Q1 Wished to be Dead (Past Month) Q2 Suicidal Thoughts (Past Month) Q3 Suicidal Thought Method Q4 Suicidal Intent without Specific Plan Q5 Suicide Intent with Specific Plan Q6 Suicide Behavior (Lifetime) If yes to Q6, within past 3 months? Level of Risk per Screen Level of Risk per Screen User   03/28/25 1321 0-->no 0-->no -- -- -- 0-->no -- -- no risks indicated RN   03/28/25 0929 -- -- -- -- -- 1-->yes -- -- -- BASILIOK   03/27/25 1244 0-->no 0-->no -- -- -- 0-->no -- -- no risks indicated RG            Behavioral status of patient: Green    Code 21 called this shift? No    Use of restraints/seclusion this shift? No    Most recent vital signs:      BP: (!) 133/97 Pulse: 83   Resp: 20 SpO2: 100 % O2 Device: None (Room air)      Medications:  Scheduled medication compliance? No    PRN Meds administered this shift? No    Medications   midazolam (VERSED) injection 1 mg (has no administration in time range)   benztropine (COGENTIN) tablet 2 mg (2 mg Oral Not Given 3/30/25 2128)   melatonin tablet 3 mg (3 mg Oral Not Given 3/30/25 2129)   OLANZapine (zyPREXA) tablet 7.5 mg (7.5 mg Oral $Given 3/30/25 1127)   hydrOXYzine HCl (ATARAX) tablet 10 mg (10 mg Oral $Given 3/30/25 1801)   oxyBUTYnin (DITROPAN) half-tab 2.5 mg (has no administration in time range)   tamsulosin (FLOMAX) capsule 0.4 mg (0.4 mg Oral $Given 3/30/25 2332)   OLANZapine (zyPREXA) tablet 25 mg (25 mg Oral Not Given 3/30/25 2129)   busPIRone (BUSPAR) tablet 20 mg (20 mg Oral Not Given 3/30/25 2128)   busPIRone (BUSPAR)  tablet 20 mg (has no administration in time range)   hydrOXYzine HCl (ATARAX) tablet 10 mg (10 mg Oral $Given 3/30/25 1455)   lamoTRIgine (LaMICtal) tablet 150 mg (150 mg Oral $Given 3/30/25 0831)   risperiDONE (risperDAL M-TABS) ODT tab 1 mg (1 mg Oral $Given 3/30/25 1455)   haloperidol lactate (HALDOL) injection 5 mg (has no administration in time range)   OLANZapine (zyPREXA) injection 5 mg (5 mg Intramuscular Not Given 3/27/25 1344)   OLANZapine zydis (zyPREXA) ODT tab 5 mg (5 mg Oral $Given 3/27/25 1333)   sodium chloride 0.9% BOLUS 1,000 mL (0 mLs Intravenous Stopped 3/27/25 1746)   OLANZapine zydis (zyPREXA) ODT tab 5 mg (5 mg Oral $Given 3/27/25 1629)   haloperidol lactate (HALDOL) injection 5 mg (5 mg Intravenous $Given 3/27/25 1746)   hydrOXYzine HCl (ATARAX) tablet 10 mg (10 mg Oral $Given 3/27/25 1827)   lamoTRIgine (LaMICtal) tablet 50 mg (50 mg Oral $Given 3/28/25 1404)         ADLs    Meal Provided this shift? Yes    Hygiene items provided? Yes    ADLs completed? Yes    Date of last shower: n/a offered bath wipes    Any significant events this shift? No    Any information that would be helpful in caring for this patient?  Pt has been verbally abusive towards staff- was told he does not like tattoos    Family present/updated? No    Location of patient's belongings: DEC office    Critical Care Minutes:  Does the patient need critical care minutes documented? No

## 2025-03-31 NOTE — ED NOTES
Vitals taken and morning medications given, pt remains calm, cooperative. Pt given bed bath and linens changed, incontinence care completed. Given breakfast tray.

## 2025-03-31 NOTE — ED NOTES
"Nurse has offered to change pt through out the shift- pt has been verbally aggressive this shift. Nurse had security and ed tech help change pt. Pt attempted to hit and kick staff while changing. Told staff \"to fuck off\". Pt diaper changed will continue to monitor  "

## 2025-03-31 NOTE — CONSULTS
"[facility]     CONSULT PSYCHIATRY  FOLLOW UP NOTE     DATE OF SERVICE   03/31/2025       IDENTIFICATION   Harpreet Mcelroy  Age: 69 year old  MRN# [id number]   YOB: 1955  Length of Stay:  0        CHIEF COMPLAINT   \"Get out of here you pervert!\"       CONSULT REQUEST BY   David Solano Md re:  72-hour hold and boarding.       SUBJECTIVE   The patient's care was discussed with primary treatment team directly and patient's electronic chart notes were reviewed.      Staff report patient did not report any acute medical concerns or side effects.     Behavioral issues overnight, including verbal aggression with derogatory statements to staff.  Later, described as physically aggressive to staff and security involved.  Patient did not require seclusion or restraints. Patient is exhibiting signs or symptoms of psychosis or jaki. Patient did not endorse suicidal ideation. Patient did not endorse homicide ideation but violent towards staff.     Patient is not medication adherent. Patient is assigned a 1:1. Patient is sleeping. Patient is eating.     DEC reports petition filed on 3/28 needs to be redone for [facility] rather than [facility].    Attending Interval History (3/30):  Signed out received.  No events on my shift.  Patient without any need for sedation.  Continue to require inpatient admission.  He is on the list.  No further information today regarding placement.     C/L Psychiatry initial treatment plan (3/28):  Safety/Supervision/Disposition:  Legal:   3/28 (0309):  Placed on a 72-hour hold.  3/28:  Indication to file [facility] Petition for Commitment with [facility].   Placement:  IP  Admission  Precautions placed:  Routine as per ED; Delirium   Medication Recommendations:   Zyprexa: Continued PTA Zyprexa 7.5 mg every morning, q. noon, and 25 mg at bedtime, psychosis/mood.  Cogentin: Continued PTA Cogentin 2 mg nightly, EPSE.  Lamictal: Increased " PTA lamotrigine 100 mg daily to lamotrigine 150 mg daily.  No report of issues of adherence reported prior to admission from [facility].  BuSpar: Increased PTA BuSpar 15 mg twice daily to 20 mg twice daily, anxiety.  Add as needed BuSpar 20 mg daily, anxiety.  PRN Melatonin: Continued PTA as needed melatonin 3 mg at bedtime, sleep.  PRN Atarax: Continued PTA Atarax 10 mg twice daily.  Add as needed Atarax 10 mg 3 times daily, anxiety.  PRN Risperdal ODT:  As needed Risperdal ODT 1 mg bid, mild to moderate agitation.  PRN Haldol IM:  As needed Haldol IM 5 mg bid, severe agitation      Review of notes and/or information:  I personally reviewed notes from the patient's electronic chart since initial psychiatry consult dated 3/28, most recent visit, and other interim reports. This provided me with information regarding patient's recent clinical course.     I personally reviewed the patient's chart, including available medication list and progression of hospital course.       OBJECTIVE   Upon interview, the patient is uncooperative on approach.  Visit performed in patient's room in ED with staff present.  He refused to engage meaningfully for evaluation.  Patient is not voluntary.    INTERVAL HISTORY:  Chart reviewed.  Patient seen for consult follow-up after initial consult performed on 3/28 regarding decompensated state of mood/psychosis with issues of agitation directed to staff.  Felt to be related to treatment nonadherence.  MI petition for commitment submitted to [facility] with [facility].    Today, consult received for follow-up.  Significant events reviewed.  According to DEC, petition not filed and continued to be redone secondary to incorrect county reported as responsible for commitment.  Now, corrected as [facility] and not [facility].    Attempted to evaluate patient.  According to nursing, patient has been selective with staff.  Unable to obtain meaningful information directly from patient secondary to  patient behaviors and psychiatric presentation.  Patient is verbally aggressive with derogatory statements based on delusional beliefs.  Attempted to provide reassurance, without success.  Patient given update of treatment plan relating to further exacerbation of agitation.  Visit terminated.    Suicidal ideation: denies current or recent suicidal ideation or behaviors.    Homicidal ideation: denies current or recent homicidal ideation or behaviors.    Psychotic symptoms: Paranoid delusion with agitation.    Medication side effects reported: No significant side effects.    Acute medical concerns: None.  Baseline speech impairment.    Other issues reported by patient:  Patient had no further questions or concerns.         MEDICATIONS   Medications:  Scheduled Meds:  Current Facility-Administered Medications   Medication Dose Route Frequency Provider Last Rate Last Admin    benztropine (COGENTIN) tablet 2 mg  2 mg Oral At Bedtime Aisha Velez MD   2 mg at 03/27/25 2245    busPIRone (BUSPAR) tablet 20 mg  20 mg Oral BID Arnel Peres MD   20 mg at 03/31/25 0827    hydrOXYzine HCl (ATARAX) tablet 10 mg  10 mg Oral BID Lisseth Harris MD   10 mg at 03/30/25 1801    lamoTRIgine (LaMICtal) tablet 150 mg  150 mg Oral Daily Arnel Peres MD   150 mg at 03/31/25 0826    melatonin tablet 3 mg  3 mg Oral At Bedtime Aisha Velez MD   3 mg at 03/27/25 2242    OLANZapine (zyPREXA) tablet 25 mg  25 mg Oral At Bedtime Arnel Peres MD        OLANZapine (zyPREXA) tablet 7.5 mg  7.5 mg Oral BID Lisseth Harris MD   7.5 mg at 03/31/25 0826    tamsulosin (FLOMAX) capsule 0.4 mg  0.4 mg Oral Daily Lisseth Harris MD   0.4 mg at 03/31/25 0827     Continuous Infusions:  Current Facility-Administered Medications   Medication Dose Route Frequency Provider Last Rate Last Admin     PRN Meds:.  Current Facility-Administered Medications   Medication Dose Route Frequency Provider Last Rate Last Admin     "busPIRone (BUSPAR) tablet 20 mg  20 mg Oral Daily PRN Arnel Peres MD        haloperidol lactate (HALDOL) injection 5 mg  5 mg Intramuscular BID PRN Arnel Peres MD        hydrOXYzine HCl (ATARAX) tablet 10 mg  10 mg Oral TID PRN Arnel Peres MD   10 mg at 03/31/25 0827    midazolam (VERSED) injection 1 mg  1 mg Intravenous Once PRN Aisha Velez MD        oxyBUTYnin (DITROPAN) half-tab 2.5 mg  2.5 mg Oral TID PRN Lisseth Harris MD        risperiDONE (risperDAL M-TABS) ODT tab 1 mg  1 mg Oral BID PRN Arnel Peres MD   1 mg at 03/30/25 1455     Medication adherence issues: MS Med Adherence Y/N: Yes, Unknown  Medication side effects: MEDICATION SIDE EFFECTS: no side effects reported  Benefit: Yes / No: Does not know       ROS   The 10 point Review of Systems is negative other than noted in the HPI or here.       MENTAL STATUS EXAM   Vitals: BP (!) 160/81   Pulse 80   Temp 98  F (36.7  C) (Temporal)   Resp 18   SpO2 98%   Weight:   0 lbs 0 oz    There is no height or weight on file to calculate BMI.  There were no vitals filed for this visit.    Appearance: Uncooperative.  Mood:  Mood: Agitated  Affect: inappropriate  was congruent to speech  Suicidal Ideation: PRESENT / ABSENT: absent   Homicidal Ideation: PRESENT / ABSENT: absent   Thought process: disorganized   Thought content: significant for delusions.   Fund of Knowledge: BAHMAN  Attention/Concentration: Poor  Language ability:  Sufficient (baseline)  Memory:  Impaired  Insight:  limited.  Judgement: limited  Orientation: Person:  yes  Psychomotor Behavior: agitated    Muscle Strength and Tone: MuscleStrength: Normal  Gait and Station:  In bed       LABS   personally reviewed.       BP: (!) 160/81 Pulse: 80   Resp: 18 SpO2: 98 % O2 Device: None (Room air)        Estimated body mass index is 24.19 kg/m  as calculated from the following:    Height as of 7/23/23: 1.753 m (5' 9\").    Weight as of 6/6/24: 74.3 kg (163 lb " "12.8 oz).    No results found for this or any previous visit (from the past 48 hours).    Qtc: 498    No results found for: \"PHENYTOIN\", \"PHENOBARB\", \"VALPROATE\", \"CBMZ\"       DIAGNOSIS   Principal Problem:    Schizoaffective disorder, bipolar type (H)    Active Problem List:  Patient Active Problem List   Diagnosis    Carol (H)    Acute UTI    Other hydronephrosis    Slurred speech    Falls frequently    Mood disorder with psychosis    Agitation    Anxiety    Schizoaffective disorder, bipolar type (H)          ASSESSMENT/PLAN   Today's Changes-03/31/2025:  Medication Changes:    NONE:  Medications optimized on 3/28.  Continue Medications:  Zyprexa: Continue PTA Zyprexa 7.5 mg every morning, q. noon, and 25 mg at bedtime, psychosis/mood.  Cogentin: Continue PTA Cogentin 2 mg nightly, EPSE.  Lamictal: Continue increase of PTA lamotrigine 100 mg daily to lamotrigine 150 mg daily.  No report of issues of adherence reported prior to admission from [facility].  BuSpar: Continue increase of PTA BuSpar 15 mg twice daily to 20 mg twice daily, anxiety.  Add as needed BuSpar 20 mg daily, anxiety.  PRN Melatonin: Continue PTA as needed melatonin 3 mg at bedtime, sleep.  PRN Atarax: Continue PTA Atarax 10 mg twice daily.  Add as needed Atarax 10 mg 3 times daily, anxiety.  PRN Risperdal ODT:  Continue as needed Risperdal ODT 1 mg bid, mild to moderate agitation.  PRN Haldol IM:  Continue as needed Haldol IM 5 mg bid, severe agitation  Other:  Legal:   3/28 (0309):  Placed on a 72-hour hold.  3/28:  Filed [facility] Petition for Commitment with [facility].   3/31:  Petition re-filed through [facility].  Placement:  IP  Admission.  ADDENDUM (3/31 @1400): Meets exclusion criteria for IP -->  Plan to continue court process with efforts of stabilization, then return to [facility] (PTA Placement).  Precautions placed:  Routine as per ED; Delirium; Sexual.  Please also refer to RN/team documentation for additional " details.     Acute Medical Problems and Treatments:  .  Acute medical concerns:  No acute medical concerns.     Care Coordination:  Treatment plan discussed with bedside RN.  Please reconsult Psychiatry as needed (off service on 4/1); plan to follow up on 4/2 if still boarding.        Risk Assessment: [facility] RISK ASSESSMENT: Patient on precautions    Coordination of Care:   Treatment Plan reviewed, Care discussed with Care/Treatment Team Members, Chart reviewed and Patient seen         Arnel Peres MD    -     03/31/2025  -     9:15 AM    Total encounter time:  A total of  81  minutes spent related to chart review, history and exam, documentation   and further activities as noted above.  Extended time required to coordinate cares with treatment team and redo legal documentation to correct county of responsibility for petition.    This note was created with help of Dragon dictation system. Grammatical / typing errors are not intentional.        Arnel Peres MD  Consult/Liaison Psychiatry   [facility]  Securely message with [employer]

## 2025-03-31 NOTE — CONSULTS
Petition Status    Petition was filed today.    Type of Petition Filed: Mental Illness (MI)    Care team faxed the Examiners Statement, Exhibit A, 72 hour hold, Petition, Progress Notes, ED Notes, [place] Note, [place] Petition, Consult, H&P, results, MAR, and Facesheet.Yes    County Involved: [facility]    Screening Outcome: Supported    Next steps include: Awaiting Court hold. Will come prior to 72HH expiring.    County Contact  PPS assigned: Eileen Ballard  Phone: [phone]

## 2025-03-31 NOTE — ED PROVIDER NOTES
I again assumed care today for this patient from Dr. Solano.  Patient was waiting for inpatient psych placement.  I was informed by intake and from Dr. Peres with psychiatry that the patient is meeting exclusion criteria and will not be placed in any inpatient psychiatric unit.  They recommend medical admission for the patient considering that he is considered full cares and will continue psychiatric stabilization as an inpatient here.  I discussed with Dr. Martinez with the hospitalist team who accepts admission.     Vicente Mike MD  03/31/25 6040

## 2025-03-31 NOTE — PROGRESS NOTES
NEUROLEPTIC MEDICATION NOTE FOR [facility] PROCEEDINGS    Patient s name: Harpreet Mcelroy    FOR EMERGENCY USE ONLY:If a medical emergency has been declared before this note, please describe:   a.The basis for the emergency:   b.The neuroleptic medications provided:   _____________________________________________________________________________________________________________________________________    1. Briefly describe the patient s clinical condition that supports a recommendation for the treatment with neuroleptic medication: Decompensated mood and psychosis with agitated behaviors.    2.   List the working diagnosis(es) of the condition(s) for which neuroleptic medication is recommended: Schizoaffective disorder, bipolar type    3. Is neuroleptic medication the treatment of choice in prevailing medical practice?  yes or no: yes    4. Treatment options other than neuroleptics that alone effectively treat the illness: Electroconvulsive therapy    5.   Medication ordered or proposed: (up to 4 medications, no more than two to be used simultaneously unless meds are being changed or emergency exists, unless otherwise specified): Risperidone, olanzapine, quetiapine, haloperidol    OR: unusual circumstances exist under which physician requests authorization to use any FDA approved neuroleptic.  Identify unusual circumstances and describe proposed course of treatment:     6.   Document the proposed course of treatment with neuroleptic medication, i.e., how the medication will be prescribed, monitored, and adjusted: Routine clinical monitoring with adjustments of dose and/or frequency as indicated.    7.   If the patient has a known record with neuroleptics, please summarize his/her history regarding risks/benefits and whether this is predictive of expected response: Olanzapine; documented efficacy when adherent without issues of tolerability reported.    8. List the possible risks and side effects, and what can be  done should they occur.  Include any specific risks associated with the patient s age/gender/ethnic identity or medical condition.  Does this patient have any medical conditions that could be exacerbated by neuroleptics?  Neuroleptics may cause side effects, including sedation, extrapyramidal symptoms (EPS), metabolic disturbances, and tardive dyskinesia.  Patient is known pre-existing conditions that may be exacerbated by use of neuroleptics include slurred speech and recurrent falls.    9. Does this patient have any medical conditions that could be exacerbated by neuroleptics yes or no: yes (if yes, please explain)?  Medical conditions may be exacerbated due to possible side effects of EPS, sedation, and tardive dyskinesia.    10. Indicate likely benefits and outcomes for the patient after treatment with neuroleptic medication, including prognosis if neuroleptic medication is not administered (even if other forms of therapy are utilized): Target behavior/symptom progression with adherence; guarded if not administered or refused.    11. Does this patient have tardive dyskinesia?   yes or no: no  If yes, how serious is the tardive dyskinesia, why are neuroleptics still indicated and, if applicable, why use typical as opposed to atypical neuroleptics?     12. For a patient to have the capacity to decide about the use of neuroleptics, the answers to a), b) and c) below must be answered  yes.   If one or more of these questions is answered  no,  the Court may find that the patient lacks this capacity, and the Court may make the decision after a hearing.    a)Does the patient demonstrate awareness of the nature of the patient s situation, including the reasons for hospitalization and possible consequences of refusing treatment with neuroleptic medication? yes or no: no    b) Does the patient have an understanding of treatment with neuroleptic medication including the risks, benefits, and alternatives? yes or no: no      c)   Does the patient communicate verbally or nonverbally a clear choice regarding treatment that is reasoned and not based on a symptom of the patient s mental illness, even though it may not be in the patient s best interests?   yes or no: no    d)  If the patient objects, is the objection based on family, community, moral, Yazdanism, and/or social values?  yes or no: no        If yes, briefly describe:     13. Document specific efforts that have been made to assist patient in understanding the risks and benefits: Attempt at psychoeducation performed, but refused to engage meaningfully.    14. If the patient is consenting to the medication, why is a court order necessary?  If the patient has history of  unreliable consent,  please summarize: Unreliable consent prior to admission and while hospitalized in ED associated with decompensation and target behavior exacerbation.    15. Is the proposed treatment experimental or part of a research study?  yes or no: no    16. In this patient s case, do the benefits of the treatment outweigh the risks?  Please summarize: Severity of presentation on risk analysis indicates benefit.    The above statements represent my opinion within a reasonable degree of medical certainty.    Physician s or Other Provider s Signature: _______________________________    Date: March 31, 2025  Time: 9:59 AM    Printed Provider s Name: Arnel Peres MD Psychiatrist   [facility] EMERGENCY DEPT  [address] [place] [address]  [phone]  [phone]     Please write legibly.   Original must be placed in patient's medical chart.   A copy may be provided to the .

## 2025-03-31 NOTE — ED NOTES
[facility]  ED Nurse Handoff Report    ED Chief complaint: Aggressive Behavior  . ED Diagnosis:   Final diagnoses:   Aggressive behavior   Hx of schizophrenia   ROBBIN (acute kidney injury)       Allergies:   Allergies   Allergen Reactions    Lactose Anxiety     Lactose intolerance    Aspirin     Cucumber Extract Unknown     pickles    Diphenhydramine Other (See Comments)     Lock jaw. Could not open mouth    Pollen Extract Other (See Comments)     Sneezing and congestion    Acetaminophen Rash     Fixed drug reaction       Code Status: Full Code    Activity level - Baseline/Home:  in bed.  Activity Level - Current:   in bed.   Lift room needed: No.   Bariatric: No   Needed: No   Isolation: No.   Infection: Not Applicable.     Respiratory status: Room air    Vital Signs (within 30 minutes):   Vitals:    03/29/25 0747 03/29/25 1600 03/30/25 0857 03/31/25 0839   BP: (!) 154/78 (!) 166/78 (!) 133/97 (!) 160/81   Pulse: 87 96 83 80   Resp: 18 20 20 18   Temp:       TempSrc:       SpO2: 97% 95% 100% 98%       Cardiac Rhythm:  ,      Pain level:    Patient confused: Yes.   Patient Falls Risk: arm band in place.   Elimination Status: Has voided     Patient Report - Initial Complaint: PT arrives on [facility] from [facility] where pt began to have agitation and attempting to harm staff with cane, PD and EMS called, pt cooperative with staff upon arrival. PT at baseline per EMS. Tachycardic upon arrival. .   Focused Assessment: PT arrives on [facility] from [facility] where pt began to have agitation and attempting to harm staff with cane, PD and EMS called, pt cooperative with staff upon arrival. PT at baseline per EMS. Tachycardic upon arrival.      Abnormal Results:   Labs Ordered and Resulted from Time of ED Arrival to Time of ED Departure   ROUTINE UA WITH MICROSCOPIC REFLEX TO CULTURE - Abnormal       Result Value    Color Urine Light Yellow      Appearance Urine Slightly Cloudy (*)      Glucose Urine Negative      Bilirubin Urine Negative      Ketones Urine Negative      Specific Gravity Urine 1.009      Blood Urine Small (*)     pH Urine 8.0 (*)     Protein Albumin Urine 10 (*)     Urobilinogen Urine Normal      Nitrite Urine Negative      Leukocyte Esterase Urine Large (*)     Mucus Urine Present (*)     RBC Urine 11 (*)     WBC Urine 11 (*)     Squamous Epithelials Urine 6 (*)    COMPREHENSIVE METABOLIC PANEL - Abnormal    Sodium 143      Potassium 3.8      Carbon Dioxide (CO2) 21 (*)     Anion Gap 16 (*)     Urea Nitrogen 29.0 (*)     Creatinine 1.42 (*)     GFR Estimate 54 (*)     Calcium 9.6      Chloride 106      Glucose 134 (*)     Alkaline Phosphatase 130      AST 19      ALT 13      Protein Total 7.3      Albumin 4.2      Bilirubin Total 0.2     CBC WITH PLATELETS AND DIFFERENTIAL - Abnormal    WBC Count 8.5      RBC Count 4.10 (*)     Hemoglobin 11.9 (*)     Hematocrit 36.3 (*)     MCV 89      MCH 29.0      MCHC 32.8      RDW 14.6      Platelet Count 247      % Neutrophils 76      % Lymphocytes 13      % Monocytes 8      % Eosinophils 3      % Basophils 0      % Immature Granulocytes 0      NRBCs per 100 WBC 0      Absolute Neutrophils 6.5      Absolute Lymphocytes 1.1      Absolute Monocytes 0.7      Absolute Eosinophils 0.3      Absolute Basophils 0.0      Absolute Immature Granulocytes 0.0      Absolute NRBCs 0.0     URINE CULTURE    Culture 10,000-50,000 CFU/mL Mixture of Urogenital Juliana          Abd/pelvis CT no contrast - Stone Protocol   Final Result   IMPRESSION:    1.  Stable severe bilateral hydronephrosis.         CT Head w/o Contrast   Final Result   IMPRESSION:     1.  No evidence of acute intracranial hemorrhage or mass effect.          Treatments provided: Medications  Family Comments: Family Updated  OBS brochure/video discussed/provided to patient:  N/A  ED Medications:   Medications   midazolam (VERSED) injection 1 mg (has no administration in time range)   benztropine  (COGENTIN) tablet 2 mg (2 mg Oral Not Given 3/30/25 2128)   melatonin tablet 3 mg (3 mg Oral Not Given 3/30/25 2129)   OLANZapine (zyPREXA) tablet 7.5 mg (7.5 mg Oral $Given 3/31/25 1210)   hydrOXYzine HCl (ATARAX) tablet 10 mg (10 mg Oral $Given 3/31/25 1211)   oxyBUTYnin (DITROPAN) half-tab 2.5 mg (has no administration in time range)   tamsulosin (FLOMAX) capsule 0.4 mg (0.4 mg Oral $Given 3/31/25 0827)   OLANZapine (zyPREXA) tablet 25 mg (25 mg Oral Not Given 3/30/25 2129)   busPIRone (BUSPAR) tablet 20 mg (20 mg Oral $Given 3/31/25 0827)   busPIRone (BUSPAR) tablet 20 mg (has no administration in time range)   hydrOXYzine HCl (ATARAX) tablet 10 mg (10 mg Oral $Given 3/31/25 0827)   lamoTRIgine (LaMICtal) tablet 150 mg (150 mg Oral $Given 3/31/25 0826)   risperiDONE (risperDAL M-TABS) ODT tab 1 mg (1 mg Oral $Given 3/30/25 1455)   haloperidol lactate (HALDOL) injection 5 mg (has no administration in time range)   OLANZapine (zyPREXA) injection 5 mg (5 mg Intramuscular Not Given 3/27/25 1344)   OLANZapine zydis (zyPREXA) ODT tab 5 mg (5 mg Oral $Given 3/27/25 1333)   sodium chloride 0.9% BOLUS 1,000 mL (0 mLs Intravenous Stopped 3/27/25 1746)   OLANZapine zydis (zyPREXA) ODT tab 5 mg (5 mg Oral $Given 3/27/25 1629)   haloperidol lactate (HALDOL) injection 5 mg (5 mg Intravenous $Given 3/27/25 1746)   hydrOXYzine HCl (ATARAX) tablet 10 mg (10 mg Oral $Given 3/27/25 1827)   lamoTRIgine (LaMICtal) tablet 50 mg (50 mg Oral $Given 3/28/25 1404)   ibuprofen (ADVIL/MOTRIN) tablet 400 mg (400 mg Oral $Given 3/31/25 1210)       Drips infusing:  No  For the majority of the shift this patient was Green.   Interventions performed were None.    Sepsis treatment initiated: No    Cares/treatment/interventions/medications to be completed following ED care: None    ED Nurse Name: Beverly Joiner RN  RECEIVING UNIT ED HANDOFF REVIEW    Above ED Nurse Handoff Report was reviewed: Yes  Reviewed by: Zandra Valencia RN on March  31, 2025 at 7:25 PM   I Shi called the ED to inform them the note was read: Yes     4:59 PM

## 2025-03-31 NOTE — PROGRESS NOTES
IP MH Referral Acuity Rating Score (RARS)    LMHP complete at referral to IP MH, with DEC; and, daily while awaiting IP MH placement. Call score to PPS.  CRITERIA SCORING   New 73 HH and Involuntary for IP MH (not adolescent) 3/3   Boarding over 24 hours 1/1   Vulnerable adult at least 55+ with multiple co morbidities; or, Patient age 11 or under 1/1   Suicide ideation without relief of precipitating factors 0/1   Current plan for suicide 0/1   Current plan for homicide 0/1   Imminent risk or actual attempt to seriously harm another without relief of factors precipitating the attempt 0/1   Severe dysfunction in daily living (ex: complete neglect for self care, extreme disruption in vegetative function, extreme deterioration in social interactions) 1/1   Recent (last 2 weeks) or current physical aggression in the ED 1/1   Restraints or seclusion episode in ED 0/1   Verbal aggression, agitation, yelling, etc., while in the ED 1/1   Active psychosis with psychomotor agitation or catatonia 1/1   Need for constant or near constant redirection (from leaving, from others, etc).  1/1   Intrusive or disruptive behaviors 1/1   TOTAL 11

## 2025-03-31 NOTE — H&P
[facility]    History and Physical - Hospitalist Service       Date of Admission:  3/27/2025    Assessment & Plan      Harpreet Mcelroy is a 69 year old male with PMH of bipolar, anxiety, depression, schizophrenia, BPD, paranoia, bladder cancer s/p ureteral stent, HTN, HLD, who presented on 3/27/2025 with decompensated schizophrenia and ROBBIN. He was admitted by the medicine service on 3/31/2025 after he was declined by inpatient psych.    Presented from his facility with agitation and attempting to harm staff. On arrival, afebrile, slightly tachycardic but otherwise hemodynamically stable.     Labs notable for Cr 1.42, WBC 8.5, Hgb 11.9, plt 247, electrolytes and LFTs in normal limits, CT head with no acute pathology. CT AP with stable severe bilateral hydronephrosis. UA with large LE but negative nitrites and culture with mixture of urogenital piper.     Has continued to be aggressive and inappropriate with staff in ER. Placed on a 72 hr hold on 3/28/25 at 0308. Psychiatry consulted and filed for commitment with junior. He is unfortunately total cares so inpatient psych is unable to manage him. Plan to admit here while psychiatry optimizes him to return back to his facility ([facility]).     ROBBIN  Cr up to 1.42 from baseline of ~1. Received 1L NS on 3/27. Labs have not been repeated since 3/27.   - AM BMP if patient agreeable    Decompensated schizophrenia  Bipolar  Anxiety  Depression  Dr. Peres is following and will be off service on 4/1 but will plan to follow up on 4/2. If acute issues on 4/1, re-consult psychiatry.    Bladder cancer   Chronic L ureteral stent  Stent last changed 2/4/25 by [facility] Dr. Whitt.  - PTA oxybutynin and flomax        Diet: Mechanical/Dental Soft Diet    DVT Prophylaxis: Pneumatic Compression Devices  Hernandez Catheter: Not present  Lines: None     Cardiac Monitoring: None  Code Status:  full    Clinically Significant Risk Factors Present on  Admission                                 # Financial/Environmental Concerns: none         Disposition Plan     Medically Ready for Discharge: Ready Now           Mary Martinez DO  Hospitalist Service  [facility]  Securely message with Shi (more info)  Text page via [employer] Paging/Directory     ______________________________________________________________________    Chief Complaint   Agitation     History is obtained from the patient, ER provider, and EMR    History of Present Illness   Harpreet Mcelroy is a 69 year old male who came in with agitation from his facility. Was attempting to hit staff with a cane. Has been in the ER since 3/27 while psych was adjusting meds and seeing if he could go to inpatient psych. Unfortunately, he is total cares and inpatient psych is unable to take him. He will be admitted here while psych follows to stabilize him.   Upon my assessment, he was pleasant but mumbling and difficult to understand. Reported some neck discomfort, which he attributed to being in bed for the past few days. Also had some GI upset and requested ginger ale. Denied any physical complaints. Was hoping to talk with psychiatry. Mumbled a lot more than those few requests but unclear what he was saying. Calm and cooperative throughout conversation.       Past Medical History    Past Medical History:   Diagnosis Date    Anxiety     Bipolar 1 disorder (H)     Cancer (H)     Depressive disorder     Psychiatric diagnosis        Past Surgical History   No past surgical history on file.    Prior to Admission Medications   Prior to Admission Medications   Prescriptions Last Dose Informant Patient Reported? Taking?   OLANZapine (ZYPREXA) 20 MG tablet 3/25/2025 Bedtime  No Yes   Sig: Take 1 tablet (20 mg) by mouth at bedtime.   OLANZapine (ZYPREXA) 5 MG tablet   Yes Yes   Sig: Take 5 mg by mouth at bedtime.   OLANZapine (ZYPREXA) 7.5 MG tablet 3/27/2025 Noon  Yes Yes   Sig: Take 7.5 mg  by mouth 2 times daily. 8AM and noon   alum & mag hydroxide-simethicone (MAALOX  ES) 400-400-40 MG/5ML SUSP suspension   Yes Yes   Sig: Take 20 mLs by mouth every 6 hours as needed for indigestion.   artificial tears OINT ophthalmic ointment 3/19/2025 Bedtime  Yes Yes   Sig: Place Into the left eye at bedtime.   benztropine (COGENTIN) 1 MG tablet 3/25/2025 Bedtime  Yes Yes   Sig: Take 2 mg by mouth at bedtime.   bisacodyl (DULCOLAX) 10 MG suppository   Yes Yes   Sig: Place 10 mg rectally daily as needed for constipation.   busPIRone (BUSPAR) 15 MG tablet 3/27/2025 Morning  Yes Yes   Sig: Take 15 mg by mouth 2 times daily.   docusate sodium (COLACE) 100 MG capsule  Other Yes Yes   Sig: Take 100 mg by mouth 2 times daily as needed for constipation.   fluticasone (FLONASE) 50 MCG/ACT nasal spray  Other Yes Yes   Sig: Spray 1 spray into both nostrils daily as needed.   guaiFENesin (ROBITUSSIN) 20 mg/mL liquid   Yes Yes   Sig: Take 200 mg by mouth every 4 hours as needed for cough.   hydrOXYzine HCl (ATARAX) 10 MG tablet 3/27/2025 Noon  Yes Yes   Sig: Take 10 mg by mouth 2 times daily. Noon and 4PM   lamoTRIgine (LAMICTAL) 100 MG tablet 3/27/2025 Morning  Yes Yes   Sig: Take 100 mg by mouth daily.   loperamide (IMODIUM) 2 MG capsule   Yes Yes   Sig: Take 2 mg by mouth 4 times daily as needed for diarrhea.   magnesium hydroxide (MILK OF MAGNESIA) 400 MG/5ML suspension   Yes Yes   Sig: Take 30 mLs by mouth daily as needed for constipation or heartburn.   melatonin 3 MG tablet   No Yes   Sig: Take 1 tablet (3 mg) by mouth nightly as needed for sleep.   menthol-zinc oxide (CALMOSEPTINE) 0.44-20.6 % OINT ointment   Yes Yes   Sig: Apply topically 4 times daily as needed for skin protection.   mineral oil enema   Yes Yes   Sig: Place 1 enema rectally once.   naproxen (NAPROSYN) 250 MG tablet   Yes Yes   Sig: Take 250 mg by mouth 2 times daily as needed for moderate pain.   ondansetron (ZOFRAN ODT) 4 MG ODT tab   Yes Yes   Sig:  Take 4 mg by mouth every 6 hours as needed for nausea.   oxyBUTYnin (DITROPAN) 5 MG tablet   Yes Yes   Sig: Take 2.5 mg by mouth 3 times daily as needed for bladder spasms.   polyethylene glycol (MIRALAX) 17 GM/Dose powder 3/27/2025 Morning  No Yes   Sig: Take 17 g (1 Capful) by mouth daily.   tamsulosin (FLOMAX) 0.4 MG capsule 3/27/2025 Morning Other Yes Yes   Sig: Take 0.4 mg by mouth daily   vitamin A & D (BABY) external ointment   Yes Yes   Sig: Apply topically 4 times daily as needed for dry skin or irritation.      Facility-Administered Medications: None           Physical Exam   Vital Signs:     BP: (!) 160/81 Pulse: 80   Resp: 18 SpO2: 98 % O2 Device: None (Room air)    Weight: 0 lbs 0 oz    Constitutional: Awake, alert, no distress, and cooperative  Cardiovascular: Regular rate and rhythm, normal S1 and S2, no S3 or S4, and no murmur noted  Respiratory: No increased work of breathing, good air exchange, clear to auscultation bilaterally, no crackles or wheezing  Gastrointestinal: Abdomen soft, non-tender, non-distended. BS normal. No masses, organomegaly     Medical Decision Making       65 MINUTES SPENT BY ME on the date of service doing chart review, history, exam, documentation & further activities per the note.      Data

## 2025-04-01 LAB
ANION GAP SERPL CALCULATED.3IONS-SCNC: 11 MMOL/L (ref 7–15)
BUN SERPL-MCNC: 26.4 MG/DL (ref 8–23)
CALCIUM SERPL-MCNC: 9.2 MG/DL (ref 8.8–10.4)
CHLORIDE SERPL-SCNC: 105 MMOL/L (ref 98–107)
CREAT SERPL-MCNC: 1.1 MG/DL (ref 0.67–1.17)
EGFRCR SERPLBLD CKD-EPI 2021: 73 ML/MIN/1.73M2
GLUCOSE SERPL-MCNC: 162 MG/DL (ref 70–99)
HCO3 SERPL-SCNC: 24 MMOL/L (ref 22–29)
HOLD SPECIMEN: NORMAL
POTASSIUM SERPL-SCNC: 4.2 MMOL/L (ref 3.4–5.3)
SODIUM SERPL-SCNC: 140 MMOL/L (ref 135–145)

## 2025-04-01 PROCEDURE — 250N000013 HC RX MED GY IP 250 OP 250 PS 637: Performed by: INTERNAL MEDICINE

## 2025-04-01 PROCEDURE — 250N000013 HC RX MED GY IP 250 OP 250 PS 637: Performed by: EMERGENCY MEDICINE

## 2025-04-01 PROCEDURE — 80048 BASIC METABOLIC PNL TOTAL CA: CPT | Performed by: STUDENT IN AN ORGANIZED HEALTH CARE EDUCATION/TRAINING PROGRAM

## 2025-04-01 PROCEDURE — 99232 SBSQ HOSP IP/OBS MODERATE 35: CPT | Performed by: INTERNAL MEDICINE

## 2025-04-01 PROCEDURE — 36415 COLL VENOUS BLD VENIPUNCTURE: CPT | Performed by: STUDENT IN AN ORGANIZED HEALTH CARE EDUCATION/TRAINING PROGRAM

## 2025-04-01 PROCEDURE — 250N000013 HC RX MED GY IP 250 OP 250 PS 637: Performed by: PSYCHIATRY & NEUROLOGY

## 2025-04-01 PROCEDURE — 120N000001 HC R&B MED SURG/OB

## 2025-04-01 RX ORDER — LIDOCAINE 4 G/G
1 PATCH TOPICAL
Status: DISCONTINUED | OUTPATIENT
Start: 2025-04-01 | End: 2025-04-30 | Stop reason: HOSPADM

## 2025-04-01 RX ADMIN — DICLOFENAC SODIUM 2 G: 10 GEL TOPICAL at 05:37

## 2025-04-01 ASSESSMENT — ACTIVITIES OF DAILY LIVING (ADL)
ADLS_ACUITY_SCORE: 76
ADLS_ACUITY_SCORE: 72
ADLS_ACUITY_SCORE: 72
ADLS_ACUITY_SCORE: 76
ADLS_ACUITY_SCORE: 80
ADLS_ACUITY_SCORE: 72
ADLS_ACUITY_SCORE: 80
ADLS_ACUITY_SCORE: 80
ADLS_ACUITY_SCORE: 76
ADLS_ACUITY_SCORE: 80
ADLS_ACUITY_SCORE: 80
ADLS_ACUITY_SCORE: 76
ADLS_ACUITY_SCORE: 80
ADLS_ACUITY_SCORE: 76
ADLS_ACUITY_SCORE: 80
ADLS_ACUITY_SCORE: 76
ADLS_ACUITY_SCORE: 80

## 2025-04-01 NOTE — PLAN OF CARE
"Unable to assess patient. Refusing repositioning meds, labs, assessment, vitals and checking for incontinence. Becomes upset when anyone enters his room, starts yelling, \"you're not my nurse\", cursing, \"fuck you\", \"get the fuck out of my room\", name calling, \"bitch\", \"whore\", swinging arms. Attempted many times to reapproach pt with no success, also had multiple nurses approach pt with no success. MD aware. PSC discontinued at 1100.     Goal Outcome Evaluation:      Plan of Care Reviewed With: patient    Overall Patient Progress: no changeOverall Patient Progress: no change    Outcome Evaluation: agitated, refusing meds, labs and assessments      Problem: Adult Inpatient Plan of Care  Goal: Plan of Care Review  Description: The Plan of Care Review/Shift note should be completed every shift.  The Outcome Evaluation is a brief statement about your assessment that the patient is improving, declining, or no change.  This information will be displayed automatically on your shiftnote.  Outcome: Not Progressing  Flowsheets (Taken 4/1/2025 1211)  Outcome Evaluation: agitated, refusing meds, labs and assessments  Plan of Care Reviewed With: patient  Overall Patient Progress: no change  Goal: Patient-Specific Goal (Individualized)  Description: You can add care plan individualizations to a care plan. Examples of Individualization might be:  \"Parent requests to be called daily at 9am for status\", \"I have a hard time hearing out of my right ear\", or \"Do not touch me to wake me up as it startlesme\".  Outcome: Not Progressing  Goal: Absence of Hospital-Acquired Illness or Injury  Outcome: Not Progressing  Goal: Optimal Comfort and Wellbeing  Outcome: Not Progressing  Goal: Readiness for Transition of Care  Outcome: Not Progressing     Problem: Delirium  Goal: Optimal Coping  Outcome: Not Progressing  Goal: Improved Behavioral Control  Outcome: Not Progressing  Goal: Improved Attention and Thought Clarity  Outcome: Not " Progressing  Goal: Improved Sleep  Outcome: Not Progressing     Problem: Violence Risk or Actual  Goal: Anger and Impulse Control  Outcome: Not Progressing

## 2025-04-01 NOTE — PROGRESS NOTES
[facility]  Hospitalist Progress Note  Kendrick Alex MD 04/01/2025    Reason for Stay (Diagnosis): agitation, schizophrenia, bpolar         Assessment and Plan:      Summary of Stay: Harpreet Mcelroy is a 69 year old male with PMH of bipolar, anxiety, depression, schizophrenia, BPD, paranoia, bladder cancer s/p ureteral stent, HTN, HLD, who presented on 3/27/2025 with decompensated schizophrenia and ROBBIN. He was admitted by the medicine service on 3/31/2025 after he was declined by inpatient psych.     Presented from his facility with agitation and attempting to harm staff. On arrival, afebrile, slightly tachycardic but otherwise hemodynamically stable.      Labs notable for Cr 1.42, WBC 8.5, Hgb 11.9, plt 247, electrolytes and LFTs in normal limits, CT head with no acute pathology. CT AP with stable severe bilateral hydronephrosis. UA with large LE but negative nitrites and culture with mixture of urogenital piper.      Has continued to be aggressive and inappropriate with staff. Placed on a 72 hr hold on 3/28/25. Psychiatry consulted and filed for commitment with junior. He is unfortunately total cares so inpatient psych is unable to manage him. Plan to admit here while psychiatry optimizes him to return back to his facility ([facility]).     Plans today:  -No significant changes made to care plan today  -Psychiatry following and will allow them to adjust antipsychotics and other psychiatric medications as per their discretion     ROBBIN  Cr up to 1.42 from baseline of ~1. Received 1L NS on 3/27. Labs have not been repeated since 3/27.   - Repeat BMP when patient agreeable (currently not agreeable to lab draws)     Decompensated schizophrenia  Bipolar  Anxiety  Depression  -Psychiatry following.  Will leave adjustment of antipsychotic and psychiatric medications to psychiatry     Bladder cancer   Chronic L ureteral stent  Stent last changed 2/4/25 by [facility] urology Dr. Whitt.  - PTA  oxybutynin and flomax        Diet: Mechanical/Dental Soft Diet    DVT Prophylaxis: Pneumatic Compression Devices  Hernandez Catheter: Not present  Lines: None     Cardiac Monitoring: None  Code Status:  full  Discharge Dispo: [facility] facility  Medically Ready for Discharge: Anticipated in 2-4 Days after further stabilization of his agitation and psychosis           Interval History (Subjective):      Patient agitated this morning.  When I walked into the room he asked me to leave.  He would not allow me to examine him.  Per nursing staff he was not agreeable to take his medications yet this AM.  Bedside attendant in room                  Physical Exam:      Last Vital Signs:  BP (!) 155/60 (BP Location: Left arm)   Pulse 63   Temp 96.8  F (36  C) (Tympanic)   Resp 16   SpO2 99%       Intake/Output Summary (Last 24 hours) at 4/1/2025 1157  Last data filed at 4/1/2025 0700  Gross per 24 hour   Intake 370 ml   Output --   Net 370 ml       Constitutional: Awake, alert, moderately verbally agitated, argumentative, asked me to leave the room after I introduced myself and attempted to ask him some questions.  He would not allow me to examine him today     Respiratory:    Cardiovascular:    Abdomen:    Skin:    Neuro:    Extremities:    Other(s):        All other systems: Negative          Medications:      All current medications were reviewed with changes reflected in problem list.         Data:      All new lab and imaging data was reviewed.   Labs:       Lab Results   Component Value Date     03/27/2025     12/28/2024     12/23/2024     07/12/2020     01/26/2020    Lab Results   Component Value Date    CHLORIDE 106 03/27/2025    CHLORIDE 106 12/28/2024    CHLORIDE 101 12/23/2024    CHLORIDE 107 05/26/2022    CHLORIDE 99 07/14/2020    CHLORIDE 106 07/12/2020    CHLORIDE 107 01/26/2020    Lab Results   Component Value Date    BUN 29.0 03/27/2025    BUN 26.3 12/28/2024    BUN 24.5 12/23/2024     BUN 19 05/26/2022    BUN 13 07/14/2020    BUN 14 07/12/2020    BUN 18 01/26/2020      Lab Results   Component Value Date    POTASSIUM 3.8 03/27/2025    POTASSIUM 4.4 12/28/2024    POTASSIUM 4.4 12/23/2024    POTASSIUM 4.3 05/26/2022    POTASSIUM 3.5 07/14/2020    POTASSIUM 3.7 07/12/2020    POTASSIUM 4.1 01/26/2020    Lab Results   Component Value Date    CO2 21 03/27/2025    CO2 24 12/28/2024    CO2 24 12/23/2024    CO2 29 05/26/2022    CO2 27 07/14/2020    CO2 27 07/12/2020    CO2 29 01/26/2020    Lab Results   Component Value Date    CR 1.42 03/27/2025    CR 1.19 12/28/2024    CR 0.98 12/23/2024    CR 0.77 07/12/2020    CR 0.67 01/26/2020        Recent Labs   Lab 03/27/25  1334   WBC 8.5   HGB 11.9*   HCT 36.3*   MCV 89         Imaging:   No results found for this or any previous visit (from the past 24 hours).

## 2025-04-01 NOTE — PLAN OF CARE
"Goal Outcome Evaluation:      Plan of Care Reviewed With: patient    Overall Patient Progress: no changeOverall Patient Progress: no change    Outcome Evaluation: PSC monitoring activity, no violent behaviors this shift, no acute changes in status    A/Ox3, confused to situation, VSS, on RA, PIV SL, speech garbled and sometimes difficult to understand, not oob this shift, pt reports using wheelchair at living facility, PSC at bedside overnight, no violent behaviors this shift, c/o of pain to lower back, heat applied, external cath in place, tolerating mechanical/soft diet, plan TBD    Pt agitated and refused AM labs stating, \"You're crazy. Get out. I don't want to talk to you.\" Writer educated pt on rationale for obtaining a blood sample. Pt did not verbalize understanding.     Problem: Adult Inpatient Plan of Care  Goal: Plan of Care Review  Description: The Plan of Care Review/Shift note should be completed every shift.  The Outcome Evaluation is a brief statement about your assessment that the patient is improving, declining, or no change.  This information will be displayed automatically on your shiftnote.  Outcome: Progressing  Flowsheets (Taken 4/1/2025 0120)  Outcome Evaluation: PSC monitoring activity, no violent behaviors this shift, no acute changes in status  Plan of Care Reviewed With: patient  Overall Patient Progress: no change  Goal: Patient-Specific Goal (Individualized)  Description: You can add care plan individualizations to a care plan. Examples of Individualization might be:  \"Parent requests to be called daily at 9am for status\", \"I have a hard time hearing out of my right ear\", or \"Do not touch me to wake me up as it startlesme\".  Outcome: Progressing  Goal: Absence of Hospital-Acquired Illness or Injury  Outcome: Progressing  Intervention: Identify and Manage Fall Risk  Recent Flowsheet Documentation  Taken 3/31/2025 7391 by Maria Del Carmen Garcia, RN  Safety Promotion/Fall Prevention:   activity " supervised   assistive device/personal items within reach   clutter free environment maintained   room door open   room near nurse's station   room organization consistent   safety round/check completed  Intervention: Prevent and Manage VTE (Venous Thromboembolism) Risk  Recent Flowsheet Documentation  Taken 3/31/2025 2347 by Maria Del Carmen Garcia, RN  VTE Prevention/Management: SCDs on (sequential compression devices)  Intervention: Prevent Infection  Recent Flowsheet Documentation  Taken 3/31/2025 2347 by Maria Del Carmen Garcia RN  Infection Prevention:   environmental surveillance performed   rest/sleep promoted   single patient room provided  Goal: Optimal Comfort and Wellbeing  Outcome: Progressing  Intervention: Monitor Pain and Promote Comfort  Recent Flowsheet Documentation  Taken 3/31/2025 2346 by Maria Del Carmen Garcia RN  Pain Management Interventions:   repositioned   rest   other (see comments)   heat applied  Intervention: Provide Person-Centered Care  Recent Flowsheet Documentation  Taken 3/31/2025 2347 by Maria Del Carmen Garcia RN  Trust Relationship/Rapport:   care explained   choices provided   empathic listening provided   reassurance provided  Goal: Readiness for Transition of Care  Outcome: Progressing     Problem: Delirium  Goal: Optimal Coping  Outcome: Progressing  Goal: Improved Behavioral Control  Outcome: Progressing  Intervention: Minimize Safety Risk  Recent Flowsheet Documentation  Taken 3/31/2025 2347 by Maria Del Carmen Garcia, RN  Enhanced Safety Measures:   room near unit station    at bedside  Trust Relationship/Rapport:   care explained   choices provided   empathic listening provided   reassurance provided  Goal: Improved Attention and Thought Clarity  Outcome: Progressing  Goal: Improved Sleep  Outcome: Progressing     Problem: Violence Risk or Actual  Goal: Anger and Impulse Control  Outcome: Progressing  Intervention: Minimize Safety Risk  Recent Flowsheet Documentation  Taken 3/31/2025 2347 by  Maria Del Carmen Garcia, RN  Enhanced Safety Measures:   room near unit station    at bedside

## 2025-04-01 NOTE — DISCHARGE INSTRUCTIONS
ADDITIONAL SUPPORTS:  Call or text 943 - National Suicide & Crisis Lifeline - if you feel like you may be in crisis or having thoughts of suicide.    [employer] [place] [employer] [place] ([employer]) provides support groups and educational programs. Visit www.namimn.org Helpline at 1-327.296.6542 or 404-661-4989 for further information.   [facility] ([employer] [place] [employer]) improves the lives of children and adults with mental illnesses and their families by providing free classes on mental illnesses andsupport groups for adults with mental illnesses, parents and family members.   For more information:  Phone: [phone]  Toll free: 8-104-AZSL-HELPS  Website: www.namihelps.org      The [place] [facility] provides a pjaw-cs-cvkn approach to mental health recovery, support and wellness. Calls are answered by our team of professionally trained Certified Peer Specialists, who have first hand experience living with a mental health condition.   Open Monday-Saturday, 5pm to 10pm. Call [phone].       You may contact the [facility] for brief, short-term solution focused therapy support with your mental health goals. Call [phone] for more information or to schedule. (Virtual Appointments)          [facility]: Thank you for your interest in [facility]. Currently, patients are experiencing long waits for intake when referred within [facility]. Please know that you may contact your insurance carrier member services to learn more about scheduling in network therapy. Your insurance company will have lists of in network therapists that are not within [facility] and may have more immediate availability.       Get care started with an ongoing therapist by calling to schedule your intake at one of the following clinics:    [facility]: [phone]  [facility]  [phone]  [facility] [phone]  [facility] [phone]  [facility] [phone]  [facility]  [phone]   [facility] in [facility] [phone]  [facility] [phone]    Call an [facility] Behavioral Coordinator at [phone] for assistance in scheduling mental health appointments (Psychiatry/medication management, therapy, support groups, neuropsych testing, intake for programmatic care such as IOP/PHP program, etc.)     clinics:    [facility]: [phone]  [facility]  [phone]  [facility] [phone]  [facility] [phone]  [facility] [phone]  [facility]  [phone]   [facility] in [facility] [phone]  [facility] [phone]    Call an [facility] Behavioral Coordinator at [phone] for assistance in scheduling mental health appointments (Psychiatry/medication management, therapy, support groups, neuropsych testing, intake for programmatic care such as IOP/PHP program, etc.)

## 2025-04-01 NOTE — PLAN OF CARE
"Goal Outcome Evaluation:      Plan of Care Reviewed With: patient    Overall Patient Progress: improvingOverall Patient Progress: improving    Outcome Evaluation: adm to 6th floor. . VSS afeb. on room air. incont bowel & bladder.      Problem: Adult Inpatient Plan of Care  Goal: Plan of Care Review  Description: The Plan of Care Review/Shift note should be completed every shift.  The Outcome Evaluation is a brief statement about your assessment that the patient is improving, declining, or no change.  This information will be displayed automatically on your shiftnote.  Outcome: Progressing  Flowsheets (Taken 3/31/2025 2331)  Outcome Evaluation: adm to 6th floor. . VSS afeb. on room air. incont bowel & bladder.  Plan of Care Reviewed With: patient  Overall Patient Progress: improving  Goal: Patient-Specific Goal (Individualized)  Description: You can add care plan individualizations to a care plan. Examples of Individualization might be:  \"Parent requests to be called daily at 9am for status\", \"I have a hard time hearing out of my right ear\", or \"Do not touch me to wake me up as it startlesme\".  Outcome: Progressing  Goal: Absence of Hospital-Acquired Illness or Injury  Outcome: Progressing  Intervention: Identify and Manage Fall Risk  Recent Flowsheet Documentation  Taken 3/31/2025 1949 by Zandra Valencia RN  Safety Promotion/Fall Prevention:   activity supervised   assistive device/personal items within reach   clutter free environment maintained   bedside attendant   nonskid shoes/slippers when out of bed   patient and family education   safety round/check completed  Intervention: Prevent Skin Injury  Recent Flowsheet Documentation  Taken 3/31/2025 1949 by Zandra Valencia RN  Body Position:   log-rolled   position changed independently   supine, head elevated  Intervention: Prevent and Manage VTE (Venous Thromboembolism) Risk  Recent Flowsheet Documentation  Taken 3/31/2025 2116 by Zandra Valencia, " RN  VTE Prevention/Management: SCDs on (sequential compression devices)  Goal: Optimal Comfort and Wellbeing  Outcome: Progressing  Intervention: Monitor Pain and Promote Comfort  Recent Flowsheet Documentation  Taken 3/31/2025 1949 by Zandra Valencia RN  Pain Management Interventions: (aqua k ordered)   repositioned   rest   other (see comments)  Intervention: Provide Person-Centered Care  Recent Flowsheet Documentation  Taken 3/31/2025 2000 by Zandra Valencia RN  Trust Relationship/Rapport:   care explained   choices provided   empathic listening provided   reassurance provided  Goal: Readiness for Transition of Care  Outcome: Progressing     Problem: Delirium  Goal: Optimal Coping  Outcome: Progressing  Goal: Improved Behavioral Control  Outcome: Progressing  Intervention: Minimize Safety Risk  Recent Flowsheet Documentation  Taken 3/31/2025 2000 by Zandra Valencia RN  Trust Relationship/Rapport:   care explained   choices provided   empathic listening provided   reassurance provided  Goal: Improved Attention and Thought Clarity  Outcome: Progressing  Goal: Improved Sleep  Outcome: Progressing     Problem: Violence Risk or Actual  Goal: Anger and Impulse Control  Outcome: Progressing

## 2025-04-02 LAB
ANION GAP SERPL CALCULATED.3IONS-SCNC: 12 MMOL/L (ref 7–15)
BUN SERPL-MCNC: 26.5 MG/DL (ref 8–23)
CALCIUM SERPL-MCNC: 9.7 MG/DL (ref 8.8–10.4)
CHLORIDE SERPL-SCNC: 106 MMOL/L (ref 98–107)
CREAT SERPL-MCNC: 1.05 MG/DL (ref 0.67–1.17)
EGFRCR SERPLBLD CKD-EPI 2021: 77 ML/MIN/1.73M2
GLUCOSE SERPL-MCNC: 103 MG/DL (ref 70–99)
HCO3 SERPL-SCNC: 24 MMOL/L (ref 22–29)
HOLD SPECIMEN: NORMAL
POTASSIUM SERPL-SCNC: 4.3 MMOL/L (ref 3.4–5.3)
SODIUM SERPL-SCNC: 142 MMOL/L (ref 135–145)

## 2025-04-02 PROCEDURE — 250N000013 HC RX MED GY IP 250 OP 250 PS 637: Performed by: EMERGENCY MEDICINE

## 2025-04-02 PROCEDURE — 99232 SBSQ HOSP IP/OBS MODERATE 35: CPT | Performed by: INTERNAL MEDICINE

## 2025-04-02 PROCEDURE — 80048 BASIC METABOLIC PNL TOTAL CA: CPT | Performed by: INTERNAL MEDICINE

## 2025-04-02 PROCEDURE — 250N000013 HC RX MED GY IP 250 OP 250 PS 637: Performed by: INTERNAL MEDICINE

## 2025-04-02 PROCEDURE — 250N000013 HC RX MED GY IP 250 OP 250 PS 637: Performed by: PSYCHIATRY & NEUROLOGY

## 2025-04-02 PROCEDURE — 120N000001 HC R&B MED SURG/OB

## 2025-04-02 PROCEDURE — 36415 COLL VENOUS BLD VENIPUNCTURE: CPT | Performed by: INTERNAL MEDICINE

## 2025-04-02 PROCEDURE — 250N000013 HC RX MED GY IP 250 OP 250 PS 637: Performed by: STUDENT IN AN ORGANIZED HEALTH CARE EDUCATION/TRAINING PROGRAM

## 2025-04-02 PROCEDURE — 99232 SBSQ HOSP IP/OBS MODERATE 35: CPT | Performed by: PSYCHIATRY & NEUROLOGY

## 2025-04-02 RX ORDER — SIMETHICONE 80 MG
40 TABLET,CHEWABLE ORAL EVERY 6 HOURS PRN
Status: DISCONTINUED | OUTPATIENT
Start: 2025-04-02 | End: 2025-04-30 | Stop reason: HOSPADM

## 2025-04-02 RX ORDER — IBUPROFEN 400 MG/1
400 TABLET, FILM COATED ORAL EVERY 8 HOURS PRN
Status: DISCONTINUED | OUTPATIENT
Start: 2025-04-02 | End: 2025-04-30

## 2025-04-02 RX ADMIN — DICLOFENAC SODIUM 2 G: 10 GEL TOPICAL at 21:45

## 2025-04-02 RX ADMIN — OLANZAPINE 7.5 MG: 5 TABLET, FILM COATED ORAL at 08:13

## 2025-04-02 RX ADMIN — Medication 3 MG: at 21:42

## 2025-04-02 RX ADMIN — TAMSULOSIN HYDROCHLORIDE 0.4 MG: 0.4 CAPSULE ORAL at 08:13

## 2025-04-02 RX ADMIN — LAMOTRIGINE 150 MG: 150 TABLET ORAL at 08:12

## 2025-04-02 RX ADMIN — IBUPROFEN 400 MG: 400 TABLET, FILM COATED ORAL at 21:43

## 2025-04-02 RX ADMIN — HYDROXYZINE HYDROCHLORIDE 10 MG: 10 TABLET, FILM COATED ORAL at 16:16

## 2025-04-02 RX ADMIN — BENZTROPINE MESYLATE 2 MG: 1 TABLET ORAL at 21:57

## 2025-04-02 RX ADMIN — MINERAL OIL, WHITE PETROLATUM: .03; .94 OINTMENT OPHTHALMIC at 21:55

## 2025-04-02 RX ADMIN — BUSPIRONE HYDROCHLORIDE 20 MG: 10 TABLET ORAL at 08:13

## 2025-04-02 RX ADMIN — LIDOCAINE 1 PATCH: 4 PATCH TOPICAL at 08:14

## 2025-04-02 RX ADMIN — BUSPIRONE HYDROCHLORIDE 20 MG: 10 TABLET ORAL at 21:42

## 2025-04-02 RX ADMIN — ALUMINUM HYDROXIDE, MAGNESIUM HYDROXIDE, AND DIMETHICONE 30 ML: 200; 20; 200 SUSPENSION ORAL at 09:49

## 2025-04-02 RX ADMIN — HYDROXYZINE HYDROCHLORIDE 10 MG: 10 TABLET, FILM COATED ORAL at 08:15

## 2025-04-02 RX ADMIN — OLANZAPINE 7.5 MG: 5 TABLET, FILM COATED ORAL at 13:21

## 2025-04-02 RX ADMIN — OLANZAPINE 25 MG: 10 TABLET, FILM COATED ORAL at 21:58

## 2025-04-02 ASSESSMENT — ACTIVITIES OF DAILY LIVING (ADL)
ADLS_ACUITY_SCORE: 79
ADLS_ACUITY_SCORE: 75
ADLS_ACUITY_SCORE: 75
ADLS_ACUITY_SCORE: 80
ADLS_ACUITY_SCORE: 79
ADLS_ACUITY_SCORE: 80
ADLS_ACUITY_SCORE: 75
ADLS_ACUITY_SCORE: 80
ADLS_ACUITY_SCORE: 75
ADLS_ACUITY_SCORE: 80
ADLS_ACUITY_SCORE: 79
ADLS_ACUITY_SCORE: 80
ADLS_ACUITY_SCORE: 73
ADLS_ACUITY_SCORE: 75
ADLS_ACUITY_SCORE: 73
ADLS_ACUITY_SCORE: 73
ADLS_ACUITY_SCORE: 77
ADLS_ACUITY_SCORE: 75
ADLS_ACUITY_SCORE: 75

## 2025-04-02 NOTE — PLAN OF CARE
"Goal Outcome Evaluation:      Plan of Care Reviewed With: patient    Overall Patient Progress: improvingOverall Patient Progress: improving    Outcome Evaluation: plan to discharge to LTC    Pt alert, garbled speech, can be behavioral at times. Incontinent of B/B, tried using external catheter however, anatomy does not allow it. No longer has PIV. WOC to be consulted due to wounds on pts bottom and thighs, mepilex on x4.Psych was consulted.  Pt says he is not able to walk, NOOB all day. This RN offered to get pt up in a chair, pt refused. Needs help ordering meals.    Problem: Adult Inpatient Plan of Care  Goal: Plan of Care Review  Description: The Plan of Care Review/Shift note should be completed every shift.  The Outcome Evaluation is a brief statement about your assessment that the patient is improving, declining, or no change.  This information will be displayed automatically on your shiftnote.  4/2/2025 1846 by Justus Foote, RN  Outcome: Progressing  Flowsheets (Taken 4/2/2025 1846)  Outcome Evaluation: plan to discharge to LTC  Plan of Care Reviewed With: patient  Overall Patient Progress: improving  4/2/2025 1825 by Justus Foote, RN  Outcome: Not Progressing  Goal: Patient-Specific Goal (Individualized)  Description: You can add care plan individualizations to a care plan. Examples of Individualization might be:  \"Parent requests to be called daily at 9am for status\", \"I have a hard time hearing out of my right ear\", or \"Do not touch me to wake me up as it startlesme\".  4/2/2025 1846 by Justus Foote, RN  Outcome: Progressing  4/2/2025 1825 by Justus Foote, RN  Outcome: Not Progressing  Goal: Absence of Hospital-Acquired Illness or Injury  4/2/2025 1846 by Justus Foote, RN  Outcome: Progressing  4/2/2025 1825 by Justus Foote, RN  Outcome: Not Progressing  Intervention: Identify and Manage Fall Risk  Recent Flowsheet Documentation  Taken 4/2/2025 1600 by Justus Foote, RN  Safety " Promotion/Fall Prevention:   activity supervised   assistive device/personal items within reach   clutter free environment maintained   nonskid shoes/slippers when out of bed   safety round/check completed   room organization consistent  Intervention: Prevent Skin Injury  Recent Flowsheet Documentation  Taken 4/2/2025 1600 by Justus Foote RN  Body Position: position changed independently  Intervention: Prevent and Manage VTE (Venous Thromboembolism) Risk  Recent Flowsheet Documentation  Taken 4/2/2025 1600 by Justus Foote RN  VTE Prevention/Management: SCDs on (sequential compression devices)  Goal: Optimal Comfort and Wellbeing  4/2/2025 1846 by Justus Foote, RN  Outcome: Progressing  4/2/2025 1825 by Justus Foote, RN  Outcome: Not Progressing  Intervention: Provide Person-Centered Care  Recent Flowsheet Documentation  Taken 4/2/2025 1600 by Justus Foote RN  Trust Relationship/Rapport:   care explained   choices provided   questions answered  Goal: Readiness for Transition of Care  4/2/2025 1846 by Justus Foote, RN  Outcome: Progressing  4/2/2025 1825 by Justus Foote, RN  Outcome: Not Progressing     Problem: Delirium  Goal: Optimal Coping  4/2/2025 1846 by Justus Foote, RN  Outcome: Progressing  4/2/2025 1825 by Justus Foote, RN  Outcome: Not Progressing  Intervention: Optimize Psychosocial Adjustment to Delirium  Recent Flowsheet Documentation  Taken 4/2/2025 1600 by Justus Foote, RN  Supportive Measures:   active listening utilized   self-care encouraged   positive reinforcement provided  Family/Support System Care: self-care encouraged  Goal: Improved Behavioral Control  4/2/2025 1846 by Justus Foote, RN  Outcome: Progressing  4/2/2025 1825 by Justus Foote, RN  Outcome: Not Progressing  Intervention: Minimize Safety Risk  Recent Flowsheet Documentation  Taken 4/2/2025 1600 by Justus Foote, LUCAS  Trust Relationship/Rapport:   care explained   choices provided   questions  answered  Goal: Improved Attention and Thought Clarity  4/2/2025 1846 by Justus Foote, RN  Outcome: Progressing  4/2/2025 1825 by Justus Foote, RN  Outcome: Not Progressing  Goal: Improved Sleep  4/2/2025 1846 by Justus Foote, RN  Outcome: Progressing  4/2/2025 1825 by Justus Foote, RN  Outcome: Not Progressing     Problem: Violence Risk or Actual  Goal: Anger and Impulse Control  4/2/2025 1846 by Justus Foote, RN  Outcome: Progressing  4/2/2025 1825 by Justus Foote, RN  Outcome: Not Progressing  Intervention: Promote Self-Control  Recent Flowsheet Documentation  Taken 4/2/2025 1600 by Justus Foote, RN  Supportive Measures:   active listening utilized   self-care encouraged   positive reinforcement provided

## 2025-04-02 NOTE — PROGRESS NOTES
[facility]     CONSULT PSYCHIATRY  FOLLOW UP NOTE     DATE OF SERVICE   04/02/2025       IDENTIFICATION   Harpreet Mcelroy  Age: 69 year old  MRN# [id number]   YOB: 1955  Length of Stay:  2        CHIEF COMPLAINT   Court Hold       SUBJECTIVE   The patient's care was discussed with primary treatment team directly and patient's electronic chart notes were reviewed.      Staff report patient did not report any acute medical concerns or side effects.     Behavioral issues overnight, including verbally aggressive/inappropriate behaviors toward staff.  Associated refusal with cares, medications, despite efforts of redirection.  Patient did not require seclusion or restraints. Patient is exhibiting signs or symptoms of psychosis or jaki. Patient did not endorse suicidal ideation. Patient did not endorse homicide ideation.     Patient is not medication adherent. Patient is not assigned a sitter. Patient is sleeping. Patient is eating.    Attending Interval History:  Patient much more interactive and cooperative today.     DEC Report:  Court hold received on 4/2.  On 4/4, exam and primary hearing pending.  Final hearing is scheduled on 4/9.    C/L Psychiatry last treatment plan (3/31):  03/31/2025:  Medication Changes:    NONE:  Medications optimized on 3/28.  Continue Medications:  Zyprexa: Continue PTA Zyprexa 7.5 mg every morning, q. noon, and 25 mg at bedtime, psychosis/mood.  Cogentin: Continue PTA Cogentin 2 mg nightly, EPSE.  Lamictal: Continue increase of PTA lamotrigine 100 mg daily to lamotrigine 150 mg daily.  No report of issues of adherence reported prior to admission from [facility].  BuSpar: Continue increase of PTA BuSpar 15 mg twice daily to 20 mg twice daily, anxiety.  Add as needed BuSpar 20 mg daily, anxiety.  PRN Melatonin: Continue PTA as needed melatonin 3 mg at bedtime, sleep.  PRN Atarax: Continue PTA Atarax 10 mg twice daily.  Add as needed Atarax 10 mg 3 times  daily, anxiety.  PRN Risperdal ODT:  Continue as needed Risperdal ODT 1 mg bid, mild to moderate agitation.  PRN Haldol IM:  Continue as needed Haldol IM 5 mg bid, severe agitation  Other:  Legal:   3/28 (0309):  Placed on a 72-hour hold.  3/28:  Filed [facility] Petition for Commitment with [facility].   3/31:  Petition re-filed through [facility].  Placement:  IP  Admission.  ADDENDUM (3/31 @1400): Meets exclusion criteria for IP[facility] -->  Plan to continue court process with efforts of stabilization, then return to [facility] (PTA Placement).  Precautions placed:  Routine as per ED; Delirium; Sexual.      Review of notes and/or information:  I personally reviewed notes from the patient's electronic chart since last psychiatry consult dated 4/2, most recent visit, and other interim reports. This provided me with information regarding patient's recent clinical course.     I personally reviewed the patient's chart, including available medication list and progression of hospital course.       OBJECTIVE   Upon interview, the patient is not cooperative on approach.  Visit performed in patient's room on the unit.  Patient is not voluntary.    INTERVAL HISTORY:  Chart reviewed.  Patient seen as follow-up after last consult in ED on 3/31.  Patient admitted to medicine service after efforts to coordinate MI petition for commitment and medications optimized, as patient presented after behaviors of agitation and decompensation demonstrated at group home.  Goal to stabilize, received through court process, then discharge back to group home.    Today, follow-up attempted.  Patient described as improved as per hospitalist, and presentation is consistent.  However, patient continues to demonstrate behaviors of verbal aggression and inappropriate behaviors toward staff.  Patient is calmly sitting in bed eating breakfast during evaluation attempt, but does not engage meaningfully with demands for provider to  leave.  Unable to assess meaningfully.  Patient made aware of treatment plan to include legal update, but patient does not accept.    Information gathered by means of chart review, nursing/physician/DEC report and patient observation.    Suicidal ideation: denies current or recent suicidal ideation or behaviors.    Homicidal ideation: denies current or recent homicidal ideation or behaviors.    Psychotic symptoms: Disorganized.  Perseverates on negativism and perceptions with associated behaviors.  Associated agitation is decreased and is improving, but target behaviors remain.    Medication side effects reported: No significant side effects.  Restarted on PTA medications with adjustments by titration to PTA BuSpar and lamotrigine.    Acute medical concerns: None    Other issues reported by patient: No concerns expressed.  Demanded provider to leave.  Patient had no further questions or concerns.         MEDICATIONS   Medications:  Scheduled Meds:  Current Facility-Administered Medications   Medication Dose Route Frequency Provider Last Rate Last Admin    artificial tears ophthalmic ointment   Left Eye At Bedtime Mary Martinez DO   Given at 03/31/25 2103    benztropine (COGENTIN) tablet 2 mg  2 mg Oral At Bedtime Aisha Velez MD   2 mg at 03/31/25 2103    busPIRone (BUSPAR) tablet 20 mg  20 mg Oral BID Arnel Peres MD   20 mg at 04/02/25 0813    hydrOXYzine HCl (ATARAX) tablet 10 mg  10 mg Oral BID Lisseth Harris MD   10 mg at 04/02/25 0815    lamoTRIgine (LaMICtal) tablet 150 mg  150 mg Oral Daily Arnel Peres MD   150 mg at 04/02/25 0812    Lidocaine (LIDOCARE) 4 % Patch 1 patch  1 patch Transdermal Q24H Panfilo Damon DO   1 patch at 04/02/25 0814    melatonin tablet 3 mg  3 mg Oral At Bedtime Aisha Velze MD   3 mg at 03/31/25 2102    OLANZapine (zyPREXA) tablet 25 mg  25 mg Oral At Bedtime Arnel Peres MD   25 mg at 03/31/25 2102    OLANZapine  (zyPREXA) tablet 7.5 mg  7.5 mg Oral BID Lisseth Harris MD   7.5 mg at 04/02/25 0813    sodium chloride (PF) 0.9% PF flush 3 mL  3 mL Intracatheter Q8H MEIR Mary Martinez DO   3 mL at 03/31/25 2103    tamsulosin (FLOMAX) capsule 0.4 mg  0.4 mg Oral Daily Lisseth Harris MD   0.4 mg at 04/02/25 0813     Continuous Infusions:  Current Facility-Administered Medications   Medication Dose Route Frequency Provider Last Rate Last Admin     PRN Meds:.  Current Facility-Administered Medications   Medication Dose Route Frequency Provider Last Rate Last Admin    alum & mag hydroxide-simethicone (MAALOX) suspension 30 mL  30 mL Oral Q6H PRN Mary Martinez DO   30 mL at 04/02/25 0949    busPIRone (BUSPAR) tablet 20 mg  20 mg Oral Daily PRN Arnel Peres MD        calcium carbonate (TUMS) chewable tablet 1,000 mg  1,000 mg Oral 4x Daily PRN Mary Martinez DO        diclofenac (VOLTAREN) 1 % topical gel 2 g  2 g Topical 4x Daily PRN Panfilo Damon DO   2 g at 04/01/25 0537    haloperidol lactate (HALDOL) injection 5 mg  5 mg Intramuscular BID PRN Arnel Peres MD        hydrOXYzine HCl (ATARAX) tablet 10 mg  10 mg Oral TID PRN Arnel Peres MD   10 mg at 03/31/25 0827    ibuprofen (ADVIL/MOTRIN) tablet 400 mg  400 mg Oral Q8H PRN Kendrick Alex MD        lidocaine (LMX4) cream   Topical Q1H PRN Mary Martinez DO        lidocaine 1 % 0.1-1 mL  0.1-1 mL Other Q1H PRN Mary Martinez DO        midazolam (VERSED) injection 1 mg  1 mg Intravenous Once PRN Aisha Velez MD        ondansetron (ZOFRAN ODT) ODT tab 4 mg  4 mg Oral Q6H PRN Mary Martinez DO        Or    ondansetron (ZOFRAN) injection 4 mg  4 mg Intravenous Q6H PRN Mary Martinez DO        oxyBUTYnin (DITROPAN) half-tab 2.5 mg  2.5 mg Oral TID PRN Lisseth Harris MD        polyethylene glycol (MIRALAX) Packet 17 g  17 g Oral BID PRN Mary Martinez DO        prochlorperazine  (COMPAZINE) injection 5 mg  5 mg Intravenous Q6H PRN Mary Martinez DO        Or    prochlorperazine (COMPAZINE) tablet 5 mg  5 mg Oral Q6H PRN Mary Martinez DO        risperiDONE (risperDAL M-TABS) ODT tab 1 mg  1 mg Oral BID PRN Arnel Peres MD   1 mg at 03/30/25 1455    senna-docusate (SENOKOT-S/PERICOLACE) 8.6-50 MG per tablet 1 tablet  1 tablet Oral BID PRN Mary Martinez DO        Or    senna-docusate (SENOKOT-S/PERICOLACE) 8.6-50 MG per tablet 2 tablet  2 tablet Oral BID PRN Mary Martinez DO        simethicone (MYLICON) chewable half-tab 40 mg  40 mg Oral Q6H PRN Kendrick Alex MD        sodium chloride (PF) 0.9% PF flush 3 mL  3 mL Intracatheter q1 min prn Mary Martinez DO         Medication adherence issues: MS Med Adherence Y/N: Y  Medication side effects: MEDICATION SIDE EFFECTS: no side effects reported  Benefit: Yes / No: Yes, partial       ROS   The 10 point Review of Systems is negative other than noted in the HPI or here.       MENTAL STATUS EXAM   Vitals: BP (!) 155/75 (BP Location: Right arm)   Pulse 68   Temp (!) 96.7  F (35.9  C) (Temporal)   Resp 16   SpO2 99%   Weight:   0 lbs 0 oz    There is no height or weight on file to calculate BMI.  There were no vitals filed for this visit.    Appearance: Uncooperative  Mood:  Mood: Irritable   Affect: labile  was congruent to speech  Suicidal Ideation: PRESENT / ABSENT: absent   Homicidal Ideation: PRESENT / ABSENT: absent   Thought process: disorganized   Thought content: significant for preoccupations.   Fund of Knowledge: Below average  Attention/Concentration: Poor  Language ability:  Baseline slurred speech  Memory:  Impaired  Insight:  limited.  Judgement: limited  Orientation: Person:  yes  Psychomotor Behavior: restless    Muscle Strength and Tone: MuscleStrength: Normal  Gait and Station:  In bed       LABS   personally reviewed.   Temp: (!) 96.7  F (35.9  C) Temp src: Temporal BP: (!)  "155/75 Pulse: 68   Resp: 16 SpO2: 99 % O2 Device: None (Room air)        Estimated body mass index is 24.19 kg/m  as calculated from the following:    Height as of 7/23/23: 1.753 m (5' 9\").    Weight as of 6/6/24: 74.3 kg (163 lb 12.8 oz).    Recent Results (from the past 48 hours)   Basic metabolic panel    Collection Time: 04/01/25  9:50 PM   Result Value Ref Range    Sodium 140 135 - 145 mmol/L    Potassium 4.2 3.4 - 5.3 mmol/L    Chloride 105 98 - 107 mmol/L    Carbon Dioxide (CO2) 24 22 - 29 mmol/L    Anion Gap 11 7 - 15 mmol/L    Urea Nitrogen 26.4 (H) 8.0 - 23.0 mg/dL    Creatinine 1.10 0.67 - 1.17 mg/dL    GFR Estimate 73 >60 mL/min/1.73m2    Calcium 9.2 8.8 - 10.4 mg/dL    Glucose 162 (H) 70 - 99 mg/dL   Extra Purple Top EDTA (LAB USE ONLY)    Collection Time: 04/01/25  9:50 PM   Result Value Ref Range    Hold Specimen [facility]    Basic metabolic panel    Collection Time: 04/02/25  5:53 AM   Result Value Ref Range    Sodium 142 135 - 145 mmol/L    Potassium 4.3 3.4 - 5.3 mmol/L    Chloride 106 98 - 107 mmol/L    Carbon Dioxide (CO2) 24 22 - 29 mmol/L    Anion Gap 12 7 - 15 mmol/L    Urea Nitrogen 26.5 (H) 8.0 - 23.0 mg/dL    Creatinine 1.05 0.67 - 1.17 mg/dL    GFR Estimate 77 >60 mL/min/1.73m2    Calcium 9.7 8.8 - 10.4 mg/dL    Glucose 103 (H) 70 - 99 mg/dL   Extra Purple Top EDTA (LAB USE ONLY)    Collection Time: 04/02/25  5:53 AM   Result Value Ref Range    Hold Specimen JI      Qtc: 498    No results found for: \"PHENYTOIN\", \"PHENOBARB\", \"VALPROATE\", \"CBMZ\"       DIAGNOSIS   Principal Problem:    Schizoaffective disorder, bipolar type (H)    Active Problem List:  Patient Active Problem List   Diagnosis    Carol (H)    Acute UTI    Other hydronephrosis    Slurred speech    Falls frequently    Mood disorder with psychosis    Agitation    Anxiety    Schizoaffective disorder, bipolar type (H)    Hx of schizophrenia    Aggressive behavior    ROBBIN (acute kidney injury)          ASSESSMENT/PLAN   Today's " Changes-04/02/2025:  Medication Changes:    NONE  Continue Medications:  Zyprexa: Continue PTA Zyprexa 7.5 mg every morning, q. noon, and 25 mg at bedtime, psychosis/mood.  Cogentin: Continue PTA Cogentin 2 mg nightly, EPSE.  Lamictal: Continue increase of PTA lamotrigine 100 mg daily to lamotrigine 150 mg daily.  No report of issues of adherence reported prior to admission from [facility].  BuSpar: Continue increase of PTA BuSpar 15 mg twice daily to 20 mg twice daily, anxiety.  Add as needed BuSpar 20 mg daily, anxiety.  Atarax: Continue PTA Atarax 10 mg twice daily.    PRN Melatonin: Continue PTA as needed melatonin 3 mg at bedtime, sleep.  Atarax: Continue PTA Atarax 10 mg twice daily.    PRN Atarax:  Continue as needed Atarax 10 mg 3 times daily, anxiety.  PRN Risperdal ODT:  Continue as needed Risperdal ODT 1 mg bid, mild to moderate agitation.  PRN Haldol IM:  Continue as needed Haldol IM 5 mg bid, severe agitation  Other:  Legal:   3/28 (0309):  Placed on a 72-hour hold.  3/28:  Filed [facility] Petition for Commitment with [facility].   3/31:  Petition re-filed through [facility] --> Supported  4/02:  Court Hold (updated orders)  4/04:  Exam/Preliminary Hearing  4/09:  Final if indicated.  RECOMMENDATION:  Full MI Commitment with [facility]  Placement: Continue court process and  stabilization, then return to [facility] (PTA Placement) once completed.  Precautions placed:  Routine as per ED; Delirium; Sexual.  Please also refer to RN/team documentation for additional details.     Acute Medical Problems and Treatments:  .  Acute medical concerns:  No acute medical concerns.     Care Coordination:  Treatment plan discussed with staff.  Please reconsult Psychiatry as needed (off service on 4/3); plan to follow up on 4/4.        Risk Assessment: [facility] RISK ASSESSMENT: Patient on precautions    Coordination of Care:   Treatment Plan reviewed, Care discussed with Care/Treatment Team Members, Chart  reviewed and Patient seen         Arnel Peres MD    -     04/02/2025  -     1:10 PM        This note was created with help of Dragon dictation system. Grammatical / typing errors are not intentional.        Arnel Peres MD  Consult/Liaison Psychiatry   [facility]  Securely message with Shi

## 2025-04-02 NOTE — ED NOTES
"Called [facility] and confirmed notice of hearing and court hold were signed by the . Court Hold received 4/2/2025.    EXAMINATION  Examiner: Chloe Hardy PhD  Or Designee of Psychological Services  Exam Date: April 4, 2025  Time: 10:30 AM  Place: [address] [facility] [address] [place], check daily  calendar by elevators for specific courtroom  assignment.    PRELIMINARY HEARING/  SETTLEMENT CONFERENCE  The preliminary hearing and settlement conference  follow the examination. The purpose is to see if the  parties can agree on a treatment plan.  IF THEY CAN, THE CASE MAY END ON  THIS DATE WITHOUT FURTHER COURT  HEARINGS.    HEARING  Date: April 9, 2025  Time: to be determined by attys if necessary  UNLESS OTHERWISE ORDERED BY THE  COURT  Place: [address] [facility]  [address] [place] [address]      MAGO Delgadillo   Extended Care Coordinator   [phone]   "

## 2025-04-02 NOTE — PLAN OF CARE
"Goal Outcome Evaluation:      Plan of Care Reviewed With: patient    Overall Patient Progress: no changeOverall Patient Progress: no change    Outcome Evaluation: VSS on RA. AOx4. Pt uses very addressed me and other staff using very vulgar, sexual, and innapropriate words. Pt refuses meds and other cares despite being calmly asked multiple times. Refuses to listen to education and swears, asking me to leave the room. I complied with the Pts repeated, vehement requests.      Problem: Adult Inpatient Plan of Care  Goal: Plan of Care Review  Description: The Plan of Care Review/Shift note should be completed every shift.  The Outcome Evaluation is a brief statement about your assessment that the patient is improving, declining, or no change.  This information will be displayed automatically on your shiftnote.  Outcome: Progressing  Flowsheets (Taken 4/1/2025 2308)  Outcome Evaluation: VSS on RA. AOx4. Pt uses very addressed me and other staff using very vulgar, sexual, and innapropriate words. Pt refuses meds and other cares despite being calmly asked multiple times. Refuses to listen to education and swears, asking me to leave the room. I complied with the Pts repeated, vehement requests.  Plan of Care Reviewed With: patient  Overall Patient Progress: no change  Goal: Patient-Specific Goal (Individualized)  Description: You can add care plan individualizations to a care plan. Examples of Individualization might be:  \"Parent requests to be called daily at 9am for status\", \"I have a hard time hearing out of my right ear\", or \"Do not touch me to wake me up as it startlesme\".  Outcome: Progressing  Goal: Absence of Hospital-Acquired Illness or Injury  Outcome: Progressing  Intervention: Identify and Manage Fall Risk  Recent Flowsheet Documentation  Taken 4/1/2025 2000 by Flavio Herrera, RN  Safety Promotion/Fall Prevention:   activity supervised   clutter free environment maintained   increased rounding and " observation   increase visualization of patient   nonskid shoes/slippers when out of bed   patient and family education   room near nurse's station   room door open   room organization consistent   safety round/check completed  Intervention: Prevent Skin Injury  Recent Flowsheet Documentation  Taken 4/1/2025 2000 by Flavio Herrera, RN  Body Position: position changed independently  Intervention: Prevent and Manage VTE (Venous Thromboembolism) Risk  Recent Flowsheet Documentation  Taken 4/1/2025 2000 by Flavio Herrera RN  VTE Prevention/Management: SCDs on (sequential compression devices)  Intervention: Prevent Infection  Recent Flowsheet Documentation  Taken 4/1/2025 2000 by Flavio Herrera, RN  Infection Prevention:   single patient room provided   rest/sleep promoted  Goal: Optimal Comfort and Wellbeing  Outcome: Progressing  Intervention: Provide Person-Centered Care  Recent Flowsheet Documentation  Taken 4/1/2025 2000 by Flavio Herrera RN  Trust Relationship/Rapport:   care explained   choices provided   empathic listening provided   reassurance provided  Goal: Readiness for Transition of Care  Outcome: Progressing

## 2025-04-02 NOTE — PROGRESS NOTES
[facility]  Hospitalist Progress Note  Kendrick Alex MD 04/02/2025    Reason for Stay (Diagnosis): agitation         Assessment and Plan:      Summary of Stay: Harpreet Mcelroy is a 69 year old male with PMH of bipolar, anxiety, depression, schizophrenia, BPD, paranoia, bladder cancer s/p ureteral stent, HTN, HLD, who presented on 3/27/2025 with decompensated schizophrenia and ROBBIN. He was admitted by the medicine service on 3/31/2025 after he was declined by inpatient psych.     Presented from his facility with agitation and attempting to harm staff. On arrival, afebrile, slightly tachycardic but otherwise hemodynamically stable.      Labs notable for Cr 1.42, WBC 8.5, Hgb 11.9, plt 247, electrolytes and LFTs in normal limits, CT head with no acute pathology. CT AP with stable severe bilateral hydronephrosis. UA with large LE but negative nitrites and culture with mixture of urogenital piper.      Has continued to be aggressive and inappropriate with staff. Placed on a 72 hr hold on 3/28/25. Psychiatry consulted and filed for commitment with junior. He is unfortunately total cares so inpatient psych is unable to manage him. Plan to admit here while psychiatry optimizes him to return back to his facility ([facility]).     Update on 4/2: Patient seems to be much more cooperative with staff this morning.  He was interactive with me.  He allowed me to examine him today, which he did not permit yesterday.     Plans today:  -No significant changes made to care plan today  -Psychiatry following and will allow them to adjust antipsychotics and other psychiatric medications as per their discretion     ROBBIN  - resolved     Decompensated schizophrenia  Bipolar  Anxiety  Depression  -Psychiatry following.  Will leave adjustment of antipsychotic and psychiatric medications to psychiatry    Dysarthric speech  -Patient can be challenging to understand at times     Bladder cancer   Chronic L ureteral  stent  Stent last changed 2/4/25 by [facility]rology Dr. Whitt.  - PTA oxybutynin and flomax        Diet: Mechanical/Dental Soft Diet    DVT Prophylaxis: Pneumatic Compression Devices  Hernandez Catheter: Not present  Lines: None     Cardiac Monitoring: None  Code Status:  full  Discharge Dispo: [facility] facility  Medically Ready for Discharge: Anticipated in 1-2 Days after further stabilization of his agitation and psychosis           Interval History (Subjective):      Patient much more interactive and cooperative today.                  Physical Exam:      Last Vital Signs:  BP (!) 155/75 (BP Location: Right arm)   Pulse 68   Temp (!) 96.7  F (35.9  C) (Temporal)   Resp 16   SpO2 99%       Intake/Output Summary (Last 24 hours) at 4/2/2025 1015  Last data filed at 4/2/2025 1000  Gross per 24 hour   Intake 780 ml   Output --   Net 780 ml       Constitutional: Awake, alert, cooperative, no apparent distress     Respiratory: Clear to auscultation bilaterally, no crackles or wheezing   Cardiovascular: Regular rate and rhythm, normal S1 and S2, and no murmur noted   Abdomen: Normal bowel sounds, soft, non-distended, non-tender   Skin: No rashes, no cyanosis, dry to touch   Neuro: Alert and oriented x3, no focal weakness.  Dysarthria with difficult to understand speech   Extremities: No edema, normal range of motion   Other(s):        All other systems: Negative          Medications:      All current medications were reviewed with changes reflected in problem list.         Data:      All new lab and imaging data was reviewed.   Labs:       Lab Results   Component Value Date     04/02/2025     04/01/2025     03/27/2025     07/12/2020     01/26/2020    Lab Results   Component Value Date    CHLORIDE 106 04/02/2025    CHLORIDE 105 04/01/2025    CHLORIDE 106 03/27/2025    CHLORIDE 107 05/26/2022    CHLORIDE 99 07/14/2020    CHLORIDE 106 07/12/2020    CHLORIDE 107 01/26/2020    Lab Results   Component  Value Date    BUN 26.5 04/02/2025    BUN 26.4 04/01/2025    BUN 29.0 03/27/2025    BUN 19 05/26/2022    BUN 13 07/14/2020    BUN 14 07/12/2020    BUN 18 01/26/2020      Lab Results   Component Value Date    POTASSIUM 4.3 04/02/2025    POTASSIUM 4.2 04/01/2025    POTASSIUM 3.8 03/27/2025    POTASSIUM 4.3 05/26/2022    POTASSIUM 3.5 07/14/2020    POTASSIUM 3.7 07/12/2020    POTASSIUM 4.1 01/26/2020    Lab Results   Component Value Date    CO2 24 04/02/2025    CO2 24 04/01/2025    CO2 21 03/27/2025    CO2 29 05/26/2022    CO2 27 07/14/2020    CO2 27 07/12/2020    CO2 29 01/26/2020    Lab Results   Component Value Date    CR 1.05 04/02/2025    CR 1.10 04/01/2025    CR 1.42 03/27/2025    CR 0.77 07/12/2020    CR 0.67 01/26/2020        Recent Labs   Lab 03/27/25  1334   WBC 8.5   HGB 11.9*   HCT 36.3*   MCV 89         Imaging:   No results found for this or any previous visit (from the past 24 hours).

## 2025-04-02 NOTE — PLAN OF CARE
"Goal Outcome Evaluation:      Plan of Care Reviewed With: patient    Overall Patient Progress: improvingOverall Patient Progress: improving    Outcome Evaluation: Discharge to LTC - date TBD    Pleasant and compliant with all medication and cares  Loss of PIV access  Incontinent of bowel and bladder  Potential beginings of decubitus ulcers x4 - WOC consult obtained  C/O indigestion and abdominal cramping - maloxx and simethicone   One episode of paranoia when culinary delivered tray. This writer re approached pt and was able to deescalate paranoid behavior      Problem: Adult Inpatient Plan of Care  Goal: Plan of Care Review  Description: The Plan of Care Review/Shift note should be completed every shift.  The Outcome Evaluation is a brief statement about your assessment that the patient is improving, declining, or no change.  This information will be displayed automatically on your shiftnote.  Outcome: Progressing  Flowsheets (Taken 4/2/2025 1449)  Outcome Evaluation: Discharge to LTC - date TBD  Plan of Care Reviewed With: patient  Overall Patient Progress: improving  Goal: Patient-Specific Goal (Individualized)  Description: You can add care plan individualizations to a care plan. Examples of Individualization might be:  \"Parent requests to be called daily at 9am for status\", \"I have a hard time hearing out of my right ear\", or \"Do not touch me to wake me up as it startlesme\".  Outcome: Progressing  Goal: Absence of Hospital-Acquired Illness or Injury  Outcome: Progressing  Intervention: Identify and Manage Fall Risk  Recent Flowsheet Documentation  Taken 4/2/2025 0800 by Reyes O'Connor, Bridget M, RN  Safety Promotion/Fall Prevention:   activity supervised   assistive device/personal items within reach   clutter free environment maintained   increased rounding and observation   increase visualization of patient   lighting adjusted   mobility aid in reach   nonskid shoes/slippers when out of bed   patient and " family education   room organization consistent   safety round/check completed   supervised activity  Intervention: Prevent Skin Injury  Recent Flowsheet Documentation  Taken 4/2/2025 0800 by Reyes O'Connor, Bridget M, RN  Body Position: position changed independently  Intervention: Prevent and Manage VTE (Venous Thromboembolism) Risk  Recent Flowsheet Documentation  Taken 4/2/2025 0800 by Reyes O'Connor, Bridget M, RN  VTE Prevention/Management: SCDs on (sequential compression devices)  Goal: Optimal Comfort and Wellbeing  Outcome: Progressing  Intervention: Monitor Pain and Promote Comfort  Recent Flowsheet Documentation  Taken 4/2/2025 0815 by Reyes O'Connor, Bridget M, RN  Pain Management Interventions: medication (see MAR)  Goal: Readiness for Transition of Care  Outcome: Progressing

## 2025-04-02 NOTE — PLAN OF CARE
"Goal Outcome Evaluation:      Plan of Care Reviewed With: patient    Overall Patient Progress: no changeOverall Patient Progress: no change    Outcome Evaluation: Discharge to LTC once stabilization of his agitation and psychosis is achieved.      Start of shift pt refused assessment and nurse in the room. No inappropriate language-pt just stated \"no\" and told to leave room. Later in shift, nurse was allowed to complete most of assessment and assist support with incontinence care (bm/urine) and repositioning. Speech is clear at times-mainly garbled. Able to make needs known. Room air mid 90s. Denies pain. Tolerating diet.         Problem: Adult Inpatient Plan of Care  Goal: Plan of Care Review  Description: The Plan of Care Review/Shift note should be completed every shift.  The Outcome Evaluation is a brief statement about your assessment that the patient is improving, declining, or no change.  This information will be displayed automatically on your shiftnote.  Outcome: Progressing  Flowsheets (Taken 4/2/2025 4454)  Outcome Evaluation: Discharge to LTC once stabilization of his agitation and psychosis is achieved.  Plan of Care Reviewed With: patient  Overall Patient Progress: no change  Goal: Patient-Specific Goal (Individualized)  Description: You can add care plan individualizations to a care plan. Examples of Individualization might be:  \"Parent requests to be called daily at 9am for status\", \"I have a hard time hearing out of my right ear\", or \"Do not touch me to wake me up as it startlesme\".  Outcome: Progressing  Goal: Absence of Hospital-Acquired Illness or Injury  Outcome: Progressing  Intervention: Identify and Manage Fall Risk  Recent Flowsheet Documentation  Taken 4/2/2025 0500 by Ligia Sanches RN  Safety Promotion/Fall Prevention:   activity supervised   assistive device/personal items within reach   room near nurse's station  Intervention: Prevent Skin Injury  Recent Flowsheet " Documentation  Taken 4/2/2025 0500 by Ligia Sanches RN  Body Position: turned  Intervention: Prevent and Manage VTE (Venous Thromboembolism) Risk  Recent Flowsheet Documentation  Taken 4/2/2025 0500 by Ligia Sanches RN  VTE Prevention/Management: SCDs on (sequential compression devices)  Intervention: Prevent Infection  Recent Flowsheet Documentation  Taken 4/2/2025 0500 by Ligia Sanches RN  Infection Prevention:   rest/sleep promoted   single patient room provided  Goal: Optimal Comfort and Wellbeing  Outcome: Progressing  Intervention: Provide Person-Centered Care  Recent Flowsheet Documentation  Taken 4/2/2025 0500 by Ligia Sanches RN  Trust Relationship/Rapport:   care explained   choices provided   questions answered  Goal: Readiness for Transition of Care  Outcome: Progressing     Problem: Delirium  Goal: Optimal Coping  Outcome: Progressing  Goal: Improved Behavioral Control  Outcome: Progressing  Intervention: Minimize Safety Risk  Recent Flowsheet Documentation  Taken 4/2/2025 0500 by Ligia Sanches RN  Enhanced Safety Measures:   pain management   room near unit station  Trust Relationship/Rapport:   care explained   choices provided   questions answered  Goal: Improved Attention and Thought Clarity  Outcome: Progressing  Goal: Improved Sleep  Outcome: Progressing     Problem: Violence Risk or Actual  Goal: Anger and Impulse Control  Outcome: Progressing  Intervention: Minimize Safety Risk  Recent Flowsheet Documentation  Taken 4/2/2025 0500 by Ligia Sanches RN  Enhanced Safety Measures:   pain management   room near unit station

## 2025-04-03 VITALS
TEMPERATURE: 97.1 F | DIASTOLIC BLOOD PRESSURE: 65 MMHG | SYSTOLIC BLOOD PRESSURE: 164 MMHG | HEART RATE: 73 BPM | RESPIRATION RATE: 18 BRPM | OXYGEN SATURATION: 98 %

## 2025-04-03 PROCEDURE — 120N000001 HC R&B MED SURG/OB

## 2025-04-03 PROCEDURE — 250N000013 HC RX MED GY IP 250 OP 250 PS 637: Performed by: EMERGENCY MEDICINE

## 2025-04-03 PROCEDURE — 99232 SBSQ HOSP IP/OBS MODERATE 35: CPT | Performed by: INTERNAL MEDICINE

## 2025-04-03 PROCEDURE — 250N000013 HC RX MED GY IP 250 OP 250 PS 637: Performed by: INTERNAL MEDICINE

## 2025-04-03 PROCEDURE — 250N000013 HC RX MED GY IP 250 OP 250 PS 637: Performed by: PSYCHIATRY & NEUROLOGY

## 2025-04-03 PROCEDURE — G0463 HOSPITAL OUTPT CLINIC VISIT: HCPCS

## 2025-04-03 RX ADMIN — Medication 3 MG: at 21:36

## 2025-04-03 RX ADMIN — MINERAL OIL, WHITE PETROLATUM: .03; .94 OINTMENT OPHTHALMIC at 21:42

## 2025-04-03 RX ADMIN — RISPERIDONE 1 MG: 0.5 TABLET, ORALLY DISINTEGRATING ORAL at 17:43

## 2025-04-03 RX ADMIN — HYDROXYZINE HYDROCHLORIDE 10 MG: 10 TABLET, FILM COATED ORAL at 08:56

## 2025-04-03 RX ADMIN — OLANZAPINE 7.5 MG: 5 TABLET, FILM COATED ORAL at 08:35

## 2025-04-03 RX ADMIN — HYDROXYZINE HYDROCHLORIDE 10 MG: 10 TABLET, FILM COATED ORAL at 16:06

## 2025-04-03 RX ADMIN — IBUPROFEN 400 MG: 400 TABLET, FILM COATED ORAL at 16:06

## 2025-04-03 RX ADMIN — IBUPROFEN 400 MG: 400 TABLET, FILM COATED ORAL at 08:35

## 2025-04-03 RX ADMIN — OLANZAPINE 7.5 MG: 5 TABLET, FILM COATED ORAL at 12:10

## 2025-04-03 RX ADMIN — OLANZAPINE 25 MG: 10 TABLET, FILM COATED ORAL at 21:36

## 2025-04-03 RX ADMIN — BUSPIRONE HYDROCHLORIDE 20 MG: 10 TABLET ORAL at 19:47

## 2025-04-03 RX ADMIN — BENZTROPINE MESYLATE 2 MG: 1 TABLET ORAL at 21:41

## 2025-04-03 RX ADMIN — BUSPIRONE HYDROCHLORIDE 20 MG: 10 TABLET ORAL at 08:36

## 2025-04-03 RX ADMIN — TAMSULOSIN HYDROCHLORIDE 0.4 MG: 0.4 CAPSULE ORAL at 08:35

## 2025-04-03 RX ADMIN — LAMOTRIGINE 150 MG: 150 TABLET ORAL at 08:45

## 2025-04-03 RX ADMIN — LIDOCAINE 1 PATCH: 4 PATCH TOPICAL at 08:37

## 2025-04-03 ASSESSMENT — ACTIVITIES OF DAILY LIVING (ADL)
ADLS_ACUITY_SCORE: 80
ADLS_ACUITY_SCORE: 80
ADLS_ACUITY_SCORE: 75
ADLS_ACUITY_SCORE: 75
ADLS_ACUITY_SCORE: 80
ADLS_ACUITY_SCORE: 80
ADLS_ACUITY_SCORE: 75
ADLS_ACUITY_SCORE: 80
ADLS_ACUITY_SCORE: 75
ADLS_ACUITY_SCORE: 80
ADLS_ACUITY_SCORE: 75
ADLS_ACUITY_SCORE: 75
ADLS_ACUITY_SCORE: 80
ADLS_ACUITY_SCORE: 75
ADLS_ACUITY_SCORE: 75
ADLS_ACUITY_SCORE: 80
ADLS_ACUITY_SCORE: 80

## 2025-04-03 NOTE — PROGRESS NOTES
IMPORTANT! COURT INFO FOR 4/4/2025    Name: Harpreet Duganjorge TALBOTREBEKAH Mcelroy  Station: [facility]  10:30 AM Exam: [email or web address]  Meeting ID:   Passcode: [email or web address]  11:30 AM Hearing: [email or web address]  Meeting ID:   Passcode: [email or web address]

## 2025-04-03 NOTE — CONSULTS
[facility] Nurse Inpatient Assessment     Consulted for: buttocks and thighs    Summary: Chart reviewed, patient had intermittent agitation.   buttocks: friction tears, blanchable erythema, dual incontinence  Thighs:friction tear  Scrotal erythema, MASD related to incontinence    [facility] nurse follow-up plan: weekly    Patient History (according to provider note(s):     69 year old male who came in with agitation from his facility. Was attempting to hit staff with a cane. Has been in the ER since 3/27 while psych was adjusting meds and seeing if he could go to inpatient psych. Unfortunately, he is total cares and inpatient psych is unable to take him. He will be admitted here while psych follows to stabilize him.   Upon my assessment, he was pleasant but mumbling and difficult to understand. Reported some neck discomfort, which he attributed to being in bed for the past few days. Also had some GI upset and requested ginger ale. Denied any physical complaints. Was hoping to talk with psychiatry. Mumbled a lot more than those few requests but unclear what he was saying. Calm and cooperative throughout conversation.       Assessment:      Areas visualized during today's visit: Focused:, Sacrum/coccyx, and thighs and buttock    Wound location: left posterior thigh, buttocks     Buttocks/scrotum     Left thigh    Last photo: 4/3/25  Wound due to: Friction, Incontinence Associated Dermatitis (IAD), and Moisture Associated Skin Damage (MASD)  Wound history/plan of care: per chart review, patient had intermittent agitation. Presently has dual incontinence. Left posterior thigh and buttocks with blanchable redness and friction tears, superficial epidermal skin loss. No pain present upon palpation. Elevated risk for continued skin breakdown related to intermittent agitation mixed with moisture from incontinence and limited mobility  Wound base: left thigh: 100% Blanchable , Erythema, and Epidermis,  "buttocks 100 % Blanchable , Erythema, and Epidermis     Palpation of the wound bed: normal      Drainage: none     Description of drainage: none     Measurements (length x width x depth, in cm): left thigh: 1.1  x 3  x  0.1 cm, buttocks: both areas approx 1 x 1.5 x 0.1cm     Tunneling: N/A     Undermining: N/A  Periwound skin: Intact and Erythema- blanchable      Color: normal and consistent with surrounding tissue      Temperature: normal   Odor: none  Pain: denies , none  Pain interventions prior to dressing change: patient tolerated well, no significant pain present , soaking, and slow and gentle cares   Treatment goal: Heal , Infection control/prevention, Decrease moisture, Maintain (prevention of deterioration), and Protection  STATUS: initial assessment  Supplies ordered: at bedside, supplies stored on unit, discussed with RN, and discussed with patient           Treatment Plan:     Buttocks, scrotum and posterior left thigh BID and PRN  Cleanse the area with Wicho cleanse and protect, very gently with soft cloth.  Apply thin layer of critic aid paste on top of it.  With repeat application, do not scrub the paste, only remove soiled paste and reapply.  Ensure pt has José-cushion (#741117) while sitting up in the chair.  Use only one Covidien pad in between mattress and pt. No brief while in bed.      Pressure Injury Prevention (PIP) Plan:  If patient is declining pressure injury prevention interventions: Explore reason why and address patient's concerns, Educate on pressure injury risk and prevention intervention(s), If patient is still declining, document \"informed refusal\" , and Ensure Care team is aware ( provider, charge nurse, etc)  Mattress: Follow bed algorithm, add Low Air Loss (Air+) mattress pump if skin is very moist or constantly moist.   HOB: Maintain at or below 30 degrees, unless contraindicated  Repositioning in bed: Every 1-2 hours , Left/right positioning; avoid supine, and Raise foot of bed " prior to raising head of bed, to reduce patient sliding down (shear)  Heels: Keep elevated off mattress and Pillows under calves  Protective Dressing: Sacral Mepilex for prevention (#855345),  especially for the agitated patient   Positioning Equipment:None  Chair positioning: Chair cushion (#127984) , Assist patient to reposition hourly, and Do NOT use a donut for sitting (this increases pressure to smaller area and creates a higher potential for injury)    If patient has a buttock pressure injury, or high risk for PI use chair cushion or SPS.  Moisture Management: Perineal cleansing /protection: Follow Incontinence Protocol, Avoid brief in bed, Clean and dry skin folds with bathing , Consider InterDry (#322547) between folds, and Moisturize dry skin  Under Devices: Inspect skin under all medical devices during skin inspection , Ensure tubes are stabilized without tension, and Ensure patient is not lying on medical devices or equipment when repositioned  Ask provider to discontinue device when no longer needed.      Orders: Written    RECOMMEND PRIMARY TEAM ORDER: None, at this time  Education provided: importance of repositioning, plan of care, wound progress, Infection prevention , Moisture management, Hygiene, and Off-loading pressure  Discussed plan of care with: Patient  Notify WOC if wound(s) deteriorate.  Nursing to notify the Provider(s) and re-consult the WOC Nurse if new skin concern.    DATA:     Current support surface: Standard  Standard gel mattress (Isoflex)  Containment of urine/stool: Incontinence Protocol and Incontinent pad in bed  BMI: There is no height or weight on file to calculate BMI.   Active diet order: Orders Placed This Encounter      Mechanical/Dental Soft Diet     Output: I/O last 3 completed shifts:  In: 180 [P.O.:180]  Out: -      Labs:   Recent Labs   Lab 03/27/25  1334   ALBUMIN 4.2   HGB 11.9*   WBC 8.5     Pressure injury risk assessment:   Sensory Perception: 3-->slightly  limited  Moisture: 3-->occasionally moist  Activity: 2-->chairfast  Mobility: 3-->slightly limited  Nutrition: 3-->adequate  Friction and Shear: 2-->potential problem  Obie Score: 16    Ligia Perez RN, BSN, CWOCN   Pager no longer is use, please contact through [place] (by [employer]) group: [facility] Nurse [place]  Dept. Office Number: [phone]

## 2025-04-03 NOTE — PROGRESS NOTES
[facility]  Hospitalist Progress Note  Kendrick Alex MD 04/03/2025    Reason for Stay (Diagnosis): agitation, schizophrenia, bipolar         Assessment and Plan:      Summary of Stay: Harpreet Mcelroy is a 69 year old male with PMH of bipolar, anxiety, depression, schizophrenia, BPD, paranoia, bladder cancer s/p ureteral stent, HTN, HLD, who presented on 3/27/2025 with decompensated schizophrenia and ROBBIN. He was admitted by the medicine service on 3/31/2025 after he was declined by inpatient psych.     Presented from his facility with agitation and attempting to harm staff. On arrival, afebrile, slightly tachycardic but otherwise hemodynamically stable.      Labs notable for Cr 1.42, WBC 8.5, Hgb 11.9, plt 247, electrolytes and LFTs in normal limits, CT head with no acute pathology. CT AP with stable severe bilateral hydronephrosis. UA with large LE but negative nitrites and culture with mixture of urogenital piper.      Has continued to be aggressive and inappropriate with staff. Placed on a 72 hr hold on 3/28/25. Psychiatry consulted and filed for commitment with junior. He is unfortunately total cares so inpatient psych is unable to manage him. Plan to admit here while psychiatry optimizes him to return back to his facility ([facility]).  Has a preliminary court hearing on 4/4/25     Plans today:  -No significant changes made to care plan today  -Psychiatry following and will allow them to adjust antipsychotics and other psychiatric medications as per their discretion     ROBBIN  - resolved     Decompensated schizophrenia  Bipolar  Anxiety  Depression  -Psychiatry following.  Will leave adjustment of antipsychotic and psychiatric medications to psychiatry     Dysarthric speech  -Patient can be challenging to understand at times     Bladder cancer   Chronic L ureteral stent  Stent last changed 2/4/25 by [facility] Dr. Whitt.  - PTA oxybutynin and flomax        Diet: Mechanical/Dental  Soft Diet    DVT Prophylaxis: Pneumatic Compression Devices  Hernandez Catheter: Not present  Lines: None     Cardiac Monitoring: None  Code Status:  full  Discharge Dispo: LTC facility  Medically Ready for Discharge: Anticipated after court hearings completed (prelim scheduled on 4/4 and final hearing - if needed - on 4/9) and further stabilization of his agitation and psychosis           Interval History (Subjective):      Slightly agitated this morning, but allowed my visit and allowed me to examine him today.  Difficult understand due to chronic dysarthria                  Physical Exam:      Last Vital Signs:  BP (!) 155/75 (BP Location: Right arm)   Pulse 68   Temp (!) 96.7  F (35.9  C) (Temporal)   Resp 16   SpO2 99%     No intake or output data in the 24 hours ending 04/03/25 1046    Constitutional: Awake, alert, cooperative, no apparent distress     Respiratory: Clear to auscultation bilaterally, no crackles or wheezing   Cardiovascular: Regular rate and rhythm, normal S1 and S2, and no murmur noted   Abdomen: Normal bowel sounds, soft, non-distended, non-tender   Skin: No rashes, no cyanosis, dry to touch   Neuro: Alert and awake, no weakness, only mildly agitated   Extremities: No edema, normal range of motion   Other(s):        All other systems: Negative          Medications:      All current medications were reviewed with changes reflected in problem list.         Data:      All new lab and imaging data was reviewed.   Labs:       Lab Results   Component Value Date     04/02/2025     04/01/2025     03/27/2025     07/12/2020     01/26/2020    Lab Results   Component Value Date    CHLORIDE 106 04/02/2025    CHLORIDE 105 04/01/2025    CHLORIDE 106 03/27/2025    CHLORIDE 107 05/26/2022    CHLORIDE 99 07/14/2020    CHLORIDE 106 07/12/2020    CHLORIDE 107 01/26/2020    Lab Results   Component Value Date    BUN 26.5 04/02/2025    BUN 26.4 04/01/2025    BUN 29.0 03/27/2025    BUN  19 05/26/2022    BUN 13 07/14/2020    BUN 14 07/12/2020    BUN 18 01/26/2020      Lab Results   Component Value Date    POTASSIUM 4.3 04/02/2025    POTASSIUM 4.2 04/01/2025    POTASSIUM 3.8 03/27/2025    POTASSIUM 4.3 05/26/2022    POTASSIUM 3.5 07/14/2020    POTASSIUM 3.7 07/12/2020    POTASSIUM 4.1 01/26/2020    Lab Results   Component Value Date    CO2 24 04/02/2025    CO2 24 04/01/2025    CO2 21 03/27/2025    CO2 29 05/26/2022    CO2 27 07/14/2020    CO2 27 07/12/2020    CO2 29 01/26/2020    Lab Results   Component Value Date    CR 1.05 04/02/2025    CR 1.10 04/01/2025    CR 1.42 03/27/2025    CR 0.77 07/12/2020    CR 0.67 01/26/2020        Recent Labs   Lab 03/27/25  1334   WBC 8.5   HGB 11.9*   HCT 36.3*   MCV 89         Imaging:   No results found for this or any previous visit (from the past 24 hours).

## 2025-04-03 NOTE — PLAN OF CARE
"Goal Outcome Evaluation:      Plan of Care Reviewed With: patient    Overall Patient Progress: improvingOverall Patient Progress: improving    Outcome Evaluation: Pt refused vitals, repositioning. A&Ox2, disorientated to situation and place. Garbled speech. Tolerating mechanical diet well. Pt refused head to toe assessment. WOC consulted for wound on buttock and thighs. Incontinent of bowel and bladder. Plan is TBD.      Problem: Adult Inpatient Plan of Care  Goal: Plan of Care Review  Description: The Plan of Care Review/Shift note should be completed every shift.  The Outcome Evaluation is a brief statement about your assessment that the patient is improving, declining, or no change.  This information will be displayed automatically on your shiftnote.  Outcome: Progressing  Flowsheets (Taken 4/3/2025 1629)  Outcome Evaluation: Pt refused vitals, repositioning. A&Ox2, disorientated to situation and place. Garbled speech. Tolerating mechanical diet well. Pt refused head to toe assessment. WOC consulted for wound on buttock and thighs. Incontinent of bowel and bladder. Plan is TBD.  Plan of Care Reviewed With: patient  Overall Patient Progress: improving  Goal: Patient-Specific Goal (Individualized)  Description: You can add care plan individualizations to a care plan. Examples of Individualization might be:  \"Parent requests to be called daily at 9am for status\", \"I have a hard time hearing out of my right ear\", or \"Do not touch me to wake me up as it startlesme\".  Outcome: Progressing  Goal: Absence of Hospital-Acquired Illness or Injury  Outcome: Progressing  Intervention: Identify and Manage Fall Risk  Recent Flowsheet Documentation  Taken 4/3/2025 7014 by Eb Dennis, LUCAS  Safety Promotion/Fall Prevention:   activity supervised   assistive device/personal items within reach   clutter free environment maintained   increased rounding and observation   increase visualization of patient   nonskid shoes/slippers when " out of bed   patient and family education   room door open   room near nurse's station   safety round/check completed   supervised activity  Intervention: Prevent Skin Injury  Recent Flowsheet Documentation  Taken 4/3/2025 0835 by Eb Dennis RN  Body Position: position changed independently  Skin Protection: adhesive use limited  Intervention: Prevent Infection  Recent Flowsheet Documentation  Taken 4/3/2025 0835 by Eb Dennis RN  Infection Prevention:   hand hygiene promoted   rest/sleep promoted   single patient room provided  Goal: Optimal Comfort and Wellbeing  Outcome: Progressing  Intervention: Monitor Pain and Promote Comfort  Recent Flowsheet Documentation  Taken 4/3/2025 0835 by Eb Dennis RN  Pain Management Interventions:   medication (see MAR)   care clustered   distraction  Intervention: Provide Person-Centered Care  Recent Flowsheet Documentation  Taken 4/3/2025 0835 by Eb Dennis RN  Trust Relationship/Rapport:   care explained   choices provided   questions answered  Goal: Readiness for Transition of Care  Outcome: Progressing     Problem: Delirium  Goal: Optimal Coping  Outcome: Progressing  Intervention: Optimize Psychosocial Adjustment to Delirium  Recent Flowsheet Documentation  Taken 4/3/2025 0835 by Eb Dennis RN  Family/Support System Care: self-care encouraged  Goal: Improved Behavioral Control  Outcome: Progressing  Intervention: Minimize Safety Risk  Recent Flowsheet Documentation  Taken 4/3/2025 0835 by Eb Dennis RN  Enhanced Safety Measures:   pain management   patient/family teach back on injury risk   review medications for side effects with activity   room near unit station  Trust Relationship/Rapport:   care explained   choices provided   questions answered  Goal: Improved Attention and Thought Clarity  Outcome: Progressing  Goal: Improved Sleep  Outcome: Progressing     Problem: Violence Risk or Actual  Goal: Anger and Impulse Control  Outcome: Progressing  Intervention:  Minimize Safety Risk  Recent Flowsheet Documentation  Taken 4/3/2025 0825 by Eb Dennis, RN  Enhanced Safety Measures:   pain management   patient/family teach back on injury risk   review medications for side effects with activity   room near unit station

## 2025-04-03 NOTE — PROGRESS NOTES
Care Management Follow Up    Length of Stay (days): 3    Expected Discharge Date: 04/04/2025     Concerns to be Addressed: mental health       Patient plan of care discussed at interdisciplinary rounds: Yes    Private pay costs discussed: Not applicable    Discussed  Partnership in Safe Discharge Planning  document with patient/family: No     Handoff Completed: No, handoff not indicated or clinically appropriate    Additional Information:  HIRAM received a call from Carol Ayala with [facility]. SW provided contact information and confirmed access to tomorrow's zoom links for the exam at 10:30 and the hearing at 11:30.    MAGO Meza  Inpatient Care Coordination   [facility]  [phone]

## 2025-04-04 PROCEDURE — 250N000013 HC RX MED GY IP 250 OP 250 PS 637: Performed by: PSYCHIATRY & NEUROLOGY

## 2025-04-04 PROCEDURE — 99232 SBSQ HOSP IP/OBS MODERATE 35: CPT | Performed by: INTERNAL MEDICINE

## 2025-04-04 PROCEDURE — 250N000013 HC RX MED GY IP 250 OP 250 PS 637: Performed by: EMERGENCY MEDICINE

## 2025-04-04 PROCEDURE — 250N000013 HC RX MED GY IP 250 OP 250 PS 637: Performed by: INTERNAL MEDICINE

## 2025-04-04 PROCEDURE — 99232 SBSQ HOSP IP/OBS MODERATE 35: CPT | Performed by: PSYCHIATRY & NEUROLOGY

## 2025-04-04 PROCEDURE — 120N000001 HC R&B MED SURG/OB

## 2025-04-04 RX ADMIN — OLANZAPINE 7.5 MG: 5 TABLET, FILM COATED ORAL at 08:16

## 2025-04-04 RX ADMIN — HYDROXYZINE HYDROCHLORIDE 10 MG: 10 TABLET, FILM COATED ORAL at 15:55

## 2025-04-04 RX ADMIN — BUSPIRONE HYDROCHLORIDE 20 MG: 10 TABLET ORAL at 19:11

## 2025-04-04 RX ADMIN — TAMSULOSIN HYDROCHLORIDE 0.4 MG: 0.4 CAPSULE ORAL at 08:17

## 2025-04-04 RX ADMIN — BUSPIRONE HYDROCHLORIDE 20 MG: 10 TABLET ORAL at 08:16

## 2025-04-04 RX ADMIN — OLANZAPINE 7.5 MG: 5 TABLET, FILM COATED ORAL at 13:08

## 2025-04-04 RX ADMIN — IBUPROFEN 400 MG: 400 TABLET, FILM COATED ORAL at 20:30

## 2025-04-04 RX ADMIN — LIDOCAINE 1 PATCH: 4 PATCH TOPICAL at 08:17

## 2025-04-04 RX ADMIN — HYDROXYZINE HYDROCHLORIDE 10 MG: 10 TABLET, FILM COATED ORAL at 13:08

## 2025-04-04 RX ADMIN — LAMOTRIGINE 150 MG: 150 TABLET ORAL at 08:16

## 2025-04-04 ASSESSMENT — ACTIVITIES OF DAILY LIVING (ADL)
ADLS_ACUITY_SCORE: 76
ADLS_ACUITY_SCORE: 80
ADLS_ACUITY_SCORE: 76
ADLS_ACUITY_SCORE: 80
ADLS_ACUITY_SCORE: 76
ADLS_ACUITY_SCORE: 80
ADLS_ACUITY_SCORE: 80
ADLS_ACUITY_SCORE: 76
ADLS_ACUITY_SCORE: 80
ADLS_ACUITY_SCORE: 80
ADLS_ACUITY_SCORE: 76
ADLS_ACUITY_SCORE: 76
ADLS_ACUITY_SCORE: 80
ADLS_ACUITY_SCORE: 80
ADLS_ACUITY_SCORE: 76
ADLS_ACUITY_SCORE: 80
ADLS_ACUITY_SCORE: 80

## 2025-04-04 NOTE — PLAN OF CARE
"Goal Outcome Evaluation:      Plan of Care Reviewed With: patient    Overall Patient Progress: no changeOverall Patient Progress: no change    Outcome Evaluation: Pt refused assessments, no meds this shift, verbally aggressive towards staff, allowed charge nurse to help change brief when incontinent x1      Problem: Adult Inpatient Plan of Care  Goal: Plan of Care Review  Description: The Plan of Care Review/Shift note should be completed every shift.  The Outcome Evaluation is a brief statement about your assessment that the patient is improving, declining, or no change.  This information will be displayed automatically on your shiftnote.  Outcome: Progressing  Flowsheets (Taken 4/4/2025 0532)  Outcome Evaluation: Pt refused assessments, no meds this shift, verbally aggressive towards staff, allowed charge nurse to help change brief when incontinent  Plan of Care Reviewed With: patient  Overall Patient Progress: no change  Goal: Patient-Specific Goal (Individualized)  Description: You can add care plan individualizations to a care plan. Examples of Individualization might be:  \"Parent requests to be called daily at 9am for status\", \"I have a hard time hearing out of my right ear\", or \"Do not touch me to wake me up as it startlesme\".  Outcome: Progressing  Goal: Absence of Hospital-Acquired Illness or Injury  Outcome: Progressing  Intervention: Identify and Manage Fall Risk  Recent Flowsheet Documentation  Taken 4/4/2025 0400 by Maria Del Carmen Garcia, RN  Safety Promotion/Fall Prevention:   assistive device/personal items within reach   clutter free environment maintained   mobility aid in reach   nonskid shoes/slippers when out of bed  Intervention: Prevent and Manage VTE (Venous Thromboembolism) Risk  Recent Flowsheet Documentation  Taken 4/4/2025 0400 by Maria Del Carmen Garcia RN  VTE Prevention/Management: SCDs on (sequential compression devices)  Intervention: Prevent Infection  Recent Flowsheet Documentation  Taken 4/4/2025 " 0400 by Maria Del Carmen Garcia RN  Infection Prevention:   environmental surveillance performed   rest/sleep promoted   single patient room provided  Taken 4/4/2025 0116 by Maria Del Carmen Garcia RN  Infection Prevention:   environmental surveillance performed   rest/sleep promoted   single patient room provided  Goal: Optimal Comfort and Wellbeing  Outcome: Progressing  Intervention: Provide Person-Centered Care  Recent Flowsheet Documentation  Taken 4/4/2025 0400 by Maria Del Carmen Garcia RN  Trust Relationship/Rapport:   care explained   choices provided   questions answered  Goal: Readiness for Transition of Care  Outcome: Progressing     Problem: Delirium  Goal: Optimal Coping  Outcome: Progressing  Goal: Improved Behavioral Control  Outcome: Progressing  Intervention: Minimize Safety Risk  Recent Flowsheet Documentation  Taken 4/4/2025 0400 by Maria Del Carmen Garcia RN  Enhanced Safety Measures: room near unit station  Trust Relationship/Rapport:   care explained   choices provided   questions answered  Goal: Improved Attention and Thought Clarity  Outcome: Progressing  Goal: Improved Sleep  Outcome: Progressing     Problem: Violence Risk or Actual  Goal: Anger and Impulse Control  Outcome: Progressing  Intervention: Minimize Safety Risk  Recent Flowsheet Documentation  Taken 4/4/2025 0400 by Maria Del Carmen Garcia RN  Enhanced Safety Measures: room near unit station

## 2025-04-04 NOTE — PLAN OF CARE
Goal Outcome Evaluation:      Plan of Care Reviewed With: patient      Calm and cooperative today.  Does not like a lot of commotion.  Good appetite.  Incontinent of large amounts of urine.  No BM this shift.  Awaiting safe placement plan.
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
## 2025-04-04 NOTE — PROGRESS NOTES
[facility]  Hospitalist Progress Note  Kendrick Alex MD 04/04/2025    Reason for Stay (Diagnosis): agitation, schizophrenia, bipolar         Assessment and Plan:      Summary of Stay: Harpreet Mcelroy is a 69 year old male with PMH of bipolar, anxiety, depression, schizophrenia, BPD, paranoia, bladder cancer s/p ureteral stent, HTN, HLD, who presented on 3/27/2025 with decompensated schizophrenia and ROBBIN. He was admitted by the medicine service on 3/31/2025 after he was declined by inpatient psych.     Presented from his facility with agitation and attempting to harm staff. On arrival, afebrile, slightly tachycardic but otherwise hemodynamically stable.      Labs notable for Cr 1.42, WBC 8.5, Hgb 11.9, plt 247, electrolytes and LFTs in normal limits, CT head with no acute pathology. CT AP with stable severe bilateral hydronephrosis. UA with large LE but negative nitrites and culture with mixture of urogenital piper.      Has continued to be aggressive and inappropriate with staff. Placed on a 72 hr hold on 3/28/25. Psychiatry consulted and filed for commitment with [facility]. He is unfortunately total cares so inpatient psych is unable to manage him. Plan to admit here while psychiatry optimizes him to return back to his facility ([facility]).  Has a preliminary court hearing on 4/4/25     Plans today:  -No significant changes made to care plan today  -Psychiatry following and will allow them to adjust antipsychotics and other psychiatric medications as per their discretion  - prelim court hearing today noted     ROBBIN  - resolved     Decompensated schizophrenia  Bipolar  Anxiety  Depression  -Psychiatry following.  Will leave adjustment of antipsychotic and psychiatric medications to psychiatry     Dysarthric speech  -Patient can be challenging to understand at times     Bladder cancer   Chronic L ureteral stent  Stent last changed 2/4/25 by [facility] urology Dr. Whitt.  - PTA oxybutynin and  flomax        Diet: Mechanical/Dental Soft Diet    DVT Prophylaxis: Pneumatic Compression Devices  Hernandez Catheter: Not present  Lines: None     Cardiac Monitoring: None  Code Status:  full  Discharge Dispo: LTC facility  Medically Ready for Discharge: Anticipated after court hearings completed (prelim scheduled on 4/4 and final hearing - if needed - on 4/9) and further stabilization of his agitation and psychosis                 Interval History (Subjective):      Nursing notes reviewed.  Continues to have intermittent agitation with nursing staff.  Was pleasant with me this morning.  Allowed an exam today                  Physical Exam:      Last Vital Signs:  /67 (BP Location: Right arm)   Pulse 73   Temp 97.5  F (36.4  C) (Temporal)   Resp 18   SpO2 97%       Intake/Output Summary (Last 24 hours) at 4/4/2025 1026  Last data filed at 4/3/2025 1933  Gross per 24 hour   Intake 300 ml   Output 500 ml   Net -200 ml       Constitutional: Awake, alert, cooperative, no apparent distress     Respiratory: Clear to auscultation bilaterally, no crackles or wheezing   Cardiovascular: Regular rate and rhythm, normal S1 and S2, and no murmur noted   Abdomen: Normal bowel sounds, soft, non-distended, non-tender   Skin: No rashes, no cyanosis, dry to touch   Neuro: Alert and oriented x3, no focal weakness, dysarthria making him difficult to understand   Extremities: No edema, normal range of motion   Other(s):        All other systems: Negative          Medications:      All current medications were reviewed with changes reflected in problem list.         Data:      All new lab and imaging data was reviewed.   Labs:       Lab Results   Component Value Date     04/02/2025     04/01/2025     03/27/2025     07/12/2020     01/26/2020    Lab Results   Component Value Date    CHLORIDE 106 04/02/2025    CHLORIDE 105 04/01/2025    CHLORIDE 106 03/27/2025    CHLORIDE 107 05/26/2022    CHLORIDE 99  "07/14/2020    CHLORIDE 106 07/12/2020    CHLORIDE 107 01/26/2020    Lab Results   Component Value Date    BUN 26.5 04/02/2025    BUN 26.4 04/01/2025    BUN 29.0 03/27/2025    BUN 19 05/26/2022    BUN 13 07/14/2020    BUN 14 07/12/2020    BUN 18 01/26/2020      Lab Results   Component Value Date    POTASSIUM 4.3 04/02/2025    POTASSIUM 4.2 04/01/2025    POTASSIUM 3.8 03/27/2025    POTASSIUM 4.3 05/26/2022    POTASSIUM 3.5 07/14/2020    POTASSIUM 3.7 07/12/2020    POTASSIUM 4.1 01/26/2020    Lab Results   Component Value Date    CO2 24 04/02/2025    CO2 24 04/01/2025    CO2 21 03/27/2025    CO2 29 05/26/2022    CO2 27 07/14/2020    CO2 27 07/12/2020    CO2 29 01/26/2020    Lab Results   Component Value Date    CR 1.05 04/02/2025    CR 1.10 04/01/2025    CR 1.42 03/27/2025    CR 0.77 07/12/2020    CR 0.67 01/26/2020        No results for input(s): \"WBC\", \"HGB\", \"HCT\", \"MCV\", \"PLT\" in the last 168 hours.   Imaging:   No results found for this or any previous visit (from the past 24 hours).   "

## 2025-04-04 NOTE — PLAN OF CARE
"Goal Outcome Evaluation:      Plan of Care Reviewed With: patient    Overall Patient Progress: no changeOverall Patient Progress: no change    Outcome Evaluation: pt refused  assessments. refuse to take mediations from writer. asked other RN to help. PRN Risperodone for mild agitation given.  Pt  is on RA. Has garbled speech. Difficult to undescend. Pt had mild agitation today. Incont. B&B. Lift A-2.     Problem: Adult Inpatient Plan of Care  Goal: Plan of Care Review  Description: The Plan of Care Review/Shift note should be completed every shift.  The Outcome Evaluation is a brief statement about your assessment that the patient is improving, declining, or no change.  This information will be displayed automatically on your shiftnote.  Outcome: Progressing  Flowsheets (Taken 4/3/2025 1950)  Outcome Evaluation: pt refused  assessments. refuse to take mediations from writer. asked other RN to help. PRN Risperodone for mild agitation given.  Plan of Care Reviewed With: patient  Overall Patient Progress: no change  Goal: Patient-Specific Goal (Individualized)  Description: You can add care plan individualizations to a care plan. Examples of Individualization might be:  \"Parent requests to be called daily at 9am for status\", \"I have a hard time hearing out of my right ear\", or \"Do not touch me to wake me up as it startlesme\".  Outcome: Progressing  Goal: Absence of Hospital-Acquired Illness or Injury  Outcome: Progressing  Intervention: Identify and Manage Fall Risk  Recent Flowsheet Documentation  Taken 4/3/2025 1900 by Mirian Aguila, RN  Safety Promotion/Fall Prevention:   activity supervised   assistive device/personal items within reach   clutter free environment maintained   nonskid shoes/slippers when out of bed   safety round/check completed   room organization consistent  Intervention: Prevent Infection  Recent Flowsheet Documentation  Taken 4/3/2025 1900 by Mirian Aguila, RN  Infection Prevention:   hand " hygiene promoted   rest/sleep promoted   single patient room provided  Goal: Optimal Comfort and Wellbeing  Outcome: Progressing  Intervention: Provide Person-Centered Care  Recent Flowsheet Documentation  Taken 4/3/2025 1900 by Mirian Aguila RN  Trust Relationship/Rapport:   care explained   choices provided   questions answered  Goal: Readiness for Transition of Care  Outcome: Progressing

## 2025-04-05 PROCEDURE — 250N000013 HC RX MED GY IP 250 OP 250 PS 637: Performed by: EMERGENCY MEDICINE

## 2025-04-05 PROCEDURE — 250N000013 HC RX MED GY IP 250 OP 250 PS 637: Performed by: INTERNAL MEDICINE

## 2025-04-05 PROCEDURE — 99232 SBSQ HOSP IP/OBS MODERATE 35: CPT | Performed by: INTERNAL MEDICINE

## 2025-04-05 PROCEDURE — 250N000013 HC RX MED GY IP 250 OP 250 PS 637: Performed by: PSYCHIATRY & NEUROLOGY

## 2025-04-05 PROCEDURE — 120N000001 HC R&B MED SURG/OB

## 2025-04-05 RX ADMIN — DICLOFENAC SODIUM 2 G: 10 GEL TOPICAL at 10:23

## 2025-04-05 RX ADMIN — LAMOTRIGINE 150 MG: 150 TABLET ORAL at 08:50

## 2025-04-05 RX ADMIN — MINERAL OIL, WHITE PETROLATUM: .03; .94 OINTMENT OPHTHALMIC at 21:16

## 2025-04-05 RX ADMIN — OLANZAPINE 25 MG: 10 TABLET, FILM COATED ORAL at 21:16

## 2025-04-05 RX ADMIN — HYDROXYZINE HYDROCHLORIDE 10 MG: 10 TABLET, FILM COATED ORAL at 12:19

## 2025-04-05 RX ADMIN — Medication 3 MG: at 21:15

## 2025-04-05 RX ADMIN — DICLOFENAC SODIUM 2 G: 10 GEL TOPICAL at 01:23

## 2025-04-05 RX ADMIN — BENZTROPINE MESYLATE 2 MG: 1 TABLET ORAL at 21:15

## 2025-04-05 RX ADMIN — IBUPROFEN 400 MG: 400 TABLET, FILM COATED ORAL at 10:22

## 2025-04-05 RX ADMIN — TAMSULOSIN HYDROCHLORIDE 0.4 MG: 0.4 CAPSULE ORAL at 08:47

## 2025-04-05 RX ADMIN — BUSPIRONE HYDROCHLORIDE 20 MG: 10 TABLET ORAL at 08:47

## 2025-04-05 RX ADMIN — LIDOCAINE 1 PATCH: 4 PATCH TOPICAL at 08:52

## 2025-04-05 RX ADMIN — OLANZAPINE 7.5 MG: 5 TABLET, FILM COATED ORAL at 08:47

## 2025-04-05 RX ADMIN — OLANZAPINE 7.5 MG: 5 TABLET, FILM COATED ORAL at 12:19

## 2025-04-05 RX ADMIN — HYDROXYZINE HYDROCHLORIDE 10 MG: 10 TABLET, FILM COATED ORAL at 15:29

## 2025-04-05 RX ADMIN — BUSPIRONE HYDROCHLORIDE 20 MG: 10 TABLET ORAL at 19:37

## 2025-04-05 RX ADMIN — OLANZAPINE 25 MG: 10 TABLET, FILM COATED ORAL at 01:25

## 2025-04-05 ASSESSMENT — ACTIVITIES OF DAILY LIVING (ADL)
ADLS_ACUITY_SCORE: 76
ADLS_ACUITY_SCORE: 73
ADLS_ACUITY_SCORE: 72
ADLS_ACUITY_SCORE: 76
ADLS_ACUITY_SCORE: 76
ADLS_ACUITY_SCORE: 73
ADLS_ACUITY_SCORE: 76
ADLS_ACUITY_SCORE: 73
ADLS_ACUITY_SCORE: 76
ADLS_ACUITY_SCORE: 73
ADLS_ACUITY_SCORE: 76
ADLS_ACUITY_SCORE: 76
ADLS_ACUITY_SCORE: 72
ADLS_ACUITY_SCORE: 76
ADLS_ACUITY_SCORE: 72

## 2025-04-05 NOTE — PLAN OF CARE
"Goal Outcome Evaluation:      Plan of Care Reviewed With: patient    Overall Patient Progress: improvingOverall Patient Progress: improving    Outcome Evaluation: VSS on RA. Alert to self. Inappropriate and aggressive language and responses to staff. Does not allow me to provide cares and makes no attempt to answer my questions. Incontinent of urine. Redness on bottom and scrotum.      Problem: Adult Inpatient Plan of Care  Goal: Plan of Care Review  Description: The Plan of Care Review/Shift note should be completed every shift.  The Outcome Evaluation is a brief statement about your assessment that the patient is improving, declining, or no change.  This information will be displayed automatically on your shiftnote.  Outcome: Progressing  Flowsheets (Taken 4/5/2025 0441)  Outcome Evaluation: VSS on RA. Alert to self. Inappropriate and aggressive language and responses to staff. Does not allow me to provide cares and makes no attempt to answer my questions. Incontinent of urine. Redness on bottom and scrotum.  Plan of Care Reviewed With: patient  Overall Patient Progress: improving  Goal: Patient-Specific Goal (Individualized)  Description: You can add care plan individualizations to a care plan. Examples of Individualization might be:  \"Parent requests to be called daily at 9am for status\", \"I have a hard time hearing out of my right ear\", or \"Do not touch me to wake me up as it startlesme\".  Outcome: Progressing  Goal: Absence of Hospital-Acquired Illness or Injury  Outcome: Progressing  Intervention: Identify and Manage Fall Risk  Recent Flowsheet Documentation  Taken 4/5/2025 0015 by Flavio Herrera, RN  Safety Promotion/Fall Prevention:   activity supervised   assistive device/personal items within reach   lighting adjusted   increase visualization of patient  Intervention: Prevent Skin Injury  Recent Flowsheet Documentation  Taken 4/5/2025 0015 by Flavio Herrera, RN  Body Position: position changed " independently  Skin Protection: adhesive use limited  Intervention: Prevent and Manage VTE (Venous Thromboembolism) Risk  Recent Flowsheet Documentation  Taken 4/5/2025 0015 by Flavio Herrera RN  VTE Prevention/Management: SCDs on (sequential compression devices)  Intervention: Prevent Infection  Recent Flowsheet Documentation  Taken 4/5/2025 0015 by Flavio Herrera, RN  Infection Prevention:   rest/sleep promoted   single patient room provided   hand hygiene promoted  Goal: Optimal Comfort and Wellbeing  Outcome: Progressing  Intervention: Provide Person-Centered Care  Recent Flowsheet Documentation  Taken 4/5/2025 0015 by Flavio Herrera, RN  Trust Relationship/Rapport:   choices provided   reassurance provided   thoughts/feelings acknowledged  Goal: Readiness for Transition of Care  Outcome: Progressing
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
## 2025-04-05 NOTE — PLAN OF CARE
Goal Outcome Evaluation:      Plan of Care Reviewed With: patient    Overall Patient Progress: no changeOverall Patient Progress: no change     Agitated at times, good appetite. Linens changes X2 due to incontinence. External cath applied. Ibuprofen and Voltaren Gel given for pain. SW invovled. TBD discharge plans    Problem: Adult Inpatient Plan of Care  Goal: Plan of Care Review  Outcome: Progressing  Flowsheets (Taken 4/5/2025 1433)  Plan of Care Reviewed With: patient  Overall Patient Progress: no change  Goal: Patient-Specific Goal (Individualized)  Outcome: Progressing  Goal: Absence of Hospital-Acquired Illness or Injury  Outcome: Progressing  Intervention: Identify and Manage Fall Risk  Recent Flowsheet Documentation  Taken 4/5/2025 1300 by Zabrina Noriega RN  Safety Promotion/Fall Prevention:   activity supervised   assistive device/personal items within reach   safety round/check completed   room organization consistent   room near nurse's station  Intervention: Prevent Skin Injury  Recent Flowsheet Documentation  Taken 4/5/2025 1300 by Zabrina Noriega RN  Body Position: position changed independently  Intervention: Prevent and Manage VTE (Venous Thromboembolism) Risk  Recent Flowsheet Documentation  Taken 4/5/2025 1300 by Zabrina Noriega RN  VTE Prevention/Management: SCDs off (sequential compression devices)  Intervention: Prevent Infection  Recent Flowsheet Documentation  Taken 4/5/2025 1300 by Zabrina Noriega RN  Infection Prevention:   hand hygiene promoted   rest/sleep promoted  Goal: Optimal Comfort and Wellbeing  Outcome: Progressing  Intervention: Monitor Pain and Promote Comfort  Recent Flowsheet Documentation  Taken 4/5/2025 1022 by Zabrina Noriega RN  Pain Management Interventions: medication (see MAR)  Goal: Readiness for Transition of Care  Outcome: Progressing     Problem: Delirium  Goal: Optimal Coping  Outcome: Progressing  Goal: Improved Behavioral Control  Outcome:  Progressing  Intervention: Minimize Safety Risk  Recent Flowsheet Documentation  Taken 4/5/2025 1300 by Zabrina Noriega, RN  Enhanced Safety Measures: room near unit station  Goal: Improved Attention and Thought Clarity  Outcome: Progressing  Goal: Improved Sleep  Outcome: Progressing     Problem: Violence Risk or Actual  Goal: Anger and Impulse Control  Outcome: Progressing  Intervention: Minimize Safety Risk  Recent Flowsheet Documentation  Taken 4/5/2025 1300 by Zabrina Noriega, RN  Enhanced Safety Measures: room near unit station

## 2025-04-05 NOTE — PLAN OF CARE
"Goal Outcome Evaluation:           Overall Patient Progress: no changeOverall Patient Progress: no change    Outcome Evaluation: vss, calm and coopertive at times,anxious and non compliant at times.Incontinent of urine,incontinence cares done as needed, barrier cream aplied with each incontinence episode. C/o pain in sacral area, given ibuprofen with relief. Scrotum and buttocks reddened. , barrier cream applied. Appetite is great.Falls risk precautions.  Pt  was agitated and ordered nurse to leave his room now, refused his hs meds, Awaiting discharge.      Problem: Adult Inpatient Plan of Care  Goal: Plan of Care Review  Description: The Plan of Care Review/Shift note should be completed every shift.  The Outcome Evaluation is a brief statement about your assessment that the patient is improving, declining, or no change.  This information will be displayed automatically on your shiftnote.  Outcome: Progressing  Flowsheets (Taken 4/4/2025 1927)  Outcome Evaluation: vss, calm and coopertive at times,anxious and non compliant at times. Incontinent of urine,incontinence cares done as needed, barrier cream aplied with each incontinence episode. Denies  pain, scrotum and buttocks reddened. Appetite is great.Falls risk precautions.  Overall Patient Progress: no change  Goal: Patient-Specific Goal (Individualized)  Description: You can add care plan individualizations to a care plan. Examples of Individualization might be:  \"Parent requests to be called daily at 9am for status\", \"I have a hard time hearing out of my right ear\", or \"Do not touch me to wake me up as it startlesme\".  Outcome: Progressing  Goal: Absence of Hospital-Acquired Illness or Injury  Outcome: Progressing  Intervention: Identify and Manage Fall Risk  Recent Flowsheet Documentation  Taken 4/4/2025 1600 by Jordana Arvizu, RN  Safety Promotion/Fall Prevention:   activity supervised   assistive device/personal items within reach   lighting adjusted   " increase visualization of patient  Intervention: Prevent Skin Injury  Recent Flowsheet Documentation  Taken 4/4/2025 1600 by Jordana Arvizu RN  Body Position: position changed independently  Skin Protection: adhesive use limited  Intervention: Prevent Infection  Recent Flowsheet Documentation  Taken 4/4/2025 1600 by Jordana Arvizu RN  Infection Prevention:   rest/sleep promoted   single patient room provided   hand hygiene promoted  Goal: Optimal Comfort and Wellbeing  Outcome: Progressing  Intervention: Monitor Pain and Promote Comfort  Recent Flowsheet Documentation  Taken 4/4/2025 1556 by Jordana Arvizu RN  Pain Management Interventions: care clustered  Intervention: Provide Person-Centered Care  Recent Flowsheet Documentation  Taken 4/4/2025 1600 by Jordana Arvizu RN  Trust Relationship/Rapport:   choices provided   reassurance provided   thoughts/feelings acknowledged  Goal: Readiness for Transition of Care  Outcome: Progressing     Problem: Delirium  Goal: Optimal Coping  Outcome: Progressing  Intervention: Optimize Psychosocial Adjustment to Delirium  Recent Flowsheet Documentation  Taken 4/4/2025 1600 by Jordana Arvizu RN  Supportive Measures:   active listening utilized   relaxation techniques promoted  Family/Support System Care:   self-care encouraged   support provided  Goal: Improved Behavioral Control  Outcome: Progressing  Intervention: Minimize Safety Risk  Recent Flowsheet Documentation  Taken 4/4/2025 1600 by Jordana Arvizu RN  Enhanced Safety Measures: room near unit station  Trust Relationship/Rapport:   choices provided   reassurance provided   thoughts/feelings acknowledged  Goal: Improved Attention and Thought Clarity  Outcome: Progressing  Goal: Improved Sleep  Outcome: Progressing     Problem: Violence Risk or Actual  Goal: Anger and Impulse Control  Outcome: Progressing  Intervention: Minimize Safety Risk  Recent Flowsheet Documentation  Taken 4/4/2025 1600 by  Jordana Arvizu, RN  Enhanced Safety Measures: room near unit station  Intervention: Promote Self-Control  Recent Flowsheet Documentation  Taken 4/4/2025 1600 by Jordana Arvizu, RN  Supportive Measures:   active listening utilized   relaxation techniques promoted

## 2025-04-06 PROCEDURE — 99232 SBSQ HOSP IP/OBS MODERATE 35: CPT | Performed by: INTERNAL MEDICINE

## 2025-04-06 PROCEDURE — 250N000013 HC RX MED GY IP 250 OP 250 PS 637: Performed by: INTERNAL MEDICINE

## 2025-04-06 PROCEDURE — 250N000013 HC RX MED GY IP 250 OP 250 PS 637: Performed by: EMERGENCY MEDICINE

## 2025-04-06 PROCEDURE — 120N000001 HC R&B MED SURG/OB

## 2025-04-06 PROCEDURE — 250N000013 HC RX MED GY IP 250 OP 250 PS 637: Performed by: PSYCHIATRY & NEUROLOGY

## 2025-04-06 PROCEDURE — 250N000013 HC RX MED GY IP 250 OP 250 PS 637: Performed by: STUDENT IN AN ORGANIZED HEALTH CARE EDUCATION/TRAINING PROGRAM

## 2025-04-06 RX ORDER — MAGNESIUM HYDROXIDE/ALUMINUM HYDROXICE/SIMETHICONE 120; 1200; 1200 MG/30ML; MG/30ML; MG/30ML
30 SUSPENSION ORAL EVERY 4 HOURS PRN
Status: DISCONTINUED | OUTPATIENT
Start: 2025-04-06 | End: 2025-04-30 | Stop reason: HOSPADM

## 2025-04-06 RX ADMIN — BENZTROPINE MESYLATE 2 MG: 1 TABLET ORAL at 21:00

## 2025-04-06 RX ADMIN — HYDROXYZINE HYDROCHLORIDE 10 MG: 10 TABLET, FILM COATED ORAL at 16:32

## 2025-04-06 RX ADMIN — LIDOCAINE 1 PATCH: 4 PATCH TOPICAL at 07:45

## 2025-04-06 RX ADMIN — Medication 3 MG: at 21:01

## 2025-04-06 RX ADMIN — BUSPIRONE HYDROCHLORIDE 20 MG: 10 TABLET ORAL at 19:31

## 2025-04-06 RX ADMIN — OLANZAPINE 25 MG: 10 TABLET, FILM COATED ORAL at 21:01

## 2025-04-06 RX ADMIN — OLANZAPINE 7.5 MG: 5 TABLET, FILM COATED ORAL at 07:42

## 2025-04-06 RX ADMIN — HYDROXYZINE HYDROCHLORIDE 10 MG: 10 TABLET, FILM COATED ORAL at 12:36

## 2025-04-06 RX ADMIN — BUSPIRONE HYDROCHLORIDE 20 MG: 10 TABLET ORAL at 07:42

## 2025-04-06 RX ADMIN — LAMOTRIGINE 150 MG: 150 TABLET ORAL at 07:42

## 2025-04-06 RX ADMIN — MINERAL OIL, WHITE PETROLATUM: .03; .94 OINTMENT OPHTHALMIC at 21:00

## 2025-04-06 RX ADMIN — OLANZAPINE 7.5 MG: 5 TABLET, FILM COATED ORAL at 12:36

## 2025-04-06 RX ADMIN — CALCIUM CARBONATE (ANTACID) CHEW TAB 500 MG 1000 MG: 500 CHEW TAB at 00:44

## 2025-04-06 RX ADMIN — TAMSULOSIN HYDROCHLORIDE 0.4 MG: 0.4 CAPSULE ORAL at 07:42

## 2025-04-06 ASSESSMENT — ACTIVITIES OF DAILY LIVING (ADL)
ADLS_ACUITY_SCORE: 72
ADLS_ACUITY_SCORE: 72
ADLS_ACUITY_SCORE: 73
ADLS_ACUITY_SCORE: 72
ADLS_ACUITY_SCORE: 72
ADLS_ACUITY_SCORE: 73
ADLS_ACUITY_SCORE: 72
DEPENDENT_IADLS:: CLEANING;COOKING;LAUNDRY;SHOPPING;MEAL PREPARATION;MEDICATION MANAGEMENT;MONEY MANAGEMENT;TRANSPORTATION
ADLS_ACUITY_SCORE: 73
ADLS_ACUITY_SCORE: 72
ADLS_ACUITY_SCORE: 73
ADLS_ACUITY_SCORE: 72
ADLS_ACUITY_SCORE: 73

## 2025-04-06 NOTE — PLAN OF CARE
Goal Outcome Evaluation:      Plan of Care Reviewed With: patient    Overall Patient Progress: no changeOverall Patient Progress: no change     Patient agitated at times. Complete linen change X3 due to incontinence. External catheter applied. Good appetite- needs assistance ordering meals. SW involved. TBD discharge plans       Problem: Adult Inpatient Plan of Care  Goal: Plan of Care Review  Outcome: Progressing  Flowsheets (Taken 4/6/2025 1158)  Plan of Care Reviewed With: patient  Overall Patient Progress: no change  Goal: Patient-Specific Goal (Individualized)  Outcome: Progressing  Goal: Absence of Hospital-Acquired Illness or Injury  Outcome: Progressing  Intervention: Identify and Manage Fall Risk  Recent Flowsheet Documentation  Taken 4/6/2025 1000 by Zabrina Noriega RN  Safety Promotion/Fall Prevention:   activity supervised   assistive device/personal items within reach   safety round/check completed   room near nurse's station   room organization consistent   room door open  Intervention: Prevent and Manage VTE (Venous Thromboembolism) Risk  Recent Flowsheet Documentation  Taken 4/6/2025 1000 by Zabrina Noriega RN  VTE Prevention/Management: SCDs off (sequential compression devices)  Intervention: Prevent Infection  Recent Flowsheet Documentation  Taken 4/6/2025 1000 by Zabrina Noriega RN  Infection Prevention:   hand hygiene promoted   rest/sleep promoted  Goal: Optimal Comfort and Wellbeing  Outcome: Progressing  Goal: Readiness for Transition of Care  Outcome: Progressing     Problem: Delirium  Goal: Optimal Coping  Outcome: Progressing  Goal: Improved Behavioral Control  Outcome: Progressing  Intervention: Minimize Safety Risk  Recent Flowsheet Documentation  Taken 4/6/2025 1000 by Zabrina Noriega RN  Enhanced Safety Measures: room near unit station  Goal: Improved Attention and Thought Clarity  Outcome: Progressing  Goal: Improved Sleep  Outcome: Progressing     Problem: Violence Risk or  Actual  Goal: Anger and Impulse Control  Outcome: Progressing  Intervention: Minimize Safety Risk  Recent Flowsheet Documentation  Taken 4/6/2025 1000 by Zabrina Noriega RN  Enhanced Safety Measures: room near unit station

## 2025-04-06 NOTE — PLAN OF CARE
Goal Outcome Evaluation:      Plan of Care Reviewed With: patient    Overall Patient Progress: no changeOverall Patient Progress: no change    Outcome Evaluation: VSS, alert , agitated at times and refusing cares, otherwise cooperative at times also. Denies pain. Incontinence cares and linen change done, external cathetet in use.Appetite is good.Falls risk precautions. Needs assist with hygiene and transferring.      Problem: Adult Inpatient Plan of Care  Goal: Plan of Care Review  Description: The Plan of Care Review/Shift note should be completed every shift.  The Outcome Evaluation is a brief statement about your assessment that the patient is improving, declining, or no change.  This information will be displayed automatically on your shiftnote.  Outcome: Not Progressing  Flowsheets (Taken 4/5/2025 1951)  Outcome Evaluation: VSS, alert , agitated at times and refusing cares, otherwise cooperative at times also. Denies pain. Incontinence cares and linen change done, external cathetet in use.Appetite is good.Falls risk precautions. Needs assist with hygiene and transferring.  Plan of Care Reviewed With: patient  Overall Patient Progress: no change
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
## 2025-04-06 NOTE — PLAN OF CARE
"Goal Outcome Evaluation:      Plan of Care Reviewed With: patient    Overall Patient Progress: no changeOverall Patient Progress: no change    Outcome Evaluation: VSS on RA. Alert to self. He is agitated with me each time I enter the room and does not answer questions, engage with education, or accept meds. Does not appear to be in pain and sleeps well. He tends to be accepting of one or two people per shift, who help with his cares and medications.      Problem: Adult Inpatient Plan of Care  Goal: Plan of Care Review  Description: The Plan of Care Review/Shift note should be completed every shift.  The Outcome Evaluation is a brief statement about your assessment that the patient is improving, declining, or no change.  This information will be displayed automatically on your shiftnote.  Outcome: Progressing  Flowsheets (Taken 4/6/2025 0402)  Outcome Evaluation: VSS on RA. Alert to self. He is agitated with me each time I enter the room and does not answer questions, engage with education, or accept meds. Does not appear to be in pain and sleeps well. He tends to be accepting of one or two people per shift, who help with his cares and medications.  Plan of Care Reviewed With: patient  Overall Patient Progress: no change  Goal: Patient-Specific Goal (Individualized)  Description: You can add care plan individualizations to a care plan. Examples of Individualization might be:  \"Parent requests to be called daily at 9am for status\", \"I have a hard time hearing out of my right ear\", or \"Do not touch me to wake me up as it startlesme\".  Outcome: Progressing  Goal: Absence of Hospital-Acquired Illness or Injury  Outcome: Progressing  Intervention: Identify and Manage Fall Risk  Recent Flowsheet Documentation  Taken 4/6/2025 0015 by Flavio Herrera, RN  Safety Promotion/Fall Prevention:   assistive device/personal items within reach   clutter free environment maintained   lighting adjusted   nonskid shoes/slippers when " out of bed   mobility aid in reach  Intervention: Prevent Skin Injury  Recent Flowsheet Documentation  Taken 4/6/2025 0015 by Flavio Herrera RN  Body Position: supine, head elevated  Skin Protection: adhesive use limited  Intervention: Prevent and Manage VTE (Venous Thromboembolism) Risk  Recent Flowsheet Documentation  Taken 4/6/2025 0015 by Flavio Herrera RN  VTE Prevention/Management: SCDs off (sequential compression devices)  Intervention: Prevent Infection  Recent Flowsheet Documentation  Taken 4/6/2025 0015 by Flavio Herrera RN  Infection Prevention:   rest/sleep promoted   single patient room provided  Goal: Optimal Comfort and Wellbeing  Outcome: Progressing  Intervention: Monitor Pain and Promote Comfort  Recent Flowsheet Documentation  Taken 4/6/2025 0015 by Flavio Herrera RN  Pain Management Interventions:   care clustered   emotional support   medication offered but refused   quiet environment facilitated  Intervention: Provide Person-Centered Care  Recent Flowsheet Documentation  Taken 4/6/2025 0015 by Flavio Herrera RN  Trust Relationship/Rapport:   thoughts/feelings acknowledged   care explained   emotional support provided   empathic listening provided  Goal: Readiness for Transition of Care  Outcome: Progressing

## 2025-04-06 NOTE — PROGRESS NOTES
[facility]  Hospitalist Progress Note  Kendrick Alex MD 04/06/2025    Reason for Stay (Diagnosis): decompensated schizophrenia         Assessment and Plan:      Summary of Stay: Harpreet Mcelroy is a 69 year old male with PMH of bipolar, anxiety, depression, schizophrenia, BPD, paranoia, bladder cancer s/p ureteral stent, HTN, HLD, who presented on 3/27/2025 with decompensated schizophrenia and ROBBIN. He was admitted by the medicine service on 3/31/2025 after he was declined by inpatient psych.     Presented from his facility with agitation and attempting to harm staff. On arrival, afebrile, slightly tachycardic but otherwise hemodynamically stable.      Labs notable for Cr 1.42, WBC 8.5, Hgb 11.9, plt 247, electrolytes and LFTs in normal limits, CT head with no acute pathology. CT AP with stable severe bilateral hydronephrosis. UA with large LE but negative nitrites and culture with mixture of urogenital piper.      Has continued to be aggressive and inappropriate with staff. Placed on a 72 hr hold on 3/28/25. Psychiatry consulted and filed for commitment with junior. He is unfortunately total cares so inpatient psych is unable to manage him. Plan to admit here while psychiatry optimizes him to return back to his facility ([facility]).  Had a preliminary court hearing on 4/4/25, with final hearing being planned on 4/9/25     Plans today:  -No significant changes made to care plan today  -Psychiatry following     ROBBIN  - resolved     Decompensated schizophrenia with intermittent agitation  Bipolar  Anxiety  Depression  -Psychiatry following.  Will leave adjustment of antipsychotic and psychiatric medications to psychiatry     Dysarthric speech  -Patient can be challenging to understand at times     Bladder cancer   Chronic L ureteral stent  Stent last changed 2/4/25 by [facility] urology Dr. Whitt.  - PTA oxybutynin and flomax        Diet: Mechanical/Dental Soft Diet    DVT Prophylaxis:  Pneumatic Compression Devices  Hernandez Catheter: Not present  Lines: None     Cardiac Monitoring: None  Code Status:  full  Discharge Dispo: LTC facility  Medically Ready for Discharge: Anticipated after court hearings completed (prelim performed on 4/4 and final hearing scheduled on 4/9) and further stabilization of his agitation and psychosis              Interval History (Subjective):      No new concerns or complaints today.  Patient allowed me to visit with him and examined him today                  Physical Exam:      Last Vital Signs:  BP (!) 150/69 (BP Location: Right arm, Patient Position: Semi-Ballard's, Cuff Size: Adult Regular)   Pulse 85   Temp 97.9  F (36.6  C) (Temporal)   Resp 16   SpO2 95%       Intake/Output Summary (Last 24 hours) at 4/6/2025 1028  Last data filed at 4/5/2025 2117  Gross per 24 hour   Intake 625 ml   Output 300 ml   Net 325 ml       Constitutional: Awake, alert, cooperative, no apparent distress.  Difficult to understand due to chronic dysarthric and somewhat rapid speech     Respiratory: Clear to auscultation bilaterally, no crackles or wheezing   Cardiovascular: Regular rate and rhythm, normal S1 and S2, and no murmur noted   Abdomen: Normal bowel sounds, soft, non-distended, non-tender   Skin: No rashes, no cyanosis, dry to touch   Neuro: Alert and alert, no focal weakness   Extremities: No edema, normal range of motion   Other(s):        All other systems: Negative          Medications:      All current medications were reviewed with changes reflected in problem list.         Data:      All new lab and imaging data was reviewed.   Labs:       Lab Results   Component Value Date     04/02/2025     04/01/2025     03/27/2025     07/12/2020     01/26/2020    Lab Results   Component Value Date    CHLORIDE 106 04/02/2025    CHLORIDE 105 04/01/2025    CHLORIDE 106 03/27/2025    CHLORIDE 107 05/26/2022    CHLORIDE 99 07/14/2020    CHLORIDE 106 07/12/2020  "   CHLORIDE 107 01/26/2020    Lab Results   Component Value Date    BUN 26.5 04/02/2025    BUN 26.4 04/01/2025    BUN 29.0 03/27/2025    BUN 19 05/26/2022    BUN 13 07/14/2020    BUN 14 07/12/2020    BUN 18 01/26/2020      Lab Results   Component Value Date    POTASSIUM 4.3 04/02/2025    POTASSIUM 4.2 04/01/2025    POTASSIUM 3.8 03/27/2025    POTASSIUM 4.3 05/26/2022    POTASSIUM 3.5 07/14/2020    POTASSIUM 3.7 07/12/2020    POTASSIUM 4.1 01/26/2020    Lab Results   Component Value Date    CO2 24 04/02/2025    CO2 24 04/01/2025    CO2 21 03/27/2025    CO2 29 05/26/2022    CO2 27 07/14/2020    CO2 27 07/12/2020    CO2 29 01/26/2020    Lab Results   Component Value Date    CR 1.05 04/02/2025    CR 1.10 04/01/2025    CR 1.42 03/27/2025    CR 0.77 07/12/2020    CR 0.67 01/26/2020        No results for input(s): \"WBC\", \"HGB\", \"HCT\", \"MCV\", \"PLT\" in the last 168 hours.   Imaging:   No results found for this or any previous visit (from the past 24 hours).   "

## 2025-04-07 PROCEDURE — 250N000013 HC RX MED GY IP 250 OP 250 PS 637: Performed by: EMERGENCY MEDICINE

## 2025-04-07 PROCEDURE — 120N000001 HC R&B MED SURG/OB

## 2025-04-07 PROCEDURE — 99231 SBSQ HOSP IP/OBS SF/LOW 25: CPT | Performed by: INTERNAL MEDICINE

## 2025-04-07 PROCEDURE — 250N000013 HC RX MED GY IP 250 OP 250 PS 637: Performed by: INTERNAL MEDICINE

## 2025-04-07 PROCEDURE — 250N000013 HC RX MED GY IP 250 OP 250 PS 637: Performed by: PSYCHIATRY & NEUROLOGY

## 2025-04-07 PROCEDURE — 99232 SBSQ HOSP IP/OBS MODERATE 35: CPT | Performed by: PSYCHIATRY & NEUROLOGY

## 2025-04-07 RX ADMIN — HYDROXYZINE HYDROCHLORIDE 10 MG: 10 TABLET, FILM COATED ORAL at 02:03

## 2025-04-07 RX ADMIN — TAMSULOSIN HYDROCHLORIDE 0.4 MG: 0.4 CAPSULE ORAL at 08:56

## 2025-04-07 RX ADMIN — IBUPROFEN 400 MG: 400 TABLET, FILM COATED ORAL at 09:03

## 2025-04-07 RX ADMIN — ALUMINUM HYDROXIDE, MAGNESIUM HYDROXIDE, AND DIMETHICONE 30 ML: 200; 20; 200 SUSPENSION ORAL at 20:01

## 2025-04-07 RX ADMIN — MINERAL OIL, WHITE PETROLATUM: .03; .94 OINTMENT OPHTHALMIC at 22:58

## 2025-04-07 RX ADMIN — OLANZAPINE 25 MG: 10 TABLET, FILM COATED ORAL at 22:50

## 2025-04-07 RX ADMIN — OLANZAPINE 7.5 MG: 5 TABLET, FILM COATED ORAL at 12:11

## 2025-04-07 RX ADMIN — HYDROXYZINE HYDROCHLORIDE 10 MG: 10 TABLET, FILM COATED ORAL at 12:13

## 2025-04-07 RX ADMIN — HYDROXYZINE HYDROCHLORIDE 10 MG: 10 TABLET, FILM COATED ORAL at 16:11

## 2025-04-07 RX ADMIN — Medication 3 MG: at 22:51

## 2025-04-07 RX ADMIN — LIDOCAINE 1 PATCH: 4 PATCH TOPICAL at 09:04

## 2025-04-07 RX ADMIN — IBUPROFEN 400 MG: 400 TABLET, FILM COATED ORAL at 17:24

## 2025-04-07 RX ADMIN — BENZTROPINE MESYLATE 2 MG: 1 TABLET ORAL at 22:50

## 2025-04-07 RX ADMIN — BUSPIRONE HYDROCHLORIDE 20 MG: 10 TABLET ORAL at 08:56

## 2025-04-07 RX ADMIN — DICLOFENAC SODIUM 2 G: 10 GEL TOPICAL at 08:59

## 2025-04-07 RX ADMIN — BUSPIRONE HYDROCHLORIDE 20 MG: 10 TABLET ORAL at 17:24

## 2025-04-07 RX ADMIN — BUSPIRONE HYDROCHLORIDE 20 MG: 10 TABLET ORAL at 20:56

## 2025-04-07 RX ADMIN — OLANZAPINE 7.5 MG: 5 TABLET, FILM COATED ORAL at 08:56

## 2025-04-07 RX ADMIN — LAMOTRIGINE 150 MG: 150 TABLET ORAL at 08:56

## 2025-04-07 RX ADMIN — DICLOFENAC SODIUM 2 G: 10 GEL TOPICAL at 02:03

## 2025-04-07 RX ADMIN — IBUPROFEN 400 MG: 400 TABLET, FILM COATED ORAL at 02:03

## 2025-04-07 ASSESSMENT — ACTIVITIES OF DAILY LIVING (ADL)
ADLS_ACUITY_SCORE: 72

## 2025-04-07 NOTE — PLAN OF CARE
"Goal Outcome Evaluation:      Plan of Care Reviewed With: patient    Overall Patient Progress: no changeOverall Patient Progress: no change    Outcome Evaluation: Vss, agitated and anxious at times, asking staff to leave now and not wanting to take his meds, was reoriented as needed. Incontinent of urine, external urinary catheter in use, redness to buttocks and scrotum treated with barrier cream after each incontinence episodeas needed. Skin , gurdeep cares done, linen changed. On scheduled atarax for neck discomfort.Assistof 2 with adl's. Falls risk precautions.Awaiting discharge,  following.        Problem: Adult Inpatient Plan of Care  Goal: Plan of Care Review  Description: The Plan of Care Review/Shift note should be completed every shift.  The Outcome Evaluation is a brief statement about your assessment that the patient is improving, declining, or no change.  This information will be displayed automatically on your shiftnote.  Outcome: Progressing  Flowsheets (Taken 4/6/2025 8818)  Outcome Evaluation: Vss, agitated and anxious at times, asking staff to leave now and not wanting to take his meds, was reoriented as needed. Incontinent of urine, external urinary catheter in use, redness to buttocks and scrotum treated with barrier cream after each incontinence episodeas needed. Skin , gurdeep cares done, linen changed. On scheduled atarax for neck discomfort.Assistof 2 with adl's. Falls risk precautions.Awaiting discharge,  following.  Overall Patient Progress: no change  Goal: Patient-Specific Goal (Individualized)  Description: You can add care plan individualizations to a care plan. Examples of Individualization might be:  \"Parent requests to be called daily at 9am for status\", \"I have a hard time hearing out of my right ear\", or \"Do not touch me to wake me up as it startlesme\".  Outcome: Progressing  Goal: Absence of Hospital-Acquired Illness or Injury  Outcome: " Progressing  Intervention: Identify and Manage Fall Risk  Recent Flowsheet Documentation  Taken 4/6/2025 1557 by Jordana Arvizu RN  Safety Promotion/Fall Prevention:   activity supervised   increase visualization of patient   lighting adjusted  Intervention: Prevent Skin Injury  Recent Flowsheet Documentation  Taken 4/6/2025 1557 by Jordana Arvizu RN  Body Position: position changed independently  Skin Protection: adhesive use limited  Intervention: Prevent Infection  Recent Flowsheet Documentation  Taken 4/6/2025 1557 by Jordana Arvizu RN  Infection Prevention:   rest/sleep promoted   single patient room provided   hand hygiene promoted  Goal: Optimal Comfort and Wellbeing  Outcome: Progressing  Intervention: Monitor Pain and Promote Comfort  Recent Flowsheet Documentation  Taken 4/6/2025 1538 by Jordana Arvizu RN  Pain Management Interventions: care clustered  Intervention: Provide Person-Centered Care  Recent Flowsheet Documentation  Taken 4/6/2025 1557 by Jordana Arvizu RN  Trust Relationship/Rapport:   care explained   thoughts/feelings acknowledged  Goal: Readiness for Transition of Care  Outcome: Progressing     Problem: Delirium  Goal: Optimal Coping  Outcome: Progressing  Intervention: Optimize Psychosocial Adjustment to Delirium  Recent Flowsheet Documentation  Taken 4/6/2025 1557 by Jordana Arvizu RN  Supportive Measures: active listening utilized  Family/Support System Care: self-care encouraged  Goal: Improved Behavioral Control  Outcome: Progressing  Intervention: Minimize Safety Risk  Recent Flowsheet Documentation  Taken 4/6/2025 1557 by Jordana Arvizu RN  Enhanced Safety Measures: pain management  Trust Relationship/Rapport:   care explained   thoughts/feelings acknowledged  Goal: Improved Attention and Thought Clarity  Outcome: Progressing  Intervention: Maximize Cognitive Function  Recent Flowsheet Documentation  Taken 4/6/2025 1557 by Jordana Arvizu  RN  Reorientation Measures:   clock in view   reorientation provided  Goal: Improved Sleep  Outcome: Progressing     Problem: Violence Risk or Actual  Goal: Anger and Impulse Control  Outcome: Progressing  Intervention: Minimize Safety Risk  Recent Flowsheet Documentation  Taken 4/6/2025 1557 by Jordana Arvizu RN  Enhanced Safety Measures: pain management  Intervention: Promote Self-Control  Recent Flowsheet Documentation  Taken 4/6/2025 1557 by Jordana Arvizu RN  Supportive Measures: active listening utilized

## 2025-04-07 NOTE — PLAN OF CARE
"Nursing shift [id number]    Goal Outcome Evaluation:      Plan of Care Reviewed With: patient    Overall Patient Progress: no changeOverall Patient Progress: no change       VSS on RA. Pain managed with PRN medications. Hair washed. Voiding well, incontinent. Redness to buttocks and scrotum, barrier cream applied. Mechanical soft diet. Ax2 gb/walker, but not oob during shift. No IV access. SW following. Discharge TBD.      Problem: Adult Inpatient Plan of Care  Goal: Plan of Care Review  Description: The Plan of Care Review/Shift note should be completed every shift.  The Outcome Evaluation is a brief statement about your assessment that the patient is improving, declining, or no change.  This information will be displayed automatically on your shiftnote.  Outcome: Progressing  Flowsheets (Taken 4/7/2025 0007)  Plan of Care Reviewed With: patient  Overall Patient Progress: no change  Goal: Patient-Specific Goal (Individualized)  Description: You can add care plan individualizations to a care plan. Examples of Individualization might be:  \"Parent requests to be called daily at 9am for status\", \"I have a hard time hearing out of my right ear\", or \"Do not touch me to wake me up as it startlesme\".  Outcome: Progressing  Goal: Absence of Hospital-Acquired Illness or Injury  Outcome: Progressing  Goal: Optimal Comfort and Wellbeing  Outcome: Progressing  Goal: Readiness for Transition of Care  Outcome: Progressing     Problem: Delirium  Goal: Optimal Coping  Outcome: Progressing  Goal: Improved Behavioral Control  Outcome: Progressing  Goal: Improved Attention and Thought Clarity  Outcome: Progressing  Goal: Improved Sleep  Outcome: Progressing     Problem: Violence Risk or Actual  Goal: Anger and Impulse Control  Outcome: Progressing     "

## 2025-04-07 NOTE — PROGRESS NOTES
"[facility]     CONSULT PSYCHIATRY  FOLLOW UP NOTE     DATE OF SERVICE   04/07/2025       IDENTIFICATION   Harpreet Mcelroy  Age: 69 year old  MRN# [id number]   YOB: 1955  Length of Stay:  7        CHIEF COMPLAINT   \"Leave!\"       SUBJECTIVE   The patient's care was discussed with primary treatment team directly and patient's electronic chart notes were reviewed.      Staff report patient did not report any acute medical concerns or side effects.     No behavioral issues overnight, including violent or aggressive behaviors. Patient did not require seclusion or restraints. Patient is exhibiting signs or symptoms of psychosis or jaki. Patient did not endorse suicidal ideation. Patient did not endorse homicide ideation.     Patient is medication adherent. Patient is not on a 1:1 for safety. Patient is sleeping. Patient is eating adequately.     Attending Interval History:  Patient cooperative with me today.  He allowed me to examine him today.    C/L Psychiatry treatment plan (4/4):  4/04/2025:  Medication Changes:    NONE  Continue Medications:  Zyprexa: Continued PTA Zyprexa 7.5 mg every morning, q. noon, and 25 mg at bedtime, psychosis/mood.  Cogentin: Continued PTA Cogentin 2 mg nightly, EPSE.  Lamictal: Continued increase of PTA lamotrigine 100 mg daily to lamotrigine 150 mg daily.  No issues of tolerability after titration on 3/28 with slow progression of mood lability.  Consider further titration on 4/11 for optimization.  BuSpar: Continued increase of PTA BuSpar 15 mg twice daily to 20 mg twice daily, anxiety, + as needed BuSpar 20 mg daily, anxiety.  Atarax: Continued PTA Atarax 10 mg twice daily.    PRN Melatonin: Continue PTA as needed melatonin 3 mg at bedtime, sleep.  Atarax: Continued PTA Atarax 10 mg twice daily.    PRN Atarax:  Continued as needed Atarax 10 mg 3 times daily, anxiety.  PRN Risperdal ODT:  Continued as needed Risperdal ODT 1 mg bid, mild to " moderate agitation.  PRN Haldol IM:  Continued as needed Haldol IM 5 mg bid, severe agitation  Other:  Legal:   3/28 (0309):  Placed on a 72-hour hold.  3/28:  Filed [facility] Petition for Commitment with [facility].   3/31:  Petition re-filed through [facility] --> Supported  4/02:  Court Hold (updated orders)  4/04:  Exam/Preliminary Hearing today  4/09:  Final if indicated.  RECOMMENDATION:  Full MI Commitment with [facility]  Placement: Continue court process and progress MH stabilization, then return back to [facility] (PTA Placement) once completed.  Precautions placed:  Routine as per ED; Delirium; Sexual.    Review of notes and/or information:  I personally reviewed notes from the patient's electronic chart since last psychiatry consult dated 4/4, most recent visit, and other interim reports. This provided me with information regarding patient's recent clinical course.     I personally reviewed the patient's chart, including available medication list and progression of hospital course.       OBJECTIVE   Upon interview, the patient is dismissive on approach.  Visit evaluation attempted in patient's room on the unit.  Consent is not given to evaluate.  Patient is not voluntary.    INTERVAL HISTORY:  Chart reviewed..  Attempted to discuss and evaluate patient directly.  Patient refuses meaningful engagement, consistent with previous visits for follow-up.  Most recent visit attempt on 4/4 during exam and preliminary hearing.  Case management note reviewed and final hearing remain scheduled for 4/9.  Decision is pending if patient is able to return to previous residence at [facility].    Today, patient refuses meaningful evaluation.  Information gathered by means of nursing/physician/social work report and patient observation.  Does not appear in acute distress.    Suicidal ideation: denies current or recent suicidal ideation or behaviors.    Homicidal ideation: denies current or recent homicidal  ideation or behaviors.    Psychotic symptoms: Paranoia; possibly baseline.    Medication side effects reported: No significant side effects.    Acute medical concerns: none    Other issues reported by patient:  Patient had no further questions or concerns.         MEDICATIONS   Medications:  Scheduled Meds:  Current Facility-Administered Medications   Medication Dose Route Frequency Provider Last Rate Last Admin     artificial tears ophthalmic ointment   Left Eye At Bedtime Mary Martinez DO   Given at 04/06/25 2100     benztropine (COGENTIN) tablet 2 mg  2 mg Oral At Bedtime Aisha Velez MD   2 mg at 04/06/25 2100     busPIRone (BUSPAR) tablet 20 mg  20 mg Oral BID Arnel Peres MD   20 mg at 04/07/25 0856     hydrOXYzine HCl (ATARAX) tablet 10 mg  10 mg Oral BID Lisseth Harris MD   10 mg at 04/07/25 1213     lamoTRIgine (LaMICtal) tablet 150 mg  150 mg Oral Daily Arnel Peres MD   150 mg at 04/07/25 0856     Lidocaine (LIDOCARE) 4 % Patch 1 patch  1 patch Transdermal Q24H Panfilo Damon DO   1 patch at 04/07/25 0904     melatonin tablet 3 mg  3 mg Oral At Bedtime Aisha Velez MD   3 mg at 04/06/25 2101     OLANZapine (zyPREXA) tablet 25 mg  25 mg Oral At Bedtime Arnel Peres MD   25 mg at 04/06/25 2101     OLANZapine (zyPREXA) tablet 7.5 mg  7.5 mg Oral BID Lisseth Harris MD   7.5 mg at 04/07/25 1211     tamsulosin (FLOMAX) capsule 0.4 mg  0.4 mg Oral Daily Lisseth Harris MD   0.4 mg at 04/07/25 0856     Continuous Infusions:  Current Facility-Administered Medications   Medication Dose Route Frequency Provider Last Rate Last Admin     PRN Meds:.  Current Facility-Administered Medications   Medication Dose Route Frequency Provider Last Rate Last Admin     alum & mag hydroxide-simethicone (MAALOX) suspension 30 mL  30 mL Oral Q4H PRN Kendrick Alex MD         busPIRone (BUSPAR) tablet 20 mg  20 mg Oral Daily PRN Arnel Peres MD         calcium  carbonate (TUMS) chewable tablet 1,000 mg  1,000 mg Oral 4x Daily PRN Mary Martinez DO   1,000 mg at 04/06/25 0044     diclofenac (VOLTAREN) 1 % topical gel 2 g  2 g Topical 4x Daily PRN Panfilo Damon DO   2 g at 04/07/25 0859     haloperidol lactate (HALDOL) injection 5 mg  5 mg Intramuscular BID PRN Arnel Peres MD         hydrOXYzine HCl (ATARAX) tablet 10 mg  10 mg Oral TID PRN Arnel Peres MD   10 mg at 04/07/25 0203     ibuprofen (ADVIL/MOTRIN) tablet 400 mg  400 mg Oral Q8H PRN Kendrick Alex MD   400 mg at 04/07/25 0903     lidocaine (LMX4) cream   Topical Q1H PRN Mary Martinez DO         lidocaine 1 % 0.1-1 mL  0.1-1 mL Other Q1H PRN Mary Martinez DO         midazolam (VERSED) injection 1 mg  1 mg Intravenous Once PRN Aisha Velez MD         ondansetron (ZOFRAN ODT) ODT tab 4 mg  4 mg Oral Q6H PRN Mary Martinez DO        Or     ondansetron (ZOFRAN) injection 4 mg  4 mg Intravenous Q6H PRN Mary Martinez DO         oxyBUTYnin (DITROPAN) half-tab 2.5 mg  2.5 mg Oral TID PRN Lisseth Harris MD         polyethylene glycol (MIRALAX) Packet 17 g  17 g Oral BID PRN Mary Martinez DO         prochlorperazine (COMPAZINE) injection 5 mg  5 mg Intravenous Q6H PRN Mary Martinez DO        Or     prochlorperazine (COMPAZINE) tablet 5 mg  5 mg Oral Q6H PRN Mary Martinez DO         risperiDONE (risperDAL M-TABS) ODT tab 1 mg  1 mg Oral BID PRN Arnel Peres MD   1 mg at 04/03/25 1743     senna-docusate (SENOKOT-S/PERICOLACE) 8.6-50 MG per tablet 1 tablet  1 tablet Oral BID PRN Mary Martinez DO        Or     senna-docusate (SENOKOT-S/PERICOLACE) 8.6-50 MG per tablet 2 tablet  2 tablet Oral BID PRN Mary Martinez DO         simethicone (MYLICON) chewable half-tab 40 mg  40 mg Oral Q6H PRN Kendrick Alex MD         sodium chloride (PF) 0.9% PF flush 3 mL  3 mL Intracatheter q1 min prn Juan,  "DO Mary         Medication adherence issues: MS Med Adherence Y/N: No  Medication side effects: MEDICATION SIDE EFFECTS: no side effects reported  Benefit: Yes / No: BAHMAN       ROS   The 10 point Review of Systems is negative other than noted in the HPI or here.       MENTAL STATUS EXAM   Vitals: /69 (BP Location: Right arm)   Pulse 73   Temp (!) 96.7  F (35.9  C) (Temporal)   Resp 16   SpO2 98%     Weight:   0 lbs 0 oz    There is no height or weight on file to calculate BMI.  There were no vitals filed for this visit.    Appearance: Dismissive  Mood:  Mood: Irritable , improving  Affect: guarded  was congruent to speech  Suicidal Ideation: PRESENT / ABSENT: absent   Homicidal Ideation: PRESENT / ABSENT: absent   Thought process: response delay   Thought content: devoid of  suicidal ideation and violent ideation.   Fund of Knowledge: BAHMAN  Attention/Concentration: Poor  Language ability: Chronic dysarthria  Memory:  BAHMAN  Insight:  limited.  Judgement: limited  Orientation: Person:  yes  Psychomotor Behavior: restless    Muscle Strength and Tone: MuscleStrength: Normal  Gait and Station:  In bed       LABS   personally reviewed.   Temp: (!) 96.7  F (35.9  C) Temp src: Temporal BP: 132/69 Pulse: 73   Resp: 16 SpO2: 98 % O2 Device: None (Room air)        Estimated body mass index is 24.19 kg/m  as calculated from the following:    Height as of 7/23/23: 1.753 m (5' 9\").    Weight as of 6/6/24: 74.3 kg (163 lb 12.8 oz).    No results found for this or any previous visit (from the past 48 hours).    Qtc: 498    No results found for: \"PHENYTOIN\", \"PHENOBARB\", \"VALPROATE\", \"CBMZ\"       DIAGNOSIS   Principal Problem:    Schizoaffective disorder, bipolar type (H)    Active Problem List:  Patient Active Problem List   Diagnosis     Carol (H)     Acute UTI     Other hydronephrosis     Slurred speech     Falls frequently     Mood disorder with psychosis     Agitation     Anxiety     Schizoaffective disorder, " bipolar type (H)     Hx of schizophrenia     Aggressive behavior     ROBBIN (acute kidney injury)          ASSESSMENT/PLAN   Today's Changes-04/07/2025:  Medication Changes:    NONE  Continue Medications:  Zyprexa: Continue PTA Zyprexa 7.5 mg every morning, q. noon, and 25 mg at bedtime, psychosis/mood.  Cogentin: Continue PTA Cogentin 2 mg nightly, EPSE.  Lamictal: Continue increase of PTA lamotrigine 100 mg daily to lamotrigine 150 mg daily.  No issues of tolerability after titration on 3/28 with slow progression of mood lability.  Consider further titration on 4/11 for optimization.  BuSpar: Continue increase of PTA BuSpar 15 mg twice daily to 20 mg twice daily, anxiety, + as needed BuSpar 20 mg daily, anxiety.  Atarax: Continue PTA Atarax 10 mg twice daily.    PRN Melatonin: Continue PTA as needed melatonin 3 mg at bedtime, sleep.  Atarax: Continue PTA Atarax 10 mg twice daily.    PRN Atarax:  Continue as needed Atarax 10 mg 3 times daily, anxiety.  PRN Risperdal ODT:  Continue as needed Risperdal ODT 1 mg bid, mild to moderate agitation.  PRN Haldol IM:  Continue as needed Haldol IM 5 mg bid, severe agitation  Other:  Legal:   3/28 (0309):  Placed on a 72-hour hold.  3/28:  Filed [facility] Petition for Commitment with [facility].   3/31:  Petition re-filed through [facility] --> Supported  4/02:  Court Hold (updated orders)  4/04:  Exam/Preliminary Hearing  4/09:  Final hearing pending  RECOMMENDATION:  Full MI Commitment with [facility]  Placement: Continue court process and progress MH stabilization, then return back to [facility] (PTA Placement) once completed.  Precautions placed:  Routine as per ED; Delirium; Sexual.  Please also refer to RN/team documentation for additional details.     Target psychiatric symptoms and interventions:  Education:  Risks, benefits, and alternatives discussed at length with patient.     Care Coordination:  Treatment plan discussed with staff.  Please reconsult  Psychiatry as needed. Plan to follow up on 4/9 (Off service on 4/8).        Risk Assessment: IP MHAC RISK ASSESSMENT: Patient on precautions    Coordination of Care:   Treatment Plan reviewed, Care discussed with Care/Treatment Team Members, Chart reviewed and Patient seen         Arnel Peres MD    -     04/07/2025  -     1:15 PM    Total encounter time:  A total of  45  minutes spent related to chart review, history and exam, documentation   and further activities as noted above    This note was created with help of Dragon dictation system. Grammatical / typing errors are not intentional.        Arnel Peres MD  Consult/Liaison Psychiatry   [facility]  Securely message with Shi

## 2025-04-07 NOTE — PLAN OF CARE
"Goal Outcome Evaluation:      Plan of Care Reviewed With: patient    Overall Patient Progress: no changeOverall Patient Progress: no change     Confused, agitated. VSS on room air. Refused to get oob. Incontinent cares done. External catheter on. Wound cares completed. Prn Voltaren gel, ibuprofen given. Mechanical soft diet. Nnamdi Glover following.         Problem: Adult Inpatient Plan of Care  Goal: Plan of Care Review  Description: The Plan of Care Review/Shift note should be completed every shift.  The Outcome Evaluation is a brief statement about your assessment that the patient is improving, declining, or no change.  This information will be displayed automatically on your shiftnote.  Outcome: Progressing  Flowsheets (Taken 4/7/2025 1528)  Plan of Care Reviewed With: patient  Overall Patient Progress: no change  Goal: Patient-Specific Goal (Individualized)  Description: You can add care plan individualizations to a care plan. Examples of Individualization might be:  \"Parent requests to be called daily at 9am for status\", \"I have a hard time hearing out of my right ear\", or \"Do not touch me to wake me up as it startlesme\".  Outcome: Progressing  Goal: Absence of Hospital-Acquired Illness or Injury  Outcome: Progressing  Intervention: Identify and Manage Fall Risk  Recent Flowsheet Documentation  Taken 4/7/2025 1000 by Berenice Soliz RN  Safety Promotion/Fall Prevention:   activity supervised   clutter free environment maintained   increased rounding and observation   increase visualization of patient   lighting adjusted   nonskid shoes/slippers when out of bed   patient and family education   room near nurse's station   room organization consistent   safety round/check completed   supervised activity  Intervention: Prevent and Manage VTE (Venous Thromboembolism) Risk  Recent Flowsheet Documentation  Taken 4/7/2025 1000 by Berenice Soliz RN  VTE Prevention/Management: SCDs off (sequential compression " devices)  Intervention: Prevent Infection  Recent Flowsheet Documentation  Taken 4/7/2025 1000 by Berenice Soliz RN  Infection Prevention:   rest/sleep promoted   single patient room provided   hand hygiene promoted  Goal: Optimal Comfort and Wellbeing  Outcome: Progressing  Goal: Readiness for Transition of Care  Outcome: Progressing     Problem: Delirium  Goal: Optimal Coping  Outcome: Progressing  Intervention: Optimize Psychosocial Adjustment to Delirium  Recent Flowsheet Documentation  Taken 4/7/2025 1000 by Berenice Soliz RN  Supportive Measures: active listening utilized  Goal: Improved Behavioral Control  Outcome: Progressing  Intervention: Minimize Safety Risk  Recent Flowsheet Documentation  Taken 4/7/2025 1000 by Berenice Soliz RN  Enhanced Safety Measures: pain management  Goal: Improved Attention and Thought Clarity  Outcome: Progressing  Goal: Improved Sleep  Outcome: Progressing     Problem: Violence Risk or Actual  Goal: Anger and Impulse Control  Outcome: Progressing  Intervention: Minimize Safety Risk  Recent Flowsheet Documentation  Taken 4/7/2025 1000 by Berenice Soliz RN  Enhanced Safety Measures: pain management  Intervention: Promote Self-Control  Recent Flowsheet Documentation  Taken 4/7/2025 1000 by Berenice Soliz RN  Supportive Measures: active listening utilized     Problem: Comorbidity Management  Goal: Maintenance of Behavioral Health Symptom Control  Outcome: Progressing  Intervention: Maintain Behavioral Health Symptom Control  Recent Flowsheet Documentation  Taken 4/7/2025 1000 by Berenice Soliz RN  Medication Review/Management: medications reviewed

## 2025-04-07 NOTE — CONSULTS
"NUTRITION BRIEF NOTE      REASON FOR NUTRITION BRIEF NOTE:  Chart check at length of stay (LOS) per policy.    CHART CHECK:  - Per review of flowsheets, patient is consuming % of meals and ordering/receiving meals TID-QID per meal system review.    - There is no current documentation of edema.  There is no formal admission weight for comparison.  Consider a formal admit wt, if able:  Wt Readings from Last 10 Encounters:   06/06/24 74.3 kg (163 lb 12.8 oz)   07/23/23 75.8 kg (167 lb)   12/01/21 75.3 kg (166 lb)   01/26/20 61.7 kg (136 lb)   06/29/15 57.2 kg (126 lb)     - No documentation of pressure injury.  WOCN note from 4/03 indicates friction, IAD, and MASD to L posterior thigh and buttocks.    FOLLOW UP:   Will re-check chart in 7-10 days per policy, unless formally consulted in the interim.  Please formally consult if needs arise.      Zandra Ambriz RDN, , LD  Clinical Dietitian  [facility] Message Group: \"Dietitian [[place]]\"  Office: [phone]  Pagers:  3rd floor/ICU: [phone]  All other floors: [phone]  Weekend/holiday: [phone]    "

## 2025-04-07 NOTE — PLAN OF CARE
"Pt alert, confused and agitated. Yelling staff to leave him alone. Refused skin assessment and reposition by primary RN and support. Charge RN helped with cares. Incontinent of bladder. On RA. No IV access.     Problem: Adult Inpatient Plan of Care  Goal: Plan of Care Review  Description: The Plan of Care Review/Shift note should be completed every shift.  The Outcome Evaluation is a brief statement about your assessment that the patient is improving, declining, or no change.  This information will be displayed automatically on your shiftnote.  Outcome: Progressing  Flowsheets (Taken 4/7/2025 0653)  Outcome Evaluation: Pt alert, confused and agitated. Yelling staff to leave him alone. Refused skin assessment and reposition by primary RN and support. Charge RN helped with cares.  Incontinent of bladder. On RA. No IV access.  Plan of Care Reviewed With: patient  Overall Patient Progress: no change  Goal: Patient-Specific Goal (Individualized)  Description: You can add care plan individualizations to a care plan. Examples of Individualization might be:  \"Parent requests to be called daily at 9am for status\", \"I have a hard time hearing out of my right ear\", or \"Do not touch me to wake me up as it startlesme\".  Outcome: Progressing  Goal: Absence of Hospital-Acquired Illness or Injury  Outcome: Progressing  Intervention: Identify and Manage Fall Risk  Recent Flowsheet Documentation  Taken 4/7/2025 0600 by Abigail Sellers, RN  Safety Promotion/Fall Prevention:   activity supervised   clutter free environment maintained   increased rounding and observation   increase visualization of patient   lighting adjusted   nonskid shoes/slippers when out of bed   patient and family education   room near nurse's station   room organization consistent   safety round/check completed   supervised activity  Intervention: Prevent Skin Injury  Recent Flowsheet Documentation  Taken 4/7/2025 0600 by Abigail Sellers RN  Body " Position: refuses positioning  Intervention: Prevent Infection  Recent Flowsheet Documentation  Taken 4/7/2025 0600 by Abigail Sellers RN  Infection Prevention:   rest/sleep promoted   single patient room provided   hand hygiene promoted  Goal: Optimal Comfort and Wellbeing  Outcome: Progressing  Goal: Readiness for Transition of Care  Outcome: Progressing     Problem: Delirium  Goal: Optimal Coping  Outcome: Progressing  Goal: Improved Behavioral Control  Outcome: Progressing  Intervention: Minimize Safety Risk  Recent Flowsheet Documentation  Taken 4/7/2025 0600 by Abigail Sellers RN  Enhanced Safety Measures: pain management  Goal: Improved Attention and Thought Clarity  Outcome: Progressing  Goal: Improved Sleep  Outcome: Progressing     Problem: Violence Risk or Actual  Goal: Anger and Impulse Control  Outcome: Progressing  Intervention: Minimize Safety Risk  Recent Flowsheet Documentation  Taken 4/7/2025 0600 by Abigail Sellers RN  Enhanced Safety Measures: pain management     Problem: Comorbidity Management  Goal: Maintenance of Behavioral Health Symptom Control  Outcome: Progressing  Intervention: Maintain Behavioral Health Symptom Control  Recent Flowsheet Documentation  Taken 4/7/2025 0600 by Abigail Sellers RN  Medication Review/Management: medications reviewed   Goal Outcome Evaluation:      Plan of Care Reviewed With: patient    Overall Patient Progress: no changeOverall Patient Progress: no change    Outcome Evaluation: Pt alert, confused and agitated. Yelling staff to leave him alone. Refused skin assessment and reposition by primary RN and support. Charge RN helped with cares.  Incontinent of bladder. On RA. No IV access.
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
## 2025-04-08 PROCEDURE — 250N000013 HC RX MED GY IP 250 OP 250 PS 637: Performed by: INTERNAL MEDICINE

## 2025-04-08 PROCEDURE — 250N000013 HC RX MED GY IP 250 OP 250 PS 637: Performed by: PSYCHIATRY & NEUROLOGY

## 2025-04-08 PROCEDURE — 250N000013 HC RX MED GY IP 250 OP 250 PS 637: Performed by: EMERGENCY MEDICINE

## 2025-04-08 PROCEDURE — 99232 SBSQ HOSP IP/OBS MODERATE 35: CPT | Performed by: INTERNAL MEDICINE

## 2025-04-08 PROCEDURE — G0463 HOSPITAL OUTPT CLINIC VISIT: HCPCS

## 2025-04-08 PROCEDURE — 120N000001 HC R&B MED SURG/OB

## 2025-04-08 RX ADMIN — OLANZAPINE 7.5 MG: 5 TABLET, FILM COATED ORAL at 11:30

## 2025-04-08 RX ADMIN — BUSPIRONE HYDROCHLORIDE 20 MG: 10 TABLET ORAL at 08:06

## 2025-04-08 RX ADMIN — TAMSULOSIN HYDROCHLORIDE 0.4 MG: 0.4 CAPSULE ORAL at 08:06

## 2025-04-08 RX ADMIN — ALUMINUM HYDROXIDE, MAGNESIUM HYDROXIDE, AND DIMETHICONE 30 ML: 200; 20; 200 SUSPENSION ORAL at 14:24

## 2025-04-08 RX ADMIN — HYDROXYZINE HYDROCHLORIDE 10 MG: 10 TABLET, FILM COATED ORAL at 14:25

## 2025-04-08 RX ADMIN — DICLOFENAC SODIUM 2 G: 10 GEL TOPICAL at 11:26

## 2025-04-08 RX ADMIN — IBUPROFEN 400 MG: 400 TABLET, FILM COATED ORAL at 08:10

## 2025-04-08 RX ADMIN — HYDROXYZINE HYDROCHLORIDE 10 MG: 10 TABLET, FILM COATED ORAL at 11:32

## 2025-04-08 RX ADMIN — LAMOTRIGINE 150 MG: 150 TABLET ORAL at 08:06

## 2025-04-08 RX ADMIN — OLANZAPINE 7.5 MG: 5 TABLET, FILM COATED ORAL at 08:06

## 2025-04-08 ASSESSMENT — ACTIVITIES OF DAILY LIVING (ADL)
ADLS_ACUITY_SCORE: 72
ADLS_ACUITY_SCORE: 76
ADLS_ACUITY_SCORE: 72

## 2025-04-08 NOTE — PLAN OF CARE
"Goal Outcome Evaluation:      Plan of Care Reviewed With: patient    Overall Patient Progress: no changeOverall Patient Progress: no change     Anxious, agitated at times. Vitally stably on room air. Refuses to get oob. Multiple soft BMs today. Maalox given for indigestion. Incontinent cares done. Pain controlled with voltaren gel, lidocaine patch, ibuprofen. Prn atarax given x1 for anxiety. Mech soft diet. Sw following for placement. Court hearing tomorrow at 1330. Meeting ID and passcode in behavioral health note from today.         Problem: Adult Inpatient Plan of Care  Goal: Plan of Care Review  Description: The Plan of Care Review/Shift note should be completed every shift.  The Outcome Evaluation is a brief statement about your assessment that the patient is improving, declining, or no change.  This information will be displayed automatically on your shiftnote.  4/8/2025 1432 by Berenice Soliz RN  Outcome: Progressing  4/8/2025 1432 by Berenice Soliz RN  Outcome: Progressing  Flowsheets (Taken 4/8/2025 1432)  Plan of Care Reviewed With: patient  Overall Patient Progress: no change  4/8/2025 1421 by Berenice Soliz RN  Outcome: Progressing  Flowsheets (Taken 4/8/2025 1421)  Plan of Care Reviewed With: patient  Overall Patient Progress: no change  Goal: Patient-Specific Goal (Individualized)  Description: You can add care plan individualizations to a care plan. Examples of Individualization might be:  \"Parent requests to be called daily at 9am for status\", \"I have a hard time hearing out of my right ear\", or \"Do not touch me to wake me up as it startlesme\".  4/8/2025 1432 by Berenice Soliz RN  Outcome: Progressing  4/8/2025 1432 by Berenice Soliz RN  Outcome: Progressing  4/8/2025 1421 by Berenice Soliz RN  Outcome: Progressing  Goal: Absence of Hospital-Acquired Illness or Injury  4/8/2025 1432 by Berenice Soliz RN  Outcome: Progressing  4/8/2025 1432 by Berenice Soliz RN  Outcome: " Progressing  4/8/2025 1421 by Berenice Soliz RN  Outcome: Progressing  Intervention: Identify and Manage Fall Risk  Recent Flowsheet Documentation  Taken 4/8/2025 0900 by Berenice Soliz RN  Safety Promotion/Fall Prevention: safety round/check completed  Intervention: Prevent Skin Injury  Recent Flowsheet Documentation  Taken 4/8/2025 0900 by Berenice Soliz RN  Body Position: position changed independently  Intervention: Prevent Infection  Recent Flowsheet Documentation  Taken 4/8/2025 0900 by Berenice Soliz RN  Infection Prevention:   rest/sleep promoted   single patient room provided   hand hygiene promoted  Goal: Optimal Comfort and Wellbeing  4/8/2025 1432 by Berenice Soliz RN  Outcome: Progressing  4/8/2025 1432 by Berenice Soliz RN  Outcome: Progressing  4/8/2025 1421 by Berenice Soliz RN  Outcome: Progressing  Intervention: Monitor Pain and Promote Comfort  Recent Flowsheet Documentation  Taken 4/8/2025 0900 by Berenice Soliz RN  Pain Management Interventions: medication (see MAR)  Goal: Readiness for Transition of Care  4/8/2025 1432 by Berenice Soliz RN  Outcome: Progressing  4/8/2025 1432 by Berenice Soliz RN  Outcome: Progressing  4/8/2025 1421 by Berenice Soliz RN  Outcome: Progressing     Problem: Delirium  Goal: Optimal Coping  4/8/2025 1432 by Berenice Soliz RN  Outcome: Progressing  4/8/2025 1432 by Berenice Soliz RN  Outcome: Progressing  4/8/2025 1421 by Berenice Soliz RN  Outcome: Progressing  Goal: Improved Behavioral Control  4/8/2025 1432 by Berenice Soliz RN  Outcome: Progressing  4/8/2025 1432 by Berenice Soliz RN  Outcome: Progressing  4/8/2025 1421 by Berenice Soliz RN  Outcome: Progressing  Intervention: Minimize Safety Risk  Recent Flowsheet Documentation  Taken 4/8/2025 0900 by Berenice Soliz RN  Enhanced Safety Measures: pain management  Goal: Improved Attention and Thought Clarity  4/8/2025 1432 by Berenice Soliz RN  Outcome:  Progressing  4/8/2025 1432 by Berenice Soliz RN  Outcome: Progressing  4/8/2025 1421 by Berenice Soliz RN  Outcome: Progressing  Goal: Improved Sleep  4/8/2025 1432 by Berenice Soliz RN  Outcome: Progressing  4/8/2025 1432 by Berenice Soliz RN  Outcome: Progressing  4/8/2025 1421 by Berenice Soliz RN  Outcome: Progressing     Problem: Violence Risk or Actual  Goal: Anger and Impulse Control  4/8/2025 1432 by Berenice Soliz RN  Outcome: Progressing  4/8/2025 1432 by Berenice Soliz RN  Outcome: Progressing  4/8/2025 1421 by Berenice Soliz RN  Outcome: Progressing  Intervention: Minimize Safety Risk  Recent Flowsheet Documentation  Taken 4/8/2025 0900 by Berenice Soliz RN  Enhanced Safety Measures: pain management     Problem: Comorbidity Management  Goal: Maintenance of Behavioral Health Symptom Control  4/8/2025 1432 by Berenice Soliz RN  Outcome: Progressing  4/8/2025 1432 by Berenice Soliz RN  Outcome: Progressing  4/8/2025 1421 by Berenice Soliz RN  Outcome: Progressing  Intervention: Maintain Behavioral Health Symptom Control  Recent Flowsheet Documentation  Taken 4/8/2025 0900 by Berenice Soliz RN  Medication Review/Management: medications reviewed
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
## 2025-04-08 NOTE — PLAN OF CARE
"Goal Outcome Evaluation:      Plan of Care Reviewed With: patient    Overall Patient Progress: improvingOverall Patient Progress: improving    Outcome Evaluation: pt states is anxious, suspicious/mistrustful at times. refusing out of bed. awaiting placement LTC , court hearing on the 9th    Pain controlled with po meds. Yves regular diet. Voiding in large amts- incontinent bowel & bladder. Repositioning to relieve pressure on areas of redness      Problem: Adult Inpatient Plan of Care  Goal: Plan of Care Review  Description: The Plan of Care Review/Shift note should be completed every shift.  The Outcome Evaluation is a brief statement about your assessment that the patient is improving, declining, or no change.  This information will be displayed automatically on your shiftnote.  Outcome: Progressing  Flowsheets (Taken 4/7/2025 7359)  Outcome Evaluation: pt states is anxious, suspicious/mistrustful at times. refusing out of bed. awaiting placement LTC  Plan of Care Reviewed With: patient  Overall Patient Progress: improving  Goal: Patient-Specific Goal (Individualized)  Description: You can add care plan individualizations to a care plan. Examples of Individualization might be:  \"Parent requests to be called daily at 9am for status\", \"I have a hard time hearing out of my right ear\", or \"Do not touch me to wake me up as it startlesme\".  Outcome: Progressing  Goal: Absence of Hospital-Acquired Illness or Injury  Outcome: Progressing  Intervention: Identify and Manage Fall Risk  Recent Flowsheet Documentation  Taken 4/7/2025 1628 by Zandra Valencia, RN  Safety Promotion/Fall Prevention:   activity supervised   assistive device/personal items within reach   clutter free environment maintained   increased rounding and observation   increase visualization of patient   nonskid shoes/slippers when out of bed   patient and family education   safety round/check completed  Intervention: Prevent Skin Injury  Recent Flowsheet " Documentation  Taken 4/7/2025 1628 by Zandra Valencia RN  Body Position:   right   tilted   heels elevated   log-rolled   supine, head elevated  Intervention: Prevent and Manage VTE (Venous Thromboembolism) Risk  Recent Flowsheet Documentation  Taken 4/7/2025 1630 by Zandra Valencia RN  VTE Prevention/Management: SCDs on (sequential compression devices)  Goal: Optimal Comfort and Wellbeing  Outcome: Progressing  Intervention: Monitor Pain and Promote Comfort  Recent Flowsheet Documentation  Taken 4/7/2025 1724 by Zandra Valencia RN  Pain Management Interventions: medication (see MAR)  Intervention: Provide Person-Centered Care  Recent Flowsheet Documentation  Taken 4/7/2025 1630 by Zandra Valencia RN  Trust Relationship/Rapport:   care explained   choices provided   reassurance provided   thoughts/feelings acknowledged  Goal: Readiness for Transition of Care  Outcome: Progressing     Problem: Delirium  Goal: Optimal Coping  Outcome: Progressing  Goal: Improved Behavioral Control  Outcome: Progressing  Intervention: Minimize Safety Risk  Recent Flowsheet Documentation  Taken 4/7/2025 1630 by Zandra Valencia RN  Trust Relationship/Rapport:   care explained   choices provided   reassurance provided   thoughts/feelings acknowledged  Goal: Improved Attention and Thought Clarity  Outcome: Progressing  Goal: Improved Sleep  Outcome: Progressing     Problem: Violence Risk or Actual  Goal: Anger and Impulse Control  Outcome: Progressing     Problem: Comorbidity Management  Goal: Maintenance of Behavioral Health Symptom Control  Outcome: Progressing
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
## 2025-04-08 NOTE — PROGRESS NOTES
Please see below connection information for 4/9/25 hearings.    Harpreet Mcelroy  [facility] ED    1:30 - 2:15 Hearing  Link: [email or web address]  Meeting ID:   Passcode: [id number]    Thank you,    Andria Trujillo  Court       Emailed Andria back to inform her patient has moved and to reach out directly to the unit to coordinate court hearings/care.     Zabrina Stevenson  Extended Care  Ph: [phone]

## 2025-04-09 PROCEDURE — 250N000011 HC RX IP 250 OP 636: Performed by: PSYCHIATRY & NEUROLOGY

## 2025-04-09 PROCEDURE — 120N000001 HC R&B MED SURG/OB

## 2025-04-09 PROCEDURE — 250N000013 HC RX MED GY IP 250 OP 250 PS 637: Performed by: PSYCHIATRY & NEUROLOGY

## 2025-04-09 PROCEDURE — 99231 SBSQ HOSP IP/OBS SF/LOW 25: CPT | Performed by: PSYCHIATRY & NEUROLOGY

## 2025-04-09 PROCEDURE — 250N000009 HC RX 250: Performed by: STUDENT IN AN ORGANIZED HEALTH CARE EDUCATION/TRAINING PROGRAM

## 2025-04-09 PROCEDURE — 99232 SBSQ HOSP IP/OBS MODERATE 35: CPT | Performed by: INTERNAL MEDICINE

## 2025-04-09 PROCEDURE — 250N000013 HC RX MED GY IP 250 OP 250 PS 637: Performed by: INTERNAL MEDICINE

## 2025-04-09 PROCEDURE — 250N000013 HC RX MED GY IP 250 OP 250 PS 637: Performed by: EMERGENCY MEDICINE

## 2025-04-09 RX ADMIN — HYDROXYZINE HYDROCHLORIDE 10 MG: 10 TABLET, FILM COATED ORAL at 12:29

## 2025-04-09 RX ADMIN — BENZTROPINE MESYLATE 2 MG: 1 TABLET ORAL at 21:15

## 2025-04-09 RX ADMIN — OLANZAPINE 7.5 MG: 5 TABLET, FILM COATED ORAL at 09:06

## 2025-04-09 RX ADMIN — MINERAL OIL, WHITE PETROLATUM: .03; .94 OINTMENT OPHTHALMIC at 23:09

## 2025-04-09 RX ADMIN — IBUPROFEN 400 MG: 400 TABLET, FILM COATED ORAL at 20:12

## 2025-04-09 RX ADMIN — LIDOCAINE 1 PATCH: 4 PATCH TOPICAL at 09:05

## 2025-04-09 RX ADMIN — LAMOTRIGINE 150 MG: 150 TABLET ORAL at 09:06

## 2025-04-09 RX ADMIN — OLANZAPINE 25 MG: 10 TABLET, FILM COATED ORAL at 21:14

## 2025-04-09 RX ADMIN — HYDROXYZINE HYDROCHLORIDE 10 MG: 10 TABLET, FILM COATED ORAL at 00:16

## 2025-04-09 RX ADMIN — OLANZAPINE 7.5 MG: 5 TABLET, FILM COATED ORAL at 12:29

## 2025-04-09 RX ADMIN — DICLOFENAC SODIUM 2 G: 10 GEL TOPICAL at 21:41

## 2025-04-09 RX ADMIN — BUSPIRONE HYDROCHLORIDE 20 MG: 10 TABLET ORAL at 20:11

## 2025-04-09 RX ADMIN — HALOPERIDOL LACTATE 5 MG: 5 INJECTION, SOLUTION INTRAMUSCULAR at 15:40

## 2025-04-09 RX ADMIN — Medication 3 MG: at 21:14

## 2025-04-09 RX ADMIN — HYDROXYZINE HYDROCHLORIDE 10 MG: 10 TABLET, FILM COATED ORAL at 20:11

## 2025-04-09 RX ADMIN — TAMSULOSIN HYDROCHLORIDE 0.4 MG: 0.4 CAPSULE ORAL at 09:06

## 2025-04-09 RX ADMIN — BUSPIRONE HYDROCHLORIDE 20 MG: 10 TABLET ORAL at 09:06

## 2025-04-09 ASSESSMENT — ACTIVITIES OF DAILY LIVING (ADL)
ADLS_ACUITY_SCORE: 72
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
## 2025-04-09 NOTE — PLAN OF CARE
"Goal Outcome Evaluation:      Plan of Care Reviewed With: patient    Overall Patient Progress: no changeOverall Patient Progress: no change         A&Ox2 (self/place). Pt reported back pain but refused pain medication from nurse. PRN atarax given for increased anxiety. Mechanical soft diet. Speech is garbled and hard to understand. Pt refuses to get out of bed, but rolls for incontinence cares. Incontinent of bowel/bladder. Court hearing today (4/9) @ 1330. ID and password in SW note. Pt agitated with nurse, would not allow in room. Wound cares done. Discharge TBD.    Problem: Adult Inpatient Plan of Care  Goal: Plan of Care Review  Description: The Plan of Care Review/Shift note should be completed every shift.  The Outcome Evaluation is a brief statement about your assessment that the patient is improving, declining, or no change.  This information will be displayed automatically on your shiftnote.  Outcome: Progressing  Flowsheets (Taken 4/9/2025 9923)  Plan of Care Reviewed With: patient  Overall Patient Progress: no change  Goal: Patient-Specific Goal (Individualized)  Description: You can add care plan individualizations to a care plan. Examples of Individualization might be:  \"Parent requests to be called daily at 9am for status\", \"I have a hard time hearing out of my right ear\", or \"Do not touch me to wake me up as it startlesme\".  Outcome: Progressing  Goal: Absence of Hospital-Acquired Illness or Injury  Outcome: Progressing  Intervention: Identify and Manage Fall Risk  Recent Flowsheet Documentation  Taken 4/8/2025 9846 by Taylor Saldaña RN  Safety Promotion/Fall Prevention:   supervised activity   safety round/check completed   room organization consistent   room near nurse's station   room door open   patient and family education   nonskid shoes/slippers when out of bed   increase visualization of patient   increased rounding and observation   clutter free environment maintained   assistive " device/personal items within reach   activity supervised  Intervention: Prevent and Manage VTE (Venous Thromboembolism) Risk  Recent Flowsheet Documentation  Taken 4/8/2025 2340 by Taylor Saldaña RN  VTE Prevention/Management: SCDs off (sequential compression devices)  Intervention: Prevent Infection  Recent Flowsheet Documentation  Taken 4/8/2025 2340 by Taylor Saldaña RN  Infection Prevention:   cohorting utilized   equipment surfaces disinfected   hand hygiene promoted   rest/sleep promoted   single patient room provided  Goal: Optimal Comfort and Wellbeing  Outcome: Progressing  Goal: Readiness for Transition of Care  Outcome: Progressing     Problem: Delirium  Goal: Optimal Coping  Outcome: Progressing  Goal: Improved Behavioral Control  Outcome: Progressing  Intervention: Minimize Safety Risk  Recent Flowsheet Documentation  Taken 4/8/2025 2340 by Taylor Saldaña RN  Enhanced Safety Measures:   review medications for side effects with activity   patient/family teach back on injury risk   assistive devices when indicated  Goal: Improved Attention and Thought Clarity  Outcome: Progressing  Goal: Improved Sleep  Outcome: Progressing     Problem: Violence Risk or Actual  Goal: Anger and Impulse Control  Outcome: Progressing  Intervention: Minimize Safety Risk  Recent Flowsheet Documentation  Taken 4/8/2025 2340 by Taylor Saldaña RN  Enhanced Safety Measures:   review medications for side effects with activity   patient/family teach back on injury risk   assistive devices when indicated     Problem: Comorbidity Management  Goal: Maintenance of Behavioral Health Symptom Control  Outcome: Progressing  Intervention: Maintain Behavioral Health Symptom Control  Recent Flowsheet Documentation  Taken 4/8/2025 2340 by Taylor Saldaña RN  Medication Review/Management: medications reviewed

## 2025-04-09 NOTE — PROGRESS NOTES
Hospitalist Medicine Progress Note   [facility]       Harpreet Mcelroy is a 69 year old gentleman with bipolar, anxiety, depression, schizophrenia, BPD, paranoia, bladder cancer s/p ureteral stent, HTN, HLD, who came to [facility] on 3/27/2025 on a RENETTA from [facility] with Agitation, attempt to harm staff with cane and was diagnosed with decompensated schizophrenia and ROBBIN with creatinine 1.42 . He was admitted by the medicine service on 3/31/2025 after he was declined by inpatient psychiatry for Psychiatry admission. Was seen by Dr Peres from Psychiatry service here who managed his medications and recommended full MI commitment with pacheco       Date of Admission:  3/27/2025  Assessment & Plan     ROBBIN  - resolved     Decompensated schizophrenia with intermittent agitation  Bipolar  Anxiety  Depression  Chronic cognitive dysfunction  -Psychiatry following.  Will leave adjustment of antipsychotic and psychiatric medications to psychiatry    As per Psychiatry   Zyprexa: Continue PTA Zyprexa 7.5 mg every morning, q. noon, and 25 mg at bedtime, psychosis/mood.  Cogentin: Continue PTA Cogentin 2 mg nightly, EPSE.  Lamictal: Continue increase of PTA lamotrigine 100 mg daily to lamotrigine 150 mg daily.  No issues of tolerability after titration on 3/28 with slow progression of mood lability.  Consider further titration on 4/11 for optimization.  BuSpar: Continue increase of PTA BuSpar 15 mg twice daily to 20 mg twice daily, anxiety, + as needed BuSpar 20 mg daily, anxiety.  Atarax: Continue PTA Atarax 10 mg twice daily.    PRN Melatonin: Continue PTA as needed melatonin 3 mg at bedtime, sleep.  Atarax: Continue PTA Atarax 10 mg twice daily.    PRN Atarax:  Continue as needed Atarax 10 mg 3 times daily, anxiety.  PRN Risperdal ODT:  Continue as needed Risperdal ODT 1 mg bid, mild to moderate agitation.  PRN Haldol IM:  Continue as needed Haldol IM 5 mg bid, severe agitation  Other:  Legal:   3/28  (0309):  Placed on a 72-hour hold.  3/28:  Filed [facility] Petition for Commitment with [facility].   3/31:  Petition re-filed through [facility] --> Supported  4/02:  Court Hold (updated orders)  4/04:  Exam/Preliminary Hearing  4/09:  Final hearing pending     Dysarthric speech  -Patient can be challenging to understand at times     Bladder cancer   Chronic L ureteral stent  Stent last changed 2/4/25 by [facility] urology Dr. Whitt.  - PTA oxybutynin and flomax               Plan:   Awaiting placement     Diet: Mechanical/Dental Soft Diet    DVT Prophylaxis: Pneumatic Compression Devices  Hernandez Catheter: Not present  Code Status: Full Code         Medically Ready for Discharge: Anticipated in 5+ Days    Clinically Significant Risk Factors                                  # Financial/Environmental Concerns: none               The patient's care was discussed with the Patient and RN.    Cassy Reed MD  Hospitalist Service  [facility]    ______________________________________________________________________    Interval History     Patient has multiple complaints and gets frustrated very easily about everything during our discussion.  No other acute issues    Review of Systems:   Could not get any histroy      Data reviewed today: I reviewed all medications, new labs and imaging results over the last 24 hours.     Physical Exam   Vital Signs: Temp: 97.9  F (36.6  C) Temp src: Temporal BP: (!) 152/67 Pulse: 74   Resp: 16 SpO2: 98 % O2 Device: None (Room air)    Weight: 0 lbs 0 oz      GENERAL: Patient is not  in acute distress  HEENT: EOM+,Conjunctiva is clear   NECK: no Jugular Venous distention  CNS:  Alert, agitated , Moving all the Four Limbs     Data   No lab results found in last 7 days.        No results found for this or any previous visit (from the past 24 hours).

## 2025-04-09 NOTE — PROGRESS NOTES
"[facility]     CONSULT PSYCHIATRY  FOLLOW UP NOTE     DATE OF SERVICE   04/09/2025       IDENTIFICATION   Harpreet Mcelroy  Age: 69 year old  MRN# [id number]   YOB: 1955  Length of Stay:  9        CHIEF COMPLAINT   \"Leave!\"       SUBJECTIVE   The patient's care was discussed with primary treatment team directly and patient's electronic chart notes were reviewed.      Staff report patient did not report any acute medical concerns or side effects.     No behavioral issues overnight, including violent or aggressive behaviors. Agitation described as provoked or worsened with males in room, as per [facility] note.  Described as pleasant during wound nurse management.  Wanting to speak to psychiatry.  Patient did not require seclusion or restraints. Patient is exhibiting signs or symptoms of psychosis or jaki. Patient did not endorse suicidal ideation. Patient did not endorse homicide ideation.     Patient is medication adherent. Patient is not assigned a sitter. Patient is sleeping. Patient is eating.     Attending Interval History (4/8):  Patient wanted me to get out of the room the moment I entered the room     C/L Psychiatry last treatment plan (4/7):  4/07/2025:  Medication Changes:    NONE  Continue Medications:  Zyprexa: Continued PTA Zyprexa 7.5 mg every morning, q. noon, and 25 mg at bedtime, psychosis/mood.  Cogentin: Continued PTA Cogentin 2 mg nightly, EPSE.  Lamictal: Continued increase of PTA lamotrigine 100 mg daily to lamotrigine 150 mg daily.  No issues of tolerability after titration on 3/28 with slow progression of mood lability.  Consider further titration on 4/11 for optimization.  BuSpar: Continued increase of PTA BuSpar 15 mg twice daily to 20 mg twice daily, anxiety, + as needed BuSpar 20 mg daily, anxiety.  Atarax: Continued PTA Atarax 10 mg twice daily.    PRN Melatonin: Continue PTA as needed melatonin 3 mg at bedtime, sleep.  Atarax: Continued PTA Atarax 10 " mg twice daily.    PRN Atarax:  Continue as needed Atarax 10 mg 3 times daily, anxiety.  PRN Risperdal ODT:  Continue as needed Risperdal ODT 1 mg bid, mild to moderate agitation.  PRN Haldol IM:  Continue as needed Haldol IM 5 mg bid, severe agitation  Other:  Legal:   3/28 (0309):  Placed on a 72-hour hold.  3/28:  Filed [facility] Petition for Commitment with [facility].   3/31:  Petition re-filed through [facility] --> Supported  4/02:  Court Hold (updated orders)  4/04:  Exam/Preliminary Hearing  4/09:  Final hearing pending  RECOMMENDATION:  Full MI Commitment with [facility]anco  Placement: Continue court process and progress MH stabilization, then return back to [facility] (PTA Placement) once completed.  Precautions placed:  Routine as per ED; Delirium; Sexual.    Review of notes and/or information:  I personally reviewed notes from the patient's electronic chart since last psychiatry consult dated 4/7, most recent visit, and other interim reports. This provided me with information regarding patient's recent clinical course.     I personally reviewed the patient's chart, including available medication list and progression of hospital course.       OBJECTIVE   Upon interview, the patient is not cooperative on approach.  Visit attempt in patient's room on the unit.  Consent is not given to evaluate.  Patient is not voluntary.    INTERVAL HISTORY:  Chart reviewed.  Patient evaluation attempted as follow-up while on a court hold.  Final hearing scheduled today after MI petition for commitment with [facility]anco filed on 3/28.  Patient restarted on PTA medication after issues of noncompliance while a group home decompensation and ED presentation.  While hospitalized, patient has demonstrated baseline behaviors while awaiting court decision.  Case management following during court proceedings and no report by previous living facility of inability to return.    Attempt to evaluate patient today.  According  to chart review, patient informed [facility] nurse avoiding to speak with psychiatry.  Patient has been dismissive or refusing evaluation meaningfully by psychiatry throughout hospitalization.  Today, patient demonstrates same behavior and adamantly refuses evaluation, and demonstrates agitated behavior.  Same interaction documented by hospitalist on previous date.    Unable to obtain review of symptoms directly.  Information gathered by means of chart review, nursing/physician/chart review and patient observation.  Does not appear in acute distress.    Suicidal ideation: denies current or recent suicidal ideation or behaviors.    Homicidal ideation: denies current or recent homicidal ideation or behaviors.    Psychotic symptoms: Perceptual disturbance or paranoia on approach leading to inability to evaluate.    Medication side effects reported: No significant side effects.    Acute medical concerns: none    Other issues reported by patient: Refuses meaningful evaluation.        MEDICATIONS   Medications:  Scheduled Meds:  Current Facility-Administered Medications   Medication Dose Route Frequency Provider Last Rate Last Admin    artificial tears ophthalmic ointment   Left Eye At Bedtime Mary Martinez DO   Given at 04/07/25 2258    benztropine (COGENTIN) tablet 2 mg  2 mg Oral At Bedtime Aisha Velez MD   2 mg at 04/07/25 2250    busPIRone (BUSPAR) tablet 20 mg  20 mg Oral BID Arnel Peres MD   20 mg at 04/09/25 0906    hydrOXYzine HCl (ATARAX) tablet 10 mg  10 mg Oral BID Lisseth Harris MD   10 mg at 04/08/25 1132    lamoTRIgine (LaMICtal) tablet 150 mg  150 mg Oral Daily Arnel Peres MD   150 mg at 04/09/25 0906    Lidocaine (LIDOCARE) 4 % Patch 1 patch  1 patch Transdermal Q24H Panfilo Damon DO   1 patch at 04/09/25 0905    melatonin tablet 3 mg  3 mg Oral At Bedtime Aisha Velez MD   3 mg at 04/07/25 2251    OLANZapine (zyPREXA) tablet 25 mg  25 mg Oral At Bedtime  Arnel Peres MD   25 mg at 04/07/25 2250    OLANZapine (zyPREXA) tablet 7.5 mg  7.5 mg Oral BID Lisseth Harris MD   7.5 mg at 04/09/25 0906    tamsulosin (FLOMAX) capsule 0.4 mg  0.4 mg Oral Daily Lisseth Harris MD   0.4 mg at 04/09/25 0906     Continuous Infusions:  Current Facility-Administered Medications   Medication Dose Route Frequency Provider Last Rate Last Admin     PRN Meds:.  Current Facility-Administered Medications   Medication Dose Route Frequency Provider Last Rate Last Admin    alum & mag hydroxide-simethicone (MAALOX) suspension 30 mL  30 mL Oral Q4H PRN Kendrick Alex MD   30 mL at 04/08/25 1424    busPIRone (BUSPAR) tablet 20 mg  20 mg Oral Daily PRN Arnel Peres MD   20 mg at 04/07/25 1724    calcium carbonate (TUMS) chewable tablet 1,000 mg  1,000 mg Oral 4x Daily PRN Mary Martinez DO   1,000 mg at 04/06/25 0044    diclofenac (VOLTAREN) 1 % topical gel 2 g  2 g Topical 4x Daily PRN Panfilo Damon DO   2 g at 04/08/25 1126    haloperidol lactate (HALDOL) injection 5 mg  5 mg Intramuscular BID PRN Arnel Peres MD        hydrOXYzine HCl (ATARAX) tablet 10 mg  10 mg Oral TID PRN Arnel Peres MD   10 mg at 04/09/25 0016    ibuprofen (ADVIL/MOTRIN) tablet 400 mg  400 mg Oral Q8H PRN Kendrick Alex MD   400 mg at 04/08/25 0810    lidocaine (LMX4) cream   Topical Q1H PRN Mary Martinez DO        lidocaine 1 % 0.1-1 mL  0.1-1 mL Other Q1H PRN Mary Martinez DO        midazolam (VERSED) injection 1 mg  1 mg Intravenous Once PRN Aisha Velez MD        ondansetron (ZOFRAN ODT) ODT tab 4 mg  4 mg Oral Q6H PRN Mary Martinez DO        Or    ondansetron (ZOFRAN) injection 4 mg  4 mg Intravenous Q6H PRN Mary Martinez DO        oxyBUTYnin (DITROPAN) half-tab 2.5 mg  2.5 mg Oral TID PRN Lisseth Harris MD        polyethylene glycol (MIRALAX) Packet 17 g  17 g Oral BID PRN Mary Martinez DO         prochlorperazine (COMPAZINE) injection 5 mg  5 mg Intravenous Q6H PRN Mary Martinez DO        Or    prochlorperazine (COMPAZINE) tablet 5 mg  5 mg Oral Q6H PRN Mary Martinez DO        risperiDONE (risperDAL M-TABS) ODT tab 1 mg  1 mg Oral BID PRN Arnel Peres MD   1 mg at 04/03/25 1743    senna-docusate (SENOKOT-S/PERICOLACE) 8.6-50 MG per tablet 1 tablet  1 tablet Oral BID PRN Mary Martinez DO        Or    senna-docusate (SENOKOT-S/PERICOLACE) 8.6-50 MG per tablet 2 tablet  2 tablet Oral BID PRN Mary Martinez DO        simethicone (MYLICON) chewable half-tab 40 mg  40 mg Oral Q6H PRN Kendrick Alex MD        sodium chloride (PF) 0.9% PF flush 3 mL  3 mL Intracatheter q1 min prn Mary Martinez DO         Medication adherence issues: MS Med Adherence Y/N: No  Medication side effects: MEDICATION SIDE EFFECTS: no side effects reported  Benefit: Yes / No: Does not know       ROS   The 10 point Review of Systems is negative other than noted in the HPI or here.       MENTAL STATUS EXAM   Vitals: BP (!) 152/67 (BP Location: Right arm, Patient Position: Semi-Ballard's)   Pulse 74   Temp 97.9  F (36.6  C) (Temporal)   Resp 16   SpO2 98%   Weight:   0 lbs 0 oz    There is no height or weight on file to calculate BMI.  There were no vitals filed for this visit.    Appearance:  Dismissive  Mood:  Mood: Irritable   Affect: guarded  was congruent to speech  Suicidal Ideation: PRESENT / ABSENT: absent   Homicidal Ideation: PRESENT / ABSENT: absent   Thought process: disorganized   Thought content: devoid of  suicidal ideation and violent ideation.   Fund of Knowledge: BAHMAN  Attention/Concentration: Poor  Language ability:  Chronic dysarthria  Memory:  BAHMAN  Insight:  limited.  Judgement: limited  Orientation: Person:  yes  Psychomotor Behavior: agitated    Muscle Strength and Tone: MuscleStrength: Normal  Gait and Station:  Seated       LABS   personally reviewed.   Temp: 97.9  " F (36.6  C) Temp src: Temporal BP: (!) 152/67 Pulse: 74   Resp: 16 SpO2: 98 % O2 Device: None (Room air)        Estimated body mass index is 24.19 kg/m  as calculated from the following:    Height as of 7/23/23: 1.753 m (5' 9\").    Weight as of 6/6/24: 74.3 kg (163 lb 12.8 oz).    No results found for this or any previous visit (from the past 48 hours).    Qtc: 498    No results found for: \"PHENYTOIN\", \"PHENOBARB\", \"VALPROATE\", \"CBMZ\"       DIAGNOSIS   Principal Problem:    Schizoaffective disorder, bipolar type (H)    Active Problem List:  Patient Active Problem List   Diagnosis    Carol (H)    Acute UTI    Other hydronephrosis    Slurred speech    Falls frequently    Mood disorder with psychosis    Agitation    Anxiety    Schizoaffective disorder, bipolar type (H)    Hx of schizophrenia    Aggressive behavior    ROBBIN (acute kidney injury)          ASSESSMENT/PLAN   Today's Changes-04/09/2025:  Medication Changes:    NONE. No changes indicated.  Continue Medications:  Zyprexa: Continue PTA Zyprexa 7.5 mg every morning, q. noon, and 25 mg at bedtime, psychosis/mood.  Cogentin: Continue PTA Cogentin 2 mg nightly, EPSE.  Lamictal: Continue increase of PTA lamotrigine 100 mg daily to lamotrigine 150 mg daily.  No issues of tolerability after titration on 3/28 with slow progression of mood lability.  Consider further titration on 4/11 for optimization.  BuSpar: Continue increase of PTA BuSpar 15 mg twice daily to 20 mg twice daily, anxiety, + as needed BuSpar 20 mg daily, anxiety.  Atarax: Continue PTA Atarax 10 mg twice daily.    PRN Melatonin: Continue PTA as needed melatonin 3 mg at bedtime, sleep.  Atarax: Continue PTA Atarax 10 mg twice daily.    PRN Atarax:  Continue as needed Atarax 10 mg 3 times daily, anxiety.  PRN Risperdal ODT:  Continue as needed Risperdal ODT 1 mg bid, mild to moderate agitation.  PRN Haldol IM:  Continue as needed Haldol IM 5 mg bid, severe agitation  Other:  Legal:   3/28 (0309):  Placed " on a 72-hour hold.  3/28:  Filed [facility] [place] Petition for Commitment with [facility].   3/31:  Petition re-filed through [facility] --> Supported  4/02:  Court Hold (updated orders)  4/04:  Exam/Preliminary Hearing  4/09:  Final hearing scheduled today.   RECOMMENDATION:  Full MI Commitment with [facility]anco.   Placement: Continue court process and progress MH stabilization, then return back to [facility] (PTA Placement) once completed.  Precautions placed:  Routine as per ED; Delirium; Sexual.  Please also refer to RN/team documentation for additional details.     Acute Medical Problems and Treatments:  .  Acute medical concerns:  No acute medical concerns.     Care Coordination:  Treatment plan discussed with staff.  Please reconsult Psychiatry as needed.         Risk Assessment: IP MHAC RISK ASSESSMENT: Patient on precautions    Coordination of Care:   Treatment Plan reviewed, Care discussed with Care/Treatment Team Members, Chart reviewed and Patient seen         Arnel Peres MD    -     04/09/2025  -     11:44 AM        This note was created with help of Dragon dictation system. Grammatical / typing errors are not intentional.        Arnel Peres MD  Consult/Liaison Psychiatry   [facility]  Securely message with Pop[employer]

## 2025-04-09 NOTE — PLAN OF CARE
"Goal Outcome Evaluation:      Overall Patient Progress: no changeOverall Patient Progress: no change    Outcome Evaluation: Pt is A&Ox3 self/time/place, intermittent confusion, agitated, garbled speech. Denies n/v/d/numbness/tingling/dizziness/sob/chest pain. Refused VS at beginning of shift. Refuses OOB, Ax1 rolls to get changed. Refused all medication. No PIV. Mech soft diet, help ordering, tolerated dinner. Incontinent of blower/bladder. Ex cath in place. Bed alarm in place. Court hearing tomorrow, meeting ID/password in note. Discharge pending.    /58 (BP Location: Right arm)   Pulse 77   Temp 97  F (36.1  C) (Temporal)   Resp 16   SpO2 99%     Problem: Adult Inpatient Plan of Care  Goal: Plan of Care Review  Description: The Plan of Care Review/Shift note should be completed every shift.  The Outcome Evaluation is a brief statement about your assessment that the patient is improving, declining, or no change.  This information will be displayed automatically on your shiftnote.  Outcome: Progressing  Flowsheets (Taken 4/8/2025 1941)  Outcome Evaluation: Pt is A&Ox2.  Overall Patient Progress: no change  Goal: Patient-Specific Goal (Individualized)  Description: You can add care plan individualizations to a care plan. Examples of Individualization might be:  \"Parent requests to be called daily at 9am for status\", \"I have a hard time hearing out of my right ear\", or \"Do not touch me to wake me up as it startlesme\".  Outcome: Progressing  Goal: Absence of Hospital-Acquired Illness or Injury  Outcome: Progressing  Intervention: Identify and Manage Fall Risk  Recent Flowsheet Documentation  Taken 4/8/2025 1715 by Nataliia Muse, RN  Safety Promotion/Fall Prevention:   supervised activity   safety round/check completed   room organization consistent   room near nurse's station   room door open   patient and family education   nonskid shoes/slippers when out of bed   increase visualization of patient   " increased rounding and observation   clutter free environment maintained   assistive device/personal items within reach   activity supervised  Intervention: Prevent Infection  Recent Flowsheet Documentation  Taken 4/8/2025 1715 by Nataliia Muse RN  Infection Prevention:   rest/sleep promoted   single patient room provided   hand hygiene promoted  Goal: Optimal Comfort and Wellbeing  Outcome: Progressing  Intervention: Monitor Pain and Promote Comfort  Recent Flowsheet Documentation  Taken 4/8/2025 1715 by Nataliia Muse, RN  Pain Management Interventions: medication offered but refused  Intervention: Provide Person-Centered Care  Recent Flowsheet Documentation  Taken 4/8/2025 1715 by Nataliia Muse, RN  Trust Relationship/Rapport:   care explained   choices provided   questions answered   reassurance provided   thoughts/feelings acknowledged  Goal: Readiness for Transition of Care  Outcome: Progressing     Problem: Delirium  Goal: Optimal Coping  Outcome: Progressing  Intervention: Optimize Psychosocial Adjustment to Delirium  Recent Flowsheet Documentation  Taken 4/8/2025 1715 by Nataliia Muse, RN  Family/Support System Care: self-care encouraged  Goal: Improved Behavioral Control  Outcome: Progressing  Intervention: Minimize Safety Risk  Recent Flowsheet Documentation  Taken 4/8/2025 1715 by Nataliia Muse, RN  Enhanced Safety Measures:   review medications for side effects with activity   patient/family teach back on injury risk   assistive devices when indicated  Trust Relationship/Rapport:   care explained   choices provided   questions answered   reassurance provided   thoughts/feelings acknowledged  Goal: Improved Attention and Thought Clarity  Outcome: Progressing  Goal: Improved Sleep  Outcome: Progressing     Problem: Violence Risk or Actual  Goal: Anger and Impulse Control  Outcome: Progressing  Intervention: Minimize Safety Risk  Recent Flowsheet Documentation  Taken 4/8/2025  1715 by Nataliia Muse, RN  Enhanced Safety Measures:   review medications for side effects with activity   patient/family teach back on injury risk   assistive devices when indicated     Problem: Comorbidity Management  Goal: Maintenance of Behavioral Health Symptom Control  Outcome: Progressing  Intervention: Maintain Behavioral Health Symptom Control  Recent Flowsheet Documentation  Taken 4/8/2025 1715 by Nataliia Muse, RN  Medication Review/Management: medications reviewed

## 2025-04-09 NOTE — PROGRESS NOTES
"Spoke with \"[facility]\" facility. They will not take client back. Informed Acute Care Management team.  "

## 2025-04-10 PROCEDURE — 250N000013 HC RX MED GY IP 250 OP 250 PS 637: Performed by: EMERGENCY MEDICINE

## 2025-04-10 PROCEDURE — 250N000013 HC RX MED GY IP 250 OP 250 PS 637: Performed by: PSYCHIATRY & NEUROLOGY

## 2025-04-10 PROCEDURE — 250N000013 HC RX MED GY IP 250 OP 250 PS 637: Performed by: INTERNAL MEDICINE

## 2025-04-10 PROCEDURE — 120N000001 HC R&B MED SURG/OB

## 2025-04-10 PROCEDURE — 99231 SBSQ HOSP IP/OBS SF/LOW 25: CPT | Performed by: INTERNAL MEDICINE

## 2025-04-10 RX ADMIN — OLANZAPINE 7.5 MG: 5 TABLET, FILM COATED ORAL at 08:02

## 2025-04-10 RX ADMIN — TAMSULOSIN HYDROCHLORIDE 0.4 MG: 0.4 CAPSULE ORAL at 08:01

## 2025-04-10 RX ADMIN — IBUPROFEN 400 MG: 400 TABLET, FILM COATED ORAL at 15:41

## 2025-04-10 RX ADMIN — BUSPIRONE HYDROCHLORIDE 20 MG: 10 TABLET ORAL at 08:01

## 2025-04-10 RX ADMIN — BUSPIRONE HYDROCHLORIDE 20 MG: 10 TABLET ORAL at 20:27

## 2025-04-10 RX ADMIN — OLANZAPINE 20 MG: 10 TABLET, FILM COATED ORAL at 22:02

## 2025-04-10 RX ADMIN — MINERAL OIL, WHITE PETROLATUM: .03; .94 OINTMENT OPHTHALMIC at 22:52

## 2025-04-10 RX ADMIN — HYDROXYZINE HYDROCHLORIDE 10 MG: 10 TABLET, FILM COATED ORAL at 13:36

## 2025-04-10 RX ADMIN — HYDROXYZINE HYDROCHLORIDE 10 MG: 10 TABLET, FILM COATED ORAL at 20:26

## 2025-04-10 RX ADMIN — LIDOCAINE 1 PATCH: 4 PATCH TOPICAL at 08:02

## 2025-04-10 RX ADMIN — LAMOTRIGINE 150 MG: 150 TABLET ORAL at 08:01

## 2025-04-10 RX ADMIN — OLANZAPINE 7.5 MG: 5 TABLET, FILM COATED ORAL at 13:37

## 2025-04-10 RX ADMIN — BENZTROPINE MESYLATE 2 MG: 1 TABLET ORAL at 22:02

## 2025-04-10 ASSESSMENT — ACTIVITIES OF DAILY LIVING (ADL)
ADLS_ACUITY_SCORE: 72
ADLS_ACUITY_SCORE: 74
ADLS_ACUITY_SCORE: 72
ADLS_ACUITY_SCORE: 74
ADLS_ACUITY_SCORE: 72
ADLS_ACUITY_SCORE: 72
ADLS_ACUITY_SCORE: 74
ADLS_ACUITY_SCORE: 72
ADLS_ACUITY_SCORE: 74
ADLS_ACUITY_SCORE: 72
ADLS_ACUITY_SCORE: 72
ADLS_ACUITY_SCORE: 74
ADLS_ACUITY_SCORE: 72
ADLS_ACUITY_SCORE: 74
ADLS_ACUITY_SCORE: 72
ADLS_ACUITY_SCORE: 74
ADLS_ACUITY_SCORE: 72

## 2025-04-10 NOTE — PLAN OF CARE
"Goal Outcome Evaluation:      Plan of Care Reviewed With: patient    Overall Patient Progress: no changeOverall Patient Progress: no change           Alert to self. RA. Mechanical soft diet. Pills whole. Incont of urine. Mepilex placed on sacrum for prevention. Discahrge plans pending.      Problem: Adult Inpatient Plan of Care  Goal: Plan of Care Review  Description: The Plan of Care Review/Shift note should be completed every shift.  The Outcome Evaluation is a brief statement about your assessment that the patient is improving, declining, or no change.  This information will be displayed automatically on your shiftnote.  4/10/2025 0745 by Chanda Gutierrez, RN  Outcome: Progressing  Flowsheets (Taken 4/10/2025 0745)  Plan of Care Reviewed With: patient  Overall Patient Progress: no change  4/10/2025 0739 by Chanda Gutierrez RN  Outcome: Progressing  Flowsheets (Taken 4/10/2025 0739)  Plan of Care Reviewed With: patient  Overall Patient Progress: no change  Goal: Patient-Specific Goal (Individualized)  Description: You can add care plan individualizations to a care plan. Examples of Individualization might be:  \"Parent requests to be called daily at 9am for status\", \"I have a hard time hearing out of my right ear\", or \"Do not touch me to wake me up as it startlesme\".  4/10/2025 0745 by Chanda Gutierrez, RN  Outcome: Progressing  4/10/2025 0739 by Chanda Gutierrez RN  Outcome: Progressing  Goal: Absence of Hospital-Acquired Illness or Injury  4/10/2025 0745 by Chanda Gutierrez RN  Outcome: Progressing  4/10/2025 0739 by Chanda Gutierrez, RN  Outcome: Progressing  Intervention: Identify and Manage Fall Risk  Recent Flowsheet Documentation  Taken 4/9/2025 2013 by Chanda Gutierrez RN  Safety Promotion/Fall Prevention:   activity supervised   assistive device/personal items within reach   room near nurse's station   supervised " activity  Intervention: Prevent Infection  Recent Flowsheet Documentation  Taken 4/9/2025 2013 by Chanda Gutierrez RN  Infection Prevention:   single patient room provided   rest/sleep promoted  Goal: Optimal Comfort and Wellbeing  4/10/2025 0745 by Chanda Gutierrez RN  Outcome: Progressing  4/10/2025 0739 by Chanda Gutierrez RN  Outcome: Progressing  Goal: Readiness for Transition of Care  4/10/2025 0745 by Chanda Gutierrez RN  Outcome: Progressing  4/10/2025 0739 by Chanda Gutierrez RN  Outcome: Progressing     Problem: Delirium  Goal: Optimal Coping  4/10/2025 0745 by Chanda Gutierrez RN  Outcome: Progressing  4/10/2025 0739 by Chanda Gutierrez RN  Outcome: Progressing  Goal: Improved Behavioral Control  4/10/2025 0745 by Chanda Gutierrez RN  Outcome: Progressing  4/10/2025 0739 by Chanda Gutierrez RN  Outcome: Progressing  Intervention: Minimize Safety Risk  Recent Flowsheet Documentation  Taken 4/9/2025 2013 by Chanda Gutierrez RN  Enhanced Safety Measures: room near unit station  Goal: Improved Attention and Thought Clarity  4/10/2025 0745 by Chanda Gutierrez RN  Outcome: Progressing  4/10/2025 0739 by Chanda Gutierrez RN  Outcome: Progressing  Goal: Improved Sleep  4/10/2025 0745 by Chanda Gutierrez RN  Outcome: Progressing  4/10/2025 0739 by Chanda Gutierrez RN  Outcome: Progressing     Problem: Violence Risk or Actual  Goal: Anger and Impulse Control  4/10/2025 0745 by Chanda Gutierrez RN  Outcome: Progressing  4/10/2025 0739 by Chanda Gutierrez RN  Outcome: Progressing  Intervention: Minimize Safety Risk  Recent Flowsheet Documentation  Taken 4/9/2025 2013 by Chanda Gutierrez RN  Enhanced Safety Measures: room near unit station     Problem: Comorbidity Management  Goal: Maintenance of Behavioral Health Symptom  Control  4/10/2025 0745 by Chanda Gutierrez, RN  Outcome: Progressing  4/10/2025 0739 by Chanda Gutierrez, RN  Outcome: Progressing  Intervention: Maintain Behavioral Health Symptom Control  Recent Flowsheet Documentation  Taken 4/9/2025 2013 by Chanda Gutierrez, RN  Medication Review/Management: medications reviewed

## 2025-04-10 NOTE — PROGRESS NOTES
Hospitalist Medicine Progress Note   [facility]       Harpreet Mcelroy is a 69 year old gentleman with bipolar, anxiety, depression, schizophrenia, BPD, paranoia, bladder cancer s/p ureteral stent, HTN, HLD, who came to [facility] on 3/27/2025 on a RENETTA from [facility] with Agitation, attempt to harm staff with cane and was diagnosed with decompensated schizophrenia and ROBBIN with creatinine 1.42 . He was admitted by the medicine service on 3/31/2025 after he was declined by inpatient psychiatry for Psychiatry admission. Was seen by Dr Peres from Psychiatry service here who managed his medications and recommended full MI commitment with pacheco       Date of Admission:  3/27/2025  Assessment & Plan     ROBBIN  - resolved     Decompensated schizophrenia with intermittent agitation  Bipolar  Anxiety  Depression  Chronic cognitive dysfunction  -Psychiatry following.  Will leave adjustment of antipsychotic and psychiatric medications to psychiatry    As per Psychiatry   Zyprexa: Continue PTA Zyprexa 7.5 mg every morning, q. noon, and 25 mg at bedtime, psychosis/mood.  Cogentin: Continue PTA Cogentin 2 mg nightly, EPSE.  Lamictal: Continue increase of PTA lamotrigine 100 mg daily to lamotrigine 150 mg daily.  No issues of tolerability after titration on 3/28 with slow progression of mood lability.  Consider further titration on 4/11 for optimization.  BuSpar: Continue increase of PTA BuSpar 15 mg twice daily to 20 mg twice daily, anxiety, + as needed BuSpar 20 mg daily, anxiety.  Atarax: Continue PTA Atarax 10 mg twice daily.    PRN Melatonin: Continue PTA as needed melatonin 3 mg at bedtime, sleep.  Atarax: Continue PTA Atarax 10 mg twice daily.    PRN Atarax:  Continue as needed Atarax 10 mg 3 times daily, anxiety.  PRN Risperdal ODT:  Continue as needed Risperdal ODT 1 mg bid, mild to moderate agitation.  PRN Haldol IM:  Continue as needed Haldol IM 5 mg bid, severe agitation  Other:  Legal:   3/28  (0309):  Placed on a 72-hour hold.  3/28:  Filed [facility] Petition for Commitment with [facility].   3/31:  Petition re-filed through [facility] --> Supported  4/02:  Court Hold (updated orders)  4/04:  Exam/Preliminary Hearing  4/09:  Final hearing pending     Dysarthric speech  -Patient can be challenging to understand at times     Bladder cancer   Chronic L ureteral stent  Stent last changed 2/4/25 by [facility] urology Dr. Whitt.  - PTA oxybutynin and flomax               Plan:   Awaiting placement     Diet: Mechanical/Dental Soft Diet    DVT Prophylaxis: Pneumatic Compression Devices  Hernandez Catheter: Not present  Code Status: Full Code         Medically Ready for Discharge: Anticipated in 5+ Days    Clinically Significant Risk Factors                                  # Financial/Environmental Concerns: none               The patient's care was discussed with the Patient and RN.    Cassy Reed MD  Hospitalist Service  [facility]    ______________________________________________________________________    Interval History     Patient gets frustrated very easily about everything during our discussion.  No other acute issues    Review of Systems:   Could not get any histroy      Data reviewed today: I reviewed all medications, new labs and imaging results over the last 24 hours.     Physical Exam   Vital Signs: Temp: (!) 96.5  F (35.8  C) Temp src: Temporal BP: (!) 145/75 Pulse: 78   Resp: 16 SpO2: 98 % O2 Device: None (Room air)    Weight: 0 lbs 0 oz      GENERAL: Patient is not  in acute distress  HEENT: EOM+,Conjunctiva is clear   NECK: no Jugular Venous distention  CNS:  Alert, agitated , Moving all the Four Limbs     Data   No lab results found in last 7 days.        No results found for this or any previous visit (from the past 24 hours).

## 2025-04-11 PROCEDURE — 250N000013 HC RX MED GY IP 250 OP 250 PS 637: Performed by: EMERGENCY MEDICINE

## 2025-04-11 PROCEDURE — 120N000001 HC R&B MED SURG/OB

## 2025-04-11 PROCEDURE — 250N000013 HC RX MED GY IP 250 OP 250 PS 637: Performed by: PSYCHIATRY & NEUROLOGY

## 2025-04-11 PROCEDURE — 250N000011 HC RX IP 250 OP 636: Performed by: PSYCHIATRY & NEUROLOGY

## 2025-04-11 PROCEDURE — 99231 SBSQ HOSP IP/OBS SF/LOW 25: CPT | Performed by: PSYCHIATRY & NEUROLOGY

## 2025-04-11 PROCEDURE — 99232 SBSQ HOSP IP/OBS MODERATE 35: CPT | Performed by: INTERNAL MEDICINE

## 2025-04-11 PROCEDURE — 250N000013 HC RX MED GY IP 250 OP 250 PS 637: Performed by: INTERNAL MEDICINE

## 2025-04-11 PROCEDURE — 250N000011 HC RX IP 250 OP 636: Performed by: INTERNAL MEDICINE

## 2025-04-11 RX ORDER — OLANZAPINE 10 MG/2ML
10 INJECTION, POWDER, FOR SOLUTION INTRAMUSCULAR ONCE
Status: COMPLETED | OUTPATIENT
Start: 2025-04-11 | End: 2025-04-11

## 2025-04-11 RX ORDER — HALOPERIDOL 5 MG/ML
10 INJECTION INTRAMUSCULAR ONCE
Status: COMPLETED | OUTPATIENT
Start: 2025-04-11 | End: 2025-04-11

## 2025-04-11 RX ADMIN — OLANZAPINE 10 MG: 10 INJECTION, POWDER, FOR SOLUTION INTRAMUSCULAR at 17:40

## 2025-04-11 RX ADMIN — BENZTROPINE MESYLATE 2 MG: 1 TABLET ORAL at 22:32

## 2025-04-11 RX ADMIN — OLANZAPINE 7.5 MG: 5 TABLET, FILM COATED ORAL at 13:08

## 2025-04-11 RX ADMIN — HALOPERIDOL LACTATE 10 MG: 5 INJECTION, SOLUTION INTRAMUSCULAR at 16:58

## 2025-04-11 RX ADMIN — HALOPERIDOL LACTATE 5 MG: 5 INJECTION, SOLUTION INTRAMUSCULAR at 16:55

## 2025-04-11 RX ADMIN — HYDROXYZINE HYDROCHLORIDE 10 MG: 10 TABLET, FILM COATED ORAL at 20:35

## 2025-04-11 RX ADMIN — IBUPROFEN 400 MG: 400 TABLET, FILM COATED ORAL at 00:39

## 2025-04-11 RX ADMIN — BUSPIRONE HYDROCHLORIDE 20 MG: 10 TABLET ORAL at 20:35

## 2025-04-11 RX ADMIN — HYDROXYZINE HYDROCHLORIDE 10 MG: 10 TABLET, FILM COATED ORAL at 22:33

## 2025-04-11 RX ADMIN — HYDROXYZINE HYDROCHLORIDE 10 MG: 10 TABLET, FILM COATED ORAL at 00:39

## 2025-04-11 ASSESSMENT — ACTIVITIES OF DAILY LIVING (ADL)
ADLS_ACUITY_SCORE: 72
ADLS_ACUITY_SCORE: 74
ADLS_ACUITY_SCORE: 74
ADLS_ACUITY_SCORE: 72
ADLS_ACUITY_SCORE: 74
ADLS_ACUITY_SCORE: 72
ADLS_ACUITY_SCORE: 72
ADLS_ACUITY_SCORE: 74
ADLS_ACUITY_SCORE: 72
ADLS_ACUITY_SCORE: 72
ADLS_ACUITY_SCORE: 74
ADLS_ACUITY_SCORE: 72
ADLS_ACUITY_SCORE: 74
ADLS_ACUITY_SCORE: 74
ADLS_ACUITY_SCORE: 72

## 2025-04-11 NOTE — PROGRESS NOTES
Care provided from 07:00 till 10:30. Pt very verbally abusive to this writer. Worked with CN to adjust assignment. Other floor RN taking over care for assigned patient. Report given and care handed over to other floor RN.

## 2025-04-11 NOTE — PROGRESS NOTES
"[facility]     CONSULT PSYCHIATRY  FOLLOW UP NOTE     DATE OF SERVICE   04/11/2025       IDENTIFICATION   Harpreet Mcelroy  Age: 69 year old  MRN# [id number]   YOB: 1955  Length of Stay:  11        CHIEF COMPLAINT   \"Get out!\"       SUBJECTIVE   The patient's care was discussed with primary treatment team directly and patient's electronic chart notes were reviewed.      Staff report patient did not report any acute medical concerns or side effects. No behavioral issues overnight, including violent or aggressive behaviors. Patient did not require seclusion or restraints. Patient is not exhibiting new signs or symptoms of psychosis or jaki; description is baseline. Patient did not endorse suicidal ideation. Patient did not endorse homicide ideation.     Patient is medication adherent. Patient is not on a 1:1. Patient is sleeping. Patient is eating.     Care Management:  Report court papers not received.  [facility] verbally stated patient not able to return but no 30 day notice given.  Discussed indication to involve [facility].    Attending Interval History:  Could not get any histroy     C/L Psychiatry last treatment plan (4/9):  4/09/2025:  Medication Changes:    NONE. No changes indicated.  Continue Medications:  Zyprexa: Continue PTA Zyprexa 7.5 mg every morning, q. noon, and 25 mg at bedtime, psychosis/mood.  Cogentin: Continue PTA Cogentin 2 mg nightly, EPSE.  Lamictal: Continue increase of PTA lamotrigine 100 mg daily to lamotrigine 150 mg daily.  No issues of tolerability after titration on 3/28 with slow progression of mood lability.  Consider further titration on 4/11 for optimization.  BuSpar: Continue increase of PTA BuSpar 15 mg twice daily to 20 mg twice daily, anxiety, + as needed BuSpar 20 mg daily, anxiety.  Atarax: Continue PTA Atarax 10 mg twice daily.    PRN Melatonin: Continue PTA as needed melatonin 3 mg at bedtime, sleep.  Atarax: Continue PTA Atarax 10 " mg twice daily.    PRN Atarax:  Continue as needed Atarax 10 mg 3 times daily, anxiety.  PRN Risperdal ODT:  Continue as needed Risperdal ODT 1 mg bid, mild to moderate agitation.  PRN Haldol IM:  Continue as needed Haldol IM 5 mg bid, severe agitation  Other:  Legal:   3/28 (0309):  Placed on a 72-hour hold.  3/28:  Filed [facility] Petition for Commitment with [facility].   3/31:  Petition re-filed through [facility] --> Supported  4/02:  Court Hold (updated orders)  4/04:  Exam/Preliminary Hearing  4/09:  Final hearing scheduled today.   RECOMMENDATION:  Full MI Commitment with [facility].   Placement: Continue court process and progress MH stabilization, then return back to [facility] (PTA Placement) once completed.  Precautions placed:  Routine as per ED; Delirium; Sexual.      Review of notes and/or information:  I personally reviewed notes from the patient's electronic chart since last psychiatry consult dated 4/9, most recent visit, and other interim reports. This provided me with information regarding patient's recent clinical course.     I personally reviewed the patient's chart, including available medication list and progression of hospital course.       OBJECTIVE   Upon interview, the patient is not cooperative on approach.  Visit attempted in patient's room on the unit.   Patient is not voluntary.    INTERVAL HISTORY:  Chart reviewed.  Attempted to discuss significant events since last visit on 4/9 the patient.  On 4/9, final hearing related to commitment process was scheduled.  Care coordination performed with case management, decision is pending and no documentation sent from court.   does report staff that patient's care facility prior to admission will not accept back, but a 30-day notice has not been received.  Plan to review alternate placement options and indication to involve [facility].    Consistent with previous efforts to evaluate and patient preference to engage  more meaningfully with female staff, unable to engage with patient despite patient request to see psychiatry at previous visit.  Attempted to discuss with patient, but messed with refusal demonstrated by verbal  statements without behavior.    Unable to obtain meaningful information directly from patient.  Information gathered by means of chart review, nursing/physician/social work report and direct discussion and patient observation.  Does not appear in acute distress.    Suicidal ideation: denies current or recent suicidal ideation or behaviors.    Homicidal ideation: denies current or recent homicidal ideation or behaviors.    Psychotic symptoms: No acute changes. Perceptual distortion versus paranoia felt to be baseline.    Medication side effects reported: No significant side effects.    Acute medical concerns: none    Other issues reported by patient: Unable to obtain, secondary to patient presentation.       MEDICATIONS   Medications:  Scheduled Meds:  Current Facility-Administered Medications   Medication Dose Route Frequency Provider Last Rate Last Admin    artificial tears ophthalmic ointment   Left Eye At Bedtime Mary Martinez DO   Given at 04/10/25 2252    benztropine (COGENTIN) tablet 2 mg  2 mg Oral At Bedtime Aisha Velez MD   2 mg at 04/10/25 2202    busPIRone (BUSPAR) tablet 20 mg  20 mg Oral BID Arnel Peres MD   20 mg at 04/10/25 2027    hydrOXYzine HCl (ATARAX) tablet 10 mg  10 mg Oral BID Lisseth Harris MD   10 mg at 04/10/25 2026    lamoTRIgine (LaMICtal) tablet 150 mg  150 mg Oral Daily Arnel Peres MD   150 mg at 04/10/25 0801    Lidocaine (LIDOCARE) 4 % Patch 1 patch  1 patch Transdermal Q24H Panfilo Damon DO   1 patch at 04/10/25 0802    melatonin tablet 3 mg  3 mg Oral At Bedtime Aisha Velez MD   3 mg at 04/09/25 2114    OLANZapine (zyPREXA) tablet 25 mg  25 mg Oral At Bedtime Arnel Peres MD   20 mg at 04/10/25 2202     OLANZapine (zyPREXA) tablet 7.5 mg  7.5 mg Oral BID Lisseth Harris MD   7.5 mg at 04/10/25 1337    tamsulosin (FLOMAX) capsule 0.4 mg  0.4 mg Oral Daily Lisseth Harris MD   0.4 mg at 04/10/25 0801     Continuous Infusions:  Current Facility-Administered Medications   Medication Dose Route Frequency Provider Last Rate Last Admin     PRN Meds:.  Current Facility-Administered Medications   Medication Dose Route Frequency Provider Last Rate Last Admin    alum & mag hydroxide-simethicone (MAALOX) suspension 30 mL  30 mL Oral Q4H PRN Kendrick Alex MD   30 mL at 04/08/25 1424    busPIRone (BUSPAR) tablet 20 mg  20 mg Oral Daily PRN Arnel Peres MD   20 mg at 04/07/25 1724    calcium carbonate (TUMS) chewable tablet 1,000 mg  1,000 mg Oral 4x Daily PRN Mary Martinez DO   1,000 mg at 04/06/25 0044    diclofenac (VOLTAREN) 1 % topical gel 2 g  2 g Topical 4x Daily PRN Panfilo Damon DO   2 g at 04/09/25 2141    haloperidol lactate (HALDOL) injection 5 mg  5 mg Intramuscular BID PRN Arnel Peres MD   5 mg at 04/09/25 1540    hydrOXYzine HCl (ATARAX) tablet 10 mg  10 mg Oral TID PRN Arnel Peres MD   10 mg at 04/11/25 0039    ibuprofen (ADVIL/MOTRIN) tablet 400 mg  400 mg Oral Q8H PRN Kendrick Alex MD   400 mg at 04/11/25 0039    lidocaine (LMX4) cream   Topical Q1H PRN Mary Martinez DO        lidocaine 1 % 0.1-1 mL  0.1-1 mL Other Q1H PRN Mary Martinez DO        midazolam (VERSED) injection 1 mg  1 mg Intravenous Once PRN Aisha Velez MD        ondansetron (ZOFRAN ODT) ODT tab 4 mg  4 mg Oral Q6H PRN Mary Martinez DO        Or    ondansetron (ZOFRAN) injection 4 mg  4 mg Intravenous Q6H PRN Mary Martinez DO        oxyBUTYnin (DITROPAN) half-tab 2.5 mg  2.5 mg Oral TID PRN Lisseth Harris MD        polyethylene glycol (MIRALAX) Packet 17 g  17 g Oral BID PRN Mary Martinez DO        prochlorperazine (COMPAZINE) injection 5 mg   5 mg Intravenous Q6H PRN Mary Martinez DO        Or    prochlorperazine (COMPAZINE) tablet 5 mg  5 mg Oral Q6H PRN Mary Martinez DO        risperiDONE (risperDAL M-TABS) ODT tab 1 mg  1 mg Oral BID PRN Arnel Peres MD   1 mg at 04/03/25 1743    senna-docusate (SENOKOT-S/PERICOLACE) 8.6-50 MG per tablet 1 tablet  1 tablet Oral BID PRN Mary Martinez DO        Or    senna-docusate (SENOKOT-S/PERICOLACE) 8.6-50 MG per tablet 2 tablet  2 tablet Oral BID PRN Mary Martinez DO        simethicone (MYLICON) chewable half-tab 40 mg  40 mg Oral Q6H PRN Kendrick Alex MD        sodium chloride (PF) 0.9% PF flush 3 mL  3 mL Intracatheter q1 min prn Mary Martinez DO         Medication adherence issues: MS Med Adherence Y/N: No  Medication side effects: MEDICATION SIDE EFFECTS: no side effects reported  Benefit: Yes / No: Yes       ROS   The 10 point Review of Systems is negative other than noted in the HPI or here.       MENTAL STATUS EXAM   Vitals: BP (!) 178/85 (BP Location: Right arm)   Pulse 85   Temp (!) 95.8  F (35.4  C) (Temporal)   Resp 20   SpO2 97%   Weight:   0 lbs 0 oz    There is no height or weight on file to calculate BMI.  There were no vitals filed for this visit.    Appearance: Dismissive  Mood:  Mood: Irritable   Affect: appropriate  was congruent to speech  Suicidal Ideation: PRESENT / ABSENT: absent   Homicidal Ideation: PRESENT / ABSENT: absent   Thought process: perseverative   Thought content: devoid of  suicidal ideation and violent ideation.   Fund of Knowledge: BAHMAN  Attention/Concentration: Poor  Language ability:  Chronic dysarthria  Memory:  BAHMAN  Insight:  limited.  Judgement: limited  Orientation: Person:  yes  Psychomotor Behavior: restless    Muscle Strength and Tone: MuscleStrength: Normal  Gait and Station:  In bed       LABS   personally reviewed.   Temp: (!) 95.8  F (35.4  C) (i tried to re do his temp orally, but pt got aggiated, i  "left room before it escalated.) Temp src: Temporal BP: (!) 178/85 Pulse: 85   Resp: 20 SpO2: 97 % O2 Device: None (Room air)        Estimated body mass index is 24.19 kg/m  as calculated from the following:    Height as of 7/23/23: 1.753 m (5' 9\").    Weight as of 6/6/24: 74.3 kg (163 lb 12.8 oz).    No results found for this or any previous visit (from the past 48 hours).    No results found for: \"PHENYTOIN\", \"PHENOBARB\", \"VALPROATE\", \"CBMZ\"       DIAGNOSIS   Principal Problem:    Schizoaffective disorder, bipolar type (H)    Active Problem List:  Patient Active Problem List   Diagnosis    Carol (H)    Acute UTI    Other hydronephrosis    Slurred speech    Falls frequently    Mood disorder with psychosis    Agitation    Anxiety    Schizoaffective disorder, bipolar type (H)    Hx of schizophrenia    Aggressive behavior    ROBBIN (acute kidney injury)          ASSESSMENT/PLAN   Today's Changes-04/11/2025:  Medication Changes:    NONE  Continue Medications:  Zyprexa: Continue PTA Zyprexa 7.5 mg every morning, q. noon, and 25 mg at bedtime, psychosis/mood.  Cogentin: Continue PTA Cogentin 2 mg nightly, EPSE.  Lamictal: Continue increase of PTA lamotrigine 100 mg daily to lamotrigine 150 mg daily.  No issues of tolerability after titration on 3/28 with slow progression of mood lability.  Consider further titration on 4/11 for optimization.  BuSpar: Continue increase of PTA BuSpar 15 mg twice daily to 20 mg twice daily, anxiety, + as needed BuSpar 20 mg daily, anxiety.  Atarax: Continue PTA Atarax 10 mg twice daily.    PRN Melatonin: Continue PTA as needed melatonin 3 mg at bedtime, sleep.  Atarax: Continue PTA Atarax 10 mg twice daily.    PRN Atarax:  Continue as needed Atarax 10 mg 3 times daily, anxiety.  PRN Risperdal ODT:  Continue as needed Risperdal ODT 1 mg bid, mild to moderate agitation.  PRN Haldol IM:  Continue as needed Haldol IM 5 mg bid, severe agitation  Other:  Legal:   3/28 (0309):  Placed on a 72-hour " hold.  3/28:  Filed [facility] Petition for Commitment with [facility].   3/31:  Petition re-filed through [facility] --> Supported  4/02:  Court Hold (updated orders)  4/04:  Exam/Preliminary Hearing  4/09:  Final hearing  4/11:  Decision/documentation pending.  RECOMMENDATION:  Full MI Commitment with [facility].   Placement: [facility] reportedly will not accept but no 30-day notice given.  CM is following.  Precautions placed:  Routine as per ED; Delirium; Sexual.  Please also refer to RN/team documentation for additional details.     Acute Medical Problems and Treatments:  .  Acute medical concerns:  No acute medical concerns.   Consults:  CM following.    Care Coordination:  Treatment plan discussed directly with Dr. Mora.  Please reconsult Psychiatry as needed. Plan to follow on 4/14.        Risk Assessment:  MHAC RISK ASSESSMENT: Patient on precautions    Coordination of Care:   Treatment Plan reviewed, Care discussed with Care/Treatment Team Members, Chart reviewed and Patient seen         Arnel Peres MD    -     04/11/2025  -     12:37 PM      This note was created with help of Dragon dictation system. Grammatical / typing errors are not intentional.        Arnel Peres MD  Consult/Liaison Psychiatry   [facility]  Securely message with Shi

## 2025-04-11 NOTE — PROVIDER NOTIFICATION
"Face to face conversation with Dr. Mora this morning about pts behaviors. Let provider know that pt will not let this RN into room to given medications of provide cares. Pt screaming \"You are not a nurse, get the F out of my room\" \"You're a bitch get the F out of my room\". Pt also threatening hard to this writer when trying to help with patients needs. CN made aware. Unit manager made aware and Provider.   "

## 2025-04-11 NOTE — PROGRESS NOTES
Hospitalist Medicine Progress Note   [facility]       Harpreet Mcelroy is a 69 year old gentleman with bipolar, anxiety, depression, schizophrenia, BPD, paranoia, bladder cancer s/p ureteral stent, HTN, HLD, who came to [facility] on 3/27/2025 on a RENETTA from [facility] with Agitation, attempt to harm staff with cane and was diagnosed with decompensated schizophrenia and ROBBIN with creatinine 1.42 . He was admitted by the medicine service on 3/31/2025 after he was declined by inpatient psychiatry for Psychiatry admission. Was seen by Dr Peres from Psychiatry service here who managed his medications and recommended full MI commitment with pacheco       Date of Admission:  3/27/2025  Assessment & Plan     ROBBIN  - resolved     Decompensated schizophrenia with intermittent agitation  Bipolar  Anxiety  Depression  Chronic cognitive dysfunction  -Psychiatry following.  Will leave adjustment of antipsychotic and psychiatric medications to psychiatry     Dysarthric speech  -Patient can be challenging to understand at times     Bladder cancer   Chronic L ureteral stent  Stent last changed 2/4/25 by [facility] [place] [place] urology Dr. Whitt.  - PTA oxybutynin and flomax               Plan:   He is awaiting placement   Give IM Haldol as ordered if not taking oral medications      Diet: Mechanical/Dental Soft Diet    DVT Prophylaxis: Pneumatic Compression Devices  Hernandez Catheter: Not present  Code Status: Full Code         Medically Ready for Discharge: Anticipated in 5+ Days    Clinically Significant Risk Factors                                  # Financial/Environmental Concerns: none               The patient's care was discussed with the Patient and RN.    Chan Mora MD  Hospitalist Service  [facility]    ______________________________________________________________________    Interval History     Symptoms   Patient was asleep - didn't wake him up   But later when he got up he was quite  agitated with nursing staff    Review of Systems:   Could not get any histroy      Data reviewed today: I reviewed all medications, new labs and imaging results over the last 24 hours.     Physical Exam   Vital Signs: Temp: 96.8  F (36  C) Temp src: Temporal BP: (!) 162/74 Pulse: 84   Resp: 16 SpO2: 98 % O2 Device: None (Room air)    Weight: 0 lbs 0 oz      GENERAL: Patient is not  in acute distress  NECK: I did not see any no Jugular Venous distention  HEART: S1 S2 RRR  LUNGS: Clear in the upper lung fields        Data   No lab results found in last 7 days.        No results found for this or any previous visit (from the past 24 hours).

## 2025-04-11 NOTE — PLAN OF CARE
"Goal Outcome Evaluation:      Plan of Care Reviewed With: patient    Overall Patient Progress: no changeOverall Patient Progress: no change    Outcome Evaluation: Discharge plans TBD      VSS on RA, refused ambulation but attempted to climb out of bed x3 this shift. Mechanical soft diet. Only alert to self. Garbled speech. Pt. intermittently verbally abusive and refused other staff in the room. Pt. Expressed frustration with talking \"too fast\" so this writer spoke slowly, which patient expressed appreciation for. Incontinent b/b. Pt. Refused gown and clothing. Red scrotum/4 red spots on buttocks/thighs, barrier cream applied, mepilex in place. Pain managed with PRN ibuprofen and heating pad. Anxiety managed with breathing techniques and PRN atarax. Plans TBD.      Problem: Adult Inpatient Plan of Care  Goal: Plan of Care Review  Description: The Plan of Care Review/Shift note should be completed every shift.  The Outcome Evaluation is a brief statement about your assessment that the patient is improving, declining, or no change.  This information will be displayed automatically on your shiftnote.  Outcome: Progressing  Flowsheets (Taken 4/11/2025 0348)  Outcome Evaluation: Discharge plans TBD  Plan of Care Reviewed With: patient  Overall Patient Progress: no change  Goal: Patient-Specific Goal (Individualized)  Description: You can add care plan individualizations to a care plan. Examples of Individualization might be:  \"Parent requests to be called daily at 9am for status\", \"I have a hard time hearing out of my right ear\", or \"Do not touch me to wake me up as it startlesme\".  Outcome: Progressing  Goal: Absence of Hospital-Acquired Illness or Injury  Outcome: Progressing  Intervention: Identify and Manage Fall Risk  Recent Flowsheet Documentation  Taken 4/10/2025 2051 by Berenice Nguyen, RN  Safety Promotion/Fall Prevention:   activity supervised   assistive device/personal items within reach   clutter free " environment maintained   lighting adjusted   mobility aid in reach   nonskid shoes/slippers when out of bed   patient and family education   room organization consistent   safety round/check completed   supervised activity   treat reversible contributory factors   treat underlying cause  Intervention: Prevent Skin Injury  Recent Flowsheet Documentation  Taken 4/10/2025 2051 by Berenice Nguyen RN  Body Position: position changed independently  Skin Protection:   adhesive use limited   incontinence pads utilized   silicone foam dressing in place   tubing/devices free from skin contact  Intervention: Prevent and Manage VTE (Venous Thromboembolism) Risk  Recent Flowsheet Documentation  Taken 4/10/2025 2051 by Berenice Nguyen RN  VTE Prevention/Management: SCDs off (sequential compression devices)  Intervention: Prevent Infection  Recent Flowsheet Documentation  Taken 4/10/2025 2051 by Berenice Nguyen RN  Infection Prevention:   hand hygiene promoted   rest/sleep promoted   single patient room provided  Goal: Optimal Comfort and Wellbeing  Outcome: Progressing  Intervention: Monitor Pain and Promote Comfort  Recent Flowsheet Documentation  Taken 4/10/2025 2045 by Berenice Nguyen RN  Pain Management Interventions:   medication (see MAR)   heat applied  Intervention: Provide Person-Centered Care  Recent Flowsheet Documentation  Taken 4/10/2025 2051 by Berenice Nguyen RN  Trust Relationship/Rapport:   care explained   choices provided   questions answered   questions encouraged   thoughts/feelings acknowledged   reassurance provided   emotional support provided   empathic listening provided  Goal: Readiness for Transition of Care  Outcome: Progressing     Problem: Delirium  Goal: Optimal Coping  Outcome: Progressing  Intervention: Optimize Psychosocial Adjustment to Delirium  Recent Flowsheet Documentation  Taken 4/10/2025 2051 by Berenice Nguyen RN  Supportive Measures:   active listening utilized   relaxation techniques  promoted  Goal: Improved Behavioral Control  Outcome: Progressing  Intervention: Minimize Safety Risk  Recent Flowsheet Documentation  Taken 4/10/2025 2051 by Berenice Nguyen RN  Enhanced Safety Measures:   assistive devices when indicated   pain management   patient/family teach back on injury risk   room near unit station   review medications for side effects with activity  Trust Relationship/Rapport:   care explained   choices provided   questions answered   questions encouraged   thoughts/feelings acknowledged   reassurance provided   emotional support provided   empathic listening provided  Goal: Improved Attention and Thought Clarity  Outcome: Progressing  Intervention: Maximize Cognitive Function  Recent Flowsheet Documentation  Taken 4/10/2025 2051 by Berenice Nguyen RN  Sensory Stimulation Regulation:   care clustered   quiet environment promoted  Reorientation Measures:   clock in view   reorientation provided  Goal: Improved Sleep  Outcome: Progressing  Intervention: Promote Sleep  Recent Flowsheet Documentation  Taken 4/10/2025 2051 by Berenice Nguyen RN  Sleep/Rest Enhancement:   awakenings minimized   noise level reduced   regular sleep/rest pattern promoted   relaxation techniques promoted     Problem: Violence Risk or Actual  Goal: Anger and Impulse Control  Outcome: Progressing  Intervention: Minimize Safety Risk  Recent Flowsheet Documentation  Taken 4/10/2025 2051 by Berenice Nguyen RN  Sensory Stimulation Regulation:   care clustered   quiet environment promoted  Enhanced Safety Measures:   assistive devices when indicated   pain management   patient/family teach back on injury risk   room near unit station   review medications for side effects with activity  Intervention: Promote Self-Control  Recent Flowsheet Documentation  Taken 4/10/2025 2051 by Berenice Nguyen RN  Supportive Measures:   active listening utilized   relaxation techniques promoted  Environmental Support:   calm environment  promoted   caregiver consistency promoted   distractions minimized     Problem: Comorbidity Management  Goal: Maintenance of Behavioral Health Symptom Control  Outcome: Progressing  Intervention: Maintain Behavioral Health Symptom Control  Recent Flowsheet Documentation  Taken 4/10/2025 2051 by Berenice Nguyen RN  Medication Review/Management: medications reviewed

## 2025-04-11 NOTE — PLAN OF CARE
Goal Outcome Evaluation:      Plan of Care Reviewed With: patient    Overall Patient Progress: no changeOverall Patient Progress: no change    Outcome Evaluation: VS monitored, garbled speech, difficult to understand, calm and cooperative most of shift for this writer but yelled at other staff and wouldn't let them in the room, nghia diet, incontinent large amounts urine, red scrotum/4 red spots on bottom/thighs, barrier cream applied, mepilex to coccyx, Ibuprofen/Lidocaine patch/heating pad for neck and back pain, awaiting placement.    Problem: Adult Inpatient Plan of Care  Goal: Plan of Care Review  Description: The Plan of Care Review/Shift note should be completed every shift.  The Outcome Evaluation is a brief statement about your assessment that the patient is improving, declining, or no change.  This information will be displayed automatically on your shiftnote.  4/10/2025 1937 by Yaneli Turner, RN  Outcome: Not Progressing  Flowsheets (Taken 4/10/2025 1937)  Outcome Evaluation: VS monitored, garbled speech, difficult to understand, calm and cooperative most of shift for this writer but yelled at other staff and wouldn't let them in the room, nghia diet, incontinent large amounts urine, red scrotum/4 red spots on bottom/thighs, barrier cream applied, mepilex to coccyx, Ibuprofen/Lidocaine patch/heating pad for neck and back pain, awaiting placement.  Plan of Care Reviewed With: patient  Overall Patient Progress: no change  4/10/2025 1937 by Yaneli Turner, RN  Outcome: Progressing  Flowsheets (Taken 4/10/2025 1937)  Outcome Evaluation: VS monitored, garbled speech, difficult to understand, calm and cooperative most of shift for this writer but yelled at other staff and wouldn't let them in the room, nghia diet, incontinent large amounts urine, red scrotum/4 red spots on bottom/thighs, barrier cream applied, mepilex to coccyx, Ibuprofen/Lidocaine patch/heating pad for neck and back pain, awaiting placement.  Plan  "of Care Reviewed With: patient  Overall Patient Progress: no change  Goal: Patient-Specific Goal (Individualized)  Description: You can add care plan individualizations to a care plan. Examples of Individualization might be:  \"Parent requests to be called daily at 9am for status\", \"I have a hard time hearing out of my right ear\", or \"Do not touch me to wake me up as it startlesme\".  4/10/2025 1937 by Yaneli Turner RN  Outcome: Not Progressing  4/10/2025 1937 by Yaneli Turner RN  Outcome: Progressing  Goal: Absence of Hospital-Acquired Illness or Injury  4/10/2025 1937 by Yaneli Turner RN  Outcome: Not Progressing  4/10/2025 1937 by Yaneli Turner RN  Outcome: Progressing  Intervention: Identify and Manage Fall Risk  Recent Flowsheet Documentation  Taken 4/10/2025 0802 by Yaneli Turner RN  Safety Promotion/Fall Prevention:   activity supervised   assistive device/personal items within reach   clutter free environment maintained   lighting adjusted   mobility aid in reach   nonskid shoes/slippers when out of bed   patient and family education   room organization consistent   safety round/check completed   supervised activity   treat reversible contributory factors   treat underlying cause  Intervention: Prevent Skin Injury  Recent Flowsheet Documentation  Taken 4/10/2025 0802 by Yaneli Turner RN  Body Position: position changed independently  Skin Protection:   adhesive use limited   incontinence pads utilized   silicone foam dressing in place   tubing/devices free from skin contact  Intervention: Prevent and Manage VTE (Venous Thromboembolism) Risk  Recent Flowsheet Documentation  Taken 4/10/2025 0802 by Yaneli Turner RN  VTE Prevention/Management: SCDs off (sequential compression devices)  Intervention: Prevent Infection  Recent Flowsheet Documentation  Taken 4/10/2025 0802 by Yaneli Turner RN  Infection Prevention:   hand hygiene promoted   rest/sleep promoted   single patient room provided  Goal: " Optimal Comfort and Wellbeing  4/10/2025 1937 by Yaneli Turner RN  Outcome: Not Progressing  4/10/2025 1937 by Yaneli Turner RN  Outcome: Progressing  Intervention: Monitor Pain and Promote Comfort  Recent Flowsheet Documentation  Taken 4/10/2025 1336 by Yaneli Turner RN  Pain Management Interventions:   medication (see MAR)   repositioned  Taken 4/10/2025 0802 by Yaneli Turner RN  Pain Management Interventions: (declined ibuprofen) medication (see MAR)  Intervention: Provide Person-Centered Care  Recent Flowsheet Documentation  Taken 4/10/2025 0802 by Yaneli Turner RN  Trust Relationship/Rapport:   care explained   choices provided   questions answered   questions encouraged   thoughts/feelings acknowledged   reassurance provided   emotional support provided   empathic listening provided  Goal: Readiness for Transition of Care  4/10/2025 1937 by Yaneli Turner RN  Outcome: Not Progressing  4/10/2025 1937 by Yaneli Turner RN  Outcome: Progressing     Problem: Delirium  Goal: Optimal Coping  4/10/2025 1937 by Yaneli Turner RN  Outcome: Not Progressing  4/10/2025 1937 by Yaneli Turner RN  Outcome: Progressing  Intervention: Optimize Psychosocial Adjustment to Delirium  Recent Flowsheet Documentation  Taken 4/10/2025 0802 by Yaneli Turner RN  Supportive Measures:   active listening utilized   relaxation techniques promoted  Goal: Improved Behavioral Control  4/10/2025 1937 by Yaneli Turner RN  Outcome: Not Progressing  4/10/2025 1937 by Yaneli Turner RN  Outcome: Progressing  Intervention: Minimize Safety Risk  Recent Flowsheet Documentation  Taken 4/10/2025 0802 by Yaneli Turner RN  Enhanced Safety Measures:   assistive devices when indicated   pain management   patient/family teach back on injury risk   room near unit station   review medications for side effects with activity  Trust Relationship/Rapport:   care explained   choices provided   questions answered   questions encouraged    thoughts/feelings acknowledged   reassurance provided   emotional support provided   empathic listening provided  Goal: Improved Attention and Thought Clarity  4/10/2025 1937 by Yaneli Turner RN  Outcome: Not Progressing  4/10/2025 1937 by Yaneli Turner RN  Outcome: Progressing  Intervention: Maximize Cognitive Function  Recent Flowsheet Documentation  Taken 4/10/2025 0802 by Yaneli Turner RN  Sensory Stimulation Regulation:   care clustered   quiet environment promoted  Reorientation Measures:   clock in view   reorientation provided  Goal: Improved Sleep  4/10/2025 1937 by Yaneli Turner RN  Outcome: Not Progressing  4/10/2025 1937 by Yaneli Turner RN  Outcome: Progressing  Intervention: Promote Sleep  Recent Flowsheet Documentation  Taken 4/10/2025 0802 by Yaneli Turner RN  Sleep/Rest Enhancement:   awakenings minimized   noise level reduced   regular sleep/rest pattern promoted   relaxation techniques promoted     Problem: Violence Risk or Actual  Goal: Anger and Impulse Control  4/10/2025 1937 by Yaneli Turner RN  Outcome: Not Progressing  4/10/2025 1937 by Yaneli Turner RN  Outcome: Progressing  Intervention: Minimize Safety Risk  Recent Flowsheet Documentation  Taken 4/10/2025 0802 by Yaneli Turner RN  Sensory Stimulation Regulation:   care clustered   quiet environment promoted  Enhanced Safety Measures:   assistive devices when indicated   pain management   patient/family teach back on injury risk   room near unit station   review medications for side effects with activity  Intervention: Promote Self-Control  Recent Flowsheet Documentation  Taken 4/10/2025 0802 by Yaneli Turner RN  Supportive Measures:   active listening utilized   relaxation techniques promoted  Environmental Support:   calm environment promoted   caregiver consistency promoted   distractions minimized     Problem: Comorbidity Management  Goal: Maintenance of Behavioral Health Symptom Control  4/10/2025 1937 by Johnny  Yaneli SEXTON, RN  Outcome: Not Progressing  4/10/2025 1937 by Yaneli Turner, RN  Outcome: Progressing  Intervention: Maintain Behavioral Health Symptom Control  Recent Flowsheet Documentation  Taken 4/10/2025 0802 by Yaneli Turner, RN  Medication Review/Management: medications reviewed

## 2025-04-11 NOTE — PROGRESS NOTES
Patient was agitated and tried to get out of bed after having soiled his bed  A code 21 was called  Olanzapine 10 mg IM was given  Patient was much more calmer and did not require any restraints following this

## 2025-04-12 PROCEDURE — 250N000013 HC RX MED GY IP 250 OP 250 PS 637: Performed by: EMERGENCY MEDICINE

## 2025-04-12 PROCEDURE — 250N000013 HC RX MED GY IP 250 OP 250 PS 637: Performed by: INTERNAL MEDICINE

## 2025-04-12 PROCEDURE — 120N000001 HC R&B MED SURG/OB

## 2025-04-12 PROCEDURE — 250N000013 HC RX MED GY IP 250 OP 250 PS 637: Performed by: STUDENT IN AN ORGANIZED HEALTH CARE EDUCATION/TRAINING PROGRAM

## 2025-04-12 PROCEDURE — 250N000013 HC RX MED GY IP 250 OP 250 PS 637: Performed by: PSYCHIATRY & NEUROLOGY

## 2025-04-12 PROCEDURE — 99232 SBSQ HOSP IP/OBS MODERATE 35: CPT | Performed by: INTERNAL MEDICINE

## 2025-04-12 RX ADMIN — TAMSULOSIN HYDROCHLORIDE 0.4 MG: 0.4 CAPSULE ORAL at 08:37

## 2025-04-12 RX ADMIN — OLANZAPINE 7.5 MG: 5 TABLET, FILM COATED ORAL at 13:52

## 2025-04-12 RX ADMIN — IBUPROFEN 400 MG: 400 TABLET, FILM COATED ORAL at 01:29

## 2025-04-12 RX ADMIN — CALCIUM CARBONATE (ANTACID) CHEW TAB 500 MG 1000 MG: 500 CHEW TAB at 20:59

## 2025-04-12 RX ADMIN — DICLOFENAC SODIUM 2 G: 10 GEL TOPICAL at 20:54

## 2025-04-12 RX ADMIN — HYDROXYZINE HYDROCHLORIDE 10 MG: 10 TABLET, FILM COATED ORAL at 22:14

## 2025-04-12 RX ADMIN — HYDROXYZINE HYDROCHLORIDE 10 MG: 10 TABLET, FILM COATED ORAL at 15:35

## 2025-04-12 RX ADMIN — BENZTROPINE MESYLATE 2 MG: 1 TABLET ORAL at 22:14

## 2025-04-12 RX ADMIN — OLANZAPINE 7.5 MG: 5 TABLET, FILM COATED ORAL at 08:08

## 2025-04-12 RX ADMIN — BUSPIRONE HYDROCHLORIDE 20 MG: 10 TABLET ORAL at 20:49

## 2025-04-12 RX ADMIN — LAMOTRIGINE 150 MG: 150 TABLET ORAL at 08:36

## 2025-04-12 RX ADMIN — OLANZAPINE 25 MG: 10 TABLET, FILM COATED ORAL at 21:20

## 2025-04-12 RX ADMIN — RISPERIDONE 1 MG: 0.5 TABLET, ORALLY DISINTEGRATING ORAL at 01:30

## 2025-04-12 RX ADMIN — LIDOCAINE 1 PATCH: 4 PATCH TOPICAL at 08:38

## 2025-04-12 RX ADMIN — BUSPIRONE HYDROCHLORIDE 20 MG: 10 TABLET ORAL at 08:36

## 2025-04-12 ASSESSMENT — ACTIVITIES OF DAILY LIVING (ADL)
ADLS_ACUITY_SCORE: 72

## 2025-04-12 NOTE — PLAN OF CARE
"Goal Outcome Evaluation:      Plan of Care Reviewed With: patient    Overall Patient Progress: no changeOverall Patient Progress: no change    Outcome Evaluation: TBD - court hold    New skin injuries documented, likely occurred during \"hands-on\" Code 21 yesterday  - Right upper back scratch, Right shin skin shear, Left hip bruise  Pt pleasant and compliant with all medications and cares  Complete bed bath with hair wash this afternoon  Refusing bed alarm: Education attempted however pt became irritable, pt allowed time to calm self with this writer at bedside, pt stated in clear short sentences, \"I can't walk, I won't get up, It scares me (this writer asked, \"when it goes off?\" Pt confirmed with firm \"yes\". Frequent visual monitoring for safety      Problem: Adult Inpatient Plan of Care  Goal: Plan of Care Review  Description: The Plan of Care Review/Shift note should be completed every shift.  The Outcome Evaluation is a brief statement about your assessment that the patient is improving, declining, or no change.  This information will be displayed automatically on your shiftnote.  Outcome: Progressing  Flowsheets (Taken 4/12/2025 9253)  Outcome Evaluation: TBD - court hold  Plan of Care Reviewed With: patient  Overall Patient Progress: no change  Goal: Patient-Specific Goal (Individualized)  Description: You can add care plan individualizations to a care plan. Examples of Individualization might be:  \"Parent requests to be called daily at 9am for status\", \"I have a hard time hearing out of my right ear\", or \"Do not touch me to wake me up as it startlesme\".  Outcome: Progressing  Goal: Absence of Hospital-Acquired Illness or Injury  Outcome: Progressing  Intervention: Identify and Manage Fall Risk  Recent Flowsheet Documentation  Taken 4/12/2025 1412 by Reyes O'Connor, Bridget M, RN  Safety Promotion/Fall Prevention:   activity supervised   assistive device/personal items within reach   increased rounding and " observation   increase visualization of patient   lighting adjusted   mobility aid in reach   nonskid shoes/slippers when out of bed   patient and family education   room organization consistent   safety round/check completed   supervised activity  Intervention: Prevent Skin Injury  Recent Flowsheet Documentation  Taken 4/12/2025 1400 by Reyes O'Connor, Bridget M, RN  Body Position:   turned   left   right   position changed independently  Taken 4/12/2025 0900 by Reyes O'Connor, Bridget M, RN  Body Position:   turned   right   left   position changed independently  Taken 4/12/2025 0812 by Reyes O'Connor, Bridget M, RN  Body Position: position changed independently  Intervention: Prevent and Manage VTE (Venous Thromboembolism) Risk  Recent Flowsheet Documentation  Taken 4/12/2025 0812 by Reyes O'Connor, Bridget M, RN  VTE Prevention/Management: SCDs off (sequential compression devices)  Goal: Optimal Comfort and Wellbeing  Outcome: Progressing  Intervention: Provide Person-Centered Care  Recent Flowsheet Documentation  Taken 4/12/2025 0812 by Reyes O'Connor, Bridget M, RN  Trust Relationship/Rapport:   empathic listening provided   reassurance provided   thoughts/feelings acknowledged   emotional support provided   care explained   choices provided  Goal: Readiness for Transition of Care  Outcome: Progressing
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
## 2025-04-12 NOTE — PLAN OF CARE
"Goal Outcome Evaluation:      Plan of Care Reviewed With: patient    Overall Patient Progress: no changeOverall Patient Progress: no change    Outcome Evaluation: TBD - court hold    Intermittent behavioral episodes throughout day  Wound cares completed - mepilexs' applied d/t friction on moist skin and sores \"thickening\" (blister-like, skin remains intact)  Refusal of cares, combative, verbally abusive  Code Green called at 1600, escaladed to Code 21  IM Haldol to LUE, additional IM of Haldol to RLE during Code 21  Post code: pt pleasant, apologetic, and compliant with all cares, IM Zyprexa to Left buttock - calm and compliant with administration      Problem: Adult Inpatient Plan of Care  Goal: Plan of Care Review  Description: The Plan of Care Review/Shift note should be completed every shift.  The Outcome Evaluation is a brief statement about your assessment that the patient is improving, declining, or no change.  This information will be displayed automatically on your shiftnote.  Outcome: Not Progressing  Flowsheets (Taken 4/11/2025 1938)  Outcome Evaluation: TBD - court hold  Plan of Care Reviewed With: patient  Overall Patient Progress: no change  Goal: Patient-Specific Goal (Individualized)  Description: You can add care plan individualizations to a care plan. Examples of Individualization might be:  \"Parent requests to be called daily at 9am for status\", \"I have a hard time hearing out of my right ear\", or \"Do not touch me to wake me up as it startlesme\".  Outcome: Not Progressing  Goal: Absence of Hospital-Acquired Illness or Injury  Outcome: Not Progressing  Intervention: Identify and Manage Fall Risk  Recent Flowsheet Documentation  Taken 4/11/2025 1309 by Reyes O'Connor, Bridget M, RN  Safety Promotion/Fall Prevention:   activity supervised   assistive device/personal items within reach   clutter free environment maintained   lighting adjusted   increase visualization of patient   increased rounding " and observation   mobility aid in reach   nonskid shoes/slippers when out of bed   patient and family education   room organization consistent   safety round/check completed   supervised activity  Intervention: Prevent Skin Injury  Recent Flowsheet Documentation  Taken 4/11/2025 1800 by Reyes O'Connor, Bridget M, RN  Body Position: position changed independently  Taken 4/11/2025 1309 by Reyes O'Connor, Bridget M, RN  Body Position: position changed independently  Intervention: Prevent and Manage VTE (Venous Thromboembolism) Risk  Recent Flowsheet Documentation  Taken 4/11/2025 1309 by Reyes O'Connor, Bridget M, RN  VTE Prevention/Management: SCDs off (sequential compression devices)  Goal: Optimal Comfort and Wellbeing  Outcome: Not Progressing  Intervention: Provide Person-Centered Care  Recent Flowsheet Documentation  Taken 4/11/2025 1800 by Reyes O'Connor, Bridget M, RN  Trust Relationship/Rapport:   care explained   choices provided   emotional support provided   empathic listening provided   questions answered   reassurance provided   thoughts/feelings acknowledged  Taken 4/11/2025 1430 by Reyes O'Connor, Bridget M, RN  Trust Relationship/Rapport:   care explained   choices provided   emotional support provided   empathic listening provided   questions answered   reassurance provided   thoughts/feelings acknowledged  Taken 4/11/2025 1309 by Reyes O'Connor, Bridget M, RN  Trust Relationship/Rapport:   care explained   choices provided   emotional support provided   empathic listening provided   questions answered   reassurance provided   thoughts/feelings acknowledged  Taken 4/11/2025 1000 by Reyes O'Connor, Bridget M, RN  Trust Relationship/Rapport:   emotional support provided   empathic listening provided   reassurance provided   thoughts/feelings acknowledged  Goal: Readiness for Transition of Care  Outcome: Not Progressing

## 2025-04-12 NOTE — PLAN OF CARE
"Goal Outcome Evaluation:      Plan of Care Reviewed With: patient    Overall Patient Progress: no changeOverall Patient Progress: no change    Outcome Evaluation: VSS. A&O. Initially cooperative with cares - increasing in resistance to help in the dinner hours. Frequently stopping RN as the door and politely asking to step out. Pt request respected and alarms remain off per pt request to prevent escalation in behaviors.  Pt reminded to utilize call light and informed of risks of leaving alarms off - pt understanding and compliant with call light use. Skin assessment not completed due to pt refusal for writer to observe.      Problem: Adult Inpatient Plan of Care  Goal: Plan of Care Review  Description: The Plan of Care Review/Shift note should be completed every shift.  The Outcome Evaluation is a brief statement about your assessment that the patient is improving, declining, or no change.  This information will be displayed automatically on your shiftnote.  Outcome: Not Progressing  Flowsheets (Taken 4/12/2025 7657)  Outcome Evaluation: VSS. A&O. Initially cooperative with cares - increasing in resistance to help in the dinner hours. Frequently stopping RN as the door and politely asking to step out. Pt request respected and alarms remain off per pt request to prevent escalation in behaviors.  Pt reminded to utilize call light and informed of risks of leaving alarms off - pt understanding and compliant with call light use. Skin assessment not completed due to pt refusal for writer to observe.  Plan of Care Reviewed With: patient  Overall Patient Progress: no change  Goal: Patient-Specific Goal (Individualized)  Description: You can add care plan individualizations to a care plan. Examples of Individualization might be:  \"Parent requests to be called daily at 9am for status\", \"I have a hard time hearing out of my right ear\", or \"Do not touch me to wake me up as it startlesme\".  Outcome: Not Progressing  Goal: " Absence of Hospital-Acquired Illness or Injury  Outcome: Not Progressing  Intervention: Identify and Manage Fall Risk  Recent Flowsheet Documentation  Taken 4/12/2025 1632 by Alexander Farris RN  Safety Promotion/Fall Prevention:   activity supervised   assistive device/personal items within reach   increase visualization of patient   nonskid shoes/slippers when out of bed   patient and family education  Intervention: Prevent Skin Injury  Recent Flowsheet Documentation  Taken 4/12/2025 1632 by Alexander Farris RN  Body Position: position changed independently  Intervention: Prevent Infection  Recent Flowsheet Documentation  Taken 4/12/2025 1632 by Alexander Farris RN  Infection Prevention: rest/sleep promoted  Goal: Optimal Comfort and Wellbeing  Outcome: Not Progressing  Intervention: Provide Person-Centered Care  Recent Flowsheet Documentation  Taken 4/12/2025 1632 by Alexander Farrsi RN  Trust Relationship/Rapport:   choices provided   empathic listening provided   questions encouraged   reassurance provided   thoughts/feelings acknowledged  Goal: Readiness for Transition of Care  Outcome: Not Progressing
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
## 2025-04-12 NOTE — PLAN OF CARE
"Goal Outcome Evaluation:      Plan of Care Reviewed With: patient    Overall Patient Progress: no changeOverall Patient Progress: no change    Outcome Evaluation: Plan TBD, on a court hold, no aggressive behaviors overnight    A/Ox2, no aggressive behaviors overnight, BP elevated otherwise VSS, on RA, no IV access, Ax1 W/GB stand at bedside, tolerating a regular diet, incontinent of bowel and bladder, plan TBD    Pt refused bed alarm. Education and rationale given, pt still refused fall interventions    Problem: Adult Inpatient Plan of Care  Goal: Plan of Care Review  Description: The Plan of Care Review/Shift note should be completed every shift.  The Outcome Evaluation is a brief statement about your assessment that the patient is improving, declining, or no change.  This information will be displayed automatically on your shiftnote.  Outcome: Not Progressing  Flowsheets (Taken 4/12/2025 0739)  Outcome Evaluation: Plan TBD, on a court hold, no aggressive behaviors overnight  Plan of Care Reviewed With: patient  Overall Patient Progress: no change  Goal: Patient-Specific Goal (Individualized)  Description: You can add care plan individualizations to a care plan. Examples of Individualization might be:  \"Parent requests to be called daily at 9am for status\", \"I have a hard time hearing out of my right ear\", or \"Do not touch me to wake me up as it startlesme\".  Outcome: Not Progressing  Goal: Absence of Hospital-Acquired Illness or Injury  Outcome: Not Progressing  Intervention: Identify and Manage Fall Risk  Recent Flowsheet Documentation  Taken 4/11/2025 2000 by Maria Del Carmen Garcia, RN  Safety Promotion/Fall Prevention:   assistive device/personal items within reach   clutter free environment maintained   increased rounding and observation   room near nurse's station   room organization consistent   safety round/check completed  Intervention: Prevent and Manage VTE (Venous Thromboembolism) Risk  Recent Flowsheet " Documentation  Taken 4/11/2025 2000 by Maria Del Carmen Garcia RN  VTE Prevention/Management:   SCDs off (sequential compression devices)   patient refused intervention  Intervention: Prevent Infection  Recent Flowsheet Documentation  Taken 4/11/2025 2000 by Maria Del Carmen Garcia RN  Infection Prevention:   environmental surveillance performed   rest/sleep promoted   single patient room provided  Goal: Optimal Comfort and Wellbeing  Outcome: Not Progressing  Intervention: Provide Person-Centered Care  Recent Flowsheet Documentation  Taken 4/11/2025 2000 by Maria Del Camren Garcia RN  Trust Relationship/Rapport:   care explained   choices provided   emotional support provided   empathic listening provided   questions answered   reassurance provided   thoughts/feelings acknowledged  Goal: Readiness for Transition of Care  Outcome: Not Progressing     Problem: Delirium  Goal: Optimal Coping  Outcome: Not Progressing  Intervention: Optimize Psychosocial Adjustment to Delirium  Recent Flowsheet Documentation  Taken 4/11/2025 2000 by Maria Del Carmen Garcia RN  Supportive Measures:   active listening utilized   relaxation techniques promoted  Goal: Improved Behavioral Control  Outcome: Not Progressing  Intervention: Minimize Safety Risk  Recent Flowsheet Documentation  Taken 4/11/2025 2000 by Maria Del Carmen Garcia RN  Enhanced Safety Measures:   pain management   room near unit station  Trust Relationship/Rapport:   care explained   choices provided   emotional support provided   empathic listening provided   questions answered   reassurance provided   thoughts/feelings acknowledged  Goal: Improved Attention and Thought Clarity  Outcome: Not Progressing  Goal: Improved Sleep  Outcome: Not Progressing     Problem: Violence Risk or Actual  Goal: Anger and Impulse Control  Outcome: Not Progressing  Intervention: Minimize Safety Risk  Recent Flowsheet Documentation  Taken 4/11/2025 2000 by Maria Del Carmen Garcia RN  Enhanced Safety Measures:   pain management   room  near unit station  Intervention: Promote Self-Control  Recent Flowsheet Documentation  Taken 4/11/2025 2000 by Maria Del Carmen Garcia, RN  Supportive Measures:   active listening utilized   relaxation techniques promoted  Environmental Support:   calm environment promoted   caregiver consistency promoted   distractions minimized     Problem: Comorbidity Management  Goal: Maintenance of Behavioral Health Symptom Control  Outcome: Not Progressing  Intervention: Maintain Behavioral Health Symptom Control  Recent Flowsheet Documentation  Taken 4/11/2025 2000 by Maria Del Carmen Garcia, RN  Medication Review/Management: medications reviewed

## 2025-04-13 PROCEDURE — 99232 SBSQ HOSP IP/OBS MODERATE 35: CPT | Performed by: INTERNAL MEDICINE

## 2025-04-13 PROCEDURE — 250N000013 HC RX MED GY IP 250 OP 250 PS 637: Performed by: EMERGENCY MEDICINE

## 2025-04-13 PROCEDURE — 250N000013 HC RX MED GY IP 250 OP 250 PS 637: Performed by: PSYCHIATRY & NEUROLOGY

## 2025-04-13 PROCEDURE — 250N000013 HC RX MED GY IP 250 OP 250 PS 637: Performed by: INTERNAL MEDICINE

## 2025-04-13 PROCEDURE — 120N000001 HC R&B MED SURG/OB

## 2025-04-13 RX ADMIN — Medication 3 MG: at 22:44

## 2025-04-13 RX ADMIN — BENZTROPINE MESYLATE 2 MG: 1 TABLET ORAL at 21:45

## 2025-04-13 RX ADMIN — OLANZAPINE 7.5 MG: 5 TABLET, FILM COATED ORAL at 09:21

## 2025-04-13 RX ADMIN — LIDOCAINE 1 PATCH: 4 PATCH TOPICAL at 09:20

## 2025-04-13 RX ADMIN — IBUPROFEN 400 MG: 400 TABLET, FILM COATED ORAL at 21:45

## 2025-04-13 RX ADMIN — BUSPIRONE HYDROCHLORIDE 20 MG: 10 TABLET ORAL at 21:03

## 2025-04-13 RX ADMIN — HYDROXYZINE HYDROCHLORIDE 10 MG: 10 TABLET, FILM COATED ORAL at 21:19

## 2025-04-13 RX ADMIN — BUSPIRONE HYDROCHLORIDE 20 MG: 10 TABLET ORAL at 09:21

## 2025-04-13 RX ADMIN — TAMSULOSIN HYDROCHLORIDE 0.4 MG: 0.4 CAPSULE ORAL at 09:21

## 2025-04-13 RX ADMIN — OLANZAPINE 7.5 MG: 5 TABLET, FILM COATED ORAL at 12:11

## 2025-04-13 RX ADMIN — HYDROXYZINE HYDROCHLORIDE 10 MG: 10 TABLET, FILM COATED ORAL at 12:11

## 2025-04-13 RX ADMIN — OLANZAPINE 25 MG: 10 TABLET, FILM COATED ORAL at 21:18

## 2025-04-13 RX ADMIN — LAMOTRIGINE 150 MG: 150 TABLET ORAL at 09:21

## 2025-04-13 ASSESSMENT — ACTIVITIES OF DAILY LIVING (ADL)
ADLS_ACUITY_SCORE: 76
ADLS_ACUITY_SCORE: 76
ADLS_ACUITY_SCORE: 72
ADLS_ACUITY_SCORE: 76
ADLS_ACUITY_SCORE: 72
ADLS_ACUITY_SCORE: 76
ADLS_ACUITY_SCORE: 72
ADLS_ACUITY_SCORE: 76
ADLS_ACUITY_SCORE: 76
ADLS_ACUITY_SCORE: 72
ADLS_ACUITY_SCORE: 72
ADLS_ACUITY_SCORE: 76
ADLS_ACUITY_SCORE: 72

## 2025-04-13 NOTE — PLAN OF CARE
"Goal Outcome Evaluation:      Plan of Care Reviewed With: patient    Overall Patient Progress: no changeOverall Patient Progress: no change    Outcome Evaluation: Discharge TBD    No behavioral episodes today   Calm and cooperative with cares and medication administrations  Allowed this writer to provide nail care      Problem: Adult Inpatient Plan of Care  Goal: Plan of Care Review  Description: The Plan of Care Review/Shift note should be completed every shift.  The Outcome Evaluation is a brief statement about your assessment that the patient is improving, declining, or no change.  This information will be displayed automatically on your shiftnote.  Outcome: Not Progressing  Flowsheets (Taken 4/13/2025 1618)  Outcome Evaluation: Discharge TBD  Plan of Care Reviewed With: patient  Overall Patient Progress: no change  Goal: Patient-Specific Goal (Individualized)  Description: You can add care plan individualizations to a care plan. Examples of Individualization might be:  \"Parent requests to be called daily at 9am for status\", \"I have a hard time hearing out of my right ear\", or \"Do not touch me to wake me up as it startlesme\".  Outcome: Not Progressing  Goal: Absence of Hospital-Acquired Illness or Injury  Outcome: Not Progressing  Intervention: Identify and Manage Fall Risk  Recent Flowsheet Documentation  Taken 4/13/2025 0929 by Reyes O'Connor, Bridget M, RN  Safety Promotion/Fall Prevention:   activity supervised   assistive device/personal items within reach   increased rounding and observation   increase visualization of patient   lighting adjusted   mobility aid in reach   nonskid shoes/slippers when out of bed   patient and family education   room organization consistent   safety round/check completed   supervised activity   clutter free environment maintained   room door open   room near nurse's station  Intervention: Prevent Skin Injury  Recent Flowsheet Documentation  Taken 4/13/2025 1500 by Reyes " Fiorella Ford RN  Body Position: position changed independently  Taken 4/13/2025 1200 by Reyes O'Connor, Bridget M, RN  Body Position: position changed independently  Taken 4/13/2025 0929 by Reyes O'Connor, Bridget M, RN  Body Position: position changed independently  Intervention: Prevent and Manage VTE (Venous Thromboembolism) Risk  Recent Flowsheet Documentation  Taken 4/13/2025 0929 by Reyes O'Connor, Bridget M, RN  VTE Prevention/Management: SCDs off (sequential compression devices)  Goal: Optimal Comfort and Wellbeing  Outcome: Not Progressing  Intervention: Provide Person-Centered Care  Recent Flowsheet Documentation  Taken 4/13/2025 0929 by Reyes O'Connor, Bridget M, RN  Trust Relationship/Rapport:   empathic listening provided   reassurance provided   thoughts/feelings acknowledged   emotional support provided   care explained   choices provided  Goal: Readiness for Transition of Care  Outcome: Not Progressing

## 2025-04-13 NOTE — PROGRESS NOTES
Hospitalist Medicine Progress Note   [facility]       Harpreet Mcelroy is a 69 year old gentleman with bipolar, anxiety, depression, schizophrenia, BPD, paranoia, bladder cancer s/p ureteral stent, HTN, HLD, who came to [facility] on 3/27/2025 on a RENETTA from [facility], attempt to harm staff with cane and was diagnosed with decompensated schizophrenia and ROBBIN with creatinine 1.42 . He was admitted by the medicine service on 3/31/2025 after he was declined by inpatient psychiatry for Psychiatry admission. Was seen by Dr Peres from Psychiatry service here who managed his medications and recommended full MI commitment with pacheco       Date of Admission:  3/27/2025  Assessment & Plan     ROBBIN  - resolved     Decompensated schizophrenia with intermittent agitation  Bipolar  Anxiety  Depression  Chronic cognitive dysfunction  -Psychiatry following.  Will leave adjustment of antipsychotic and psychiatric medications to psychiatry     Dysarthric speech  -Patient can be challenging to understand at times     Bladder cancer   Chronic L ureteral stent  Stent last changed 2/4/25 by [facility] urology Dr. Whitt.  - PTA oxybutynin and flomax               Plan:   He is awaiting placement         Diet: Mechanical/Dental Soft Diet    DVT Prophylaxis: Pneumatic Compression Devices  Hernandez Catheter: Not present  Code Status: Full Code         Medically Ready for Discharge: Anticipated in 5+ Days    Clinically Significant Risk Factors                                  # Financial/Environmental Concerns: none               The patient's care was discussed with the Patient and RN.    Chan Mora MD  Hospitalist Service  [facility]    ______________________________________________________________________    Interval History     Symptoms   Patient wanted to get out of the room the second he saw me    Review of Systems:   Could not get any histroy      Data reviewed today: I  reviewed all medications, new labs and imaging results over the last 24 hours.     Physical Exam   Vital Signs: Temp: 97  F (36.1  C) Temp src: Temporal BP: (!) 145/62 Pulse: 96   Resp: 16 SpO2: 96 % O2 Device: None (Room air)    Weight: 128 lbs 12 oz      GENERAL: Patient is not  in acute distress  NECK: I did not see any no Jugular Venous distention  HEART: S1 S2 RRR  LUNGS: Clear in the upper lung fields        Data   No lab results found in last 7 days.        No results found for this or any previous visit (from the past 24 hours).

## 2025-04-13 NOTE — PLAN OF CARE
"Goal Outcome Evaluation:      Plan of Care Reviewed With: patient    Overall Patient Progress: no changeOverall Patient Progress: no change    Outcome Evaluation: No violent/aggressive behaviors overnight, continuing to refuse alarms    A/Ox4, VSS, on RA, no violent or aggressive behaviors overnight, breathing exercises helpful to soothe anxiety per pt, no IV access, Ax1 W/GB, incontinent of b/b, barrier cream applied to buttock and posterior thigh wounds, left SADA, tolerating a mechanical soft diet, voltaren gel and massage for pain, plan TBD, awaiting placement.    Pt refusing bed alarms, was found in bathroom alone with bedding wet with urine. Rationale for bed alarms, fall prevention, and call light use reinforced. Pt agreeable to using walker and GB, however still refusing alarms. Pt reports being triggered by loud noises which causes an escalation in agitation and anxiety. Floor mats in place.     Problem: Adult Inpatient Plan of Care  Goal: Plan of Care Review  Description: The Plan of Care Review/Shift note should be completed every shift.  The Outcome Evaluation is a brief statement about your assessment that the patient is improving, declining, or no change.  This information will be displayed automatically on your shiftnote.  Outcome: Not Progressing  Flowsheets (Taken 4/13/2025 0050)  Outcome Evaluation: No violent/aggressive behaviors overnight, continuing to refuse alarms  Plan of Care Reviewed With: patient  Overall Patient Progress: no change  Goal: Patient-Specific Goal (Individualized)  Description: You can add care plan individualizations to a care plan. Examples of Individualization might be:  \"Parent requests to be called daily at 9am for status\", \"I have a hard time hearing out of my right ear\", or \"Do not touch me to wake me up as it startlesme\".  Outcome: Not Progressing  Goal: Absence of Hospital-Acquired Illness or Injury  Outcome: Not Progressing  Intervention: Identify and Manage Fall " Risk  Recent Flowsheet Documentation  Taken 4/12/2025 2109 by Maria Del Carmen Garcia RN  Safety Promotion/Fall Prevention:   activity supervised   assistive device/personal items within reach   increased rounding and observation   clutter free environment maintained   room near nurse's station   room organization consistent   safety round/check completed  Intervention: Prevent and Manage VTE (Venous Thromboembolism) Risk  Recent Flowsheet Documentation  Taken 4/12/2025 2109 by Maria Del Carmen Garcia RN  VTE Prevention/Management: SCDs off (sequential compression devices)  Intervention: Prevent Infection  Recent Flowsheet Documentation  Taken 4/12/2025 2109 by Maria Del Carmen Garcia RN  Infection Prevention:   environmental surveillance performed   rest/sleep promoted   single patient room provided  Goal: Optimal Comfort and Wellbeing  Outcome: Not Progressing  Intervention: Monitor Pain and Promote Comfort  Recent Flowsheet Documentation  Taken 4/12/2025 2108 by Maria Del Carmen Garcia RN  Pain Management Interventions:   medication (see MAR)   breathing exercises  Intervention: Provide Person-Centered Care  Recent Flowsheet Documentation  Taken 4/12/2025 2109 by Maria Del Carmen Garcia RN  Trust Relationship/Rapport:   choices provided   empathic listening provided   questions encouraged   reassurance provided   thoughts/feelings acknowledged  Goal: Readiness for Transition of Care  Outcome: Not Progressing     Problem: Delirium  Goal: Optimal Coping  Outcome: Not Progressing  Intervention: Optimize Psychosocial Adjustment to Delirium  Recent Flowsheet Documentation  Taken 4/12/2025 2109 by Maria Del Carmen Garcia RN  Family/Support System Care: presence promoted  Goal: Improved Behavioral Control  Outcome: Not Progressing  Intervention: Minimize Safety Risk  Recent Flowsheet Documentation  Taken 4/12/2025 2109 by Maria Del Carmen Garcia RN  Enhanced Safety Measures:   pain management   room near unit station  Trust Relationship/Rapport:   choices provided   empathic  listening provided   questions encouraged   reassurance provided   thoughts/feelings acknowledged  Goal: Improved Attention and Thought Clarity  Outcome: Not Progressing  Intervention: Maximize Cognitive Function  Recent Flowsheet Documentation  Taken 4/12/2025 2109 by Maria Del Carmen Garcia RN  Sensory Stimulation Regulation:   auditory stimulation minimized   care clustered   quiet environment promoted  Reorientation Measures: clock in view  Goal: Improved Sleep  Outcome: Not Progressing     Problem: Comorbidity Management  Goal: Maintenance of Behavioral Health Symptom Control  Outcome: Not Progressing  Intervention: Maintain Behavioral Health Symptom Control  Recent Flowsheet Documentation  Taken 4/12/2025 2109 by Maria Del Carmen Garcia RN  Medication Review/Management: medications reviewed     Problem: Violence Risk or Actual  Goal: Anger and Impulse Control  Outcome: Progressing  Intervention: Minimize Safety Risk  Recent Flowsheet Documentation  Taken 4/12/2025 2109 by Maria Del Carmen Garcia, RN  Sensory Stimulation Regulation:   auditory stimulation minimized   care clustered   quiet environment promoted  Enhanced Safety Measures:   pain management   room near unit station

## 2025-04-14 PROCEDURE — 250N000013 HC RX MED GY IP 250 OP 250 PS 637: Performed by: INTERNAL MEDICINE

## 2025-04-14 PROCEDURE — 250N000013 HC RX MED GY IP 250 OP 250 PS 637: Performed by: EMERGENCY MEDICINE

## 2025-04-14 PROCEDURE — 120N000001 HC R&B MED SURG/OB

## 2025-04-14 PROCEDURE — 99232 SBSQ HOSP IP/OBS MODERATE 35: CPT | Performed by: INTERNAL MEDICINE

## 2025-04-14 PROCEDURE — 250N000013 HC RX MED GY IP 250 OP 250 PS 637: Performed by: PSYCHIATRY & NEUROLOGY

## 2025-04-14 RX ADMIN — OLANZAPINE 7.5 MG: 5 TABLET, FILM COATED ORAL at 09:47

## 2025-04-14 RX ADMIN — BENZTROPINE MESYLATE 2 MG: 1 TABLET ORAL at 21:47

## 2025-04-14 RX ADMIN — BUSPIRONE HYDROCHLORIDE 20 MG: 10 TABLET ORAL at 09:25

## 2025-04-14 RX ADMIN — TAMSULOSIN HYDROCHLORIDE 0.4 MG: 0.4 CAPSULE ORAL at 09:25

## 2025-04-14 RX ADMIN — OLANZAPINE 25 MG: 10 TABLET, FILM COATED ORAL at 21:47

## 2025-04-14 RX ADMIN — HYDROXYZINE HYDROCHLORIDE 10 MG: 10 TABLET, FILM COATED ORAL at 13:35

## 2025-04-14 RX ADMIN — LIDOCAINE 1 PATCH: 4 PATCH TOPICAL at 09:26

## 2025-04-14 RX ADMIN — OLANZAPINE 7.5 MG: 5 TABLET, FILM COATED ORAL at 13:36

## 2025-04-14 RX ADMIN — HYDROXYZINE HYDROCHLORIDE 10 MG: 10 TABLET, FILM COATED ORAL at 20:19

## 2025-04-14 RX ADMIN — BUSPIRONE HYDROCHLORIDE 20 MG: 10 TABLET ORAL at 20:19

## 2025-04-14 RX ADMIN — IBUPROFEN 400 MG: 400 TABLET, FILM COATED ORAL at 20:19

## 2025-04-14 RX ADMIN — LAMOTRIGINE 150 MG: 150 TABLET ORAL at 09:25

## 2025-04-14 ASSESSMENT — ACTIVITIES OF DAILY LIVING (ADL)
ADLS_ACUITY_SCORE: 72

## 2025-04-14 NOTE — PLAN OF CARE
"Goal Outcome Evaluation:      Plan of Care Reviewed With: patient    Overall Patient Progress: improvingOverall Patient Progress: improving    Outcome Evaluation: Pt refused vital signs. On RA. No PIV. Tolerating mechanical soft diet well. Assist of 1-2 GB/W. Bed alarm on. Pt was agitated at times but redirectable. Bed alarm on. Plan is TBD.      Problem: Adult Inpatient Plan of Care  Goal: Plan of Care Review  Description: The Plan of Care Review/Shift note should be completed every shift.  The Outcome Evaluation is a brief statement about your assessment that the patient is improving, declining, or no change.  This information will be displayed automatically on your shiftnote.  Outcome: Progressing  Flowsheets (Taken 4/14/2025 1803)  Outcome Evaluation: Pt refused vital signs. On RA. No PIV. Tolerating mechanical soft diet well. Assist of 1-2 GB/W. Bed alarm on. Pt was agitated at times but redirectable. Bed alarm on. Plan is TBD.  Plan of Care Reviewed With: patient  Overall Patient Progress: improving  Goal: Patient-Specific Goal (Individualized)  Description: You can add care plan individualizations to a care plan. Examples of Individualization might be:  \"Parent requests to be called daily at 9am for status\", \"I have a hard time hearing out of my right ear\", or \"Do not touch me to wake me up as it startlesme\".  Outcome: Progressing  Goal: Absence of Hospital-Acquired Illness or Injury  Outcome: Progressing  Intervention: Identify and Manage Fall Risk  Recent Flowsheet Documentation  Taken 4/14/2025 1614 by Eb Dennis RN  Safety Promotion/Fall Prevention:   activity supervised   assistive device/personal items within reach   clutter free environment maintained   increased rounding and observation   increase visualization of patient   lighting adjusted   nonskid shoes/slippers when out of bed   mobility aid in reach   safety round/check completed   supervised activity  Intervention: Prevent Skin Injury  Recent " Flowsheet Documentation  Taken 4/14/2025 1614 by Eb Dennis RN  Body Position: position changed independently  Skin Protection:   adhesive use limited   incontinence pads utilized  Intervention: Prevent and Manage VTE (Venous Thromboembolism) Risk  Recent Flowsheet Documentation  Taken 4/14/2025 1614 by Eb Dennis RN  VTE Prevention/Management: SCDs off (sequential compression devices)  Intervention: Prevent Infection  Recent Flowsheet Documentation  Taken 4/14/2025 1614 by Eb Dennis RN  Infection Prevention:   hand hygiene promoted   rest/sleep promoted   single patient room provided  Goal: Optimal Comfort and Wellbeing  Outcome: Progressing  Intervention: Monitor Pain and Promote Comfort  Recent Flowsheet Documentation  Taken 4/14/2025 1614 by Eb Dennis RN  Pain Management Interventions:   declines   distraction   care clustered  Intervention: Provide Person-Centered Care  Recent Flowsheet Documentation  Taken 4/14/2025 1614 by Eb Dennis RN  Trust Relationship/Rapport:   care explained   choices provided   empathic listening provided   reassurance provided   thoughts/feelings acknowledged  Goal: Readiness for Transition of Care  Outcome: Progressing     Problem: Delirium  Goal: Optimal Coping  Outcome: Progressing  Intervention: Optimize Psychosocial Adjustment to Delirium  Recent Flowsheet Documentation  Taken 4/14/2025 1614 by Eb Dennis RN  Supportive Measures: active listening utilized  Goal: Improved Behavioral Control  Outcome: Progressing  Intervention: Minimize Safety Risk  Recent Flowsheet Documentation  Taken 4/14/2025 1614 by Eb Dennis RN  Enhanced Safety Measures:   pain management   patient/family teach back on injury risk   review medications for side effects with activity   room near unit station  Trust Relationship/Rapport:   care explained   choices provided   empathic listening provided   reassurance provided   thoughts/feelings acknowledged  Goal: Improved Attention and Thought  Clarity  Outcome: Progressing  Intervention: Maximize Cognitive Function  Recent Flowsheet Documentation  Taken 4/14/2025 1614 by Eb Dennis RN  Sensory Stimulation Regulation:   care clustered   lighting decreased   quiet environment promoted  Reorientation Measures: clock in view  Goal: Improved Sleep  Outcome: Progressing     Problem: Violence Risk or Actual  Goal: Anger and Impulse Control  Outcome: Progressing  Intervention: Minimize Safety Risk  Recent Flowsheet Documentation  Taken 4/14/2025 1614 by Eb Dennis RN  Sensory Stimulation Regulation:   care clustered   lighting decreased   quiet environment promoted  Enhanced Safety Measures:   pain management   patient/family teach back on injury risk   review medications for side effects with activity   room near unit station  Intervention: Promote Self-Control  Recent Flowsheet Documentation  Taken 4/14/2025 1614 by Eb Dennis RN  Supportive Measures: active listening utilized  Environmental Support: calm environment promoted     Problem: Comorbidity Management  Goal: Maintenance of Behavioral Health Symptom Control  Outcome: Progressing  Intervention: Maintain Behavioral Health Symptom Control  Recent Flowsheet Documentation  Taken 4/14/2025 1614 by Eb Dennis RN  Medication Review/Management: medications reviewed

## 2025-04-14 NOTE — PROGRESS NOTES
Hospitalist Medicine Progress Note   [facility]       Harpreet Mcelroy is a 69 year old gentleman with bipolar, anxiety, depression, schizophrenia, BPD, paranoia, bladder cancer s/p ureteral stent, HTN, HLD, who came to [facility] on 3/27/2025 on a RENETTA from [place], attempt to harm staff with cane and was diagnosed with decompensated schizophrenia and ROBBIN with creatinine 1.42 . He was admitted by the medicine service on 3/31/2025 after he was declined by inpatient psychiatry for Psychiatry admission. Was seen by Dr Peres from Psychiatry service here who managed his medications and recommended full MI commitment with blanco       Date of Admission:  3/27/2025  Assessment & Plan     ROBBIN  - resolved     Decompensated schizophrenia with intermittent agitation  Bipolar  Anxiety  Depression  Chronic cognitive dysfunction  -Psychiatry following.  Patient is still agitated Will leave adjustment of antipsychotic and psychiatric medications to psychiatry  Awaiting court [place] hearing following which disposition will be decided      Dysarthric speech  -Patient can be challenging to understand at times     Bladder cancer   Chronic L ureteral stent  Stent last changed 2/4/25 by [facility] urology Dr. Whitt.  - PTA oxybutynin and flomax               Plan:   He is awaiting placement         Diet: Mechanical/Dental Soft Diet    DVT Prophylaxis: Pneumatic Compression Devices  Hernandez Catheter: Not present  Code Status: Full Code         Medically Ready for Discharge: Anticipated in 5+ Days    Clinically Significant Risk Factors                                  # Financial/Environmental Concerns: none               The patient's care was discussed with the Patient and RN.    Chan Mora MD  Hospitalist Service  [facility]    ______________________________________________________________________    Interval History     Symptoms   Patient like everyday wanted me to get  out of the room the moment he saw me    Review of Systems:   Could not get any histroy      Data reviewed today: I reviewed all medications, new labs and imaging results over the last 24 hours.     Physical Exam   Vital Signs: Temp: (!) 95.5  F (35.3  C) (MD aware) Temp src: Temporal BP: (!) 169/73 (MD aware) Pulse: 70   Resp: 18 SpO2: 99 % O2 Device: None (Room air)    Weight: 128 lbs 12 oz      GENERAL: Patient is not  in acute distress  NECK: I did not see any no Jugular Venous distention  LUNGS: Clear in the upper lung fields   CNS: Alert moving all the 4 limbs     Data   No lab results found in last 7 days.        No results found for this or any previous visit (from the past 24 hours).

## 2025-04-14 NOTE — PLAN OF CARE
"Garbled speech at baseline, difficult to understand. Agitated at times, able to calm with deep breathing. Calm and cooperative with most of cares. BP elevated. On room air. No IV access. No signs of pain. Lidocaine patch applied to lower back. Tolerating mech/soft diet. Takes meds one at a time. Able to stand at bedside ax1 gb/walker. Voiding, incontinence cares provided. Barrier applied to buttocks/thigh/perineum. Discharge TBD, awaiting placement.    Goal Outcome Evaluation:      Plan of Care Reviewed With: patient    Overall Patient Progress: no changeOverall Patient Progress: no change       Problem: Adult Inpatient Plan of Care  Goal: Plan of Care Review  Description: The Plan of Care Review/Shift note should be completed every shift.  The Outcome Evaluation is a brief statement about your assessment that the patient is improving, declining, or no change.  This information will be displayed automatically on your shiftnote.  Outcome: Progressing  Flowsheets (Taken 4/14/2025 1215)  Plan of Care Reviewed With: patient  Overall Patient Progress: no change  Goal: Patient-Specific Goal (Individualized)  Description: You can add care plan individualizations to a care plan. Examples of Individualization might be:  \"Parent requests to be called daily at 9am for status\", \"I have a hard time hearing out of my right ear\", or \"Do not touch me to wake me up as it startlesme\".  Outcome: Progressing  Goal: Absence of Hospital-Acquired Illness or Injury  Outcome: Progressing  Intervention: Identify and Manage Fall Risk  Recent Flowsheet Documentation  Taken 4/14/2025 1120 by Penny Martin RN  Safety Promotion/Fall Prevention: safety round/check completed  Intervention: Prevent Skin Injury  Recent Flowsheet Documentation  Taken 4/14/2025 1123 by Penny Martin, RN  Body Position: position changed independently  Taken 4/14/2025 1120 by Penny Martin, RN  Skin Protection:   adhesive use limited   incontinence pads " utilized  Taken 4/14/2025 1033 by Penny Martin RN  Body Position: position changed independently  Taken 4/14/2025 0840 by Penny Martin RN  Body Position: position changed independently  Intervention: Prevent and Manage VTE (Venous Thromboembolism) Risk  Recent Flowsheet Documentation  Taken 4/14/2025 1120 by Penny Martin RN  VTE Prevention/Management: SCDs off (sequential compression devices)  Intervention: Prevent Infection  Recent Flowsheet Documentation  Taken 4/14/2025 1120 by Penny Martin RN  Infection Prevention:   rest/sleep promoted   single patient room provided   hand hygiene promoted  Goal: Optimal Comfort and Wellbeing  Outcome: Progressing  Intervention: Provide Person-Centered Care  Recent Flowsheet Documentation  Taken 4/14/2025 1120 by Penny Martin RN  Trust Relationship/Rapport:   care explained   empathic listening provided   reassurance provided   thoughts/feelings acknowledged  Goal: Readiness for Transition of Care  Outcome: Progressing     Problem: Delirium  Goal: Optimal Coping  Outcome: Progressing  Intervention: Optimize Psychosocial Adjustment to Delirium  Recent Flowsheet Documentation  Taken 4/14/2025 1120 by Penny Martin RN  Supportive Measures: active listening utilized  Goal: Improved Behavioral Control  Outcome: Progressing  Intervention: Minimize Safety Risk  Recent Flowsheet Documentation  Taken 4/14/2025 1120 by Penny Martin RN  Enhanced Safety Measures:   floor mats   room near unit station  Trust Relationship/Rapport:   care explained   empathic listening provided   reassurance provided   thoughts/feelings acknowledged  Goal: Improved Attention and Thought Clarity  Outcome: Progressing  Intervention: Maximize Cognitive Function  Recent Flowsheet Documentation  Taken 4/14/2025 1120 by Penny Martin RN  Sensory Stimulation Regulation:   care clustered   lighting decreased   quiet environment promoted  Reorientation Measures: clock in view  Goal:  Improved Sleep  Outcome: Progressing  Intervention: Promote Sleep  Recent Flowsheet Documentation  Taken 4/14/2025 1120 by Penny Martin RN  Sleep/Rest Enhancement:   awakenings minimized   comfort measures   noise level reduced   relaxation techniques promoted     Problem: Violence Risk or Actual  Goal: Anger and Impulse Control  Outcome: Progressing  Intervention: Minimize Safety Risk  Recent Flowsheet Documentation  Taken 4/14/2025 1120 by Penny Martin RN  Sensory Stimulation Regulation:   care clustered   lighting decreased   quiet environment promoted  Enhanced Safety Measures:   floor mats   room near unit station  Intervention: Promote Self-Control  Recent Flowsheet Documentation  Taken 4/14/2025 1120 by Penny Martin RN  Supportive Measures: active listening utilized  Environmental Support: calm environment promoted     Problem: Comorbidity Management  Goal: Maintenance of Behavioral Health Symptom Control  Outcome: Progressing  Intervention: Maintain Behavioral Health Symptom Control  Recent Flowsheet Documentation  Taken 4/14/2025 1120 by Penny Martin RN  Medication Review/Management: medications reviewed

## 2025-04-14 NOTE — PROGRESS NOTES
"CLINICAL NUTRITION SERVICES - BRIEF NOTE/LOS CHART CHECK.    - Chart check every 7-10 days per policy, RD team is not formally following.  Patient remains admitted d/t disposition.    - Continues to eat well, % intakes per flowsheets (1 instance of 50%).  Consistently ordering/receiving meals.   - Admit wt was obtained 4/12 and reviewed, unclear accuracy.    - WOCN continues to follow.  No documentation of PI, friction, IAD and MASD to L and R posterior thigh, buttocks.    - Please formally consult if nutrition needs arise otherwise will continue monitoring PO intakes peripherally from chart.        Zandra Ambriz RDN, , LD  Clinical Dietitian  [facility]: \"Dietitian [[facility]]\"  Office: [phone]  Pagers:  3rd floor/ICU: [phone]  All other floors: [phone]  Weekend/holiday: [phone]    "

## 2025-04-14 NOTE — PLAN OF CARE
"Goal Outcome Evaluation:      Plan of Care Reviewed With: patient    Overall Patient Progress: no changeOverall Patient Progress: no change    Outcome Evaluation: No aggressive or violent behaviors toward staff, plan TBD    A/Ox4, VSS, on RA, no IV access, Ax1 W/GB, not oob this shift, no aggressive or violent behaviors, calm and cooperative during assessment, medication administration, and incontinence cares, allowed lotion applied to back and feet with massage, tolerating a mechanical soft diet, discharge plan TBD    Problem: Adult Inpatient Plan of Care  Goal: Plan of Care Review  Description: The Plan of Care Review/Shift note should be completed every shift.  The Outcome Evaluation is a brief statement about your assessment that the patient is improving, declining, or no change.  This information will be displayed automatically on your shiftnote.  Outcome: Not Progressing  Flowsheets (Taken 4/14/2025 0120)  Outcome Evaluation: No aggressive or violent behaviors toward staff, plan TBD  Plan of Care Reviewed With: patient  Overall Patient Progress: no change  Goal: Patient-Specific Goal (Individualized)  Description: You can add care plan individualizations to a care plan. Examples of Individualization might be:  \"Parent requests to be called daily at 9am for status\", \"I have a hard time hearing out of my right ear\", or \"Do not touch me to wake me up as it startlesme\".  Outcome: Not Progressing  Goal: Absence of Hospital-Acquired Illness or Injury  Outcome: Not Progressing  Intervention: Identify and Manage Fall Risk  Recent Flowsheet Documentation  Taken 4/13/2025 2145 by Maria Del Carmen Garcia, RN  Safety Promotion/Fall Prevention:   activity supervised   assistive device/personal items within reach   increased rounding and observation   room near nurse's station   room organization consistent   safety round/check completed   supervised activity  Intervention: Prevent and Manage VTE (Venous Thromboembolism) " Risk  Recent Flowsheet Documentation  Taken 4/13/2025 2145 by Maira Del Carmen Garcia RN  VTE Prevention/Management: SCDs off (sequential compression devices)  Intervention: Prevent Infection  Recent Flowsheet Documentation  Taken 4/13/2025 2145 by Maria Del Carmen Garcia RN  Infection Prevention:   environmental surveillance performed   rest/sleep promoted   single patient room provided  Goal: Optimal Comfort and Wellbeing  Outcome: Not Progressing  Intervention: Provide Person-Centered Care  Recent Flowsheet Documentation  Taken 4/13/2025 2145 by Maria Del Carmen Garcia RN  Trust Relationship/Rapport:   empathic listening provided   reassurance provided   thoughts/feelings acknowledged   emotional support provided   care explained   choices provided  Goal: Readiness for Transition of Care  Outcome: Not Progressing     Problem: Delirium  Goal: Optimal Coping  Outcome: Not Progressing  Intervention: Optimize Psychosocial Adjustment to Delirium  Recent Flowsheet Documentation  Taken 4/13/2025 2145 by Maria Del Carmen Garcia RN  Family/Support System Care: presence promoted  Goal: Improved Behavioral Control  Outcome: Not Progressing  Intervention: Minimize Safety Risk  Recent Flowsheet Documentation  Taken 4/13/2025 2145 by Maria Del Carmen Garcia RN  Enhanced Safety Measures:   floor mats   room near unit station  Trust Relationship/Rapport:   empathic listening provided   reassurance provided   thoughts/feelings acknowledged   emotional support provided   care explained   choices provided  Goal: Improved Attention and Thought Clarity  Outcome: Not Progressing  Goal: Improved Sleep  Outcome: Not Progressing     Problem: Violence Risk or Actual  Goal: Anger and Impulse Control  Outcome: Not Progressing  Intervention: Minimize Safety Risk  Recent Flowsheet Documentation  Taken 4/13/2025 2145 by Maria Del Carmen Garcia RN  Enhanced Safety Measures:   floor mats   room near unit station  Intervention: Promote Self-Control  Recent Flowsheet Documentation  Taken  4/13/2025 2145 by Maria Del Carmen Garcia, RN  Environmental Support: calm environment promoted     Problem: Comorbidity Management  Goal: Maintenance of Behavioral Health Symptom Control  Outcome: Not Progressing  Intervention: Maintain Behavioral Health Symptom Control  Recent Flowsheet Documentation  Taken 4/13/2025 2145 by Maria Del Carmen Garcia, RN  Medication Review/Management: medications reviewed

## 2025-04-15 PROCEDURE — 120N000001 HC R&B MED SURG/OB

## 2025-04-15 PROCEDURE — 99231 SBSQ HOSP IP/OBS SF/LOW 25: CPT | Performed by: INTERNAL MEDICINE

## 2025-04-15 PROCEDURE — 250N000013 HC RX MED GY IP 250 OP 250 PS 637: Performed by: PSYCHIATRY & NEUROLOGY

## 2025-04-15 PROCEDURE — 250N000013 HC RX MED GY IP 250 OP 250 PS 637: Performed by: EMERGENCY MEDICINE

## 2025-04-15 PROCEDURE — G0463 HOSPITAL OUTPT CLINIC VISIT: HCPCS

## 2025-04-15 RX ORDER — BUSPIRONE HYDROCHLORIDE 10 MG/1
20 TABLET ORAL 2 TIMES DAILY
Status: DISCONTINUED | OUTPATIENT
Start: 2025-04-15 | End: 2025-04-15

## 2025-04-15 RX ADMIN — TAMSULOSIN HYDROCHLORIDE 0.4 MG: 0.4 CAPSULE ORAL at 11:02

## 2025-04-15 RX ADMIN — OLANZAPINE 25 MG: 10 TABLET, FILM COATED ORAL at 22:45

## 2025-04-15 RX ADMIN — BENZTROPINE MESYLATE 2 MG: 1 TABLET ORAL at 22:45

## 2025-04-15 RX ADMIN — DICLOFENAC SODIUM 2 G: 10 GEL TOPICAL at 01:10

## 2025-04-15 RX ADMIN — OLANZAPINE 5 MG: 5 TABLET, FILM COATED ORAL at 11:02

## 2025-04-15 RX ADMIN — HYDROXYZINE HYDROCHLORIDE 10 MG: 10 TABLET, FILM COATED ORAL at 22:44

## 2025-04-15 RX ADMIN — BUSPIRONE HYDROCHLORIDE 20 MG: 10 TABLET ORAL at 11:02

## 2025-04-15 RX ADMIN — Medication 3 MG: at 22:45

## 2025-04-15 ASSESSMENT — ACTIVITIES OF DAILY LIVING (ADL)
ADLS_ACUITY_SCORE: 72
ADLS_ACUITY_SCORE: 71
ADLS_ACUITY_SCORE: 72
ADLS_ACUITY_SCORE: 71
ADLS_ACUITY_SCORE: 71
ADLS_ACUITY_SCORE: 72
ADLS_ACUITY_SCORE: 72
ADLS_ACUITY_SCORE: 71
ADLS_ACUITY_SCORE: 72
ADLS_ACUITY_SCORE: 71
ADLS_ACUITY_SCORE: 72
ADLS_ACUITY_SCORE: 71
ADLS_ACUITY_SCORE: 71
ADLS_ACUITY_SCORE: 72
ADLS_ACUITY_SCORE: 71
ADLS_ACUITY_SCORE: 72

## 2025-04-15 NOTE — PLAN OF CARE
"Goal Outcome Evaluation: Brought pt his lunch. He is sitting at side of bed and has been for an hour. He vehemently refused RN to open his tray for him. He states \"get out and don't come back\". Will attempt again later and check on him frequently.                        "

## 2025-04-15 NOTE — PLAN OF CARE
"Goal Outcome Evaluation:      Plan of Care Reviewed With: patient    Overall Patient Progress: no changeOverall Patient Progress: no change    Outcome Evaluation: Refusing VS, did allow WOC to come and check skin, incontinent urine, sitting on edge of bed refusing most meds today and cares today, plan TBD      Problem: Adult Inpatient Plan of Care  Goal: Plan of Care Review  Description: The Plan of Care Review/Shift note should be completed every shift.  The Outcome Evaluation is a brief statement about your assessment that the patient is improving, declining, or no change.  This information will be displayed automatically on your shiftnote.  Outcome: Not Progressing  Flowsheets (Taken 4/15/2025 1347)  Outcome Evaluation: Refusing VS, did allow WOC to come and check skin, incontinent urine, sitting on edge of bed refusing most meds today and cares today, plan TBD  Plan of Care Reviewed With: patient  Overall Patient Progress: no change  Goal: Patient-Specific Goal (Individualized)  Description: You can add care plan individualizations to a care plan. Examples of Individualization might be:  \"Parent requests to be called daily at 9am for status\", \"I have a hard time hearing out of my right ear\", or \"Do not touch me to wake me up as it startlesme\".  Outcome: Not Progressing  Goal: Absence of Hospital-Acquired Illness or Injury  Outcome: Not Progressing  Intervention: Identify and Manage Fall Risk  Recent Flowsheet Documentation  Taken 4/15/2025 0800 by Carol Evans RN  Safety Promotion/Fall Prevention: activity supervised  Intervention: Prevent Skin Injury  Recent Flowsheet Documentation  Taken 4/15/2025 0800 by Carol Evans RN  Body Position: position changed independently  Goal: Optimal Comfort and Wellbeing  Outcome: Not Progressing  Intervention: Provide Person-Centered Care  Recent Flowsheet Documentation  Taken 4/15/2025 0800 by Carol Evans RN  Trust Relationship/Rapport:   care " explained   choices provided   emotional support provided   empathic listening provided   thoughts/feelings acknowledged  Goal: Readiness for Transition of Care  Outcome: Not Progressing     Problem: Delirium  Goal: Optimal Coping  Outcome: Not Progressing  Intervention: Optimize Psychosocial Adjustment to Delirium  Recent Flowsheet Documentation  Taken 4/15/2025 0800 by Carol Evans RN  Supportive Measures: active listening utilized  Goal: Improved Behavioral Control  Outcome: Not Progressing  Intervention: Minimize Safety Risk  Recent Flowsheet Documentation  Taken 4/15/2025 0800 by Carol Evans RN  Trust Relationship/Rapport:   care explained   choices provided   emotional support provided   empathic listening provided   thoughts/feelings acknowledged  Goal: Improved Attention and Thought Clarity  Outcome: Not Progressing  Intervention: Maximize Cognitive Function  Recent Flowsheet Documentation  Taken 4/15/2025 0800 by Carol Evans RN  Sensory Stimulation Regulation:   care clustered   lighting decreased   quiet environment promoted  Reorientation Measures: clock in view  Goal: Improved Sleep  Outcome: Not Progressing     Problem: Violence Risk or Actual  Goal: Anger and Impulse Control  Outcome: Not Progressing  Intervention: Minimize Safety Risk  Recent Flowsheet Documentation  Taken 4/15/2025 0800 by Carol Evans RN  Sensory Stimulation Regulation:   care clustered   lighting decreased   quiet environment promoted  Intervention: Promote Self-Control  Recent Flowsheet Documentation  Taken 4/15/2025 0800 by Carol Evans RN  Supportive Measures: active listening utilized  Environmental Support: calm environment promoted     Problem: Comorbidity Management  Goal: Maintenance of Behavioral Health Symptom Control  Outcome: Not Progressing

## 2025-04-15 NOTE — CONSULTS
DEC Consult Order placed.     This writer spoke with attending provider regarding this case after completing chart review. Pt presented to the [facility] ED on 3/27/25 from an assisted living facility for increased aggression. He carries diagnoses of bipolar I and schizophrenia. He has since been on a medical floor at [facility]. A MI petition for commitment was filed and pt was ordered to be under commitment as of 4/11/25. Pt has been declined from [facility] inpatient psychiatric units due to his need for medical cares. Pt's assisted living facility is not accepting him back and no other discharge options have been identified at this time. Social work appears to be working on referrals to other community facilities.     At this time, it is the recommendation of this writer that pt remain on a medical floor pending further collaboration with his commitment . Pt is on a court hold with no signed provisional discharge at this time and is unable to leave the hospital as a result. An additional psychiatry consult and/or care conference may also be warranted to consider all possible options for next steps in pt's plan of care.     Attending provider stated that it is not needed for this writer to meet with pt at this time as attending provider was seeking to consult instead. Consult has been closed.    CAT PalSW

## 2025-04-15 NOTE — PROGRESS NOTES
Care Management Follow Up    Length of Stay (days): 15    Expected Discharge Date: 04/18/2025     Concerns to be Addressed: mental health, discharge planning       Patient plan of care discussed at interdisciplinary rounds: Yes    Anticipated Discharge Disposition: Geriatric Psych, Assisted Living, Inpatient Mental Health     Anticipated Discharge Services: Mental Health Resources    Anticipated Discharge DME: None    Patient/family educated on Medicare website which has current facility and service quality ratings: no    Education Provided on the Discharge Plan: Yes    Patient/Family in Agreement with the Plan: unable to assess    Referrals Placed by CM/SW: Post Acute Facilities    Private pay costs discussed: Not applicable    Discussed  Partnership in Safe Discharge Planning  document with patient/family: No     Handoff Completed: No, handoff not indicated or clinically appropriate    Additional Information:  HIRAM spoke with psychiatry now that there is an order for full commitment with [facility]. HIRAM was informed that pt does not meet the criteria for inpatient psych and recommended to consult DEC for input on additional placement options. SW informed the Hospitalist who is going to place the consult, as well as reconsult psych and increase dosage of medications. HIRAM has confirmed with pt's Assisted Living Facility that they will not be accepting him back. SW placed LTC referrals and pt has been denied. The goal is for pt's functional status to improve enough to the point where we can find alternative LAM or LTC placement, or to get to inpatient psych once he meets the criteria.    Next Steps:   HIRAM will continue following until discharge and remain available as needed.    MAGO Meza  Inpatient Care Coordination   [facility]  [phone]

## 2025-04-15 NOTE — PLAN OF CARE
Goal Outcome Evaluation:      Plan of Care Reviewed With: patient    Overall Patient Progress: no changeOverall Patient Progress: no change         Alert, but disoriented. VSS on RA. No IV access. Incontinent of urine, cares provided. Tolerating mechanical soft diet. Pain managed with PRN voltaren gel. Ax1 gb/w. Pt cooperative with staff overnight. Pt rotated between bed and chair overnight. Discharge plan TBD.     Problem: Comorbidity Management  Goal: Maintenance of Behavioral Health Symptom Control  Outcome: Progressing  Intervention: Maintain Behavioral Health Symptom Control  Recent Flowsheet Documentation  Taken 4/15/2025 0123 by Taylor Saldaña RN  Medication Review/Management: medications reviewed     Problem: Violence Risk or Actual  Goal: Anger and Impulse Control  Outcome: Progressing  Intervention: Minimize Safety Risk  Recent Flowsheet Documentation  Taken 4/15/2025 0123 by Taylor Saldaña RN  Enhanced Safety Measures:   pain management   patient/family teach back on injury risk   review medications for side effects with activity   room near unit station     Problem: Delirium  Goal: Optimal Coping  Outcome: Progressing  Goal: Improved Behavioral Control  Outcome: Progressing  Intervention: Minimize Safety Risk  Recent Flowsheet Documentation  Taken 4/15/2025 0123 by Taylor Saldaña RN  Enhanced Safety Measures:   pain management   patient/family teach back on injury risk   review medications for side effects with activity   room near unit station  Goal: Improved Attention and Thought Clarity  Outcome: Progressing  Goal: Improved Sleep  Outcome: Progressing     Problem: Adult Inpatient Plan of Care  Goal: Plan of Care Review  Description: The Plan of Care Review/Shift note should be completed every shift.  The Outcome Evaluation is a brief statement about your assessment that the patient is improving, declining, or no change.  This information will be displayed automatically on your shiftnote.  Outcome:  "Progressing  Flowsheets (Taken 4/15/2025 0204)  Plan of Care Reviewed With: patient  Overall Patient Progress: no change  Goal: Patient-Specific Goal (Individualized)  Description: You can add care plan individualizations to a care plan. Examples of Individualization might be:  \"Parent requests to be called daily at 9am for status\", \"I have a hard time hearing out of my right ear\", or \"Do not touch me to wake me up as it startlesme\".  Outcome: Progressing  Goal: Absence of Hospital-Acquired Illness or Injury  Outcome: Progressing  Intervention: Identify and Manage Fall Risk  Recent Flowsheet Documentation  Taken 4/15/2025 0123 by Taylor Saldaña RN  Safety Promotion/Fall Prevention:   activity supervised   clutter free environment maintained   increased rounding and observation   increase visualization of patient   lighting adjusted   mobility aid in reach   nonskid shoes/slippers when out of bed   room near nurse's station   room organization consistent   safety round/check completed   supervised activity  Intervention: Prevent and Manage VTE (Venous Thromboembolism) Risk  Recent Flowsheet Documentation  Taken 4/15/2025 0123 by Taylor Saldaña RN  VTE Prevention/Management: SCDs off (sequential compression devices)  Intervention: Prevent Infection  Recent Flowsheet Documentation  Taken 4/15/2025 0123 by Taylor Saldaña RN  Infection Prevention:   cohorting utilized   equipment surfaces disinfected   hand hygiene promoted   rest/sleep promoted   single patient room provided  Goal: Optimal Comfort and Wellbeing  Outcome: Progressing  Goal: Readiness for Transition of Care  Outcome: Progressing   Intervention: Minimize Safety Risk  Recent Flowsheet Documentation  Taken 4/15/2025 0123 by Taylor Saldaña RN  Enhanced Safety Measures:   pain management   patient/family teach back on injury risk   review medications for side effects with activity   room near unit station     "

## 2025-04-15 NOTE — PLAN OF CARE
Goal Outcome Evaluation: Pt in chair when RN went in room to do VS. He refused NA in room. RN was going to begin with temporal temp and pt pushed hand away and said to get out. Pt wanted to get back to bed and RN stood near him as he went from chair to bed but he pushed RN away when in arms reach. RN ordered food and pt refused her to set up. Refused meds and VS. He was incontinent of urine but refusing to be cleaned. Will attempt again later.       1058 WO RN came and pt allowed her in room and to do cares. He did not want her to leave. Another staff RN went in room and he requested her to clean him up. He refused all care by his assigned RN at this time. The other staff RN will attempt to give him his morning meds.     Assigned RN did talk with his counselor Catherine Salazar from [facility] and SW updated on what they need.

## 2025-04-15 NOTE — PLAN OF CARE
"Goal Outcome Evaluation:      Plan of Care Reviewed With: patient    Overall Patient Progress: no changeOverall Patient Progress: no change         Garbled speech. Ax1 gb/walker. Refuses care/medications at times. Incont of urine. Pain managed with PO medication. Pills taken 1 at a time. Discharge TBD.         Problem: Adult Inpatient Plan of Care  Goal: Plan of Care Review  Description: The Plan of Care Review/Shift note should be completed every shift.  The Outcome Evaluation is a brief statement about your assessment that the patient is improving, declining, or no change.  This information will be displayed automatically on your shiftnote.  Outcome: Adequate for Care Transition  Flowsheets (Taken 4/14/2025 0171)  Plan of Care Reviewed With: patient  Overall Patient Progress: no change  Goal: Patient-Specific Goal (Individualized)  Description: You can add care plan individualizations to a care plan. Examples of Individualization might be:  \"Parent requests to be called daily at 9am for status\", \"I have a hard time hearing out of my right ear\", or \"Do not touch me to wake me up as it startlesme\".  Outcome: Adequate for Care Transition  Goal: Absence of Hospital-Acquired Illness or Injury  Outcome: Adequate for Care Transition  Intervention: Identify and Manage Fall Risk  Recent Flowsheet Documentation  Taken 4/14/2025 2022 by Chanda Gutierrez, RN  Safety Promotion/Fall Prevention:   activity supervised   clutter free environment maintained   lighting adjusted   room near nurse's station   supervised activity   room organization consistent  Intervention: Prevent Skin Injury  Recent Flowsheet Documentation  Taken 4/14/2025 2022 by Chanda Gutierrez, RN  Body Position: position changed independently  Intervention: Prevent Infection  Recent Flowsheet Documentation  Taken 4/14/2025 2022 by Chanda Gutierrez, RN  Infection Prevention:   single patient room provided   rest/sleep " promoted  Goal: Optimal Comfort and Wellbeing  Outcome: Adequate for Care Transition  Goal: Readiness for Transition of Care  Outcome: Adequate for Care Transition     Problem: Delirium  Goal: Optimal Coping  Outcome: Adequate for Care Transition  Goal: Improved Behavioral Control  Outcome: Adequate for Care Transition  Intervention: Minimize Safety Risk  Recent Flowsheet Documentation  Taken 4/14/2025 2022 by Chanda Gutierrez RN  Enhanced Safety Measures:   pain management   patient/family teach back on injury risk   review medications for side effects with activity   room near unit station  Goal: Improved Attention and Thought Clarity  Outcome: Adequate for Care Transition  Goal: Improved Sleep  Outcome: Adequate for Care Transition     Problem: Violence Risk or Actual  Goal: Anger and Impulse Control  Outcome: Adequate for Care Transition  Intervention: Minimize Safety Risk  Recent Flowsheet Documentation  Taken 4/14/2025 2022 by Chanda Gutierrez RN  Enhanced Safety Measures:   pain management   patient/family teach back on injury risk   review medications for side effects with activity   room near unit station     Problem: Comorbidity Management  Goal: Maintenance of Behavioral Health Symptom Control  Outcome: Adequate for Care Transition  Intervention: Maintain Behavioral Health Symptom Control  Recent Flowsheet Documentation  Taken 4/14/2025 2022 by Chanda Gutierrez RN  Medication Review/Management: medications reviewed

## 2025-04-15 NOTE — PROGRESS NOTES
Psych provider notified writer of the need for a DEC Consult for recommendations. Writer updated pts nurse to place a DEC Consult if pt is needing a consult done. Nurse voiced understanding.       Please call the DEC que if needing assistance.     MAGO Rivers  [phone]

## 2025-04-15 NOTE — PROGRESS NOTES
[facility]  WO Nurse Inpatient Assessment     Consulted for: buttocks and thighs    Summary: Patient refused care from bedside RN. Buttock area are healed no sign of friction tears. Two red areas on bilateral buttocks, suspected hyperpigmentation.       [facility] nurse follow-up plan: signing off    Patient History (according to provider note(s):    a 69 year old gentleman with bipolar, anxiety, depression, schizophrenia, BPD, paranoia, bladder cancer s/p ureteral stent, HTN, HLD, who came to [facility] on 3/27/2025 on a RENETTA from [facility] with Agitation, attempt to harm staff with cane and was diagnosed with decompensated schizophrenia and ROBBIN with creatinine 1.42 . He was admitted by the medicine service on 3/31/2025 after he was declined by inpatient psychiatry for Psychiatry admission. Was seen by Dr Peres from Psychiatry service here who managed his medications and recommended full MI commitment with [facility]     Assessment:      Areas visualized during today's visit: Focused: and Sacrum/coccyx    Wound location: buttocks             Last photo: 4/15  Wound due to: Friction, Incontinence Associated Dermatitis (IAD), and Moisture Associated Skin Damage (MASD)  Wound history/plan of care: The patient previously exhibited blanchable redness and friction tears on the left posterior thigh and buttocks. Upon arrival, the patient was seated in bed with soiled blue chux. The bedside RN reported that the patient declined care from her. The WO RN received the patient's consent for the assessment. The patient allowed the writer to clean his buttock using only a dry cloth. The patient declined to have the bed sheets changed or to receive a new blue chux. The buttocks were observed to be intact, showing no evidence of friction or skin tearing. There is no indication of redness. Two red areas on both buttocks, suspected hyperpigmentation. C signing off.     Wound base: left thigh: 100% Blanchable ,  "intact.      Palpation of the wound bed: normal      Drainage: none     Description of drainage: none     Measurements (length x width x depth, in cm): none     Tunneling: N/A     Undermining: N/A  Periwound skin: Intact      Color: normal and consistent with surrounding tissue      Temperature: normal   Odor: none  Pain: denies , none  Pain interventions prior to dressing change: patient tolerated well and no significant pain present   Treatment goal: Per patient preference  STATUS: healed  Supplies ordered: at bedside and discussed with RN          Treatment Plan:     Buttocks, scrotum and posterior left thigh BID and PRN  Cleanse the area with Wicho cleanse and protect, very gently with soft cloth.  Apply thin layer of critic aid paste on top of it.  With repeat application, do not scrub the paste, only remove soiled paste and reapply.  Ensure pt has José-cushion (#025351) while sitting up in the chair.  Use only one Covidien pad in between mattress and pt. No brief while in bed.      Pressure Injury Prevention (PIP) Plan:  If patient is declining pressure injury prevention interventions: Explore reason why and address patient's concerns, Educate on pressure injury risk and prevention intervention(s), If patient is still declining, document \"informed refusal\" , and Ensure Care team is aware ( provider, charge nurse, etc)  Mattress: Follow bed algorithm, add Low Air Loss (Air+) mattress pump if skin is very moist or constantly moist.   HOB: Maintain at or below 30 degrees, unless contraindicated  Repositioning in bed: Every 1-2 hours , Left/right positioning; avoid supine, and Raise foot of bed prior to raising head of bed, to reduce patient sliding down (shear)  Heels: Keep elevated off mattress and Pillows under calves  Protective Dressing: Sacral Mepilex for prevention (#505889),  especially for the agitated patient   Positioning Equipment:None  Chair positioning: Chair cushion (#203388) , Assist patient to " "reposition hourly, and Do NOT use a donut for sitting (this increases pressure to smaller area and creates a higher potential for injury)    If patient has a buttock pressure injury, or high risk for PI use chair cushion or SPS.  Moisture Management: Perineal cleansing /protection: Follow Incontinence Protocol, Avoid brief in bed, Clean and dry skin folds with bathing , Consider InterDry (#010305) between folds, and Moisturize dry skin  Under Devices: Inspect skin under all medical devices during skin inspection , Ensure tubes are stabilized without tension, and Ensure patient is not lying on medical devices or equipment when repositioned  Ask provider to discontinue device when no longer needed.      Orders: Reviewed    RECOMMEND PRIMARY TEAM ORDER: None, at this time  Education provided: importance of repositioning, plan of care, wound progress, Infection prevention , Moisture management, Hygiene, and Off-loading pressure  Discussed plan of care with: Patient  Notify WOC if wound(s) deteriorate.  Nursing to notify the Provider(s) and re-consult the WOC Nurse if new skin concern.    DATA:     Current support surface: Standard  Standard gel mattress (Isoflex)  Containment of urine/stool: Incontinence Protocol and Incontinent pad in bed  BMI: Body mass index is 19.01 kg/m .   Active diet order: Orders Placed This Encounter      Mechanical/Dental Soft Diet     Output: I/O last 3 completed shifts:  In: 300 [P.O.:300]  Out: -      Labs:   No lab results found in last 7 days.    Invalid input(s): \"GLUCOMBO\"    Pressure injury risk assessment:   Sensory Perception: 3-->slightly limited  Moisture: 1-->constantly moist  Activity: 2-->chairfast  Mobility: 3-->slightly limited  Nutrition: 3-->adequate  Friction and Shear: 2-->potential problem  Obie Score: 14    Krystina Jernigan, RN, BSN  Contact [facility] Nurse ([place])  Dept. Office Number: [phone]    "

## 2025-04-15 NOTE — PROGRESS NOTES
Hospitalist Medicine Progress Note   [facility]       Harpreet Mcelroy is a 69 year old gentleman with bipolar, anxiety, depression, schizophrenia, BPD, paranoia, bladder cancer s/p ureteral stent, HTN, HLD, who came to [facility] on 3/27/2025 on a RENETTA from [facility] with Agitation, attempt to harm staff with cane and was diagnosed with decompensated schizophrenia and ROBBIN with creatinine 1.42 . He was admitted by the medicine service on 3/31/2025 after he was declined by inpatient psychiatry for Psychiatry admission. Was seen by Dr Peres from Psychiatry service here who managed his medications and recommended full MI commitment with [place] hearing.      I spoke with our DEC  today regarding disposition for this patient after Dr. Peres from psychiatry recommended that I reach out to DEC.  The  confirmed that the patient has an order for commitment from the [facility].  For that reason, discharge from the hospital to a community setting is not an option without his  approving it.  Geriatric psychiatry is not available within the system and therefore is not being offered.  The patient cannot be returned to his previous residence as they already have and stated that they do not want him back.    I will ask for additional psychiatric input tomorrow.  Unclear to me what disposition options we have.       Date of Admission:  3/27/2025  Assessment & Plan     ROBBIN  - resolved     Decompensated schizophrenia with intermittent agitation  Bipolar  Anxiety  Depression  Chronic cognitive dysfunction  -Psychiatry following.  Patient is still agitated Will leave adjustment of antipsychotic and psychiatric medications to psychiatry  Awaiting court [place] hearing following which disposition will be decided      Dysarthric speech  -Patient can be challenging to understand at times     Bladder cancer   Chronic L ureteral stent  Stent last changed 2/4/25 by [facility]   Peri.  - PTA oxybutynin and flomax    Plan:   He is awaiting placement.  Psychiatry indicates that the patient does not qualify for inpatient psychiatric care.  Dr. Peres is recommended that we contact DEC for additional recommendations on discharge options.          Diet: Mechanical/Dental Soft Diet    DVT Prophylaxis: Pneumatic Compression Devices  Hernandez Catheter: Not present  Code Status: Full Code         Medically Ready for Discharge: Anticipated in 5+ Days    Clinically Significant Risk Factors                                  # Financial/Environmental Concerns: none         The patient's care was discussed with the Patient and RN.    Jacoby Delgado MD  Hospitalist Service  [facility]    ______________________________________________________________________    Interval History     Symptoms   Chart reviewed, pt interviewed.    Pt told me to get out and not come back.     Review of Systems:   Could not get any histroy      Data reviewed today: I reviewed all medications, new labs and imaging results over the last 24 hours.     Physical Exam   Vital Signs: Temp: 98.5  F (36.9  C) Temp src: Temporal BP: (!) 145/64 Pulse: 88   Resp: 16 SpO2: 100 % O2 Device: None (Room air)    Weight: 128 lbs 12 oz      GENERAL: Patient is not  in acute distress  NECK:  LUNGS: normal work of breathing.  CNS: Alert moving all the 4 limbs     Data   No lab results found in last 7 days.        No results found for this or any previous visit (from the past 24 hours).

## 2025-04-16 PROCEDURE — 99232 SBSQ HOSP IP/OBS MODERATE 35: CPT | Performed by: INTERNAL MEDICINE

## 2025-04-16 PROCEDURE — 250N000013 HC RX MED GY IP 250 OP 250 PS 637: Performed by: EMERGENCY MEDICINE

## 2025-04-16 PROCEDURE — 99233 SBSQ HOSP IP/OBS HIGH 50: CPT | Performed by: PSYCHIATRY & NEUROLOGY

## 2025-04-16 PROCEDURE — 120N000001 HC R&B MED SURG/OB

## 2025-04-16 PROCEDURE — 250N000013 HC RX MED GY IP 250 OP 250 PS 637: Performed by: PSYCHIATRY & NEUROLOGY

## 2025-04-16 PROCEDURE — 99418 PROLNG IP/OBS E/M EA 15 MIN: CPT | Performed by: PSYCHIATRY & NEUROLOGY

## 2025-04-16 RX ORDER — LAMOTRIGINE 25 MG/1
50 TABLET ORAL ONCE
Status: COMPLETED | OUTPATIENT
Start: 2025-04-16 | End: 2025-04-16

## 2025-04-16 RX ORDER — BENZTROPINE MESYLATE 1 MG/1
1 TABLET ORAL 2 TIMES DAILY PRN
Status: DISCONTINUED | OUTPATIENT
Start: 2025-04-16 | End: 2025-04-30 | Stop reason: HOSPADM

## 2025-04-16 RX ORDER — LAMOTRIGINE 200 MG/1
200 TABLET ORAL DAILY
Status: DISCONTINUED | OUTPATIENT
Start: 2025-04-17 | End: 2025-04-30 | Stop reason: HOSPADM

## 2025-04-16 RX ORDER — HALOPERIDOL 5 MG/ML
5 INJECTION INTRAMUSCULAR AT BEDTIME
Status: DISCONTINUED | OUTPATIENT
Start: 2025-04-16 | End: 2025-04-18

## 2025-04-16 RX ORDER — BUSPIRONE HYDROCHLORIDE 10 MG/1
30 TABLET ORAL 2 TIMES DAILY
Status: DISCONTINUED | OUTPATIENT
Start: 2025-04-16 | End: 2025-04-30 | Stop reason: HOSPADM

## 2025-04-16 RX ORDER — HYDROXYZINE HYDROCHLORIDE 10 MG/1
10 TABLET, FILM COATED ORAL 3 TIMES DAILY PRN
Status: DISCONTINUED | OUTPATIENT
Start: 2025-04-16 | End: 2025-04-18

## 2025-04-16 RX ORDER — QUETIAPINE FUMARATE 25 MG/1
25 TABLET, FILM COATED ORAL 3 TIMES DAILY PRN
Status: DISCONTINUED | OUTPATIENT
Start: 2025-04-16 | End: 2025-04-18

## 2025-04-16 RX ORDER — RISPERIDONE 0.5 MG/1
1 TABLET, ORALLY DISINTEGRATING ORAL 2 TIMES DAILY
Status: DISCONTINUED | OUTPATIENT
Start: 2025-04-16 | End: 2025-04-21

## 2025-04-16 RX ADMIN — MINERAL OIL, WHITE PETROLATUM: .03; .94 OINTMENT OPHTHALMIC at 22:43

## 2025-04-16 RX ADMIN — LAMOTRIGINE 150 MG: 150 TABLET ORAL at 08:04

## 2025-04-16 RX ADMIN — BUSPIRONE HYDROCHLORIDE 20 MG: 10 TABLET ORAL at 08:04

## 2025-04-16 RX ADMIN — TAMSULOSIN HYDROCHLORIDE 0.4 MG: 0.4 CAPSULE ORAL at 08:06

## 2025-04-16 RX ADMIN — OLANZAPINE 25 MG: 10 TABLET, FILM COATED ORAL at 22:36

## 2025-04-16 RX ADMIN — HYDROXYZINE HYDROCHLORIDE 10 MG: 10 TABLET, FILM COATED ORAL at 21:12

## 2025-04-16 RX ADMIN — BENZTROPINE MESYLATE 2 MG: 1 TABLET ORAL at 22:40

## 2025-04-16 RX ADMIN — OLANZAPINE 7.5 MG: 5 TABLET, FILM COATED ORAL at 08:04

## 2025-04-16 RX ADMIN — RISPERIDONE 1 MG: 0.5 TABLET, ORALLY DISINTEGRATING ORAL at 21:12

## 2025-04-16 ASSESSMENT — ACTIVITIES OF DAILY LIVING (ADL)
ADLS_ACUITY_SCORE: 72
ADLS_ACUITY_SCORE: 76
ADLS_ACUITY_SCORE: 72

## 2025-04-16 NOTE — PROGRESS NOTES
Hospitalist Medicine Progress Note   [facility]       Harpreet Mcelroy is a 69 year old gentleman with bipolar, anxiety, depression, schizophrenia, BPD, paranoia, bladder cancer s/p ureteral stent, HTN, HLD, who came to [facility] on 3/27/2025 on a RENETTA from [facility] with Agitation, attempt to harm staff with cane and was diagnosed with decompensated schizophrenia and ROBBIN with creatinine 1.42 . He was admitted by the medicine service on 3/31/2025 after he was declined by inpatient psychiatry for Psychiatry admission. Was seen by Dr Peres from Psychiatry service here who managed his medications and recommended full MI commitment with [facility].      I spoke with our DEC  today regarding disposition for this patient after Dr. Peres from psychiatry recommended that I reach out to DEC.  The  confirmed that the patient has an order for commitment from the [facility].  For that reason, discharge from the hospital to a community setting is not an option without his  approving it.  Geriatric psychiatry is not available within the system and therefore is not being offered.  The patient cannot be returned to his previous residence as they already have and stated that they do not want him back.    Disposition at this time is dependent on the court and Psychiatry.        Date of Admission:  3/27/2025  Assessment & Plan     ROBBIN  - resolved     Decompensated schizophrenia with intermittent agitation  Bipolar  Anxiety  Depression  Chronic cognitive dysfunction  -Psychiatry following.  Patient is still agitated Will leave adjustment of antipsychotic and psychiatric medications to psychiatry  -court proceeding resulted in guidance to commitment. Awaiting appropriate dispo.      Dysarthric speech  -Patient can be challenging to understand at times     Bladder cancer   Chronic L ureteral stent  Stent last changed 2/4/25 by [facility] Dr. Whitt.  - PTA oxybutynin and  flomax    Plan:   Meeting today with [facility], [facility], Psychiatry and others.  The pt will remain here until all the necessary paperwork, etc is taken care of for pt to be discharged to a facility where he can receive care.   Med regimen to change to: Lamotrigine 200 mg daily, Olanzapine 25 mg at bedtime (or Haldol 5 mg IM if oral is refused), Olanzapine 7.5 mg BID (q am and lunch), Risperidone 1 mg BID.  Hydroxyzine 10 mg BID, Cogentin 2 mg at bedtime. Also has Quetiapine 25 mg TID prn, Risperidone 1 mg BID prn.    Diet: Mechanical/Dental Soft Diet    DVT Prophylaxis: Pneumatic Compression Devices  Hernandez Catheter: Not present  Code Status: Full Code         Medically Ready for Discharge: Anticipated in 5+ Days    Clinically Significant Risk Factors                                  # Financial/Environmental Concerns: none         The patient's care was discussed with the Patient and RN.    Jacoby Delgado MD  Hospitalist Service  [facility]    ______________________________________________________________________    Interval History     Symptoms   Chart reviewed, pt interviewed.    Pt told me to get out and not come back.     Review of Systems:   Could not get any history.    Data reviewed today: I reviewed all medications, new labs and imaging results over the last 24 hours.     Physical Exam   Vital Signs: Temp: (!) 95.4  F (35.2  C) (asymptomatic- rn notified.) Temp src: Temporal BP: (!) 155/81 Pulse: 71   Resp: 16 SpO2: 100 % O2 Device: None (Room air)    Weight: 128 lbs 12 oz      GENERAL: Patient is not  in acute distress  NECK:  LUNGS: normal work of breathing.  CNS: Alert moving all the 4 limbs     Data   No lab results found in last 7 days.        No results found for this or any previous visit (from the past 24 hours).

## 2025-04-16 NOTE — PLAN OF CARE
Goal Outcome Evaluation:      Plan of Care Reviewed With: other (see comments)    Overall Patient Progress: no changeOverall Patient Progress: no change     Patient does things on his own terms.  No physical or violent outbursts this shift.  Refusing cares.  Awaiting placement.

## 2025-04-16 NOTE — PLAN OF CARE
"Goal Outcome Evaluation:      Plan of Care Reviewed With: patient    Overall Patient Progress: no changeOverall Patient Progress: no change    Patient alert but disoriented. Agitated on and of during shift and resistive with cares only allowing certain people to touch him or in his room. Patient kept kicking RN out of his room so was only able to do a partial assessment. Tolerated diet, was incontinent of bladder. Sitting at the edge of the bed most of the time. Finally took medications after several attempts. Will continue to follow plan of care, discharge plan still TBD.  Problem: Adult Inpatient Plan of Care  Goal: Plan of Care Review  Description: The Plan of Care Review/Shift note should be completed every shift.  The Outcome Evaluation is a brief statement about your assessment that the patient is improving, declining, or no change.  This information will be displayed automatically on your shiftnote.  Outcome: Progressing  Flowsheets (Taken 4/15/2025 6371)  Plan of Care Reviewed With: patient  Overall Patient Progress: no change  Goal: Patient-Specific Goal (Individualized)  Description: You can add care plan individualizations to a care plan. Examples of Individualization might be:  \"Parent requests to be called daily at 9am for status\", \"I have a hard time hearing out of my right ear\", or \"Do not touch me to wake me up as it startlesme\".  Outcome: Progressing  Goal: Absence of Hospital-Acquired Illness or Injury  Outcome: Progressing  Intervention: Identify and Manage Fall Risk  Recent Flowsheet Documentation  Taken 4/15/2025 1605 by Valerie Joiner, RN  Safety Promotion/Fall Prevention:   activity supervised   assistive device/personal items within reach   clutter free environment maintained   nonskid shoes/slippers when out of bed  Intervention: Prevent Skin Injury  Recent Flowsheet Documentation  Taken 4/15/2025 1605 by Valerie Joiner, RN  Body Position: position changed " independently  Intervention: Prevent and Manage VTE (Venous Thromboembolism) Risk  Recent Flowsheet Documentation  Taken 4/15/2025 1605 by Valerie Joiner RN  VTE Prevention/Management: SCDs off (sequential compression devices)  Goal: Optimal Comfort and Wellbeing  Outcome: Progressing  Intervention: Provide Person-Centered Care  Recent Flowsheet Documentation  Taken 4/15/2025 1605 by Valerie Joiner RN  Trust Relationship/Rapport:   care explained   choices provided  Goal: Readiness for Transition of Care  Outcome: Progressing     Problem: Delirium  Goal: Optimal Coping  Outcome: Progressing  Intervention: Optimize Psychosocial Adjustment to Delirium  Recent Flowsheet Documentation  Taken 4/15/2025 1605 by Valerie Joiner RN  Supportive Measures: active listening utilized  Goal: Improved Behavioral Control  Outcome: Progressing  Intervention: Minimize Safety Risk  Recent Flowsheet Documentation  Taken 4/15/2025 1605 by Valerie Joiner RN  Enhanced Safety Measures: pain management  Trust Relationship/Rapport:   care explained   choices provided  Goal: Improved Attention and Thought Clarity  Outcome: Progressing  Goal: Improved Sleep  Outcome: Progressing     Problem: Violence Risk or Actual  Goal: Anger and Impulse Control  Outcome: Progressing  Intervention: Minimize Safety Risk  Recent Flowsheet Documentation  Taken 4/15/2025 1605 by Valerie Joiner RN  Enhanced Safety Measures: pain management  Intervention: Promote Self-Control  Recent Flowsheet Documentation  Taken 4/15/2025 1605 by Valerie Joiner RN  Supportive Measures: active listening utilized  Environmental Support: calm environment promoted     Problem: Comorbidity Management  Goal: Maintenance of Behavioral Health Symptom Control  Outcome: Progressing  Intervention: Maintain Behavioral Health Symptom Control  Recent Flowsheet Documentation  Taken 4/15/2025 1605 by Valerie Joiner RN  Medication Review/Management: medications  reviewed

## 2025-04-16 NOTE — CONSULTS
"[facility]     CONSULT PSYCHIATRY  FOLLOW UP NOTE     DATE OF SERVICE   04/16/2025       IDENTIFICATION   Harpreet Mcelroy  Age: 69 year old  MRN# [id number]   YOB: 1955  Length of Stay:  16        CHIEF COMPLAINT   \"No.\"       CONSULT REQUEST BY   Mary Martinez re:  threatening behaviors       SUBJECTIVE   The patient's care was discussed with primary treatment team directly and patient's electronic chart notes were reviewed.      Staff report patient did not report any acute medical concerns or side effects.     No acute behavioral issues overnight, including violent or aggressive behaviors. Request made for IM medications now that Blanco is in place.  Patient did not require seclusion or restraints. Patient is not exhibiting signs or symptoms of psychosis or jaki. Patient did not endorse suicidal ideation. Patient did not endorse homicide ideation.   Overall, bedside RN familiar with patient describe behaviors as unchanged and recommend progression to conservatorship cares.    Patient is medication adherent with encouragement. Patient is not assigned a 1:1. Patient is sleeping. Patient is eating.     Care Management:  Direct discussion with Unit staff of significant events.  Include, verification of MI Commitment with [facility] effective 4/11/2025 and notified on 4/15.  Contact with County  is in progress to assist with provisional discharge plans.  Reviewed and discussed exclusion criteria for IP  admission.  Staff requesting IM medications for back up.      DEC Consult/Evaluation (4/15):  Recommendation of this writer that pt remain on a medical floor pending further collaboration with his commitment . Pt is on a court hold with no signed provisional discharge at this time and is unable to leave the hospital as a result. An additional psychiatry consult and/or care conference may also be warranted to consider all possible options for next " steps in pt's plan of care.     Attending Interval History (4/15):  Chart reviewed, pt interviewed.    Pt told me to get out and not come back.     C/L Psychiatry most recent treatment plan (4/11):  4/11/2025:  Medication Changes:    NONE  Continue Medications:  Zyprexa: Continue PTA Zyprexa 7.5 mg every morning, q. noon, and 25 mg at bedtime, psychosis/mood.  Cogentin: Continue PTA Cogentin 2 mg nightly, EPSE.  Lamictal: Continue increase of PTA lamotrigine 100 mg daily to lamotrigine 150 mg daily.  No issues of tolerability after titration on 3/28 with slow progression of mood lability.  Consider further titration on 4/11 for optimization.  BuSpar: Continue increase of PTA BuSpar 15 mg twice daily to 20 mg twice daily, anxiety, + as needed BuSpar 20 mg daily, anxiety.  Atarax: Continue PTA Atarax 10 mg twice daily.    PRN Melatonin: Continue PTA as needed melatonin 3 mg at bedtime, sleep.  Atarax: Continue PTA Atarax 10 mg twice daily.    PRN Atarax:  Continue as needed Atarax 10 mg 3 times daily, anxiety.  PRN Risperdal ODT:  Continue as needed Risperdal ODT 1 mg bid, mild to moderate agitation.  PRN Haldol IM:  Continue as needed Haldol IM 5 mg bid, severe agitation  Other:  Legal:   3/28 (0309):  Placed on a 72-hour hold.  3/28:  Filed [facility] Petition for Commitment with [facility]anco.   3/31:  Petition re-filed through [facility] --> Supported  4/02:  Court Hold (updated orders)  4/04:  Exam/Preliminary Hearing  4/09:  Final hearing  4/11:  Decision/documentation pending.  RECOMMENDATION:  Full MI Commitment with [facility].   Placement: [facility] reportedly will not accept but no 30-day notice given.  CM is following.  Precautions placed:  Routine as per ED; Delirium; Sexual.    Review of notes and/or information:  I personally reviewed notes from the patient's electronic chart since last psychiatry visit dated 4/11, most recent visit, and other interim reports. This provided me with information regarding  patient's recent clinical course.     I personally reviewed the patient's chart, including available medication list and progression of hospital course.       OBJECTIVE   Upon interview, the patient is guarded on approach.  Evaluation attempt in patient's room on the unit.  Consent is given to evaluate.  Patient is not voluntary; MI Commitment.    INTERVAL HISTORY:  Chart reviewed.  Patient discussed in detail with staff after after re-consult received regarding ongoing target behaviors and court decision related to support of commitment with [facility]anco.  At last visit on 4/11, awaiting decision from court regarding recommendation for MI commitment with [facility]anco.    On 4/15, informed by  of notification from [facility] of MI commitment with Bl[facility].  Documentation reviewed in detail and verified in paper chart.  Further discussion in detail related to patient leading exclusion criteria for inpatient mental health placement, and to pursue alternate efforts of placement.  DEC consult placed and supported recommendations.  Further discussion related to engaging with community supports, specifically [facility] , to review placement options for provisional discharge.    On evaluation, patient is not verbally aggressive on approach.  Continues with baseline dysarthria, but does not offer questions or concerns.  Informed patient of updates related to court order and medication management.  Including, coordinating medications for IM backup to bedtime Zyprexa if refused and to continue titration of lamotrigine and BuSpar for optimization.  To convert to HOBBS, plan to start trial of risperidone as dual neuroleptic therapy versus cross taper from Zyprexa pending efficacy.  Additional adjustments to medications performed optimization.    Patient does not engage meaningfully for evaluation.  Terminates evaluation by waving this physician away.  Does not appear in acute distress.  Further information  gathered by me as a chart review, nursing/physician/social work report and patient observation.    Suicidal ideation: denies current or recent suicidal ideation or behaviors.    Homicidal ideation: denies current or recent homicidal ideation or behaviors.    Psychotic symptoms: No overt psychosis. Patient denies AH, VH, paranoia, delusions.     Medication side effects reported: No significant side effects.    Acute medical concerns: None.  [facility] nurse signed off.    Other issues reported by patient:  Patient had no further questions or concerns.         MEDICATIONS   Medications:  Scheduled Meds:  Current Facility-Administered Medications   Medication Dose Route Frequency Provider Last Rate Last Admin    artificial tears ophthalmic ointment   Left Eye At Bedtime Mary Martinez DO   Given at 04/10/25 2252    benztropine (COGENTIN) tablet 2 mg  2 mg Oral At Bedtime Aisha Velez MD   2 mg at 04/15/25 2245    busPIRone (BUSPAR) tablet 20 mg  20 mg Oral BID Arnel Peres MD   20 mg at 04/16/25 0804    hydrOXYzine HCl (ATARAX) tablet 10 mg  10 mg Oral BID Lisseth Harris MD   10 mg at 04/15/25 2244    lamoTRIgine (LaMICtal) tablet 150 mg  150 mg Oral Daily Arnel Peres MD   150 mg at 04/16/25 0804    Lidocaine (LIDOCARE) 4 % Patch 1 patch  1 patch Transdermal Q24H Panfilo Damon DO   1 patch at 04/14/25 0926    melatonin tablet 3 mg  3 mg Oral At Bedtime Aisha Velez MD   3 mg at 04/15/25 2245    OLANZapine (zyPREXA) tablet 25 mg  25 mg Oral At Bedtime Arnel Peres MD   25 mg at 04/15/25 2245    OLANZapine (zyPREXA) tablet 7.5 mg  7.5 mg Oral BID Lisseth Harris MD   7.5 mg at 04/16/25 0804    tamsulosin (FLOMAX) capsule 0.4 mg  0.4 mg Oral Daily Lisseth Harris MD   0.4 mg at 04/16/25 0806     Continuous Infusions:  Current Facility-Administered Medications   Medication Dose Route Frequency Provider Last Rate Last Admin     PRN Meds:.  Current Facility-Administered  Medications   Medication Dose Route Frequency Provider Last Rate Last Admin    alum & mag hydroxide-simethicone (MAALOX) suspension 30 mL  30 mL Oral Q4H PRN Kendrick Alex MD   30 mL at 04/08/25 1424    busPIRone (BUSPAR) tablet 20 mg  20 mg Oral Daily PRN Arnel Peres MD   20 mg at 04/07/25 1724    calcium carbonate (TUMS) chewable tablet 1,000 mg  1,000 mg Oral 4x Daily PRN Mary Martinez DO   1,000 mg at 04/12/25 2059    diclofenac (VOLTAREN) 1 % topical gel 2 g  2 g Topical 4x Daily PRN Panfilo Damon DO   2 g at 04/15/25 0110    haloperidol lactate (HALDOL) injection 5 mg  5 mg Intramuscular BID PRN Arnel Peres MD   5 mg at 04/11/25 1655    hydrOXYzine HCl (ATARAX) tablet 10 mg  10 mg Oral TID PRN Arnel Peres MD   10 mg at 04/11/25 2233    ibuprofen (ADVIL/MOTRIN) tablet 400 mg  400 mg Oral Q8H PRN Kendrick Alex MD   400 mg at 04/14/25 2019    lidocaine (LMX4) cream   Topical Q1H PRN Mary Martinez DO        lidocaine 1 % 0.1-1 mL  0.1-1 mL Other Q1H PRN Mary Martinez DO        midazolam (VERSED) injection 1 mg  1 mg Intravenous Once PRN Aisha Velez MD        ondansetron (ZOFRAN ODT) ODT tab 4 mg  4 mg Oral Q6H PRN Mary Martinez DO        Or    ondansetron (ZOFRAN) injection 4 mg  4 mg Intravenous Q6H PRN Mary Martinez DO        oxyBUTYnin (DITROPAN) half-tab 2.5 mg  2.5 mg Oral TID PRN Lisseth Harris MD        polyethylene glycol (MIRALAX) Packet 17 g  17 g Oral BID PRN Mary Martinez DO        prochlorperazine (COMPAZINE) injection 5 mg  5 mg Intravenous Q6H PRN Mary Martinez DO        Or    prochlorperazine (COMPAZINE) tablet 5 mg  5 mg Oral Q6H PRN Mary Maritnez DO        risperiDONE (risperDAL M-TABS) ODT tab 1 mg  1 mg Oral BID PRN Arnel Peres MD   1 mg at 04/12/25 0130    senna-docusate (SENOKOT-S/PERICOLACE) 8.6-50 MG per tablet 1 tablet  1 tablet Oral BID PRN Juan,  "DO Mary        Or    senna-docusate (SENOKOT-S/PERICOLACE) 8.6-50 MG per tablet 2 tablet  2 tablet Oral BID PRN Mary Martinez DO        simethicone (MYLICON) chewable half-tab 40 mg  40 mg Oral Q6H PRN Kendrick Alex MD        sodium chloride (PF) 0.9% PF flush 3 mL  3 mL Intracatheter q1 min prn Mary Martinez DO         Medication adherence issues: MS Med Adherence Y/N: Selective  Medication side effects: MEDICATION SIDE EFFECTS: no side effects reported  Benefit: Yes / No: Yes, partial       ROS   The 10 point Review of Systems is negative other than noted in the HPI or here.       MENTAL STATUS EXAM   Vitals: BP (!) 155/81 (BP Location: Right arm)   Pulse 71   Temp (!) 95.4  F (35.2  C) (Temporal)   Resp 16   Wt 58.4 kg (128 lb 12 oz)   SpO2 100%   BMI 19.01 kg/m      Weight:   128 lbs 12 oz    Body mass index is 19.01 kg/m .  Vitals:    04/12/25 0647   Weight: 58.4 kg (128 lb 12 oz)     Appearance:  Guarded  Mood:  Mood: Anxious   Affect: blunted  was congruent to speech  Suicidal Ideation: PRESENT / ABSENT: absent   Homicidal Ideation: PRESENT / ABSENT: absent   Thought process: response delay   Thought content: devoid of  suicidal ideation and violent ideation.   Fund of Knowledge: Below average  Attention/Concentration: Poor  Language ability:  Chronic dysarthria  Memory:  Sufficient  Insight:  limited.  Judgement: limited and adequate for safety  Orientation: Person:  yes  Psychomotor Behavior: restless    Muscle Strength and Tone: MuscleStrength: Normal  Gait and Station:  Seated       LABS   personally reviewed.   Temp: (!) 95.4  F (35.2  C) (asymptomatic- rn notified.) Temp src: Temporal BP: (!) 155/81 Pulse: 71   Resp: 16 SpO2: 100 % O2 Device: None (Room air)     Weight: 58.4 kg (128 lb 12 oz)  Estimated body mass index is 19.01 kg/m  as calculated from the following:    Height as of 7/23/23: 1.753 m (5' 9\").    Weight as of this encounter: 58.4 kg (128 lb 12 " "oz).    No results found for this or any previous visit (from the past 48 hours).    Qtc: 498    No results found for: \"PHENYTOIN\", \"PHENOBARB\", \"VALPROATE\", \"CBMZ\"       DIAGNOSIS   Principal Problem:    Schizoaffective disorder, bipolar type (H)    Active Problem List:  Patient Active Problem List   Diagnosis    Carol (H)    Acute UTI    Other hydronephrosis    Slurred speech    Falls frequently    Mood disorder with psychosis    Agitation    Anxiety    Schizoaffective disorder, bipolar type (H)    Hx of schizophrenia    Aggressive behavior    ROBBIN (acute kidney injury)          ASSESSMENT/PLAN   Today's Changes-04/16/2025:  Medication Changes/Optimization:    BLANCO (risperidone, olanzapine, quetiapine, haloperidol):  Effective 4/11/2025.  IM backup initiated for HS Zyprexa.  Monitor for indication to progress backup.  Lamictal: Continue optimization of lamotrigine 150 mg daily to lamotrigine 200 mg daily (On 3/28, PTA lamotrigine 100 mg daily increased to 150 mg daily), mood lability.  Demonstrating tolerability to titration with partial efficacy.  Monitor.  Give Lamictal 50 mg x 1 today to bridge titration.  BuSpar: Continue optimization of BuSpar 20 mg twice daily to 30 mg twice daily (On 3/28, PTA BuSpar 15 mg twice daily to 20 mg twice daily), anxiety, and discontinue as needed BuSpar.  Risperdal M Tab:  Start trial Risperdal 1 mg bid, mood/psychosis, without Blanco backup.  Once tolerability verified, plan to convert to HOBBS (Risperdal Consta) as per [facility].  Monitor for need for IM back up as per [facility] (Haldol IM).  Seroquel:  Start trial Seroquel 50 mg tid, anxiety rank order #2, without Blanco backup.  Melatonin:  As needed Melatonin 3 mg at bedtime, sleep.  Clonidine:  Consider trial Clonidine 0.1 mg daily, impulsivity, with parameters.  Continue Medications:  Zyprexa: Continue PTA Zyprexa 7.5 mg every morning, q. noon, and 25 mg at bedtime, psychosis/mood, with IM Haldol backup to HS dose.  Cogentin: " Continue PTA Cogentin 2 mg nightly, EPSE + as needed Cogentin 1 mg BID, EPSE.  Atarax: Continue PTA Atarax 10 mg twice daily.    PRN Haldol IM: Continue as needed Haldol IM 5 mg bid, severe agitation + IM Backup to Zyprexa as per [facility].   PRN Melatonin: Continue PTA as needed melatonin 3 mg at bedtime, sleep.  PRN Atarax:  Continue as needed Atarax 10 mg 3 times daily, anxiety, rank order #1.  PRN Risperdal ODT:  Continue as needed Risperdal ODT 1 mg bid, mild to moderate agitation.  Other:  Legal:   3/28 (0309):  Placed on a 72-hour hold.  3/28:  Filed [facility] Petition for Commitment with [facility].   3/31:  Petition re-filed through [facility] --> Supported  4/02:  Court Hold (updated orders)  4/04:  Exam/Preliminary Hearing  4/09:  Final hearing  4/11:  DECISION (Notified 4/15):  Full MI Commitment with [facility] ([id number]  4/16:  Epic orders updated (Committed + [facility])  TBD:  Provisional Discharge (PD).  Placement: [facility] to initiate referrals (e.g. LAM, LTC) after care coordination with [facility]. [facility] CM to assist with PD.  Exclusion criteria met for IP MH placement.  Precautions placed:  Routine as per Unit; Delirium; Sexual.  Please also refer to RN/team documentation for additional details.     Acute Medical Problems and Treatments:  .  Acute medical concerns:  No acute medical concerns.     Care Coordination:  Treatment plan discussed directly with staff.  Please reconsult Psychiatry as needed (off service on 4/17).  Plan to follow up on 4/18.        Risk Assessment: IP MHAC RISK ASSESSMENT: Patient on precautions    Coordination of Care:   Treatment Plan reviewed, Care discussed with Care/Treatment Team Members, Chart reviewed and Patient seen         Arnel Peres MD    -     04/16/2025  -     8:32 AM    Total encounter time:  A total of  85  minutes spent related to chart review, history and exam, documentation   and further activities as noted above    This note was created  with help of Dragon dictation system. Grammatical / typing errors are not intentional.        Arnel Peres MD  Consult/Liaison Psychiatry   [facility]  Securely message with Shi

## 2025-04-16 NOTE — PLAN OF CARE
"  Problem: Adult Inpatient Plan of Care  Goal: Plan of Care Review  Description: The Plan of Care Review/Shift note should be completed every shift.  The Outcome Evaluation is a brief statement about your assessment that the patient is improving, declining, or no change.  This information will be displayed automatically on your shiftnote.  Outcome: Not Progressing  Flowsheets (Taken 4/16/2025 0540)  Outcome Evaluation: VSS, incontinent bowel and bladder.  Plan of Care Reviewed With: patient  Overall Patient Progress: no change  Goal: Patient-Specific Goal (Individualized)  Description: You can add care plan individualizations to a care plan. Examples of Individualization might be:  \"Parent requests to be called daily at 9am for status\", \"I have a hard time hearing out of my right ear\", or \"Do not touch me to wake me up as it startlesme\".  Outcome: Not Progressing  Goal: Absence of Hospital-Acquired Illness or Injury  Outcome: Not Progressing  Intervention: Identify and Manage Fall Risk  Recent Flowsheet Documentation  Taken 4/16/2025 0019 by Andie Aguiar, LUCAS  Safety Promotion/Fall Prevention:   activity supervised   assistive device/personal items within reach   clutter free environment maintained   nonskid shoes/slippers when out of bed   room near nurse's station   room organization consistent  Intervention: Prevent Infection  Recent Flowsheet Documentation  Taken 4/16/2025 0019 by Andie Aguiar, RN  Infection Prevention:   rest/sleep promoted   single patient room provided   hand hygiene promoted  Goal: Optimal Comfort and Wellbeing  Outcome: Not Progressing  Goal: Readiness for Transition of Care  Outcome: Not Progressing     Problem: Delirium  Goal: Optimal Coping  Outcome: Not Progressing  Goal: Improved Behavioral Control  Outcome: Not Progressing  Intervention: Minimize Safety Risk  Recent Flowsheet Documentation  Taken 4/16/2025 0019 by Andie Aguiar, RN  Enhanced Safety Measures: floor mats  Goal: Improved " Attention and Thought Clarity  Outcome: Not Progressing  Goal: Improved Sleep  Outcome: Not Progressing     Problem: Violence Risk or Actual  Goal: Anger and Impulse Control  Outcome: Not Progressing  Intervention: Minimize Safety Risk  Recent Flowsheet Documentation  Taken 4/16/2025 0019 by Andie Aguiar RN  Enhanced Safety Measures: floor mats     Problem: Comorbidity Management  Goal: Maintenance of Behavioral Health Symptom Control  Outcome: Not Progressing  Intervention: Maintain Behavioral Health Symptom Control  Recent Flowsheet Documentation  Taken 4/16/2025 0019 by Andie Aguiar RN  Medication Review/Management: medications reviewed   Goal Outcome Evaluation:      Plan of Care Reviewed With: patient    Overall Patient Progress: no changeOverall Patient Progress: no change    Outcome Evaluation: VSS, incontinent bowel and bladder.

## 2025-04-17 PROCEDURE — 250N000013 HC RX MED GY IP 250 OP 250 PS 637: Performed by: INTERNAL MEDICINE

## 2025-04-17 PROCEDURE — 120N000001 HC R&B MED SURG/OB

## 2025-04-17 PROCEDURE — 250N000013 HC RX MED GY IP 250 OP 250 PS 637: Performed by: PSYCHIATRY & NEUROLOGY

## 2025-04-17 PROCEDURE — 99231 SBSQ HOSP IP/OBS SF/LOW 25: CPT | Performed by: INTERNAL MEDICINE

## 2025-04-17 PROCEDURE — 250N000011 HC RX IP 250 OP 636: Performed by: STUDENT IN AN ORGANIZED HEALTH CARE EDUCATION/TRAINING PROGRAM

## 2025-04-17 PROCEDURE — 250N000013 HC RX MED GY IP 250 OP 250 PS 637: Performed by: EMERGENCY MEDICINE

## 2025-04-17 RX ADMIN — BUSPIRONE HYDROCHLORIDE 30 MG: 10 TABLET ORAL at 09:12

## 2025-04-17 RX ADMIN — MINERAL OIL, WHITE PETROLATUM: .03; .94 OINTMENT OPHTHALMIC at 22:43

## 2025-04-17 RX ADMIN — RISPERIDONE 1 MG: 0.5 TABLET, ORALLY DISINTEGRATING ORAL at 09:11

## 2025-04-17 RX ADMIN — ONDANSETRON 4 MG: 4 TABLET, ORALLY DISINTEGRATING ORAL at 16:59

## 2025-04-17 RX ADMIN — OLANZAPINE 25 MG: 10 TABLET, FILM COATED ORAL at 21:26

## 2025-04-17 RX ADMIN — DICLOFENAC SODIUM 2 G: 10 GEL TOPICAL at 20:43

## 2025-04-17 RX ADMIN — LIDOCAINE 1 PATCH: 4 PATCH TOPICAL at 09:16

## 2025-04-17 RX ADMIN — TAMSULOSIN HYDROCHLORIDE 0.4 MG: 0.4 CAPSULE ORAL at 09:11

## 2025-04-17 RX ADMIN — HYDROXYZINE HYDROCHLORIDE 10 MG: 10 TABLET, FILM COATED ORAL at 20:32

## 2025-04-17 RX ADMIN — RISPERIDONE 1 MG: 0.5 TABLET, ORALLY DISINTEGRATING ORAL at 21:39

## 2025-04-17 RX ADMIN — OLANZAPINE 7.5 MG: 5 TABLET, FILM COATED ORAL at 12:51

## 2025-04-17 RX ADMIN — BUSPIRONE HYDROCHLORIDE 30 MG: 10 TABLET ORAL at 20:32

## 2025-04-17 RX ADMIN — RISPERIDONE 1 MG: 0.5 TABLET, ORALLY DISINTEGRATING ORAL at 03:21

## 2025-04-17 RX ADMIN — OLANZAPINE 7.5 MG: 5 TABLET, FILM COATED ORAL at 09:11

## 2025-04-17 RX ADMIN — HYDROXYZINE HYDROCHLORIDE 10 MG: 10 TABLET, FILM COATED ORAL at 12:51

## 2025-04-17 RX ADMIN — LAMOTRIGINE 200 MG: 200 TABLET ORAL at 09:12

## 2025-04-17 ASSESSMENT — ACTIVITIES OF DAILY LIVING (ADL)
ADLS_ACUITY_SCORE: 76

## 2025-04-17 NOTE — PLAN OF CARE
"Goal Outcome Evaluation:      Plan of Care Reviewed With: patient    Overall Patient Progress: no changeOverall Patient Progress: no change    Outcome Evaluation: VSS on RA. Incontinent of urine, cares provided. Alert, but disoriented.    Pt denied pain overnight. SBA with GB/walker. Pt in bed and recliner overnight. Pt had increased agitation overnight. Writer sat with pt for over an hour and a half to deescalate pt. Pt took PRN risperidone around 0330. Pt continuously set off chair alarm overnight. Pt pleasant towards AM after sleeping. No discharge plans.         Problem: Adult Inpatient Plan of Care  Goal: Plan of Care Review  Description: The Plan of Care Review/Shift note should be completed every shift.  The Outcome Evaluation is a brief statement about your assessment that the patient is improving, declining, or no change.  This information will be displayed automatically on your shiftnote.  Outcome: Progressing  Flowsheets (Taken 4/17/2025 3800)  Outcome Evaluation: VSS on RA. Incontinent of urine, cares provided. Alert, but disoriented.  Plan of Care Reviewed With: patient  Overall Patient Progress: no change  Goal: Patient-Specific Goal (Individualized)  Description: You can add care plan individualizations to a care plan. Examples of Individualization might be:  \"Parent requests to be called daily at 9am for status\", \"I have a hard time hearing out of my right ear\", or \"Do not touch me to wake me up as it startlesme\".  Outcome: Progressing  Goal: Absence of Hospital-Acquired Illness or Injury  Outcome: Progressing  Intervention: Identify and Manage Fall Risk  Recent Flowsheet Documentation  Taken 4/17/2025 0032 by Taylor Saldaña RN  Safety Promotion/Fall Prevention:   activity supervised   clutter free environment maintained   increased rounding and observation   increase visualization of patient   lighting adjusted   nonskid shoes/slippers when out of bed   room near nurse's station   safety round/check " completed   supervised activity  Intervention: Prevent and Manage VTE (Venous Thromboembolism) Risk  Recent Flowsheet Documentation  Taken 4/17/2025 0032 by Taylor Saldaña RN  VTE Prevention/Management: SCDs off (sequential compression devices)  Intervention: Prevent Infection  Recent Flowsheet Documentation  Taken 4/17/2025 0032 by Taylor Saldaña RN  Infection Prevention:   rest/sleep promoted   cohorting utilized   equipment surfaces disinfected   hand hygiene promoted   single patient room provided  Goal: Optimal Comfort and Wellbeing  Outcome: Progressing  Goal: Readiness for Transition of Care  Outcome: Progressing     Problem: Violence Risk or Actual  Goal: Anger and Impulse Control  Outcome: Progressing  Intervention: Minimize Safety Risk  Recent Flowsheet Documentation  Taken 4/17/2025 0032 by Taylor Saldaña RN  Enhanced Safety Measures:   floor mats   room near unit station   pain management   review medications for side effects with activity       Problem: Fall Injury Risk  Goal: Absence of Fall and Fall-Related Injury  Outcome: Progressing  Intervention: Identify and Manage Contributors  Recent Flowsheet Documentation  Taken 4/17/2025 0032 by Taylor Saldaña RN  Medication Review/Management: medications reviewed  Intervention: Promote Injury-Free Environment  Recent Flowsheet Documentation  Taken 4/17/2025 0032 by Taylor Saldaña RN  Safety Promotion/Fall Prevention:   activity supervised   clutter free environment maintained   increased rounding and observation   increase visualization of patient   lighting adjusted   nonskid shoes/slippers when out of bed   room near nurse's station   safety round/check completed   supervised activity

## 2025-04-17 NOTE — PROGRESS NOTES
Pt was sitting edge of the bed most of the time. Said has upset stomach. Ate vanilla pudding and ice cream . Did not eat dinner meal. Refused to take Lamictal.  Pt is incontinent of bladder.  changed. No violet behavior this evening.

## 2025-04-17 NOTE — PROGRESS NOTES
Hospitalist Medicine Progress Note   [facility]       Harpreet Mcelroy is a 69 year old gentleman with bipolar, anxiety, depression, schizophrenia, BPD, paranoia, bladder cancer s/p ureteral stent, HTN, HLD, who came to [facility] on 3/27/2025 on a RENETTA from [facility] with Agitation, attempt to harm staff with cane and was diagnosed with decompensated schizophrenia and ROBBIN with creatinine 1.42 . He was admitted by the medicine service on 3/31/2025 after he was declined by inpatient psychiatry for Psychiatry admission. Was seen by Dr Peres from Psychiatry service here who managed his medications and recommended full MI commitment with [facility].      I spoke with our DEC  today regarding disposition for this patient after Dr. Peres from psychiatry recommended that I reach out to DEC.  The  confirmed that the patient has an order for commitment from the [facility].  For that reason, discharge from the hospital to a community setting is not an option without his  approving it.  Geriatric psychiatry is not available within the system and therefore is not being offered.  The patient cannot be returned to his previous residence as they already have and stated that they do not want him back.    Disposition at this time is dependent on the court and Psychiatry.        Date of Admission:  3/27/2025  Assessment & Plan     ROBBIN  - resolved     Decompensated schizophrenia with intermittent agitation  Bipolar  Anxiety  Depression  Chronic cognitive dysfunction  -Psychiatry following.  Patient is still agitated Will leave adjustment of antipsychotic and psychiatric medications to psychiatry  -court proceeding resulted in guidance to commitment. Awaiting appropriate dispo.      Dysarthric speech  -Patient can be challenging to understand at times     Bladder cancer   Chronic L ureteral stent  Stent last changed 2/4/[place] [facility] [place] Whitt.  - PTA oxybutynin and  flomax    Plan:   Meeting today with [facility], [facility]iatry and others.  The pt will remain here until all the necessary paperwork, etc is taken care of for pt to be discharged to a facility where he can receive care.   Med regimen to change to: Lamotrigine 200 mg daily, Olanzapine 25 mg at bedtime (or Haldol 5 mg IM if oral is refused), Olanzapine 7.5 mg BID (q am and lunch), Risperidone 1 mg BID.  Hydroxyzine 10 mg BID, Cogentin 2 mg at bedtime. Also has Quetiapine 25 mg TID prn, Risperidone 1 mg BID prn.    Diet: Mechanical/Dental Soft Diet    DVT Prophylaxis: Pneumatic Compression Devices  Hernandez Catheter: Not present  Code Status: Full Code         Medically Ready for Discharge: Anticipated in 5+ Days    Clinically Significant Risk Factors                                  # Financial/Environmental Concerns: none         The patient's care was discussed with the Patient and RN.    Jacoby Delgado MD  Hospitalist Service  [facility]    ______________________________________________________________________    Interval History     Symptoms   More sedate appearing today.  I am unable to understand him due his quite severe dysarthria.    Review of Systems:   Could not get any history.    Data reviewed today: I reviewed all medications, new labs and imaging results over the last 24 hours.     Physical Exam   Vital Signs:                    Weight: 128 lbs 12 oz      GENERAL: Patient is not in acute distress  NECK:  LUNGS: normal work of breathing.  CNS: Alert moving all the 4 limbs     Data   No lab results found in last 7 days.        No results found for this or any previous visit (from the past 24 hours).

## 2025-04-17 NOTE — PLAN OF CARE
"Goal Outcome Evaluation:      Plan of Care Reviewed With: patient      Overall Patient Progress: no change     Outcome Evaluation: pt waiting placement    Pt took all of his medications today. No verbal or physical outbursts. Up assist of 1 GB/walker. This evening he did let me assess his wounds and bottom. Incontinent of urine. NO BM today. Tolerated mechanical soft until this evening at 1655 he felt nauseated. Zofran po given. Pt slept after and nausea improved.       Problem: Adult Inpatient Plan of Care  Goal: Plan of Care Review  Description: The Plan of Care Review/Shift note should be completed every shift.  The Outcome Evaluation is a brief statement about your assessment that the patient is improving, declining, or no change.  This information will be displayed automatically on your shiftnote.  Outcome: Progressing  Flowsheets (Taken 4/17/2025 1717)  Outcome Evaluation: pt waiting placement  Plan of Care Reviewed With: patient  Goal: Patient-Specific Goal (Individualized)  Description: You can add care plan individualizations to a care plan. Examples of Individualization might be:  \"Parent requests to be called daily at 9am for status\", \"I have a hard time hearing out of my right ear\", or \"Do not touch me to wake me up as it startlesme\".  Outcome: Progressing  Goal: Absence of Hospital-Acquired Illness or Injury  Outcome: Progressing  Intervention: Identify and Manage Fall Risk  Recent Flowsheet Documentation  Taken 4/17/2025 0930 by Lilo Wagner RN  Safety Promotion/Fall Prevention:   activity supervised   assistive device/personal items within reach   clutter free environment maintained   increase visualization of patient   lighting adjusted   mobility aid in reach   nonskid shoes/slippers when out of bed   patient and family education   room near nurse's station   safety round/check completed  Intervention: Prevent Infection  Recent Flowsheet Documentation  Taken 4/17/2025 0930 by Lilo Wagner " A, RN  Infection Prevention: rest/sleep promoted  Goal: Optimal Comfort and Wellbeing  Outcome: Progressing  Intervention: Monitor Pain and Promote Comfort  Recent Flowsheet Documentation  Taken 4/17/2025 1100 by Lilo Wagner RN  Pain Management Interventions: medication (see MAR)  Intervention: Provide Person-Centered Care  Recent Flowsheet Documentation  Taken 4/17/2025 0930 by Lilo Wagner RN  Trust Relationship/Rapport:   care explained   choices provided  Goal: Readiness for Transition of Care  Outcome: Progressing     Problem: Violence Risk or Actual  Goal: Anger and Impulse Control  Outcome: Progressing  Intervention: Minimize Safety Risk  Recent Flowsheet Documentation  Taken 4/17/2025 0930 by Lilo Wagner RN  Sensory Stimulation Regulation:   care clustered   quiet environment promoted  Enhanced Safety Measures:   floor mats   pain management   review medications for side effects with activity  Intervention: Promote Self-Control  Recent Flowsheet Documentation  Taken 4/17/2025 0930 by Lilo Wagner RN  Supportive Measures:   relaxation techniques promoted   positive reinforcement provided   goal-setting facilitated  Environmental Support: calm environment promoted     Problem: Fall Injury Risk  Goal: Absence of Fall and Fall-Related Injury  Outcome: Progressing  Intervention: Identify and Manage Contributors  Recent Flowsheet Documentation  Taken 4/17/2025 0930 by Lilo Wagner RN  Medication Review/Management: medications reviewed  Intervention: Promote Injury-Free Environment  Recent Flowsheet Documentation  Taken 4/17/2025 0930 by Lilo Wagner RN  Safety Promotion/Fall Prevention:   activity supervised   assistive device/personal items within reach   clutter free environment maintained   increase visualization of patient   lighting adjusted   mobility aid in reach   nonskid shoes/slippers when out of bed   patient and family education   room near nurse's station    safety round/check completed

## 2025-04-18 LAB
ALBUMIN SERPL BCG-MCNC: 3.9 G/DL (ref 3.5–5.2)
ALP SERPL-CCNC: 125 U/L (ref 40–150)
ALT SERPL W P-5'-P-CCNC: 104 U/L (ref 0–70)
ANION GAP SERPL CALCULATED.3IONS-SCNC: 10 MMOL/L (ref 7–15)
AST SERPL W P-5'-P-CCNC: 62 U/L (ref 0–45)
ATRIAL RATE - MUSE: 70 BPM
BASE EXCESS BLDV CALC-SCNC: 3.2 MMOL/L (ref -3–3)
BILIRUB SERPL-MCNC: <0.2 MG/DL
BUN SERPL-MCNC: 21.4 MG/DL (ref 8–23)
CALCIUM SERPL-MCNC: 9.9 MG/DL (ref 8.8–10.4)
CHLORIDE SERPL-SCNC: 102 MMOL/L (ref 98–107)
CK SERPL-CCNC: 56 U/L (ref 39–308)
CREAT SERPL-MCNC: 1.18 MG/DL (ref 0.67–1.17)
DIASTOLIC BLOOD PRESSURE - MUSE: NORMAL MMHG
EGFRCR SERPLBLD CKD-EPI 2021: 67 ML/MIN/1.73M2
ERYTHROCYTE [DISTWIDTH] IN BLOOD BY AUTOMATED COUNT: 14.9 % (ref 10–15)
GLUCOSE BLDC GLUCOMTR-MCNC: 77 MG/DL (ref 70–99)
GLUCOSE BLDC GLUCOMTR-MCNC: 83 MG/DL (ref 70–99)
GLUCOSE SERPL-MCNC: 77 MG/DL (ref 70–99)
HCO3 BLDV-SCNC: 29 MMOL/L (ref 21–28)
HCO3 SERPL-SCNC: 26 MMOL/L (ref 22–29)
HCT VFR BLD AUTO: 35.2 % (ref 40–53)
HGB BLD-MCNC: 11.4 G/DL (ref 13.3–17.7)
INTERPRETATION ECG - MUSE: NORMAL
MCH RBC QN AUTO: 28 PG (ref 26.5–33)
MCHC RBC AUTO-ENTMCNC: 32.4 G/DL (ref 31.5–36.5)
MCV RBC AUTO: 87 FL (ref 78–100)
O2/TOTAL GAS SETTING VFR VENT: 0 %
OXYHGB MFR BLDV: 81 % (ref 70–75)
P AXIS - MUSE: 85 DEGREES
PCO2 BLDV: 51 MM HG (ref 40–50)
PH BLDV: 7.37 [PH] (ref 7.32–7.43)
PLATELET # BLD AUTO: 185 10E3/UL (ref 150–450)
PO2 BLDV: 46 MM HG (ref 25–47)
POTASSIUM SERPL-SCNC: 4.8 MMOL/L (ref 3.4–5.3)
PR INTERVAL - MUSE: 192 MS
PROCALCITONIN SERPL IA-MCNC: 0.13 NG/ML
PROT SERPL-MCNC: 7.1 G/DL (ref 6.4–8.3)
QRS DURATION - MUSE: 122 MS
QT - MUSE: 438 MS
QTC - MUSE: 473 MS
R AXIS - MUSE: 56 DEGREES
RBC # BLD AUTO: 4.07 10E6/UL (ref 4.4–5.9)
SAO2 % BLDV: 82.5 % (ref 70–75)
SODIUM SERPL-SCNC: 138 MMOL/L (ref 135–145)
SYSTOLIC BLOOD PRESSURE - MUSE: NORMAL MMHG
T AXIS - MUSE: 50 DEGREES
TSH SERPL DL<=0.005 MIU/L-ACNC: 3.25 UIU/ML (ref 0.3–4.2)
VENTRICULAR RATE- MUSE: 70 BPM
WBC # BLD AUTO: 5.4 10E3/UL (ref 4–11)

## 2025-04-18 PROCEDURE — 250N000013 HC RX MED GY IP 250 OP 250 PS 637: Performed by: INTERNAL MEDICINE

## 2025-04-18 PROCEDURE — 99232 SBSQ HOSP IP/OBS MODERATE 35: CPT | Performed by: INTERNAL MEDICINE

## 2025-04-18 PROCEDURE — 250N000013 HC RX MED GY IP 250 OP 250 PS 637: Performed by: PSYCHIATRY & NEUROLOGY

## 2025-04-18 PROCEDURE — 99232 SBSQ HOSP IP/OBS MODERATE 35: CPT | Performed by: PSYCHIATRY & NEUROLOGY

## 2025-04-18 PROCEDURE — 84443 ASSAY THYROID STIM HORMONE: CPT | Performed by: INTERNAL MEDICINE

## 2025-04-18 PROCEDURE — 82805 BLOOD GASES W/O2 SATURATION: CPT | Performed by: INTERNAL MEDICINE

## 2025-04-18 PROCEDURE — 250N000013 HC RX MED GY IP 250 OP 250 PS 637: Performed by: EMERGENCY MEDICINE

## 2025-04-18 PROCEDURE — 82533 TOTAL CORTISOL: CPT | Performed by: INTERNAL MEDICINE

## 2025-04-18 PROCEDURE — 250N000011 HC RX IP 250 OP 636: Performed by: PSYCHIATRY & NEUROLOGY

## 2025-04-18 PROCEDURE — 84155 ASSAY OF PROTEIN SERUM: CPT | Performed by: INTERNAL MEDICINE

## 2025-04-18 PROCEDURE — 84145 PROCALCITONIN (PCT): CPT | Performed by: INTERNAL MEDICINE

## 2025-04-18 PROCEDURE — 250N000011 HC RX IP 250 OP 636: Performed by: INTERNAL MEDICINE

## 2025-04-18 PROCEDURE — 82550 ASSAY OF CK (CPK): CPT | Performed by: INTERNAL MEDICINE

## 2025-04-18 PROCEDURE — 258N000003 HC RX IP 258 OP 636: Performed by: INTERNAL MEDICINE

## 2025-04-18 PROCEDURE — 120N000001 HC R&B MED SURG/OB

## 2025-04-18 PROCEDURE — 85018 HEMOGLOBIN: CPT | Performed by: INTERNAL MEDICINE

## 2025-04-18 PROCEDURE — 36415 COLL VENOUS BLD VENIPUNCTURE: CPT | Performed by: INTERNAL MEDICINE

## 2025-04-18 PROCEDURE — 93005 ELECTROCARDIOGRAM TRACING: CPT

## 2025-04-18 RX ORDER — DEXTROSE MONOHYDRATE AND SODIUM CHLORIDE 5; .45 G/100ML; G/100ML
INJECTION, SOLUTION INTRAVENOUS CONTINUOUS
Status: DISCONTINUED | OUTPATIENT
Start: 2025-04-18 | End: 2025-04-19

## 2025-04-18 RX ORDER — DEXTROSE MONOHYDRATE 25 G/50ML
25-50 INJECTION, SOLUTION INTRAVENOUS
Status: DISCONTINUED | OUTPATIENT
Start: 2025-04-18 | End: 2025-04-19

## 2025-04-18 RX ORDER — DEXTROSE MONOHYDRATE, SODIUM CHLORIDE, AND POTASSIUM CHLORIDE 50; 1.49; 4.5 G/1000ML; G/1000ML; G/1000ML
INJECTION, SOLUTION INTRAVENOUS CONTINUOUS
Status: DISCONTINUED | OUTPATIENT
Start: 2025-04-18 | End: 2025-04-18

## 2025-04-18 RX ORDER — HYDROXYZINE HYDROCHLORIDE 10 MG/1
10 TABLET, FILM COATED ORAL 3 TIMES DAILY PRN
Status: DISCONTINUED | OUTPATIENT
Start: 2025-04-18 | End: 2025-04-30 | Stop reason: HOSPADM

## 2025-04-18 RX ORDER — LORAZEPAM 2 MG/ML
2 INJECTION INTRAMUSCULAR ONCE
Status: COMPLETED | OUTPATIENT
Start: 2025-04-18 | End: 2025-04-18

## 2025-04-18 RX ORDER — HALOPERIDOL 5 MG/ML
5 INJECTION INTRAMUSCULAR AT BEDTIME
Status: DISCONTINUED | OUTPATIENT
Start: 2025-04-18 | End: 2025-04-21

## 2025-04-18 RX ORDER — OLANZAPINE 10 MG/1
20 TABLET, FILM COATED ORAL AT BEDTIME
Status: DISCONTINUED | OUTPATIENT
Start: 2025-04-18 | End: 2025-04-21

## 2025-04-18 RX ORDER — NICOTINE POLACRILEX 4 MG
15-30 LOZENGE BUCCAL
Status: DISCONTINUED | OUTPATIENT
Start: 2025-04-18 | End: 2025-04-19

## 2025-04-18 RX ADMIN — LAMOTRIGINE 200 MG: 200 TABLET ORAL at 11:45

## 2025-04-18 RX ADMIN — DEXTROSE AND SODIUM CHLORIDE: 5; 450 INJECTION, SOLUTION INTRAVENOUS at 15:52

## 2025-04-18 RX ADMIN — HYDROXYZINE HYDROCHLORIDE 10 MG: 10 TABLET, FILM COATED ORAL at 11:47

## 2025-04-18 RX ADMIN — LIDOCAINE 1 PATCH: 4 PATCH TOPICAL at 11:48

## 2025-04-18 RX ADMIN — RISPERIDONE 1 MG: 0.5 TABLET, ORALLY DISINTEGRATING ORAL at 11:41

## 2025-04-18 RX ADMIN — LORAZEPAM 2 MG: 2 INJECTION INTRAMUSCULAR; INTRAVENOUS at 20:13

## 2025-04-18 RX ADMIN — BUSPIRONE HYDROCHLORIDE 30 MG: 10 TABLET ORAL at 11:41

## 2025-04-18 RX ADMIN — HALOPERIDOL LACTATE 5 MG: 5 INJECTION, SOLUTION INTRAMUSCULAR at 19:29

## 2025-04-18 RX ADMIN — TAMSULOSIN HYDROCHLORIDE 0.4 MG: 0.4 CAPSULE ORAL at 11:42

## 2025-04-18 RX ADMIN — HYDROXYZINE HYDROCHLORIDE 10 MG: 10 TABLET, FILM COATED ORAL at 21:03

## 2025-04-18 RX ADMIN — OLANZAPINE 7.5 MG: 5 TABLET, FILM COATED ORAL at 11:42

## 2025-04-18 ASSESSMENT — ACTIVITIES OF DAILY LIVING (ADL)
ADLS_ACUITY_SCORE: 77

## 2025-04-18 NOTE — PLAN OF CARE
"Goal Outcome Evaluation:      Plan of Care Reviewed With: patient    Overall Patient Progress: improvingOverall Patient Progress: improving    Outcome Evaluation: VSS. On RA. SBA with W/GB. Pt stated he had no appetite today. Incontinent of bladder, incontinence care provided. No aggressive behavior towards staff. Pt's temperature was 91.0 F, MD aware and orders written. Pt covered in blankets and bear hugger. Plan is TBD.      Problem: Adult Inpatient Plan of Care  Goal: Plan of Care Review  Description: The Plan of Care Review/Shift note should be completed every shift.  The Outcome Evaluation is a brief statement about your assessment that the patient is improving, declining, or no change.  This information will be displayed automatically on your shiftnote.  Outcome: Progressing  Flowsheets (Taken 4/18/2025 1351)  Outcome Evaluation: VSS. On RA. SBA with W/GB. Pt stated he had no appetite today. Incontinent of bladder, incontinence care provided. No aggressive behavior towards staff. Plan is TBD.  Plan of Care Reviewed With: patient  Overall Patient Progress: improving  Goal: Patient-Specific Goal (Individualized)  Description: You can add care plan individualizations to a care plan. Examples of Individualization might be:  \"Parent requests to be called daily at 9am for status\", \"I have a hard time hearing out of my right ear\", or \"Do not touch me to wake me up as it startlesme\".  Outcome: Progressing  Goal: Absence of Hospital-Acquired Illness or Injury  Outcome: Progressing  Intervention: Identify and Manage Fall Risk  Recent Flowsheet Documentation  Taken 4/18/2025 2968 by Eb Dennis RN  Safety Promotion/Fall Prevention:   activity supervised   assistive device/personal items within reach   clutter free environment maintained   increased rounding and observation   increase visualization of patient   lighting adjusted   mobility aid in reach   nonskid shoes/slippers when out of bed   safety round/check " completed   supervised activity  Intervention: Prevent Skin Injury  Recent Flowsheet Documentation  Taken 4/18/2025 1140 by Eb Dennis RN  Body Position: position changed independently  Taken 4/18/2025 0857 by Eb Dennis RN  Body Position: position changed independently  Skin Protection:   adhesive use limited   incontinence pads utilized   skin sealant/moisture barrier applied  Intervention: Prevent and Manage VTE (Venous Thromboembolism) Risk  Recent Flowsheet Documentation  Taken 4/18/2025 0857 by Eb Dennis RN  VTE Prevention/Management:   SCDs off (sequential compression devices)   patient refused intervention  Intervention: Prevent Infection  Recent Flowsheet Documentation  Taken 4/18/2025 0857 by Eb Dennis RN  Infection Prevention:   hand hygiene promoted   rest/sleep promoted   single patient room provided  Goal: Optimal Comfort and Wellbeing  Outcome: Progressing  Intervention: Monitor Pain and Promote Comfort  Recent Flowsheet Documentation  Taken 4/18/2025 0857 by Eb Dennis RN  Pain Management Interventions: (pt sleeping)   care clustered   distraction  Goal: Readiness for Transition of Care  Outcome: Progressing     Problem: Violence Risk or Actual  Goal: Anger and Impulse Control  Outcome: Progressing  Intervention: Minimize Safety Risk  Recent Flowsheet Documentation  Taken 4/18/2025 0857 by Eb Dennis RN  Sensory Stimulation Regulation:   care clustered   lighting decreased   quiet environment promoted   visual stimulation minimized  Enhanced Safety Measures:   pain management   patient/family teach back on injury risk   review medications for side effects with activity   room near unit station  Intervention: Promote Self-Control  Recent Flowsheet Documentation  Taken 4/18/2025 0857 by Eb Dennis RN  Supportive Measures:   active listening utilized   positive reinforcement provided   verbalization of feelings encouraged  Environmental Support:   calm environment promoted   distractions  minimized   environmental consistency promoted   personal routine supported     Problem: Fall Injury Risk  Goal: Absence of Fall and Fall-Related Injury  Outcome: Progressing  Intervention: Identify and Manage Contributors  Recent Flowsheet Documentation  Taken 4/18/2025 0857 by Eb Dennis, RN  Medication Review/Management: medications reviewed  Intervention: Promote Injury-Free Environment  Recent Flowsheet Documentation  Taken 4/18/2025 0857 by Eb Dennis, RN  Safety Promotion/Fall Prevention:   activity supervised   assistive device/personal items within reach   clutter free environment maintained   increased rounding and observation   increase visualization of patient   lighting adjusted   mobility aid in reach   nonskid shoes/slippers when out of bed   safety round/check completed   supervised activity

## 2025-04-18 NOTE — PLAN OF CARE
"Goal Outcome Evaluation:      Plan of Care Reviewed With: patient    Overall Patient Progress: no changeOverall Patient Progress: no change    Outcome Evaluation: VSS on RA. Ax1 walker/GB. Discharge plan TBD.    Incontinent of urine overnight, cares provided. Pt slept for most of the night. No aggressive behaviors towards staff.     Problem: Adult Inpatient Plan of Care  Goal: Plan of Care Review  Description: The Plan of Care Review/Shift note should be completed every shift.  The Outcome Evaluation is a brief statement about your assessment that the patient is improving, declining, or no change.  This information will be displayed automatically on your shiftnote.  Outcome: Progressing  Flowsheets (Taken 4/18/2025 0102)  Outcome Evaluation: VSS on RA. Ax1 walker/GB. Discharge plan TBD.  Plan of Care Reviewed With: patient  Overall Patient Progress: no change  Goal: Patient-Specific Goal (Individualized)  Description: You can add care plan individualizations to a care plan. Examples of Individualization might be:  \"Parent requests to be called daily at 9am for status\", \"I have a hard time hearing out of my right ear\", or \"Do not touch me to wake me up as it startlesme\".  Outcome: Progressing  Goal: Absence of Hospital-Acquired Illness or Injury  Outcome: Progressing  Intervention: Identify and Manage Fall Risk  Recent Flowsheet Documentation  Taken 4/18/2025 0055 by Taylor Saldaña RN  Safety Promotion/Fall Prevention:   activity supervised   assistive device/personal items within reach   clutter free environment maintained   increased rounding and observation   increase visualization of patient   lighting adjusted   mobility aid in reach   nonskid shoes/slippers when out of bed   room near nurse's station   room organization consistent   safety round/check completed   supervised activity  Intervention: Prevent and Manage VTE (Venous Thromboembolism) Risk  Recent Flowsheet Documentation  Taken 4/18/2025 0055 by " Taylor Saldaña RN  VTE Prevention/Management: SCDs off (sequential compression devices)  Intervention: Prevent Infection  Recent Flowsheet Documentation  Taken 4/18/2025 0055 by Taylor Saldaña RN  Infection Prevention:   cohorting utilized   equipment surfaces disinfected   hand hygiene promoted   rest/sleep promoted   single patient room provided  Goal: Optimal Comfort and Wellbeing  Outcome: Progressing  Goal: Readiness for Transition of Care  Outcome: Progressing     Problem: Violence Risk or Actual  Goal: Anger and Impulse Control  Outcome: Progressing  Intervention: Minimize Safety Risk  Recent Flowsheet Documentation  Taken 4/18/2025 0055 by Taylor Saldaña RN  Sensory Stimulation Regulation:   care clustered   quiet environment promoted  Enhanced Safety Measures:   assistive devices when indicated   floor mats   pain management   review medications for side effects with activity   room near unit station     Problem: Fall Injury Risk  Goal: Absence of Fall and Fall-Related Injury  Outcome: Progressing  Intervention: Identify and Manage Contributors  Recent Flowsheet Documentation  Taken 4/18/2025 0055 by Taylor Saldaña RN  Medication Review/Management: medications reviewed  Intervention: Promote Injury-Free Environment  Recent Flowsheet Documentation  Taken 4/18/2025 0055 by Taylor Saldaña RN  Safety Promotion/Fall Prevention:   activity supervised   assistive device/personal items within reach   clutter free environment maintained   increased rounding and observation   increase visualization of patient   lighting adjusted   mobility aid in reach   nonskid shoes/slippers when out of bed   room near nurse's station   room organization consistent   safety round/check completed   supervised activity

## 2025-04-18 NOTE — PROGRESS NOTES
[facility]     CONSULT PSYCHIATRY  FOLLOW UP NOTE     DATE OF SERVICE   04/18/2025       IDENTIFICATION   Harpreet Mcelroy  Age: 69 year old  MRN# [id number]   YOB: 1955  Length of Stay:  18        CHIEF COMPLAINT   None       SUBJECTIVE   The patient's care was discussed with primary treatment team directly and patient's electronic chart notes were reviewed.      Staff report patient did not report any acute medical concerns or side effects.     No behavioral issues overnight, including violent or aggressive behaviors. Patient did not require seclusion or restraints. Patient is not exhibiting signs or symptoms of psychosis or jaki. Patient did not endorse suicidal ideation. Patient did not endorse homicide ideation.     Patient is medication adherent. Patient is not on a 1:1. Patient is sleeping. Patient is eating adequately. Patient needs supports with ADLs.    Attending Interval History (4/17):  Symptoms   More sedate appearing today.  I am unable to understand him due his quite severe dysarthria.    C/L Psychiatry last treatment plan (4/16):   4/16/2025:  Medication Changes/Optimization:    BLANCO (risperidone, olanzapine, quetiapine, haloperidol):  Effective 4/11/2025.  IM backup initiated for HS Zyprexa.  Monitor for indication to progress backup.  Lamictal: Continue optimization of lamotrigine 150 mg daily to lamotrigine 200 mg daily (On 3/28, PTA lamotrigine 100 mg daily increased to 150 mg daily), mood lability.  Demonstrating tolerability to titration with partial efficacy.  Monitor.  Give Lamictal 50 mg x 1 today to bridge titration.  BuSpar: Continue optimization of BuSpar 20 mg twice daily to 30 mg twice daily (On 3/28, PTA BuSpar 15 mg twice daily to 20 mg twice daily), anxiety, and discontinue as needed BuSpar.  Risperdal M Tab:  Start trial Risperdal 1 mg bid, mood/psychosis, without Blanco backup.  Once tolerability verified, plan to convert to HOBBS  (Risperdal Consta) as per Blanco.  Monitor for need for IM back up as per Blanco (Haldol IM).  Seroquel:  Start trial Seroquel 50 mg tid, anxiety rank order #2, without Blanco backup.  Melatonin:  As needed Melatonin 3 mg at bedtime, sleep.  Clonidine:  Consider trial Clonidine 0.1 mg daily, impulsivity, with parameters.  Continue Medications:  Zyprexa: Continue PTA Zyprexa 7.5 mg every morning, q. noon, and 25 mg at bedtime, psychosis/mood, with IM Haldol backup to HS dose.  Cogentin: Continue PTA Cogentin 2 mg nightly, EPSE + as needed Cogentin 1 mg BID, EPSE.  Atarax: Continue PTA Atarax 10 mg twice daily.    PRN Haldol IM: Continue as needed Haldol IM 5 mg bid, severe agitation + IM Backup to Zyprexa as per Blanco.   PRN Melatonin: Continue PTA as needed melatonin 3 mg at bedtime, sleep.  PRN Atarax:  Continue as needed Atarax 10 mg 3 times daily, anxiety, rank order #1.  PRN Risperdal ODT:  Continue as needed Risperdal ODT 1 mg bid, mild to moderate agitation.  Other:  Legal:   3/28 (0309):  Placed on a 72-hour hold.  3/28:  Filed [facility] Petition for Commitment with Blanco.   3/31:  Petition re-filed through [facility] --> Supported  4/02:  Court Hold (updated orders)  4/04:  Exam/Preliminary Hearing  4/09:  Final hearing  4/11:  DECISION (Notified 4/15):  Full MI Commitment with Blanco (05-GS-TA-)  4/16:  Epic orders updated (Committed + Blanco)  TBD:  Provisional Discharge (PD).  Placement: Unit CM to initiate referrals (e.g. detention, LTC) after care coordination with [facility] CM. [facility] CM to assist with PD.  Exclusion criteria met for [facility] placement.  Precautions placed:  Routine as per Unit; Delirium; Sexual.    ADDENDUM:  4/18 AT 1530  Discussed with attending concerns regarding sedation, hypothermia, and labs that are unremarkable.  Due to concerns of ongoing sedation after introduction of risperidone to PTA Zyprexa with as needed medication use, agreed with plan to initiate taper of PTA  Zyprexa as previously planned.  Plan:  Zyprexa: Decrease Zyprexa 25 mg nightly to 20 mg nightly.  Monitor overnight and review indication to proceed with dose reduction of Zyprexa 7.5 mg twice daily to 5 mg twice daily.  Continue monitoring for sedation with plan to progress taper as needed.  PRN Seroquel: Discontinue as needed Seroquel, minimize neuroleptic load, after introduction of scheduled on 4/16.    Review of notes and/or information:  I personally reviewed notes from the patient's electronic chart since last psychiatry consult dated 4/16, most recent visit, and other interim reports. This provided me with information regarding patient's recent clinical course.     I personally reviewed the patient's chart, including available medication list and progression of hospital course.       OBJECTIVE   Upon interview, the patient is cooperative on approach.  Visit performed in patient's room on the unit.  Consent is given to evaluate.  Patient is not voluntary.    INTERVAL HISTORY:  Chart reviewed.  Patient is seen for follow-up after last visit with Conference performed on 4/16.  Discussed outcome of court decision to include for commitment with [facility].  Care conference performed with community supports.  Medications optimized to include started on a second neuroleptic, risperidone, with plan to convert to HOBBS.    Today, patient presents with ongoing sedation as described by attending the previous day.  Occurring in setting of introduction of second neuroleptic and medication adjustments.  Most likely secondary to his sedating side effects from antipsychotics.  If sedating side effects continue, plan to initiate taper of PTA Zyprexa and continue risperidone trial with plan to convert to HOBBS, Risperdal Consta.  Blanco medications in place and no clear concerns of noncompliance to neuroleptics reported.    Care plan discussed with staff.  Unit  working on placement, as patient demonstrating improvements  to progress disposition plans.    Suicidal ideation: denies current or recent suicidal ideation or behaviors.    Homicidal ideation: denies current or recent homicidal ideation or behaviors.    Psychotic symptoms:  No overt psychosis.    Medication side effects reported: sedation observed    Acute medical concerns: none    Other issues reported by patient:  Patient had no further questions or concerns.         MEDICATIONS   Medications:  Scheduled Meds:  Current Facility-Administered Medications   Medication Dose Route Frequency Provider Last Rate Last Admin    artificial tears ophthalmic ointment   Left Eye At Bedtime Mary Martinez DO   Given at 04/17/25 2243    benztropine (COGENTIN) tablet 2 mg  2 mg Oral At Bedtime Aisha Velez MD   2 mg at 04/16/25 2240    busPIRone (BUSPAR) tablet 30 mg  30 mg Oral BID Arnel Peres MD   30 mg at 04/18/25 1141    OLANZapine (zyPREXA) tablet 25 mg  25 mg Oral At Bedtime Arnel Peres MD   25 mg at 04/17/25 2126    Or    haloperidol lactate (HALDOL) injection 5 mg  5 mg Intramuscular At Bedtime Arnel Peres MD        hydrOXYzine HCl (ATARAX) tablet 10 mg  10 mg Oral BID Lisseth Harris MD   10 mg at 04/18/25 1147    lamoTRIgine (LaMICtal) tablet 200 mg  200 mg Oral Daily Arnel Peres MD   200 mg at 04/18/25 1145    Lidocaine (LIDOCARE) 4 % Patch 1 patch  1 patch Transdermal Q24H Panfilo Damon DO   1 patch at 04/18/25 1148    OLANZapine (zyPREXA) tablet 7.5 mg  7.5 mg Oral BID Arnel Peres MD   7.5 mg at 04/18/25 1142    risperiDONE (risperDAL M-TABS) ODT tab 1 mg  1 mg Oral BID Arnel Peres MD   1 mg at 04/18/25 1141    tamsulosin (FLOMAX) capsule 0.4 mg  0.4 mg Oral Daily Lisseth Harris MD   0.4 mg at 04/18/25 1142     Continuous Infusions:  Current Facility-Administered Medications   Medication Dose Route Frequency Provider Last Rate Last Admin     PRN Meds:.  Current Facility-Administered Medications    Medication Dose Route Frequency Provider Last Rate Last Admin    alum & mag hydroxide-simethicone (MAALOX) suspension 30 mL  30 mL Oral Q4H PRN Kendrick Alex MD   30 mL at 04/08/25 1424    benztropine (COGENTIN) tablet 1 mg  1 mg Oral BID PRN Arnel Peres MD        calcium carbonate (TUMS) chewable tablet 1,000 mg  1,000 mg Oral 4x Daily PRN Mary Martinez DO   1,000 mg at 04/12/25 2059    diclofenac (VOLTAREN) 1 % topical gel 2 g  2 g Topical 4x Daily PRN Panfilo Damon DO   2 g at 04/17/25 2043    haloperidol lactate (HALDOL) injection 5 mg  5 mg Intramuscular BID PRN Arnel Peres MD   5 mg at 04/11/25 1655    hydrOXYzine HCl (ATARAX) tablet 10 mg  10 mg Oral TID PRN Arnel Peres MD        ibuprofen (ADVIL/MOTRIN) tablet 400 mg  400 mg Oral Q8H PRN Kendrick Alex MD   400 mg at 04/14/25 2019    lidocaine (LMX4) cream   Topical Q1H PRN Mary Martinez DO        lidocaine 1 % 0.1-1 mL  0.1-1 mL Other Q1H PRN Mary Martinez DO        melatonin tablet 3 mg  3 mg Oral At Bedtime PRN Arnel Peres MD        midazolam (VERSED) injection 1 mg  1 mg Intravenous Once PRN Aisha Velez MD        ondansetron (ZOFRAN ODT) ODT tab 4 mg  4 mg Oral Q6H PRN Mary Martinez DO   4 mg at 04/17/25 1659    Or    ondansetron (ZOFRAN) injection 4 mg  4 mg Intravenous Q6H PRN Mary Martinez DO        oxyBUTYnin (DITROPAN) half-tab 2.5 mg  2.5 mg Oral TID PRN Lisseth Harris MD        polyethylene glycol (MIRALAX) Packet 17 g  17 g Oral BID PRN Mary Mratinez DO        prochlorperazine (COMPAZINE) injection 5 mg  5 mg Intravenous Q6H PRN Mary Martinez DO        Or    prochlorperazine (COMPAZINE) tablet 5 mg  5 mg Oral Q6H PRN Mary Martinez DO        QUEtiapine (SEROquel) tablet 25 mg  25 mg Oral TID PRN Arnel Peres MD        risperiDONE (risperDAL M-TABS) ODT tab 1 mg  1 mg Oral BID PRN Arnel Peres MD    "1 mg at 04/17/25 0321    senna-docusate (SENOKOT-S/PERICOLACE) 8.6-50 MG per tablet 1 tablet  1 tablet Oral BID PRN Mary Martinez DO        Or    senna-docusate (SENOKOT-S/PERICOLACE) 8.6-50 MG per tablet 2 tablet  2 tablet Oral BID PRN Mary Martinez DO        simethicone (MYLICON) chewable half-tab 40 mg  40 mg Oral Q6H PRN Kendrick Alex MD        sodium chloride (PF) 0.9% PF flush 3 mL  3 mL Intracatheter q1 min prn Mary Martinez DO         Medication adherence issues: MS Med Adherence Y/N: No  Medication side effects: MEDICATION SIDE EFFECTS: no side effects reported  Benefit: Yes / No: Yes       ROS   The 10 point Review of Systems is negative other than noted in the HPI or here.       MENTAL STATUS EXAM   Vitals: /76 (BP Location: Right arm, Cuff Size: Adult Regular)   Pulse 79   Temp 96.9  F (36.1  C) (Temporal)   Resp 18   Wt 58.4 kg (128 lb 12 oz)   SpO2 98%   BMI 19.01 kg/m      Weight:   128 lbs 12 oz    Body mass index is 19.01 kg/m .  Vitals:    04/12/25 0647   Weight: 58.4 kg (128 lb 12 oz)     Appearance:  Somnolent  Mood:  Mood: Blunted  Affect: appropriate  was congruent to speech  Suicidal Ideation: PRESENT / ABSENT: absent   Homicidal Ideation: PRESENT / ABSENT: absent   Thought process:  BAHMAN    Thought content: devoid of  suicidal ideation and violent ideation.   Fund of Knowledge: Below average  Attention/Concentration: BAHMAN  Language ability:  Chronic dysarthria  Memory:  Impaired  Insight:  limited.  Judgement: limited  Orientation: 0  Psychomotor Behavior: slowed    Muscle Strength and Tone: MuscleStrength: Normal  Gait and Station:  In bed       LABS   personally reviewed.   Temp: 96.9  F (36.1  C) Temp src: Temporal BP: 139/76 Pulse: 79   Resp: 18 SpO2: 98 % O2 Device: None (Room air)     Weight: 58.4 kg (128 lb 12 oz)  Estimated body mass index is 19.01 kg/m  as calculated from the following:    Height as of 7/23/23: 1.753 m (5' 9\").    Weight as " "of this encounter: 58.4 kg (128 lb 12 oz).    No results found for this or any previous visit (from the past 48 hours).    Qtc: 498    No results found for: \"PHENYTOIN\", \"PHENOBARB\", \"VALPROATE\", \"CBMZ\"       DIAGNOSIS   Principal Problem:    Schizoaffective disorder, bipolar type (H)    Active Problem List:  Patient Active Problem List   Diagnosis    Carol (H)    Acute UTI    Other hydronephrosis    Slurred speech    Falls frequently    Mood disorder with psychosis    Agitation    Anxiety    Schizoaffective disorder, bipolar type (H)    Hx of schizophrenia    Aggressive behavior    ROBBIN (acute kidney injury)          ASSESSMENT/PLAN   Today's Changes-04/18/2025:  Medication Changes:    NONE  Monitor:  Sedation with trial Risperidone to PTA Zyprexa.  If sedation persists, plan to slowly taper Zyprexa and continue trial Risperdal and convert to HOBBS pending verification of tolerability.  ADDENDUM (5954):   1. Initiate taper Zyprexa 25 mg at bedtime to 20 mg at bedtime and monitor need to continue taper by decreasing Zyprexa 7.5 mg bid to 5 mg bid in AM.  2. PRN Seroquel:  Discontinue as needed Seroquel to address duplicate therapy.  Continue Medications:  BLANCO (risperidone, olanzapine, quetiapine, haloperidol):  Effective 4/11/2025.  IM backup initiated for HS Zyprexa.  Monitor for indication to progress backup.  Risperdal M Tab:  Start trial Risperdal 1 mg bid, mood/psychosis, without Blanco backup.  Once tolerability verified, plan to convert to HOBBS (Risperdal Consta) as per Blanco.  Adherent.  Continue to monitor for need of IM back up as per [place] (Haldol IM).  Zyprexa: Continue PTA Zyprexa 7.5 mg every morning, q. noon, and 25 mg at bedtime, psychosis/mood, with IM Haldol backup to HS dose.  Monitor:  If sedation persists, initiate dose taper from Zyprexa 7.5 mg bid to 5 mg bid and 25 mg at bedtime to 20 mg at bedtime.    Lamictal: Continue optimization of PTA lamotrigine to 200 mg daily (On 3/28, PTA " lamotrigine 100 mg daily increased to 150 mg daily, on 4/16 increased to current dose), mood lability.  Demonstrating tolerability to titration with partial efficacy.  Monitor:  Rash  BuSpar: Continue optimization of BuSpar 20 mg twice daily to 30 mg twice daily (On 3/28, PTA BuSpar 15 mg twice daily to 20 mg twice daily), anxiety.  Cogentin: Continue PTA Cogentin 2 mg nightly, EPSE + as needed Cogentin 1 mg BID, EPSE.  Atarax: Continue PTA Atarax 10 mg twice daily.    PRN Haldol IM: Continue as needed Haldol IM 5 mg bid, severe agitation + IM Backup to Zyprexa as per Blanco.   PRN Melatonin: Continue PTA as needed melatonin 3 mg at bedtime, sleep.  PRN Atarax:  Continue as needed Atarax 10 mg 3 times daily, anxiety, rank order #1.  PRN Risperdal ODT:  Continue as needed Risperdal ODT 1 mg bid, mild to moderate agitation.      Other:  Legal:   3/28 (0309):  Placed on a 72-hour hold.  3/28:  Filed [facility] Petition for Commitment with Blanco.   3/31:  Petition re-filed through [facility] --> Supported  4/02:  Court Hold (updated orders)  4/04:  Exam/Preliminary Hearing  4/09:  Final hearing  4/11:  DECISION (Notified 4/15):  Full MI Commitment with Blanco (40-AS-ZR-)  4/16:  Epic orders updated (Committed + Blanco)  TBD:  Provisional Discharge (PD) date pending.  Placement: Unit  initiated referrals after care conference. [facility] to assist with PD.  Exclusion criteria met for IP MH placement.  Precautions placed:  Routine as per Unit; Delirium; Sexual.  Care Conference:  Initiated on 4/16 to discuss complex cares.  Please also refer to RN/team documentation for additional details.     Acute Medical Problems and Treatments:  .  Acute medical concerns:  No acute medical concerns.     Care Coordination:  Treatment plan discussed directly with staff.  Please reconsult Psychiatry as needed (Plan to follow up on 4/21 or 4/23; off unit on 4/22)        Risk Assessment: IP MHAC RISK ASSESSMENT: Patient  on precautions    Coordination of Care:   Treatment Plan reviewed, Care discussed with Care/Treatment Team Members, Chart reviewed and Patient seen         Arnel Peres MD    -     04/18/2025  -     2:05 PM    Total encounter time:  A total of  42  minutes spent related to chart review, history and exam, documentation   and further activities as noted above    This note was created with help of Dragon dictation system. Grammatical / typing errors are not intentional.        Arnel Peres MD  Consult/Liaison Psychiatry   [facility]  Securely message with Shi peace

## 2025-04-18 NOTE — PROGRESS NOTES
Hospitalist Medicine Progress Note   [facility]       Harpreet Mcelroy is a 69 year old gentleman with bipolar, anxiety, depression, schizophrenia, BPD, paranoia, bladder cancer s/p ureteral stent, HTN, HLD, who came to [facility] on 3/27/2025 on a RENETTA from [facility] with Agitation, attempt to harm staff with cane and was diagnosed with decompensated schizophrenia and ROBBIN with creatinine 1.42 . He was admitted by the medicine service on 3/31/2025 after he was declined by inpatient psychiatry for Psychiatry admission. Was seen by Dr Peres from Psychiatry service here who managed his medications and recommended full MI commitment with [facility].      The patient has an order for commitment from the [facility].  For that reason, discharge from the hospital to a community setting is not an option without his  approving it.  Geriatric psychiatry is not available within the system and is not being offered.  The patient cannot be returned to his previous residence as they already have stated that they do not want him back.    Disposition at this time is dependent on the court and Psychiatry.     Today, 4/18, I was notified that the pt was significantly hypothermic. (T rectal 91 degrees F).  Other VS stable and FSBG was 77 mg/dL. As yesterday, he is very sedated and poorly responsive.       Date of Admission:  3/27/2025  Assessment & Plan     ROBBIN  - resolved     Decompensated schizophrenia with intermittent agitation  Bipolar  Anxiety  Depression  Chronic cognitive dysfunction  -Psychiatry following.  Patient is still agitated Will leave adjustment of antipsychotic and psychiatric medications to psychiatry  -court proceeding resulted in guidance to commitment. Awaiting appropriate dispo.      Mild hypothermia and mild hypoglycemia  -no clear sx (shivering, tachypnea or tachycardia) and no findings on EKG or labs  -hypothermia is likely a side effect of atypical antipsychotics  (Olanzapine, Risperidone and Quetiapine all are known to cause hypothermia)  -Hypoglycemia is likely due to decreased oral intake.   -Jorge shook  -monitor sugars q 4  -D5 0.45 NS infusion    Dysarthric speech  -Patient can be challenging to understand at times     Bladder cancer   Chronic L ureteral stent  Stent last changed 2/4/25 by [facility] urology Dr. Whitt.  - PTA oxybutynin and flomax    Plan:   Med regimen currently: Lamotrigine 200 mg daily, Olanzapine 25 mg at bedtime (or Haldol 5 mg IM if oral is refused), Olanzapine 7.5 mg BID (q am and lunch), Risperidone 1 mg BID.  Hydroxyzine 10 mg BID, Cogentin 2 mg at bedtime. Also has Quetiapine 25 mg TID prn, Risperidone 1 mg BID prn.    Diet: Mechanical/Dental Soft Diet    DVT Prophylaxis: Pneumatic Compression Devices  Hernandez Catheter: Not present  Code Status: Full Code         Medically Ready for Discharge: Anticipated in 5+ Days    Clinically Significant Risk Factors                                  # Financial/Environmental Concerns: none         The patient's care was discussed with the Patient and RN.    Jacoby Delgado MD  Hospitalist Service  [facility]    ______________________________________________________________________    Interval History     Symptoms   Nursing notes indicate a stable night.  Pt slept.  Noted to not have had any aggressive behaviors overnight.     As above, the pt has been very somnolent over the past 48 hours or so. I spoke with Dr. Peres who is making plans to reduce the evening dose of Olanzapine. He will also discontinue Quetiapine, which has not been used for days.     Review of Systems:   Could not get any history.    Data reviewed today: I reviewed all medications, new labs and imaging results over the last 24 hours.     Physical Exam   Vital Signs: Temp: 96.9  F (36.1  C) Temp src: Temporal BP: 139/76 Pulse: 79   Resp: 18 SpO2: 98 % O2 Device: None (Room air)    Weight: 128 lbs 12 oz      GENERAL:  Patient is not in acute distress.  Wakens to voice, but very quiet.   NECK:  LUNGS: normal work of breathing. CTA  COR: RRR without murmur  ABD: Soft, NTND  Extrems: warm and well-perfused.   CNS: Alert moving all the 4 limbs     Data   Recent Labs   Lab 04/18/25  1509 04/18/25  1459   WBC 5.4  --    HGB 11.4*  --    MCV 87  --      --      --    POTASSIUM 4.8  --    CHLORIDE 102  --    CO2 26  --    BUN 21.4  --    CR 1.18*  --    ANIONGAP 10  --    STEPHY 9.9  --    GLC 77 77   ALBUMIN 3.9  --    PROTTOTAL 7.1  --    BILITOTAL <0.2  --    ALKPHOS 125  --    *  --    AST 62*  --        No results found for this or any previous visit (from the past 24 hours).

## 2025-04-19 LAB
ANION GAP SERPL CALCULATED.3IONS-SCNC: 10 MMOL/L (ref 7–15)
BUN SERPL-MCNC: 23.5 MG/DL (ref 8–23)
CALCIUM SERPL-MCNC: 9.1 MG/DL (ref 8.8–10.4)
CHLORIDE SERPL-SCNC: 103 MMOL/L (ref 98–107)
CORTIS SERPL-MCNC: 18 UG/DL
CREAT SERPL-MCNC: 1.52 MG/DL (ref 0.67–1.17)
EGFRCR SERPLBLD CKD-EPI 2021: 50 ML/MIN/1.73M2
GLUCOSE BLDC GLUCOMTR-MCNC: 74 MG/DL (ref 70–99)
GLUCOSE BLDC GLUCOMTR-MCNC: 78 MG/DL (ref 70–99)
GLUCOSE BLDC GLUCOMTR-MCNC: 86 MG/DL (ref 70–99)
GLUCOSE BLDC GLUCOMTR-MCNC: 92 MG/DL (ref 70–99)
GLUCOSE SERPL-MCNC: 89 MG/DL (ref 70–99)
HCO3 SERPL-SCNC: 24 MMOL/L (ref 22–29)
LACTATE SERPL-SCNC: 0.4 MMOL/L (ref 0.7–2)
POTASSIUM SERPL-SCNC: 4.3 MMOL/L (ref 3.4–5.3)
SODIUM SERPL-SCNC: 137 MMOL/L (ref 135–145)

## 2025-04-19 PROCEDURE — 80048 BASIC METABOLIC PNL TOTAL CA: CPT | Performed by: INTERNAL MEDICINE

## 2025-04-19 PROCEDURE — 250N000011 HC RX IP 250 OP 636: Performed by: PSYCHIATRY & NEUROLOGY

## 2025-04-19 PROCEDURE — 83605 ASSAY OF LACTIC ACID: CPT | Performed by: STUDENT IN AN ORGANIZED HEALTH CARE EDUCATION/TRAINING PROGRAM

## 2025-04-19 PROCEDURE — 250N000013 HC RX MED GY IP 250 OP 250 PS 637: Performed by: PSYCHIATRY & NEUROLOGY

## 2025-04-19 PROCEDURE — 36415 COLL VENOUS BLD VENIPUNCTURE: CPT | Performed by: INTERNAL MEDICINE

## 2025-04-19 PROCEDURE — 36415 COLL VENOUS BLD VENIPUNCTURE: CPT | Performed by: STUDENT IN AN ORGANIZED HEALTH CARE EDUCATION/TRAINING PROGRAM

## 2025-04-19 PROCEDURE — 99232 SBSQ HOSP IP/OBS MODERATE 35: CPT | Performed by: INTERNAL MEDICINE

## 2025-04-19 PROCEDURE — 258N000003 HC RX IP 258 OP 636: Performed by: INTERNAL MEDICINE

## 2025-04-19 PROCEDURE — 87040 BLOOD CULTURE FOR BACTERIA: CPT | Performed by: INTERNAL MEDICINE

## 2025-04-19 PROCEDURE — 120N000001 HC R&B MED SURG/OB

## 2025-04-19 RX ORDER — LORAZEPAM 2 MG/ML
2 INJECTION INTRAMUSCULAR EVERY 6 HOURS PRN
Status: DISCONTINUED | OUTPATIENT
Start: 2025-04-19 | End: 2025-04-21

## 2025-04-19 RX ADMIN — HALOPERIDOL LACTATE 5 MG: 5 INJECTION, SOLUTION INTRAMUSCULAR at 22:24

## 2025-04-19 RX ADMIN — OLANZAPINE 7.5 MG: 5 TABLET, FILM COATED ORAL at 11:56

## 2025-04-19 RX ADMIN — DEXTROSE AND SODIUM CHLORIDE: 5; 450 INJECTION, SOLUTION INTRAVENOUS at 00:02

## 2025-04-19 RX ADMIN — HALOPERIDOL LACTATE 5 MG: 5 INJECTION, SOLUTION INTRAMUSCULAR at 10:02

## 2025-04-19 RX ADMIN — DEXTROSE AND SODIUM CHLORIDE: 5; 450 INJECTION, SOLUTION INTRAVENOUS at 05:43

## 2025-04-19 ASSESSMENT — ACTIVITIES OF DAILY LIVING (ADL)
ADLS_ACUITY_SCORE: 78
ADLS_ACUITY_SCORE: 77
ADLS_ACUITY_SCORE: 83
ADLS_ACUITY_SCORE: 78
ADLS_ACUITY_SCORE: 83
ADLS_ACUITY_SCORE: 78

## 2025-04-19 NOTE — PROGRESS NOTES
Hospitalist Medicine Progress Note   [facility]       Harpreet Mcelroy is a 69 year old gentleman with bipolar, anxiety, depression, schizophrenia, BPD, paranoia, bladder cancer s/p ureteral stent, HTN, HLD, who came to [facility] on 3/27/2025 on a RENETTA from [facility] with Agitation, attempt to harm staff with cane and was diagnosed with decompensated schizophrenia and ROBBIN with creatinine 1.42 . He was admitted by the medicine service on 3/31/2025 after he was declined by inpatient psychiatry for Psychiatry admission. Was seen by Dr Peres from Psychiatry service here who managed his medications and recommended full MI commitment with [facility].      The patient has an order for commitment from the [facility].  For that reason, discharge from the hospital to a community setting is not an option without his  approving it.  Geriatric psychiatry is not available within the system and is not being offered.  The patient cannot be returned to his previous residence as they already have stated that they do not want him back.    Disposition at this time is dependent on the court and Psychiatry.     Today, 4/18, I was notified that the pt was significantly hypothermic. (T rectal 91 degrees F).  Other VS stable and FSBG was 77 mg/dL. As yesterday, he is very sedated and poorly responsive.       Date of Admission:  3/27/2025  Assessment & Plan     ROBBIN. Baseline creat about 1.0  - widely variable.  Creat up again today.  - empirically will give a Liter NS bolus (more to minimize the time that the pt is limited in movement than due to the needs for large volume fast)     Decompensated schizophrenia with intermittent agitation  Bipolar  Anxiety  Depression  Chronic cognitive dysfunction  -Psychiatry following.  Patient is still agitated Will leave adjustment of antipsychotic and psychiatric medications to psychiatry  -court proceeding resulted in guidance to commitment. Awaiting appropriate  dispo.      Mild hypothermia and mild hypoglycemia, resolved  -no clear sx (shivering, tachypnea or tachycardia) and no findings on EKG or labs  -hypothermia is likely a side effect of atypical antipsychotics (Olanzapine, Risperidone and Quetiapine all are known to cause hypothermia)  -Hypoglycemia is likely due to decreased oral intake.   -monitor sugars with meals for now.   -D5 0.45 NS infusion    Dysarthric speech  -Patient can be challenging to understand at times     Bladder cancer   Chronic L ureteral stent  Stent last changed 2/4/25 by [facility] urology Dr. Whitt.  - PTA oxybutynin and flomax    Plan:   Med regimen currently: Lamotrigine 200 mg daily, Olanzapine 20 mg at bedtime (or Haldol 5 mg IM if oral is refused), Olanzapine 7.5 mg BID (q am and lunch), Risperidone 1 mg BID.  Hydroxyzine 10 mg BID, Cogentin 2 mg at bedtime. Also has Quetiapine 25 mg TID prn, Risperidone 1 mg BID prn.    Diet: Mechanical/Dental Soft Diet    DVT Prophylaxis: Pneumatic Compression Devices  Hernandez Catheter: Not present  Code Status: Full Code         Medically Ready for Discharge: Anticipated in 5+ Days    Clinically Significant Risk Factors                                  # Financial/Environmental Concerns: none         The patient's care was discussed with the Patient and RN.    Jacoby Delgado MD  Hospitalist Service  [facility]    ______________________________________________________________________    Interval History     Symptoms   Nursing notes indicate a stable night.  Pt slept.  Noted to not have had any aggressive behaviors overnight.     As above, the pt has been very somnolent over the past 48 hours or so. I spoke with Dr. Peres who is making plans to reduce the evening dose of Olanzapine. He will also discontinue Quetiapine, which has not been used for days.     Review of Systems:   Could not get any history.    Data reviewed today: I reviewed all medications, new labs and imaging  results over the last 24 hours.     Physical Exam   Vital Signs: Temp: 97.7  F (36.5  C) Temp src: Temporal BP: 111/56 Pulse: 80   Resp: 16 SpO2: 96 % O2 Device: None (Room air)    Weight: 128 lbs 12 oz      GENERAL: Patient is not in acute distress.  Wakens to voice, but very quiet.   NECK:  LUNGS: normal work of breathing. CTA  COR: RRR without murmur  ABD: Soft, NTND  Extrems: warm and well-perfused.   CNS: Alert moving all the 4 limbs     Data   Recent Labs   Lab 04/19/25  0544 04/19/25  0452 04/19/25  0124 04/18/25 2021 04/18/25  1509   WBC  --   --   --   --  5.4   HGB  --   --   --   --  11.4*   MCV  --   --   --   --  87   PLT  --   --   --   --  185   NA  --  137  --   --  138   POTASSIUM  --  4.3  --   --  4.8   CHLORIDE  --  103  --   --  102   CO2  --  24  --   --  26   BUN  --  23.5*  --   --  21.4   CR  --  1.52*  --   --  1.18*   ANIONGAP  --  10  --   --  10   STEPHY  --  9.1  --   --  9.9   GLC 92 89 86   < > 77   ALBUMIN  --   --   --   --  3.9   PROTTOTAL  --   --   --   --  7.1   BILITOTAL  --   --   --   --  <0.2   ALKPHOS  --   --   --   --  125   ALT  --   --   --   --  104*   AST  --   --   --   --  62*    < > = values in this interval not displayed.       No results found for this or any previous visit (from the past 24 hours).

## 2025-04-19 NOTE — CONSULTS
Care Management Follow Up    Length of Stay (days): 19     Concerns to be Addressed: mental health, discharge planning     Patient plan of care discussed at interdisciplinary rounds: Yes    Anticipated Discharge Disposition: Geriatric Psych, Assisted Living, Inpatient Mental Health     Anticipated Discharge Services: Mental Health Resources  Anticipated Discharge DME: None    Patient/family educated on Medicare website which has current facility and service quality ratings: no  Education Provided on the Discharge Plan: Yes  Patient/Family in Agreement with the Plan: unable to assess    Referrals Placed by CM/SW: Post Acute Facilities    Discussed  Partnership in Safe Discharge Planning  document with patient/family: No     Handoff Completed: No, handoff not indicated or clinically appropriate    Additional Information:  Teresa from [facility] came to assess the pt yesterday. She was unable to meet with the pt due to him being lethargic.  Teresa talked with the bedside RN and said that she will follow up with Sw when they can make a better determination or complete another assessment. She is wondering if the pt would be appropriate to receive the Invega injection to assist with the pt's MH and behavioral concerns.     Next Steps:   Sw will continue with discharge planning and will be available as needed until discharge.      LIANNA Sol, [facility]  Inpatient Care Coordination  [facility]  [phone]

## 2025-04-19 NOTE — PLAN OF CARE
"Goal Outcome Evaluation:      Plan of Care Reviewed With: patient    Overall Patient Progress: no changeOverall Patient Progress: no change    Outcome Evaluation: Pt refused to let us do all cares - incontinence care, wound care, vitals, taking scheduled medicationsm. MD notified and aware. Pt removed PIV and tele, MD clarified okay to leave out and remove tele. Pt refused blood sugar checks. MD notified. PRN haldol given. SBA with GB. Very agitated and uncooperative today.       Problem: Adult Inpatient Plan of Care  Goal: Plan of Care Review  Description: The Plan of Care Review/Shift note should be completed every shift.  The Outcome Evaluation is a brief statement about your assessment that the patient is improving, declining, or no change.  This information will be displayed automatically on your shiftnote.  Outcome: Not Progressing  Flowsheets (Taken 4/19/2025 5829)  Outcome Evaluation: Pt refused to let us do all cares - incontinence care, wound care, vitals, taking scheduled medicationsm. MD notified and aware. Pt removed PIV and tele, MD clarified okay to leave out and remove tele. Pt refused blood sugar checks. MD notified. PRN haldol given,  Plan of Care Reviewed With: patient  Overall Patient Progress: no change  Goal: Patient-Specific Goal (Individualized)  Description: You can add care plan individualizations to a care plan. Examples of Individualization might be:  \"Parent requests to be called daily at 9am for status\", \"I have a hard time hearing out of my right ear\", or \"Do not touch me to wake me up as it startlesme\".  Outcome: Not Progressing  Goal: Absence of Hospital-Acquired Illness or Injury  Outcome: Not Progressing  Intervention: Identify and Manage Fall Risk  Recent Flowsheet Documentation  Taken 4/19/2025 1321 by Eb Dennis, RN  Safety Promotion/Fall Prevention:   activity supervised   assistive device/personal items within reach   bedside attendant   clutter free environment maintained   " increased rounding and observation   increase visualization of patient   lighting adjusted   mobility aid in reach   nonskid shoes/slippers when out of bed   patient and family education   patient video monitoring   room near nurse's station   safety round/check completed   supervised activity  Intervention: Prevent Infection  Recent Flowsheet Documentation  Taken 4/19/2025 1329 by Eb Dennis RN  Infection Prevention:   hand hygiene promoted   rest/sleep promoted   single patient room provided  Goal: Optimal Comfort and Wellbeing  Outcome: Not Progressing  Intervention: Monitor Pain and Promote Comfort  Recent Flowsheet Documentation  Taken 4/19/2025 0910 by Eb Dennis RN  Pain Management Interventions:   care clustered   declines   distraction  Goal: Readiness for Transition of Care  Outcome: Not Progressing     Problem: Violence Risk or Actual  Goal: Anger and Impulse Control  Outcome: Not Progressing  Intervention: Minimize Safety Risk  Recent Flowsheet Documentation  Taken 4/19/2025 1329 by Eb Dennis RN  Enhanced Safety Measures:   monitored by video   pain management   patient/family teach back on injury risk   room near unit station   review medications for side effects with activity    at bedside     Problem: Fall Injury Risk  Goal: Absence of Fall and Fall-Related Injury  Outcome: Not Progressing  Intervention: Identify and Manage Contributors  Recent Flowsheet Documentation  Taken 4/19/2025 1329 by Eb Dennis RN  Medication Review/Management: medications reviewed  Intervention: Promote Injury-Free Environment  Recent Flowsheet Documentation  Taken 4/19/2025 1329 by Eb Dennis RN  Safety Promotion/Fall Prevention:   activity supervised   assistive device/personal items within reach   bedside attendant   clutter free environment maintained   increased rounding and observation   increase visualization of patient   lighting adjusted   mobility aid in reach   nonskid shoes/slippers  when out of bed   patient and family education   patient video monitoring   room near nurse's station   safety round/check completed   supervised activity

## 2025-04-19 NOTE — PLAN OF CARE
Goal Outcome Evaluation:      Plan of Care Reviewed With: patient    Overall Patient Progress: decliningOverall Patient Progress: declining    Outcome Evaluation: Ongoing hypothermic temporal temperatures: increased to 96.6- warming measures initiated hcp aware, on RA, no S&S of pain, pt combatitive kicking, attempting to hit staff unable to deescalate- CC notified x2 IM meds administered per orders, pt removed PIV- hcp aware no IV access at this time, Q4 hr glucose, poor appetite- refused oral intake, tele SR, still need UA & blood cultures- lab attempted but pt combatative- discharge plan possibly group home date tbd    Problem: Adult Inpatient Plan of Care  Goal: Plan of Care Review  Description: The Plan of Care Review/Shift note should be completed every shift.  The Outcome Evaluation is a brief statement about your assessment that the patient is improving, declining, or no change.  This information will be displayed automatically on your shiftnote.  Recent Flowsheet Documentation  Taken 4/18/2025 2251 by Marlee Quintanilla, RN  Outcome Evaluation: Ongoing hypothermic temporal temperatures: increased to 96.6- warming measures initiated hcp aware, on RA, no S&S of pain, pt combatitive kicking, attempting to hit staff unable to deescalate- CC notiifed x2 IM meds administered per orders, pt removed PIV IVF infused 4 hrs- hcp aware of no PIV access at this time, Q4 hr glucose, poor appetite- refused oral intake, tele SR, still need UA & blood cultures- lab attempted but pt combatative- discharge plan possibly group home date tbd  Plan of Care Reviewed With: patient  Overall Patient Progress: declining  Goal: Absence of Hospital-Acquired Illness or Injury  Intervention: Identify and Manage Fall Risk  Recent Flowsheet Documentation  Taken 4/18/2025 1600 by Marlee Quintanilla, RN  Safety Promotion/Fall Prevention:   safety round/check completed   nonskid shoes/slippers when out of bed   room near nurse's  station  Intervention: Prevent Skin Injury  Recent Flowsheet Documentation  Taken 4/18/2025 1600 by Marlee Quintanilla RN  Body Position: supine, head elevated  Skin Protection: skin sealant/moisture barrier applied  Intervention: Prevent and Manage VTE (Venous Thromboembolism) Risk  Recent Flowsheet Documentation  Taken 4/18/2025 1600 by Marlee Quintanilla RN  VTE Prevention/Management: SCDs off (sequential compression devices)  Intervention: Prevent Infection  Recent Flowsheet Documentation  Taken 4/18/2025 1600 by Marlee Quintanilla RN  Infection Prevention: hand hygiene promoted  Goal: Optimal Comfort and Wellbeing  Intervention: Monitor Pain and Promote Comfort  Recent Flowsheet Documentation  Taken 4/18/2025 2013 by Marlee Quintanilla RN  Pain Management Interventions:   MD notified (comment)   medication (see MAR)   quiet environment facilitated  Taken 4/18/2025 1920 by Marlee Quintanilla RN  Pain Management Interventions:   MD notified (comment)   medication (see MAR)   quiet environment facilitated     Problem: Adult Inpatient Plan of Care  Goal: Plan of Care Review  Description: The Plan of Care Review/Shift note should be completed every shift.  The Outcome Evaluation is a brief statement about your assessment that the patient is improving, declining, or no change.  This information will be displayed automatically on your shiftnote.  Recent Flowsheet Documentation  Taken 4/18/2025 2251 by Marlee Quintanilla RN  Outcome Evaluation: Ongoing hypothermic temporal temperatures: increased to 96.6- warming measures initiated hcp aware, on RA, no S&S of pain, pt combatitive kicking, attempting to hit staff unable to deescalate- CC notiifed x2 IM meds administered per orders, pt removed PIV IVF infused 4 hrs- hcp aware of no PIV access at this time, Q4 hr glucose, poor appetite- refused oral intake, tele SR, still need UA & blood cultures- lab attempted but pt combatative- discharge plan possibly group home  date tbd  Plan of Care Reviewed With: patient  Overall Patient Progress: declining  Goal: Absence of Hospital-Acquired Illness or Injury  Intervention: Identify and Manage Fall Risk  Recent Flowsheet Documentation  Taken 4/18/2025 1600 by Marlee Quintanilla RN  Safety Promotion/Fall Prevention:   safety round/check completed   nonskid shoes/slippers when out of bed   room near nurse's station  Intervention: Prevent Skin Injury  Recent Flowsheet Documentation  Taken 4/18/2025 1600 by Marlee Quintanilla RN  Body Position: supine, head elevated  Skin Protection: skin sealant/moisture barrier applied  Intervention: Prevent and Manage VTE (Venous Thromboembolism) Risk  Recent Flowsheet Documentation  Taken 4/18/2025 1600 by Marlee Quintanilla RN  VTE Prevention/Management: SCDs off (sequential compression devices)  Intervention: Prevent Infection  Recent Flowsheet Documentation  Taken 4/18/2025 1600 by Marlee Quintanilla RN  Infection Prevention: hand hygiene promoted  Goal: Optimal Comfort and Wellbeing  Intervention: Monitor Pain and Promote Comfort  Recent Flowsheet Documentation  Taken 4/18/2025 2013 by Marlee Quintanilla RN  Pain Management Interventions:   MD notified (comment)   medication (see MAR)   quiet environment facilitated  Taken 4/18/2025 1920 by Marlee Quintanilla RN  Pain Management Interventions:   MD notified (comment)   medication (see MAR)   quiet environment facilitated     Problem: Adult Inpatient Plan of Care  Goal: Plan of Care Review  Description: The Plan of Care Review/Shift note should be completed every shift.  The Outcome Evaluation is a brief statement about your assessment that the patient is improving, declining, or no change.  This information will be displayed automatically on your shiftnote.  Outcome: Not Progressing  Flowsheets (Taken 4/18/2025 2251)  Outcome Evaluation: Ongoing hypothermic temporal temperatures: increased to 96.6- warming measures initiated hcp aware, on RA,  "no S&S of pain, pt combatitive kicking, attempting to hit staff unable to deescalate- CC notiifed x2 IM meds administered per orders, pt removed PIV IVF infused 4 hrs- hcp aware of no PIV access at this time, Q4 hr glucose, poor appetite- refused oral intake, tele SR, still need UA & blood cultures- lab attempted but pt combatative- discharge plan possibly group home date tbd  Plan of Care Reviewed With: patient  Overall Patient Progress: declining  Goal: Patient-Specific Goal (Individualized)  Description: You can add care plan individualizations to a care plan. Examples of Individualization might be:  \"Parent requests to be called daily at 9am for status\", \"I have a hard time hearing out of my right ear\", or \"Do not touch me to wake me up as it startlesme\".  Outcome: Not Progressing  Goal: Absence of Hospital-Acquired Illness or Injury  Outcome: Not Progressing  Intervention: Identify and Manage Fall Risk  Recent Flowsheet Documentation  Taken 4/18/2025 1600 by Marlee Quintanilla RN  Safety Promotion/Fall Prevention:   safety round/check completed   nonskid shoes/slippers when out of bed   room near nurse's station  Intervention: Prevent Skin Injury  Recent Flowsheet Documentation  Taken 4/18/2025 1600 by Marlee Quintanilla RN  Body Position: supine, head elevated  Skin Protection: skin sealant/moisture barrier applied  Intervention: Prevent and Manage VTE (Venous Thromboembolism) Risk  Recent Flowsheet Documentation  Taken 4/18/2025 1600 by Marlee Quintanilla RN  VTE Prevention/Management: SCDs off (sequential compression devices)  Intervention: Prevent Infection  Recent Flowsheet Documentation  Taken 4/18/2025 1600 by Marlee Quintanilla RN  Infection Prevention: hand hygiene promoted  Goal: Optimal Comfort and Wellbeing  Outcome: Not Progressing  Intervention: Monitor Pain and Promote Comfort  Recent Flowsheet Documentation  Taken 4/18/2025 2013 by Marlee Quintanilla RN  Pain Management Interventions:   MD " notified (comment)   medication (see MAR)   quiet environment facilitated  Taken 4/18/2025 1920 by Marlee Quintanilla RN  Pain Management Interventions:   MD notified (comment)   medication (see MAR)   quiet environment facilitated  Goal: Readiness for Transition of Care  Outcome: Not Progressing     Problem: Delirium  Goal: Optimal Coping  Intervention: Optimize Psychosocial Adjustment to Delirium  Recent Flowsheet Documentation  Taken 4/18/2025 1600 by Marlee Quintanilla RN  Supportive Measures: active listening utilized  Goal: Improved Behavioral Control  Intervention: Minimize Safety Risk  Recent Flowsheet Documentation  Taken 4/18/2025 1600 by Marlee Quintanilla RN  Enhanced Safety Measures: room near unit station  Goal: Improved Attention and Thought Clarity  Intervention: Maximize Cognitive Function  Recent Flowsheet Documentation  Taken 4/18/2025 1600 by Marlee Quintanilla RN  Reorientation Measures: clock in view     Problem: Delirium  Goal: Optimal Coping  Intervention: Optimize Psychosocial Adjustment to Delirium  Recent Flowsheet Documentation  Taken 4/18/2025 1600 by Marlee Quintanilla RN  Supportive Measures: active listening utilized  Goal: Improved Behavioral Control  Intervention: Minimize Safety Risk  Recent Flowsheet Documentation  Taken 4/18/2025 1600 by Marlee Quintanilla RN  Enhanced Safety Measures: room near unit station  Goal: Improved Attention and Thought Clarity  Intervention: Maximize Cognitive Function  Recent Flowsheet Documentation  Taken 4/18/2025 1600 by Marlee Quintanilla RN  Reorientation Measures: clock in view     Problem: Violence Risk or Actual  Goal: Anger and Impulse Control  Intervention: Minimize Safety Risk  Recent Flowsheet Documentation  Taken 4/18/2025 1600 by Marlee Quintanilla RN  Enhanced Safety Measures: room near unit station  Intervention: Promote Self-Control  Recent Flowsheet Documentation  Taken 4/18/2025 1600 by Marlee Quintanilla RN  Supportive  Measures: active listening utilized  Environmental Support: calm environment promoted     Problem: Violence Risk or Actual  Goal: Anger and Impulse Control  Outcome: Not Progressing  Intervention: Minimize Safety Risk  Recent Flowsheet Documentation  Taken 4/18/2025 1600 by Marlee Quintanilla RN  Enhanced Safety Measures: room near unit station  Intervention: Promote Self-Control  Recent Flowsheet Documentation  Taken 4/18/2025 1600 by Marlee Quintanilla RN  Supportive Measures: active listening utilized  Environmental Support: calm environment promoted     Problem: Comorbidity Management  Goal: Maintenance of Behavioral Health Symptom Control  Intervention: Maintain Behavioral Health Symptom Control  Recent Flowsheet Documentation  Taken 4/18/2025 1600 by Marlee Quintanilla RN  Medication Review/Management: medications reviewed     Problem: Comorbidity Management  Goal: Maintenance of Behavioral Health Symptom Control  Intervention: Maintain Behavioral Health Symptom Control  Recent Flowsheet Documentation  Taken 4/18/2025 1600 by Marlee Quintanilla RN  Medication Review/Management: medications reviewed     Problem: Fall Injury Risk  Goal: Absence of Fall and Fall-Related Injury  Outcome: Not Progressing  Intervention: Identify and Manage Contributors  Recent Flowsheet Documentation  Taken 4/18/2025 1600 by Marlee Quintanilla RN  Medication Review/Management: medications reviewed  Intervention: Promote Injury-Free Environment  Recent Flowsheet Documentation  Taken 4/18/2025 1600 by Marlee Quintanilla RN  Safety Promotion/Fall Prevention:   safety round/check completed   nonskid shoes/slippers when out of bed   room near nurse's station

## 2025-04-19 NOTE — PLAN OF CARE
Goal Outcome Evaluation:      Plan of Care Reviewed With: patient    Overall Patient Progress: no changeOverall Patient Progress: no change    Outcome Evaluation: On RA, oxygen in the 94-96%. Pt slept through all of shift. Pt did not appear to be in any pain. SBA walker and GB. New IV placed, L lower forearm, D5 infusing at 100 ml/hr. BP dropped to 77/44 and BS 78. Recheck was 106/46 and BS 86- CC aware, no orders obtained. /61 and BS 92 this am. Purewick in place, no incontinent episodes overnight. On tele: SR in the 70s. Blood cultures drawn, UA still needed. On mechanical/dental soft diet. Plan TBD.      Problem: Adult Inpatient Plan of Care  Goal: Plan of Care Review  Description: The Plan of Care Review/Shift note should be completed every shift.  The Outcome Evaluation is a brief statement about your assessment that the patient is improving, declining, or no change.  This information will be displayed automatically on your shiftnote.  4/19/2025 0309 by Carol Carranza RN  Outcome: Progressing  Flowsheets (Taken 4/19/2025 0305)  Outcome Evaluation: On RA, oxygen in the 94-96%. Pt did not appear to be in any pain. SBA walker and GB. New IV placed, D5 infusing at 100 ml/hr. BP dropped to 77/44 and BS 78. Recheck was 106/46 and BS 86- CC aware, no orders obtained. Purewick in place. On tele: SR in the 70s. Blood cultures drawn, UA still needed. On mechanical/dental soft diet. Plan TBD.  Plan of Care Reviewed With: patient  Overall Patient Progress: no change  4/19/2025 0305 by Carol Carranza, RN  Outcome: Progressing  Flowsheets  Taken 4/19/2025 0305  Outcome Evaluation: On RA, oxygen in the 94-96%. Pt did not appear to be in any pain. SBA walker and GB. New IV placed, D5 infusing at 100 ml/hr. BP dropped to 77/44 and BS 78. Recheck was 106/46 and BS 86- CC aware, no orders obtained. Purewick in place. On tele: SR in the 70s. Blood cultures drawn, UA still needed. On mechanical/dental soft diet. Plan  "TBD.  Plan of Care Reviewed With: patient  Overall Patient Progress: no change  Taken 4/19/2025 0255  Outcome Evaluation: On RA, oxygen in the 94-96%. Pt did not appear to be in any pain.  Plan of Care Reviewed With: patient  Overall Patient Progress: improving  Goal: Patient-Specific Goal (Individualized)  Description: You can add care plan individualizations to a care plan. Examples of Individualization might be:  \"Parent requests to be called daily at 9am for status\", \"I have a hard time hearing out of my right ear\", or \"Do not touch me to wake me up as it startlesme\".  4/19/2025 0309 by Carol Carranza RN  Outcome: Progressing  4/19/2025 0305 by Carol Carranza RN  Outcome: Progressing  Goal: Absence of Hospital-Acquired Illness or Injury  4/19/2025 0309 by Carol Carranza RN  Outcome: Progressing  4/19/2025 0305 by Carol Carranza RN  Outcome: Progressing  Intervention: Identify and Manage Fall Risk  Recent Flowsheet Documentation  Taken 4/19/2025 0000 by Carol Carranza RN  Safety Promotion/Fall Prevention:   activity supervised   assistive device/personal items within reach   clutter free environment maintained   increased rounding and observation   increase visualization of patient   lighting adjusted   mobility aid in reach   nonskid shoes/slippers when out of bed   room door open   room near nurse's station   safety round/check completed   room organization consistent   supervised activity  Intervention: Prevent and Manage VTE (Venous Thromboembolism) Risk  Recent Flowsheet Documentation  Taken 4/19/2025 0000 by Carol Carranza RN  VTE Prevention/Management: SCDs off (sequential compression devices)  Intervention: Prevent Infection  Recent Flowsheet Documentation  Taken 4/19/2025 0000 by Carol Carranza RN  Infection Prevention:   rest/sleep promoted   single patient room provided  Goal: Optimal Comfort and Wellbeing  4/19/2025 0309 by Carol Carranza RN  Outcome: " Progressing  4/19/2025 0305 by Carol Carranza RN  Outcome: Progressing  Goal: Readiness for Transition of Care  4/19/2025 0309 by Carol Carranza RN  Outcome: Progressing  4/19/2025 0305 by Carol Carranza RN  Outcome: Progressing     Problem: Violence Risk or Actual  Goal: Anger and Impulse Control  4/19/2025 0309 by Carol Carranza RN  Outcome: Progressing  4/19/2025 0305 by Carol Carranza RN  Outcome: Progressing  Intervention: Minimize Safety Risk  Recent Flowsheet Documentation  Taken 4/19/2025 0000 by Carol Carranza RN  Enhanced Safety Measures: room near unit station     Problem: Fall Injury Risk  Goal: Absence of Fall and Fall-Related Injury  4/19/2025 0309 by Carol Carranza RN  Outcome: Progressing  4/19/2025 0305 by Carol Carranza RN  Outcome: Progressing  Intervention: Identify and Manage Contributors  Recent Flowsheet Documentation  Taken 4/19/2025 0000 by Carol Carranza RN  Medication Review/Management: medications reviewed  Intervention: Promote Injury-Free Environment  Recent Flowsheet Documentation  Taken 4/19/2025 0000 by Carol Carranza RN  Safety Promotion/Fall Prevention:   activity supervised   assistive device/personal items within reach   clutter free environment maintained   increased rounding and observation   increase visualization of patient   lighting adjusted   mobility aid in reach   nonskid shoes/slippers when out of bed   room door open   room near nurse's station   safety round/check completed   room organization consistent   supervised activity

## 2025-04-20 LAB
ANION GAP SERPL CALCULATED.3IONS-SCNC: 13 MMOL/L (ref 7–15)
BUN SERPL-MCNC: 27.6 MG/DL (ref 8–23)
CALCIUM SERPL-MCNC: 9.5 MG/DL (ref 8.8–10.4)
CHLORIDE SERPL-SCNC: 105 MMOL/L (ref 98–107)
CREAT SERPL-MCNC: 1.27 MG/DL (ref 0.67–1.17)
EGFRCR SERPLBLD CKD-EPI 2021: 62 ML/MIN/1.73M2
GLUCOSE BLDC GLUCOMTR-MCNC: 107 MG/DL (ref 70–99)
GLUCOSE BLDC GLUCOMTR-MCNC: 239 MG/DL (ref 70–99)
GLUCOSE BLDC GLUCOMTR-MCNC: 32 MG/DL (ref 70–99)
GLUCOSE BLDC GLUCOMTR-MCNC: 88 MG/DL (ref 70–99)
GLUCOSE BLDC GLUCOMTR-MCNC: 89 MG/DL (ref 70–99)
GLUCOSE SERPL-MCNC: 51 MG/DL (ref 70–99)
HCO3 SERPL-SCNC: 22 MMOL/L (ref 22–29)
POTASSIUM SERPL-SCNC: 4.6 MMOL/L (ref 3.4–5.3)
SODIUM SERPL-SCNC: 140 MMOL/L (ref 135–145)

## 2025-04-20 PROCEDURE — 250N000011 HC RX IP 250 OP 636: Mod: JZ | Performed by: STUDENT IN AN ORGANIZED HEALTH CARE EDUCATION/TRAINING PROGRAM

## 2025-04-20 PROCEDURE — 250N000011 HC RX IP 250 OP 636: Performed by: HOSPITALIST

## 2025-04-20 PROCEDURE — 99232 SBSQ HOSP IP/OBS MODERATE 35: CPT | Performed by: HOSPITALIST

## 2025-04-20 PROCEDURE — 36415 COLL VENOUS BLD VENIPUNCTURE: CPT | Performed by: HOSPITALIST

## 2025-04-20 PROCEDURE — 80048 BASIC METABOLIC PNL TOTAL CA: CPT | Performed by: HOSPITALIST

## 2025-04-20 PROCEDURE — 258N000001 HC RX 258: Performed by: HOSPITALIST

## 2025-04-20 PROCEDURE — 258N000003 HC RX IP 258 OP 636: Performed by: INTERNAL MEDICINE

## 2025-04-20 PROCEDURE — 250N000011 HC RX IP 250 OP 636: Performed by: INTERNAL MEDICINE

## 2025-04-20 PROCEDURE — 250N000011 HC RX IP 250 OP 636: Performed by: PSYCHIATRY & NEUROLOGY

## 2025-04-20 PROCEDURE — 120N000001 HC R&B MED SURG/OB

## 2025-04-20 RX ORDER — DEXTROSE MONOHYDRATE 50 MG/ML
INJECTION, SOLUTION INTRAVENOUS CONTINUOUS
Status: DISCONTINUED | OUTPATIENT
Start: 2025-04-20 | End: 2025-04-22

## 2025-04-20 RX ORDER — LORAZEPAM 2 MG/ML
2 INJECTION INTRAMUSCULAR ONCE
Status: COMPLETED | OUTPATIENT
Start: 2025-04-20 | End: 2025-04-20

## 2025-04-20 RX ORDER — NICOTINE POLACRILEX 4 MG
15-30 LOZENGE BUCCAL
Status: DISCONTINUED | OUTPATIENT
Start: 2025-04-20 | End: 2025-04-30 | Stop reason: HOSPADM

## 2025-04-20 RX ORDER — DEXTROSE MONOHYDRATE 25 G/50ML
25-50 INJECTION, SOLUTION INTRAVENOUS
Status: DISCONTINUED | OUTPATIENT
Start: 2025-04-20 | End: 2025-04-30 | Stop reason: HOSPADM

## 2025-04-20 RX ORDER — OLANZAPINE 10 MG/2ML
10 INJECTION, POWDER, FOR SOLUTION INTRAMUSCULAR ONCE
Status: COMPLETED | OUTPATIENT
Start: 2025-04-20 | End: 2025-04-20

## 2025-04-20 RX ADMIN — DEXTROSE AND SODIUM CHLORIDE 1000 ML: 5; 900 INJECTION, SOLUTION INTRAVENOUS at 14:40

## 2025-04-20 RX ADMIN — OLANZAPINE 10 MG: 10 INJECTION, POWDER, FOR SOLUTION INTRAMUSCULAR at 02:28

## 2025-04-20 RX ADMIN — GLUCAGON 1 MG: 1 INJECTION, POWDER, LYOPHILIZED, FOR SOLUTION INTRAMUSCULAR; INTRAVENOUS at 14:26

## 2025-04-20 RX ADMIN — LORAZEPAM 2 MG: 2 INJECTION INTRAMUSCULAR; INTRAVENOUS at 02:13

## 2025-04-20 RX ADMIN — LORAZEPAM 2 MG: 2 INJECTION INTRAMUSCULAR; INTRAVENOUS at 02:38

## 2025-04-20 RX ADMIN — DEXTROSE MONOHYDRATE: 50 INJECTION, SOLUTION INTRAVENOUS at 20:28

## 2025-04-20 RX ADMIN — HALOPERIDOL LACTATE 5 MG: 5 INJECTION, SOLUTION INTRAMUSCULAR at 22:15

## 2025-04-20 RX ADMIN — DEXTROSE MONOHYDRATE 25 ML: 25 INJECTION, SOLUTION INTRAVENOUS at 19:40

## 2025-04-20 ASSESSMENT — ACTIVITIES OF DAILY LIVING (ADL)
ADLS_ACUITY_SCORE: 78
ADLS_ACUITY_SCORE: 72
ADLS_ACUITY_SCORE: 78

## 2025-04-20 NOTE — PLAN OF CARE
Goal Outcome Evaluation:      Plan of Care Reviewed With: patient    Overall Patient Progress: decliningOverall Patient Progress: declining    Outcome Evaluation: Pt refused cares including VS, assessment, compliance with scheduled medications, WOC orders- CC notified/aware, glucose & tele orders discontinued, PSC, very poor appetite- sips of apple juice/water, bites of apple sauce, discharge plan tbd    Problem: Adult Inpatient Plan of Care  Goal: Plan of Care Review  Description: The Plan of Care Review/Shift note should be completed every shift.  The Outcome Evaluation is a brief statement about your assessment that the patient is improving, declining, or no change.  This information will be displayed automatically on your shiftnote.  Recent Flowsheet Documentation  Taken 4/19/2025 2120 by Marlee Quintanilla RN  Outcome Evaluation: Pt refused cares including VS, assessment, compliance with scheduled medications, WOC orders- CC notified/aware, glucose & tele orders discontinued, PSC, very poor appetite- sips of apple juice/water, bites of apple sauce, discharge plan tbd  Plan of Care Reviewed With: patient  Overall Patient Progress: declining  Goal: Absence of Hospital-Acquired Illness or Injury  Intervention: Identify and Manage Fall Risk  Recent Flowsheet Documentation  Taken 4/19/2025 1600 by Marlee Quintanilla, RN  Safety Promotion/Fall Prevention:   activity supervised   supervised activity   increase visualization of patient   room near nurse's station  Intervention: Prevent Infection  Recent Flowsheet Documentation  Taken 4/19/2025 1600 by Marlee Quintanilla, RN  Infection Prevention: rest/sleep promoted     Problem: Adult Inpatient Plan of Care  Goal: Plan of Care Review  Description: The Plan of Care Review/Shift note should be completed every shift.  The Outcome Evaluation is a brief statement about your assessment that the patient is improving, declining, or no change.  This information will be  "displayed automatically on your shiftnote.  Recent Flowsheet Documentation  Taken 4/19/2025 2120 by Marlee Quintanilla, RN  Outcome Evaluation: Pt refused cares including VS, assessment, compliance with scheduled medications, WOC orders- CC notified/aware, glucose & tele orders discontinued, PSC, very poor appetite- sips of apple juice/water, bites of apple sauce, discharge plan tbd  Plan of Care Reviewed With: patient  Overall Patient Progress: declining  Goal: Absence of Hospital-Acquired Illness or Injury  Intervention: Identify and Manage Fall Risk  Recent Flowsheet Documentation  Taken 4/19/2025 1600 by Marlee Quintanilla RN  Safety Promotion/Fall Prevention:   activity supervised   supervised activity   increase visualization of patient   room near nurse's station  Intervention: Prevent Infection  Recent Flowsheet Documentation  Taken 4/19/2025 1600 by Marlee Quintanilla RN  Infection Prevention: rest/sleep promoted     Problem: Adult Inpatient Plan of Care  Goal: Plan of Care Review  Description: The Plan of Care Review/Shift note should be completed every shift.  The Outcome Evaluation is a brief statement about your assessment that the patient is improving, declining, or no change.  This information will be displayed automatically on your shiftnote.  Outcome: Not Progressing  Flowsheets (Taken 4/19/2025 2120)  Outcome Evaluation: Pt refused cares including VS, assessment, compliance with scheduled medications, WOC orders- CC notified/aware, glucose & tele orders discontinued, PSC, very poor appetite- sips of apple juice/water, bites of apple sauce, discharge plan tbd  Plan of Care Reviewed With: patient  Overall Patient Progress: declining  Goal: Patient-Specific Goal (Individualized)  Description: You can add care plan individualizations to a care plan. Examples of Individualization might be:  \"Parent requests to be called daily at 9am for status\", \"I have a hard time hearing out of my right ear\", or \"Do " "not touch me to wake me up as it startlesme\".  Outcome: Not Progressing  Goal: Absence of Hospital-Acquired Illness or Injury  Outcome: Not Progressing  Intervention: Identify and Manage Fall Risk  Recent Flowsheet Documentation  Taken 4/19/2025 1600 by Marlee Quintanilla RN  Safety Promotion/Fall Prevention:   activity supervised   supervised activity   increase visualization of patient   room near nurse's station  Intervention: Prevent Infection  Recent Flowsheet Documentation  Taken 4/19/2025 1600 by Marlee Quintanilla RN  Infection Prevention: rest/sleep promoted  Goal: Optimal Comfort and Wellbeing  Outcome: Not Progressing  Goal: Readiness for Transition of Care  Outcome: Not Progressing     Problem: Delirium  Goal: Improved Behavioral Control  Intervention: Minimize Safety Risk  Recent Flowsheet Documentation  Taken 4/19/2025 1600 by Marlee Quintanilla RN  Enhanced Safety Measures:   room near unit station   monitored by video  Goal: Improved Attention and Thought Clarity  Intervention: Maximize Cognitive Function  Recent Flowsheet Documentation  Taken 4/19/2025 1600 by Marlee Quintanilla RN  Sensory Stimulation Regulation:   care clustered   quiet environment promoted  Reorientation Measures:   clock in view   reorientation provided     Problem: Delirium  Goal: Improved Behavioral Control  Intervention: Minimize Safety Risk  Recent Flowsheet Documentation  Taken 4/19/2025 1600 by Marlee Quintanilla RN  Enhanced Safety Measures:   room near unit station   monitored by video  Goal: Improved Attention and Thought Clarity  Intervention: Maximize Cognitive Function  Recent Flowsheet Documentation  Taken 4/19/2025 1600 by Marlee Quintanilla RN  Sensory Stimulation Regulation:   care clustered   quiet environment promoted  Reorientation Measures:   clock in view   reorientation provided     Problem: Violence Risk or Actual  Goal: Anger and Impulse Control  Intervention: Minimize Safety Risk  Recent Flowsheet " Documentation  Taken 4/19/2025 1600 by Marlee Quintanilla RN  Sensory Stimulation Regulation:   care clustered   quiet environment promoted  Enhanced Safety Measures:   room near unit station   monitored by video     Problem: Violence Risk or Actual  Goal: Anger and Impulse Control  Intervention: Minimize Safety Risk  Recent Flowsheet Documentation  Taken 4/19/2025 1600 by Marlee Quintanilla RN  Sensory Stimulation Regulation:   care clustered   quiet environment promoted  Enhanced Safety Measures:   room near unit station   monitored by video     Problem: Violence Risk or Actual  Goal: Anger and Impulse Control  Outcome: Not Progressing  Intervention: Minimize Safety Risk  Recent Flowsheet Documentation  Taken 4/19/2025 1600 by Marlee Quintanilla RN  Sensory Stimulation Regulation:   care clustered   quiet environment promoted  Enhanced Safety Measures:   room near unit station   monitored by video     Problem: Comorbidity Management  Goal: Maintenance of Behavioral Health Symptom Control  Intervention: Maintain Behavioral Health Symptom Control  Recent Flowsheet Documentation  Taken 4/19/2025 1600 by Marlee Quintanilla RN  Medication Review/Management: medications reviewed     Problem: Comorbidity Management  Goal: Maintenance of Behavioral Health Symptom Control  Intervention: Maintain Behavioral Health Symptom Control  Recent Flowsheet Documentation  Taken 4/19/2025 1600 by Marlee Quintanilla RN  Medication Review/Management: medications reviewed     Problem: Fall Injury Risk  Goal: Absence of Fall and Fall-Related Injury  Outcome: Not Progressing  Intervention: Identify and Manage Contributors  Recent Flowsheet Documentation  Taken 4/19/2025 1600 by Marlee Quintanilla RN  Medication Review/Management: medications reviewed  Intervention: Promote Injury-Free Environment  Recent Flowsheet Documentation  Taken 4/19/2025 1600 by Marlee Quintanilla RN  Safety Promotion/Fall Prevention:   activity supervised    supervised activity   increase visualization of patient   room near nurse's station

## 2025-04-20 NOTE — PLAN OF CARE
Goal Outcome Evaluation:      Plan of Care Reviewed With: patient    Overall Patient Progress: decliningOverall Patient Progress: declining    Outcome Evaluation: Shift: 9241-5350. Pt was sitting at the edge of the bed for the beginning part of shift attempting to get out of bed. Writer was in hallway since 1900 last night caring for other pts, when pt did hit 2 staff members Multiple staff members attempted to help pt get back in bed and redirect him. Pt had his fists up and was attempting to hit staff, attempted to hit, punch, and kick staff members. PRN IM ativan given with no improvement. Paged CC and 10mg of one time dose IM zyprexa ordered. No improvement with medication. Rapid was then called at ~0233. One time dose of IM ativan 2mg ordered. Pt continued to sit at the edge of the bed and sozing off but resisting sleep. After 30 mins-hour, medications began to kick in and we were able to get pt fully back in bed. Wound cares done, incontinent cares done. Soft BP and low temp- x2 heated blankets, bear hugger, and room temp. increased.       Problem: Adult Inpatient Plan of Care  Goal: Plan of Care Review  Description: The Plan of Care Review/Shift note should be completed every shift.  The Outcome Evaluation is a brief statement about your assessment that the patient is improving, declining, or no change.  This information will be displayed automatically on your shiftnote.  Outcome: Progressing  Flowsheets (Taken 4/20/2025 0657)  Outcome Evaluation: Shift: 2270-0069. Pt was sitting at the edge of the bed for the beginning part of shift attempting to get out of bed. Writer was in hallway since 1900 last night caring for other pts, when pt did hit 2 staff members Multiple staff members attempted to help pt get back in bed and redirect him. Pt had his fists up and was attempting to hit staff, attempted to hit, punch, and kick staff members. PRN IM ativan given with no improvement. Paged CC and 10mg of one time  "dose IM zyprexa ordered. No improvement with medication. Rapid was then called at ~0233. One time dose of IM ativan 2mg ordered  Plan of Care Reviewed With: patient  Overall Patient Progress: declining  Goal: Patient-Specific Goal (Individualized)  Description: You can add care plan individualizations to a care plan. Examples of Individualization might be:  \"Parent requests to be called daily at 9am for status\", \"I have a hard time hearing out of my right ear\", or \"Do not touch me to wake me up as it startlesme\".  Outcome: Progressing  Goal: Absence of Hospital-Acquired Illness or Injury  Outcome: Progressing  Intervention: Identify and Manage Fall Risk  Recent Flowsheet Documentation  Taken 4/20/2025 0200 by Carol Carranza RN  Safety Promotion/Fall Prevention:   activity supervised   assistive device/personal items within reach   clutter free environment maintained   increased rounding and observation   increase visualization of patient   lighting adjusted   mobility aid in reach   nonskid shoes/slippers when out of bed   room door open   room near nurse's station   room organization consistent   safety round/check completed   supervised activity  Goal: Optimal Comfort and Wellbeing  Outcome: Progressing  Goal: Readiness for Transition of Care  Outcome: Progressing     Problem: Violence Risk or Actual  Goal: Anger and Impulse Control  Outcome: Progressing  Intervention: Minimize Safety Risk  Recent Flowsheet Documentation  Taken 4/20/2025 0200 by Carol Carranza RN  Enhanced Safety Measures:   floor mats   room near unit station  Intervention: Promote Self-Control  Recent Flowsheet Documentation  Taken 4/20/2025 0200 by Carol Carranza RN  Environmental Support:   calm environment promoted   environmental consistency promoted     Problem: Fall Injury Risk  Goal: Absence of Fall and Fall-Related Injury  Outcome: Progressing  Intervention: Identify and Manage Contributors  Recent Flowsheet " Documentation  Taken 4/20/2025 0200 by Carol Carranza, RN  Medication Review/Management: medications reviewed  Intervention: Promote Injury-Free Environment  Recent Flowsheet Documentation  Taken 4/20/2025 0200 by Carol Carranza, RN  Safety Promotion/Fall Prevention:   activity supervised   assistive device/personal items within reach   clutter free environment maintained   increased rounding and observation   increase visualization of patient   lighting adjusted   mobility aid in reach   nonskid shoes/slippers when out of bed   room door open   room near nurse's station   room organization consistent   safety round/check completed   supervised activity

## 2025-04-20 NOTE — PROGRESS NOTES
Hospitalist Medicine Progress Note   [facility]       Harpreet Mcelroy is a 69 year old gentleman with bipolar, anxiety, depression, schizophrenia, BPD, paranoia, bladder cancer s/p ureteral stent, HTN, HLD, who came to [facility] on 3/27/2025 on a RENETTA from [facility] with Agitation, attempt to harm staff with cane and was diagnosed with decompensated schizophrenia and ROBBIN with creatinine 1.42 . He was admitted by the medicine service on 3/31/2025 after he was declined by inpatient psychiatry for Psychiatry admission. Was seen by Dr Peres from Psychiatry service here who managed his medications and recommended full MI commitment with [facility].      The patient has an order for commitment from the [facility].  For that reason, discharge from the hospital to a community setting is not an option without his  approving it.  Geriatric psychiatry is not available within the system and is not being offered.  The patient cannot be returned to his previous residence as they already have stated that they do not want him back.    Disposition at this time is dependent on the court and Psychiatry.     Rough night overnight. Sedating meds given.  Lethargic today (4/20).       Date of Admission:  3/27/2025      Assessment & Plan     ROBBIN. Baseline creat about 1.0  Widely variable.  Creat up to 1.52 on 4/19. Given IVF bolus. Improved to 1.27 this am.  - Hydration as able.  - Monitor BMP.       Decompensated schizophrenia with intermittent agitation  Bipolar  Anxiety  Depression  Chronic cognitive dysfunction  -Psychiatry following.  Patient still ahving agitations. Defer adjustment of antipsychotic and psychiatric medications to psychiatry  -court proceeding resulted in guidance to commitment. Awaiting appropriate dispo.      Mild hypothermia and mild hypoglycemia, resolved  -no clear sx (shivering, tachypnea or tachycardia) and no findings on EKG or labs  -hypothermia is likely a side effect  of atypical antipsychotics (Olanzapine, Risperidone and Quetiapine all are known to cause hypothermia)  -Hypoglycemia is likely due to decreased oral intake.   -monitor sugars. Hypoglycemia protocol.  - D1Ns bolus today.    Dysarthric speech  -Patient can be challenging to understand at times     Bladder cancer   Chronic L ureteral stent  Stent last changed 2/4/25 [place] [facility] [place] Whitt.  - PTA oxybutynin and flomax    Plan:   Med regimen currently: Lamotrigine 200 mg daily, Olanzapine 20 mg at bedtime (or Haldol 5 mg IM if oral is refused), Olanzapine 7.5 mg BID (q am and lunch), Risperidone 1 mg BID.  Hydroxyzine 10 mg BID, Cogentin 2 mg at bedtime. Also has Quetiapine 25 mg TID prn, Risperidone 1 mg BID prn.    Diet: Mechanical/Dental Soft Diet    DVT Prophylaxis: Pneumatic Compression Devices  Hernandez Catheter: Not present  Code Status: Full Code         Medically Ready for Discharge: Anticipated in 5+ Days    The patient's care was discussed with  RN.    J Carlos Jay MD  Hospitalist Service  [facility]    ______________________________________________________________________    Interval History     Agitated overnight.  Sleepy this am.    Review of Systems:   Could not get any history.    Data reviewed today: I reviewed all medications, new labs and imaging results over the last 24 hours.     Physical Exam   Vital Signs: Temp: (!) 96  F (35.6  C) Temp src: Temporal BP: 90/52     Resp: 16 SpO2: 95 % O2 Device: None (Room air)    Weight: 152 lbs 15.99 oz      Unable to complete exam.    Data   Recent Labs   Lab 04/19/25  0544 04/19/25  0452 04/19/25  0124 04/18/25 2021 04/18/25  1509   WBC  --   --   --   --  5.4   HGB  --   --   --   --  11.4*   MCV  --   --   --   --  87   PLT  --   --   --   --  185   NA  --  137  --   --  138   POTASSIUM  --  4.3  --   --  4.8   CHLORIDE  --  103  --   --  102   CO2  --  24  --   --  26   BUN  --  23.5*  --   --  21.4   CR  --  1.52*  --   --   1.18*   ANIONGAP  --  10  --   --  10   STEPHY  --  9.1  --   --  9.9   GLC 92 89 86   < > 77   ALBUMIN  --   --   --   --  3.9   PROTTOTAL  --   --   --   --  7.1   BILITOTAL  --   --   --   --  <0.2   ALKPHOS  --   --   --   --  125   ALT  --   --   --   --  104*   AST  --   --   --   --  62*    < > = values in this interval not displayed.       No results found for this or any previous visit (from the past 24 hours).

## 2025-04-20 NOTE — PLAN OF CARE
"Pt has been sedated throughout shift. Does respond to pain or when moved to reposition. Is snoring, RR and O2 with WDL. No meds or oral intake giving during shift.  MD aware. BG 51. subQ glucagon and dextrose 5% with 0.9% NaCL bolus given with BG recheck of 239. Incont of B/B. Had one BM during shift. Voiding. Bladder scan for 209. On continues pulse monitor. New IV placed. Fall mats in place. Door open. Uneventful shift.     Problem: Adult Inpatient Plan of Care  Goal: Plan of Care Review  Description: The Plan of Care Review/Shift note should be completed every shift.  The Outcome Evaluation is a brief statement about your assessment that the patient is improving, declining, or no change.  This information will be displayed automatically on your shiftnote.  Outcome: Progressing  Flowsheets (Taken 4/20/2025 1756)  Overall Patient Progress: no change  Goal: Patient-Specific Goal (Individualized)  Description: You can add care plan individualizations to a care plan. Examples of Individualization might be:  \"Parent requests to be called daily at 9am for status\", \"I have a hard time hearing out of my right ear\", or \"Do not touch me to wake me up as it startlesme\".  Outcome: Progressing  Goal: Absence of Hospital-Acquired Illness or Injury  Outcome: Progressing  Intervention: Identify and Manage Fall Risk  Recent Flowsheet Documentation  Taken 4/20/2025 1053 by Ene Castillo RN  Safety Promotion/Fall Prevention:   activity supervised   clutter free environment maintained   increase visualization of patient   nonskid shoes/slippers when out of bed   room door open   room near nurse's station   safety round/check completed  Intervention: Prevent Skin Injury  Recent Flowsheet Documentation  Taken 4/20/2025 0830 by Ene Castillo RN  Body Position: supine, head elevated  Intervention: Prevent and Manage VTE (Venous Thromboembolism) Risk  Recent Flowsheet Documentation  Taken 4/20/2025 0830 by Ene Castillo RN  VTE " Prevention/Management: SCDs off (sequential compression devices)  Intervention: Prevent Infection  Recent Flowsheet Documentation  Taken 4/20/2025 1053 by Ene Castillo RN  Infection Prevention:   cohorting utilized   rest/sleep promoted  Goal: Optimal Comfort and Wellbeing  Outcome: Progressing  Goal: Readiness for Transition of Care  Outcome: Progressing     Problem: Violence Risk or Actual  Goal: Anger and Impulse Control  Outcome: Progressing  Intervention: Minimize Safety Risk  Recent Flowsheet Documentation  Taken 4/20/2025 1053 by Ene Castillo RN  Enhanced Safety Measures:   floor mats   room near unit station     Problem: Fall Injury Risk  Goal: Absence of Fall and Fall-Related Injury  Outcome: Progressing  Intervention: Identify and Manage Contributors  Recent Flowsheet Documentation  Taken 4/20/2025 1053 by Ene Castillo RN  Medication Review/Management: medications reviewed  Intervention: Promote Injury-Free Environment  Recent Flowsheet Documentation  Taken 4/20/2025 1053 by Ene Castillo RN  Safety Promotion/Fall Prevention:   activity supervised   clutter free environment maintained   increase visualization of patient   nonskid shoes/slippers when out of bed   room door open   room near nurse's station   safety round/check completed   Goal Outcome Evaluation:           Overall Patient Progress: no changeOverall Patient Progress: no change

## 2025-04-21 LAB
ANION GAP SERPL CALCULATED.3IONS-SCNC: 12 MMOL/L (ref 7–15)
BUN SERPL-MCNC: 24.8 MG/DL (ref 8–23)
CALCIUM SERPL-MCNC: 9.3 MG/DL (ref 8.8–10.4)
CHLORIDE SERPL-SCNC: 108 MMOL/L (ref 98–107)
CREAT SERPL-MCNC: 1.33 MG/DL (ref 0.67–1.17)
EGFRCR SERPLBLD CKD-EPI 2021: 58 ML/MIN/1.73M2
GLUCOSE BLDC GLUCOMTR-MCNC: 108 MG/DL (ref 70–99)
GLUCOSE BLDC GLUCOMTR-MCNC: 115 MG/DL (ref 70–99)
GLUCOSE BLDC GLUCOMTR-MCNC: 130 MG/DL (ref 70–99)
GLUCOSE BLDC GLUCOMTR-MCNC: 68 MG/DL (ref 70–99)
GLUCOSE BLDC GLUCOMTR-MCNC: 84 MG/DL (ref 70–99)
GLUCOSE BLDC GLUCOMTR-MCNC: 90 MG/DL (ref 70–99)
GLUCOSE SERPL-MCNC: 88 MG/DL (ref 70–99)
HCO3 SERPL-SCNC: 22 MMOL/L (ref 22–29)
POTASSIUM SERPL-SCNC: 3.7 MMOL/L (ref 3.4–5.3)
SODIUM SERPL-SCNC: 142 MMOL/L (ref 135–145)

## 2025-04-21 PROCEDURE — 250N000013 HC RX MED GY IP 250 OP 250 PS 637: Performed by: EMERGENCY MEDICINE

## 2025-04-21 PROCEDURE — 36415 COLL VENOUS BLD VENIPUNCTURE: CPT | Performed by: HOSPITALIST

## 2025-04-21 PROCEDURE — 250N000013 HC RX MED GY IP 250 OP 250 PS 637: Performed by: INTERNAL MEDICINE

## 2025-04-21 PROCEDURE — 80048 BASIC METABOLIC PNL TOTAL CA: CPT | Performed by: HOSPITALIST

## 2025-04-21 PROCEDURE — 258N000003 HC RX IP 258 OP 636: Performed by: INTERNAL MEDICINE

## 2025-04-21 PROCEDURE — 99232 SBSQ HOSP IP/OBS MODERATE 35: CPT | Performed by: INTERNAL MEDICINE

## 2025-04-21 PROCEDURE — 250N000011 HC RX IP 250 OP 636: Performed by: PSYCHIATRY & NEUROLOGY

## 2025-04-21 PROCEDURE — 250N000013 HC RX MED GY IP 250 OP 250 PS 637: Performed by: PSYCHIATRY & NEUROLOGY

## 2025-04-21 PROCEDURE — 258N000001 HC RX 258: Performed by: HOSPITALIST

## 2025-04-21 PROCEDURE — 99233 SBSQ HOSP IP/OBS HIGH 50: CPT | Performed by: PSYCHIATRY & NEUROLOGY

## 2025-04-21 PROCEDURE — 120N000001 HC R&B MED SURG/OB

## 2025-04-21 RX ORDER — RISPERIDONE 25 MG/2 ML
25 KIT INTRAMUSCULAR
Status: DISCONTINUED | OUTPATIENT
Start: 2025-04-23 | End: 2025-04-30 | Stop reason: HOSPADM

## 2025-04-21 RX ORDER — HALOPERIDOL 5 MG/ML
5 INJECTION INTRAMUSCULAR EVERY 8 HOURS PRN
Status: DISCONTINUED | OUTPATIENT
Start: 2025-04-21 | End: 2025-04-30 | Stop reason: HOSPADM

## 2025-04-21 RX ORDER — LORAZEPAM 2 MG/ML
1 INJECTION INTRAMUSCULAR EVERY 8 HOURS PRN
Status: DISCONTINUED | OUTPATIENT
Start: 2025-04-21 | End: 2025-04-21

## 2025-04-21 RX ORDER — OLANZAPINE 10 MG/1
10 TABLET, FILM COATED ORAL 3 TIMES DAILY
Status: DISCONTINUED | OUTPATIENT
Start: 2025-04-21 | End: 2025-04-23

## 2025-04-21 RX ORDER — RISPERIDONE 0.5 MG/1
1 TABLET, ORALLY DISINTEGRATING ORAL 3 TIMES DAILY
Status: DISCONTINUED | OUTPATIENT
Start: 2025-04-21 | End: 2025-04-28

## 2025-04-21 RX ORDER — LORAZEPAM 2 MG/ML
1 INJECTION INTRAMUSCULAR EVERY 8 HOURS PRN
Status: DISCONTINUED | OUTPATIENT
Start: 2025-04-21 | End: 2025-04-30 | Stop reason: HOSPADM

## 2025-04-21 RX ORDER — OLANZAPINE 10 MG/2ML
10 INJECTION, POWDER, FOR SOLUTION INTRAMUSCULAR 3 TIMES DAILY
Status: DISCONTINUED | OUTPATIENT
Start: 2025-04-21 | End: 2025-04-23

## 2025-04-21 RX ORDER — HALOPERIDOL 5 MG/ML
5 INJECTION INTRAMUSCULAR EVERY 8 HOURS PRN
Status: DISCONTINUED | OUTPATIENT
Start: 2025-04-21 | End: 2025-04-21

## 2025-04-21 RX ADMIN — HYDROXYZINE HYDROCHLORIDE 10 MG: 10 TABLET, FILM COATED ORAL at 20:20

## 2025-04-21 RX ADMIN — HYDROXYZINE HYDROCHLORIDE 10 MG: 10 TABLET, FILM COATED ORAL at 11:45

## 2025-04-21 RX ADMIN — DICLOFENAC SODIUM 2 G: 10 GEL TOPICAL at 20:24

## 2025-04-21 RX ADMIN — TAMSULOSIN HYDROCHLORIDE 0.4 MG: 0.4 CAPSULE ORAL at 10:26

## 2025-04-21 RX ADMIN — RISPERIDONE 1 MG: 0.5 TABLET, ORALLY DISINTEGRATING ORAL at 20:21

## 2025-04-21 RX ADMIN — RISPERIDONE 1 MG: 0.5 TABLET, ORALLY DISINTEGRATING ORAL at 10:22

## 2025-04-21 RX ADMIN — BUSPIRONE HYDROCHLORIDE 30 MG: 10 TABLET ORAL at 20:21

## 2025-04-21 RX ADMIN — LAMOTRIGINE 200 MG: 200 TABLET ORAL at 10:25

## 2025-04-21 RX ADMIN — OLANZAPINE 10 MG: 10 TABLET, FILM COATED ORAL at 13:56

## 2025-04-21 RX ADMIN — Medication 3 MG: at 21:45

## 2025-04-21 RX ADMIN — BENZTROPINE MESYLATE 2 MG: 1 TABLET ORAL at 21:46

## 2025-04-21 RX ADMIN — MINERAL OIL, WHITE PETROLATUM: .03; .94 OINTMENT OPHTHALMIC at 21:46

## 2025-04-21 RX ADMIN — LIDOCAINE 1 PATCH: 4 PATCH TOPICAL at 10:34

## 2025-04-21 RX ADMIN — BUSPIRONE HYDROCHLORIDE 30 MG: 10 TABLET ORAL at 10:27

## 2025-04-21 RX ADMIN — HALOPERIDOL LACTATE 5 MG: 5 INJECTION, SOLUTION INTRAMUSCULAR at 01:05

## 2025-04-21 RX ADMIN — DEXTROSE MONOHYDRATE: 50 INJECTION, SOLUTION INTRAVENOUS at 09:32

## 2025-04-21 RX ADMIN — RISPERIDONE 1 MG: 0.5 TABLET, ORALLY DISINTEGRATING ORAL at 13:56

## 2025-04-21 RX ADMIN — DEXTROSE MONOHYDRATE 25 ML: 25 INJECTION, SOLUTION INTRAVENOUS at 01:51

## 2025-04-21 RX ADMIN — OLANZAPINE 10 MG: 10 TABLET, FILM COATED ORAL at 20:20

## 2025-04-21 ASSESSMENT — ACTIVITIES OF DAILY LIVING (ADL)
ADLS_ACUITY_SCORE: 78
ADLS_ACUITY_SCORE: 77
ADLS_ACUITY_SCORE: 78
ADLS_ACUITY_SCORE: 77
ADLS_ACUITY_SCORE: 78
ADLS_ACUITY_SCORE: 77
ADLS_ACUITY_SCORE: 78
ADLS_ACUITY_SCORE: 77
ADLS_ACUITY_SCORE: 78
ADLS_ACUITY_SCORE: 77
ADLS_ACUITY_SCORE: 78
ADLS_ACUITY_SCORE: 77
ADLS_ACUITY_SCORE: 78
ADLS_ACUITY_SCORE: 77
ADLS_ACUITY_SCORE: 78
ADLS_ACUITY_SCORE: 77
ADLS_ACUITY_SCORE: 78
ADLS_ACUITY_SCORE: 77
ADLS_ACUITY_SCORE: 78
ADLS_ACUITY_SCORE: 77
ADLS_ACUITY_SCORE: 77

## 2025-04-21 NOTE — PROGRESS NOTES
[facility]    PROGRESS NOTE - Hospitalist Service    ASSESSMENT AND PLAN     Principal Problem:    Schizoaffective disorder, bipolar type (H)  Active Problems:    Slurred speech    Mood disorder with psychosis    Agitation    Anxiety    Hx of schizophrenia    Aggressive behavior    ROBBIN (acute kidney injury)    Harpreet Mcelroy is a 69 year old male with bipolar, anxiety, depression, schizophrenia, BPD, paranoia, bladder cancer s/p ureteral stent, HTN, HLD, who came to [facility] on 3/27/2025 on a RENETTA from [facility] with Agitation, attempt to harm staff with cane and was diagnosed with decompensated schizophrenia and ROBBIN with creatinine 1.42 . He was admitted by the medicine service on 3/31/2025 after he was declined by inpatient psychiatry for Psychiatry admission. Was seen by Dr Peres from Psychiatry service here who managed his medications and recommended full MI commitment with Blanco hearing.       The patient has an order for commitment from the [facility].  For that reason, discharge from the hospital to a community setting is not an option without his  approving it.  Geriatric psychiatry is not available within the system and is not being offered.  The patient cannot be returned to his previous residence as they already have stated that they do not want him back.     Disposition at this time is dependent on the court and Psychiatry.        Date of Admission:  3/27/2025     ROBBIN. Baseline creat about 1.0  Widely variable.  Creat up to 1.52 on 4/19. Given IVF bolus. Improved to 1.33 Hydration as able.  Encourage oral intake     Decompensated schizophrenia with intermittent agitation  Bipolar  Anxiety  Depression  Chronic cognitive dysfunction  -Psychiatry following.  Patient still ahving agitations. Defer adjustment of antipsychotic and psychiatric medications to psychiatry  -court proceeding resulted in guidance to commitment. Awaiting appropriate dispo.      Mild hypothermia  and mild hypoglycemia, resolved  -no clear sx (shivering, tachypnea or tachycardia) and no findings on EKG or labs  -hypothermia is likely a side effect of atypical antipsychotics (Olanzapine, Risperidone and Quetiapine all are known to cause hypothermia)  -Hypoglycemia is likely due to decreased oral intake.      Dysarthric speech  -Patient can be challenging to understand at times     Bladder cancer   Chronic L ureteral stent  Stent last changed 2/4/25 by [facility] urology Dr. Whitt.  - PTA oxybutynin and flomax     Barriers to discharge: Placement    Anticipated length of stay: Medically stable but needs psychiatry inpatient    Medically Ready for Discharge: Ready Now    Clinically Significant Risk Factors          # Hyperchloremia: Highest Cl = 108 mmol/L in last 2 days, will monitor as appropriate                             # Financial/Environmental Concerns: none             Subjective:  Patient resting comfortably in bed.  Opens eyes to verbal stimuli but does not answer questions.    PHYSICAL EXAM  Temp:  [97.1  F (36.2  C)-97.9  F (36.6  C)] 97.6  F (36.4  C)  Pulse:  [65-95] 65  Resp:  [12-20] 14  BP: ()/(46-54) 112/51  SpO2:  [93 %-98 %] 97 %  Wt Readings from Last 1 Encounters:   04/20/25 69.4 kg (153 lb)       Intake/Output Summary (Last 24 hours) at 4/21/2025 1416  Last data filed at 4/21/2025 1158  Gross per 24 hour   Intake 120 ml   Output --   Net 120 ml      Body mass index is 22.59 kg/m .    GENRL: Sleeping but awakens easily to voice.  Not in acute distress. Lying in bed   CHEST: Breathing easily   EXTRM: No pedal edema  NEURO: Not following commands.    PSYCH: flat affect and mood.   INTGM: No skin rash    Medical Decision Making       35 MINUTES SPENT BY ME on the date of service doing chart review, history, exam, documentation & further activities per the note.      PERTINENT LABS/IMAGING:  Results for orders placed or performed during the hospital encounter of 03/27/25   CT Head w/o  "Contrast    Impression    IMPRESSION:    1.  No evidence of acute intracranial hemorrhage or mass effect.   Abd/pelvis CT no contrast - Stone Protocol    Impression    IMPRESSION:   1.  Stable severe bilateral hydronephrosis.       Most Recent 3 CBC's:  Recent Labs   Lab Test 04/18/25  1509 03/27/25  1334 12/28/24  0549   WBC 5.4 8.5 6.0   HGB 11.4* 11.9* 10.2*   MCV 87 89 90    247 191     Most Recent 3 BMP's:  Recent Labs   Lab Test 04/21/25  0629 04/21/25  0611 04/21/25  0239 04/20/25  1513 04/20/25  1235 04/19/25  0544 04/19/25  0452     --   --   --  140  --  137   POTASSIUM 3.7  --   --   --  4.6  --  4.3   CHLORIDE 108*  --   --   --  105  --  103   CO2 22  --   --   --  22  --  24   BUN 24.8*  --   --   --  27.6*  --  23.5*   CR 1.33*  --   --   --  1.27*  --  1.52*   ANIONGAP 12  --   --   --  13  --  10   STEPHY 9.3  --   --   --  9.5  --  9.1   GLC 88 84 115*   < > 51*   < > 89    < > = values in this interval not displayed.     Most Recent 2 LFT's:  Recent Labs   Lab Test 04/18/25  1509 03/27/25  1334   AST 62* 19   * 13   ALKPHOS 125 130   BILITOTAL <0.2 0.2       Recent Labs   Lab Test 04/25/24  0746   CHOL 122   HDL 48   LDL 50   TRIG 122     Recent Labs   Lab Test 04/25/24  0746 06/01/23  1300 05/26/22  0919   LDL 50 66 74     Recent Labs   Lab Test 04/21/25  0629      POTASSIUM 3.7   CHLORIDE 108*   CO2 22   GLC 88   BUN 24.8*   CR 1.33*   GFRESTIMATED 58*   STEPHY 9.3     Recent Labs   Lab Test 04/25/24  0746 06/01/23  1300 05/26/22  0919   A1C 5.5 5.6 5.1     Recent Labs   Lab Test 04/18/25  1509 03/27/25  1334 12/28/24  0549   HGB 11.4* 11.9* 10.2*     No results for input(s): \"TROPONINI\" in the last 17333 hours.  No results for input(s): \"BNP\", \"NTBNPI\", \"NTBNP\" in the last 38478 hours.  Recent Labs   Lab Test 04/18/25  1509   TSH 3.25     Recent Labs   Lab Test 12/23/24  1034 11/28/20  1558   INR 0.96 0.99       Carol Ruff, DO  Hospitalist Service  [facility] " [facility]

## 2025-04-21 NOTE — PROGRESS NOTES
CLINICAL NUTRITION SERVICES - REASSESSMENT NOTE     Registered Dietitian Interventions:  - Diet per provider.   - Scheduled mariann moraes offerings w/ meals, if being offered a meal.   - Nutrition POC per provider teams based on GOC and discharge disposition.       INFORMATION OBTAINED  Information obtained from chart.    CURRENT NUTRITION ORDERS  Diet: Mechanical/dental soft  Snacks/Supplements: None at this time  Noted to have lactose and cucumber allergies     CURRENT INTAKE/TOLERANCE  - RD team had previously been following from chart as Harpreet was eating well, mostly % intakes acutely.   - Since last RD chart check on 4/14, PO intakes noted to have declined.    - Per flowsheets was still doing % of meals until 4/18 when PO intakes declined to 0%-bites/sips.  - Some documentation of nausea.  - Also documented to be lethargic at times or agitated (4/20: attempting to hit staff documented).  - Refer to provider and LSW notes from 4/15, discharge/disposition still being worked out.  Psych is also following.  Noted to have a Blanco.  - Checked-in w/ RN on unit who notes patient has been lethargic today and yesterday, prior to this was becoming agitated w/ staff.  Not awake/alert enough to take PO or oral medications today.     NEW FINDINGS  Weight: No edema documented.  Wt trends from PTA limited and only 2 wts acutely which do not provide guidance:  Vitals:    04/12/25 0647 04/20/25 0412   Weight: 58.4 kg (128 lb 12 oz) 69.4 kg (153 lb)     Wt Readings from Last 10 Encounters:   04/20/25 69.4 kg (153 lb)   06/06/24 74.3 kg (163 lb 12.8 oz)   07/23/23 75.8 kg (167 lb)   12/01/21 75.3 kg (166 lb)   01/26/20 61.7 kg (136 lb)   06/29/15 57.2 kg (126 lb)     Skin/wounds: WOCN previously signed off and had documented friction, IAD and MASD to buttocks.    GI symptoms: Recorded BMs 4/20 and 4/21.    Nutrition-relevant labs: Reviewed  - BG trending noted w/ hypoglycemia 4/20.    Nutrition-relevant medications:  "Reviewed  - Requiring D5 IVFs at 75 mL/hr since 4/18.    ASSESSED NUTRITION NEEDS  Dosing Weight: 69 kg, based on most recent bed scale wt  Estimated Energy Needs: >/=1725 kcals/day (>/=25 kcals/kg)  Justification: Maintenance  Estimated Protein Needs: 69-83 grams protein/day (1 - 1.2 grams of pro/kg)  Justification: Preservation of lean body mass  Estimated Fluid Needs: per MD    MALNUTRITION  % Intake: Decreased intake does not meet criteria  % Weight Loss: Unable to assess   Subcutaneous Fat Loss:  Difficult to determine via visual from doorway   Muscle Loss: Difficult to determine via visual from doorway   Fluid Accumulation/Edema: None documented  Malnutrition Diagnosis: Unable to determine due to lack of detailed NPFE and hx  Malnutrition Present on Admission: Unable to assess    EVALUATION OF THE PROGRESS TOWARD GOALS   Previous Goals  None, refer to previous RD notes.  Evaluation:  Unable to evaluate    Previous Nutrition Diagnosis  None, refer to previous RD notes.  Evaluation:  Unable to evaluate     NUTRITION DIAGNOSIS  Inadequate oral intake related to mentation, ODILON, possible nausea as evidenced by meeting <50% of nutrition needs since 4/18.    INTERVENTIONS  Collaboration by nutrition professional with other providers  Medical food supplement therapy    GOALS  Patient to consume % of nutritionally adequate meal trays TID, or the equivalent with supplements/snacks.     MONITORING/EVALUATION  Progress toward goals will be monitored and evaluated per policy.      Zandra Ambriz RDN, , LD  Clinical Dietitian  [facility] Message Group: \"Dietitian [[place]]\"  Office: [phone]  Pagers:  3rd floor/ICU: [phone]  All other floors: [phone]  Weekend/holiday: [phone]    "

## 2025-04-21 NOTE — PLAN OF CARE
Goal Outcome Evaluation:      Plan of Care Reviewed With: patient    Overall Patient Progress: no changeOverall Patient Progress: no change    Outcome Evaluation: Checked pts BS at beginning of shift- critical 32, soft BPS otherwise VSS, CC aware, hypoglycemia protcol started. Pt had previously been given glucagon subQ and D5 bolus earlier today. 25ml of dextrose 50% injection through IV given with rechecks of 107 and 88. Paged CC to voice concerns of BS falling overnight, continuous D5 75 ml/hr ordered. 0200 recheck was 68, 25 ml of dextrose injection with rechecks of 130 and 115. 0600 BS check 84.    Pt has been sleeping mostly all throughout shift, did start taking off covers and attempt to get OOB, prn IM haldol given. Incontinent of bowel and bladder, linen changed done, incontinent cares done, wound cares done. Pt did have a large and medium BM/urine incontinent episode x2. Red scrotum and pressure sores on bottom, barrier cream applied, repositioned. Bruise on L thigh. On mechanical soft diet, poor oral intake. VSS. Plan TBD.      Problem: Adult Inpatient Plan of Care  Goal: Plan of Care Review  Description: The Plan of Care Review/Shift note should be completed every shift.  The Outcome Evaluation is a brief statement about your assessment that the patient is improving, declining, or no change.  This information will be displayed automatically on your shiftnote.  4/21/2025 0211 by Carol Carranza, RN  Outcome: Progressing  Flowsheets (Taken 4/21/2025 0201)  Outcome Evaluation: Checked pts BS at beginning of shift- critical 32, soft BPS otherwise VSS, CC aware, hypoglycemia protcol started. Pt had previously been given glucagon subQ and D5 bolus earlier today. 25ml of dextrose 50% injection through IV given with rechecks of 107 and 88. Paged CC to voice concerns of BS falling overnight, continuous D5 75 ml/hr ordered. 0200 recheck was 68, 25 ml of dextrose injection with rechecks of 108 and Pt has been  "sedated mostly all throughout shift, did start taking off covers and attempt to get OOB, prn IM haldol given. Incontinent of bowel and bladder, linen changed done, incontinent cares done, wound cares done. Pt did have a large BM incontinent episode. Reddend scrotum and pressure sores on bottom, barrier cream applied, repositioned. Bruise on L thigh. On mechanical soft diet, poor oral intake. VSS. Plan TBD.  Plan of Care Reviewed With: patient  Overall Patient Progress: no change  4/21/2025 0201 by Carol Carranza, RN  Outcome: Progressing  Flowsheets  Taken 4/21/2025 0201  Outcome Evaluation: Checked pts BS at beginning of shift- critical 32, soft BPS otherwise VSS, CC aware, hypoglycemia protcol started. Pt had previously been given glucagon subQ and D5 bolus earlier today. 25ml of dextrose 50% injection through IV given with rechecks of 107 and 88. Paged CC to voice concerns of BS falling overnight, continuous D5 75 ml/hr ordered. 0200 recheck was 68, 25 ml of dextrose injection with rechecks of 108 and Pt has been sedated mostly all throughout shift, did start taking off covers and attempt to get OOB, prn IM haldol given. Incontinent of bowel and bladder, linen changed done, incontinent cares done, wound cares done. Pt did have a large BM incontinent episode. Reddend scrotum and pressure sores on bottom, barrier cream applied, repositioned. Bruise on L thigh. On mechanical soft diet, poor oral intake. VSS. Plan TBD.  Plan of Care Reviewed With: patient  Overall Patient Progress: no change  Taken 4/21/2025 0142  Outcome Evaluation: Pt is sedated.  Plan of Care Reviewed With: patient  Overall Patient Progress: no change  Goal: Patient-Specific Goal (Individualized)  Description: You can add care plan individualizations to a care plan. Examples of Individualization might be:  \"Parent requests to be called daily at 9am for status\", \"I have a hard time hearing out of my right ear\", or \"Do not touch me to wake me up " "as it startlesme\".  4/21/2025 0211 by Carol Carranza RN  Outcome: Progressing  4/21/2025 0201 by Carol Carranza RN  Outcome: Progressing  Goal: Absence of Hospital-Acquired Illness or Injury  4/21/2025 0211 by Carol Carranza RN  Outcome: Progressing  4/21/2025 0201 by Carol Carranza RN  Outcome: Progressing  Intervention: Identify and Manage Fall Risk  Recent Flowsheet Documentation  Taken 4/20/2025 2041 by Carol Carranza RN  Safety Promotion/Fall Prevention:   activity supervised   assistive device/personal items within reach   clutter free environment maintained   increased rounding and observation   increase visualization of patient   lighting adjusted   mobility aid in reach   room door open   room near nurse's station   safety round/check completed   supervised activity  Intervention: Prevent Skin Injury  Recent Flowsheet Documentation  Taken 4/20/2025 2359 by Carol Carranza RN  Body Position:   turned   right   supine, head elevated   heels elevated  Taken 4/20/2025 1900 by Carol Carranza RN  Body Position:   turned   left   supine, head elevated   heels elevated  Intervention: Prevent and Manage VTE (Venous Thromboembolism) Risk  Recent Flowsheet Documentation  Taken 4/20/2025 2041 by Carol Carranza RN  VTE Prevention/Management: SCDs off (sequential compression devices)  Intervention: Prevent Infection  Recent Flowsheet Documentation  Taken 4/20/2025 2041 by Carol Carranza RN  Infection Prevention:   rest/sleep promoted   single patient room provided   cohorting utilized   environmental surveillance performed  Goal: Optimal Comfort and Wellbeing  4/21/2025 0211 by Carol Carranza RN  Outcome: Progressing  4/21/2025 0201 by Carol Carranza RN  Outcome: Progressing  Goal: Readiness for Transition of Care  4/21/2025 0211 by Carol Carranza RN  Outcome: Progressing  4/21/2025 0201 by Carol Carranza RN  Outcome: Progressing     Problem: Violence Risk or " Actual  Goal: Anger and Impulse Control  4/21/2025 0211 by Carol Carranza RN  Outcome: Progressing  4/21/2025 0201 by Carol Carranza RN  Outcome: Progressing  Intervention: Minimize Safety Risk  Recent Flowsheet Documentation  Taken 4/20/2025 2041 by Carol Carranza RN  Enhanced Safety Measures:   floor mats   room near unit station     Problem: Fall Injury Risk  Goal: Absence of Fall and Fall-Related Injury  4/21/2025 0211 by Carol Carranza RN  Outcome: Progressing  4/21/2025 0201 by Carol Carranza RN  Outcome: Progressing  Intervention: Identify and Manage Contributors  Recent Flowsheet Documentation  Taken 4/20/2025 2041 by Carol Carranza RN  Medication Review/Management: medications reviewed  Intervention: Promote Injury-Free Environment  Recent Flowsheet Documentation  Taken 4/20/2025 2041 by Carol Carranza RN  Safety Promotion/Fall Prevention:   activity supervised   assistive device/personal items within reach   clutter free environment maintained   increased rounding and observation   increase visualization of patient   lighting adjusted   mobility aid in reach   room door open   room near nurse's station   safety round/check completed   supervised activity

## 2025-04-21 NOTE — PROGRESS NOTES
[facility]     CONSULT PSYCHIATRY  FOLLOW UP NOTE     DATE OF SERVICE   04/21/2025       IDENTIFICATION   Harpreet Mcelroy  Age: 69 year old  MRN# [id number]   YOB: 1955  Length of Stay:  21        CHIEF COMPLAINT   Agitation       SUBJECTIVE   The patient's care was discussed with primary treatment team directly and patient's electronic chart notes were reviewed.      Staff report patient  did not report any acute medical concerns or side effects. Mild hypothermia resolved and felt to be due to decreased oral intake.  IVF bolus given and monitoring glucose and Cr (improving).    Continued behavioral issues over the weekend of agitation leading to aggression to staff. Slept throughout most of night except attempt to get OOB, prn IM haldol given.  Patient did not require seclusion or restraints. Patient is exhibiting residual signs or symptoms of psychosis. Patient did not endorse suicidal ideation. Patient did not endorse homicide ideation.     Patient is not medication adherent. IM Zyprexa by Blanco available for HS order.  Patient is not on a 1:1. Patient is sleeping well. Patient is followed by nutrition. Patient is not attending to ADLs.    Care Management:  On 4/19:  Twin  unable to meet with patient due to lethargy.    Attending Interval History (4/20):  Agitated overnight.  Sleepy this am.    C/L Psychiatry last treatment plan (4/18):  4/18/2025:  Medication Changes:    NONE  Monitor:  Sedation with trial Risperidone to PTA Zyprexa.  If sedation persists, plan to slowly taper Zyprexa and continue trial Risperdal and convert to HOBBS pending verification of tolerability.  ADDENDUM (7922):   1. Initiate taper Zyprexa 25 mg at bedtime to 20 mg at bedtime and monitor need to continue taper by decreasing Zyprexa 7.5 mg bid to 5 mg bid in AM.  2. PRN Seroquel:  Discontinue as needed Seroquel to address duplicate therapy.  Continue Medications:  BLANCO (risperidone,  olanzapine, quetiapine, haloperidol):  Effective 4/11/2025.  IM backup initiated for HS Zyprexa.  Monitor for indication to progress backup.  Risperdal M Tab:  Start trial Risperdal 1 mg bid, mood/psychosis, without Blanco backup.  Once tolerability verified, plan to convert to HOBBS (Risperdal Consta) as per Blanco.  Adherent.  Continue to monitor for need of IM back up as per Blanco (Haldol IM).  Zyprexa: Continue PTA Zyprexa 7.5 mg every morning, q. noon, and 25 mg at bedtime, psychosis/mood, with IM Haldol backup to HS dose.  Monitor:  If sedation persists, initiate dose taper from Zyprexa 7.5 mg bid to 5 mg bid and 25 mg at bedtime to 20 mg at bedtime.    Lamictal: Continue optimization of PTA lamotrigine to 200 mg daily (On 3/28, PTA lamotrigine 100 mg daily increased to 150 mg daily, on 4/16 increased to current dose), mood lability.  Demonstrating tolerability to titration with partial efficacy.  Monitor:  Rash  BuSpar: Continue optimization of BuSpar 20 mg twice daily to 30 mg twice daily (On 3/28, PTA BuSpar 15 mg twice daily to 20 mg twice daily), anxiety.  Cogentin: Continue PTA Cogentin 2 mg nightly, EPSE + as needed Cogentin 1 mg BID, EPSE.  Atarax: Continue PTA Atarax 10 mg twice daily.    PRN Haldol IM: Continue as needed Haldol IM 5 mg bid, severe agitation + IM Backup to Zyprexa as per Blanco.   PRN Melatonin: Continue PTA as needed melatonin 3 mg at bedtime, sleep.  PRN Atarax:  Continue as needed Atarax 10 mg 3 times daily, anxiety, rank order #1.  PRN Risperdal ODT:  Continue as needed Risperdal ODT 1 mg bid, mild to moderate agitation.  Other:  Legal:   3/28 (0309):  Placed on a 72-hour hold.  3/28:  Filed [facility] Petition for Commitment with Blanco.   3/31:  Petition re-filed through [facility] --> Supported  4/02:  Court Hold (updated orders)  4/04:  Exam/Preliminary Hearing  4/09:  Final hearing  4/11:  DECISION (Notified 4/15):  Full MI Commitment with Francis  ([id number]  4/16:  Epic orders updated (Committed + Blanco)  TBD:  Provisional Discharge (PD) date pending.  Placement: Unit  initiated referrals after care conference. [facility] to assist with PD.  Exclusion criteria met for IP MH placement.  Precautions placed:  Routine as per Unit; Delirium; Sexual.  Care Conference:  Initiated on 4/16 to discuss complex cares.        Review of notes and/or information:  I personally reviewed notes from the patient's electronic chart since last psychiatry consult dated 4/18, most recent visit, and other interim reports. This provided me with information regarding patient's recent clinical course.     I personally reviewed the patient's chart, including available medication list and progression of hospital course.       OBJECTIVE   Upon interview, the patient is guarded on approach.  Evaluation attempted in patient's room on the unit.   Patient is not voluntary.    INTERVAL HISTORY:  Chart reviewed.  Patient seen for consult follow up after last visit on 4/18.  At last visit, monitored trial of risperidone and initiation of Blanco order to Zyprexa HS.  Due to concerns of hypothermia, initiated Zyprexa dose reduction and discontinued PRN Seroquel.    Patient seen and discussed in detail with staff.  Concerns of intermittent agitation and report of patient sedation leading to inability to complete referral to  last week.      Bedside RN reports patient agitated over the weekend and required PRN Haldol and/or Ativan.  Haldol felt to be minimally effective and Ativan 2 mg described as sedating.  Consistent with MAR review, non-compliant over the weekend with daytime scheduled medications:  Zyprexa 7.5 mg bid, Lamictal 200 mg daily, BuSpar 30 mg bid.  Monitoring side effects and VSS.  Hypothermia resolved. No EPSE reported.  Ongoing issues of poor oral intake; Cr improving from 1.52 to 1.33.    Suicidal ideation: denies current or recent suicidal ideation or  behaviors.    Homicidal ideation: denies current or recent homicidal ideation or behaviors.    Psychotic symptoms: Disorganized with intermittent paranoia and agitation.    Medication side effects reported: No significant side effects.    Acute medical concerns: none    Other issues reported by patient: Unable to obtain meaningful information directly from patient due to sedation.  Information gathered by chart review, nursing/physician/ report and patient observation.         MEDICATIONS   Medications:  Scheduled Meds:  Current Facility-Administered Medications   Medication Dose Route Frequency Provider Last Rate Last Admin    artificial tears ophthalmic ointment   Left Eye At Bedtime Mary Martinez DO   Given at 04/17/25 2243    benztropine (COGENTIN) tablet 2 mg  2 mg Oral At Bedtime Aisha Velez MD   2 mg at 04/16/25 2240    busPIRone (BUSPAR) tablet 30 mg  30 mg Oral BID Arnel Peres MD   30 mg at 04/18/25 1141    OLANZapine (zyPREXA) tablet 20 mg  20 mg Oral At Bedtime Arnel Peres MD        Or    haloperidol lactate (HALDOL) injection 5 mg  5 mg Intramuscular At Bedtime Arnel Peres MD   5 mg at 04/20/25 2215    hydrOXYzine HCl (ATARAX) tablet 10 mg  10 mg Oral BID Lisseth Harris MD   10 mg at 04/18/25 2103    lamoTRIgine (LaMICtal) tablet 200 mg  200 mg Oral Daily Arnel Peres MD   200 mg at 04/18/25 1145    Lidocaine (LIDOCARE) 4 % Patch 1 patch  1 patch Transdermal Q24H Panfilo Damon DO   1 patch at 04/18/25 1148    OLANZapine (zyPREXA) tablet 7.5 mg  7.5 mg Oral BID Arnel Peres MD   7.5 mg at 04/19/25 1156    risperiDONE (risperDAL M-TABS) ODT tab 1 mg  1 mg Oral BID Arnel Peres MD   1 mg at 04/18/25 1141    tamsulosin (FLOMAX) capsule 0.4 mg  0.4 mg Oral Daily Lisseth Harris MD   0.4 mg at 04/18/25 1142     Continuous Infusions:  Current Facility-Administered Medications   Medication Dose Route Frequency Provider  Last Rate Last Admin    dextrose 5% infusion   Intravenous Continuous Lion Oliva MD 75 mL/hr at 04/20/25 2028 New Bag at 04/20/25 2028     PRN Meds:.  Current Facility-Administered Medications   Medication Dose Route Frequency Provider Last Rate Last Admin    alum & mag hydroxide-simethicone (MAALOX) suspension 30 mL  30 mL Oral Q4H PRN Kendrick Alex MD   30 mL at 04/08/25 1424    benztropine (COGENTIN) tablet 1 mg  1 mg Oral BID PRN Arnel Peres MD        calcium carbonate (TUMS) chewable tablet 1,000 mg  1,000 mg Oral 4x Daily PRN Mary Martinez DO   1,000 mg at 04/12/25 2059    glucose gel 15-30 g  15-30 g Oral Q15 Min PRN J Carlos Jay MD        Or    dextrose 50 % injection 25-50 mL  25-50 mL Intravenous Q15 Min PRN J Carlos Jay MD   25 mL at 04/21/25 0151    Or    glucagon injection 1 mg  1 mg Subcutaneous Q15 Min PRN J Carlos Jay MD   1 mg at 04/20/25 1426    diclofenac (VOLTAREN) 1 % topical gel 2 g  2 g Topical 4x Daily PRN Panfilo Damon DO   2 g at 04/17/25 2043    haloperidol lactate (HALDOL) injection 5 mg  5 mg Intramuscular BID PRN Arnel Peres MD   5 mg at 04/21/25 0105    hydrOXYzine HCl (ATARAX) tablet 10 mg  10 mg Oral TID PRN Arnel Peres MD        ibuprofen (ADVIL/MOTRIN) tablet 400 mg  400 mg Oral Q8H PRN Kendrick Alex MD   400 mg at 04/14/25 2019    lidocaine (LMX4) cream   Topical Q1H PRN Mary Martinez DO        lidocaine 1 % 0.1-1 mL  0.1-1 mL Other Q1H PRN Mary Martinez DO        LORazepam (ATIVAN) injection 2 mg  2 mg Intramuscular Q6H PRN Jacoby Delgado MD   2 mg at 04/20/25 0213    melatonin tablet 3 mg  3 mg Oral At Bedtime PRN Arnel Peres MD        ondansetron (ZOFRAN ODT) ODT tab 4 mg  4 mg Oral Q6H PRN Mary Martinez DO   4 mg at 04/17/25 1659    Or    ondansetron (ZOFRAN) injection 4 mg  4 mg Intravenous Q6H PRN Mary Martinez DO        oxyBUTYnin (DITROPAN) half-tab 2.5 mg   2.5 mg Oral TID PRN Lisseth Harris MD        polyethylene glycol (MIRALAX) Packet 17 g  17 g Oral BID PRN Mary Martinez DO        prochlorperazine (COMPAZINE) injection 5 mg  5 mg Intravenous Q6H PRN Mary Martinez DO        Or    prochlorperazine (COMPAZINE) tablet 5 mg  5 mg Oral Q6H PRN Mary Martinez DO        risperiDONE (risperDAL M-TABS) ODT tab 1 mg  1 mg Oral BID PRN Arnel Peres MD   1 mg at 04/17/25 0321    senna-docusate (SENOKOT-S/PERICOLACE) 8.6-50 MG per tablet 1 tablet  1 tablet Oral BID PRN Mary Martinez DO        Or    senna-docusate (SENOKOT-S/PERICOLACE) 8.6-50 MG per tablet 2 tablet  2 tablet Oral BID PRN Mary Martinez DO        simethicone (MYLICON) chewable half-tab 40 mg  40 mg Oral Q6H PRN Kendrick Alex MD        sodium chloride (PF) 0.9% PF flush 3 mL  3 mL Intracatheter q1 min prn Mary Martinez DO         Medication adherence issues: MS Med Adherence Y/N: Yes, Other  Medication side effects: MEDICATION SIDE EFFECTS: Monitoring sedation, hypothermia, tremor  Benefit: Yes / No: Yes outweighs risk       ROS   The 10 point Review of Systems is negative other than noted in the HPI or here.       MENTAL STATUS EXAM   Vitals: /47 (BP Location: Right arm)   Pulse 79   Temp 97.9  F (36.6  C) (Temporal)   Resp 14   Wt 69.4 kg (153 lb)   SpO2 97%   BMI 22.59 kg/m      Weight:   152 lbs 15.99 oz    Body mass index is 22.59 kg/m .  Vitals:    04/12/25 0647 04/20/25 0412   Weight: 58.4 kg (128 lb 12 oz) 69.4 kg (153 lb)     Appearance:  Guarded  Mood:  Mood: Anxious   Affect: blunted    Suicidal Ideation: PRESENT / ABSENT: absent   Homicidal Ideation: PRESENT / ABSENT: absent   Thought process: response delay   Thought content: devoid of  suicidal ideation and violent ideation.   Fund of Knowledge: Below average  Attention/Concentration: Poor  Language ability:  Chronic dysarthria  Memory:  BAHMAN  Insight:  limited.  Judgement:  "limited  Orientation: Person:  yes, responds to name  Psychomotor Behavior: slowed    Muscle Strength and Tone: MuscleStrength: Normal  Gait and Station:  In bed       LABS   personally reviewed.   Temp: 97.9  F (36.6  C) Temp src: Temporal BP: 113/47 Pulse: 79   Resp: 14 SpO2: 97 % O2 Device: None (Room air)     Weight: 69.4 kg (153 lb)  Estimated body mass index is 22.59 kg/m  as calculated from the following:    Height as of 7/23/23: 1.753 m (5' 9\").    Weight as of this encounter: 69.4 kg (153 lb).    Recent Results (from the past 48 hours)   Basic metabolic panel    Collection Time: 04/20/25 12:35 PM   Result Value Ref Range    Sodium 140 135 - 145 mmol/L    Potassium 4.6 3.4 - 5.3 mmol/L    Chloride 105 98 - 107 mmol/L    Carbon Dioxide (CO2) 22 22 - 29 mmol/L    Anion Gap 13 7 - 15 mmol/L    Urea Nitrogen 27.6 (H) 8.0 - 23.0 mg/dL    Creatinine 1.27 (H) 0.67 - 1.17 mg/dL    GFR Estimate 62 >60 mL/min/1.73m2    Calcium 9.5 8.8 - 10.4 mg/dL    Glucose 51 (L) 70 - 99 mg/dL   Glucose by meter    Collection Time: 04/20/25  3:13 PM   Result Value Ref Range    GLUCOSE BY METER POCT 239 (H) 70 - 99 mg/dL   Glucose by meter    Collection Time: 04/20/25  7:37 PM   Result Value Ref Range    GLUCOSE BY METER POCT 32 (LL) 70 - 99 mg/dL   Glucose by meter    Collection Time: 04/20/25  8:05 PM   Result Value Ref Range    GLUCOSE BY METER POCT 107 (H) 70 - 99 mg/dL   Glucose by meter    Collection Time: 04/20/25  8:25 PM   Result Value Ref Range    GLUCOSE BY METER POCT 88 70 - 99 mg/dL   Glucose by meter    Collection Time: 04/20/25 11:23 PM   Result Value Ref Range    GLUCOSE BY METER POCT 89 70 - 99 mg/dL   Glucose by meter    Collection Time: 04/21/25  1:45 AM   Result Value Ref Range    GLUCOSE BY METER POCT 68 (L) 70 - 99 mg/dL   Glucose by meter    Collection Time: 04/21/25  2:22 AM   Result Value Ref Range    GLUCOSE BY METER POCT 130 (H) 70 - 99 mg/dL   Glucose by meter    Collection Time: 04/21/25  2:39 AM   Result " "Value Ref Range    GLUCOSE BY METER POCT 115 (H) 70 - 99 mg/dL   Glucose by meter    Collection Time: 04/21/25  6:11 AM   Result Value Ref Range    GLUCOSE BY METER POCT 84 70 - 99 mg/dL   Basic metabolic panel    Collection Time: 04/21/25  6:29 AM   Result Value Ref Range    Sodium 142 135 - 145 mmol/L    Potassium 3.7 3.4 - 5.3 mmol/L    Chloride 108 (H) 98 - 107 mmol/L    Carbon Dioxide (CO2) 22 22 - 29 mmol/L    Anion Gap 12 7 - 15 mmol/L    Urea Nitrogen 24.8 (H) 8.0 - 23.0 mg/dL    Creatinine 1.33 (H) 0.67 - 1.17 mg/dL    GFR Estimate 58 (L) >60 mL/min/1.73m2    Calcium 9.3 8.8 - 10.4 mg/dL    Glucose 88 70 - 99 mg/dL     Qtc: 473    No results found for: \"PHENYTOIN\", \"PHENOBARB\", \"VALPROATE\", \"CBMZ\"       DIAGNOSIS   Principal Problem:    Schizoaffective disorder, bipolar type (H)    Active Problem List:  Patient Active Problem List   Diagnosis    Carol (H)    Acute UTI    Other hydronephrosis    Slurred speech    Falls frequently    Mood disorder with psychosis    Agitation    Anxiety    Schizoaffective disorder, bipolar type (H)    Hx of schizophrenia    Aggressive behavior    ROBBIN (acute kidney injury)          ASSESSMENT/PLAN   Today's Changes-04/21/2025:  Medication Changes:    Zyprexa: Due to issues of adherence (Blanco backup order not placed to Zyprexa 7.5 mg bid), adjust PTA Zyprexa 7.5 mg bid + Zyprexa 20 mg at bedtime to Zyprexa 10 mg tid, psychosis + Zyprexa  IM 10 mg as per Francis.  Risperdal: Continue trial at an increase frequency of risperidone 1 mg bid to risperidone 1 mg tid, mood/psychosis with plan to convert to Risperdal Consta on 4/23 pending monitoring review by pharmacist consult.  [Risperdal Consta]:  Tentative start of HOBBS after monitoring for tolerability.  On 4/23, plan to start Risperdal Consta IM 25 mg every 2 weeks, psychosis/treatment non-adherence and future taper oral neuroleptic(s) as clinically indicated.  Melatonin: Schedule Melatonin 3 mg at bedtime, sleep.  PRN Haldol IM " + Ativan IM:  Adjust as needed Haldol 5 mg IM every 8 hours to include as needed Ativan IM 1 mg every 8 hours, severe agitation (PRN Risperdal, mild-moderate Agitation).  PRN Ativan: Discontinue as needed Ativan 2 mg every 6 hours monotherapy due to excess sedation and adjust to combination therapy at a lower as needed Ativan 1 mg every 8 hours with PRN Haldol.  Continue Medications:  BLANCO (risperidone, olanzapine, quetiapine, haloperidol):  IM backup progressed from HS Zyprexa to all doses of Zyprexa tid.    Lamictal: Continue optimization of PTA lamotrigine to 200 mg daily (On 3/28, PTA lamotrigine 100 mg daily increased to 150 mg daily, on 4/16 increased to current dose), mood lability.  Demonstrating tolerability to titration with partial efficacy.  Monitor:  Rash  BuSpar: Continue optimization of BuSpar 20 mg twice daily to 30 mg twice daily (On 3/28, PTA BuSpar 15 mg twice daily to 20 mg twice daily), anxiety.  Cogentin: Continue PTA Cogentin 2 mg nightly, EPSE + as needed Cogentin 1 mg BID, EPSE.  Atarax: Continue PTA Atarax 10 mg twice daily.    PRN Atarax:  Continue as needed Atarax 10 mg 3 times daily, anxiety, rank order #1.  PRN Risperdal ODT:  Continue as needed Risperdal ODT 1 mg bid, mild to moderate agitation.  Other:  Consult   4/21: Pharmacy:  Consult to monitor adherence Neuroleptics and Lamotrigine with plan to convert to Risperdal Consta on 4/23.  Legal:   3/28 (0309):  Placed on a 72-hour hold.  3/28:  Filed [facility] Petition for Commitment with Blanco.   3/31:  Petition re-filed through [facility] --> Supported  4/02:  Court Hold (updated orders)  4/04:  Exam/Preliminary Hearing  4/09:  Final hearing  4/11:  DECISION (Notified 4/15):  Full MI Commitment with Blanco ([id number]  4/16:  Epic orders updated (Committed + Blanco)  TBD:  Provisional Discharge (PD) date pending.  Placement: [facility] initiated referrals after care conference. [facility] to assist with PD.  Exclusion  criteria met for IP MH placement.  Precautions placed:  Routine as per Unit; Delirium; Sexual; Agitation.  Care Conference:  Initiated on 4/16 to discuss complex cares.  Please also refer to RN/team documentation for additional details.     Target psychiatric symptoms and interventions:  Education:  Risks, benefits, and alternatives discussed at length with patient.     Acute Medical Problems and Treatments:  .  Acute medical concerns:  No acute medical concerns.   Labs: Cr 1.33    Care Coordination:  Treatment plan discussed with staff.  Please reconsult Psychiatry as needed.         Risk Assessment: IP MHAC RISK ASSESSMENT: Patient on precautions    Coordination of Care:   Treatment Plan reviewed, Care discussed with Care/Treatment Team Members, Chart reviewed and Patient seen         Arnel Peres MD    -     04/21/2025  -     9:22 AM    Total encounter time:  A total of  52  minutes spent related to chart review, history and exam, documentation   and further activities as noted above    This note was created with help of Dragon dictation system. Grammatical / typing errors are not intentional.        Arnel Peres MD  Consult/Liaison Psychiatry   [facility]  Securely message with Shi

## 2025-04-21 NOTE — PLAN OF CARE
"Pt has been drowsy/lethargic for most of the day, however was able to wake up take meds and eat meals. No behaviors noted during shift. Repositioned. Had multiple soft BM during shift. Voiding. Incont of B/B. On continues IVF. Safety mats in place.  No seizure activities noted. Uneventful shift.     Problem: Adult Inpatient Plan of Care  Goal: Plan of Care Review  Description: The Plan of Care Review/Shift note should be completed every shift.  The Outcome Evaluation is a brief statement about your assessment that the patient is improving, declining, or no change.  This information will be displayed automatically on your shiftnote.  Outcome: Progressing  Flowsheets (Taken 4/21/2025 1618)  Overall Patient Progress: no change  Goal: Patient-Specific Goal (Individualized)  Description: You can add care plan individualizations to a care plan. Examples of Individualization might be:  \"Parent requests to be called daily at 9am for status\", \"I have a hard time hearing out of my right ear\", or \"Do not touch me to wake me up as it startlesme\".  Outcome: Progressing  Goal: Absence of Hospital-Acquired Illness or Injury  Outcome: Progressing  Intervention: Identify and Manage Fall Risk  Recent Flowsheet Documentation  Taken 4/21/2025 0932 by Ene Castillo RN  Safety Promotion/Fall Prevention:   activity supervised   clutter free environment maintained   nonskid shoes/slippers when out of bed   room door open   room near nurse's station   safety round/check completed  Intervention: Prevent Skin Injury  Recent Flowsheet Documentation  Taken 4/21/2025 1559 by Ene Castillo RN  Body Position:   turned   left   right   weight shifting   supine, legs elevated   legs elevated  Taken 4/21/2025 1210 by Ene Castillo RN  Body Position:   turned   right   left   weight shifting  Taken 4/21/2025 0932 by Ene Castillo RN  Body Position:   weight shifting   turned   right   left  Intervention: Prevent and Manage VTE (Venous Thromboembolism) " Risk  Recent Flowsheet Documentation  Taken 4/21/2025 0932 by Ene Castillo RN  VTE Prevention/Management: SCDs off (sequential compression devices)  Goal: Optimal Comfort and Wellbeing  Outcome: Progressing  Goal: Readiness for Transition of Care  Outcome: Progressing     Problem: Violence Risk or Actual  Goal: Anger and Impulse Control  Outcome: Progressing  Intervention: Minimize Safety Risk  Recent Flowsheet Documentation  Taken 4/21/2025 0932 by Ene Castillo RN  Enhanced Safety Measures:   floor mats   room near unit station     Problem: Fall Injury Risk  Goal: Absence of Fall and Fall-Related Injury  Outcome: Progressing  Intervention: Identify and Manage Contributors  Recent Flowsheet Documentation  Taken 4/21/2025 0932 by Ene Castillo RN  Medication Review/Management: medications reviewed  Intervention: Promote Injury-Free Environment  Recent Flowsheet Documentation  Taken 4/21/2025 0932 by Ene Castillo RN  Safety Promotion/Fall Prevention:   activity supervised   clutter free environment maintained   nonskid shoes/slippers when out of bed   room door open   room near nurse's station   safety round/check completed   Goal Outcome Evaluation:           Overall Patient Progress: no changeOverall Patient Progress: no change

## 2025-04-22 LAB
ANION GAP SERPL CALCULATED.3IONS-SCNC: 8 MMOL/L (ref 7–15)
BUN SERPL-MCNC: 22.1 MG/DL (ref 8–23)
CALCIUM SERPL-MCNC: 9.2 MG/DL (ref 8.8–10.4)
CHLORIDE SERPL-SCNC: 106 MMOL/L (ref 98–107)
CREAT SERPL-MCNC: 1.33 MG/DL (ref 0.67–1.17)
EGFRCR SERPLBLD CKD-EPI 2021: 58 ML/MIN/1.73M2
GLUCOSE BLDC GLUCOMTR-MCNC: 121 MG/DL (ref 70–99)
GLUCOSE BLDC GLUCOMTR-MCNC: 93 MG/DL (ref 70–99)
GLUCOSE BLDC GLUCOMTR-MCNC: 97 MG/DL (ref 70–99)
GLUCOSE SERPL-MCNC: 94 MG/DL (ref 70–99)
HCO3 SERPL-SCNC: 25 MMOL/L (ref 22–29)
HOLD SPECIMEN: NORMAL
POTASSIUM SERPL-SCNC: 3.7 MMOL/L (ref 3.4–5.3)
SODIUM SERPL-SCNC: 139 MMOL/L (ref 135–145)

## 2025-04-22 PROCEDURE — 250N000013 HC RX MED GY IP 250 OP 250 PS 637: Performed by: INTERNAL MEDICINE

## 2025-04-22 PROCEDURE — 80048 BASIC METABOLIC PNL TOTAL CA: CPT | Performed by: INTERNAL MEDICINE

## 2025-04-22 PROCEDURE — 120N000001 HC R&B MED SURG/OB

## 2025-04-22 PROCEDURE — 250N000013 HC RX MED GY IP 250 OP 250 PS 637: Performed by: EMERGENCY MEDICINE

## 2025-04-22 PROCEDURE — 250N000013 HC RX MED GY IP 250 OP 250 PS 637: Performed by: PSYCHIATRY & NEUROLOGY

## 2025-04-22 PROCEDURE — 99232 SBSQ HOSP IP/OBS MODERATE 35: CPT | Performed by: INTERNAL MEDICINE

## 2025-04-22 PROCEDURE — 36415 COLL VENOUS BLD VENIPUNCTURE: CPT | Performed by: INTERNAL MEDICINE

## 2025-04-22 RX ADMIN — RISPERIDONE 1 MG: 0.5 TABLET, ORALLY DISINTEGRATING ORAL at 08:30

## 2025-04-22 RX ADMIN — OLANZAPINE 10 MG: 10 TABLET, FILM COATED ORAL at 08:30

## 2025-04-22 RX ADMIN — LAMOTRIGINE 200 MG: 200 TABLET ORAL at 09:22

## 2025-04-22 RX ADMIN — RISPERIDONE 1 MG: 0.5 TABLET, ORALLY DISINTEGRATING ORAL at 20:21

## 2025-04-22 RX ADMIN — Medication 3 MG: at 21:46

## 2025-04-22 RX ADMIN — LIDOCAINE 1 PATCH: 4 PATCH TOPICAL at 08:30

## 2025-04-22 RX ADMIN — MINERAL OIL, WHITE PETROLATUM: .03; .94 OINTMENT OPHTHALMIC at 22:58

## 2025-04-22 RX ADMIN — RISPERIDONE 1 MG: 0.5 TABLET, ORALLY DISINTEGRATING ORAL at 14:33

## 2025-04-22 RX ADMIN — OLANZAPINE 10 MG: 10 TABLET, FILM COATED ORAL at 14:33

## 2025-04-22 RX ADMIN — TAMSULOSIN HYDROCHLORIDE 0.4 MG: 0.4 CAPSULE ORAL at 08:29

## 2025-04-22 RX ADMIN — HYDROXYZINE HYDROCHLORIDE 10 MG: 10 TABLET, FILM COATED ORAL at 20:20

## 2025-04-22 RX ADMIN — HYDROXYZINE HYDROCHLORIDE 10 MG: 10 TABLET, FILM COATED ORAL at 11:50

## 2025-04-22 RX ADMIN — DICLOFENAC SODIUM 2 G: 10 GEL TOPICAL at 20:26

## 2025-04-22 RX ADMIN — BUSPIRONE HYDROCHLORIDE 30 MG: 10 TABLET ORAL at 08:29

## 2025-04-22 RX ADMIN — BENZTROPINE MESYLATE 2 MG: 1 TABLET ORAL at 21:46

## 2025-04-22 RX ADMIN — BUSPIRONE HYDROCHLORIDE 30 MG: 10 TABLET ORAL at 20:21

## 2025-04-22 RX ADMIN — OLANZAPINE 10 MG: 10 TABLET, FILM COATED ORAL at 20:20

## 2025-04-22 ASSESSMENT — ACTIVITIES OF DAILY LIVING (ADL)
ADLS_ACUITY_SCORE: 77
ADLS_ACUITY_SCORE: 80
ADLS_ACUITY_SCORE: 77
ADLS_ACUITY_SCORE: 77
ADLS_ACUITY_SCORE: 80
ADLS_ACUITY_SCORE: 77
ADLS_ACUITY_SCORE: 80
ADLS_ACUITY_SCORE: 77
ADLS_ACUITY_SCORE: 80
ADLS_ACUITY_SCORE: 80
ADLS_ACUITY_SCORE: 77
ADLS_ACUITY_SCORE: 80
ADLS_ACUITY_SCORE: 77
ADLS_ACUITY_SCORE: 80
ADLS_ACUITY_SCORE: 80
ADLS_ACUITY_SCORE: 77

## 2025-04-22 NOTE — PROGRESS NOTES
[facility]    Medicine Progress Note - Hospitalist Service    Date of Admission:  3/27/2025    Assessment & Plan     Harpreet Mcelroy is a 69 year old male with bipolar, anxiety, depression, schizophrenia, BPD, paranoia, bladder cancer s/p ureteral stent, HTN, HLD, who came to [facility] on 3/27/2025 on a RENETTA from [facility] with Agitation, attempt to harm staff with cane and was diagnosed with decompensated schizophrenia and ROBBIN with creatinine 1.42.  He was admitted by the medicine service on 3/31/2025 after he was declined by inpatient psychiatry for Psychiatry admission. Was seen by Dr Peres from Psychiatry service here who managed his medications and recommended full MI commitment with [place] hearing.       The patient has an order for commitment from the [facility].  For that reason, discharge from the hospital to a community setting is not an option without his  approving it.  Geriatric psychiatry is not available within the system and is not being offered.  The patient cannot be returned to his previous residence as they already have stated that they do not want him back.     Disposition at this time is dependent on the court and Psychiatry.        Date of Admission:  3/27/2025     ROBBIN. Baseline creat about 1.0  Widely variable.  Creat up to 1.52 on 4/19. Given IVF bolus. Improved to 1.33   Hydration as able.  Encourage oral intake     4/22 - creat still 1.33 this am    Hypoglycemia    Noted to be hypoglycemic and started on IVF (D5 at 75 ml/hr) on 4/18/25  Spoke with bedside RN  Pt is more alert and eating well  Will hold on his IVF this am and monitor his blood sugars closely    Decompensated schizophrenia with intermittent agitation  Bipolar  Anxiety  Depression  Chronic cognitive dysfunction  -Psychiatry following.  Patient still ahving agitations. Defer adjustment of antipsychotic and psychiatric medications to psychiatry  -court proceeding resulted in guidance to  commitment. Awaiting appropriate dispo.     4/22/25 - psych last saw on 4/21/25 and zyprexa dose changed to 10mg po/IM tid     Mild hypoglycemia, resolved  -no clear sx (shivering, tachypnea or tachycardia) and no findings on EKG or labs  -hypothermia is likely a side effect of atypical antipsychotics (Olanzapine, Risperidone and Quetiapine all are known to cause hypothermia)    Dysarthric speech  -Patient can be challenging to understand at times     Bladder cancer   Chronic L ureteral stent  Stent last changed 2/4/25 by [facility] urology Dr. Whitt.  - PTA oxybutynin and flomax     Barriers to discharge: Placement     Anticipated length of stay: Medically stable but needs psychiatry inpatient    PPE Used:  Mask, gloves          Diet: Mechanical/Dental Soft Diet  Snacks/Supplements Adult: Josephine [id number] Standard 1.4 Oral Supplement; With Meals    DVT Prophylaxis: Enoxaparin (Lovenox) SQ  Hernandez Catheter: Not present  Lines: None     Cardiac Monitoring: None  Code Status: Full Code      Clinically Significant Risk Factors          # Hyperchloremia: Highest Cl = 108 mmol/L in last 2 days, will monitor as appropriate                             # Financial/Environmental Concerns: none         Disposition Plan     Medically Ready for Discharge: Ready Now             Mima Meeks DO  Hospitalist Service  [facility]  Securely message with [employer] (more info)  Text page via [employer] Paging/Directory   ______________________________________________________________________    Interval History     Seen with bedside RN who is assisting him in eating his meal this am.  Is able to take a spoon and slowly eat some mandarian oranges unassisted.  Did well overnight - no aggressive or violent behaviors last night.      Physical Exam   Vital Signs: Temp: 97.2  F (36.2  C) Temp src: Temporal BP: 122/60 Pulse: 72   Resp: 14 SpO2: 97 % O2 Device: None (Room air)    Weight: 152 lbs 15.99 oz    GEN:  Alert, awake,  min speech. Appears comfortable sitting up in bed being fed  HEENT:  Normocephalic/atraumatic, no scleral icterus, no nasal discharge.  adentulous  CV:  Regular rate and rhythm, no loud murmur.  S1 + S2 noted  LUNGS:  Clear to auscultation ant/lat bilaterally without rales/rhonchi/wheezing/retractions.  Symmetric chest rise on inhalation noted.  ABD:  Active bowel sounds, soft, no grimacing to light palpation throughout and not significantly distended.  No clear rebound/guarding/rigidity.  PSYCH: calm, not restless, awake and cooperating with eating his morning meal     Medical Decision Making       44 MINUTES SPENT BY ME on the date of service doing chart review, history, exam, documentation & further activities per the note.      Data   Medications   Current Facility-Administered Medications   Medication Dose Route Frequency Provider Last Rate Last Admin    dextrose 5% infusion   Intravenous Continuous Lion Oliva MD 75 mL/hr at 04/21/25 2028 Rate Verify at 04/21/25 2028     Current Facility-Administered Medications   Medication Dose Route Frequency Provider Last Rate Last Admin    artificial tears ophthalmic ointment   Left Eye At Bedtime Mary Martinez DO   Given at 04/21/25 2146    benztropine (COGENTIN) tablet 2 mg  2 mg Oral At Bedtime Aisha Velez MD   2 mg at 04/21/25 2146    busPIRone (BUSPAR) tablet 30 mg  30 mg Oral BID Arnel Peres MD   30 mg at 04/21/25 2021    hydrOXYzine HCl (ATARAX) tablet 10 mg  10 mg Oral BID Lisseth Harris MD   10 mg at 04/21/25 2020    lamoTRIgine (LaMICtal) tablet 200 mg  200 mg Oral Daily Arnel Peres MD   200 mg at 04/21/25 1025    Lidocaine (LIDOCARE) 4 % Patch 1 patch  1 patch Transdermal Q24H Panfilo Damon DO   1 patch at 04/21/25 1034    melatonin tablet 3 mg  3 mg Oral At Bedtime Arnel Peres MD        OLANZapine (zyPREXA) tablet 10 mg  10 mg Oral TID Arnel Peres MD   10 mg at 04/21/25 2020    Or     OLANZapine (zyPREXA) injection 10 mg  10 mg Intramuscular TID Arnel Peres MD        risperiDONE (risperDAL M-TABS) ODT tab 1 mg  1 mg Oral TID Arnel Peres MD   1 mg at 04/21/25 2021    [START ON 4/23/2025] risperiDONE microspheres ER (risperDAL CONSTA) injection 25 mg  25 mg Intramuscular Q14 Days Arnel Peres MD        tamsulosin (FLOMAX) capsule 0.4 mg  0.4 mg Oral Daily Lisseth Harris MD   0.4 mg at 04/21/25 1026     Labs and Imaging results below reviewed today.  Recent Labs   Lab 04/18/25  1509   WBC 5.4   HGB 11.4*   HCT 35.2*   MCV 87        Recent Labs   Lab 04/22/25  1116 04/22/25  0551 04/22/25 0333 04/21/25 1708 04/21/25 0629 04/20/25 1513 04/20/25  1235   NA  --  139  --   --  142  --  140   POTASSIUM  --  3.7  --   --  3.7  --  4.6   CHLORIDE  --  106  --   --  108*  --  105   CO2  --  25  --   --  22  --  22   ANIONGAP  --  8  --   --  12  --  13   GLC 93 94 121*   < > 88   < > 51*   BUN  --  22.1  --   --  24.8*  --  27.6*   CR  --  1.33*  --   --  1.33*  --  1.27*   GFRESTIMATED  --  58*  --   --  58*  --  62   STEPHY  --  9.2  --   --  9.3  --  9.5    < > = values in this interval not displayed.     Recent Labs   Lab 04/22/25  1116 04/22/25  0551 04/22/25  0333 04/21/25  1708 04/21/25  0629 04/20/25  1513 04/20/25  1235 04/18/25 2021 04/18/25  1509   NA  --  139  --   --  142  --  140   < > 138   POTASSIUM  --  3.7  --   --  3.7  --  4.6   < > 4.8   CHLORIDE  --  106  --   --  108*  --  105   < > 102   CO2  --  25  --   --  22  --  22   < > 26   ANIONGAP  --  8  --   --  12  --  13   < > 10   GLC 93 94 121*   < > 88   < > 51*   < > 77   BUN  --  22.1  --   --  24.8*  --  27.6*   < > 21.4   CR  --  1.33*  --   --  1.33*  --  1.27*   < > 1.18*   GFRESTIMATED  --  58*  --   --  58*  --  62   < > 67   STEPHY  --  9.2  --   --  9.3  --  9.5   < > 9.9   PROTTOTAL  --   --   --   --   --   --   --   --  7.1   ALBUMIN  --   --   --   --   --   --   --   --  3.9   BILITOTAL   "--   --   --   --   --   --   --   --  <0.2   ALKPHOS  --   --   --   --   --   --   --   --  125   AST  --   --   --   --   --   --   --   --  62*   ALT  --   --   --   --   --   --   --   --  104*    < > = values in this interval not displayed.     Recent Labs   Lab 04/18/25  1509   TSH 3.25     No results for input(s): \"COLOR\", \"APPEARANCE\", \"URINEGLC\", \"URINEBILI\", \"URINEKETONE\", \"SG\", \"UBLD\", \"URINEPH\", \"PROTEIN\", \"UROBILINOGEN\", \"NITRITE\", \"LEUKEST\", \"RBCU\", \"WBCU\" in the last 168 hours.    No results found for this or any previous visit (from the past 24 hours).    "

## 2025-04-22 NOTE — PLAN OF CARE
"No aggressive behaviors this shift. Has been calm and cooperative and using the call light to voice \"I had a bowel movement.\" Needing cares for all ADLS. Needing to be fed. Incontinent of B/B. Testicles red skin. Placed a purewick behind the testicles where patient voids.   Appears comfortable.   Watching tv.   Variance- no teeth. Having trouble with the bite sized soft diet with chewing and not swallowing safely with a cough. Received an order for speech therapy and a pureed diet for safer swallowing. Will continue to assess.   Skin is redness to right buttocks and testicles. Skin care provided all shift and repositioned every 2 hours.   Knew his name, \"hospital\" and month.  Able to answer easy yes/no questions.   Problem: Adult Inpatient Plan of Care  Goal: Plan of Care Review  Description: The Plan of Care Review/Shift note should be completed every shift.  The Outcome Evaluation is a brief statement about your assessment that the patient is improving, declining, or no change.  This information will be displayed automatically on your shiftnote.  Outcome: Progressing  Flowsheets (Taken 4/22/2025 1712)  Plan of Care Reviewed With: patient  Overall Patient Progress: improving  Goal: Patient-Specific Goal (Individualized)  Description: You can add care plan individualizations to a care plan. Examples of Individualization might be:  \"Parent requests to be called daily at 9am for status\", \"I have a hard time hearing out of my right ear\", or \"Do not touch me to wake me up as it startlesme\".  Outcome: Progressing  Goal: Absence of Hospital-Acquired Illness or Injury  Outcome: Progressing  Intervention: Identify and Manage Fall Risk  Recent Flowsheet Documentation  Taken 4/22/2025 1018 by Alexandra Colin, RN  Safety Promotion/Fall Prevention:   activity supervised   assistive device/personal items within reach   clutter free environment maintained   room door open   room near nurse's station   room organization " consistent   safety round/check completed  Intervention: Prevent Skin Injury  Recent Flowsheet Documentation  Taken 4/22/2025 1700 by Alexandra Colin RN  Body Position:   left   turned   heels elevated  Taken 4/22/2025 1400 by Alexandra Colin RN  Body Position:   turned   right   heels elevated  Taken 4/22/2025 1018 by Alexandra Colin RN  Body Position:   upper extremity elevated   heels elevated   weight shifting   turned   right  Intervention: Prevent and Manage VTE (Venous Thromboembolism) Risk  Recent Flowsheet Documentation  Taken 4/22/2025 1018 by Alexandra Colin, RN  VTE Prevention/Management: SCDs off (sequential compression devices)  Intervention: Prevent Infection  Recent Flowsheet Documentation  Taken 4/22/2025 1018 by Alexandra Colin RN  Infection Prevention: rest/sleep promoted  Goal: Optimal Comfort and Wellbeing  Outcome: Progressing  Intervention: Provide Person-Centered Care  Recent Flowsheet Documentation  Taken 4/22/2025 1018 by Alexandra Colin RN  Trust Relationship/Rapport:   care explained   choices provided  Goal: Readiness for Transition of Care  Outcome: Progressing     Problem: Violence Risk or Actual  Goal: Anger and Impulse Control  Outcome: Progressing  Intervention: Minimize Safety Risk  Recent Flowsheet Documentation  Taken 4/22/2025 1018 by Alexandra Colin RN  Sensory Stimulation Regulation:   care clustered   auditory stimulation minimized   television on  Enhanced Safety Measures:   floor mats   room near unit station  Intervention: Promote Self-Control  Recent Flowsheet Documentation  Taken 4/22/2025 1018 by Alexandra Colin, RN  Supportive Measures:   active listening utilized   positive reinforcement provided   verbalization of feelings encouraged  Environmental Support:   calm environment promoted   distractions minimized   environmental consistency promoted     Problem: Fall Injury Risk  Goal: Absence of Fall and  Fall-Related Injury  Outcome: Progressing  Intervention: Identify and Manage Contributors  Recent Flowsheet Documentation  Taken 4/22/2025 1018 by Alexandra Colin, RN  Medication Review/Management: medications reviewed  Intervention: Promote Injury-Free Environment  Recent Flowsheet Documentation  Taken 4/22/2025 1018 by Alexandra Colin, RN  Safety Promotion/Fall Prevention:   activity supervised   assistive device/personal items within reach   clutter free environment maintained   room door open   room near nurse's station   room organization consistent   safety round/check completed   Goal Outcome Evaluation:      Plan of Care Reviewed With: patient    Overall Patient Progress: improvingOverall Patient Progress: improving

## 2025-04-22 NOTE — PLAN OF CARE
"Goal Outcome Evaluation:      Plan of Care Reviewed With: patient    Overall Patient Progress: no changeOverall Patient Progress: no change    Outcome Evaluation: No aggressive or violent behaviors this shift, compliant with night time meds, repo Q2-4    Alert to self only, confused to time, place, and situation, suspicious of writer but compliant with all night time meds this shift, no aggressive or violent behaviors overnight, VSS, on RA, PIV infusing D5 @75mL/hr, Ax2, not oob this shift, repo Q2-4, heels elevated, allowed writer to rub voltaren for c/o of back pain, incontinent of b/b, barrier cream applied during incontinence cares, pt finished 100% dinner with feed assist, tolerating a mechanical soft diet, glucose monitored d/t low sugars the past few days, pt awaiting placement, will continue to monitor    Problem: Adult Inpatient Plan of Care  Goal: Plan of Care Review  Description: The Plan of Care Review/Shift note should be completed every shift.  The Outcome Evaluation is a brief statement about your assessment that the patient is improving, declining, or no change.  This information will be displayed automatically on your shiftnote.  Outcome: Progressing  Flowsheets (Taken 4/22/2025 0202)  Outcome Evaluation: No aggressive or violent behaviors this shift, compliant with night time meds, repo Q2-4  Plan of Care Reviewed With: patient  Overall Patient Progress: no change  Goal: Patient-Specific Goal (Individualized)  Description: You can add care plan individualizations to a care plan. Examples of Individualization might be:  \"Parent requests to be called daily at 9am for status\", \"I have a hard time hearing out of my right ear\", or \"Do not touch me to wake me up as it startlesme\".  Outcome: Progressing  Goal: Absence of Hospital-Acquired Illness or Injury  Outcome: Progressing  Intervention: Identify and Manage Fall Risk  Recent Flowsheet Documentation  Taken 4/21/2025 2054 by Maria Del Carmen Garcia, RN  Safety " Promotion/Fall Prevention:   activity supervised   assistive device/personal items within reach   clutter free environment maintained   room door open   room near nurse's station   room organization consistent   safety round/check completed  Intervention: Prevent Skin Injury  Recent Flowsheet Documentation  Taken 4/22/2025 0111 by Maria Del Carmen Garcia RN  Body Position:   turned   weight shifting  Taken 4/21/2025 2054 by Maria Del Carmen Gracia RN  Body Position:   upper extremity elevated   heels elevated   weight shifting  Intervention: Prevent and Manage VTE (Venous Thromboembolism) Risk  Recent Flowsheet Documentation  Taken 4/21/2025 2054 by Maria Del Carmen Garcia RN  VTE Prevention/Management: SCDs off (sequential compression devices)  Intervention: Prevent Infection  Recent Flowsheet Documentation  Taken 4/21/2025 2104 by Maria Del Carmen Garcia RN  Infection Prevention:   environmental surveillance performed   rest/sleep promoted   single patient room provided  Goal: Optimal Comfort and Wellbeing  Outcome: Progressing  Intervention: Provide Person-Centered Care  Recent Flowsheet Documentation  Taken 4/21/2025 2054 by Maria Del Carmen Garcia RN  Trust Relationship/Rapport:   care explained   choices provided  Goal: Readiness for Transition of Care  Outcome: Progressing     Problem: Violence Risk or Actual  Goal: Anger and Impulse Control  Outcome: Progressing  Intervention: Minimize Safety Risk  Recent Flowsheet Documentation  Taken 4/21/2025 2054 by Maria Del Carmen Garcia RN  Enhanced Safety Measures:   floor mats   room near unit station  Intervention: Promote Self-Control  Recent Flowsheet Documentation  Taken 4/21/2025 2054 by Maria Del Carmen Garcia RN  Supportive Measures:   active listening utilized   positive reinforcement provided   verbalization of feelings encouraged  Environmental Support:   calm environment promoted   distractions minimized   environmental consistency promoted     Problem: Fall Injury Risk  Goal: Absence of Fall and Fall-Related  Injury  Outcome: Progressing  Intervention: Identify and Manage Contributors  Recent Flowsheet Documentation  Taken 4/21/2025 2054 by Maria Del Carmen Garcia, RN  Medication Review/Management: medications reviewed  Intervention: Promote Injury-Free Environment  Recent Flowsheet Documentation  Taken 4/21/2025 2054 by Maria Del Carmen Garcia, RN  Safety Promotion/Fall Prevention:   activity supervised   assistive device/personal items within reach   clutter free environment maintained   room door open   room near nurse's station   room organization consistent   safety round/check completed

## 2025-04-23 ENCOUNTER — APPOINTMENT (OUTPATIENT)
Dept: SPEECH THERAPY | Facility: CLINIC | Age: 70
DRG: 885 | End: 2025-04-23
Attending: INTERNAL MEDICINE
Payer: COMMERCIAL

## 2025-04-23 PROCEDURE — 250N000013 HC RX MED GY IP 250 OP 250 PS 637: Performed by: EMERGENCY MEDICINE

## 2025-04-23 PROCEDURE — 250N000011 HC RX IP 250 OP 636: Mod: JZ | Performed by: PSYCHIATRY & NEUROLOGY

## 2025-04-23 PROCEDURE — 120N000001 HC R&B MED SURG/OB

## 2025-04-23 PROCEDURE — 250N000013 HC RX MED GY IP 250 OP 250 PS 637: Performed by: PSYCHIATRY & NEUROLOGY

## 2025-04-23 PROCEDURE — 99233 SBSQ HOSP IP/OBS HIGH 50: CPT | Performed by: PSYCHIATRY & NEUROLOGY

## 2025-04-23 PROCEDURE — 99418 PROLNG IP/OBS E/M EA 15 MIN: CPT | Performed by: PSYCHIATRY & NEUROLOGY

## 2025-04-23 PROCEDURE — 92610 EVALUATE SWALLOWING FUNCTION: CPT | Mod: GN

## 2025-04-23 PROCEDURE — 250N000013 HC RX MED GY IP 250 OP 250 PS 637: Performed by: INTERNAL MEDICINE

## 2025-04-23 PROCEDURE — 99232 SBSQ HOSP IP/OBS MODERATE 35: CPT | Performed by: INTERNAL MEDICINE

## 2025-04-23 RX ORDER — OLANZAPINE 10 MG/2ML
10 INJECTION, POWDER, FOR SOLUTION INTRAMUSCULAR 3 TIMES DAILY
Status: DISCONTINUED | OUTPATIENT
Start: 2025-04-24 | End: 2025-04-28

## 2025-04-23 RX ORDER — OLANZAPINE 10 MG/2ML
10 INJECTION, POWDER, FOR SOLUTION INTRAMUSCULAR 3 TIMES DAILY
Status: COMPLETED | OUTPATIENT
Start: 2025-04-23 | End: 2025-04-23

## 2025-04-23 RX ORDER — OLANZAPINE 10 MG/1
10 TABLET, FILM COATED ORAL 3 TIMES DAILY
Status: COMPLETED | OUTPATIENT
Start: 2025-04-23 | End: 2025-04-23

## 2025-04-23 RX ADMIN — BUSPIRONE HYDROCHLORIDE 30 MG: 10 TABLET ORAL at 07:40

## 2025-04-23 RX ADMIN — OLANZAPINE 10 MG: 10 TABLET, FILM COATED ORAL at 13:23

## 2025-04-23 RX ADMIN — BUSPIRONE HYDROCHLORIDE 30 MG: 10 TABLET ORAL at 20:48

## 2025-04-23 RX ADMIN — BENZTROPINE MESYLATE 2 MG: 1 TABLET ORAL at 22:08

## 2025-04-23 RX ADMIN — Medication 3 MG: at 22:08

## 2025-04-23 RX ADMIN — TAMSULOSIN HYDROCHLORIDE 0.4 MG: 0.4 CAPSULE ORAL at 07:40

## 2025-04-23 RX ADMIN — OLANZAPINE 10 MG: 10 TABLET, FILM COATED ORAL at 07:41

## 2025-04-23 RX ADMIN — MINERAL OIL, WHITE PETROLATUM: .03; .94 OINTMENT OPHTHALMIC at 22:09

## 2025-04-23 RX ADMIN — OLANZAPINE 10 MG: 10 TABLET, FILM COATED ORAL at 20:48

## 2025-04-23 RX ADMIN — HYDROXYZINE HYDROCHLORIDE 10 MG: 10 TABLET, FILM COATED ORAL at 11:45

## 2025-04-23 RX ADMIN — LIDOCAINE 1 PATCH: 4 PATCH TOPICAL at 07:41

## 2025-04-23 RX ADMIN — RISPERIDONE 25 MG: KIT at 12:56

## 2025-04-23 RX ADMIN — RISPERIDONE 1 MG: 0.5 TABLET, ORALLY DISINTEGRATING ORAL at 07:40

## 2025-04-23 RX ADMIN — IBUPROFEN 400 MG: 400 TABLET, FILM COATED ORAL at 18:49

## 2025-04-23 RX ADMIN — RISPERIDONE 1 MG: 0.5 TABLET, ORALLY DISINTEGRATING ORAL at 13:23

## 2025-04-23 RX ADMIN — RISPERIDONE 1 MG: 0.5 TABLET, ORALLY DISINTEGRATING ORAL at 20:48

## 2025-04-23 RX ADMIN — HYDROXYZINE HYDROCHLORIDE 10 MG: 10 TABLET, FILM COATED ORAL at 20:48

## 2025-04-23 RX ADMIN — LAMOTRIGINE 200 MG: 200 TABLET ORAL at 07:42

## 2025-04-23 ASSESSMENT — ACTIVITIES OF DAILY LIVING (ADL)
ADLS_ACUITY_SCORE: 76
ADLS_ACUITY_SCORE: 76
ADLS_ACUITY_SCORE: 80
ADLS_ACUITY_SCORE: 76
ADLS_ACUITY_SCORE: 80
ADLS_ACUITY_SCORE: 76
ADLS_ACUITY_SCORE: 80
ADLS_ACUITY_SCORE: 76

## 2025-04-23 NOTE — PLAN OF CARE
"No aggressive behaviors this shift. Has been calm and cooperative and using the call light to voice \"I had a bowel movement.\" Needing cares for all ADLS. Needing to be fed. Incontinent of B/B. Testicles red skin. Purewick behind the testicles where patient voids.   Appears comfortable.   Watching tv.   Ambulated in room with 2 assist, GB, walker.   Variance- speech eval. Changed diet.    Skin is redness to right buttocks and testicles. Skin care provided all shift and repositioned every 2 hours.   Knew his name, \"hospital\" and month.  Able to answer easy yes/no questions.   Problem: Adult Inpatient Plan of Care  Goal: Plan of Care Review  Description: The Plan of Care Review/Shift note should be completed every shift.  The Outcome Evaluation is a brief statement about your assessment that the patient is improving, declining, or no change.  This information will be displayed automatically on your shiftnote.  Outcome: Progressing  Flowsheets (Taken 4/23/2025 1444)  Plan of Care Reviewed With: patient  Overall Patient Progress: improving  Goal: Patient-Specific Goal (Individualized)  Description: You can add care plan individualizations to a care plan. Examples of Individualization might be:  \"Parent requests to be called daily at 9am for status\", \"I have a hard time hearing out of my right ear\", or \"Do not touch me to wake me up as it startlesme\".  Outcome: Progressing  Goal: Absence of Hospital-Acquired Illness or Injury  Outcome: Progressing  Intervention: Identify and Manage Fall Risk  Recent Flowsheet Documentation  Taken 4/23/2025 1001 by Alexandra Colin, RN  Safety Promotion/Fall Prevention:   activity supervised   assistive device/personal items within reach   clutter free environment maintained   room door open   room near nurse's station   room organization consistent   safety round/check completed  Intervention: Prevent Skin Injury  Recent Flowsheet Documentation  Taken 4/23/2025 1001 by Cristi, " Alexandra SEXTON, RN  Body Position:   upper extremity elevated   heels elevated   weight shifting   turned   right  Intervention: Prevent and Manage VTE (Venous Thromboembolism) Risk  Recent Flowsheet Documentation  Taken 4/23/2025 1001 by Alexandra Colin RN  VTE Prevention/Management: SCDs off (sequential compression devices)  Intervention: Prevent Infection  Recent Flowsheet Documentation  Taken 4/23/2025 1001 by Alexandra Colin, RN  Infection Prevention: rest/sleep promoted  Goal: Optimal Comfort and Wellbeing  Outcome: Progressing  Intervention: Provide Person-Centered Care  Recent Flowsheet Documentation  Taken 4/23/2025 1001 by Alexandra Colin RN  Trust Relationship/Rapport:   care explained   choices provided  Goal: Readiness for Transition of Care  Outcome: Progressing     Problem: Violence Risk or Actual  Goal: Anger and Impulse Control  Outcome: Progressing  Intervention: Minimize Safety Risk  Recent Flowsheet Documentation  Taken 4/23/2025 1001 by Alexandra Colin RN  Sensory Stimulation Regulation:   care clustered   auditory stimulation minimized   television on  Enhanced Safety Measures:   floor mats   room near unit station  Intervention: Promote Self-Control  Recent Flowsheet Documentation  Taken 4/23/2025 1001 by Alexandra Colin, RN  Supportive Measures:   active listening utilized   positive reinforcement provided   verbalization of feelings encouraged  Environmental Support:   calm environment promoted   distractions minimized   environmental consistency promoted     Problem: Fall Injury Risk  Goal: Absence of Fall and Fall-Related Injury  Outcome: Progressing  Intervention: Identify and Manage Contributors  Recent Flowsheet Documentation  Taken 4/23/2025 1001 by Alexandra Colin, RN  Medication Review/Management: medications reviewed  Intervention: Promote Injury-Free Environment  Recent Flowsheet Documentation  Taken 4/23/2025 1001 by Alexandra Colin  RN  Safety Promotion/Fall Prevention:   activity supervised   assistive device/personal items within reach   clutter free environment maintained   room door open   room near nurse's station   room organization consistent   safety round/check completed   Goal Outcome Evaluation:      Plan of Care Reviewed With: patient    Overall Patient Progress: improvingOverall Patient Progress: improving

## 2025-04-23 NOTE — PROGRESS NOTES
[facility]    Medicine Progress Note - Hospitalist Service    Date of Admission:  3/27/2025    Assessment & Plan     Harpreet Mcelroy is a 69 year old male with bipolar, anxiety, depression, schizophrenia, BPD, paranoia, bladder cancer s/p ureteral stent, HTN, HLD, who came to [facility] on 3/27/2025 on a RENETTA from [facility] with Agitation, attempt to harm staff with cane and was diagnosed with decompensated schizophrenia and ROBBIN with creatinine 1.42.  He was admitted by the medicine service on 3/31/2025 after he was declined by inpatient psychiatry for Psychiatry admission. Was seen by Dr Peres from Psychiatry service here who managed his medications and recommended full MI commitment with [place] hearing.       The patient has an order for commitment from the [facility].  For that reason, discharge from the hospital to a community setting is not an option without his  approving it.  Geriatric psychiatry is not available within the system and is not being offered.  The patient cannot be returned to his previous residence as they already have stated that they do not want him back.     Disposition at this time is dependent on the court and Psychiatry.        Date of Admission:  3/27/2025     ROBBIN. Baseline creat about 1.0  Widely variable.  Creat up to 1.52 on 4/19. Given IVF bolus. Improved to 1.33   Hydration as able.  Encourage oral intake     4/22 - creat still 1.33 this am    Hypoglycemia    Noted to be hypoglycemic and started on IVF (D5 at 75 ml/hr) on 4/18/25  Spoke with bedside RN  Pt is more alert and eating well  Will hold on his IVF this am and monitor his blood sugars closely    4/23 - still eating well (staff assisting in feeding).  No hypoglycemia overnight without d5IVF    Decompensated schizophrenia with intermittent agitation  Bipolar  Anxiety  Depression  Chronic cognitive dysfunction  -Psychiatry following.  Patient still ahving agitations. Defer adjustment of  antipsychotic and psychiatric medications to psychiatry  -court proceeding resulted in guidance to commitment. Awaiting appropriate dispo.     4/22/25 - psych last saw on 4/21/25 and zyprexa dose changed to 10mg po/IM tid     Mild hypoglycemia, resolved  -no clear sx (shivering, tachypnea or tachycardia) and no findings on EKG or labs  -hypothermia is likely a side effect of atypical antipsychotics (Olanzapine, Risperidone and Quetiapine all are known to cause hypothermia)    Dysarthric speech  -Patient can be challenging to understand at times     Bladder cancer   Chronic L ureteral stent  Stent last changed 2/4/25 by [facility] [place]. Whitt.  - PTA oxybutynin and flomax     Barriers to discharge: Placement     Anticipated length of stay: Medically stable but needs psychiatry inpatient    PPE Used:  Mask, gloves          Diet: Snacks/Supplements Adult: Josephine Cherry Bird Standard 1.4 Oral Supplement; With Meals  Pureed Diet (level 4) Thin Liquids (level 0)  Room Service    DVT Prophylaxis: Enoxaparin (Lovenox) SQ  Hernandez Catheter: Not present  Lines: None     Cardiac Monitoring: None  Code Status: Full Code      Clinically Significant Risk Factors                                  # Financial/Environmental Concerns: none         Disposition Plan     Medically Ready for Discharge: Ready Now             Mima Meeks DO  Hospitalist Service  [facility]  Securely message with [employer] (more info)  Text page via [employer] Paging/Directory   ______________________________________________________________________    Interval History     Seen with staff at bedside feeding him this am.  No new complaints.  Denies pain in his head, chest, abdomen when asked specifically.  No agitation noted overnight    Physical Exam   Vital Signs: Temp: 97.1  F (36.2  C) Temp src: Temporal BP: (!) 143/62 Pulse: 66   Resp: 16 SpO2: 98 % O2 Device: None (Room air)    Weight: 152 lbs 15.99 oz    GEN:  Alert, awake,slightly  more conversant this am.     Appears comfortable sitting up in bed being fed  HEENT:  Normocephalic/atraumatic, no scleral icterus, no nasal discharge.  adentulous  CV:  Regular rate and rhythm, no loud murmur.  S1 + S2 noted  LUNGS:  Clear to auscultation ant/lat bilaterally without rales/rhonchi/wheezing/retractions.  Symmetric chest rise on inhalation noted.  ABD:  Active bowel sounds, soft, no grimacing to light palpation throughout   PSYCH: calm, not restless, awake and cooperating with eating his morning meal.  More direct eye contact this am.    Medical Decision Making       40 MINUTES SPENT BY ME on the date of service doing chart review, history, exam, documentation & further activities per the note.      Data   Medications   Current Facility-Administered Medications   Medication Dose Route Frequency Provider Last Rate Last Admin     Current Facility-Administered Medications   Medication Dose Route Frequency Provider Last Rate Last Admin    artificial tears ophthalmic ointment   Left Eye At Bedtime Mary Martinez DO   Given at 04/22/25 2258    benztropine (COGENTIN) tablet 2 mg  2 mg Oral At Bedtime Aisha Velez MD   2 mg at 04/22/25 2146    busPIRone (BUSPAR) tablet 30 mg  30 mg Oral BID Arnel Peres MD   30 mg at 04/22/25 2021    hydrOXYzine HCl (ATARAX) tablet 10 mg  10 mg Oral BID Lisseth Harris MD   10 mg at 04/22/25 2020    lamoTRIgine (LaMICtal) tablet 200 mg  200 mg Oral Daily Arnel Peres MD   200 mg at 04/22/25 0922    Lidocaine (LIDOCARE) 4 % Patch 1 patch  1 patch Transdermal Q24H Panfilo Damon DO   1 patch at 04/22/25 0830    melatonin tablet 3 mg  3 mg Oral At Bedtime Arnel Peres MD   3 mg at 04/22/25 2146    OLANZapine (zyPREXA) tablet 10 mg  10 mg Oral TID Arnel Peres MD   10 mg at 04/22/25 2020    Or    OLANZapine (zyPREXA) injection 10 mg  10 mg Intramuscular TID Arnel Peres MD        risperiDONE (risperDAL M-TABS) ODT  tab 1 mg  1 mg Oral TID Arnel Peres MD   1 mg at 04/22/25 2021    risperiDONE microspheres ER (risperDAL CONSTA) injection 25 mg  25 mg Intramuscular Q14 Days Arnel Peres MD        tamsulosin (FLOMAX) capsule 0.4 mg  0.4 mg Oral Daily Lisseth Harris MD   0.4 mg at 04/22/25 0829     Labs and Imaging results below reviewed today.  Recent Labs   Lab 04/18/25  1509   WBC 5.4   HGB 11.4*   HCT 35.2*   MCV 87        Recent Labs   Lab 04/22/25 1734 04/22/25 1116 04/22/25  0551 04/21/25 1708 04/21/25 0629 04/20/25 1513 04/20/25  1235   NA  --   --  139  --  142  --  140   POTASSIUM  --   --  3.7  --  3.7  --  4.6   CHLORIDE  --   --  106  --  108*  --  105   CO2  --   --  25  -- 22  --  22   ANIONGAP  --   --  8  --  12  --  13   GLC 97 93 94   < > 88   < > 51*   BUN  --   --  22.1  --  24.8*  --  27.6*   CR  --   --  1.33*  --  1.33*  --  1.27*   GFRESTIMATED  --   --  58*  --  58*  --  62   STEPHY  --   --  9.2  --  9.3  --  9.5    < > = values in this interval not displayed.     Recent Labs   Lab 04/22/25 1734 04/22/25 1116 04/22/25  0551 04/21/25 1708 04/21/25  0629 04/20/25  1513 04/20/25  1235 04/18/25 2021 04/18/25  1509   NA  --   --  139  --  142  --  140   < > 138   POTASSIUM  --   --  3.7  --  3.7  --  4.6   < > 4.8   CHLORIDE  --   --  106  --  108*  --  105   < > 102   CO2  --   --  25  -- 22  --  22   < > 26   ANIONGAP  --   --  8  --  12  --  13   < > 10   GLC 97 93 94   < > 88   < > 51*   < > 77   BUN  --   --  22.1  --  24.8*  --  27.6*   < > 21.4   CR  --   --  1.33*  --  1.33*  --  1.27*   < > 1.18*   GFRESTIMATED  --   --  58*  --  58*  --  62   < > 67   STEPHY  --   --  9.2  --  9.3  --  9.5   < > 9.9   PROTTOTAL  --   --   --   --   --   --   --   --  7.1   ALBUMIN  --   --   --   --   --   --   --   --  3.9   BILITOTAL  --   --   --   --   --   --   --   --  <0.2   ALKPHOS  --   --   --   --   --   --   --   --  125   AST  --   --   --   --   --   --   --   --  62*   ALT   "--   --   --   --   --   --   --   --  104*    < > = values in this interval not displayed.     Recent Labs   Lab 04/18/25  1509   TSH 3.25     No results for input(s): \"COLOR\", \"APPEARANCE\", \"URINEGLC\", \"URINEBILI\", \"URINEKETONE\", \"SG\", \"UBLD\", \"URINEPH\", \"PROTEIN\", \"UROBILINOGEN\", \"NITRITE\", \"LEUKEST\", \"RBCU\", \"WBCU\" in the last 168 hours.    No results found for this or any previous visit (from the past 24 hours).    [facility]    Disposition Plan     Medically Ready for Discharge: Ready Now             Mima Meeks DO  Hospitalist Service  [facility]  Securely message with [employer] (more info)  Text page via [employer] Paging/Directory   ______________________________________________________________________    "

## 2025-04-23 NOTE — PROGRESS NOTES
"  [facility]     CONSULT PSYCHIATRY  FOLLOW UP NOTE     DATE OF SERVICE   04/23/2025       IDENTIFICATION   Harpreet Mcelroy  Age: 69 year old  MRN# [id number]   YOB: 1955  Length of Stay:  23        CHIEF COMPLAINT   \"No.\"       SUBJECTIVE   The patient's care was discussed with primary treatment team directly and patient's electronic chart notes were reviewed.      Staff report patient did not report any acute medical concerns or side effects.  Monitoring with scheduled use of dual neuroleptics (PTA Zyprexa and risperidone trial), with plans to convert onto risperidone Consta suggest tolerability.  Vitals reviewed.  No issues of hypothermia reported.     No behavioral issues over past 2 nights since last visit on 4/21 with medication adjustments and improved compliant.  Felt that presentation at last visit suggestive of decompensation progression but mitigated with efforts of medication management with staff rapport development.  No behaviors reported to include violence or aggression. Patient did not require seclusion or restraints. Patient is not exhibiting signs or symptoms of psychosis or jaki. Patient did not endorse suicidal ideation. Patient did not endorse homicide ideation.     Patient is medication adherent. Patient is not on 1:1 for safety. Patient is sleeping well. Patient is eating adequately and intake is monitored.    Attending Interval History (4/22):  Seen with bedside RN who is assisting him in eating his meal this am. Is able to take a spoon and slowly eat some mandarian oranges unassisted. Did well overnight - no aggressive or violent behaviors last night.     C/L Psychiatry last treatment plan (4/21):  4/21/2025:  Medication Changes:    Zyprexa: Due to issues of adherence (Blanco backup order not placed to Zyprexa 7.5 mg bid), adjust PTA Zyprexa 7.5 mg bid + Zyprexa 20 mg at bedtime to Zyprexa 10 mg tid, psychosis + Zyprexa  IM 10 mg as per " Lbanco.  Risperdal: Continue trial at an increase frequency of risperidone 1 mg bid to risperidone 1 mg tid, mood/psychosis with plan to convert to Risperdal Consta on 4/23 pending monitoring review by pharmacist consult.  [Risperdal Consta]:  Tentative start of HOBBS after monitoring for tolerability.  On 4/23, plan to start Risperdal Consta IM 25 mg every 2 weeks, psychosis/treatment non-adherence and future taper oral neuroleptic(s) as clinically indicated.  Melatonin: Schedule Melatonin 3 mg at bedtime, sleep.  PRN Haldol IM + Ativan IM:  Adjust as needed Haldol 5 mg IM every 8 hours to include as needed Ativan IM 1 mg every 8 hours, severe agitation (PRN Risperdal, mild-moderate Agitation).  PRN Ativan: Discontinue as needed Ativan 2 mg every 6 hours monotherapy due to excess sedation and adjust to combination therapy at a lower as needed Ativan 1 mg every 8 hours with PRN Haldol.  Continue Medications:  BLANCO (risperidone, olanzapine, quetiapine, haloperidol):  IM backup progressed from HS Zyprexa to all doses of Zyprexa tid.    Lamictal: Continue optimization of PTA lamotrigine to 200 mg daily (On 3/28, PTA lamotrigine 100 mg daily increased to 150 mg daily, on 4/16 increased to current dose), mood lability.  Demonstrating tolerability to titration with partial efficacy.  Monitor:  Rash  BuSpar: Continue optimization of BuSpar 20 mg twice daily to 30 mg twice daily (On 3/28, PTA BuSpar 15 mg twice daily to 20 mg twice daily), anxiety.  Cogentin: Continue PTA Cogentin 2 mg nightly, EPSE + as needed Cogentin 1 mg BID, EPSE.  Atarax: Continue PTA Atarax 10 mg twice daily.    PRN Atarax:  Continue as needed Atarax 10 mg 3 times daily, anxiety, rank order #1.  PRN Risperdal ODT:  Continue as needed Risperdal ODT 1 mg bid, mild to moderate agitation.  Other:  Legal:   3/28 (0309):  Placed on a 72-hour hold.  3/28:  Filed [facility] Petition for Commitment with [facility].   3/31:  Petition re-filed through  [facility] --> Supported  4/02:  Court Hold (updated orders)  4/04:  Exam/Preliminary Hearing  4/09:  Final hearing  4/11:  DECISION (Notified 4/15):  Full MI Commitment with Francis ([id number]  4/16:  Epic orders updated (Committed + Blanco)  TBD:  Provisional Discharge (PD) date pending.  Placement: [facility] initiated referrals after care conference. [facility] to assist with PD.  Exclusion criteria met for IP MH placement.  Precautions placed:  Routine as per Unit; Delirium; Sexual; Agitation.  Care Conference:  4/16:  Initial complex care conference.          Review of notes and/or information:  I personally reviewed notes from the patient's electronic chart since  most recent psychiatry consult dated 4/21, most recent visit, and other interim reports. This provided me with information regarding patient's recent clinical course.     I personally reviewed the patient's chart, including available medication list and progression of hospital course.       OBJECTIVE   Upon interview, the patient is cooperative on approach.  Visit performed in patient's room on the unit with staff present.  Consent is given to evaluate.  Patient is not voluntary.    INTERVAL HISTORY:  Chart reviewed.  Patient seen as follow up to visit on 4/21; medication optimization performed in setting of decompensation related to nonadherence over weekend leading to increasing agitation.  Medication was adjusted to improve adherence with IM Zyprexa added to morning and afternoon dose as per Francis.  Continued monitoring of risperidone trial, initiated on 4/16, with plans to convert to HOBBS once tolerability and efficacy demonstrated.    Today, patient seen directly, care conference performed with treatment team and community support, and discussed with bedside RN.  Since medication adjustments performed, patient demonstrating improved adherence and progression of target behaviors.  Specifically, no new events related to agitation and  improved adherence reported.  Residual concerns of confusion and no issues of sedation described.  Concerns of sedation felt to be related to use of as needed lorazepam, masking behaviors of exacerbation, and as needed Haldol reported as lacking efficacy.  Now, progressing with behaviors after Zyprexa and risperidone adjustments.  No reports of agitation reported and/or described as redirectable.  Accepting cares.    Patient seen at bedside RN and speech.  Cooperative and no concerns expressed.  Unlike previous evaluations, patient did not demonstrate behaviors of verbal aggression toward this provider.  Rather, indicated no concerns or questions.  Informed patient of treatment plan to continue medications as previously discussed to include HOBBS start.    Suicidal ideation: denies current or recent suicidal ideation or behaviors.    Homicidal ideation: denies current or recent homicidal ideation or behaviors.    Psychotic symptoms: No overt psychosis and not guarded on approach.     Medication side effects reported: No significant side effects reported, observed, or noted by monitoring.    Acute medical concerns: None    Other issues reported by patient:  Patient had no further questions or concerns.         MEDICATIONS   Medications:  Scheduled Meds:  Current Facility-Administered Medications   Medication Dose Route Frequency Provider Last Rate Last Admin    artificial tears ophthalmic ointment   Left Eye At Bedtime Mary Martinez,    Given at 04/22/25 2258    benztropine (COGENTIN) tablet 2 mg  2 mg Oral At Bedtime Aisha Velez MD   2 mg at 04/22/25 2146    busPIRone (BUSPAR) tablet 30 mg  30 mg Oral BID Arnel Peres MD   30 mg at 04/23/25 0740    hydrOXYzine HCl (ATARAX) tablet 10 mg  10 mg Oral BID Lisseth Harris MD   10 mg at 04/22/25 2020    lamoTRIgine (LaMICtal) tablet 200 mg  200 mg Oral Daily Arnel Peres MD   200 mg at 04/23/25 0742    Lidocaine (LIDOCARE) 4 % Patch 1  patch  1 patch Transdermal Q24H Panfilo Damon DO   1 patch at 04/23/25 0741    melatonin tablet 3 mg  3 mg Oral At Bedtime Arnel Peres MD   3 mg at 04/22/25 2146    OLANZapine (zyPREXA) tablet 10 mg  10 mg Oral TID Arnel Peres MD   10 mg at 04/23/25 0741    Or    OLANZapine (zyPREXA) injection 10 mg  10 mg Intramuscular TID Arnel Peres MD        risperiDONE (risperDAL M-TABS) ODT tab 1 mg  1 mg Oral TID Arnel Peres MD   1 mg at 04/23/25 0740    risperiDONE microspheres ER (risperDAL CONSTA) injection 25 mg  25 mg Intramuscular Q14 Days Arnel Peres MD        tamsulosin (FLOMAX) capsule 0.4 mg  0.4 mg Oral Daily Lisseth Harris MD   0.4 mg at 04/23/25 0740     Continuous Infusions:  Current Facility-Administered Medications   Medication Dose Route Frequency Provider Last Rate Last Admin     PRN Meds:.  Current Facility-Administered Medications   Medication Dose Route Frequency Provider Last Rate Last Admin    alum & mag hydroxide-simethicone (MAALOX) suspension 30 mL  30 mL Oral Q4H PRN Kendrick Alex MD   30 mL at 04/08/25 1424    benztropine (COGENTIN) tablet 1 mg  1 mg Oral BID PRN Arnel Peres MD        calcium carbonate (TUMS) chewable tablet 1,000 mg  1,000 mg Oral 4x Daily PRN Mary Martinez DO   1,000 mg at 04/12/25 2059    glucose gel 15-30 g  15-30 g Oral Q15 Min PRN J Carlos Jay MD        Or    dextrose 50 % injection 25-50 mL  25-50 mL Intravenous Q15 Min PRN J Carlos Jay MD   25 mL at 04/21/25 0151    Or    glucagon injection 1 mg  1 mg Subcutaneous Q15 Min PRN J Carlos Jay MD   1 mg at 04/20/25 1426    diclofenac (VOLTAREN) 1 % topical gel 2 g  2 g Topical 4x Daily PRN Panfilo Damon DO   2 g at 04/22/25 2026    haloperidol lactate (HALDOL) injection 5 mg  5 mg Intramuscular Q8H PRN Arnel Peres MD        And    LORazepam (ATIVAN) injection 1 mg  1 mg Intramuscular Q8H PRN Arnel Peres MD         hydrOXYzine HCl (ATARAX) tablet 10 mg  10 mg Oral TID PRN Arnel Peres MD        ibuprofen (ADVIL/MOTRIN) tablet 400 mg  400 mg Oral Q8H PRN Kendrick Alex MD   400 mg at 04/14/25 2019    lidocaine (LMX4) cream   Topical Q1H PRN Mary aMrtinez DO        lidocaine 1 % 0.1-1 mL  0.1-1 mL Other Q1H PRN Mary Martinez DO        melatonin tablet 3 mg  3 mg Oral At Bedtime PRN Arnel Peres MD   3 mg at 04/21/25 2145    ondansetron (ZOFRAN ODT) ODT tab 4 mg  4 mg Oral Q6H PRN Mary Martinez DO   4 mg at 04/17/25 1659    Or    ondansetron (ZOFRAN) injection 4 mg  4 mg Intravenous Q6H PRN Mary Martinez DO        oxyBUTYnin (DITROPAN) half-tab 2.5 mg  2.5 mg Oral TID PRN Lisseth Harris MD        polyethylene glycol (MIRALAX) Packet 17 g  17 g Oral BID PRN Mary Martinez DO        prochlorperazine (COMPAZINE) injection 5 mg  5 mg Intravenous Q6H PRN Mary Martinez DO        Or    prochlorperazine (COMPAZINE) tablet 5 mg  5 mg Oral Q6H PRN Mary Martinez DO        risperiDONE (risperDAL M-TABS) ODT tab 1 mg  1 mg Oral BID PRN Arnel Peres MD   1 mg at 04/17/25 0321    senna-docusate (SENOKOT-S/PERICOLACE) 8.6-50 MG per tablet 1 tablet  1 tablet Oral BID PRN Mary Martinez DO        Or    senna-docusate (SENOKOT-S/PERICOLACE) 8.6-50 MG per tablet 2 tablet  2 tablet Oral BID PRN Mary Martinez DO        simethicone (MYLICON) chewable half-tab 40 mg  40 mg Oral Q6H PRN Kendrick Alex MD        sodium chloride (PF) 0.9% PF flush 3 mL  3 mL Intracatheter q1 min prn Mary Martinez, DO   3 mL at 04/21/25 2026     Medication adherence issues: MS Med Adherence Y/N: No  Medication side effects: MEDICATION SIDE EFFECTS: no side effects reported  Benefit: Yes / No: Yes       ROS   The 10 point Review of Systems is negative other than noted in the HPI or here.       MENTAL STATUS EXAM   Vitals: BP (!) 143/62   Pulse 66   Temp 97.1  " F (36.2  C) (Temporal)   Resp 16   Wt 69.4 kg (153 lb)   SpO2 98%   BMI 22.59 kg/m      Weight:   152 lbs 15.99 oz    Body mass index is 22.59 kg/m .  Vitals:    04/12/25 0647 04/20/25 0412   Weight: 58.4 kg (128 lb 12 oz) 69.4 kg (153 lb)     Appearance:  Calm, cooperative  Mood:  Mood: Ambivalence  Affect: indifferent  was congruent to speech  Suicidal Ideation: PRESENT / ABSENT: absent   Homicidal Ideation: PRESENT / ABSENT: absent   Thought process: response delay   Thought content: devoid of  suicidal ideation and violent ideation.   Fund of Knowledge: Below average  Attention/Concentration: Poor  Language ability:  Chronic dysarthria  Memory:  Impaired  Insight:  limited.  Judgement: limited and adequate for safety  Orientation: Person:  yes  Psychomotor Behavior: slowed    Muscle Strength and Tone: MuscleStrength: Normal  Gait and Station:  In bed       LABS   personally reviewed.   Temp: 97.1  F (36.2  C) Temp src: Temporal BP: (!) 143/62 Pulse: 66   Resp: 16 SpO2: 98 % O2 Device: None (Room air)     Weight: 69.4 kg (153 lb)  Estimated body mass index is 22.59 kg/m  as calculated from the following:    Height as of 7/23/23: 1.753 m (5' 9\").    Weight as of this encounter: 69.4 kg (153 lb).    Recent Results (from the past 48 hours)   Glucose by meter    Collection Time: 04/21/25  5:08 PM   Result Value Ref Range    GLUCOSE BY METER POCT 90 70 - 99 mg/dL   Glucose by meter    Collection Time: 04/21/25  9:49 PM   Result Value Ref Range    GLUCOSE BY METER POCT 108 (H) 70 - 99 mg/dL   Glucose by meter    Collection Time: 04/22/25  3:33 AM   Result Value Ref Range    GLUCOSE BY METER POCT 121 (H) 70 - 99 mg/dL   Basic metabolic panel    Collection Time: 04/22/25  5:51 AM   Result Value Ref Range    Sodium 139 135 - 145 mmol/L    Potassium 3.7 3.4 - 5.3 mmol/L    Chloride 106 98 - 107 mmol/L    Carbon Dioxide (CO2) 25 22 - 29 mmol/L    Anion Gap 8 7 - 15 mmol/L    Urea Nitrogen 22.1 8.0 - 23.0 mg/dL    " "Creatinine 1.33 (H) 0.67 - 1.17 mg/dL    GFR Estimate 58 (L) >60 mL/min/1.73m2    Calcium 9.2 8.8 - 10.4 mg/dL    Glucose 94 70 - 99 mg/dL   Extra Purple Top EDTA (LAB USE ONLY)    Collection Time: 04/22/25  5:51 AM   Result Value Ref Range    Hold Specimen JIC    Glucose by meter    Collection Time: 04/22/25 11:16 AM   Result Value Ref Range    GLUCOSE BY METER POCT 93 70 - 99 mg/dL   Glucose by meter    Collection Time: 04/22/25  5:34 PM   Result Value Ref Range    GLUCOSE BY METER POCT 97 70 - 99 mg/dL     Qtc: 473    No results found for: \"PHENYTOIN\", \"PHENOBARB\", \"VALPROATE\", \"CBMZ\"       DIAGNOSIS   Principal Problem:    Schizoaffective disorder, bipolar type (H)    Active Problem List:  Patient Active Problem List   Diagnosis    Carol (H)    Acute UTI    Other hydronephrosis    Slurred speech    Falls frequently    Mood disorder with psychosis    Agitation    Anxiety    Schizoaffective disorder, bipolar type (H)    Hx of schizophrenia    Aggressive behavior    ROBBIN (acute kidney injury)          ASSESSMENT/PLAN   Today's Changes-04/23/2025:  Medication Changes:    Risperdal Consta:  Start conversion onto HOBBS, Risperdal Consta IM 25 mg every 2 weeks, psychosis/treatment non-adherence.  Initiate slow taper taper schedule Zyprexa and Risperdal and continue as clinically indicated while hospitalized and after discharge.  Risperdal: Continue trial risperidone 1 mg tid, mood/psychosis.  On 4/16, initial trial Risperidone started and demonstrating tolerability and efficacy with medication adjustments.  Plan to convert to Risperdal Consta today as planned with plan to initiate slow taper after monitoring of Zyprexa taper.  Zyprexa:  Efficacy and tolerability for management of target behaviors since 4/16 with adjustment to PTA Zyprexa to 10 mg tid and titration of Risperdal trial.  Appears to be demonstrating stabilization from dual neuroleptic therapy after risperidone trial to PTA Zyprexa.  Goal is monotherapy.  On " 4/24, decrease Zyprexa 10 mg tid to Zyprexa 7.5 mg tid + Zyprexa  IM 10 mg as per Blanco.  Continue Medications:  BLANCO (risperidone, olanzapine, quetiapine, haloperidol):  IM backup coordinated to scheduled Zyprexa tid doses  Lamictal: Continue optimized dose of PTA lamotrigine 200 mg daily, mood lability.  Improved adherence and no issues of tolerability.  BuSpar: Continue optimized dose of PTA 30 mg twice daily, anxiety.  Cogentin: Continue PTA Cogentin 2 mg nightly, EPSE + as needed Cogentin 1 mg BID, EPSE.  Atarax: Continue PTA Atarax 10 mg twice daily.    Melatonin: Schedule Melatonin 3 mg at bedtime, sleep.  PRN Atarax:  Continue as needed Atarax 10 mg 3 times daily, anxiety, rank order #1.  PRN Risperdal ODT:  Continue as needed Risperdal ODT 1 mg bid, mild to moderate agitation.  PRN Haldol IM + Ativan IM:  Continue adjusted as needed Haldol 5 mg IM every 8 hours to include as needed Ativan IM 1 mg every 8 hours, severe agitation.  No PRN relported since last visit.  PRN Ativan:  Resolved daytime sedation after discontinued as needed Ativan 2 mg every 6 hours monotherapy and started in combination at a lower dose as needed Ativan 1 mg every 8 hours with PRN Haldol.  Other:  Care conference: 4/23:  Follow up care conference to initial conference performed on 4/16.  Discussed progression of behaviors after medication adjustment with plans to convert to HOBBS after demonstrating efficacy and tolerability on monitoring, addressed barriers for placement which no longer includes previous placement.  Please also refer to RN/team documentation for additional details.     Target psychiatric symptoms and interventions:  Dual-Neuroleptic Therapy:  Demonstrating need to continue at least 2 neuroleptics to target/maintain symptoms.  Goal is to progress toward neuroleptic monotherapy.  Specifically, plans to initiate taper of PTA Zyprexa and oral Risperdal with converation onto HOBBS (Risperdal Consta) on 4/23.    Acute  Medical Problems and Treatments:  .  Acute medical concerns:  No acute medical concerns.   Consults:  Nutrition, Speech  Labs:  Cr 1.33    Care Coordination:  Treatment plan discussed directly with staff, bedside RN, Community Support.  Please reconsult Psychiatry as needed (Off service on 4/24).  Plan to follow up on 4/25.         Risk Assessment:  MHAC RISK ASSESSMENT: Patient on precautions    Coordination of Care:   Treatment Plan reviewed, Care discussed with Care/Treatment Team Members, Chart reviewed and Patient seen         Arnel Peres MD    -     04/23/2025  -     8:41 AM    Total encounter time:  A total of  86  minutes spent related to chart review, history and exam, documentation   and further activities as noted above    This note was created with help of Dragon dictation system. Grammatical / typing errors are not intentional.        Arnel Peres MD  Consult/Liaison Psychiatry   [facility]  Securely message with Shi

## 2025-04-23 NOTE — PROGRESS NOTES
Care Management Follow Up    Length of Stay (days): 23    Expected Discharge Date: 04/25/2025     Concerns to be Addressed: discharge planning       Patient plan of care discussed at interdisciplinary rounds: Yes    Anticipated Discharge Disposition: Assisted Living     Anticipated Discharge Services: Mental Health Resources    Anticipated Discharge DME: None    Patient/family educated on Medicare website which has current facility and service quality ratings: no    Education Provided on the Discharge Plan: Yes    Patient/Family in Agreement with the Plan: unable to assess    Referrals Placed by CM/SW: Post Acute Facilities    Private pay costs discussed: Not applicable    Discussed  Partnership in Safe Discharge Planning  document with patient/family: No     Handoff Completed: No, handoff not indicated or clinically appropriate    Additional Information:  A Care Conference was held for pt this morning regarding the discharge plan. Pt's CADI , Charleen and HIRAM have been trying to get a hold of someone at [facility] for termination paperwork. Charleen GANDHI, and nursing staff at [facility] have all been informed by the Assistant  at [facility] that pt will not be allowed back, but failed to provide any documentation of this decision. HIRAM and Charleen eventually connected with the , Alfie [phone] who stated that their  had given inaccurate information to our staff and no longer works for their company. HIRAM spoke with Alfie this morning and faxed clinicals (F: [phone]) for review. Their staff is reviewing and will determine if they are able to meet pt's needs, but made it clear that they will not be discharging/evicting pt due to the incident that happened prior to this hospital stay. If they can care for him medically, pt will be able to return to [facility].     Next Steps:   HIRAM will continue following until discharge and remain available      MAGO Meza  Inpatient Care Coordination   [facility]  [phone]   Benzoyl Peroxide Counseling: Patient counseled that medicine may cause skin irritation and bleach clothing.  In the event of skin irritation, the patient was advised to reduce the amount of the drug applied or use it less frequently.   The patient verbalized understanding of the proper use and possible adverse effects of benzoyl peroxide.  All of the patient's questions and concerns were addressed.

## 2025-04-23 NOTE — PLAN OF CARE
"Goal Outcome Evaluation:      Plan of Care Reviewed With: patient    Overall Patient Progress: improvingOverall Patient Progress: improving    Outcome Evaluation: No aggressive or violent behaviors this shift, compliant with all meds this shift, incontinence cares    A/Ox2, confused to situation and time, no aggressive or violent behaviors this shift, compliant with all meds, VSS, on RA, PIV SL, Ax2 W/GB, not oob this shift, voltaren gel applied to back and shoulders for c/o pain, incontinent of b/b, barrier cream applied when soiled, external cath in place, tolerating a pureed (level 4) diet, awaiting placement, will continue to monitor    Problem: Adult Inpatient Plan of Care  Goal: Plan of Care Review  Description: The Plan of Care Review/Shift note should be completed every shift.  The Outcome Evaluation is a brief statement about your assessment that the patient is improving, declining, or no change.  This information will be displayed automatically on your shiftnote.  Outcome: Progressing  Flowsheets (Taken 4/23/2025 0027)  Outcome Evaluation: No aggressive or violent behaviors this shift, compliant with all meds this shift, incontinence cares  Plan of Care Reviewed With: patient  Overall Patient Progress: improving  Goal: Patient-Specific Goal (Individualized)  Description: You can add care plan individualizations to a care plan. Examples of Individualization might be:  \"Parent requests to be called daily at 9am for status\", \"I have a hard time hearing out of my right ear\", or \"Do not touch me to wake me up as it startlesme\".  Outcome: Progressing  Goal: Absence of Hospital-Acquired Illness or Injury  Outcome: Progressing  Intervention: Identify and Manage Fall Risk  Recent Flowsheet Documentation  Taken 4/22/2025 2020 by Maria Del Carmen Garcia, RN  Safety Promotion/Fall Prevention:   activity supervised   assistive device/personal items within reach   clutter free environment maintained   room near nurse's station   " room organization consistent   room door open   safety round/check completed  Intervention: Prevent Skin Injury  Recent Flowsheet Documentation  Taken 4/22/2025 2020 by Maria Del Carmen Garcia RN  Body Position:   log-rolled   weight shifting   heels elevated  Intervention: Prevent and Manage VTE (Venous Thromboembolism) Risk  Recent Flowsheet Documentation  Taken 4/22/2025 2020 by Maria Del Carmen Garcia RN  VTE Prevention/Management: SCDs off (sequential compression devices)  Intervention: Prevent Infection  Recent Flowsheet Documentation  Taken 4/22/2025 2020 by Maria Del Carmen Garcia RN  Infection Prevention:   environmental surveillance performed   rest/sleep promoted   single patient room provided  Goal: Optimal Comfort and Wellbeing  Outcome: Progressing  Intervention: Monitor Pain and Promote Comfort  Recent Flowsheet Documentation  Taken 4/22/2025 2020 by Maria Del Carmen Garcia RN  Pain Management Interventions:   medication (see MAR)   care clustered   quiet environment facilitated  Intervention: Provide Person-Centered Care  Recent Flowsheet Documentation  Taken 4/22/2025 2020 by Maria Del Carmen Garcia RN  Trust Relationship/Rapport:   care explained   choices provided  Goal: Readiness for Transition of Care  Outcome: Progressing     Problem: Violence Risk or Actual  Goal: Anger and Impulse Control  Outcome: Progressing  Intervention: Minimize Safety Risk  Recent Flowsheet Documentation  Taken 4/22/2025 2020 by Maria Del Carmen Garcia RN  Enhanced Safety Measures:   pain management   room near unit station     Problem: Fall Injury Risk  Goal: Absence of Fall and Fall-Related Injury  Outcome: Progressing  Intervention: Identify and Manage Contributors  Recent Flowsheet Documentation  Taken 4/22/2025 2020 by Maria Del Carmen Garcia RN  Medication Review/Management: medications reviewed  Intervention: Promote Injury-Free Environment  Recent Flowsheet Documentation  Taken 4/22/2025 2020 by Maria Del Carmen Garcia RN  Safety Promotion/Fall Prevention:   activity  supervised   assistive device/personal items within reach   clutter free environment maintained   room near nurse's station   room organization consistent   room door open   safety round/check completed

## 2025-04-23 NOTE — PROGRESS NOTES
"   04/23/25 1001   Appointment Info   Signing Clinician's Name / Credentials (SLP) Daria Call M.A. CCC-SLP   General Information   Onset of Illness/Injury or Date of Surgery 03/27/25   Referring Physician Mima Meeks,    Patient/Family Therapy Goal Statement (SLP) did not state a goal   Pertinent History of Current Problem Harpreet Mcelroy is a 69 year old male with bipolar, anxiety, depression, schizophrenia, BPD, paranoia, bladder cancer s/p ureteral stent, HTN, HLD, [facility] on a RENETTA from [facility] with Agitation, attempt to harm staff with cane and was diagnosed with decompensated schizophrenia and ROBBIN with creatinine 1.42.  He was admitted by the medicine service on 3/31/2025 after he was declined by inpatient psychiatry for Psychiatry admission. Was seen by Dr Peres from Psychiatry service here who managed his medications and recommended full MI commitment with [facility] hearing.       The patient has an order for commitment from the [facility] to [facility].  For that reason, discharge from the hospital to a community setting is not an option without his  approving it.  Geriatric psychiatry is not available within the system and is not being offered.  The patient cannot be returned to his previous residence as they already have stated that they do not want him back.     Disposition at this time is dependent on the court and Psychiatry. Pt was previously on a soft and bite sized diet, staff report coughing/choking with these textures so the patient was downgraded to puree textures & SLP was consulted.   General Observations only responding with \"yes and no\" but alert, cooperative   Type of Evaluation   Type of Evaluation Swallow Evaluation   Oral Motor   Oral Musculature unable to assess due to poor participation/comprehension   Dentition (Oral Motor)   Dentition (Oral Motor) edentulous   Facial Symmetry (Oral Motor)   Facial Symmetry (Oral Motor) WNL   Lip Function " (Oral Motor)   Lip Range of Motion (Oral Motor) unable/difficult to assess   Tongue Function (Oral Motor)   Tongue ROM (Oral Motor) unable/difficult to assess   Jaw Function (Oral Motor)   Jaw Function (Oral Motor) unable/difficult to assess   Cough/Swallow/Gag Reflex (Oral Motor)   Soft Palate/Velum (Oral Motor) unable/difficult to assess   Volitional Throat Clear/Cough (Oral Motor) unable/difficult to assess   Vocal Quality/Secretion Management (Oral Motor)   Vocal Quality (Oral Motor)   (appears somewhat hoarse, difficult to assess fully with minimal verbalizations)   General Swallowing Observations   Past History of Dysphagia not known, was previously on a soft & bite sized diet when hospitalized, but baseline diet is not known   Respiratory Support room air   Current Diet/Method of Nutritional Intake (General Swallowing Observations, NIS) thin liquids (level 0);pureed (dysphagia pureed) (level 4)   Swallowing Evaluation Clinical swallow evaluation   Clinical Swallow Evaluation   Feeding Assistance dependent   Clinical Swallow Evaluation Textures Trialed thin liquids;pureed;minced & moist   Clinical Swallow Eval: Thin Liquid Texture Trial   Mode of Presentation, Thin Liquids spoon;cup;straw;self-fed   Volume of Liquid or Food Presented 6 oz thin   Oral Phase of Swallow WFL   Pharyngeal Phase of Swallow throat clearing   Diagnostic Statement throat clear by straw (RN confirms cough with straw use); no overt signs or symptoms of aspiration with thin by spoon or cup   Clinical Swallow Evaluation: Puree Solid Texture Trial   Mode of Presentation, Puree spoon   Volume of Puree Presented 8 oz puree fed by SLP (applesauce)   Oral Phase, Puree WFL   Pharyngeal Phase, Puree intact   Diagnostic Statement no overt signs or symptoms of aspiration   Clinical Swallow Eval: Minced & Moist   Mode of Presentation fed by clinician   Volume Presented catherine cracker ground up in applesauce   Oral Phase impaired mastication    Pharyngeal Phase intact   Diagnostic Statement slow but functional mastication, no overt signs or symptoms of aspiration. RN reports coughing/choking episodes on soft/bite sized texture   Esophageal Phase of Swallow   Patient reports or presents with symptoms of esophageal dysphagia No   Swallowing Recommendations   Diet Consistency Recommendations thin liquids (level 0);minced & moist (level 5)   Supervision Level for Intake 1:1 supervision needed   Mode of Delivery Recommendations bolus size, small;no straws;slow rate of intake   Swallowing Maneuver Recommendations alternate food and liquid intake   Monitoring/Assistance Required (Eating/Swallowing) stop eating activities when fatigue is present;monitor for cough or change in vocal quality with intake   Recommended Feeding/Eating Techniques (Swallow Eval) maintain upright posture during/after eating for 30 minutes;maintain upright sitting position for eating;minimize distractions during oral intake   Medication Administration Recommendations, Swallowing (SLP) crushed in [place]   Instrumental Assessment Recommendations instrumental evaluation not recommended at this time   General Therapy Interventions   Planned Therapy Interventions Dysphagia Treatment   Dysphagia treatment Instruction of safe swallow strategies   Clinical Impression   Criteria for Skilled Therapeutic Interventions Met (SLP Eval) Yes, treatment indicated   SLP Diagnosis oropharyngeal dysphagia   Risks & Benefits of therapy have been explained evaluation/treatment results reviewed;care plan/treatment goals reviewed;current/potential barriers reviewed;participants voiced agreement with care plan;participants included;patient   Clinical Impression Comments Clinical dysphagia eval completed per MD order. The patient presents with mild-moderate oropharyngeal dysphagia impacted by edentulous status and AMS. Recommend a modified diet for safety: minced & moist textures (IDDSI 5) and thin liquids (0). No  straws. One small bite/sip at a time when upright & alert. Rec medication be served crushed in puree.   SLP Total Evaluation Time   Eval: oral/pharyngeal swallow function, clinical swallow Minutes (64599) 20   SLP Goals   Therapy Frequency (SLP Eval) 5 times/week   SLP Predicted Duration/Target Date for Goal Attainment 04/29/25   SLP Goals Swallow   SLP: Safely tolerate diet without signs/symptoms of aspiration Minced & moist diet;Thin liquids;With use of swallow precautions;With assistance/supervision   Interventions   Interventions Quick Adds Swallowing Dysfunction   SLP Discharge Planning   SLP Plan diet tolerance, consider candidacy for upgrade   SLP Discharge Recommendation home with assist   SLP Rationale for DC Rec benefits from SLP to ensure safe tolerance of least restrictive diet; pt is edentulous, unsure of baseline diet level   SLP Brief overview of current status  Recommend a modified diet for safety: minced & moist textures (IDDSI 5) and thin liquids (0). No straws. One small bite/sip at a time when upright & alert. Rec medication be served crushed in puree.   SLP Time and Intention   Total Session Time (sum of timed and untimed services) 20   Psychosocial Support   Trust Relationship/Rapport care explained;choices provided

## 2025-04-24 ENCOUNTER — APPOINTMENT (OUTPATIENT)
Dept: SPEECH THERAPY | Facility: CLINIC | Age: 70
DRG: 885 | End: 2025-04-24
Payer: COMMERCIAL

## 2025-04-24 ENCOUNTER — APPOINTMENT (OUTPATIENT)
Dept: PHYSICAL THERAPY | Facility: CLINIC | Age: 70
DRG: 885 | End: 2025-04-24
Attending: INTERNAL MEDICINE
Payer: COMMERCIAL

## 2025-04-24 LAB
BACTERIA BLD CULT: NO GROWTH
BACTERIA BLD CULT: NO GROWTH

## 2025-04-24 PROCEDURE — 99232 SBSQ HOSP IP/OBS MODERATE 35: CPT | Performed by: INTERNAL MEDICINE

## 2025-04-24 PROCEDURE — 250N000013 HC RX MED GY IP 250 OP 250 PS 637: Performed by: EMERGENCY MEDICINE

## 2025-04-24 PROCEDURE — 120N000001 HC R&B MED SURG/OB

## 2025-04-24 PROCEDURE — 250N000013 HC RX MED GY IP 250 OP 250 PS 637: Performed by: PSYCHIATRY & NEUROLOGY

## 2025-04-24 PROCEDURE — 250N000013 HC RX MED GY IP 250 OP 250 PS 637: Performed by: INTERNAL MEDICINE

## 2025-04-24 PROCEDURE — 92526 ORAL FUNCTION THERAPY: CPT | Mod: GN

## 2025-04-24 PROCEDURE — 97161 PT EVAL LOW COMPLEX 20 MIN: CPT | Mod: GP | Performed by: PHYSICAL THERAPIST

## 2025-04-24 RX ADMIN — MINERAL OIL, WHITE PETROLATUM: .03; .94 OINTMENT OPHTHALMIC at 21:44

## 2025-04-24 RX ADMIN — OLANZAPINE 7.5 MG: 5 TABLET, FILM COATED ORAL at 13:49

## 2025-04-24 RX ADMIN — RISPERIDONE 1 MG: 0.5 TABLET, ORALLY DISINTEGRATING ORAL at 08:17

## 2025-04-24 RX ADMIN — HYDROXYZINE HYDROCHLORIDE 10 MG: 10 TABLET, FILM COATED ORAL at 11:45

## 2025-04-24 RX ADMIN — TAMSULOSIN HYDROCHLORIDE 0.4 MG: 0.4 CAPSULE ORAL at 08:17

## 2025-04-24 RX ADMIN — Medication 3 MG: at 21:43

## 2025-04-24 RX ADMIN — RISPERIDONE 1 MG: 0.5 TABLET, ORALLY DISINTEGRATING ORAL at 13:49

## 2025-04-24 RX ADMIN — BUSPIRONE HYDROCHLORIDE 30 MG: 10 TABLET ORAL at 21:35

## 2025-04-24 RX ADMIN — LIDOCAINE 1 PATCH: 4 PATCH TOPICAL at 08:17

## 2025-04-24 RX ADMIN — BUSPIRONE HYDROCHLORIDE 30 MG: 10 TABLET ORAL at 08:16

## 2025-04-24 RX ADMIN — LAMOTRIGINE 200 MG: 200 TABLET ORAL at 08:18

## 2025-04-24 RX ADMIN — OLANZAPINE 7.5 MG: 5 TABLET, FILM COATED ORAL at 08:16

## 2025-04-24 RX ADMIN — HYDROXYZINE HYDROCHLORIDE 10 MG: 10 TABLET, FILM COATED ORAL at 21:33

## 2025-04-24 RX ADMIN — RISPERIDONE 1 MG: 0.5 TABLET, ORALLY DISINTEGRATING ORAL at 21:35

## 2025-04-24 RX ADMIN — OLANZAPINE 7.5 MG: 5 TABLET, FILM COATED ORAL at 21:33

## 2025-04-24 RX ADMIN — BENZTROPINE MESYLATE 2 MG: 1 TABLET ORAL at 21:43

## 2025-04-24 ASSESSMENT — ACTIVITIES OF DAILY LIVING (ADL)
ADLS_ACUITY_SCORE: 74
ADLS_ACUITY_SCORE: 74
ADLS_ACUITY_SCORE: 76
ADLS_ACUITY_SCORE: 74
ADLS_ACUITY_SCORE: 74
ADLS_ACUITY_SCORE: 76
ADLS_ACUITY_SCORE: 74
ADLS_ACUITY_SCORE: 76
ADLS_ACUITY_SCORE: 74
ADLS_ACUITY_SCORE: 76
ADLS_ACUITY_SCORE: 73
ADLS_ACUITY_SCORE: 76
ADLS_ACUITY_SCORE: 76
ADLS_ACUITY_SCORE: 74
ADLS_ACUITY_SCORE: 76
ADLS_ACUITY_SCORE: 74
ADLS_ACUITY_SCORE: 76
ADLS_ACUITY_SCORE: 74
ADLS_ACUITY_SCORE: 74

## 2025-04-24 NOTE — PROGRESS NOTES
04/24/25 1312   Appointment Info   Signing Clinician's Name / Credentials (PT) Kadeem Madden DPT   Living Environment   Living Environment Comments Pt lives in [facility], per review of previous admission pt was requiring Ax1 at that time with FWW and [facility] was able to provide this support. Had previously used cane for mobility   Self-Care   Usual Activity Tolerance moderate   Current Activity Tolerance fair   Equipment Currently Used at Home cane, straight;walker, standard   Fall history within last six months   (unknown)   General Information   Onset of Illness/Injury or Date of Surgery 03/27/25   Referring Physician Kenyon De Dios, DO   Patient/Family Therapy Goals Statement (PT) None stated   Pertinent History of Current Problem (include personal factors and/or comorbidities that impact the POC) Per review,  is a 69 year old male with bipolar, anxiety, depression, schizophrenia, BPD, paranoia, bladder cancer s/p ureteral stent, HTN, HLD, who came [facility] on 3/27/2025 on a RENETTA from [facility] with Agitation, attempt to harm staff with cane and was diagnosed with decompensated schizophrenia and ROBBIN with creatinine 1.42.  He was admitted by the medicine service on 3/31/2025 after he was declined by inpatient psychiatry for Psychiatry admission. Was seen by Dr Peres from Psychiatry service here who managed his medications and recommended full MI commitment with [facility] hearing.   Existing Precautions/Restrictions fall   Cognition   Affect/Mental Status (Cognition) unable/difficult to assess   Orientation Status (Cognition) unable/difficult to assess   Follows Commands (Cognition) follows one-step commands;25-49% accuracy;physical/tactile prompts required;repetition of directions required;verbal cues/prompting required;delayed response/completion   Cognitive Status Comments minimal verbal responses throughout to PT, a few head shakes yes/no   Pain Assessment   Patient Currently in Pain No   Posture    Posture  Forward head position;Protracted shoulders   Posture Comments L lateral lean sitting position   Range of Motion (ROM)   ROM Comment decreased B LE active ROM   Strength (Manual Muscle Testing)   Strength (Manual Muscle Testing) Deficits observed during functional mobility   Strength Comments able to complete B SLR   Bed Mobility   Comment, (Bed Mobility) modA supine to sit   Transfers   Comment, (Transfers) modA sit <> stand with FWW   Gait/Stairs (Locomotion)   Distance in Feet (Gait) 30   Pattern (Gait) step-through   Deviations/Abnormal Patterns (Gait) gait speed decreased;stride length decreased;base of support, wide   Comment, (Gait/Stairs) modA with FWW   Balance   Balance Comments fair sitting EOB; fair- standing with FWW   Clinical Impression   Criteria for Skilled Therapeutic Intervention Yes, treatment indicated   PT Diagnosis (PT) decreased functional mobility   Influenced by the following impairments impaired balance, strength   Functional limitations due to impairments impaired transfers, ambulation, bed mobility   Clinical Presentation (PT Evaluation Complexity) stable   Clinical Presentation Rationale Clinical judgement   Clinical Decision Making (Complexity) low complexity   Planned Therapy Interventions (PT) balance training;bed mobility training;gait training;home exercise program;patient/family education;postural re-education   Risk & Benefits of therapy have been explained evaluation/treatment results reviewed;care plan/treatment goals reviewed;risks/benefits reviewed;current/potential barriers reviewed;participants voiced agreement with care plan;participants included;patient   Clinical Impression Comments No treatment initiated.   PT Total Evaluation Time   PT Farnaz Low Complexity Minutes (39924) 18   Physical Therapy Goals   PT Frequency 3x/week   PT Predicted Duration/Target Date for Goal Attainment 05/01/25   PT Goals Bed Mobility;Transfers;Gait   PT: Bed Mobility Supervision/stand-by  assist;Supine to/from sit   PT: Transfers Supervision/stand-by assist;Sit to/from stand;Assistive device   PT: Gait Supervision/stand-by assist;Assistive device;100 feet   PT Discharge Planning   PT Plan progress transfers, ambulation as able   PT Discharge Recommendation (DC Rec) home with assist;home with home care physical therapy   PT Rationale for DC Rec Pt appears to present below baseline mobility based on chart review from previous admission. Pt likely to benefit return to familiar living environment, anticipate pt to require Ax1 with walker for mobility. Would benefit from HHPT to continue progression with activity.   PT Brief overview of current status Ax1-2 with FWW   PT Total Distance Amb During Session (feet) 30   Physical Therapy Time and Intention   Total Session Time (sum of timed and untimed services) 18

## 2025-04-24 NOTE — PLAN OF CARE
"No aggressive behaviors this shift. Has been calm and cooperative. Needing cares for all ADLS. Needing to be fed. Incontinent of B/B. Testicles red skin. Purewick behind the testicles where patient voids.   Appears comfortable. Leans to the right with his head. Back kyphosis. Generalized weakness.  Watching tv.   Ambulated in room with 2 assist, GB, walker.   Pureed diet. Fed self at lunch time after sitting the meal up.   Skin is redness to right buttocks and testicles. Skin care provided all shift and repositioned every 2 hours.   Knew his name, \"hospital\" and month.  Able to answer easy yes/no questions.   Plan is to [facility] tomorrow.   Up in chair with 2 assist, GB, walker, and seatbelt alarm.   Problem: Adult Inpatient Plan of Care  Goal: Plan of Care Review  Description: The Plan of Care Review/Shift note should be completed every shift.  The Outcome Evaluation is a brief statement about your assessment that the patient is improving, declining, or no change.  This information will be displayed automatically on your shiftnote.  Outcome: Progressing  Flowsheets (Taken 4/24/2025 1054)  Plan of Care Reviewed With: patient  Overall Patient Progress: improving  Goal: Patient-Specific Goal (Individualized)  Description: You can add care plan individualizations to a care plan. Examples of Individualization might be:  \"Parent requests to be called daily at 9am for status\", \"I have a hard time hearing out of my right ear\", or \"Do not touch me to wake me up as it startlesme\".  Outcome: Progressing  Goal: Absence of Hospital-Acquired Illness or Injury  Outcome: Progressing  Intervention: Identify and Manage Fall Risk  Recent Flowsheet Documentation  Taken 4/24/2025 0908 by Alexandra Colin, RN  Safety Promotion/Fall Prevention:   activity supervised   assistive device/personal items within reach   clutter free environment maintained   room door open   room near nurse's station   room organization " consistent   safety round/check completed  Intervention: Prevent Skin Injury  Recent Flowsheet Documentation  Taken 4/24/2025 0908 by Alexandra Colin, RN  Body Position:   upper extremity elevated   heels elevated   weight shifting   turned   right  Intervention: Prevent and Manage VTE (Venous Thromboembolism) Risk  Recent Flowsheet Documentation  Taken 4/24/2025 0908 by Alexandra Colin RN  VTE Prevention/Management: SCDs off (sequential compression devices)  Intervention: Prevent Infection  Recent Flowsheet Documentation  Taken 4/24/2025 0908 by Alexandra Colin RN  Infection Prevention: rest/sleep promoted  Goal: Optimal Comfort and Wellbeing  Outcome: Progressing  Intervention: Provide Person-Centered Care  Recent Flowsheet Documentation  Taken 4/24/2025 0908 by Alexandra Colin RN  Trust Relationship/Rapport:   care explained   choices provided  Goal: Readiness for Transition of Care  Outcome: Progressing     Problem: Violence Risk or Actual  Goal: Anger and Impulse Control  Outcome: Progressing  Intervention: Minimize Safety Risk  Recent Flowsheet Documentation  Taken 4/24/2025 0908 by Alexandra Colin RN  Sensory Stimulation Regulation:   care clustered   auditory stimulation minimized   television on  Enhanced Safety Measures:   floor mats   room near unit station  Intervention: Promote Self-Control  Recent Flowsheet Documentation  Taken 4/24/2025 0908 by Alexandra Colin RN  Supportive Measures:   active listening utilized   positive reinforcement provided   verbalization of feelings encouraged  Environmental Support:   calm environment promoted   distractions minimized   environmental consistency promoted     Problem: Fall Injury Risk  Goal: Absence of Fall and Fall-Related Injury  Outcome: Progressing  Intervention: Identify and Manage Contributors  Recent Flowsheet Documentation  Taken 4/24/2025 0908 by Alexandra Colin, RN  Medication Review/Management:  medications reviewed  Intervention: Promote Injury-Free Environment  Recent Flowsheet Documentation  Taken 4/24/2025 0908 by Alexandra Colin, RN  Safety Promotion/Fall Prevention:   activity supervised   assistive device/personal items within reach   clutter free environment maintained   room door open   room near nurse's station   room organization consistent   safety round/check completed   Goal Outcome Evaluation:      Plan of Care Reviewed With: patient    Overall Patient Progress: improvingOverall Patient Progress: improving

## 2025-04-24 NOTE — PLAN OF CARE
"Goal Outcome Evaluation:      Plan of Care Reviewed With: patient    Overall Patient Progress: improvingOverall Patient Progress: improving    Outcome Evaluation: No behaviors overnight, compliant with meds, incontience cares provided    A/Ox3, confused to situation, VSS, on RA, PIV SL, Ax2 W/GB, not oob this shift, denies pain, incontinent of b/b, barrier cream applied when soiled, external cath in place, tolerating a mechanical soft diet, plan TBD, will continue to monitor    Problem: Adult Inpatient Plan of Care  Goal: Plan of Care Review  Description: The Plan of Care Review/Shift note should be completed every shift.  The Outcome Evaluation is a brief statement about your assessment that the patient is improving, declining, or no change.  This information will be displayed automatically on your shiftnote.  4/24/2025 0510 by Maria Del Carmen Garcia, RN  Outcome: Progressing  Flowsheets (Taken 4/24/2025 0510)  Outcome Evaluation: No behaviors overnight, compliant with meds, incontience cares provided  Plan of Care Reviewed With: patient  Overall Patient Progress: improving  4/24/2025 0430 by Maria Del Carmen Garcia, RN  Outcome: Progressing  Flowsheets (Taken 4/24/2025 0428)  Outcome Evaluation: No behaviors overnight,  Plan of Care Reviewed With: patient  Overall Patient Progress: improving  Goal: Patient-Specific Goal (Individualized)  Description: You can add care plan individualizations to a care plan. Examples of Individualization might be:  \"Parent requests to be called daily at 9am for status\", \"I have a hard time hearing out of my right ear\", or \"Do not touch me to wake me up as it startlesme\".  4/24/2025 0510 by Maria Del Carmen Garcia, RN  Outcome: Progressing  4/24/2025 0430 by Maria Del Carmen Garcia, RN  Outcome: Progressing  Goal: Absence of Hospital-Acquired Illness or Injury  4/24/2025 0510 by Maria Del Carmen Garcia RN  Outcome: Progressing  4/24/2025 0430 by Maria Del Carmen Garcia, RN  Outcome: Progressing  Intervention: Identify and Manage Fall " Risk  Recent Flowsheet Documentation  Taken 4/23/2025 2048 by Maria Del Carmen Garcia RN  Safety Promotion/Fall Prevention:   activity supervised   assistive device/personal items within reach   clutter free environment maintained   room near nurse's station   room organization consistent   safety round/check completed  Intervention: Prevent Skin Injury  Recent Flowsheet Documentation  Taken 4/24/2025 0200 by Maria Del Carmen Garcia RN  Body Position:   log-rolled   weight shifting   supine, head elevated  Taken 4/23/2025 2230 by Maria Del Carmen Garcia RN  Body Position:   turned   weight shifting   supine, head elevated  Taken 4/23/2025 2048 by Maria Del Carmen Garcia RN  Body Position:   log-rolled   weight shifting   supine, head elevated  Intervention: Prevent and Manage VTE (Venous Thromboembolism) Risk  Recent Flowsheet Documentation  Taken 4/23/2025 2048 by Maria Del Carmen Garcia RN  VTE Prevention/Management: SCDs off (sequential compression devices)  Intervention: Prevent Infection  Recent Flowsheet Documentation  Taken 4/23/2025 2048 by Maria Del Carmen Garcia RN  Infection Prevention:   environmental surveillance performed   rest/sleep promoted   single patient room provided  Goal: Optimal Comfort and Wellbeing  4/24/2025 0510 by Maria Del Carmen Garcia RN  Outcome: Progressing  4/24/2025 0430 by Maria Del Carmen Garcia RN  Outcome: Progressing  Intervention: Provide Person-Centered Care  Recent Flowsheet Documentation  Taken 4/23/2025 2048 by Maria Del Carmen Garcia RN  Trust Relationship/Rapport:   care explained   choices provided  Goal: Readiness for Transition of Care  4/24/2025 0510 by Maria Del Carmen Garcia RN  Outcome: Progressing  4/24/2025 0430 by Maria Del Carmen Garcia RN  Outcome: Progressing     Problem: Violence Risk or Actual  Goal: Anger and Impulse Control  4/24/2025 0510 by Maria Del Carmen Garcia RN  Outcome: Progressing  4/24/2025 0430 by Maria Del Carmen Garcia RN  Outcome: Progressing  Intervention: Minimize Safety Risk  Recent Flowsheet Documentation  Taken 4/23/2025 2048 by  Maria Del Carmen Garcia, RN  Enhanced Safety Measures: room near unit station     Problem: Fall Injury Risk  Goal: Absence of Fall and Fall-Related Injury  4/24/2025 0510 by Maria Del Carmen Garcia, RN  Outcome: Progressing  4/24/2025 0430 by Maria Del Carmen Garcia, RN  Outcome: Progressing  Intervention: Identify and Manage Contributors  Recent Flowsheet Documentation  Taken 4/23/2025 2048 by Maria Del Carmen Garcia, RN  Medication Review/Management: medications reviewed  Intervention: Promote Injury-Free Environment  Recent Flowsheet Documentation  Taken 4/23/2025 2048 by Maria Del Carmen Garcia, RN  Safety Promotion/Fall Prevention:   activity supervised   assistive device/personal items within reach   clutter free environment maintained   room near nurse's station   room organization consistent   safety round/check completed

## 2025-04-24 NOTE — PROGRESS NOTES
[facility]    Medicine Progress Note - Hospitalist Service    Date of Admission:  3/27/2025    Assessment & Plan   aHrpreet Mcelroy is a 69 year old male with bipolar, anxiety, depression, schizophrenia, BPD, paranoia, bladder cancer s/p ureteral stent, HTN, HLD, who came to [facility] on 3/27/2025 on a RENETTA from [facility] with Agitation, attempt to harm staff with cane and was diagnosed with decompensated schizophrenia and ROBBIN with creatinine 1.42.  He was admitted by the medicine service on 3/31/2025 after he was declined by inpatient psychiatry for Psychiatry admission. Was seen by Dr Peres from Psychiatry service here who managed his medications and recommended full MI commitment with [place] hearing.       The patient has an order for commitment from the [facility].  For that reason, discharge from the hospital to a community setting is not an option without his  approving it.  Geriatric psychiatry is not available within the system and is not being offered.  The patient cannot be returned to his previous residence as they already have stated that they do not want him back.     Disposition at this time is dependent on the court and Psychiatry.        Date of Admission:  3/27/2025       Decompensated schizophrenia with intermittent agitation  Bipolar  Anxiety  Depression  Chronic cognitive dysfunction:  -Psychiatry following.  Deferring adjustment of antipsychotic and psychiatric medications to psychiatry.  Clinically he is much calmer/less agitated after their recent adjustments.  Per the last assessment, they were planning to reassess again 4/25/25.   Appears by report his prior living situation can accept him back possibly tomorrow.  Will need to confirm with psych final med recommendations for discharge.  -Note, court proceeding resulted in guidance to commitment.     ROBBIN. Baseline creat about 1.0  Widely variable.  Creat up to 1.52 on 4/19. Then improved/leveled off around  1.3 for multiple checks.  Needs a follow-up check in a week.     Hypoglycemia earlier in stay:  -Noted to be hypoglycemic and started on IVF (D5 at 75 ml/hr) on 4/18/25.  Now eating much better with multiple glu checks above 80 without further Dextrose needs.    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     Bladder cancer   Chronic L ureteral stent:  Stent last changed 2/4/25 by [facility] urology Dr. Whitt.  -PTA oxybutynin and flomax.  -Needs ongoing Urology follow-up after discharge for continued stent changes/cares.    Deconditioning:  -PT eval today.  Recommendation for some Home Health therapy at discharge.      PPE Used:  Mask          Diet: Snacks/Supplements Adult: Might[employer] Standard 1.4 Oral Supplement; With Meals  Combination Diet Thin Liquids (level 0) (no straws); Pureed Diet (level 4); Thin Liquids (level 0) (no straws)  Room Service    DVT Prophylaxis: Pneumatic Compression Devices and Ambulate every shift  Hernandez Catheter: Not present  Lines: None     Cardiac Monitoring: None  Code Status: Full Code      Clinically Significant Risk Factors                                  # Financial/Environmental Concerns: none         Disposition Plan     Medically Ready for Discharge: Anticipated Tomorrow if continues to improve.         Kenyon De Dios,   Hospitalist Service  [facility]  Securely message with [employer] (more info)  Text page via [email or web address] Paging/Directory   ______________________________________________________________________    Interval History   Denied any new complaints.  Staff report less agitated and now more cooperative with  cares.    Physical Exam   Vital Signs: Temp: 97  F (36.1  C) Temp src: Temporal BP: (!) 151/64 Pulse: 83   Resp: 14 SpO2: 98 % O2 Device: None (Room air)    Weight: 152 lbs 15.99 oz    GEN:  Alert, appears comfortable, no overt distress.  Cooperative with basic questions, exam requests.  HEENT:  Normocephalic/atraumatic, no scleral icterus, no nasal discharge, mouth moist.  CV:  Regular rate and rhythm, distant.  LUNGS:  Clear to auscultation bilaterally without rales/rhonchi/wheezing/retractions.  Symmetric chest rise on inhalation noted.  ABD:  Active bowel sounds, soft, non-tender/non-distended.  No guarding/rigidity.  EXT:  No edema.  No cyanosis.  No new joint synovitis noted.  SKIN:  Dry to touch, no new exanthems noted in the visualized areas.    Medical Decision Making       38 MINUTES SPENT BY ME on the date of service doing chart review, history, exam, documentation & further activities per the note.      Data   Medications   Current Facility-Administered Medications   Medication Dose Route Frequency Provider Last Rate Last Admin     Current Facility-Administered Medications   Medication Dose Route Frequency Provider Last Rate Last Admin    artificial tears ophthalmic ointment   Left Eye At Bedtime Mary Martinez DO   Given at 04/23/25 2209    benztropine (COGENTIN) tablet 2 mg  2 mg Oral At Bedtime Aisha Velez MD   2 mg at 04/23/25 2208    busPIRone (BUSPAR) tablet 30 mg  30 mg Oral BID Arnel Peres MD   30 mg at 04/24/25 0816    hydrOXYzine HCl (ATARAX) tablet 10 mg  10 mg Oral BID Lisseth Harris MD   10 mg at 04/24/25 1145    lamoTRIgine (LaMICtal) tablet 200 mg  200 mg Oral Daily Arnel Peres MD   200 mg at 04/24/25 0818    Lidocaine (LIDOCARE) 4 % Patch 1 patch  1 patch Transdermal Q24H Panfilo Damon DO   1 patch at 04/24/25 0817    melatonin tablet 3 mg  3 mg Oral At Bedtime Arnel Peres MD   3 mg at 04/23/25 2208    OLANZapine (zyPREXA) tablet 7.5  mg  7.5 mg Oral TID Arnel Peres MD   7.5 mg at 04/24/25 1349    Or    OLANZapine (zyPREXA) injection 10 mg  10 mg Intramuscular TID Arnel Peres MD        risperiDONE (risperDAL M-TABS) ODT tab 1 mg  1 mg Oral TID Arnel Peres MD   1 mg at 04/24/25 1349    risperiDONE microspheres ER (risperDAL CONSTA) injection 25 mg  25 mg Intramuscular Q14 Days Arnel Peres MD   25 mg at 04/23/25 1256    tamsulosin (FLOMAX) capsule 0.4 mg  0.4 mg Oral Daily Lisseth Harris MD   0.4 mg at 04/24/25 0817     Labs and Imaging results below reviewed today.  Recent Labs   Lab 04/18/25  1509   WBC 5.4   HGB 11.4*   HCT 35.2*   MCV 87        Recent Labs   Lab 04/22/25  1734 04/22/25  1116 04/22/25  0551 04/21/25  1708 04/21/25  0629 04/20/25  1513 04/20/25  1235 04/18/25  2021 04/18/25  1509   NA  --   --  139  --  142  --  140   < > 138   POTASSIUM  --   --  3.7  --  3.7  --  4.6   < > 4.8   CHLORIDE  --   --  106  --  108*  --  105   < > 102   CO2  --   --  25  --  22  --  22   < > 26   ANIONGAP  --   --  8  --  12  --  13   < > 10   GLC 97 93 94   < > 88   < > 51*   < > 77   BUN  --   --  22.1  --  24.8*  --  27.6*   < > 21.4   CR  --   --  1.33*  --  1.33*  --  1.27*   < > 1.18*   GFRESTIMATED  --   --  58*  --  58*  --  62   < > 67   STEPHY  --   --  9.2  --  9.3  --  9.5   < > 9.9   PROTTOTAL  --   --   --   --   --   --   --   --  7.1   ALBUMIN  --   --   --   --   --   --   --   --  3.9   BILITOTAL  --   --   --   --   --   --   --   --  <0.2   ALKPHOS  --   --   --   --   --   --   --   --  125   AST  --   --   --   --   --   --   --   --  62*   ALT  --   --   --   --   --   --   --   --  104*    < > = values in this interval not displayed.     7-Day Micro Results       Collected Updated Procedure Result Status      04/19/2025 0038 04/24/2025 0502 Blood Culture Wrist, Right [[id number]]    Blood from Wrist, Right    Final result Component Value   Culture No Growth               04/19/2025  0033 04/24/2025 0502 Blood Culture Hand, Right [[id number]]   Blood from Hand, Right    Final result Component Value   Culture No Growth                       No results found for this or any previous visit (from the past 24 hours).     [email or web address]

## 2025-04-25 ENCOUNTER — APPOINTMENT (OUTPATIENT)
Dept: SPEECH THERAPY | Facility: CLINIC | Age: 70
DRG: 885 | End: 2025-04-25
Payer: COMMERCIAL

## 2025-04-25 PROCEDURE — 120N000001 HC R&B MED SURG/OB

## 2025-04-25 PROCEDURE — 250N000013 HC RX MED GY IP 250 OP 250 PS 637: Performed by: INTERNAL MEDICINE

## 2025-04-25 PROCEDURE — 250N000013 HC RX MED GY IP 250 OP 250 PS 637: Performed by: EMERGENCY MEDICINE

## 2025-04-25 PROCEDURE — 99232 SBSQ HOSP IP/OBS MODERATE 35: CPT | Performed by: INTERNAL MEDICINE

## 2025-04-25 PROCEDURE — 250N000013 HC RX MED GY IP 250 OP 250 PS 637: Performed by: PSYCHIATRY & NEUROLOGY

## 2025-04-25 PROCEDURE — 99233 SBSQ HOSP IP/OBS HIGH 50: CPT | Performed by: PSYCHIATRY & NEUROLOGY

## 2025-04-25 PROCEDURE — 92526 ORAL FUNCTION THERAPY: CPT | Mod: GN

## 2025-04-25 RX ADMIN — BUSPIRONE HYDROCHLORIDE 30 MG: 10 TABLET ORAL at 20:24

## 2025-04-25 RX ADMIN — RISPERIDONE 1 MG: 0.5 TABLET, ORALLY DISINTEGRATING ORAL at 20:24

## 2025-04-25 RX ADMIN — BUSPIRONE HYDROCHLORIDE 30 MG: 10 TABLET ORAL at 08:25

## 2025-04-25 RX ADMIN — MINERAL OIL, WHITE PETROLATUM: .03; .94 OINTMENT OPHTHALMIC at 22:10

## 2025-04-25 RX ADMIN — BENZTROPINE MESYLATE 2 MG: 1 TABLET ORAL at 22:10

## 2025-04-25 RX ADMIN — LAMOTRIGINE 200 MG: 200 TABLET ORAL at 08:26

## 2025-04-25 RX ADMIN — RISPERIDONE 1 MG: 0.5 TABLET, ORALLY DISINTEGRATING ORAL at 08:25

## 2025-04-25 RX ADMIN — OLANZAPINE 7.5 MG: 5 TABLET, FILM COATED ORAL at 13:13

## 2025-04-25 RX ADMIN — RISPERIDONE 1 MG: 0.5 TABLET, ORALLY DISINTEGRATING ORAL at 13:13

## 2025-04-25 RX ADMIN — TAMSULOSIN HYDROCHLORIDE 0.4 MG: 0.4 CAPSULE ORAL at 08:25

## 2025-04-25 RX ADMIN — HYDROXYZINE HYDROCHLORIDE 10 MG: 10 TABLET, FILM COATED ORAL at 13:13

## 2025-04-25 RX ADMIN — Medication 3 MG: at 22:09

## 2025-04-25 RX ADMIN — OLANZAPINE 7.5 MG: 5 TABLET, FILM COATED ORAL at 20:23

## 2025-04-25 RX ADMIN — OLANZAPINE 7.5 MG: 5 TABLET, FILM COATED ORAL at 08:25

## 2025-04-25 RX ADMIN — LIDOCAINE 1 PATCH: 4 PATCH TOPICAL at 10:23

## 2025-04-25 RX ADMIN — HYDROXYZINE HYDROCHLORIDE 10 MG: 10 TABLET, FILM COATED ORAL at 20:23

## 2025-04-25 ASSESSMENT — ACTIVITIES OF DAILY LIVING (ADL)
ADLS_ACUITY_SCORE: 78
ADLS_ACUITY_SCORE: 77
ADLS_ACUITY_SCORE: 78
ADLS_ACUITY_SCORE: 73
ADLS_ACUITY_SCORE: 77
ADLS_ACUITY_SCORE: 73
ADLS_ACUITY_SCORE: 78
ADLS_ACUITY_SCORE: 73
ADLS_ACUITY_SCORE: 73
ADLS_ACUITY_SCORE: 77
ADLS_ACUITY_SCORE: 73
ADLS_ACUITY_SCORE: 78
ADLS_ACUITY_SCORE: 78
ADLS_ACUITY_SCORE: 73
ADLS_ACUITY_SCORE: 73
ADLS_ACUITY_SCORE: 78
ADLS_ACUITY_SCORE: 73
ADLS_ACUITY_SCORE: 73
ADLS_ACUITY_SCORE: 78
ADLS_ACUITY_SCORE: 73
ADLS_ACUITY_SCORE: 78

## 2025-04-25 NOTE — PLAN OF CARE
"Alert.  Denies pain.  Calm and cooperative, no aggressive behaviors witnessed.  Meal tray set up, patient feeding self all meals today.  Continues on pureed diet.  Speech following.  Updated group home via phone, staff stated they would come today to assess, not here as of this time.   Up to chair with assist 2/gait belt/walker.  Incontinent of bowel and bladder.  Awaiting placement.  Problem: Adult Inpatient Plan of Care  Goal: Plan of Care Review  Description: The Plan of Care Review/Shift note should be completed every shift.  The Outcome Evaluation is a brief statement about your assessment that the patient is improving, declining, or no change.  This information will be displayed automatically on your shiftnote.  Outcome: Progressing  Flowsheets (Taken 4/25/2025 1716)  Plan of Care Reviewed With: patient  Overall Patient Progress: improving  Goal: Patient-Specific Goal (Individualized)  Description: You can add care plan individualizations to a care plan. Examples of Individualization might be:  \"Parent requests to be called daily at 9am for status\", \"I have a hard time hearing out of my right ear\", or \"Do not touch me to wake me up as it startlesme\".  Outcome: Progressing  Goal: Absence of Hospital-Acquired Illness or Injury  Outcome: Progressing  Intervention: Identify and Manage Fall Risk  Recent Flowsheet Documentation  Taken 4/25/2025 0906 by Tala Long, RN  Safety Promotion/Fall Prevention:   activity supervised   assistive device/personal items within reach   clutter free environment maintained   nonskid shoes/slippers when out of bed   patient and family education   safety round/check completed   room near nurse's station  Intervention: Prevent Skin Injury  Recent Flowsheet Documentation  Taken 4/25/2025 0800 by Tala Long, RN  Body Position:   turned   supine, head elevated  Intervention: Prevent and Manage VTE (Venous Thromboembolism) Risk  Recent Flowsheet Documentation  Taken " 4/25/2025 0906 by Tala Long RN  VTE Prevention/Management: SCDs off (sequential compression devices)  Intervention: Prevent Infection  Recent Flowsheet Documentation  Taken 4/25/2025 0906 by Tala Long RN  Infection Prevention:   hand hygiene promoted   single patient room provided  Goal: Optimal Comfort and Wellbeing  Outcome: Progressing  Intervention: Provide Person-Centered Care  Recent Flowsheet Documentation  Taken 4/25/2025 0906 by Tala Long RN  Trust Relationship/Rapport:   care explained   reassurance provided  Goal: Readiness for Transition of Care  Outcome: Progressing     Problem: Violence Risk or Actual  Goal: Anger and Impulse Control  Outcome: Progressing  Intervention: Minimize Safety Risk  Recent Flowsheet Documentation  Taken 4/25/2025 0906 by Tala Long RN  Sensory Stimulation Regulation:   care clustered   quiet environment promoted  Enhanced Safety Measures:   floor mats   review medications for side effects with activity   room near unit station  Intervention: Promote Self-Control  Recent Flowsheet Documentation  Taken 4/25/2025 0906 by Tala Long RN  Supportive Measures: active listening utilized  Environmental Support: calm environment promoted     Problem: Fall Injury Risk  Goal: Absence of Fall and Fall-Related Injury  Outcome: Progressing  Intervention: Identify and Manage Contributors  Recent Flowsheet Documentation  Taken 4/25/2025 0906 by Tala Long RN  Self-Care Promotion: meal set-up provided  Medication Review/Management:   medications reviewed   high-risk medications identified  Intervention: Promote Injury-Free Environment  Recent Flowsheet Documentation  Taken 4/25/2025 0906 by Tala Long RN  Safety Promotion/Fall Prevention:   activity supervised   assistive device/personal items within reach   clutter free environment maintained   nonskid shoes/slippers when out of bed   patient and family education   safety  round/check completed   room near nurse's station   Goal Outcome Evaluation:      Plan of Care Reviewed With: patient    Overall Patient Progress: improvingOverall Patient Progress: improving

## 2025-04-25 NOTE — PROGRESS NOTES
Care Management Follow Up    Length of Stay (days): 25    Expected Discharge Date: 04/28/2025     Concerns to be Addressed: discharge planning       Patient plan of care discussed at interdisciplinary rounds: Yes    Anticipated Discharge Disposition: Geriatric Psych, Assisted Living, Inpatient Mental Health     Anticipated Discharge Services: Mental Health Resources    Anticipated Discharge DME: None    Patient/family educated on Medicare website which has current facility and service quality ratings: no    Education Provided on the Discharge Plan: Yes    Patient/Family in Agreement with the Plan: unable to assess    Referrals Placed by CM/SW: Post Acute Facilities    Private pay costs discussed: Not applicable    Discussed  Partnership in Safe Discharge Planning  document with patient/family: No     Handoff Completed: No, handoff not indicated or clinically appropriate    Additional Information:  HIRAM spoke with Alfie,  at [facility] [phone]. He said that they have reviewed the clinical info SW faxed over and their only concern at this point is pt's diet and recent change in requiring assistance with feeding. HIRAM informed that pt is improving and was able to feed himself yesterday but it was noted that he is now on a Puree diet, which Alfie said their unable to accommodate. HIRAM spoke to Daria with [facility] who stated pt is not tolerating a moist and minced diet very well, but tolerance might improve if pt used dentures. SW reached out to Alfie to clarify if pt had been using dentures prior to this hospital stay and is waiting to hear back.     Next Steps:   HIRAM will continue following until discharge and remain available as needed.     MAGO Meza  Inpatient Care Coordination   [facility]  [phone]

## 2025-04-25 NOTE — PLAN OF CARE
"Goal Outcome Evaluation:      Plan of Care Reviewed With: patient    Overall Patient Progress: improvingOverall Patient Progress: improving    Outcome Evaluation: No behaviors overnight, compliant with cares and meds. Alert, disoriented to situation and time. Quiet and garbled speech, able to answer yes/no questions. Denied pain. Purewick in place, voiding. Wound cares done, pressure sores on sacrum, red scrotum. Repos done q2-3 hrs. Tolerating pureed diet, thin liquids. Plan to hopefully discharge to  today.      Problem: Adult Inpatient Plan of Care  Goal: Plan of Care Review  Description: The Plan of Care Review/Shift note should be completed every shift.  The Outcome Evaluation is a brief statement about your assessment that the patient is improving, declining, or no change.  This information will be displayed automatically on your shiftnote.  Outcome: Progressing  Flowsheets (Taken 4/25/2025 0354)  Outcome Evaluation: No behaviors overnight, compliant with cares and meds. Alert, disoriented to situation and time. Quiet and garbled speech, able to answer yes/no questions. Denied pain. Purewick in place, voiding. Wound cares done, pressure sores on sacrum, red scrotum. Tolerating pureed diet, thin liquids. Plan to discharge to  tomorrow.  Plan of Care Reviewed With: patient  Overall Patient Progress: improving  Goal: Patient-Specific Goal (Individualized)  Description: You can add care plan individualizations to a care plan. Examples of Individualization might be:  \"Parent requests to be called daily at 9am for status\", \"I have a hard time hearing out of my right ear\", or \"Do not touch me to wake me up as it startlesme\".  Outcome: Progressing  Goal: Absence of Hospital-Acquired Illness or Injury  Outcome: Progressing  Intervention: Identify and Manage Fall Risk  Recent Flowsheet Documentation  Taken 4/24/2025 2131 by Carol Carranza RN  Safety Promotion/Fall Prevention:   activity supervised   assistive " device/personal items within reach   clutter free environment maintained   increased rounding and observation   increase visualization of patient   lighting adjusted   room door open   room near nurse's station   safety round/check completed   supervised activity  Intervention: Prevent Skin Injury  Recent Flowsheet Documentation  Taken 4/25/2025 0213 by Carol Carranza RN  Body Position:   turned   right   side-lying   heels elevated  Taken 4/24/2025 2131 by Carol Carranza RN  Body Position:   turned   right   heels elevated  Intervention: Prevent and Manage VTE (Venous Thromboembolism) Risk  Recent Flowsheet Documentation  Taken 4/24/2025 2131 by Carol Carranza RN  VTE Prevention/Management: SCDs off (sequential compression devices)  Goal: Optimal Comfort and Wellbeing  Outcome: Progressing  Intervention: Provide Person-Centered Care  Recent Flowsheet Documentation  Taken 4/24/2025 2131 by Carol Carranza RN  Trust Relationship/Rapport:   care explained   choices provided  Goal: Readiness for Transition of Care  Outcome: Progressing     Problem: Violence Risk or Actual  Goal: Anger and Impulse Control  Outcome: Progressing  Intervention: Minimize Safety Risk  Recent Flowsheet Documentation  Taken 4/24/2025 2131 by Carol Carranza RN  Sensory Stimulation Regulation:   care clustered   quiet environment promoted   television on  Enhanced Safety Measures:   floor mats   room near unit station  Intervention: Promote Self-Control  Recent Flowsheet Documentation  Taken 4/24/2025 2131 by Carol Carranza RN  Supportive Measures: active listening utilized  Environmental Support:   calm environment promoted   environmental consistency promoted     Problem: Fall Injury Risk  Goal: Absence of Fall and Fall-Related Injury  Outcome: Progressing  Intervention: Identify and Manage Contributors  Recent Flowsheet Documentation  Taken 4/24/2025 2131 by Carol Carranza RN  Medication Review/Management:  medications reviewed  Intervention: Promote Injury-Free Environment  Recent Flowsheet Documentation  Taken 4/24/2025 2131 by Carol Carranza, RN  Safety Promotion/Fall Prevention:   activity supervised   assistive device/personal items within reach   clutter free environment maintained   increased rounding and observation   increase visualization of patient   lighting adjusted   room door open   room near nurse's station   safety round/check completed   supervised activity

## 2025-04-25 NOTE — PROGRESS NOTES
[facility]    Medicine Progress Note - Hospitalist Service    Date of Admission:  3/27/2025    Assessment & Plan     Harpreet Mcelroy is a 69 year old male with bipolar, anxiety, depression, schizophrenia, BPD, paranoia, bladder cancer s/p ureteral stent, HTN, HLD, who came to [facility] on 3/27/2025 on a RENETTA from [facility] with Agitation, attempt to harm staff with cane and was diagnosed with decompensated schizophrenia and ROBBIN with creatinine 1.42.  He was admitted by the medicine service on 3/31/2025 after he was declined by inpatient psychiatry for Psychiatry admission. Was seen by Dr Peres from Psychiatry service here who managed his medications and recommended full MI commitment with Blanco hearing.       The patient has an order for commitment from the Psychiatric hospital.  For that reason, discharge from the hospital to a community setting is not an option without his  approval.  Geriatric psychiatry is not available within the system and is not being offered.       Disposition at this time is dependent on the court and Psychiatry.        Date of Admission:  3/27/2025        Decompensated schizophrenia with intermittent agitation.  Bipolar disorder.  Anxiety.  Depression.  Chronic cognitive dysfunction.  -Psychiatry following.    -Appreciate continued input.  -Note, court proceeding resulted in guidance to commitment.     Acute kidney injury on chronic kidney disease stage II.  Creat up to 1.52 on 4/19. Then improved/leveled off around 1.3 for multiple checks.  - Avoid nephrotoxins as able.  - Recheck metabolic panel 4/26.       Hypoglycemia earlier in stay:  -Noted to be hypoglycemic and started on IVF (D5 at 75 ml/hr) on 4/18/25.  Now eating much better with multiple glu checks above 80 without further Dextrose needs.  -Check glucose intermittently.     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    Dysphagia.  -Speech pathology following.  -Dysphagia diet.     Bladder cancer   Chronic L ureteral stent:  Stent last changed 2/4/25 by [facility] Dr. Whitt.  -PTA oxybutynin and flomax.  -Needs ongoing Urology follow-up after discharge for continued stent changes/cares.     Deconditioning:  - Physical therapy following.    -Recommendation for some Home Health therapy at discharge.            Diet: Snacks/Supplements Adult: [employer] Standard 1.4 Oral Supplement; With Meals  Combination Diet Thin Liquids (level 0) (no straws); Pureed Diet (level 4); Thin Liquids (level 0) (no straws)  Room Service    DVT Prophylaxis: Pneumatic Compression Devices  Hernandez Catheter: Not present  Lines: None     Cardiac Monitoring: None  Code Status: Full Code      Clinically Significant Risk Factors                                  # Financial/Environmental Concerns: none         Social Drivers of Health    Food Insecurity: Unknown (4/5/2025)    Food Insecurity     Within the past 12 months, did you worry that your food would run out before you got money to buy more?: Patient unable to answer     Within the past 12 months, did the food you bought just not last and you didn t have money to get more?: Patient unable to answer   Housing Stability: Unknown (4/5/2025)    Housing Stability     Do you have housing? : Patient unable to answer     Are you worried about losing your housing?: Patient unable to answer   Tobacco Use: Medium Risk (1/30/2025)    Received from [facility]    Patient History     Smoking Tobacco Use: Former     Smokeless Tobacco Use: Never   Financial Resource Strain: Unknown (4/5/2025)    Financial Resource Strain     Within the past 12  months, have you or your family members you live with been unable to get utilities (heat, electricity) when it was really needed?: Patient unable to answer   Transportation Needs: Unknown (4/5/2025)    Transportation Needs     Within the past 12 months, has lack of transportation kept you from medical appointments, getting your medicines, non-medical meetings or appointments, work, or from getting things that you need?: Patient unable to answer          Disposition Plan     Medically Ready for Discharge: Ready Now, awaiting outpatient placement plan per psychiatry and court as above.             Rivera Bishop,   Hospitalist Service  [facility]  Securely message with [employer] (more info)  Text page via [facility] Paging/Directory   ______________________________________________________________________    Interval History   Not speaking for me today.  Does seem to answer questions appropriately with yes and no head movements.    Physical Exam   Vital Signs: Temp: 96.9  F (36.1  C) Temp src: Temporal BP: (!) 159/67 Pulse: 74   Resp: 16 SpO2: 97 % O2 Device: None (Room air)    Weight: 152 lbs 15.99 oz    Gen:  NAD, awake, not speaking today.  Seems to answer questions appropriately with yes and no head movements.  Eyes:  PERRL, sclera anicteric.  OP:  MMM, no lesions.  Neck:  Supple.  CV:  Regular, no loud murmurs.  Lung:  CTA b/l, normal effort.  Ab:  +BS, soft.  Skin:  Warm, dry to touch.  No rash.  Ext:  No pitting edema LE b/l.      Medical Decision Making       30 MINUTES SPENT BY ME on the date of service doing chart review, history, exam, documentation & further activities per the note.      Data         Imaging results reviewed over the past 24 hrs:   No results found for this or any previous visit (from the past 24 hours).

## 2025-04-25 NOTE — PROGRESS NOTES
"[facility]     CONSULT PSYCHIATRY  FOLLOW UP NOTE     DATE OF SERVICE   04/25/2025       IDENTIFICATION   Harpreet Mcelroy  Age: 69 year old  MRN# [id number]   YOB: 1955  Length of Stay:  25        CHIEF COMPLAINT   \"No.\" [Headnod]       SUBJECTIVE   The patient's care was discussed with primary treatment team directly and patient's electronic chart notes were reviewed.      Staff report patient did not report any acute medical concerns or side effects.     No behavioral issues overnight or since last visit on 4/23, including violent or aggressive behaviors.  Described as improving and less agitated.  Patient did not require seclusion or restraints. Patient is not exhibiting signs or symptoms of psychosis or jaki. Patient did not endorse suicidal ideation. Patient did not endorse homicide ideation.     Patient is medication adherent.  Initiated conversion onto HOBBS, Risperdal Consta, after last visit on 4/23 without issue.  Patient is not on a 1:1 for safety.  Patient is sleeping. Patient is eating.    Attending Interval History (4/24):  Denied any new complaints. Staff report less agitated and now more cooperative with cares.     Care Management report (4/23):  Care conference on 4/23 with [facility]  and psychiatry.  Inadequate information provided previously related to patient inability to return to previous placement at [facility].  Now, patient is under review and not discharging/evicting patient due to incident prior to hospital stay.    C/L Psychiatry treatment plan (4/23):  4/23/2025:  Medication Changes:    Risperdal Consta:  Start conversion onto HOBBS, Risperdal Consta IM 25 mg every 2 weeks, psychosis/treatment non-adherence.  Initiate slow taper taper schedule Zyprexa and Risperdal and continue as clinically indicated while hospitalized and after discharge.  Risperdal: Continue trial risperidone 1 mg tid, mood/psychosis.  On 4/16, initial trial " Risperidone started and demonstrating tolerability and efficacy with medication adjustments.  Plan to convert to Risperdal Consta today as planned with plan to initiate slow taper after monitoring of Zyprexa taper.  Zyprexa:  Efficacy and tolerability for management of target behaviors since 4/16 with adjustment to PTA Zyprexa to 10 mg tid and titration of Risperdal trial.  Appears to be demonstrating stabilization from dual neuroleptic therapy after risperidone trial to PTA Zyprexa.  Goal is monotherapy.  On 4/24, decrease Zyprexa 10 mg tid to Zyprexa 7.5 mg tid + Zyprexa  IM 10 mg as per Blanco.  Continue Medications:  BLANCO (risperidone, olanzapine, quetiapine, haloperidol):  IM backup coordinated to scheduled Zyprexa tid doses  Lamictal: Continue optimized dose of PTA lamotrigine 200 mg daily, mood lability.  Improved adherence and no issues of tolerability.  BuSpar: Continue optimized dose of PTA 30 mg twice daily, anxiety.  Cogentin: Continue PTA Cogentin 2 mg nightly, EPSE + as needed Cogentin 1 mg BID, EPSE.  Atarax: Continue PTA Atarax 10 mg twice daily.    Melatonin: Schedule Melatonin 3 mg at bedtime, sleep.  PRN Atarax:  Continue as needed Atarax 10 mg 3 times daily, anxiety, rank order #1.  PRN Risperdal ODT:  Continue as needed Risperdal ODT 1 mg bid, mild to moderate agitation.  PRN Haldol IM + Ativan IM:  Continue adjusted as needed Haldol 5 mg IM every 8 hours to include as needed Ativan IM 1 mg every 8 hours, severe agitation.  No PRN relported since last visit.  PRN Ativan:  Resolved daytime sedation after discontinued as needed Ativan 2 mg every 6 hours monotherapy and started in combination at a lower dose as needed Ativan 1 mg every 8 hours with PRN Haldol.  Other:  Care conference: 4/23:  Follow up care conference to initial conference performed on 4/16.  Discussed progression of behaviors after medication adjustment with plans to convert to HOBBS after demonstrating efficacy and tolerability  on monitoring, addressed barriers for placement which no longer includes previous placement.  Legal:   3/28 (0309):  Placed on a 72-hour hold.  3/28:  Filed [facility] Petition for Commitment with Blanco.   3/31:  Petition re-filed through [facility] --> Supported  4/02:  Court Hold (updated orders)  4/04:  Exam/Preliminary Hearing  4/09:  Final hearing  4/11:  DECISION (Notified 4/15):  Full MI Commitment with Blanco ([id number]  4/16:  Epic orders updated (Committed + Blanco)  TBD:  Provisional Discharge (PD) date pending.  Placement: [facility] initiated referrals after care conference. [facility] to assist with PD.  Exclusion criteria met for IP MH placement.  Precautions placed:  Routine as per Unit; Delirium; Sexual; Agitation.      Review of notes and/or information:  I personally reviewed notes from the patient's electronic chart since last psychiatry consult dated 4/23, most recent visit, and other interim reports. This provided me with information regarding patient's recent clinical course.     I personally reviewed the patient's chart, including available medication list and progression of hospital course.       OBJECTIVE   Upon interview, the patient is cooperative on approach.  Does not demonstrate aggressive behaviors toward this physician.  Visit performed in patient's room on the unit.  Patient is not voluntary.    INTERVAL HISTORY:  Chart reviewed.  Patient discussed with staff for follow-up visit after care conference on 4/23.  At last visit, patient demonstrated efficacy and tolerability to oral risperidone allowing conversion on long-acting injectable, Risperdal Consta, followed by initial efforts to taper PTA Zyprexa while maintaining all Risperdal for augmentation.  Demonstrating need for at least 2 neuroleptics for acute stabilization with goal to proceed to eventual neuroleptic monotherapy.    Evaluation performed directly without issue.  Able to engage patient more meaningfully  than previously as patient demonstrated efforts of feeding self and watching TV.  Appearing comfortable and calm without acute distress.  Patient made aware of ongoing efforts to proceed with slow taper of neuroleptic, specifically, initiate close reduction of or risperidone after conversion on Risperdal Consta.  No signs or symptoms of decompensation after initial efforts of Zyprexa taper.    Care discussed with bedside RN and no concerns reported.    Suicidal ideation: denies current or recent suicidal ideation or behaviors.    Homicidal ideation: denies current or recent homicidal ideation or behaviors.    Psychotic symptoms: No overt psychosis or agitated behaviors.    Medication side effects reported: No significant side effects.    Acute medical concerns: none    Other issues reported by patient:  Patient had no further questions or concerns.         MEDICATIONS   Medications:  Scheduled Meds:  Current Facility-Administered Medications   Medication Dose Route Frequency Provider Last Rate Last Admin    artificial tears ophthalmic ointment   Left Eye At Bedtime Mary Martinez DO   Given at 04/24/25 2144    benztropine (COGENTIN) tablet 2 mg  2 mg Oral At Bedtime Aisha Velez MD   2 mg at 04/24/25 2143    busPIRone (BUSPAR) tablet 30 mg  30 mg Oral BID Arnel Peres MD   30 mg at 04/25/25 0825    hydrOXYzine HCl (ATARAX) tablet 10 mg  10 mg Oral BID Lisseth Harris MD   10 mg at 04/24/25 2133    lamoTRIgine (LaMICtal) tablet 200 mg  200 mg Oral Daily Arnel Peres MD   200 mg at 04/25/25 0826    Lidocaine (LIDOCARE) 4 % Patch 1 patch  1 patch Transdermal Q24H Panfilo Damon DO   1 patch at 04/24/25 0817    melatonin tablet 3 mg  3 mg Oral At Bedtime Arnel Peres MD   3 mg at 04/24/25 2143    OLANZapine (zyPREXA) tablet 7.5 mg  7.5 mg Oral TID Arnel Peres MD   7.5 mg at 04/25/25 0825    Or    OLANZapine (zyPREXA) injection 10 mg  10 mg Intramuscular TID  Arnel Peres MD        risperiDONE (risperDAL M-TABS) ODT tab 1 mg  1 mg Oral TID Arnel Peres MD   1 mg at 04/25/25 0825    risperiDONE microspheres ER (risperDAL CONSTA) injection 25 mg  25 mg Intramuscular Q14 Days Arnel Peres MD   25 mg at 04/23/25 1256    tamsulosin (FLOMAX) capsule 0.4 mg  0.4 mg Oral Daily Lisseth Harris MD   0.4 mg at 04/25/25 0825     Continuous Infusions:  Current Facility-Administered Medications   Medication Dose Route Frequency Provider Last Rate Last Admin     PRN Meds:.  Current Facility-Administered Medications   Medication Dose Route Frequency Provider Last Rate Last Admin    alum & mag hydroxide-simethicone (MAALOX) suspension 30 mL  30 mL Oral Q4H PRN Kendrick Alex MD   30 mL at 04/08/25 1424    benztropine (COGENTIN) tablet 1 mg  1 mg Oral BID PRN Arnel Peres MD        calcium carbonate (TUMS) chewable tablet 1,000 mg  1,000 mg Oral 4x Daily PRN Mary Martinez DO   1,000 mg at 04/12/25 2059    glucose gel 15-30 g  15-30 g Oral Q15 Min PRN J Carlos Jay MD        Or    dextrose 50 % injection 25-50 mL  25-50 mL Intravenous Q15 Min PRN J Carlos Jay MD   25 mL at 04/21/25 0151    Or    glucagon injection 1 mg  1 mg Subcutaneous Q15 Min PRN J Carlos Jay MD   1 mg at 04/20/25 1426    diclofenac (VOLTAREN) 1 % topical gel 2 g  2 g Topical 4x Daily PRN Panfilo Damon DO   2 g at 04/22/25 2026    haloperidol lactate (HALDOL) injection 5 mg  5 mg Intramuscular Q8H PRN Arnel Peres MD        And    LORazepam (ATIVAN) injection 1 mg  1 mg Intramuscular Q8H PRN Arnel Peres MD        hydrOXYzine HCl (ATARAX) tablet 10 mg  10 mg Oral TID PRN Arnel Peres MD        ibuprofen (ADVIL/MOTRIN) tablet 400 mg  400 mg Oral Q8H PRN Kendrick Alex MD   400 mg at 04/23/25 1849    lidocaine (LMX4) cream   Topical Q1H PRN Mary Martinez DO        lidocaine 1 % 0.1-1 mL  0.1-1 mL Other Q1H PRN Juan,  DO Mary        melatonin tablet 3 mg  3 mg Oral At Bedtime PRN Arnel Peres MD   3 mg at 04/21/25 2145    ondansetron (ZOFRAN ODT) ODT tab 4 mg  4 mg Oral Q6H PRN Mary Martinez DO   4 mg at 04/17/25 1659    Or    ondansetron (ZOFRAN) injection 4 mg  4 mg Intravenous Q6H PRN Mary Martinez DO        oxyBUTYnin (DITROPAN) half-tab 2.5 mg  2.5 mg Oral TID PRN Lisseth Harris MD        polyethylene glycol (MIRALAX) Packet 17 g  17 g Oral BID PRN Mary Martinez DO        prochlorperazine (COMPAZINE) injection 5 mg  5 mg Intravenous Q6H PRN Mary Martinez DO        Or    prochlorperazine (COMPAZINE) tablet 5 mg  5 mg Oral Q6H PRN Mary Martinez DO        risperiDONE (risperDAL M-TABS) ODT tab 1 mg  1 mg Oral BID PRN Arnel Peres MD   1 mg at 04/17/25 0321    senna-docusate (SENOKOT-S/PERICOLACE) 8.6-50 MG per tablet 1 tablet  1 tablet Oral BID PRN Mary Martinez DO        Or    senna-docusate (SENOKOT-S/PERICOLACE) 8.6-50 MG per tablet 2 tablet  2 tablet Oral BID PRN Mary Martinez DO        simethicone (MYLICON) chewable half-tab 40 mg  40 mg Oral Q6H PRN Kendrick Alex MD        sodium chloride (PF) 0.9% PF flush 3 mL  3 mL Intracatheter q1 min prn Mary Martinez DO   3 mL at 04/21/25 2026     Medication adherence issues: MS Med Adherence Y/N: No  Medication side effects: MEDICATION SIDE EFFECTS: no side effects reported  Benefit: Yes / No: Yes       ROS   The 10 point Review of Systems is negative other than noted in the HPI or here.       MENTAL STATUS EXAM   Vitals: BP (!) 159/67 (BP Location: Right arm)   Pulse 74   Temp 96.9  F (36.1  C) (Temporal)   Resp 16   Wt 69.4 kg (153 lb)   SpO2 97%   BMI 22.59 kg/m      Weight:   152 lbs 15.99 oz    Body mass index is 22.59 kg/m .  Vitals:    04/12/25 0647 04/20/25 0412   Weight: 58.4 kg (128 lb 12 oz) 69.4 kg (153 lb)     Appearance:  Calm, approachable  Mood:  Mood:  "Euthymic  Affect: appropriate  was congruent to speech  Suicidal Ideation: PRESENT / ABSENT: absent   Homicidal Ideation: PRESENT / ABSENT: absent   Thought process: response delay   Thought content: devoid of  suicidal ideation and violent ideation.   Fund of Knowledge: Below average  Attention/Concentration: Fair  Language ability:  Chronic dysarthria  Memory:  BAHMAN  Insight:  limited.  Judgement: limited  Orientation: Person:  yes  Psychomotor Behavior: slowed    Muscle Strength and Tone: MuscleStrength: Normal  Gait and Station:  In bed       LABS   personally reviewed.   Temp: 96.9  F (36.1  C) Temp src: Temporal BP: (!) 159/67 Pulse: 74   Resp: 16 SpO2: 97 % O2 Device: None (Room air)     Weight: 69.4 kg (153 lb)  Estimated body mass index is 22.59 kg/m  as calculated from the following:    Height as of 7/23/23: 1.753 m (5' 9\").    Weight as of this encounter: 69.4 kg (153 lb).    No results found for this or any previous visit (from the past 48 hours).    Qtc: 473    No results found for: \"PHENYTOIN\", \"PHENOBARB\", \"VALPROATE\", \"CBMZ\"       DIAGNOSIS   Principal Problem:    Schizoaffective disorder, bipolar type (H)    Active Problem List:  Patient Active Problem List   Diagnosis    Carol (H)    Acute UTI    Other hydronephrosis    Slurred speech    Falls frequently    Mood disorder with psychosis    Agitation    Anxiety    Schizoaffective disorder, bipolar type (H)    Hx of schizophrenia    Aggressive behavior    ROBBIN (acute kidney injury)          ASSESSMENT/PLAN   Today's Changes-04/25/2025:  Medication Changes:    Risperdal: On 4/26, initiate taper overall risperidone from 1 mg 3 times daily to 1 mg twice daily and 0.5 mg daily.  Demonstrating no signs or symptoms of decompensation after initial Zyprexa taper and conversion onto Risperdal Consta or HOBBS therapy.  Monitor ongoing stability with plans to continue slow neuroleptic taper with conversion onto HOBBS.  No Blanco order as HOBBS administered on " 4/23.  Continue Medications:  BLANCO (risperidone, olanzapine, quetiapine, haloperidol):  IM backup coordinated to scheduled Zyprexa tid doses  Risperdal Consta:  On 4/23, initiated conversion onto HOBBS, Risperdal Consta IM 25 mg every 2 weeks, psychosis/treatment non-adherence.  Demonstrating tolerability with ongoing efficacy at target symptom management.  No signs or symptoms of decompensation, with initial taper of whole neuroleptic counterpart.  Recommendation to continue conversion process and consider HOBBS titration after next injection, if clinically indicated.  Zyprexa:  Continue initial tapered Zyprexa Zyprexa 7.5 mg tid + Zyprexa  IM 10 mg as per Blanco.  No signs or symptoms of decompensation.  Monitor ongoing stability with plan to continue taper as clinically indicated.  Lamictal: Continue optimized dose of PTA lamotrigine 200 mg daily, mood lability.  Improved adherence and no issues of tolerability.  Demonstrating efficacy with dose titration at targeting mood stabilization.  BuSpar: Continue optimized dose of PTA 30 mg twice daily, anxiety.  Cogentin: Continue PTA Cogentin 2 mg nightly, EPSE + as needed Cogentin 1 mg BID, EPSE.  Atarax: Continue PTA Atarax 10 mg twice daily.    Melatonin: Schedule Melatonin 3 mg at bedtime, sleep.  PRN Atarax:  Continue as needed Atarax 10 mg 3 times daily, anxiety, rank order #1.  PRN Risperdal ODT:  Continue as needed Risperdal ODT 1 mg bid, mild to moderate agitation.  PRN Haldol IM + Ativan IM:  Continue adjusted as needed Haldol 5 mg IM every 8 hours to include as needed Ativan IM 1 mg every 8 hours, severe agitation.  No PRN relported since last visit.  PRN Ativan:  Resolved daytime sedation after discontinued as needed Ativan 2 mg every 6 hours monotherapy and started in combination at a lower dose as needed Ativan 1 mg every 8 hours with PRN Haldol.  Other:  Legal:   3/28 (0309):  Placed on a 72-hour hold.  3/28:  Filed [facility] Petition for Commitment  with Blanco.   3/31:  Petition re-filed through [facility] --> Supported  4/02:  Court Hold (updated orders)  4/04:  Exam/Preliminary Hearing  4/09:  Final hearing  4/11:  DECISION (Notified 4/15):  Full MI Commitment with Blanco ([id number]-)  4/16:  Epic orders updated (Committed + Blanco)  TBD:  Provisional Discharge (PD) date pending.  Placement: Care Free L[facility] review.  [facility] CM initiated referrals after care conference. [facility] to assist with PD.  Exclusion criteria met for IP MH placement.  Precautions placed:  Routine as per Unit; Delirium; Aggression  Care Conferences:  4/16:  Initial complex care conference. 4/23:  Follow up.  Please also refer to RN/team documentation for additional details.     Target psychiatric symptoms and interventions:  Dual-Neuroleptic Therapy:  Demonstrating need to continue at least 2 neuroleptics to target/maintain symptoms.  Goal is to progress toward neuroleptic monotherapy.  Specifically, plans to continue taper of PTA Zyprexa and oral Risperdal after conversion onto HOBBS (Risperdal Consta) on 4/23.    Acute Medical Problems and Treatments:  .  Acute medical concerns:  No acute medical concerns.   Consults:  PT (4/24) reviewed    Care Coordination:  Treatment plan discussed directly with bedside RN.  Please reconsult Psychiatry as needed.  Next visit planned on 4/28.        Risk Assessment: IP MHAC RISK ASSESSMENT: Patient able to contract for safety and Patient on precautions    Coordination of Care:   Treatment Plan reviewed, Care discussed with Care/Treatment Team Members, Chart reviewed and Patient seen         Arnel Peres MD    -     04/25/2025  -     8:39 AM    Total encounter time:  A total of  53  minutes spent related to chart review, history and exam, documentation   and further activities as noted above    This note was created with help of Dragon dictation system. Grammatical / typing errors are not intentional.        Arnel Peres  MD  Consult/Liaison Psychiatry   [facility]  Securely message with [employer]

## 2025-04-26 ENCOUNTER — APPOINTMENT (OUTPATIENT)
Dept: SPEECH THERAPY | Facility: CLINIC | Age: 70
DRG: 885 | End: 2025-04-26
Payer: COMMERCIAL

## 2025-04-26 LAB
ANION GAP SERPL CALCULATED.3IONS-SCNC: 10 MMOL/L (ref 7–15)
BUN SERPL-MCNC: 23.9 MG/DL (ref 8–23)
CALCIUM SERPL-MCNC: 10.2 MG/DL (ref 8.8–10.4)
CHLORIDE SERPL-SCNC: 103 MMOL/L (ref 98–107)
CREAT SERPL-MCNC: 1.29 MG/DL (ref 0.67–1.17)
EGFRCR SERPLBLD CKD-EPI 2021: 60 ML/MIN/1.73M2
ERYTHROCYTE [DISTWIDTH] IN BLOOD BY AUTOMATED COUNT: 15.3 % (ref 10–15)
GLUCOSE SERPL-MCNC: 96 MG/DL (ref 70–99)
HCO3 SERPL-SCNC: 28 MMOL/L (ref 22–29)
HCT VFR BLD AUTO: 35.4 % (ref 40–53)
HGB BLD-MCNC: 11.4 G/DL (ref 13.3–17.7)
MCH RBC QN AUTO: 28.6 PG (ref 26.5–33)
MCHC RBC AUTO-ENTMCNC: 32.2 G/DL (ref 31.5–36.5)
MCV RBC AUTO: 89 FL (ref 78–100)
PLATELET # BLD AUTO: 225 10E3/UL (ref 150–450)
POTASSIUM SERPL-SCNC: 4.5 MMOL/L (ref 3.4–5.3)
RBC # BLD AUTO: 3.98 10E6/UL (ref 4.4–5.9)
SODIUM SERPL-SCNC: 141 MMOL/L (ref 135–145)
WBC # BLD AUTO: 7.3 10E3/UL (ref 4–11)

## 2025-04-26 PROCEDURE — 99231 SBSQ HOSP IP/OBS SF/LOW 25: CPT | Performed by: INTERNAL MEDICINE

## 2025-04-26 PROCEDURE — 80048 BASIC METABOLIC PNL TOTAL CA: CPT | Performed by: INTERNAL MEDICINE

## 2025-04-26 PROCEDURE — 250N000013 HC RX MED GY IP 250 OP 250 PS 637: Performed by: INTERNAL MEDICINE

## 2025-04-26 PROCEDURE — 92526 ORAL FUNCTION THERAPY: CPT | Mod: GN | Performed by: SPEECH-LANGUAGE PATHOLOGIST

## 2025-04-26 PROCEDURE — 36415 COLL VENOUS BLD VENIPUNCTURE: CPT | Performed by: INTERNAL MEDICINE

## 2025-04-26 PROCEDURE — 250N000013 HC RX MED GY IP 250 OP 250 PS 637: Performed by: EMERGENCY MEDICINE

## 2025-04-26 PROCEDURE — 85014 HEMATOCRIT: CPT | Performed by: INTERNAL MEDICINE

## 2025-04-26 PROCEDURE — 250N000013 HC RX MED GY IP 250 OP 250 PS 637: Performed by: PSYCHIATRY & NEUROLOGY

## 2025-04-26 PROCEDURE — 120N000001 HC R&B MED SURG/OB

## 2025-04-26 RX ADMIN — RISPERIDONE 1 MG: 0.5 TABLET, ORALLY DISINTEGRATING ORAL at 13:55

## 2025-04-26 RX ADMIN — LAMOTRIGINE 200 MG: 200 TABLET ORAL at 07:57

## 2025-04-26 RX ADMIN — OLANZAPINE 7.5 MG: 5 TABLET, FILM COATED ORAL at 07:51

## 2025-04-26 RX ADMIN — IBUPROFEN 400 MG: 400 TABLET, FILM COATED ORAL at 18:04

## 2025-04-26 RX ADMIN — OLANZAPINE 7.5 MG: 5 TABLET, FILM COATED ORAL at 13:55

## 2025-04-26 RX ADMIN — OLANZAPINE 7.5 MG: 5 TABLET, FILM COATED ORAL at 20:55

## 2025-04-26 RX ADMIN — RISPERIDONE 1 MG: 0.5 TABLET, ORALLY DISINTEGRATING ORAL at 07:52

## 2025-04-26 RX ADMIN — DICLOFENAC SODIUM 2 G: 10 GEL TOPICAL at 10:08

## 2025-04-26 RX ADMIN — TAMSULOSIN HYDROCHLORIDE 0.4 MG: 0.4 CAPSULE ORAL at 07:52

## 2025-04-26 RX ADMIN — LIDOCAINE 1 PATCH: 4 PATCH TOPICAL at 07:51

## 2025-04-26 RX ADMIN — RISPERIDONE 1 MG: 0.5 TABLET, ORALLY DISINTEGRATING ORAL at 20:55

## 2025-04-26 RX ADMIN — BUSPIRONE HYDROCHLORIDE 30 MG: 10 TABLET ORAL at 20:55

## 2025-04-26 RX ADMIN — HYDROXYZINE HYDROCHLORIDE 10 MG: 10 TABLET, FILM COATED ORAL at 20:55

## 2025-04-26 RX ADMIN — BUSPIRONE HYDROCHLORIDE 30 MG: 10 TABLET ORAL at 07:52

## 2025-04-26 RX ADMIN — HYDROXYZINE HYDROCHLORIDE 10 MG: 10 TABLET, FILM COATED ORAL at 12:07

## 2025-04-26 RX ADMIN — MINERAL OIL, WHITE PETROLATUM: .03; .94 OINTMENT OPHTHALMIC at 22:23

## 2025-04-26 RX ADMIN — BENZTROPINE MESYLATE 2 MG: 1 TABLET ORAL at 22:23

## 2025-04-26 RX ADMIN — Medication 3 MG: at 22:23

## 2025-04-26 ASSESSMENT — ACTIVITIES OF DAILY LIVING (ADL)
ADLS_ACUITY_SCORE: 74
ADLS_ACUITY_SCORE: 73
ADLS_ACUITY_SCORE: 78
ADLS_ACUITY_SCORE: 74
ADLS_ACUITY_SCORE: 78
ADLS_ACUITY_SCORE: 74
ADLS_ACUITY_SCORE: 78
ADLS_ACUITY_SCORE: 73
ADLS_ACUITY_SCORE: 78
ADLS_ACUITY_SCORE: 73
ADLS_ACUITY_SCORE: 78
ADLS_ACUITY_SCORE: 78
ADLS_ACUITY_SCORE: 73
ADLS_ACUITY_SCORE: 73
ADLS_ACUITY_SCORE: 78
ADLS_ACUITY_SCORE: 74
ADLS_ACUITY_SCORE: 78

## 2025-04-26 NOTE — PLAN OF CARE
"Goal Outcome Evaluation:      Plan of Care Reviewed With: patient    Overall Patient Progress: improvingOverall Patient Progress: improving    Outcome Evaluation: Medically ready for discharge. Awaiting placement plan per psych and court.      VSS on RA. Ax2 gbw. Disoriented to situation and time. Calm and cooperative with no witnessed aggressive behaviors. Mepilex applied to sacrum. Wound cares completed. Poor appetite. Turned and repositioned. Incontinent of bladder. Denied pain. Medically ready for discharge. Awaiting placement per psych and court.    Problem: Adult Inpatient Plan of Care  Goal: Plan of Care Review  Description: The Plan of Care Review/Shift note should be completed every shift.  The Outcome Evaluation is a brief statement about your assessment that the patient is improving, declining, or no change.  This information will be displayed automatically on your shiftnote.  Outcome: Progressing  Flowsheets (Taken 4/26/2025 0335)  Outcome Evaluation: Medically ready for discharge. Awaiting placement plan per psych and court.  Plan of Care Reviewed With: patient  Overall Patient Progress: improving  Goal: Patient-Specific Goal (Individualized)  Description: You can add care plan individualizations to a care plan. Examples of Individualization might be:  \"Parent requests to be called daily at 9am for status\", \"I have a hard time hearing out of my right ear\", or \"Do not touch me to wake me up as it startlesme\".  Outcome: Progressing  Goal: Absence of Hospital-Acquired Illness or Injury  Outcome: Progressing  Intervention: Identify and Manage Fall Risk  Recent Flowsheet Documentation  Taken 4/25/2025 2033 by Berenice Nguyen RN  Safety Promotion/Fall Prevention:   activity supervised   assistive device/personal items within reach   clutter free environment maintained   nonskid shoes/slippers when out of bed   patient and family education   safety round/check completed   room near nurse's " station  Intervention: Prevent Skin Injury  Recent Flowsheet Documentation  Taken 4/26/2025 0048 by Berenice Nguyen RN  Body Position:   turned   right   heels elevated  Taken 4/25/2025 2033 by Berenice Nguyen RN  Body Position:   supine, head elevated   weight shifting  Intervention: Prevent and Manage VTE (Venous Thromboembolism) Risk  Recent Flowsheet Documentation  Taken 4/25/2025 2033 by Berenice Nguyen RN  VTE Prevention/Management: SCDs off (sequential compression devices)  Intervention: Prevent Infection  Recent Flowsheet Documentation  Taken 4/25/2025 2033 by Berenice Nguyen RN  Infection Prevention:   hand hygiene promoted   single patient room provided  Goal: Optimal Comfort and Wellbeing  Outcome: Progressing  Intervention: Provide Person-Centered Care  Recent Flowsheet Documentation  Taken 4/25/2025 2033 by Berenice Nguyen RN  Trust Relationship/Rapport:   care explained   reassurance provided  Goal: Readiness for Transition of Care  Outcome: Progressing     Problem: Violence Risk or Actual  Goal: Anger and Impulse Control  Outcome: Progressing  Intervention: Minimize Safety Risk  Recent Flowsheet Documentation  Taken 4/25/2025 2033 by Berenice Nguyen RN  Sensory Stimulation Regulation:   care clustered   quiet environment promoted  Enhanced Safety Measures:   floor mats   review medications for side effects with activity   room near unit station  Intervention: Promote Self-Control  Recent Flowsheet Documentation  Taken 4/25/2025 2033 by Berenice Nguyen RN  Supportive Measures: active listening utilized  Environmental Support: calm environment promoted     Problem: Fall Injury Risk  Goal: Absence of Fall and Fall-Related Injury  Outcome: Progressing  Intervention: Identify and Manage Contributors  Recent Flowsheet Documentation  Taken 4/25/2025 2033 by Berenice Nguyen RN  Medication Review/Management:   medications reviewed   high-risk medications identified  Intervention: Promote Injury-Free  Environment  Recent Flowsheet Documentation  Taken 4/25/2025 2033 by Berenice Nguyen, RN  Safety Promotion/Fall Prevention:   activity supervised   assistive device/personal items within reach   clutter free environment maintained   nonskid shoes/slippers when out of bed   patient and family education   safety round/check completed   room near nurse's station

## 2025-04-26 NOTE — PLAN OF CARE
"Pt has been calm, pleasant and following directions. Reported some pain to left shoulder and R hip. Lidocaine and Voltaren gel applied. Took all scheduled meds with any issues. Was up in the chair with A2 gait belt and walker. Incon of B/B. Voiding without any issues. On purred diet/thin with no straw. Pt able to feed self. Will continue to provide supportive.    Problem: Adult Inpatient Plan of Care  Goal: Plan of Care Review  Description: The Plan of Care Review/Shift note should be completed every shift.  The Outcome Evaluation is a brief statement about your assessment that the patient is improving, declining, or no change.  This information will be displayed automatically on your shiftnote.  Outcome: Progressing  Flowsheets (Taken 4/26/2025 1050)  Overall Patient Progress: improving  Goal: Patient-Specific Goal (Individualized)  Description: You can add care plan individualizations to a care plan. Examples of Individualization might be:  \"Parent requests to be called daily at 9am for status\", \"I have a hard time hearing out of my right ear\", or \"Do not touch me to wake me up as it startlesme\".  Outcome: Progressing  Goal: Absence of Hospital-Acquired Illness or Injury  Outcome: Progressing  Intervention: Identify and Manage Fall Risk  Recent Flowsheet Documentation  Taken 4/26/2025 0758 by Ene Castillo RN  Safety Promotion/Fall Prevention:   activity supervised   clutter free environment maintained   mobility aid in reach   nonskid shoes/slippers when out of bed   room door open   room near nurse's station   safety round/check completed  Intervention: Prevent and Manage VTE (Venous Thromboembolism) Risk  Recent Flowsheet Documentation  Taken 4/26/2025 0758 by Ene Castillo RN  VTE Prevention/Management: (Simultaneous filing. User may be unaware of other data.) SCDs off (sequential compression devices)  Intervention: Prevent Infection  Recent Flowsheet Documentation  Taken 4/26/2025 0758 by Ene Castillo, " RN  Infection Prevention:   single patient room provided   rest/sleep promoted   hand hygiene promoted   environmental surveillance performed  Goal: Optimal Comfort and Wellbeing  Outcome: Progressing  Intervention: Provide Person-Centered Care  Recent Flowsheet Documentation  Taken 4/26/2025 0758 by Ene Castillo RN  Trust Relationship/Rapport:   care explained   reassurance provided   thoughts/feelings acknowledged  Goal: Readiness for Transition of Care  Outcome: Progressing     Problem: Violence Risk or Actual  Goal: Anger and Impulse Control  Outcome: Progressing  Intervention: Minimize Safety Risk  Recent Flowsheet Documentation  Taken 4/26/2025 0758 by Ene Castillo RN  Enhanced Safety Measures:   floor mats   room near unit station  Intervention: Promote Self-Control  Recent Flowsheet Documentation  Taken 4/26/2025 0758 by Ene Castillo RN  Supportive Measures: active listening utilized  Environmental Support: calm environment promoted     Problem: Fall Injury Risk  Goal: Absence of Fall and Fall-Related Injury  Outcome: Progressing  Intervention: Identify and Manage Contributors  Recent Flowsheet Documentation  Taken 4/26/2025 0758 by Ene Castillo RN  Medication Review/Management:   medications reviewed   high-risk medications identified  Intervention: Promote Injury-Free Environment  Recent Flowsheet Documentation  Taken 4/26/2025 0758 by Ene Castillo RN  Safety Promotion/Fall Prevention:   activity supervised   clutter free environment maintained   mobility aid in reach   nonskid shoes/slippers when out of bed   room door open   room near nurse's station   safety round/check completed   Goal Outcome Evaluation:           Overall Patient Progress: improvingOverall Patient Progress: improving

## 2025-04-27 PROCEDURE — 250N000013 HC RX MED GY IP 250 OP 250 PS 637: Performed by: EMERGENCY MEDICINE

## 2025-04-27 PROCEDURE — 99231 SBSQ HOSP IP/OBS SF/LOW 25: CPT | Performed by: INTERNAL MEDICINE

## 2025-04-27 PROCEDURE — 250N000013 HC RX MED GY IP 250 OP 250 PS 637: Performed by: PSYCHIATRY & NEUROLOGY

## 2025-04-27 PROCEDURE — 120N000001 HC R&B MED SURG/OB

## 2025-04-27 PROCEDURE — 250N000013 HC RX MED GY IP 250 OP 250 PS 637: Performed by: INTERNAL MEDICINE

## 2025-04-27 RX ADMIN — RISPERIDONE 1 MG: 0.5 TABLET, ORALLY DISINTEGRATING ORAL at 08:29

## 2025-04-27 RX ADMIN — OLANZAPINE 7.5 MG: 5 TABLET, FILM COATED ORAL at 08:28

## 2025-04-27 RX ADMIN — LIDOCAINE 1 PATCH: 4 PATCH TOPICAL at 08:29

## 2025-04-27 RX ADMIN — RISPERIDONE 1 MG: 0.5 TABLET, ORALLY DISINTEGRATING ORAL at 20:26

## 2025-04-27 RX ADMIN — BUSPIRONE HYDROCHLORIDE 30 MG: 10 TABLET ORAL at 08:29

## 2025-04-27 RX ADMIN — TAMSULOSIN HYDROCHLORIDE 0.4 MG: 0.4 CAPSULE ORAL at 08:29

## 2025-04-27 RX ADMIN — OLANZAPINE 7.5 MG: 5 TABLET, FILM COATED ORAL at 13:02

## 2025-04-27 RX ADMIN — HYDROXYZINE HYDROCHLORIDE 10 MG: 10 TABLET, FILM COATED ORAL at 20:25

## 2025-04-27 RX ADMIN — MINERAL OIL, WHITE PETROLATUM: .03; .94 OINTMENT OPHTHALMIC at 22:25

## 2025-04-27 RX ADMIN — OLANZAPINE 7.5 MG: 5 TABLET, FILM COATED ORAL at 20:25

## 2025-04-27 RX ADMIN — BENZTROPINE MESYLATE 2 MG: 1 TABLET ORAL at 22:25

## 2025-04-27 RX ADMIN — Medication 3 MG: at 22:25

## 2025-04-27 RX ADMIN — LAMOTRIGINE 200 MG: 200 TABLET ORAL at 08:30

## 2025-04-27 RX ADMIN — HYDROXYZINE HYDROCHLORIDE 10 MG: 10 TABLET, FILM COATED ORAL at 12:38

## 2025-04-27 RX ADMIN — BUSPIRONE HYDROCHLORIDE 30 MG: 10 TABLET ORAL at 20:25

## 2025-04-27 RX ADMIN — RISPERIDONE 1 MG: 0.5 TABLET, ORALLY DISINTEGRATING ORAL at 13:02

## 2025-04-27 ASSESSMENT — ACTIVITIES OF DAILY LIVING (ADL)
ADLS_ACUITY_SCORE: 72
ADLS_ACUITY_SCORE: 73
ADLS_ACUITY_SCORE: 72
ADLS_ACUITY_SCORE: 74
ADLS_ACUITY_SCORE: 72

## 2025-04-27 NOTE — PLAN OF CARE
"Goal Outcome Evaluation:      Plan of Care Reviewed With: patient    Overall Patient Progress: improvingOverall Patient Progress: improving    Outcome Evaluation: Medically ready for discharge. Awaiting placement plan per psych and court.      VSS on RA. Ax1 gbw. Disoriented to situation and time. Calm and cooperative. No witnessed aggressive behaviors. Turned and repositioned. Tolerating diet. Voiding, incontinent. Pain managed with scheduled meds and repositioning. Medically ready for discharge. Awaiting placement per psych and court.       Problem: Adult Inpatient Plan of Care  Goal: Plan of Care Review  Description: The Plan of Care Review/Shift note should be completed every shift.  The Outcome Evaluation is a brief statement about your assessment that the patient is improving, declining, or no change.  This information will be displayed automatically on your shiftnote.  Outcome: Progressing  Flowsheets (Taken 4/27/2025 0323)  Outcome Evaluation: Medically ready for discharge. Awaiting placement plan per psych and court.  Plan of Care Reviewed With: patient  Overall Patient Progress: improving  Goal: Patient-Specific Goal (Individualized)  Description: You can add care plan individualizations to a care plan. Examples of Individualization might be:  \"Parent requests to be called daily at 9am for status\", \"I have a hard time hearing out of my right ear\", or \"Do not touch me to wake me up as it startlesme\".  Outcome: Progressing  Goal: Absence of Hospital-Acquired Illness or Injury  Outcome: Progressing  Intervention: Identify and Manage Fall Risk  Recent Flowsheet Documentation  Taken 4/26/2025 2225 by Berenice Nguyen RN  Safety Promotion/Fall Prevention:   activity supervised   clutter free environment maintained   mobility aid in reach   nonskid shoes/slippers when out of bed   room door open   room near nurse's station   safety round/check completed  Intervention: Prevent Skin Injury  Recent Flowsheet " Documentation  Taken 4/26/2025 2234 by Berenice Nguyen RN  Body Position:   turned   right   heels elevated  Intervention: Prevent and Manage VTE (Venous Thromboembolism) Risk  Recent Flowsheet Documentation  Taken 4/26/2025 2225 by Berenice Nguyen RN  VTE Prevention/Management: SCDs off (sequential compression devices)  Intervention: Prevent Infection  Recent Flowsheet Documentation  Taken 4/26/2025 2225 by Berenice Nguyen RN  Infection Prevention:   single patient room provided   rest/sleep promoted   hand hygiene promoted   environmental surveillance performed  Goal: Optimal Comfort and Wellbeing  Outcome: Progressing  Intervention: Monitor Pain and Promote Comfort  Recent Flowsheet Documentation  Taken 4/26/2025 2055 by Berenice Nguyen RN  Pain Management Interventions:   medication (see MAR)   care clustered   quiet environment facilitated  Intervention: Provide Person-Centered Care  Recent Flowsheet Documentation  Taken 4/26/2025 2225 by Berenice Nguyen RN  Trust Relationship/Rapport:   care explained   reassurance provided   thoughts/feelings acknowledged  Goal: Readiness for Transition of Care  Outcome: Progressing     Problem: Violence Risk or Actual  Goal: Anger and Impulse Control  Outcome: Progressing  Intervention: Minimize Safety Risk  Recent Flowsheet Documentation  Taken 4/26/2025 2225 by Berenice Nguyen RN  Sensory Stimulation Regulation:   care clustered   quiet environment promoted  Enhanced Safety Measures:   floor mats   room near unit station  Intervention: Promote Self-Control  Recent Flowsheet Documentation  Taken 4/26/2025 2225 by Berenice Nguyen RN  Supportive Measures: active listening utilized  Environmental Support: calm environment promoted     Problem: Fall Injury Risk  Goal: Absence of Fall and Fall-Related Injury  Outcome: Progressing  Intervention: Identify and Manage Contributors  Recent Flowsheet Documentation  Taken 4/26/2025 2225 by Berenice Nguyen RN  Medication  Review/Management:   medications reviewed   high-risk medications identified  Intervention: Promote Injury-Free Environment  Recent Flowsheet Documentation  Taken 4/26/2025 2225 by Berenice Nguyen RN  Safety Promotion/Fall Prevention:   activity supervised   clutter free environment maintained   mobility aid in reach   nonskid shoes/slippers when out of bed   room door open   room near nurse's station   safety round/check completed

## 2025-04-27 NOTE — PROGRESS NOTES
[facility]    Medicine Progress Note - Hospitalist Service    Date of Admission:  3/27/2025    Assessment & Plan     Harpreet Mcelroy is a 69 year old male with bipolar, anxiety, depression, schizophrenia, BPD, paranoia, bladder cancer s/p ureteral stent, HTN, HLD, who came to [facility] on 3/27/2025 on a RENETTA from [facility] with Agitation, attempt to harm staff with cane and was diagnosed with decompensated schizophrenia and ROBBIN with creatinine 1.42.  He was admitted by the medicine service on 3/31/2025 after he was declined by inpatient psychiatry for Psychiatry admission. Was seen by Dr Peres from Psychiatry service here who managed his medications and recommended full MI commitment with [place] hearing.       The patient has an order for commitment from the [facility].  For that reason, discharge from the hospital to a community setting is not an option without his  approval.  Geriatric psychiatry is not available within the system and is not being offered.       Disposition at this time is dependent on the court and Psychiatry.        Date of Admission:  3/27/2025        Decompensated schizophrenia with intermittent agitation.  Bipolar disorder.  Anxiety.  Depression.  Chronic cognitive dysfunction.  -Psychiatry following.    -Appreciate continued input.  -Note, court proceeding resulted in guidance to commitment.     Acute kidney injury on chronic kidney disease stage II.  Creat up to 1.52 on 4/19. Then improved/leveled off around 1.3 for multiple checks.  - Avoid nephrotoxins as able.  - Recheck metabolic panel intermittently.     Hypoglycemia earlier in stay:  -Noted to be hypoglycemic and started on IVF (D5 at 75 ml/hr) on 4/18/25.  Now eating much better with multiple glu checks above 80 without further Dextrose needs.  -Check glucose intermittently.     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     Dysphagia.  -Speech pathology following.  -Dysphagia diet.     Bladder cancer   Chronic L ureteral stent:  [place] changed [place] [facility] [place] [place].  -PTA oxybutynin and flomax.  -Needs ongoing Urology follow-up after discharge for continued stent changes/cares.     Deconditioning:  - Physical therapy following.    -Recommendation for some Home Health therapy at discharge.               Diet: Snacks/Supplements Adult: [employer] Standard 1.4 Oral Supplement; With Meals  Combination Diet Thin Liquids (level 0) (no straws); Pureed Diet (level 4); Thin Liquids (level 0) (no straws)  Room Service    DVT Prophylaxis: Pneumatic Compression Devices  Hernandez Catheter: Not present  Lines: None     Cardiac Monitoring: None  Code Status: Full Code      Clinically Significant Risk Factors                                  # Financial/Environmental Concerns: none         Social Drivers of Health    Food Insecurity: Unknown (4/5/2025)    Food Insecurity     Within the past 12 months, did you worry that your food would run out before you got money to buy more?: Patient unable to answer     Within the past 12 months, did the food you bought just not last and you didn t have money to get more?: Patient unable to answer   Housing Stability: Unknown (4/5/2025)    Housing Stability     Do you have housing? : Patient unable to answer     Are you worried about losing your housing?: Patient unable to answer   Tobacco Use: Medium Risk (1/30/2025)    Received from [facility]    Patient History     Smoking Tobacco Use: Former     Smokeless Tobacco Use: Never   Financial Resource Strain: Unknown (4/5/2025)    Financial Resource Strain     Within the past 12  months, have you or your family members you live with been unable to get utilities (heat, electricity) when it was really needed?: Patient unable to answer   Transportation Needs: Unknown (4/5/2025)    Transportation Needs     Within the past 12 months, has lack of transportation kept you from medical appointments, getting your medicines, non-medical meetings or appointments, work, or from getting things that you need?: Patient unable to answer          Disposition Plan     Medically Ready for Discharge: Ready Now             Rivera Bishop DO  Hospitalist Service  [facility]  Securely message with [employer] (more info)  Text page via [facility] Paging/Directory   ______________________________________________________________________    Interval History   Speaking fairly clearly today.  Having occasional back pain.  Denies chest pain, shortness of breath, fevers, chills, nausea.    Physical Exam   Vital Signs: Temp: 97.2  F (36.2  C) Temp src: Temporal BP: 124/62 Pulse: 79   Resp: 18 SpO2: 99 % O2 Device: None (Room air)    Weight: 152 lbs 15.99 oz    Gen:  NAD, A&O seemingly x2 to person and place.  Trouble with time.  Eyes:  PERRL, sclera anicteric.  OP:  MMM, no lesions.  Neck:  Supple.  CV:  Regular, no murmurs.  Lung:  CTA b/l, normal effort.  Ab:  +BS, soft.  Skin:  Warm, dry to touch.  No rash.  Ext:  No pitting edema LE b/l.      Medical Decision Making       20 MINUTES SPENT BY ME on the date of service doing chart review, history, exam, documentation & further activities per the note.      Data         Imaging results reviewed over the past 24 hrs:   No results found for this or any previous visit (from the past 24 hours).

## 2025-04-27 NOTE — PLAN OF CARE
"Goal Outcome Evaluation:      Plan of Care Reviewed With: patient    Pt disoriented to time and situaion. Pain managed with lidocaine patch and atarax. Tolerating puree diet with thin liquids. Transfers with Ax1. AUO, incontinent.       Problem: Adult Inpatient Plan of Care  Goal: Plan of Care Review  Description: The Plan of Care Review/Shift note should be completed every shift.  The Outcome Evaluation is a brief statement about your assessment that the patient is improving, declining, or no change.  This information will be displayed automatically on your shiftnote.  Outcome: Progressing  Flowsheets (Taken 4/27/2025 1452)  Outcome Evaluation: Pt disoriented to time and situaion. Pain managed with lidocaine patch and atarax. Tolerating puree diet with thin liquids. Transfers with Ax1. AUO, incontinent.  Plan of Care Reviewed With: patient  Goal: Patient-Specific Goal (Individualized)  Description: You can add care plan individualizations to a care plan. Examples of Individualization might be:  \"Parent requests to be called daily at 9am for status\", \"I have a hard time hearing out of my right ear\", or \"Do not touch me to wake me up as it startlesme\".  Outcome: Progressing  Goal: Absence of Hospital-Acquired Illness or Injury  Outcome: Progressing  Intervention: Identify and Manage Fall Risk  Recent Flowsheet Documentation  Taken 4/27/2025 1000 by Berenice Noland, RN  Safety Promotion/Fall Prevention:   activity supervised   increased rounding and observation   clutter free environment maintained   increase visualization of patient   lighting adjusted   mobility aid in reach   nonskid shoes/slippers when out of bed   patient and family education   safety round/check completed  Intervention: Prevent Infection  Recent Flowsheet Documentation  Taken 4/27/2025 1000 by Berenice Noland, RN  Infection Prevention:   rest/sleep promoted   single patient room provided  Goal: Optimal Comfort and Wellbeing  Outcome: " Progressing  Intervention: Monitor Pain and Promote Comfort  Recent Flowsheet Documentation  Taken 4/27/2025 0837 by Berenice Noland, RN  Pain Management Interventions: medication (see MAR)  Intervention: Provide Person-Centered Care  Recent Flowsheet Documentation  Taken 4/27/2025 0837 by Berenice Noland RN  Trust Relationship/Rapport:   care explained   choices provided   thoughts/feelings acknowledged   empathic listening provided  Goal: Readiness for Transition of Care  Outcome: Progressing     Problem: Violence Risk or Actual  Goal: Anger and Impulse Control  Outcome: Progressing  Intervention: Minimize Safety Risk  Recent Flowsheet Documentation  Taken 4/27/2025 1000 by Berenice Noland RN  Enhanced Safety Measures: floor mats  Intervention: Promote Self-Control  Recent Flowsheet Documentation  Taken 4/27/2025 0837 by Berenice Noland RN  Supportive Measures: active listening utilized  Environmental Support:   calm environment promoted   distractions minimized     Problem: Fall Injury Risk  Goal: Absence of Fall and Fall-Related Injury  Outcome: Progressing  Intervention: Promote Injury-Free Environment  Recent Flowsheet Documentation  Taken 4/27/2025 1000 by Berenice Noland, RN  Safety Promotion/Fall Prevention:   activity supervised   increased rounding and observation   clutter free environment maintained   increase visualization of patient   lighting adjusted   mobility aid in reach   nonskid shoes/slippers when out of bed   patient and family education   safety round/check completed

## 2025-04-28 PROCEDURE — 120N000001 HC R&B MED SURG/OB

## 2025-04-28 PROCEDURE — 97110 THERAPEUTIC EXERCISES: CPT | Mod: GP | Performed by: PHYSICAL THERAPIST

## 2025-04-28 PROCEDURE — 250N000013 HC RX MED GY IP 250 OP 250 PS 637: Performed by: EMERGENCY MEDICINE

## 2025-04-28 PROCEDURE — 99231 SBSQ HOSP IP/OBS SF/LOW 25: CPT | Performed by: INTERNAL MEDICINE

## 2025-04-28 PROCEDURE — 250N000013 HC RX MED GY IP 250 OP 250 PS 637: Performed by: INTERNAL MEDICINE

## 2025-04-28 PROCEDURE — 97116 GAIT TRAINING THERAPY: CPT | Mod: GP | Performed by: PHYSICAL THERAPIST

## 2025-04-28 PROCEDURE — 92526 ORAL FUNCTION THERAPY: CPT | Mod: GN

## 2025-04-28 PROCEDURE — 250N000013 HC RX MED GY IP 250 OP 250 PS 637: Performed by: PSYCHIATRY & NEUROLOGY

## 2025-04-28 PROCEDURE — 99233 SBSQ HOSP IP/OBS HIGH 50: CPT | Performed by: PSYCHIATRY & NEUROLOGY

## 2025-04-28 RX ORDER — OLANZAPINE 10 MG/2ML
10 INJECTION, POWDER, FOR SOLUTION INTRAMUSCULAR 3 TIMES DAILY
Status: COMPLETED | OUTPATIENT
Start: 2025-04-28 | End: 2025-04-28

## 2025-04-28 RX ORDER — RISPERIDONE 0.5 MG/1
0.5 TABLET, ORALLY DISINTEGRATING ORAL
Status: DISCONTINUED | OUTPATIENT
Start: 2025-04-28 | End: 2025-04-30 | Stop reason: HOSPADM

## 2025-04-28 RX ORDER — OLANZAPINE 5 MG/1
5 TABLET, ORALLY DISINTEGRATING ORAL 3 TIMES DAILY
Status: DISCONTINUED | OUTPATIENT
Start: 2025-04-29 | End: 2025-04-30 | Stop reason: HOSPADM

## 2025-04-28 RX ORDER — RISPERIDONE 0.5 MG/1
1 TABLET, ORALLY DISINTEGRATING ORAL 2 TIMES DAILY
Status: DISCONTINUED | OUTPATIENT
Start: 2025-04-28 | End: 2025-04-30 | Stop reason: HOSPADM

## 2025-04-28 RX ORDER — OLANZAPINE 10 MG/2ML
5 INJECTION, POWDER, FOR SOLUTION INTRAMUSCULAR 3 TIMES DAILY
Status: DISCONTINUED | OUTPATIENT
Start: 2025-04-29 | End: 2025-04-30 | Stop reason: HOSPADM

## 2025-04-28 RX ADMIN — MINERAL OIL, WHITE PETROLATUM: .03; .94 OINTMENT OPHTHALMIC at 21:18

## 2025-04-28 RX ADMIN — LIDOCAINE 1 PATCH: 4 PATCH TOPICAL at 09:33

## 2025-04-28 RX ADMIN — HYDROXYZINE HYDROCHLORIDE 10 MG: 10 TABLET, FILM COATED ORAL at 12:24

## 2025-04-28 RX ADMIN — RISPERIDONE 1 MG: 0.5 TABLET, ORALLY DISINTEGRATING ORAL at 21:15

## 2025-04-28 RX ADMIN — OLANZAPINE 7.5 MG: 5 TABLET, FILM COATED ORAL at 21:15

## 2025-04-28 RX ADMIN — BUSPIRONE HYDROCHLORIDE 30 MG: 10 TABLET ORAL at 09:26

## 2025-04-28 RX ADMIN — LAMOTRIGINE 200 MG: 200 TABLET ORAL at 09:26

## 2025-04-28 RX ADMIN — RISPERIDONE 1 MG: 0.5 TABLET, ORALLY DISINTEGRATING ORAL at 09:26

## 2025-04-28 RX ADMIN — RISPERIDONE 0.5 MG: 0.5 TABLET, ORALLY DISINTEGRATING ORAL at 16:22

## 2025-04-28 RX ADMIN — BUSPIRONE HYDROCHLORIDE 30 MG: 10 TABLET ORAL at 21:14

## 2025-04-28 RX ADMIN — OLANZAPINE 7.5 MG: 5 TABLET, FILM COATED ORAL at 14:45

## 2025-04-28 RX ADMIN — HYDROXYZINE HYDROCHLORIDE 10 MG: 10 TABLET, FILM COATED ORAL at 21:15

## 2025-04-28 RX ADMIN — IBUPROFEN 400 MG: 400 TABLET, FILM COATED ORAL at 14:45

## 2025-04-28 RX ADMIN — TAMSULOSIN HYDROCHLORIDE 0.4 MG: 0.4 CAPSULE ORAL at 09:26

## 2025-04-28 RX ADMIN — BENZTROPINE MESYLATE 2 MG: 1 TABLET ORAL at 21:15

## 2025-04-28 RX ADMIN — Medication 3 MG: at 21:14

## 2025-04-28 RX ADMIN — OLANZAPINE 7.5 MG: 5 TABLET, FILM COATED ORAL at 09:26

## 2025-04-28 ASSESSMENT — ACTIVITIES OF DAILY LIVING (ADL)
ADLS_ACUITY_SCORE: 73
ADLS_ACUITY_SCORE: 72
ADLS_ACUITY_SCORE: 72
ADLS_ACUITY_SCORE: 73
ADLS_ACUITY_SCORE: 73
ADLS_ACUITY_SCORE: 72
ADLS_ACUITY_SCORE: 73
ADLS_ACUITY_SCORE: 72
ADLS_ACUITY_SCORE: 73
ADLS_ACUITY_SCORE: 78
ADLS_ACUITY_SCORE: 73
ADLS_ACUITY_SCORE: 72
ADLS_ACUITY_SCORE: 73
ADLS_ACUITY_SCORE: 72
ADLS_ACUITY_SCORE: 73
ADLS_ACUITY_SCORE: 73
ADLS_ACUITY_SCORE: 72
ADLS_ACUITY_SCORE: 72
ADLS_ACUITY_SCORE: 78
ADLS_ACUITY_SCORE: 73
ADLS_ACUITY_SCORE: 72
ADLS_ACUITY_SCORE: 72
ADLS_ACUITY_SCORE: 78

## 2025-04-28 NOTE — PROGRESS NOTES
Care Management Follow Up    Length of Stay (days): 28    Expected Discharge Date: 04/30/2025     Concerns to be Addressed: discharge planning       Patient plan of care discussed at interdisciplinary rounds: Yes    Anticipated Discharge Disposition: Assisted Living     Anticipated Discharge Services: Mental Health Resources    Anticipated Discharge DME: None    Patient/family educated on Medicare website which has current facility and service quality ratings: no    Education Provided on the Discharge Plan: Yes    Patient/Family in Agreement with the Plan: unable to assess    Referrals Placed by CM/SW: Post Acute Facilities    Private pay costs discussed: Not applicable    Discussed  Partnership in Safe Discharge Planning  document with patient/family: No     Handoff Completed: No, handoff not indicated or clinically appropriate    Additional Information:  HIRAM connected with Alfie,  at [facility], and confirmed that pt does use dentures and tolerates a regular diet at baseline. Alfie said that an LPN was going to double check pt's room and drop them off at the Hospital hopefully tomorrow. HIRAM is waiting for confirmation that the dentures were found and will be dropped off.     Next Steps:   SW will continue following until discharge and remain available as needed.     MAGO Meza  Inpatient Care Coordination   [facility]  [phone]

## 2025-04-28 NOTE — PROGRESS NOTES
[facility]    Medicine Progress Note - Hospitalist Service    Date of Admission:  3/27/2025    Assessment & Plan     Harpreet Mcelroy is a 69 year old male with bipolar, anxiety, depression, schizophrenia, BPD, paranoia, bladder cancer s/p ureteral stent, HTN, HLD, who came to [facility] on 3/27/2025 on a RENETTA from [facility] with Agitation, attempt to harm staff with cane and was diagnosed with decompensated schizophrenia and ROBBIN with creatinine 1.42.  He was admitted by the medicine service on 3/31/2025 after he was declined by inpatient psychiatry for Psychiatry admission. Was seen by Dr Peres from Psychiatry service here who managed his medications and recommended full MI commitment with [place] hearing.       The patient has an order for commitment from the formerly [facility].  For that reason, discharge from the hospital to a community setting is not an option without his  approval.  Geriatric psychiatry is not available within the system and is not being offered.       Disposition at this time is dependent on the court and Psychiatry.        Date of Admission:  3/27/2025        Decompensated schizophrenia with intermittent agitation.  Bipolar disorder.  Anxiety.  Depression.  Chronic cognitive dysfunction.  -Psychiatry following.    -Appreciate continued input.  -Note, court proceeding resulted in guidance to commitment.     Acute kidney injury on chronic kidney disease stage II.  Creat up to 1.52 on 4/19. Then improved/leveled off around 1.3 for multiple checks.  - Avoid nephrotoxins as able.  - Recheck metabolic panel intermittently.     Hypoglycemia earlier in stay:  -Noted to be hypoglycemic and started on IVF (D5 at 75 ml/hr) on 4/18/25.  Now eating much better with multiple glu checks above 80 without further Dextrose needs.  -Check glucose intermittently.     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     Dysphagia.  -Speech pathology following.  -Dysphagia diet.     Bladder cancer   Chronic L ureteral stent:  Stent last changed 2/4/25 by [facility] urology Dr. Whitt.  -PTA oxybutynin and flomax.  -Needs ongoing Urology follow-up after discharge for continued stent changes/cares.     Deconditioning:  - Physical therapy following.    -Recommendation for some Home Health therapy at discharge.               Diet: Snacks/Supplements Adult: App47 Standard 1.4 Oral Supplement; With Meals  Combination Diet Thin Liquids (level 0) (no straws); Pureed Diet (level 4); Thin Liquids (level 0) (no straws)  Room Service    DVT Prophylaxis: Pneumatic Compression Devices  Hernandez Catheter: Not present  Lines: None     Cardiac Monitoring: None  Code Status: Full Code      Clinically Significant Risk Factors                                  # Financial/Environmental Concerns: none         Social Drivers of Health    Food Insecurity: Unknown (4/5/2025)    Food Insecurity     Within the past 12 months, did you worry that your food would run out before you got money to buy more?: Patient unable to answer     Within the past 12 months, did the food you bought just not last and you didn t have money to get more?: Patient unable to answer   Housing Stability: Unknown (4/5/2025)    Housing Stability     Do you have housing? : Patient unable to answer     Are you worried about losing your housing?: Patient unable to answer   Tobacco Use: Medium Risk (1/30/2025)    Received from [facility]    Patient History     Smoking Tobacco Use: Former     Smokeless Tobacco Use: Never   Financial Resource Strain: Unknown (4/5/2025)    Financial Resource Strain     Within the past 12  months, have you or your family members you live with been unable to get utilities (heat, electricity) when it was really needed?: Patient unable to answer   Transportation Needs: Unknown (4/5/2025)    Transportation Needs     Within the past 12 months, has lack of transportation kept you from medical appointments, getting your medicines, non-medical meetings or appointments, work, or from getting things that you need?: Patient unable to answer          Disposition Plan     Medically Ready for Discharge: Ready Now             Rivera Bishop DO  Hospitalist Service  [facility]  Securely message with [email or web address] (more info)  Text page via [facility] Paging/Directory   ______________________________________________________________________    Interval History   Having occasional back pain.  Denies chest pain, shortness of breath, fevers, chills, nausea.    Physical Exam   Vital Signs: Temp: 97.1  F (36.2  C) Temp src: Temporal BP: 138/59 Pulse: 79   Resp: 12 SpO2: 98 % O2 Device: None (Room air)    Weight: 152 lbs 15.99 oz    Gen:  NAD, A&Ox2 to person and place.  Mild trouble with time.  Eyes:  PERRL, sclera anicteric.  OP:  MMM, no lesions.  Neck:  Supple.  CV:  Regular, no murmurs.  Lung:  CTA b/l, normal effort.  Ab:  +BS, soft.  Skin:  Warm, dry to touch.  No rash.  Ext:  No pitting edema LE b/l.      Medical Decision Making       22 MINUTES SPENT BY ME on the date of service doing chart review, history, exam, documentation & further activities per the note.      Data         Imaging results reviewed over the past 24 hrs:   No results found for this or any previous visit (from the past 24 hours).

## 2025-04-28 NOTE — PROGRESS NOTES
"CLINICAL NUTRITION SERVICES - BRIEF NOTE    - Refer to formal RD reassessment from 4/21.  - RD team had been following peripherally via chart checks, but had formally signed onto patient 4/21 d/t acute change in PO intakes (likely mentation related and had some periods of decreased ODILON).  - Since this time PO intakes have again improved and Harpreet is eating % of meals again.  - Wt trending reviewed and only 2 recordings which do not provide insight as they are quite different (128# vs 153#).    - No documentation of PI.  - Psych continues to follow.   - SLP was consulted for diet recs.    - Refer to LSW notes regarding discharge disposition.  - Provider notes documenting medically ready for discharge, when disposition sorted out.  - RD will chart check PO intakes every 7-10 days per policy.  Please formally consult if nutrition needs arise.        Zandra Ambriz, NICOLAS, , LD  Clinical Dietitian  [facility] Message Group: \"Dietitian [[place]]\"  Office: [phone]  Pagers:  3rd floor/ICU: [phone]  All other floors: [phone]  Weekend/holiday: [phone]    "

## 2025-04-28 NOTE — PLAN OF CARE
"Goal Outcome Evaluation:      Plan of Care Reviewed With: patient    Overall Patient Progress: improvingOverall Patient Progress: improving    Outcome Evaluation: Pt alert to self and place. Pleasant and cooperative with cares. Taking meds well-whole with sips of water or juice.  Reports some lower back discomfort-Lidocaine patch placed. Voids frequently, but denies dysuria. Incontinent at times. Tolerating puree diet well. Good appetite. Up with Ax1 et walker. Awaiting placement.       Problem: Adult Inpatient Plan of Care  Goal: Plan of Care Review  Description: The Plan of Care Review/Shift note should be completed every shift.  The Outcome Evaluation is a brief statement about your assessment that the patient is improving, declining, or no change.  This information will be displayed automatically on your shiftnote.  Outcome: Progressing  Flowsheets (Taken 4/28/2025 1305)  Outcome Evaluation: Pt alert to self and place.  Plan of Care Reviewed With: patient  Overall Patient Progress: improving  Goal: Patient-Specific Goal (Individualized)  Description: You can add care plan individualizations to a care plan. Examples of Individualization might be:  \"Parent requests to be called daily at 9am for status\", \"I have a hard time hearing out of my right ear\", or \"Do not touch me to wake me up as it startlesme\".  Outcome: Progressing  Goal: Absence of Hospital-Acquired Illness or Injury  Outcome: Progressing  Intervention: Identify and Manage Fall Risk  Recent Flowsheet Documentation  Taken 4/28/2025 0991 by Aleyda Mejia RN  Safety Promotion/Fall Prevention:   activity supervised   assistive device/personal items within reach   clutter free environment maintained   increased rounding and observation   increase visualization of patient   nonskid shoes/slippers when out of bed   patient and family education   room near nurse's station   supervised activity   safety round/check completed  Intervention: Prevent and " Manage VTE (Venous Thromboembolism) Risk  Recent Flowsheet Documentation  Taken 4/28/2025 0940 by Aleyda Mejia RN  VTE Prevention/Management: SCDs off (sequential compression devices)  Intervention: Prevent Infection  Recent Flowsheet Documentation  Taken 4/28/2025 0940 by Aleyda Mejia RN  Infection Prevention:   environmental surveillance performed   hand hygiene promoted   rest/sleep promoted   single patient room provided  Goal: Optimal Comfort and Wellbeing  Outcome: Progressing  Intervention: Provide Person-Centered Care  Recent Flowsheet Documentation  Taken 4/28/2025 0940 by Aleyda Mejia RN  Trust Relationship/Rapport:   care explained   choices provided   thoughts/feelings acknowledged   empathic listening provided  Goal: Readiness for Transition of Care  Outcome: Progressing     Problem: Violence Risk or Actual  Goal: Anger and Impulse Control  Outcome: Progressing  Intervention: Minimize Safety Risk  Recent Flowsheet Documentation  Taken 4/28/2025 0940 by Aleyda Mejia RN  Enhanced Safety Measures:   floor mats   pain management   review medications for side effects with activity   room near unit station     Problem: Fall Injury Risk  Goal: Absence of Fall and Fall-Related Injury  Outcome: Progressing  Intervention: Identify and Manage Contributors  Recent Flowsheet Documentation  Taken 4/28/2025 0940 by Aleyda Mejia RN  Medication Review/Management:   medications reviewed   high-risk medications identified  Intervention: Promote Injury-Free Environment  Recent Flowsheet Documentation  Taken 4/28/2025 0940 by Aleyda Mejia RN  Safety Promotion/Fall Prevention:   activity supervised   assistive device/personal items within reach   clutter free environment maintained   increased rounding and observation   increase visualization of patient   nonskid shoes/slippers when out of bed   patient and family education   room near nurse's station   supervised activity   safety  round/check completed

## 2025-04-28 NOTE — PROGRESS NOTES
"[facility]     CONSULT PSYCHIATRY  FOLLOW UP NOTE     DATE OF SERVICE   04/28/2025       IDENTIFICATION   Harpreet Mcelroy  Age: 69 year old  MRN# [id number]   YOB: 1955  Length of Stay:  28        CHIEF COMPLAINT   \"No.\"       SUBJECTIVE   The patient's care was discussed with primary treatment team directly and patient's electronic chart notes were reviewed.      Staff report patient did not report any acute medical concerns or side effects.     No behavioral issues overnight, including violent or aggressive behaviors. Patient did not require seclusion or restraints. Patient is not exhibiting signs or symptoms of psychosis or jaki. Patient did not endorse suicidal ideation. Patient did not endorse homicide ideation.     Patient is medication adherent. Patient is not on 1:1 for safety. Patient is sleeping well. Patient is eating adequately.     Attending Interval History:  Having occasional back pain. Denies chest pain, shortness of breath, fevers, chills, nausea.     Case Management (4/25):   reviewed clinical information and remaining concern is patient's diet requiring assistance with feeding.  Patient is following.    C/L Psychiatry last treatment plan (4/25):  4/25/2025:  Medication Changes:    Risperdal: On 4/26, initiate taper overall risperidone from 1 mg 3 times daily to 1 mg twice daily and 0.5 mg daily.  Demonstrating no signs or symptoms of decompensation after initial Zyprexa taper and conversion onto Risperdal Consta or HOBBS therapy.  Monitor ongoing stability with plans to continue slow neuroleptic taper with conversion onto HOBBS.  No Blanco order as HOBBS administered on 4/23.  Continue Medications:  BLANCO (risperidone, olanzapine, quetiapine, haloperidol):  IM backup coordinated to scheduled Zyprexa tid doses  Risperdal Consta:  On 4/23, initiated conversion onto HOBBS, Risperdal Consta IM 25 mg every 2 weeks, psychosis/treatment " non-adherence.  Demonstrating tolerability with ongoing efficacy at target symptom management.  No signs or symptoms of decompensation, with initial taper of whole neuroleptic counterpart.  Recommendation to continue conversion process and consider HOBBS titration after next injection, if clinically indicated.  Zyprexa:  Continue initial tapered Zyprexa Zyprexa 7.5 mg tid + Zyprexa  IM 10 mg as per Francis.  No signs or symptoms of decompensation.  Monitor ongoing stability with plan to continue taper as clinically indicated.  Lamictal: Continue optimized dose of PTA lamotrigine 200 mg daily, mood lability.  Improved adherence and no issues of tolerability.  Demonstrating efficacy with dose titration at targeting mood stabilization.  BuSpar: Continue optimized dose of PTA 30 mg twice daily, anxiety.  Cogentin: Continue PTA Cogentin 2 mg nightly, EPSE + as needed Cogentin 1 mg BID, EPSE.  Atarax: Continue PTA Atarax 10 mg twice daily.    Melatonin: Schedule Melatonin 3 mg at bedtime, sleep.  PRN Atarax:  Continue as needed Atarax 10 mg 3 times daily, anxiety, rank order #1.  PRN Risperdal ODT:  Continue as needed Risperdal ODT 1 mg bid, mild to moderate agitation.  PRN Haldol IM + Ativan IM:  Continue adjusted as needed Haldol 5 mg IM every 8 hours to include as needed Ativan IM 1 mg every 8 hours, severe agitation.  No PRN relported since last visit.  PRN Ativan:  Resolved daytime sedation after discontinued as needed Ativan 2 mg every 6 hours monotherapy and started in combination at a lower dose as needed Ativan 1 mg every 8 hours with PRN Haldol.  Other:  Legal:   3/28 (0309):  Placed on a 72-hour hold.  3/28:  Filed [facility] Petition for Commitment with Blanco.   3/31:  Petition re-filed through [facility] --> Supported  4/02:  Court Hold (updated orders)  4/04:  Exam/Preliminary Hearing  4/09:  Final hearing  4/11:  DECISION (Notified 4/15):  Full MI Commitment with Blanco ([id number]-)  4/16:   Epic orders updated (Committed + Blanco)  TBD:  Provisional Discharge (PD) date pending.  Placement: [facility] review.  Unit CM initiated referrals after care conference. Critical access hospital to assist with PD.  Exclusion criteria met for IP MH placement.  Precautions placed:  Routine as per Unit; Delirium; Aggression  Care Conferences:  4/16:  Initial complex care conference. 4/23:  Follow up.          Review of notes and/or information:  I personally reviewed notes from the patient's electronic chart since last psychiatry consult dated 4/25, most recent visit, and other interim reports. This provided me with information regarding patient's recent clinical course.     I personally reviewed the patient's chart, including available medication list and progression of hospital course.       OBJECTIVE   Upon interview, the patient is cooperative on approach.  Visit performed in patient's room on the unit.  Patient is not voluntary.    INTERVAL HISTORY:  Chart reviewed.  Patient seen while staff assisting with nutrition.  Discussed significant events with bedside RN and no concerns reported.  At last visit, monitored for signs and symptoms of decompensation after initial taper of PTA Zyprexa after start of Risperdal Consta.    Today, discussed plans to initiate slow taper of PTA Zyprexa and oral Risperdal after patient demonstrated ongoing stability through the weekend.  Initially, initial plans to start slow taper of risperidone but delayed to maintain stability and not decompensate.  Demonstrating greater benefit of risperidone over PTA Zyprexa, with plans to continue oral risperidone as augmenting agent after HOBBS start.     Patient engages without agitation.  Calm, pleasant on approach.  Discussed plans of ongoing medication management with efforts to slowly taper neuroleptics with a goal of neuroleptic HOBBS therapy if clinically able.  Further goal of returning back to [facility].    Suicidal ideation: denies current or recent  suicidal ideation or behaviors.    Homicidal ideation: denies current or recent homicidal ideation or behaviors.    Psychotic symptoms: No overt psychosis.  Presents as baseline.    Medication side effects reported: No significant side effects.    Acute medical concerns: none    Other issues reported by patient:  Patient had no further questions or concerns.         MEDICATIONS   Medications:  Scheduled Meds:  Current Facility-Administered Medications   Medication Dose Route Frequency Provider Last Rate Last Admin    artificial tears ophthalmic ointment   Left Eye At Bedtime Mary Martinez DO   Given at 04/27/25 2225    benztropine (COGENTIN) tablet 2 mg  2 mg Oral At Bedtime Aisha Velez MD   2 mg at 04/27/25 2225    busPIRone (BUSPAR) tablet 30 mg  30 mg Oral BID Arnel Peres MD   30 mg at 04/27/25 2025    hydrOXYzine HCl (ATARAX) tablet 10 mg  10 mg Oral BID Lisseth Hraris MD   10 mg at 04/27/25 2025    lamoTRIgine (LaMICtal) tablet 200 mg  200 mg Oral Daily Arnel Peres MD   200 mg at 04/27/25 0830    Lidocaine (LIDOCARE) 4 % Patch 1 patch  1 patch Transdermal Q24H Panfilo Damon DO   1 patch at 04/27/25 0829    melatonin tablet 3 mg  3 mg Oral At Bedtime Arnel Peres MD   3 mg at 04/27/25 2225    OLANZapine (zyPREXA) tablet 7.5 mg  7.5 mg Oral TID Arnel Peres MD   7.5 mg at 04/27/25 2025    Or    OLANZapine (zyPREXA) injection 10 mg  10 mg Intramuscular TID Arnel Peres MD        risperiDONE (risperDAL M-TABS) ODT tab 1 mg  1 mg Oral TID Arnel Peres MD   1 mg at 04/27/25 2026    risperiDONE microspheres ER (risperDAL CONSTA) injection 25 mg  25 mg Intramuscular Q14 Days Arnel Peres MD   25 mg at 04/23/25 1256    tamsulosin (FLOMAX) capsule 0.4 mg  0.4 mg Oral Daily Lisseth Harris MD   0.4 mg at 04/27/25 0829     Continuous Infusions:  Current Facility-Administered Medications   Medication Dose Route Frequency Provider Last Rate  Last Admin     PRN Meds:.  Current Facility-Administered Medications   Medication Dose Route Frequency Provider Last Rate Last Admin    alum & mag hydroxide-simethicone (MAALOX) suspension 30 mL  30 mL Oral Q4H PRN Kendrick Alex MD   30 mL at 04/08/25 1424    benztropine (COGENTIN) tablet 1 mg  1 mg Oral BID PRN Arnel Peres MD        calcium carbonate (TUMS) chewable tablet 1,000 mg  1,000 mg Oral 4x Daily PRN Mary Martinez DO   1,000 mg at 04/12/25 2059    glucose gel 15-30 g  15-30 g Oral Q15 Min PRN J Carlos Jay MD        Or    dextrose 50 % injection 25-50 mL  25-50 mL Intravenous Q15 Min PRN J Carlos Jay MD   25 mL at 04/21/25 0151    Or    glucagon injection 1 mg  1 mg Subcutaneous Q15 Min PRN J Carlos Jay MD   1 mg at 04/20/25 1426    diclofenac (VOLTAREN) 1 % topical gel 2 g  2 g Topical 4x Daily PRN Panfilo Damon DO   2 g at 04/26/25 1008    haloperidol lactate (HALDOL) injection 5 mg  5 mg Intramuscular Q8H PRN Arnel Peres MD        And    LORazepam (ATIVAN) injection 1 mg  1 mg Intramuscular Q8H PRN Arnel Peres MD        hydrOXYzine HCl (ATARAX) tablet 10 mg  10 mg Oral TID PRN Arnel Peres MD        ibuprofen (ADVIL/MOTRIN) tablet 400 mg  400 mg Oral Q8H PRN Kendrick Alex MD   400 mg at 04/26/25 1804    lidocaine (LMX4) cream   Topical Q1H PRN Mary Martinez DO        lidocaine 1 % 0.1-1 mL  0.1-1 mL Other Q1H PRN Mary Martinez DO        melatonin tablet 3 mg  3 mg Oral At Bedtime PRN Arnel Peres MD   3 mg at 04/21/25 2145    ondansetron (ZOFRAN ODT) ODT tab 4 mg  4 mg Oral Q6H PRN Mary Martinez DO   4 mg at 04/17/25 1659    Or    ondansetron (ZOFRAN) injection 4 mg  4 mg Intravenous Q6H PRN Mary Martinez DO        oxyBUTYnin (DITROPAN) half-tab 2.5 mg  2.5 mg Oral TID PRN Lisseth Harris MD        polyethylene glycol (MIRALAX) Packet 17 g  17 g Oral BID PRN Mary Martinez DO         prochlorperazine (COMPAZINE) injection 5 mg  5 mg Intravenous Q6H PRN Eddie Martinezine, DO        Or    prochlorperazine (COMPAZINE) tablet 5 mg  5 mg Oral Q6H PRN Juan, Mary, DO        risperiDONE (risperDAL M-TABS) ODT tab 1 mg  1 mg Oral BID PRN Arnel Peres MD   1 mg at 04/17/25 0321    senna-docusate (SENOKOT-S/PERICOLACE) 8.6-50 MG per tablet 1 tablet  1 tablet Oral BID PRN Juan, Mary, DO        Or    senna-docusate (SENOKOT-S/PERICOLACE) 8.6-50 MG per tablet 2 tablet  2 tablet Oral BID PRN Juan, Mary, DO        simethicone (MYLICON) chewable half-tab 40 mg  40 mg Oral Q6H PRN Kendrick Alex MD        sodium chloride (PF) 0.9% PF flush 3 mL  3 mL Intracatheter q1 min prn Mary Martinez DO   3 mL at 04/28/25 0034     Medication adherence issues: MS Med Adherence Y/N: No  Medication side effects: MEDICATION SIDE EFFECTS: no side effects reported  Benefit: Yes / No: Yes       ROS   The 10 point Review of Systems is negative other than noted in the HPI or here.       MENTAL STATUS EXAM   Vitals: /59 (BP Location: Right arm)   Pulse 79   Temp 97.1  F (36.2  C)   Resp 12   Wt 69.4 kg (153 lb)   SpO2 98%   BMI 22.59 kg/m      Weight:   152 lbs 15.99 oz    Body mass index is 22.59 kg/m .  Vitals:    04/12/25 0647 04/20/25 0412   Weight: 58.4 kg (128 lb 12 oz) 69.4 kg (153 lb)     Appearance: Calm, cooperative without aggressive behaviors.  Mood:  Mood: Euthymic  Affect: appropriate  was congruent to speech  Suicidal Ideation: PRESENT / ABSENT: absent   Homicidal Ideation: PRESENT / ABSENT: absent   Thought process:  [place]    Thought content: devoid of  suicidal ideation and violent ideation.   Fund of Knowledge: Baseline  Attention/Concentration: Fair  Language ability:  Chronic dysarthria  Memory:  Sufficient  Insight:   Appropriate .  Judgement: limited  Orientation: Person:  yes  Psychomotor Behavior: slowed    Muscle Strength and Tone:  "MuscleStrength: Normal  Gait and Station:  In bed       LABS   personally reviewed.   Temp: 97.1  F (36.2  C) Temp src: Temporal BP: 138/59 Pulse: 79   Resp: 12 SpO2: 98 % O2 Device: None (Room air)     Weight: 69.4 kg (153 lb)  Estimated body mass index is 22.59 kg/m  as calculated from the following:    Height as of 7/23/23: 1.753 m (5' 9\").    Weight as of this encounter: 69.4 kg (153 lb).    No results found for this or any previous visit (from the past 48 hours).    Qtc: 473    No results found for: \"PHENYTOIN\", \"PHENOBARB\", \"VALPROATE\", \"CBMZ\"       DIAGNOSIS   Principal Problem:    Schizoaffective disorder, bipolar type (H)    Active Problem List:  Patient Active Problem List   Diagnosis    Carol (H)    Acute UTI    Other hydronephrosis    Slurred speech    Falls frequently    Mood disorder with psychosis    Agitation    Anxiety    Schizoaffective disorder, bipolar type (H)    Hx of schizophrenia    Aggressive behavior    ROBBIN (acute kidney injury)          ASSESSMENT/PLAN   Today's Changes-04/28/2025:  Medication Changes:    Risperdal ODT: On 4/28, initiate taper oral risperidone from 1 mg 3 times daily to Risperdal 1 mg twice daily + Risperdal 0.5 mg daily x 1 week, then stop.  Demonstrating no signs or symptoms of decompensation after initial Zyprexa taper and conversion onto Risperdal Consta or HOBBS therapy.  Monitor ongoing stability with plans to continue slow neuroleptic taper with conversion onto HOBBS.  No Blanco order as HOBBS administered on 4/23.  Zyprexa ODT:  On 4/29, continue taper PTA Zyprexa ODT to 5 mg tid/Zyprexa IM 5 mg TID.  No signs or symptoms of decompensation with taper initiated.  Continue Medications:  Risperdal Consta:  On 4/23, initiated conversion onto HOBBS, Risperdal Consta IM 25 mg every 2 weeks, psychosis/treatment non-adherence.  Demonstrating tolerability with ongoing efficacy at target symptom management.  No signs or symptoms of decompensation, with initial taper of whole " neuroleptic counterpart.  Recommendation to continue conversion process and consider HOBBS titration after next injection, if clinically indicated.  Next injection scheduled on 5/7.  Lamictal: Continue optimized dose of PTA lamotrigine 200 mg daily, mood lability.  Improved adherence and no issues of tolerability.  Demonstrating efficacy with dose titration at targeting mood stabilization.  BuSpar: Continue optimized dose of PTA 30 mg twice daily, anxiety.  Cogentin: Continue PTA Cogentin 2 mg nightly, EPSE + as needed Cogentin 1 mg BID, EPSE.  Atarax: Continue PTA Atarax 10 mg twice daily.    Melatonin: Schedule Melatonin 3 mg at bedtime, sleep.  PRN Atarax:  Continue as needed Atarax 10 mg 3 times daily, anxiety, rank order #1.  PRN Risperdal ODT:  Continue as needed Risperdal ODT 1 mg bid, mild to moderate agitation.  PRN Haldol IM + Ativan IM:  Continue adjusted as needed Haldol 5 mg IM every 8 hours to include as needed Ativan IM 1 mg every 8 hours, severe agitation.  No PRN relported since last visit.  Other:  Legal:   3/28 (0309):  Placed on a 72-hour hold.  3/28:  Filed [facility] Petition for Commitment with Blanco.   3/31:  Petition re-filed through [facility] --> Supported  4/02:  Court Hold (updated orders)  4/04:  Exam/Preliminary Hearing  4/09:  Final hearing  4/11:  DECISION (Notified 4/15):  Full MI Commitment with Blanco ([id number]-)  4/16:  Epic orders updated (Committed + Blanco)  TBD:  Provisional Discharge (PD) date pending.  Placement: [facility] accepted.  [facility] to assist with PD.  Exclusion criteria met for IP MH placement.  Precautions placed:  Routine as per Unit; Delirium; Aggression  Care Conferences:  4/16:  Initial complex care conference. 4/23:  Follow up.    Target psychiatric symptoms and interventions:  Dual-Neuroleptic Therapy:  Demonstrating need to continue at least 2 neuroleptics to target/maintain symptoms.  Goal is to progress toward neuroleptic  monotherapy.  Specifically, plans to continue taper of PTA Zyprexa and oral Risperdal after conversion onto HOBBS (Risperdal Consta) on 4/23.    Acute Medical Problems and Treatments:  .  Acute medical concerns:  No acute medical concerns.     Care Coordination:  Treatment plan discussed directly with staff.  Please reconsult Psychiatry as needed. Plan to monitor by as needed chart review until next direct visit on 5/2.        Risk Assessment: IP MHAC RISK ASSESSMENT: Patient on precautions    Coordination of Care:   Treatment Plan reviewed, Care discussed with Care/Treatment Team Members, Chart reviewed and Patient seen         Arnel Peres MD    -     04/28/2025  -     8:53 AM    Total encounter time:  A total of  52  minutes spent related to chart review, history and exam, documentation   and further activities as noted above    This note was created with help of Dragon dictation system. Grammatical / typing errors are not intentional.        Arnel Peres MD  Consult/Liaison Psychiatry   [facility]  Securely message with [employer]

## 2025-04-28 NOTE — PLAN OF CARE
"Goal Outcome Evaluation:      Plan of Care Reviewed With: patient    Overall Patient Progress: improvingOverall Patient Progress: improving    Outcome Evaluation: Discharge plans TBD      VSS on RA. Pleasant and cooperative. No witnessed aggressive behaviors. Court hold. Tolerating diet. Disoriented to situation/time. Ax1 gbw. Incontinent bowel/bladder. Pain managed with scheduled meds. Medically ready for discharge. Awaiting placement per psych and court.     Problem: Adult Inpatient Plan of Care  Goal: Plan of Care Review  Description: The Plan of Care Review/Shift note should be completed every shift.  The Outcome Evaluation is a brief statement about your assessment that the patient is improving, declining, or no change.  This information will be displayed automatically on your shiftnote.  Outcome: Progressing  Flowsheets (Taken 4/28/2025 6512)  Outcome Evaluation: Discharge plans TBD  Plan of Care Reviewed With: patient  Overall Patient Progress: improving  Goal: Patient-Specific Goal (Individualized)  Description: You can add care plan individualizations to a care plan. Examples of Individualization might be:  \"Parent requests to be called daily at 9am for status\", \"I have a hard time hearing out of my right ear\", or \"Do not touch me to wake me up as it startlesme\".  Outcome: Progressing  Goal: Absence of Hospital-Acquired Illness or Injury  Outcome: Progressing  Intervention: Identify and Manage Fall Risk  Recent Flowsheet Documentation  Taken 4/27/2025 2025 by Berenice Nguyen, RN  Safety Promotion/Fall Prevention:   activity supervised   increased rounding and observation   clutter free environment maintained   increase visualization of patient   lighting adjusted   mobility aid in reach   nonskid shoes/slippers when out of bed   patient and family education   safety round/check completed  Intervention: Prevent Skin Injury  Recent Flowsheet Documentation  Taken 4/28/2025 0040 by Berenice Nguyen, RN  Body " Position:   turned   left   heels elevated  Taken 4/27/2025 2025 by Berenice Nguyen RN  Body Position: supine, head elevated  Intervention: Prevent and Manage VTE (Venous Thromboembolism) Risk  Recent Flowsheet Documentation  Taken 4/27/2025 2025 by Berenice Nguyen RN  VTE Prevention/Management: SCDs off (sequential compression devices)  Intervention: Prevent Infection  Recent Flowsheet Documentation  Taken 4/27/2025 2025 by Berenice Nguyen RN  Infection Prevention:   rest/sleep promoted   single patient room provided  Goal: Optimal Comfort and Wellbeing  Outcome: Progressing  Intervention: Provide Person-Centered Care  Recent Flowsheet Documentation  Taken 4/27/2025 2025 by Berenice Nguyen RN  Trust Relationship/Rapport:   care explained   choices provided   thoughts/feelings acknowledged   empathic listening provided  Goal: Readiness for Transition of Care  Outcome: Progressing     Problem: Violence Risk or Actual  Goal: Anger and Impulse Control  Outcome: Progressing  Intervention: Minimize Safety Risk  Recent Flowsheet Documentation  Taken 4/27/2025 2025 by Berenice Nguyen RN  Sensory Stimulation Regulation:   care clustered   quiet environment promoted  Enhanced Safety Measures: floor mats  Intervention: Promote Self-Control  Recent Flowsheet Documentation  Taken 4/27/2025 2025 by Berenice Nguyen RN  Supportive Measures: active listening utilized  Environmental Support:   calm environment promoted   distractions minimized     Problem: Fall Injury Risk  Goal: Absence of Fall and Fall-Related Injury  Outcome: Progressing  Intervention: Identify and Manage Contributors  Recent Flowsheet Documentation  Taken 4/27/2025 2025 by Berenice Nguyen RN  Medication Review/Management:   medications reviewed   high-risk medications identified  Intervention: Promote Injury-Free Environment  Recent Flowsheet Documentation  Taken 4/27/2025 2025 by Berenice Nguyen RN  Safety Promotion/Fall Prevention:   activity supervised    increased rounding and observation   clutter free environment maintained   increase visualization of patient   lighting adjusted   mobility aid in reach   nonskid shoes/slippers when out of bed   patient and family education   safety round/check completed

## 2025-04-29 PROCEDURE — 250N000013 HC RX MED GY IP 250 OP 250 PS 637: Performed by: EMERGENCY MEDICINE

## 2025-04-29 PROCEDURE — 250N000013 HC RX MED GY IP 250 OP 250 PS 637: Performed by: PSYCHIATRY & NEUROLOGY

## 2025-04-29 PROCEDURE — 92526 ORAL FUNCTION THERAPY: CPT | Mod: GN

## 2025-04-29 PROCEDURE — 120N000001 HC R&B MED SURG/OB

## 2025-04-29 RX ADMIN — BUSPIRONE HYDROCHLORIDE 30 MG: 10 TABLET ORAL at 08:15

## 2025-04-29 RX ADMIN — OLANZAPINE 5 MG: 5 TABLET, ORALLY DISINTEGRATING ORAL at 08:14

## 2025-04-29 RX ADMIN — RISPERIDONE 0.5 MG: 0.5 TABLET, ORALLY DISINTEGRATING ORAL at 16:13

## 2025-04-29 RX ADMIN — HYDROXYZINE HYDROCHLORIDE 10 MG: 10 TABLET, FILM COATED ORAL at 20:50

## 2025-04-29 RX ADMIN — RISPERIDONE 1 MG: 0.5 TABLET, ORALLY DISINTEGRATING ORAL at 20:50

## 2025-04-29 RX ADMIN — BUSPIRONE HYDROCHLORIDE 30 MG: 10 TABLET ORAL at 20:50

## 2025-04-29 RX ADMIN — TAMSULOSIN HYDROCHLORIDE 0.4 MG: 0.4 CAPSULE ORAL at 08:15

## 2025-04-29 RX ADMIN — Medication 3 MG: at 20:54

## 2025-04-29 RX ADMIN — OLANZAPINE 5 MG: 5 TABLET, ORALLY DISINTEGRATING ORAL at 20:50

## 2025-04-29 RX ADMIN — OLANZAPINE 5 MG: 5 TABLET, ORALLY DISINTEGRATING ORAL at 14:55

## 2025-04-29 RX ADMIN — HYDROXYZINE HYDROCHLORIDE 10 MG: 10 TABLET, FILM COATED ORAL at 12:15

## 2025-04-29 RX ADMIN — RISPERIDONE 1 MG: 0.5 TABLET, ORALLY DISINTEGRATING ORAL at 08:14

## 2025-04-29 RX ADMIN — BENZTROPINE MESYLATE 2 MG: 1 TABLET ORAL at 20:54

## 2025-04-29 RX ADMIN — LAMOTRIGINE 200 MG: 200 TABLET ORAL at 08:15

## 2025-04-29 RX ADMIN — MINERAL OIL, WHITE PETROLATUM: .03; .94 OINTMENT OPHTHALMIC at 20:55

## 2025-04-29 ASSESSMENT — ACTIVITIES OF DAILY LIVING (ADL)
ADLS_ACUITY_SCORE: 78
ADLS_ACUITY_SCORE: 72
ADLS_ACUITY_SCORE: 72
ADLS_ACUITY_SCORE: 78
ADLS_ACUITY_SCORE: 70
ADLS_ACUITY_SCORE: 72
ADLS_ACUITY_SCORE: 78
ADLS_ACUITY_SCORE: 72
ADLS_ACUITY_SCORE: 78
ADLS_ACUITY_SCORE: 72
ADLS_ACUITY_SCORE: 70
ADLS_ACUITY_SCORE: 72
ADLS_ACUITY_SCORE: 78
ADLS_ACUITY_SCORE: 72
ADLS_ACUITY_SCORE: 72
ADLS_ACUITY_SCORE: 78
ADLS_ACUITY_SCORE: 72
ADLS_ACUITY_SCORE: 72
ADLS_ACUITY_SCORE: 78
ADLS_ACUITY_SCORE: 72
ADLS_ACUITY_SCORE: 78
ADLS_ACUITY_SCORE: 72
ADLS_ACUITY_SCORE: 78

## 2025-04-29 NOTE — PROGRESS NOTES
Care Management Follow Up    Length of Stay (days): 29    Expected Discharge Date: 04/30/2025     Concerns to be Addressed: mental health, discharge planning       Patient plan of care discussed at interdisciplinary rounds: Yes    Anticipated Discharge Disposition: Geriatric Psych, Assisted Living, Inpatient Mental Health     Anticipated Discharge Services: Mental Health Resources    Anticipated Discharge DME: None    Patient/family educated on Medicare website which has current facility and service quality ratings: no    Education Provided on the Discharge Plan: Yes    Patient/Family in Agreement with the Plan: unable to assess    Referrals Placed by CM/SW: Post Acute Facilities    Private pay costs discussed: Not applicable    Discussed  Partnership in Safe Discharge Planning  document with patient/family: No     Handoff Completed: No, handoff not indicated or clinically appropriate    Additional Information:  HIRAM spoke with Alfie at [facility] [phone]  and he agreed that pt is able to return to their facility tomorrow now that he is tolerating soft foods as opposed to a pureed diet, which they cannot accommodate at the [facility]. HIRAM arranged for stretcher transport tomorrow [id number].     Next Steps:   PCS ambulance form  Send discharge orders to [facility] F: [phone].     MAGO Meza  Inpatient Care Coordination   [facility]  [phone]

## 2025-04-29 NOTE — PROGRESS NOTES
Care Management Follow Up    Length of Stay (days): 29    Expected Discharge Date: 04/30/2025     Concerns to be Addressed: discharge planning       Patient plan of care discussed at interdisciplinary rounds: Yes    Anticipated Discharge Disposition: Assisted Living    Anticipated Discharge Services: Mental Health Resources    Anticipated Discharge DME: None    Patient/family educated on Medicare website which has current facility and service quality ratings: no    Education Provided on the Discharge Plan: Yes    Patient/Family in Agreement with the Plan: unable to assess    Referrals Placed by CM/SW: Post Acute Facilities    Private pay costs discussed: Not applicable    Discussed  Partnership in Safe Discharge Planning  document with patient/family: No     Handoff Completed: No, handoff not indicated or clinically appropriate    Additional Information:  SW heard back from Alfie,  at [facility] who said that their LPN will be dropping pt's dentures off this morning. Pt will trial a regular diet in hopes his dentures will improve tolerance and be able to return to [facility].    Next Steps:   SW will continue following until discharge and remain available as needed.    MAGO Meza  Inpatient Care Coordination   [facility]  [phone]

## 2025-04-29 NOTE — PLAN OF CARE
"Goal Outcome Evaluation:      Plan of Care Reviewed With: patient    Overall Patient Progress: improvingOverall Patient Progress: improving    Outcome Evaluation: Pt alert to self, place, and time. Denied need for Lidocaine patch this AM.  Diet advanced to bite sized, easy to chew-tolerating well with supervision. Calm, pleasant, cooperative with cares. Up with Ax1 et walker.  Incontinent at times.  Possible discharge back to [facility] later this week.       Problem: Adult Inpatient Plan of Care  Goal: Plan of Care Review  Description: The Plan of Care Review/Shift note should be completed every shift.  The Outcome Evaluation is a brief statement about your assessment that the patient is improving, declining, or no change.  This information will be displayed automatically on your shiftnote.  Outcome: Progressing  Flowsheets (Taken 4/29/2025 1334)  Outcome Evaluation: Pt alert to self, place, and time.  Plan of Care Reviewed With: patient  Overall Patient Progress: improving  Goal: Patient-Specific Goal (Individualized)  Description: You can add care plan individualizations to a care plan. Examples of Individualization might be:  \"Parent requests to be called daily at 9am for status\", \"I have a hard time hearing out of my right ear\", or \"Do not touch me to wake me up as it startlesme\".  Outcome: Progressing  Goal: Absence of Hospital-Acquired Illness or Injury  Outcome: Progressing  Intervention: Identify and Manage Fall Risk  Recent Flowsheet Documentation  Taken 4/29/2025 0800 by Aleyda Mejia, RN  Safety Promotion/Fall Prevention:   activity supervised   assistive device/personal items within reach   clutter free environment maintained   increased rounding and observation   increase visualization of patient   nonskid shoes/slippers when out of bed   patient and family education   room near nurse's station   supervised activity   safety round/check completed  Intervention: Prevent Skin Injury  Recent " Flowsheet Documentation  Taken 4/29/2025 1253 by Aleyda Mejia RN  Body Position: supine, head elevated  Intervention: Prevent and Manage VTE (Venous Thromboembolism) Risk  Recent Flowsheet Documentation  Taken 4/29/2025 0800 by Aleyda Mejia RN  VTE Prevention/Management: SCDs off (sequential compression devices)  Intervention: Prevent Infection  Recent Flowsheet Documentation  Taken 4/29/2025 0800 by Aleyda Mejia RN  Infection Prevention:   environmental surveillance performed   hand hygiene promoted   rest/sleep promoted   single patient room provided  Goal: Optimal Comfort and Wellbeing  Outcome: Progressing  Intervention: Provide Person-Centered Care  Recent Flowsheet Documentation  Taken 4/29/2025 0800 by Aleyda Mejia RN  Trust Relationship/Rapport:   care explained   choices provided   thoughts/feelings acknowledged   empathic listening provided  Goal: Readiness for Transition of Care  Outcome: Progressing     Problem: Violence Risk or Actual  Goal: Anger and Impulse Control  Outcome: Progressing  Intervention: Minimize Safety Risk  Recent Flowsheet Documentation  Taken 4/29/2025 0800 by Aleyda Mejia RN  Enhanced Safety Measures:   floor mats   pain management   review medications for side effects with activity   room near unit station     Problem: Fall Injury Risk  Goal: Absence of Fall and Fall-Related Injury  Outcome: Progressing  Intervention: Identify and Manage Contributors  Recent Flowsheet Documentation  Taken 4/29/2025 0800 by Aleyda Mejia RN  Medication Review/Management:   medications reviewed   high-risk medications identified  Intervention: Promote Injury-Free Environment  Recent Flowsheet Documentation  Taken 4/29/2025 0800 by Aleyda Mejia RN  Safety Promotion/Fall Prevention:   activity supervised   assistive device/personal items within reach   clutter free environment maintained   increased rounding and observation   increase visualization of patient    nonskid shoes/slippers when out of bed   patient and family education   room near nurse's station   supervised activity   safety round/check completed     Problem: Infection  Goal: Absence of Infection Signs and Symptoms  Outcome: Progressing

## 2025-04-29 NOTE — PROGRESS NOTES
[facility]    Medicine Progress Note - Hospitalist Service    Date of Admission:  3/27/2025    Assessment & Plan     Harpreet Mcelroy is a 69 year old male with bipolar, anxiety, depression, schizophrenia, BPD, paranoia, bladder cancer s/p ureteral stent, HTN, HLD, who came to [facility] on 3/27/2025 on a RENETTA from [facility] with Agitation, attempt to harm staff with cane and was diagnosed with decompensated schizophrenia and ROBBIN with creatinine 1.42.  He was admitted by the medicine service on 3/31/2025 after he was declined by inpatient psychiatry for Psychiatry admission. Was seen by Dr Peres from Psychiatry service here who managed his medications and recommended full MI commitment with [place] hearing.       The patient has an order for commitment from the [facility].  For that reason, discharge from the hospital to a community setting is not an option without his  approval.  Geriatric psychiatry is not available within the system and is not being offered.       Disposition at this time is dependent on the court and Psychiatry.        Date of Admission:  3/27/2025        Decompensated schizophrenia with intermittent agitation.  Bipolar disorder.  Anxiety.  Depression.  Chronic cognitive dysfunction.  -Psychiatry following.    -Appreciate continued input.  -Note, court proceeding resulted in guidance to commitment.     Acute kidney injury on chronic kidney disease stage II.  Creat up to 1.52 on 4/19. Then improved/leveled off around 1.3 for multiple checks.  - Avoid nephrotoxins as able.  - Recheck metabolic panel intermittently.     Hypoglycemia earlier in stay:  -Noted to be hypoglycemic and started on IVF (D5 at 75 ml/hr) on 4/18/25.  Now eating much better with multiple glu checks above 80 without further Dextrose needs.  -Check glucose intermittently.     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     Dysphagia.  -Speech pathology following.  -Dysphagia diet.     Bladder cancer   Chronic L ureteral stent:  Stent last changed 2/4/25 by [facility] urology Dr. Whitt.  -PTA oxybutynin and flomax.  -Needs ongoing Urology follow-up after discharge for continued stent changes/cares.     Deconditioning:  - Physical therapy following.    -Recommendation for some Home Health therapy at discharge.               Diet: Snacks/Supplements Adult: StarsVu Standard 1.4 Oral Supplement; With Meals  Combination Diet Thin Liquids (level 0) (no straws); Pureed Diet (level 4); Thin Liquids (level 0) (no straws)  Room Service    DVT Prophylaxis: Pneumatic Compression Devices  Hernandez Catheter: Not present  Lines: None     Cardiac Monitoring: None  Code Status: Full Code      Clinically Significant Risk Factors                                  # Financial/Environmental Concerns: none         Social Drivers of Health    Food Insecurity: Unknown (4/5/2025)    Food Insecurity     Within the past 12 months, did you worry that your food would run out before you got money to buy more?: Patient unable to answer     Within the past 12 months, did the food you bought just not last and you didn t have money to get more?: Patient unable to answer   Housing Stability: Unknown (4/5/2025)    Housing Stability     Do you have housing? : Patient unable to answer     Are you worried about losing your housing?: Patient unable to answer   Tobacco Use: Medium Risk (1/30/2025)    Received from [facility]    Patient History     Smoking Tobacco Use: Former     Smokeless Tobacco Use: Never   Financial Resource Strain: Unknown (4/5/2025)    Financial Resource Strain     Within the past 12  months, have you or your family members you live with been unable to get utilities (heat, electricity) when it was really needed?: Patient unable to answer   Transportation Needs: Unknown (4/5/2025)    Transportation Needs     Within the past 12 months, has lack of transportation kept you from medical appointments, getting your medicines, non-medical meetings or appointments, work, or from getting things that you need?: Patient unable to answer          Disposition Plan     Medically Ready for Discharge: Ready Now  Discharge tomorrow.              Jacoby Delgado MD  Hospitalist Service  [facility]  Securely message with #[email or web address] (more info)  Text page via [facility] Paging/Directory   ______________________________________________________________________    Interval History   Much issac compliant than a couple of weeks ago. Denies complaints. Not confrontational.     Physical Exam   Vital Signs: Temp: 97  F (36.1  C) Temp src: Temporal BP: (!) 160/71 Pulse: 78   Resp: 14 SpO2: 97 % O2 Device: None (Room air)    Weight: 152 lbs 15.99 oz    Gen:  NAD, A&Ox2 to person and place.  Mild trouble with time.  Eyes:  PERRL, sclera anicteric.  OP:  MMM, no lesions.  Neck:  Supple.  CV:  Regular, no murmurs.  Lung:  CTA b/l, normal effort.  Ab:  +BS, soft.  Skin:  Warm, dry to touch.  No rash.  Ext:  No pitting edema LE b/l.      Medical Decision Making       15 MINUTES SPENT BY ME on the date of service doing chart review, history, exam, documentation & further activities per the note.      Data         Imaging results reviewed over the past 24 hrs:   No results found for this or any previous visit (from the past 24 hours).

## 2025-04-29 NOTE — PLAN OF CARE
"Pt alert to self and place. On RA. Denies pain. Behaving, calm and cooperative with cares. PIV saline locked. Incontinent. Up with 1 assist, gait belt and walker. Awaiting placement.     Problem: Adult Inpatient Plan of Care  Goal: Plan of Care Review  Description: The Plan of Care Review/Shift note should be completed every shift.  The Outcome Evaluation is a brief statement about your assessment that the patient is improving, declining, or no change.  This information will be displayed automatically on your shiftnote.  Outcome: Progressing  Flowsheets (Taken 4/29/2025 0611)  Outcome Evaluation: Pt alert to self and place. On RA. Denies pain. Behaving, calm and cooperative with cares. PIV saline locked. Incontinent. Up with 1 assist, gait belt and walker. Awaiting placement.  Plan of Care Reviewed With: patient  Overall Patient Progress: improving  Goal: Patient-Specific Goal (Individualized)  Description: You can add care plan individualizations to a care plan. Examples of Individualization might be:  \"Parent requests to be called daily at 9am for status\", \"I have a hard time hearing out of my right ear\", or \"Do not touch me to wake me up as it startlesme\".  Outcome: Progressing  Goal: Absence of Hospital-Acquired Illness or Injury  Outcome: Progressing  Intervention: Identify and Manage Fall Risk  Recent Flowsheet Documentation  Taken 4/28/2025 2033 by Abigail Sellers RN  Safety Promotion/Fall Prevention:   activity supervised   clutter free environment maintained   nonskid shoes/slippers when out of bed  Intervention: Prevent and Manage VTE (Venous Thromboembolism) Risk  Recent Flowsheet Documentation  Taken 4/28/2025 2115 by Abigail Sellers RN  VTE Prevention/Management: SCDs off (sequential compression devices)  Goal: Optimal Comfort and Wellbeing  Outcome: Progressing  Goal: Readiness for Transition of Care  Outcome: Progressing     Problem: Violence Risk or Actual  Goal: Anger and Impulse " Control  Outcome: Progressing  Intervention: Minimize Safety Risk  Recent Flowsheet Documentation  Taken 4/28/2025 2115 by Abigail Sellers, RN  Sensory Stimulation Regulation:   care clustered   quiet environment promoted  Taken 4/28/2025 2033 by Abigail Sellers, RN  Enhanced Safety Measures:   floor mats   pain management   review medications for side effects with activity   room near unit station     Problem: Fall Injury Risk  Goal: Absence of Fall and Fall-Related Injury  Outcome: Progressing  Intervention: Identify and Manage Contributors  Recent Flowsheet Documentation  Taken 4/28/2025 2033 by Abigail Sellers, RN  Medication Review/Management:   medications reviewed   high-risk medications identified  Intervention: Promote Injury-Free Environment  Recent Flowsheet Documentation  Taken 4/28/2025 2033 by Abigail Sellers, RN  Safety Promotion/Fall Prevention:   activity supervised   clutter free environment maintained   nonskid shoes/slippers when out of bed     Problem: Infection  Goal: Absence of Infection Signs and Symptoms  Outcome: Progressing   Goal Outcome Evaluation:      Plan of Care Reviewed With: patient    Overall Patient Progress: improvingOverall Patient Progress: improving    Outcome Evaluation: Pt alert to self and place. On RA. Denies pain. Behaving, calm and cooperative with cares. PIV saline locked. Incontinent. Up with 1 assist, gait belt and walker. Awaiting placement.

## 2025-04-30 VITALS
BODY MASS INDEX: 22.59 KG/M2 | WEIGHT: 153 LBS | HEART RATE: 89 BPM | RESPIRATION RATE: 16 BRPM | DIASTOLIC BLOOD PRESSURE: 79 MMHG | TEMPERATURE: 97.5 F | SYSTOLIC BLOOD PRESSURE: 145 MMHG | OXYGEN SATURATION: 98 %

## 2025-04-30 PROBLEM — L89.311 PRESSURE INJURY OF RIGHT BUTTOCK, STAGE 1: Status: ACTIVE | Noted: 2025-04-30

## 2025-04-30 PROBLEM — L89.201: Status: ACTIVE | Noted: 2025-04-30

## 2025-04-30 PROCEDURE — 250N000013 HC RX MED GY IP 250 OP 250 PS 637: Performed by: PSYCHIATRY & NEUROLOGY

## 2025-04-30 PROCEDURE — 250N000013 HC RX MED GY IP 250 OP 250 PS 637: Performed by: INTERNAL MEDICINE

## 2025-04-30 PROCEDURE — 250N000013 HC RX MED GY IP 250 OP 250 PS 637: Performed by: EMERGENCY MEDICINE

## 2025-04-30 RX ORDER — HYDROXYZINE HYDROCHLORIDE 10 MG/1
10 TABLET, FILM COATED ORAL 3 TIMES DAILY PRN
Status: SHIPPED
Start: 2025-04-30

## 2025-04-30 RX ORDER — BENZTROPINE MESYLATE 1 MG/1
1 TABLET ORAL 2 TIMES DAILY PRN
Qty: 60 TABLET | Refills: 0 | Status: SHIPPED | OUTPATIENT
Start: 2025-04-30 | End: 2025-05-30

## 2025-04-30 RX ORDER — OLANZAPINE 5 MG/1
5 TABLET, ORALLY DISINTEGRATING ORAL 3 TIMES DAILY
Qty: 90 TABLET | Refills: 2 | Status: SHIPPED | OUTPATIENT
Start: 2025-04-30 | End: 2025-07-29

## 2025-04-30 RX ORDER — NAPROXEN 250 MG/1
250 TABLET ORAL 2 TIMES DAILY PRN
Status: DISCONTINUED | OUTPATIENT
Start: 2025-04-30 | End: 2025-04-30 | Stop reason: HOSPADM

## 2025-04-30 RX ORDER — BUSPIRONE HYDROCHLORIDE 30 MG/1
30 TABLET ORAL 2 TIMES DAILY
Qty: 60 TABLET | Refills: 2 | Status: SHIPPED | OUTPATIENT
Start: 2025-04-30 | End: 2025-07-29

## 2025-04-30 RX ORDER — LAMOTRIGINE 200 MG/1
200 TABLET ORAL DAILY
Qty: 30 TABLET | Refills: 2 | Status: SHIPPED | OUTPATIENT
Start: 2025-05-01 | End: 2025-07-30

## 2025-04-30 RX ORDER — RISPERIDONE 1 MG/1
1 TABLET, ORALLY DISINTEGRATING ORAL 2 TIMES DAILY
Qty: 60 TABLET | Refills: 0 | Status: SHIPPED | OUTPATIENT
Start: 2025-04-30

## 2025-04-30 RX ORDER — POLYETHYLENE GLYCOL 3350 17 G/17G
17 POWDER, FOR SOLUTION ORAL DAILY
Status: DISCONTINUED | OUTPATIENT
Start: 2025-04-30 | End: 2025-04-30 | Stop reason: HOSPADM

## 2025-04-30 RX ORDER — BISACODYL 10 MG
10 SUPPOSITORY, RECTAL RECTAL DAILY PRN
Status: DISCONTINUED | OUTPATIENT
Start: 2025-04-30 | End: 2025-04-30 | Stop reason: HOSPADM

## 2025-04-30 RX ORDER — GUAIFENESIN 200 MG/10ML
200 LIQUID ORAL EVERY 4 HOURS PRN
Status: DISCONTINUED | OUTPATIENT
Start: 2025-04-30 | End: 2025-04-30 | Stop reason: HOSPADM

## 2025-04-30 RX ORDER — BENZTROPINE MESYLATE 2 MG/1
2 TABLET ORAL AT BEDTIME
Qty: 30 TABLET | Refills: 0 | Status: SHIPPED | OUTPATIENT
Start: 2025-04-30

## 2025-04-30 RX ORDER — ONDANSETRON 4 MG/1
4 TABLET, ORALLY DISINTEGRATING ORAL EVERY 6 HOURS PRN
Status: DISCONTINUED | OUTPATIENT
Start: 2025-04-30 | End: 2025-04-30

## 2025-04-30 RX ORDER — RISPERIDONE 0.5 MG/1
0.5 TABLET, ORALLY DISINTEGRATING ORAL
Qty: 4 TABLET | Refills: 0 | Status: SHIPPED | OUTPATIENT
Start: 2025-04-30 | End: 2025-05-04

## 2025-04-30 RX ORDER — FLUTICASONE PROPIONATE 50 MCG
1 SPRAY, SUSPENSION (ML) NASAL DAILY PRN
Status: DISCONTINUED | OUTPATIENT
Start: 2025-04-30 | End: 2025-04-30 | Stop reason: HOSPADM

## 2025-04-30 RX ORDER — RISPERIDONE 25 MG/2 ML
25 KIT INTRAMUSCULAR
Status: SHIPPED
Start: 2025-05-07

## 2025-04-30 RX ORDER — LOPERAMIDE HYDROCHLORIDE 2 MG/1
2 CAPSULE ORAL 4 TIMES DAILY PRN
Status: DISCONTINUED | OUTPATIENT
Start: 2025-04-30 | End: 2025-04-30 | Stop reason: HOSPADM

## 2025-04-30 RX ORDER — HYDROXYZINE HYDROCHLORIDE 10 MG/1
10 TABLET, FILM COATED ORAL 2 TIMES DAILY
Qty: 60 TABLET | Refills: 2 | Status: SHIPPED | OUTPATIENT
Start: 2025-04-30 | End: 2025-07-29

## 2025-04-30 RX ORDER — DOCUSATE SODIUM 100 MG/1
100 CAPSULE, LIQUID FILLED ORAL 2 TIMES DAILY PRN
Status: DISCONTINUED | OUTPATIENT
Start: 2025-04-30 | End: 2025-04-30 | Stop reason: HOSPADM

## 2025-04-30 RX ADMIN — RISPERIDONE 0.5 MG: 0.5 TABLET, ORALLY DISINTEGRATING ORAL at 15:03

## 2025-04-30 RX ADMIN — OLANZAPINE 5 MG: 5 TABLET, ORALLY DISINTEGRATING ORAL at 13:09

## 2025-04-30 RX ADMIN — OLANZAPINE 5 MG: 5 TABLET, ORALLY DISINTEGRATING ORAL at 08:25

## 2025-04-30 RX ADMIN — POLYETHYLENE GLYCOL 3350 17 G: 17 POWDER, FOR SOLUTION ORAL at 09:29

## 2025-04-30 RX ADMIN — LIDOCAINE 1 PATCH: 4 PATCH TOPICAL at 08:31

## 2025-04-30 RX ADMIN — LAMOTRIGINE 200 MG: 200 TABLET ORAL at 08:25

## 2025-04-30 RX ADMIN — BUSPIRONE HYDROCHLORIDE 30 MG: 10 TABLET ORAL at 08:24

## 2025-04-30 RX ADMIN — HYDROXYZINE HYDROCHLORIDE 10 MG: 10 TABLET, FILM COATED ORAL at 13:12

## 2025-04-30 RX ADMIN — TAMSULOSIN HYDROCHLORIDE 0.4 MG: 0.4 CAPSULE ORAL at 08:24

## 2025-04-30 RX ADMIN — RISPERIDONE 1 MG: 0.5 TABLET, ORALLY DISINTEGRATING ORAL at 08:24

## 2025-04-30 ASSESSMENT — ACTIVITIES OF DAILY LIVING (ADL)
ADLS_ACUITY_SCORE: 72
ADLS_ACUITY_SCORE: 70
ADLS_ACUITY_SCORE: 70
ADLS_ACUITY_SCORE: 67
ADLS_ACUITY_SCORE: 67
ADLS_ACUITY_SCORE: 70
ADLS_ACUITY_SCORE: 70
ADLS_ACUITY_SCORE: 72
ADLS_ACUITY_SCORE: 70

## 2025-04-30 NOTE — PLAN OF CARE
Goal Outcome Evaluation:      Plan of Care Reviewed With: patient    Overall Patient Progress: improvingOverall Patient Progress: improving    Outcome Evaluation: A/O x3. Ambulating with A1. Plan to D/C Tomorrow      Problem: Adult Inpatient Plan of Care  Goal: Plan of Care Review  Description: The Plan of Care Review/Shift note should be completed every shift.  The Outcome Evaluation is a brief statement about your assessment that the patient is improving, declining, or no change.  This information will be displayed automatically on your shiftnote.  Recent Flowsheet Documentation  Taken 4/29/2025 2203 by Bartolome Kramer RN  Outcome Evaluation: A/O x3. Ambulating with A1. Plan to D/C Tomorrow  Plan of Care Reviewed With: patient  Overall Patient Progress: improving  Goal: Absence of Hospital-Acquired Illness or Injury  Intervention: Identify and Manage Fall Risk  Recent Flowsheet Documentation  Taken 4/29/2025 2100 by Bartolome Kramer RN  Safety Promotion/Fall Prevention:   activity supervised   clutter free environment maintained   nonskid shoes/slippers when out of bed  Intervention: Prevent Skin Injury  Recent Flowsheet Documentation  Taken 4/29/2025 2100 by Bartolome Kramer RN  Body Position: supine, head elevated  Goal: Optimal Comfort and Wellbeing  Intervention: Provide Person-Centered Care  Recent Flowsheet Documentation  Taken 4/29/2025 2100 by Bartolome Kramer RN  Trust Relationship/Rapport:   care explained   choices provided   thoughts/feelings acknowledged   empathic listening provided     Problem: Delirium  Goal: Improved Behavioral Control  Intervention: Minimize Safety Risk  Recent Flowsheet Documentation  Taken 4/29/2025 2100 by Bartolome Kramer RN  Enhanced Safety Measures:   floor mats   pain management   review medications for side effects with activity   room near unit station  Trust Relationship/Rapport:   care explained   choices provided   thoughts/feelings acknowledged   empathic listening  provided     Problem: Violence Risk or Actual  Goal: Anger and Impulse Control  Intervention: Minimize Safety Risk  Recent Flowsheet Documentation  Taken 4/29/2025 2100 by Bartolome Kramer RN  Enhanced Safety Measures:   floor mats   pain management   review medications for side effects with activity   room near unit station     Problem: Comorbidity Management  Goal: Maintenance of Behavioral Health Symptom Control  Intervention: Maintain Behavioral Health Symptom Control  Recent Flowsheet Documentation  Taken 4/29/2025 2100 by Bartolome Kramer RN  Medication Review/Management:   medications reviewed   high-risk medications identified     Problem: Adult Inpatient Plan of Care  Goal: Plan of Care Review  Description: The Plan of Care Review/Shift note should be completed every shift.  The Outcome Evaluation is a brief statement about your assessment that the patient is improving, declining, or no change.  This information will be displayed automatically on your shiftnote.  Recent Flowsheet Documentation  Taken 4/29/2025 2203 by Bartolome Kramer RN  Outcome Evaluation: A/O x3. Ambulating with A1. Plan to D/C Tomorrow  Plan of Care Reviewed With: patient  Overall Patient Progress: improving  Goal: Absence of Hospital-Acquired Illness or Injury  Intervention: Identify and Manage Fall Risk  Recent Flowsheet Documentation  Taken 4/29/2025 2100 by Bartolome Kramer RN  Safety Promotion/Fall Prevention:   activity supervised   clutter free environment maintained   nonskid shoes/slippers when out of bed  Intervention: Prevent Skin Injury  Recent Flowsheet Documentation  Taken 4/29/2025 2100 by Bartolome Kramer, RN  Body Position: supine, head elevated  Goal: Optimal Comfort and Wellbeing  Intervention: Provide Person-Centered Care  Recent Flowsheet Documentation  Taken 4/29/2025 2100 by Bartolome Kramer RN  Trust Relationship/Rapport:   care explained   choices provided   thoughts/feelings acknowledged   empathic listening  provided     Problem: Delirium  Goal: Improved Behavioral Control  Intervention: Minimize Safety Risk  Recent Flowsheet Documentation  Taken 4/29/2025 2100 by Bartolome Kramer RN  Enhanced Safety Measures:   floor mats   pain management   review medications for side effects with activity   room near unit station  Trust Relationship/Rapport:   care explained   choices provided   thoughts/feelings acknowledged   empathic listening provided     Problem: Violence Risk or Actual  Goal: Anger and Impulse Control  Intervention: Minimize Safety Risk  Recent Flowsheet Documentation  Taken 4/29/2025 2100 by Bartolome Kramer RN  Enhanced Safety Measures:   floor mats   pain management   review medications for side effects with activity   room near unit station     Problem: Comorbidity Management  Goal: Maintenance of Behavioral Health Symptom Control  Intervention: Maintain Behavioral Health Symptom Control  Recent Flowsheet Documentation  Taken 4/29/2025 2100 by Bartolome Kramer RN  Medication Review/Management:   medications reviewed   high-risk medications identified     Problem: Adult Inpatient Plan of Care  Goal: Plan of Care Review  Description: The Plan of Care Review/Shift note should be completed every shift.  The Outcome Evaluation is a brief statement about your assessment that the patient is improving, declining, or no change.  This information will be displayed automatically on your shiftnote.  Outcome: Progressing  Flowsheets (Taken 4/29/2025 2203)  Outcome Evaluation: A/O x3. Ambulating with A1. Plan to D/C Tomorrow  Plan of Care Reviewed With: patient  Overall Patient Progress: improving  Goal: Absence of Hospital-Acquired Illness or Injury  Intervention: Identify and Manage Fall Risk  Recent Flowsheet Documentation  Taken 4/29/2025 2100 by Bartolome Kramer, RN  Safety Promotion/Fall Prevention:   activity supervised   clutter free environment maintained   nonskid shoes/slippers when out of bed  Intervention:  Prevent Skin Injury  Recent Flowsheet Documentation  Taken 4/29/2025 2100 by Bartolome Kramer RN  Body Position: supine, head elevated  Goal: Optimal Comfort and Wellbeing  Intervention: Provide Person-Centered Care  Recent Flowsheet Documentation  Taken 4/29/2025 2100 by Bartolome Kramer RN  Trust Relationship/Rapport:   care explained   choices provided   thoughts/feelings acknowledged   empathic listening provided     Problem: Violence Risk or Actual  Goal: Anger and Impulse Control  Intervention: Minimize Safety Risk  Recent Flowsheet Documentation  Taken 4/29/2025 2100 by Bartolome Kramer RN  Enhanced Safety Measures:   floor mats   pain management   review medications for side effects with activity   room near unit station     Problem: Fall Injury Risk  Goal: Absence of Fall and Fall-Related Injury  Intervention: Identify and Manage Contributors  Recent Flowsheet Documentation  Taken 4/29/2025 2100 by Bartolome Kramer RN  Medication Review/Management:   medications reviewed   high-risk medications identified  Intervention: Promote Injury-Free Environment  Recent Flowsheet Documentation  Taken 4/29/2025 2100 by Bartolome Kramer RN  Safety Promotion/Fall Prevention:   activity supervised   clutter free environment maintained   nonskid shoes/slippers when out of bed

## 2025-04-30 NOTE — DISCHARGE SUMMARY
[facility] Discharge Summary    Harpreet Mcelroy MRN# [id number]   Age: 69 year old YOB: 1955     Date of Admission:  3/27/2025  Date of Discharge::  4/30/2025  Admitting Physician:  Mary Martinez DO  Discharge Physician:  Jacoby Delgado MD          Admission Diagnoses:   Hx of schizophrenia [Z86.59]  Aggressive behavior [R46.89]  ROBBIN (acute kidney injury) [N17.9]          Discharge Diagnosis:     Principal Problem:    Schizoaffective disorder, bipolar type (H)  Active Problems:    Slurred speech    Mood disorder with psychosis    Agitation    Anxiety    Hx of schizophrenia    Aggressive behavior    ROBBIN (acute kidney injury)           Procedures:   No procedures performed during this admission          Medications Prior to Admission:     Medications Prior to Admission   Medication Sig Dispense Refill Last Dose/Taking    alum & mag hydroxide-simethicone (MAALOX  ES) 400-400-40 MG/5ML SUSP suspension Take 20 mLs by mouth every 6 hours as needed for indigestion.   Taking As Needed    artificial tears OINT ophthalmic ointment Place Into the left eye at bedtime.   3/19/2025 Bedtime    benztropine (COGENTIN) 1 MG tablet Take 2 mg by mouth at bedtime.   3/25/2025 Bedtime    bisacodyl (DULCOLAX) 10 MG suppository Place 10 mg rectally daily as needed for constipation.   Taking As Needed    busPIRone (BUSPAR) 15 MG tablet Take 15 mg by mouth 2 times daily.   3/27/2025 Morning    docusate sodium (COLACE) 100 MG capsule Take 100 mg by mouth 2 times daily as needed for constipation.   Taking As Needed    fluticasone (FLONASE) 50 MCG/ACT nasal spray Spray 1 spray into both nostrils daily as needed.   Taking As Needed    guaiFENesin (ROBITUSSIN) 20 mg/mL liquid Take 200 mg by mouth every 4 hours as needed for cough.   Taking As Needed    hydrOXYzine HCl (ATARAX) 10 MG tablet Take 10 mg by mouth 2 times daily. Noon and 4PM   3/27/2025 Noon    lamoTRIgine (LAMICTAL) 100 MG tablet Take 100 mg  by mouth daily.   3/27/2025 Morning    loperamide (IMODIUM) 2 MG capsule Take 2 mg by mouth 4 times daily as needed for diarrhea.   Taking As Needed    magnesium hydroxide (MILK OF MAGNESIA) 400 MG/5ML suspension Take 30 mLs by mouth daily as needed for constipation or heartburn.   Taking As Needed    melatonin 3 MG tablet Take 1 tablet (3 mg) by mouth nightly as needed for sleep. 30 tablet 1 Taking As Needed    menthol-zinc oxide (CALMOSEPTINE) 0.44-20.6 % OINT ointment Apply topically 4 times daily as needed for skin protection.   Taking As Needed    mineral oil enema Place 1 enema rectally once.   Taking    naproxen (NAPROSYN) 250 MG tablet Take 250 mg by mouth 2 times daily as needed for moderate pain.   Taking As Needed    OLANZapine (ZYPREXA) 20 MG tablet Take 1 tablet (20 mg) by mouth at bedtime. 30 tablet 1 3/25/2025 Bedtime    OLANZapine (ZYPREXA) 5 MG tablet Take 5 mg by mouth at bedtime.   Taking    OLANZapine (ZYPREXA) 7.5 MG tablet Take 7.5 mg by mouth 2 times daily. 8AM and noon   3/27/2025 Noon    ondansetron (ZOFRAN ODT) 4 MG ODT tab Take 4 mg by mouth every 6 hours as needed for nausea.   Taking As Needed    oxyBUTYnin (DITROPAN) 5 MG tablet Take 2.5 mg by mouth 3 times daily as needed for bladder spasms.   Taking As Needed    polyethylene glycol (MIRALAX) 17 GM/Dose powder Take 17 g (1 Capful) by mouth daily. 510 g 1 3/27/2025 Morning    tamsulosin (FLOMAX) 0.4 MG capsule Take 0.4 mg by mouth daily   3/27/2025 Morning    vitamin A & D (BABY) external ointment Apply topically 4 times daily as needed for dry skin or irritation.   Taking As Needed             Discharge Medications:   {                  :[id number]}          Consultations:   Consultation during this admission received from psychiatry.          Brief History of Illness:   ***          Hospital Course:   ***    BP (!) 145/79 (BP Location: Right arm)   Pulse 89   Temp 97.5  F (36.4  C) (Temporal)   Resp 16   Wt 69.4 kg (153 lb)   SpO2  "98%   BMI 22.59 kg/m    On the date of discharge, Mr. Mcelroy is alert and comfortable. His speech is garbled, but he seems to indicate \"yes\" and \"no\" appropriately.  HEENT: ***  CHest: No increased WOB, CTA.  COR: RRR without murmur  Abd: soft, NTND          Discharge Instructions and Follow-Up:     Discharge diet: Regular   Discharge activity: ***   Discharge follow-up: ***           Discharge Disposition:     Discharged to assisted living      Attestation:  I have reviewed today's vital signs, notes, medications, labs and imaging.    Jacoby Delgado MD     " Historical      docusate sodium (COLACE) 100 MG capsule Take 100 mg by mouth 2 times daily as needed for constipation., Historical      fluticasone (FLONASE) 50 MCG/ACT nasal spray Spray 1 spray into both nostrils daily as needed., Historical      guaiFENesin (ROBITUSSIN) 20 mg/mL liquid Take 200 mg by mouth every 4 hours as needed for cough., Historical      loperamide (IMODIUM) 2 MG capsule Take 2 mg by mouth 4 times daily as needed for diarrhea., Historical      magnesium hydroxide (MILK OF MAGNESIA) 400 MG/5ML suspension Take 30 mLs by mouth daily as needed for constipation or heartburn., Historical      !! melatonin 3 MG tablet Take 1 tablet (3 mg) by mouth nightly as needed for sleep., Disp-30 tablet, R-1, E-Prescribe      menthol-zinc oxide (CALMOSEPTINE) 0.44-20.6 % OINT ointment Apply topically 4 times daily as needed for skin protection.Historical      naproxen (NAPROSYN) 250 MG tablet Take 250 mg by mouth 2 times daily as needed for moderate pain., Historical      oxyBUTYnin (DITROPAN) 5 MG tablet Take 2.5 mg by mouth 3 times daily as needed for bladder spasms., Historical      polyethylene glycol (MIRALAX) 17 GM/Dose powder Take 17 g (1 Capful) by mouth daily., Disp-510 g, R-1, E-Prescribe      tamsulosin (FLOMAX) 0.4 MG capsule Take 0.4 mg by mouth daily, Historical      vitamin A & D (BABY) external ointment Apply topically 4 times daily as needed for dry skin or irritation.Historical       !! - Potential duplicate medications found. Please discuss with provider.        STOP taking these medications       mineral oil enema Comments:   Reason for Stopping:         OLANZapine (ZYPREXA) 20 MG tablet Comments:   Reason for Stopping:         OLANZapine (ZYPREXA) 5 MG tablet Comments:   Reason for Stopping:         OLANZapine (ZYPREXA) 7.5 MG tablet Comments:   Reason for Stopping:         ondansetron (ZOFRAN ODT) 4 MG ODT tab Comments:   Reason for Stopping:                     Consultations:   Consultation  during this admission received from psychiatry.          Brief History of Illness:   Harpreet Mcelroy is a 69 year old male with bipolar, anxiety, depression, schizophrenia, BPD, paranoia, bladder cancer s/p ureteral stent, HTN, HLD, who came to [facility] on 3/27/2025 on a RENETTA from [facility] with Agitation, attempt to harm staff with cane and was diagnosed with decompensated schizophrenia and ROBBIN with creatinine 1.42.  He was admitted by the medicine service on 3/31/2025 after he was declined by inpatient psychiatry for Psychiatry admission. Was seen by Dr Peres from Psychiatry service here who managed his medications and recommended full MI commitment with [facility] hearing.       The patient has an order for commitment from the [facility].  For that reason, discharge from the hospital to a community setting is not an option without his  approval.  Geriatric psychiatry is not available within the system and is not being offered.       Disposition at this time is dependent on the court and Psychiatry.        Date of Admission:  3/27/2025          Hospital Course:   Decompensated schizophrenia with intermittent agitation.  Bipolar disorder.  Anxiety.  Depression.  Chronic cognitive dysfunction.  -Psychiatry following.    -Appreciate continued input.  -Note, court proceeding resulted in guidance to commitment.     Acute kidney injury on chronic kidney disease stage II.  Creat up to 1.52 on 4/19. Then improved/leveled off around 1.3 for multiple checks.  - Avoid nephrotoxins as able.  - Recheck metabolic panel intermittently.     Hypoglycemia earlier in stay:  -Noted to be hypoglycemic and started on IVF (D5 at 75 ml/hr) on 4/18/25.  Now eating much better with multiple glu checks above 80 without further Dextrose needs.  -Check glucose intermittently.     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     Dysphagia.  -Speech pathology following.  -Dysphagia diet.     Bladder cancer   Chronic L ureteral stent:  Stent last changed 2/4/25 by [facility] urology Dr. Whitt.  -PTA oxybutynin and flomax.  -Needs ongoing Urology follow-up after discharge for continued stent changes/cares.     Deconditioning:  - Physical therapy following.    -Recommendation for some Home Health therapy at discharge.         BP (!) 145/79 (BP Location: Right arm)   Pulse 89   Temp 97.5  F (36.4  C) (Temporal)   Resp 16   Wt 69.4 kg (153 lb)   SpO2 98%   BMI 22.59 kg/m    On the date of discharge, Mr. Mcelroy is alert and comfortable. His speech is garbled, but he seems to indicate \"yes\" and \"no\" appropriately.  HEENT: no focal muscular asymmetry  CHest: No increased WOB, CTA.  COR: RRR without murmur  Abd: soft, NTND          Discharge Instructions and Follow-Up:     Discharge diet: Regular   Discharge activity: As tolerated   Discharge follow-up: Discharge home.  Follow up with primary Psychiatry team is essential.            Discharge Disposition:     Discharged to assisted living      Attestation:  I have reviewed today's vital signs, notes, medications, labs and imaging.    Jacoby Delgado MD     "

## 2025-04-30 NOTE — PROGRESS NOTES
Care Management Discharge Note    Discharge Date: 04/30/2025     Discharge Disposition: Assisted Living, Home Care, Outpatient Mental Health    Discharge Services: Mental Health Resources, Transportation Services, County Worker    Discharge DME: None    Discharge Transportation: agency    Private pay costs discussed: Not applicable    Does the patient's insurance plan have a 3 day qualifying hospital stay waiver?  No    PAS Confirmation Code: N/A    Patient/family educated on Medicare website which has current facility and service quality ratings: yes    Education Provided on the Discharge Plan: Yes    Persons Notified of Discharge Plans: Patient, [facility], [facility], [facility]I      Patient/Family in Agreement with the Plan: yes    Handoff Referral Completed: No, handoff not indicated or clinically appropriate    Additional Information:  Pt will be discharging home to [facility] this evening.   HIRAM faxed discharge orders to the facility (F: [phone]).  Pt will be discharging with PT and OT home care services. HIRAM asked the facility if they are contracted with a specific agency and was informed that they use [facility].  Referral was sent to [facility] and accepted. HIRAM sent the discharge orders to [facility].  HIRAM called Charleen Edouard, [phone] pt's CADI  to inform about the discharge so she can make sure all of his waiver services are in place upon his return home.   HIRAM collaborated with Vanessa from [employer] to create a Provisional Discharge Agreement which was signed by HIRAM and patient, and faxed to [facility] (F: [phone]). This was also placed in pt's physical chart.   Mariah Bellamy with [facility] Resources [phone] was informed about the discharge.   [facility] Stretcher is scheduled for [id number]. [facility] ambulance form submitted. HIRAM did not discuss the cost of the ride with pt, as [facility] which is a Medical Assistance  product and will cover the cost.  SW addressed all questions and concerns related to the discharge plan at this time.     MAGO Meza  Inpatient Care Coordination   [facility]  [phone]

## 2025-04-30 NOTE — PROGRESS NOTES
Speech Language Therapy Discharge Summary    Reason for therapy discharge:    Discharged to home.      Progress towards therapy goal(s). See goals on Care Plan in [facility] electronic health record for goal details.  Goals met Could pursue HH SLP if staff at [facility] feel his chewing/swallowing is below baseline. Suspect pt to have returned to baseline function, though.    Therapy recommendation(s):    Recommend easy-to-chew solids and thin liquids. Benefits from assist with meals. Upright with PO, slow rate, alternate food/drink, and no straws. Please have lower dentures in place with PO (upper dentures are loose).

## 2025-04-30 NOTE — PLAN OF CARE
"Goal Outcome Evaluation:      Plan of Care Reviewed With: patient    Overall Patient Progress: improvingOverall Patient Progress: improving    Outcome Evaluation: Plan to discharge to [facility] today 1325 - 1410      VSS on RA, calm and cooperative with no witnesed aggressive behaviors, ax1 gbw, disoriented to situation and time, incontinent, denied pain. Plan to discharge back to [facility] today [id number].    Problem: Adult Inpatient Plan of Care  Goal: Plan of Care Review  Description: The Plan of Care Review/Shift note should be completed every shift.  The Outcome Evaluation is a brief statement about your assessment that the patient is improving, declining, or no change.  This information will be displayed automatically on your shiftnote.  Outcome: Progressing  Flowsheets (Taken 4/30/2025 0232)  Outcome Evaluation: Plan to discharge to [facility] today 1325 - 1410  Plan of Care Reviewed With: patient  Overall Patient Progress: improving  Goal: Patient-Specific Goal (Individualized)  Description: You can add care plan individualizations to a care plan. Examples of Individualization might be:  \"Parent requests to be called daily at 9am for status\", \"I have a hard time hearing out of my right ear\", or \"Do not touch me to wake me up as it startlesme\".  Outcome: Progressing  Goal: Absence of Hospital-Acquired Illness or Injury  Outcome: Progressing  Intervention: Identify and Manage Fall Risk  Recent Flowsheet Documentation  Taken 4/30/2025 0012 by Berenice Nguyen, RN  Safety Promotion/Fall Prevention:   activity supervised   clutter free environment maintained   nonskid shoes/slippers when out of bed  Intervention: Prevent Skin Injury  Recent Flowsheet Documentation  Taken 4/30/2025 0012 by Berenice Nguyen RN  Body Position: supine, head elevated  Goal: Optimal Comfort and Wellbeing  Outcome: Progressing  Intervention: Provide Person-Centered Care  Recent Flowsheet Documentation  Taken 4/30/2025 " 0012 by Berenice Nguyen RN  Trust Relationship/Rapport:   care explained   choices provided   thoughts/feelings acknowledged   empathic listening provided  Goal: Readiness for Transition of Care  Outcome: Progressing     Problem: Violence Risk or Actual  Goal: Anger and Impulse Control  Outcome: Progressing  Intervention: Minimize Safety Risk  Recent Flowsheet Documentation  Taken 4/30/2025 0012 by Berenice Nguyen RN  Sensory Stimulation Regulation:   care clustered   quiet environment promoted  Enhanced Safety Measures:   floor mats   pain management   review medications for side effects with activity   room near unit station     Problem: Fall Injury Risk  Goal: Absence of Fall and Fall-Related Injury  Outcome: Progressing  Intervention: Identify and Manage Contributors  Recent Flowsheet Documentation  Taken 4/30/2025 0012 by Berenice Nguyen RN  Medication Review/Management:   medications reviewed   high-risk medications identified  Intervention: Promote Injury-Free Environment  Recent Flowsheet Documentation  Taken 4/30/2025 0012 by Berenice Nguyen, RN  Safety Promotion/Fall Prevention:   activity supervised   clutter free environment maintained   nonskid shoes/slippers when out of bed     Problem: Infection  Goal: Absence of Infection Signs and Symptoms  Outcome: Progressing

## 2025-04-30 NOTE — PLAN OF CARE
"Alert, oriented x2-3.  Low back pain, lido patch applied.  Ambulated to BR with assist 1/gait belt/walker several times, up in chair for meals.  Caroline/scrotal area reddened, cleansed and barrier cream applied.  Incontinent of urine, also using toilet.  Large formed BM today.  Discussed enema that was ordered, patient declined.  Piv removed.  Personal belongings with patient at discharge, only had shirt and glasses, patient stated he had pants and shoes, unable to locate.  Discharged via stretcher back to [facility].  Problem: Adult Inpatient Plan of Care  Goal: Plan of Care Review  Description: The Plan of Care Review/Shift note should be completed every shift.  The Outcome Evaluation is a brief statement about your assessment that the patient is improving, declining, or no change.  This information will be displayed automatically on your shiftnote.  Outcome: Met  Flowsheets (Taken 4/30/2025 3800)  Overall Patient Progress: improving  Goal: Patient-Specific Goal (Individualized)  Description: You can add care plan individualizations to a care plan. Examples of Individualization might be:  \"Parent requests to be called daily at 9am for status\", \"I have a hard time hearing out of my right ear\", or \"Do not touch me to wake me up as it startlesme\".  Outcome: Met  Goal: Absence of Hospital-Acquired Illness or Injury  Outcome: Met  Intervention: Identify and Manage Fall Risk  Recent Flowsheet Documentation  Taken 4/30/2025 0915 by Tala Long, RN  Safety Promotion/Fall Prevention:   activity supervised   clutter free environment maintained   increase visualization of patient   nonskid shoes/slippers when out of bed   patient and family education   room near nurse's station   safety round/check completed  Intervention: Prevent Skin Injury  Recent Flowsheet Documentation  Taken 4/30/2025 0915 by Tala Long, RN  Skin Protection:   adhesive use limited   incontinence pads utilized   skin sealant/moisture " barrier applied   tubing/devices free from skin contact  Intervention: Prevent and Manage VTE (Venous Thromboembolism) Risk  Recent Flowsheet Documentation  Taken 4/30/2025 0915 by Tala Long RN  VTE Prevention/Management: SCDs off (sequential compression devices)  Intervention: Prevent Infection  Recent Flowsheet Documentation  Taken 4/30/2025 0915 by Tala Long RN  Infection Prevention:   hand hygiene promoted   single patient room provided  Goal: Optimal Comfort and Wellbeing  Outcome: Met  Intervention: Provide Person-Centered Care  Recent Flowsheet Documentation  Taken 4/30/2025 0915 by Tala Long RN  Trust Relationship/Rapport:   care explained   reassurance provided   choices provided   thoughts/feelings acknowledged  Goal: Readiness for Transition of Care  Outcome: Met     Problem: Violence Risk or Actual  Goal: Anger and Impulse Control  Outcome: Met  Intervention: Minimize Safety Risk  Recent Flowsheet Documentation  Taken 4/30/2025 0915 by Tala Long RN  Sensory Stimulation Regulation:   care clustered   quiet environment promoted  Enhanced Safety Measures:   floor mats   pain management   room near unit station  Intervention: Promote Self-Control  Recent Flowsheet Documentation  Taken 4/30/2025 0915 by Tala Long RN  Supportive Measures: active listening utilized  Environmental Support: calm environment promoted     Problem: Fall Injury Risk  Goal: Absence of Fall and Fall-Related Injury  Outcome: Met  Intervention: Identify and Manage Contributors  Recent Flowsheet Documentation  Taken 4/30/2025 0915 by Tala Long RN  Self-Care Promotion: meal set-up provided  Medication Review/Management:   medications reviewed   high-risk medications identified  Intervention: Promote Injury-Free Environment  Recent Flowsheet Documentation  Taken 4/30/2025 0915 by Tala Long RN  Safety Promotion/Fall Prevention:   activity supervised   clutter free  environment maintained   increase visualization of patient   nonskid shoes/slippers when out of bed   patient and family education   room near nurse's station   safety round/check completed     Problem: Infection  Goal: Absence of Infection Signs and Symptoms  Outcome: Met  Intervention: Prevent or Manage Infection  Recent Flowsheet Documentation  Taken 4/30/2025 0915 by Tala Long, RN  Infection Management: aseptic technique maintained   Goal Outcome Evaluation:           Overall Patient Progress: improvingOverall Patient Progress: improving

## 2025-05-01 DIAGNOSIS — F25.0 SCHIZOAFFECTIVE DISORDER, BIPOLAR TYPE (H): ICD-10-CM

## 2025-05-01 RX ORDER — RISPERIDONE 25 MG/2 ML
25 KIT INTRAMUSCULAR
Status: CANCELLED | OUTPATIENT
Start: 2025-05-07

## 2025-05-01 NOTE — PLAN OF CARE
Physical Therapy Discharge Summary    Reason for therapy discharge:    Discharged to home with home therapy.    Progress towards therapy goal(s). See goals on Care Plan in [facility] electronic health record for goal details.  Goals not met.  Barriers to achieving goals:   discharge from facility.    Therapy recommendation(s):    Continued therapy is recommended.  Rationale/Recommendations:  Pt appears to present below baseline mobility based on chart review from previous admission. Pt likely to benefit return to familiar living environment, anticipate pt to require Ax1 with walker for mobility. Would benefit from HHPT to continue progression with activity.

## 2025-06-02 LAB
ATRIAL RATE - MUSE: 128 BPM
DIASTOLIC BLOOD PRESSURE - MUSE: NORMAL MMHG
INTERPRETATION ECG - MUSE: NORMAL
P AXIS - MUSE: 76 DEGREES
PR INTERVAL - MUSE: 146 MS
QRS DURATION - MUSE: 116 MS
QT - MUSE: 336 MS
QTC - MUSE: 490 MS
R AXIS - MUSE: 49 DEGREES
SYSTOLIC BLOOD PRESSURE - MUSE: NORMAL MMHG
T AXIS - MUSE: 19 DEGREES
VENTRICULAR RATE- MUSE: 128 BPM

## 2025-06-04 ENCOUNTER — LAB REQUISITION (OUTPATIENT)
Dept: LAB | Facility: CLINIC | Age: 70
End: 2025-06-04
Payer: COMMERCIAL

## 2025-06-04 DIAGNOSIS — Z13.21 ENCOUNTER FOR SCREENING FOR NUTRITIONAL DISORDER: ICD-10-CM

## 2025-06-04 DIAGNOSIS — Z79.899 OTHER LONG TERM (CURRENT) DRUG THERAPY: ICD-10-CM

## 2025-06-05 LAB
ALBUMIN SERPL BCG-MCNC: 3.9 G/DL (ref 3.5–5.2)
ALP SERPL-CCNC: 126 U/L (ref 40–150)
ALT SERPL W P-5'-P-CCNC: <5 U/L (ref 0–70)
ANION GAP SERPL CALCULATED.3IONS-SCNC: 11 MMOL/L (ref 7–15)
AST SERPL W P-5'-P-CCNC: 16 U/L (ref 0–45)
BASOPHILS # BLD AUTO: 0 10E3/UL (ref 0–0.2)
BASOPHILS NFR BLD AUTO: 0 %
BILIRUB SERPL-MCNC: 0.3 MG/DL
BUN SERPL-MCNC: 28.2 MG/DL (ref 8–23)
CALCIUM SERPL-MCNC: 9.5 MG/DL (ref 8.8–10.4)
CHLORIDE SERPL-SCNC: 106 MMOL/L (ref 98–107)
CHOLEST SERPL-MCNC: 198 MG/DL
CREAT SERPL-MCNC: 1.09 MG/DL (ref 0.67–1.17)
EGFRCR SERPLBLD CKD-EPI 2021: 74 ML/MIN/1.73M2
EOSINOPHIL # BLD AUTO: 0.3 10E3/UL (ref 0–0.7)
EOSINOPHIL NFR BLD AUTO: 3 %
ERYTHROCYTE [DISTWIDTH] IN BLOOD BY AUTOMATED COUNT: 16.4 % (ref 10–15)
EST. AVERAGE GLUCOSE BLD GHB EST-MCNC: 100 MG/DL
FASTING STATUS PATIENT QL REPORTED: YES
GLUCOSE SERPL-MCNC: 95 MG/DL (ref 70–99)
HBA1C MFR BLD: 5.1 %
HCO3 SERPL-SCNC: 24 MMOL/L (ref 22–29)
HCT VFR BLD AUTO: 38.2 % (ref 40–53)
HDLC SERPL-MCNC: 55 MG/DL
HGB BLD-MCNC: 11.8 G/DL (ref 13.3–17.7)
IMM GRANULOCYTES # BLD: 0 10E3/UL
IMM GRANULOCYTES NFR BLD: 0 %
LDLC SERPL CALC-MCNC: 114 MG/DL
LYMPHOCYTES # BLD AUTO: 1 10E3/UL (ref 0.8–5.3)
LYMPHOCYTES NFR BLD AUTO: 13 %
MCH RBC QN AUTO: 28.3 PG (ref 26.5–33)
MCHC RBC AUTO-ENTMCNC: 30.9 G/DL (ref 31.5–36.5)
MCV RBC AUTO: 92 FL (ref 78–100)
MONOCYTES # BLD AUTO: 0.6 10E3/UL (ref 0–1.3)
MONOCYTES NFR BLD AUTO: 8 %
NEUTROPHILS # BLD AUTO: 5.6 10E3/UL (ref 1.6–8.3)
NEUTROPHILS NFR BLD AUTO: 75 %
NONHDLC SERPL-MCNC: 143 MG/DL
NRBC # BLD AUTO: 0 10E3/UL
NRBC BLD AUTO-RTO: 0 /100
PLATELET # BLD AUTO: 274 10E3/UL (ref 150–450)
POTASSIUM SERPL-SCNC: 4.2 MMOL/L (ref 3.4–5.3)
PROLACTIN SERPL 3RD IS-MCNC: 216 NG/ML (ref 4–15)
PROT SERPL-MCNC: 7.3 G/DL (ref 6.4–8.3)
RBC # BLD AUTO: 4.17 10E6/UL (ref 4.4–5.9)
SODIUM SERPL-SCNC: 141 MMOL/L (ref 135–145)
T4 FREE SERPL-MCNC: 1.44 NG/DL (ref 0.9–1.7)
TRIGL SERPL-MCNC: 143 MG/DL
TSH SERPL DL<=0.005 MIU/L-ACNC: 1.58 UIU/ML (ref 0.3–4.2)
VIT D+METAB SERPL-MCNC: 22 NG/ML (ref 20–50)
WBC # BLD AUTO: 7.4 10E3/UL (ref 4–11)

## 2025-06-05 PROCEDURE — 82306 VITAMIN D 25 HYDROXY: CPT | Mod: ORL | Performed by: NURSE PRACTITIONER

## 2025-06-05 PROCEDURE — 84443 ASSAY THYROID STIM HORMONE: CPT | Mod: ORL | Performed by: NURSE PRACTITIONER

## 2025-06-05 PROCEDURE — P9603 ONE-WAY ALLOW PRORATED MILES: HCPCS | Mod: ORL | Performed by: NURSE PRACTITIONER

## 2025-06-05 PROCEDURE — 85025 COMPLETE CBC W/AUTO DIFF WBC: CPT | Mod: ORL | Performed by: NURSE PRACTITIONER

## 2025-06-05 PROCEDURE — 84439 ASSAY OF FREE THYROXINE: CPT | Mod: ORL | Performed by: NURSE PRACTITIONER

## 2025-06-05 PROCEDURE — 80053 COMPREHEN METABOLIC PANEL: CPT | Mod: ORL | Performed by: NURSE PRACTITIONER

## 2025-06-05 PROCEDURE — 84146 ASSAY OF PROLACTIN: CPT | Mod: ORL | Performed by: NURSE PRACTITIONER

## 2025-06-05 PROCEDURE — 83036 HEMOGLOBIN GLYCOSYLATED A1C: CPT | Mod: ORL | Performed by: NURSE PRACTITIONER

## 2025-06-05 PROCEDURE — 36415 COLL VENOUS BLD VENIPUNCTURE: CPT | Mod: ORL | Performed by: NURSE PRACTITIONER

## 2025-06-05 PROCEDURE — 80061 LIPID PANEL: CPT | Mod: ORL | Performed by: NURSE PRACTITIONER

## 2025-06-18 ENCOUNTER — HOSPITAL ENCOUNTER (OUTPATIENT)
Facility: CLINIC | Age: 70
Setting detail: OBSERVATION
End: 2025-06-18
Attending: EMERGENCY MEDICINE | Admitting: HOSPITALIST
Payer: COMMERCIAL

## 2025-06-18 DIAGNOSIS — D64.9 ANEMIA, UNSPECIFIED TYPE: ICD-10-CM

## 2025-06-18 DIAGNOSIS — K92.2 UPPER GI BLEED: ICD-10-CM

## 2025-06-18 LAB
ABO + RH BLD: NORMAL
ALBUMIN SERPL BCG-MCNC: 3.9 G/DL (ref 3.5–5.2)
ALP SERPL-CCNC: 111 U/L (ref 40–150)
ALT SERPL W P-5'-P-CCNC: 24 U/L (ref 0–70)
ANION GAP SERPL CALCULATED.3IONS-SCNC: 11 MMOL/L (ref 7–15)
AST SERPL W P-5'-P-CCNC: 40 U/L (ref 0–45)
BASOPHILS # BLD AUTO: 0 10E3/UL (ref 0–0.2)
BASOPHILS NFR BLD AUTO: 0 %
BILIRUB SERPL-MCNC: 0.2 MG/DL
BLD GP AB SCN SERPL QL: NEGATIVE
BUN SERPL-MCNC: 27.7 MG/DL (ref 8–23)
CALCIUM SERPL-MCNC: 9.1 MG/DL (ref 8.8–10.4)
CHLORIDE SERPL-SCNC: 100 MMOL/L (ref 98–107)
CREAT SERPL-MCNC: 1.16 MG/DL (ref 0.67–1.17)
EGFRCR SERPLBLD CKD-EPI 2021: 69 ML/MIN/1.73M2
EOSINOPHIL # BLD AUTO: 0.4 10E3/UL (ref 0–0.7)
EOSINOPHIL NFR BLD AUTO: 5 %
ERYTHROCYTE [DISTWIDTH] IN BLOOD BY AUTOMATED COUNT: 15.7 % (ref 10–15)
FERRITIN SERPL-MCNC: 215 NG/ML (ref 31–409)
GLUCOSE SERPL-MCNC: 100 MG/DL (ref 70–99)
HCO3 SERPL-SCNC: 25 MMOL/L (ref 22–29)
HCT VFR BLD AUTO: 30.9 % (ref 40–53)
HGB BLD-MCNC: 10 G/DL (ref 13.3–17.7)
HGB BLD-MCNC: 10.8 G/DL (ref 13.3–17.7)
HOLD SPECIMEN: NORMAL
IMM GRANULOCYTES # BLD: 0 10E3/UL
IMM GRANULOCYTES NFR BLD: 0 %
IRON BINDING CAPACITY (ROCHE): 208 UG/DL (ref 240–430)
IRON SATN MFR SERPL: 13 % (ref 15–46)
IRON SERPL-MCNC: 27 UG/DL (ref 61–157)
LYMPHOCYTES # BLD AUTO: 1.2 10E3/UL (ref 0.8–5.3)
LYMPHOCYTES NFR BLD AUTO: 18 %
MCH RBC QN AUTO: 28.2 PG (ref 26.5–33)
MCHC RBC AUTO-ENTMCNC: 32.4 G/DL (ref 31.5–36.5)
MCV RBC AUTO: 86 FL (ref 78–100)
MCV RBC AUTO: 87 FL (ref 78–100)
MONOCYTES # BLD AUTO: 0.6 10E3/UL (ref 0–1.3)
MONOCYTES NFR BLD AUTO: 9 %
NEUTROPHILS # BLD AUTO: 4.7 10E3/UL (ref 1.6–8.3)
NEUTROPHILS NFR BLD AUTO: 68 %
NRBC # BLD AUTO: 0 10E3/UL
NRBC BLD AUTO-RTO: 0 /100
PLATELET # BLD AUTO: 313 10E3/UL (ref 150–450)
POTASSIUM SERPL-SCNC: 4.2 MMOL/L (ref 3.4–5.3)
PROT SERPL-MCNC: 7 G/DL (ref 6.4–8.3)
RBC # BLD AUTO: 3.54 10E6/UL (ref 4.4–5.9)
SODIUM SERPL-SCNC: 136 MMOL/L (ref 135–145)
SPECIMEN EXP DATE BLD: NORMAL
WBC # BLD AUTO: 7 10E3/UL (ref 4–11)

## 2025-06-18 PROCEDURE — 99223 1ST HOSP IP/OBS HIGH 75: CPT | Performed by: NURSE PRACTITIONER

## 2025-06-18 PROCEDURE — 83550 IRON BINDING TEST: CPT | Performed by: NURSE PRACTITIONER

## 2025-06-18 PROCEDURE — 99418 PROLNG IP/OBS E/M EA 15 MIN: CPT | Performed by: NURSE PRACTITIONER

## 2025-06-18 PROCEDURE — 83540 ASSAY OF IRON: CPT | Performed by: NURSE PRACTITIONER

## 2025-06-18 PROCEDURE — 36415 COLL VENOUS BLD VENIPUNCTURE: CPT | Performed by: EMERGENCY MEDICINE

## 2025-06-18 PROCEDURE — 86900 BLOOD TYPING SEROLOGIC ABO: CPT | Performed by: NURSE PRACTITIONER

## 2025-06-18 PROCEDURE — G0378 HOSPITAL OBSERVATION PER HR: HCPCS

## 2025-06-18 PROCEDURE — 258N000003 HC RX IP 258 OP 636: Performed by: NURSE PRACTITIONER

## 2025-06-18 PROCEDURE — 85025 COMPLETE CBC W/AUTO DIFF WBC: CPT | Performed by: EMERGENCY MEDICINE

## 2025-06-18 PROCEDURE — 99285 EMERGENCY DEPT VISIT HI MDM: CPT | Mod: 25

## 2025-06-18 PROCEDURE — 250N000011 HC RX IP 250 OP 636: Performed by: NURSE PRACTITIONER

## 2025-06-18 PROCEDURE — 250N000011 HC RX IP 250 OP 636: Performed by: EMERGENCY MEDICINE

## 2025-06-18 PROCEDURE — 82947 ASSAY GLUCOSE BLOOD QUANT: CPT | Performed by: EMERGENCY MEDICINE

## 2025-06-18 PROCEDURE — 82310 ASSAY OF CALCIUM: CPT | Performed by: EMERGENCY MEDICINE

## 2025-06-18 PROCEDURE — 250N000009 HC RX 250: Performed by: NURSE PRACTITIONER

## 2025-06-18 PROCEDURE — 82728 ASSAY OF FERRITIN: CPT | Performed by: NURSE PRACTITIONER

## 2025-06-18 PROCEDURE — 36415 COLL VENOUS BLD VENIPUNCTURE: CPT | Performed by: NURSE PRACTITIONER

## 2025-06-18 PROCEDURE — 96376 TX/PRO/DX INJ SAME DRUG ADON: CPT

## 2025-06-18 PROCEDURE — 96374 THER/PROPH/DIAG INJ IV PUSH: CPT

## 2025-06-18 PROCEDURE — 85018 HEMOGLOBIN: CPT | Performed by: NURSE PRACTITIONER

## 2025-06-18 PROCEDURE — 250N000013 HC RX MED GY IP 250 OP 250 PS 637: Performed by: NURSE PRACTITIONER

## 2025-06-18 RX ORDER — NEOMYCIN/BACITRACIN/POLYMYXINB 3.5-400-5K
OINTMENT (GRAM) TOPICAL PRN
COMMUNITY

## 2025-06-18 RX ORDER — PROCHLORPERAZINE MALEATE 5 MG/1
5 TABLET ORAL EVERY 6 HOURS PRN
Status: DISCONTINUED | OUTPATIENT
Start: 2025-06-18 | End: 2025-06-20 | Stop reason: HOSPADM

## 2025-06-18 RX ORDER — PETROLATUM,WHITE
OINTMENT IN PACKET (GRAM) TOPICAL PRN
COMMUNITY

## 2025-06-18 RX ORDER — PETROLATUM,WHITE/LANOLIN
OINTMENT (GRAM) TOPICAL PRN
COMMUNITY

## 2025-06-18 RX ORDER — AMOXICILLIN 250 MG
1 CAPSULE ORAL 2 TIMES DAILY PRN
Status: DISCONTINUED | OUTPATIENT
Start: 2025-06-18 | End: 2025-06-20 | Stop reason: HOSPADM

## 2025-06-18 RX ORDER — ONDANSETRON 2 MG/ML
4 INJECTION INTRAMUSCULAR; INTRAVENOUS EVERY 6 HOURS PRN
Status: DISCONTINUED | OUTPATIENT
Start: 2025-06-18 | End: 2025-06-20 | Stop reason: HOSPADM

## 2025-06-18 RX ORDER — BENZTROPINE MESYLATE 0.5 MG/1
2 TABLET ORAL AT BEDTIME
Status: DISCONTINUED | OUTPATIENT
Start: 2025-06-18 | End: 2025-06-20 | Stop reason: HOSPADM

## 2025-06-18 RX ORDER — AMOXICILLIN 250 MG
2 CAPSULE ORAL 2 TIMES DAILY PRN
Status: DISCONTINUED | OUTPATIENT
Start: 2025-06-18 | End: 2025-06-20 | Stop reason: HOSPADM

## 2025-06-18 RX ORDER — HYDRALAZINE HYDROCHLORIDE 20 MG/ML
10 INJECTION INTRAMUSCULAR; INTRAVENOUS EVERY 4 HOURS PRN
Status: DISCONTINUED | OUTPATIENT
Start: 2025-06-18 | End: 2025-06-20 | Stop reason: HOSPADM

## 2025-06-18 RX ORDER — ONDANSETRON 4 MG/1
4 TABLET, ORALLY DISINTEGRATING ORAL EVERY 6 HOURS PRN
Status: DISCONTINUED | OUTPATIENT
Start: 2025-06-18 | End: 2025-06-20 | Stop reason: HOSPADM

## 2025-06-18 RX ORDER — TAMSULOSIN HYDROCHLORIDE 0.4 MG/1
0.4 CAPSULE ORAL DAILY
Status: DISCONTINUED | OUTPATIENT
Start: 2025-06-19 | End: 2025-06-20 | Stop reason: HOSPADM

## 2025-06-18 RX ORDER — RISPERIDONE 0.5 MG/1
1 TABLET, ORALLY DISINTEGRATING ORAL 2 TIMES DAILY
Status: DISCONTINUED | OUTPATIENT
Start: 2025-06-18 | End: 2025-06-20 | Stop reason: HOSPADM

## 2025-06-18 RX ORDER — POLYETHYLENE GLYCOL 3350 17 G/17G
17 POWDER, FOR SOLUTION ORAL DAILY
Status: DISCONTINUED | OUTPATIENT
Start: 2025-06-19 | End: 2025-06-20 | Stop reason: HOSPADM

## 2025-06-18 RX ORDER — HYDRALAZINE HYDROCHLORIDE 10 MG/1
10 TABLET, FILM COATED ORAL EVERY 4 HOURS PRN
Status: DISCONTINUED | OUTPATIENT
Start: 2025-06-18 | End: 2025-06-20 | Stop reason: HOSPADM

## 2025-06-18 RX ORDER — LIDOCAINE 40 MG/G
CREAM TOPICAL
Status: DISCONTINUED | OUTPATIENT
Start: 2025-06-18 | End: 2025-06-20

## 2025-06-18 RX ORDER — BUSPIRONE HYDROCHLORIDE 15 MG/1
30 TABLET ORAL 2 TIMES DAILY
Status: DISCONTINUED | OUTPATIENT
Start: 2025-06-18 | End: 2025-06-20 | Stop reason: HOSPADM

## 2025-06-18 RX ORDER — ONDANSETRON 4 MG/1
4 TABLET, ORALLY DISINTEGRATING ORAL EVERY 6 HOURS PRN
COMMUNITY

## 2025-06-18 RX ORDER — SODIUM CHLORIDE 9 MG/ML
INJECTION, SOLUTION INTRAVENOUS CONTINUOUS
Status: DISCONTINUED | OUTPATIENT
Start: 2025-06-18 | End: 2025-06-20

## 2025-06-18 RX ORDER — OLANZAPINE 5 MG/1
5 TABLET, ORALLY DISINTEGRATING ORAL 3 TIMES DAILY
Status: DISCONTINUED | OUTPATIENT
Start: 2025-06-18 | End: 2025-06-20 | Stop reason: HOSPADM

## 2025-06-18 RX ORDER — HYDROXYZINE HYDROCHLORIDE 10 MG/1
10 TABLET, FILM COATED ORAL 2 TIMES DAILY
Status: DISCONTINUED | OUTPATIENT
Start: 2025-06-18 | End: 2025-06-20 | Stop reason: HOSPADM

## 2025-06-18 RX ORDER — BENZTROPINE MESYLATE 0.5 MG/1
1 TABLET ORAL 2 TIMES DAILY PRN
Status: DISCONTINUED | OUTPATIENT
Start: 2025-06-18 | End: 2025-06-20 | Stop reason: HOSPADM

## 2025-06-18 RX ORDER — MINERAL OIL, PETROLATUM 425; 573 MG/G; MG/G
OINTMENT OPHTHALMIC
COMMUNITY

## 2025-06-18 RX ORDER — SODIUM PHOSPHATE,MONO-DIBASIC 19G-7G/118
1 ENEMA (ML) RECTAL
COMMUNITY

## 2025-06-18 RX ORDER — GUAIFENESIN 200 MG/10ML
200 LIQUID ORAL EVERY 4 HOURS PRN
Status: DISCONTINUED | OUTPATIENT
Start: 2025-06-18 | End: 2025-06-20 | Stop reason: HOSPADM

## 2025-06-18 RX ADMIN — BENZTROPINE MESYLATE 2 MG: 0.5 TABLET ORAL at 21:21

## 2025-06-18 RX ADMIN — BUSPIRONE HYDROCHLORIDE 30 MG: 15 TABLET ORAL at 20:24

## 2025-06-18 RX ADMIN — OLANZAPINE 5 MG: 5 TABLET, ORALLY DISINTEGRATING ORAL at 20:24

## 2025-06-18 RX ADMIN — SODIUM CHLORIDE: 0.9 INJECTION, SOLUTION INTRAVENOUS at 22:41

## 2025-06-18 RX ADMIN — RISPERIDONE 1 MG: 0.5 TABLET, ORALLY DISINTEGRATING ORAL at 21:21

## 2025-06-18 RX ADMIN — SODIUM CHLORIDE: 0.9 INJECTION, SOLUTION INTRAVENOUS at 13:31

## 2025-06-18 RX ADMIN — PANTOPRAZOLE SODIUM 40 MG: 40 INJECTION, POWDER, FOR SOLUTION INTRAVENOUS at 11:48

## 2025-06-18 RX ADMIN — MINERAL OIL, WHITE PETROLATUM: .03; .94 OINTMENT OPHTHALMIC at 21:23

## 2025-06-18 RX ADMIN — HYDROXYZINE HYDROCHLORIDE 10 MG: 10 TABLET, FILM COATED ORAL at 20:24

## 2025-06-18 RX ADMIN — PANTOPRAZOLE SODIUM 40 MG: 40 INJECTION, POWDER, FOR SOLUTION INTRAVENOUS at 22:40

## 2025-06-18 RX ADMIN — OLANZAPINE 5 MG: 5 TABLET, ORALLY DISINTEGRATING ORAL at 15:05

## 2025-06-18 ASSESSMENT — ACTIVITIES OF DAILY LIVING (ADL)
ADLS_ACUITY_SCORE: 58
ADLS_ACUITY_SCORE: 53
ADLS_ACUITY_SCORE: 53
ADLS_ACUITY_SCORE: 66
ADLS_ACUITY_SCORE: 53
ADLS_ACUITY_SCORE: 58
ADLS_ACUITY_SCORE: 53
ADLS_ACUITY_SCORE: 57
ADLS_ACUITY_SCORE: 53
ADLS_ACUITY_SCORE: 58
ADLS_ACUITY_SCORE: 58
ADLS_ACUITY_SCORE: 53
ADLS_ACUITY_SCORE: 58

## 2025-06-18 ASSESSMENT — COLUMBIA-SUICIDE SEVERITY RATING SCALE - C-SSRS
1. IN THE PAST MONTH, HAVE YOU WISHED YOU WERE DEAD OR WISHED YOU COULD GO TO SLEEP AND NOT WAKE UP?: NO
2. HAVE YOU ACTUALLY HAD ANY THOUGHTS OF KILLING YOURSELF IN THE PAST MONTH?: NO
6. HAVE YOU EVER DONE ANYTHING, STARTED TO DO ANYTHING, OR PREPARED TO DO ANYTHING TO END YOUR LIFE?: NO

## 2025-06-18 NOTE — ED TRIAGE NOTES
Pt arrives via EMS from [facility] due to facility reporting dark red blood in stools for the past 2 days.     Pt reports that he feels tired. Pt denies rectal pain.     Provider in room completing assessment at this time.

## 2025-06-18 NOTE — PHARMACY-ADMISSION MEDICATION HISTORY
Pharmacy Intern Admission Medication History    Admission medication history is complete. The information provided in this note is only as accurate as the sources available at the time of the update.    Information Source(s): Facility ([facility]/) medication list/MAR via N/A    Pertinent Information: Patient comes from [facility]  ([phone])    Changes made to PTA medication list:  Added: Artificial saliva, Zofran, Refresh eye oint prn, Neosporin, Vit A/D oint prn, fleet enema   Deleted: Hydroxyzine PRN, Risperidone injection (confirmed with nurse at facility that he is no longer on it), Risperidone 0.5mg tab  Changed: Naproxen prn-> bid     Allergies reviewed with patient and updates made in EHR: unable to assess    Medication History Completed By: Domitila Man RPH 6/18/2025 12:45 PM    PTA Med List   Medication Sig Last Dose/Taking    alum & mag hydroxide-simethicone (MAALOX  ES) 400-400-40 MG/5ML SUSP suspension Take 20 mLs by mouth every 6 hours as needed for indigestion. More than a month    ARTIFICIAL SALIVA MT Take 3 sprays by mouth 3 times daily as needed (dry mouth). More than a month    artificial tears OINT ophthalmic ointment Place Into the left eye at bedtime. 6/17/2025 Bedtime    benztropine (COGENTIN) 1 MG tablet Take 1 tablet (1 mg) by mouth 2 times daily as needed for extrapyramidal symptoms (EPS). Past Month    benztropine (COGENTIN) 2 MG tablet Take 1 tablet (2 mg) by mouth at bedtime. 6/17/2025 Bedtime    bisacodyl (DULCOLAX) 10 MG suppository Place 10 mg rectally daily as needed for constipation. Taking As Needed    busPIRone (BUSPAR) 30 MG tablet Take 1 tablet (30 mg) by mouth 2 times daily. 6/18/2025 Morning    diclofenac (VOLTAREN) 1 % topical gel Apply 2 g topically 4 times daily as needed for moderate pain. Taking As Needed    docusate sodium (COLACE) 100 MG capsule Take 100 mg by mouth 2 times daily as needed for constipation. Past Month    fluticasone (FLONASE) 50 MCG/ACT nasal  spray Spray 1 spray into both nostrils daily as needed. Taking As Needed    guaiFENesin (ROBITUSSIN) 20 mg/mL liquid Take 200 mg by mouth every 4 hours as needed for cough. More than a month    hydrOXYzine HCl (ATARAX) 10 MG tablet Take 1 tablet (10 mg) by mouth 2 times daily. 6/17/2025 Evening    lamoTRIgine (LAMICTAL) 200 MG tablet Take 1 tablet (200 mg) by mouth daily. 6/18/2025 Morning    loperamide (IMODIUM) 2 MG capsule Take 2 mg by mouth 4 times daily as needed for diarrhea. Past Month    magnesium hydroxide (MILK OF MAGNESIA) 400 MG/5ML suspension Take 30 mLs by mouth daily as needed for constipation or heartburn. More than a month    melatonin 3 MG tablet Take 3 mg by mouth at bedtime. Past Month    menthol-zinc oxide (CALMOSEPTINE) 0.44-20.6 % OINT ointment Apply topically 4 times daily as needed for skin protection. Taking As Needed    naproxen (NAPROSYN) 250 MG tablet Take 250 mg by mouth 2 times daily (with meals). 6/18/2025 Morning    neomycin-bacitracin-polymyxin (NEOSPORIN) 5-400-5000 ointment Apply topically as needed (Redness or drainage). Taking As Needed    OLANZapine zydis (ZYPREXA) 5 MG ODT Take 1 tablet (5 mg) by mouth 3 times daily. 6/18/2025 Morning    ondansetron (ZOFRAN ODT) 4 MG ODT tab Take 4 mg by mouth every 6 hours as needed for nausea. Taking As Needed    oxyBUTYnin (DITROPAN) 5 MG tablet Take 2.5 mg by mouth 3 times daily as needed for bladder spasms. 6/17/2025 Evening    polyethylene glycol (MIRALAX) 17 GM/Dose powder Take 17 g (1 Capful) by mouth daily. 6/18/2025 Morning    risperiDONE (RISPERDAL M-TABS) 1 MG ODT Take 1 tablet (1 mg) by mouth 2 times daily. 6/18/2025 Morning    sodium phosphate (FLEET ENEMA) 7-19 GM/118ML rectal enema Place 1 enema rectally once as needed for constipation. Taking As Needed    tamsulosin (FLOMAX) 0.4 MG capsule Take 0.4 mg by mouth daily 6/18/2025 Morning    vitamin A & D (BABY) external ointment Apply topically as needed for dry skin or  irritation. Taking As Needed    vitamin A & D (BABY) external ointment Apply topically 4 times daily as needed for dry skin or irritation. More than a month    white petrolatum gel Apply topically as needed for dry skin. Taking As Needed    White Petrolatum-Mineral Oil (REFRESH P.M.) OINT Apply to eye nightly as needed. Apply a small amount to left eye at bedtime as needed Taking As Needed

## 2025-06-18 NOTE — PLAN OF CARE
ROOM # 233    Living Situation (if not independent, order SW consult): Assisted Living  Facility name: [facility]  : Bessie & Liu, siblings    Activity level at baseline: Independent with cane  Activity level on admit: Assist x 1 W/G    Who will be transporting you at discharge: TBD    Patient registered to observation; given Patient Bill of Rights; given the opportunity to ask questions about observation status and their plan of care.  Patient has been oriented to the observation room, bathroom and call light is in place. YES    Discussed discharge goals and expectations with patient/family. YES

## 2025-06-18 NOTE — H&P
[facility]    History and Physical - Hospitalist Service       Date of Admission:  6/18/2025    Assessment & Plan      Harpreet Mcelroy is a 69 year old male who has a very complex psychiatric history with a history of being bipolar, anxiety, depression, schizophrenia, borderline personality, history of paranoia who also has a history of bladder cancer and ureteral stent, hypertension, hyperlipidemia, noncompliance with medications is presenting from his [facility] with concerns for concerns of dark colored stools.  He is a poor historian at baseline and collateral information is obtained from the EM team and care facility.  Per EMS, he reportedly has had a two day history of dark bloody stools.   No evidence of acute bleeding.  Denies any bright red blood or clots.  Endorses ongoing fatigue and lower back pain.  No chest pain, SOB, HA, or dizziness.      Acute medical issues:  #Concern for possible upper GI bleed: Given recent history of dark colored stools.  Hgb 10 on 6/18/2025 (baseline 11-12 range).  Rectal exam in the ED negative for bloody stools.   Poor historian at baseline.     #Anemia:  Acute on chronic.  Suspected upper GI bleed.   -- Admit to Obs  -- Repeat hgb at 1800 and in the AM.  -- Transfuse PRN hgb </= 7.  -- GI consult.  Not on anticoagulation but has been taking Naproxen BID scheduled per facility records.   -- CLD today.  NPO at MN.    -- Check B12, folate, ferritin, iron studies.       ----------------------------------------------------------------------  Chronic medical issue:   #BPH  #Bladder cancer  #Hx ureteral stent:    No acute issues.  Followed by [facility].  Per chart, appears he was last seen in 2/2025.   -- Continue tamsulosin.     # HTN: VSS. Monitor.     #Bipolar, ARLYN/schizophrenia, paranoia, BPD:   -- Continue PTA benztropine, buspirone, lamotrigine, olanzapine, and risperidone PO.  Per chart it appears that he is no longer on risperidone  microspheres ER IM.   No longer on hydroxyzine PRN.   Will monitor closely as he has had issues with encephalopathy in the past related to medications/over sedation.    -- Follow up with PCP/Psychiatry as OP.    #Hx of medication noncompliance: This too has been an issue in the past.  Appears to be living in a facility -- [facility] -- [facility] and they manage medications.    -- Noted.      Observation Goals: -diagnostic tests and consults completed and resulted, -vital signs normal or at patient baseline, -tolerating oral intake to maintain hydration, -returns to baseline functional status, -safe disposition plan has been identified, Nurse to notify provider when observation goals have been met and patient is ready for discharge.  Diet: Clear Liquid Diet  NPO for Medical/Clinical Reasons Except for: Meds, Ice Chips  DVT Prophylaxis: Pneumatic Compression Devices and Ambulate every shift  Hernandez Catheter: Not present  Lines: None     Cardiac Monitoring: None  Code Status: Full Code    Clinically Significant Risk Factors Present on Admission                        # Anemia: based on hgb <11  # Anemia: based on hgb <11           # Financial/Environmental Concerns:           Disposition Plan     Medically Ready for Discharge: Anticipated Tomorrow         The patient's care was discussed with the Bedside Nurse, Care Coordinator/, Patient, and EM Team.    ECHO [facility]  Hospitalist Service  [facility]  Securely message with [employer] (more info)  Text page via [employer] Paging/Directory     ______________________________________________________________________    Chief Complaint   Dark colored stools    History is obtained from the patient, electronic health record, and emergency department physician    History of Present Illness   Harpreet Mcelroy is a 69 year old male who has a very complex psychiatric history with a history of being bipolar, anxiety, depression,  schizophrenia, borderline personality, history of paranoia who also has a history of bladder cancer and ureteral stent, hypertension, hyperlipidemia, noncompliance with medications is presenting from his [facility] with concerns for concerns of dark colored stools.  He is a poor historian at baseline and collateral information is obtained from the EM team and care facility.  Per EMS, he reportedly has had a two day history of dark bloody stools.   No evidence of acute bleeding.  Denies any bright red blood or clots.  Endorses ongoing fatigue and lower back pain.  No chest pain, SOB, HA, or dizziness.       Past Medical History    Past Medical History:   Diagnosis Date    Anxiety     Bipolar 1 disorder (H)     Cancer (H)     Depressive disorder     Psychiatric diagnosis        Past Surgical History   No past surgical history on file.    Prior to Admission Medications   Prior to Admission Medications   Prescriptions Last Dose Informant Patient Reported? Taking?   ARTIFICIAL SALIVA MT More than a month  Yes Yes   Sig: Take 3 sprays by mouth 3 times daily as needed (dry mouth).   OLANZapine zydis (ZYPREXA) 5 MG ODT 6/18/2025 Morning  No Yes   Sig: Take 1 tablet (5 mg) by mouth 3 times daily.   White Petrolatum-Mineral Oil (REFRESH P.M.) OINT   Yes Yes   Sig: Apply to eye nightly as needed. Apply a small amount to left eye at bedtime as needed   alum & mag hydroxide-simethicone (MAALOX  ES) 400-400-40 MG/5ML SUSP suspension More than a month  Yes Yes   Sig: Take 20 mLs by mouth every 6 hours as needed for indigestion.   artificial tears OINT ophthalmic ointment 6/17/2025 Bedtime  Yes Yes   Sig: Place Into the left eye at bedtime.   benztropine (COGENTIN) 1 MG tablet Past Month  No Yes   Sig: Take 1 tablet (1 mg) by mouth 2 times daily as needed for extrapyramidal symptoms (EPS).   benztropine (COGENTIN) 2 MG tablet 6/17/2025 Bedtime  No Yes   Sig: Take 1 tablet (2 mg) by mouth at bedtime.   bisacodyl  (DULCOLAX) 10 MG suppository   Yes Yes   Sig: Place 10 mg rectally daily as needed for constipation.   busPIRone (BUSPAR) 30 MG tablet 6/18/2025 Morning  No Yes   Sig: Take 1 tablet (30 mg) by mouth 2 times daily.   diclofenac (VOLTAREN) 1 % topical gel   No Yes   Sig: Apply 2 g topically 4 times daily as needed for moderate pain.   docusate sodium (COLACE) 100 MG capsule Past Month Other Yes Yes   Sig: Take 100 mg by mouth 2 times daily as needed for constipation.   fluticasone (FLONASE) 50 MCG/ACT nasal spray  Other Yes Yes   Sig: Spray 1 spray into both nostrils daily as needed.   guaiFENesin (ROBITUSSIN) 20 mg/mL liquid More than a month  Yes Yes   Sig: Take 200 mg by mouth every 4 hours as needed for cough.   hydrOXYzine HCl (ATARAX) 10 MG tablet 6/17/2025 Evening  No Yes   Sig: Take 1 tablet (10 mg) by mouth 2 times daily.   lamoTRIgine (LAMICTAL) 200 MG tablet 6/18/2025 Morning  No Yes   Sig: Take 1 tablet (200 mg) by mouth daily.   loperamide (IMODIUM) 2 MG capsule Past Month  Yes Yes   Sig: Take 2 mg by mouth 4 times daily as needed for diarrhea.   magnesium hydroxide (MILK OF MAGNESIA) 400 MG/5ML suspension More than a month  Yes Yes   Sig: Take 30 mLs by mouth daily as needed for constipation or heartburn.   melatonin 3 MG tablet Past Month  Yes Yes   Sig: Take 3 mg by mouth at bedtime.   menthol-zinc oxide (CALMOSEPTINE) 0.44-20.6 % OINT ointment   Yes Yes   Sig: Apply topically 4 times daily as needed for skin protection.   naproxen (NAPROSYN) 250 MG tablet 6/18/2025 Morning  Yes Yes   Sig: Take 250 mg by mouth 2 times daily (with meals).   neomycin-bacitracin-polymyxin (NEOSPORIN) 5-400-5000 ointment   Yes Yes   Sig: Apply topically as needed (Redness or drainage).   ondansetron (ZOFRAN ODT) 4 MG ODT tab   Yes Yes   Sig: Take 4 mg by mouth every 6 hours as needed for nausea.   oxyBUTYnin (DITROPAN) 5 MG tablet 6/17/2025 Evening  Yes Yes   Sig: Take 2.5 mg by mouth 3 times daily as needed for bladder  spasms.   polyethylene glycol (MIRALAX) 17 GM/Dose powder 6/18/2025 Morning  No Yes   Sig: Take 17 g (1 Capful) by mouth daily.   risperiDONE (RISPERDAL M-TABS) 1 MG ODT 6/18/2025 Morning  No Yes   Sig: Take 1 tablet (1 mg) by mouth 2 times daily.   sodium phosphate (FLEET ENEMA) 7-19 GM/118ML rectal enema   Yes Yes   Sig: Place 1 enema rectally once as needed for constipation.   tamsulosin (FLOMAX) 0.4 MG capsule 6/18/2025 Morning Other Yes Yes   Sig: Take 0.4 mg by mouth daily   vitamin A & D (BABY) external ointment More than a month  Yes Yes   Sig: Apply topically 4 times daily as needed for dry skin or irritation.   vitamin A & D (BABY) external ointment   Yes Yes   Sig: Apply topically as needed for dry skin or irritation.   white petrolatum gel   Yes Yes   Sig: Apply topically as needed for dry skin.      Facility-Administered Medications: None        Review of Systems    As above otherwise negative.    Social History   I have reviewed this patient's social history and updated it with pertinent information if needed.  Social History     Tobacco Use    Smoking status: Former    Smokeless tobacco: Current   Substance Use Topics    Alcohol use: Not Currently    Drug use: Never         Family History     Reviewed and non contributory to presenting illness.      Allergies   Allergies   Allergen Reactions    Lactose Anxiety     Lactose intolerance    Aspirin     Cucumber Extract Unknown     pickles    Diphenhydramine Other (See Comments)     Lock jaw. Could not open mouth    Pollen Extract Other (See Comments)     Sneezing and congestion    Acetaminophen Rash     Fixed drug reaction        Physical Exam   Vital Signs: Temp: 97.4  F (36.3  C) Temp src: Oral BP: (!) 154/77 Pulse: 64   Resp: 18 SpO2: 100 % O2 Device: None (Room air)    Weight: 152 lbs 5.41 oz    GEN:   Alert, oriented x 3, appears comfortable, NAD. Chronic speech dysarthria  NECK:   Supple ,no mass or thyromegaly   HEENT:  Normocephalic/atraumatic, no  scleral icterus, no nasal discharge, mouth moist.  CV:   Regular rate and rhythm, no murmur or JVD.  S1 + S2 noted, no S3 or S4.  LUNGS:   Clear to auscultation bilaterally without rales/rhonchi/wheezing/retractions.  Symmetric chest rise on inhalation noted.  ABD:   Active bowel sounds, soft, non-tender/non-distended.  No rebound/guarding/rigidity.  EXT:   No edema.  No cyanosis.  No joint synovitis noted.  SKIN:   Dry to touch, no exanthems noted in the visualized areas.  Neurologic: Grossly intact,non focal.   Neuropsychiatric:  General: normal, calm and normal eye contact  Level of consciousness: alert / normal  Affect: normal  Orientation: oriented to self, place, time and situation     Medical Decision Making       90 MINUTES SPENT BY ME on the date of service doing chart review, history, exam, documentation & further activities per the note.      Data   ------------------------- PAST 24 HR DATA REVIEWED -----------------------------------------------    I have personally reviewed the following data over the past 24 hrs:    7.0  \   10.0 (L)   / 313     136 100 27.7 (H) /  100 (H)   4.2 25 1.16 \     ALT: 24 AST: 40 AP: 111 TBILI: 0.2   ALB: 3.9 TOT PROTEIN: 7.0 LIPASE: N/A       Imaging results reviewed over the past 24 hrs:   No results found for this or any previous visit (from the past 24 hours).

## 2025-06-18 NOTE — ED PROVIDER NOTES
Emergency Department Note      History of Present Illness     Chief Complaint   Rectal Bleeding      HPI   Harpreet Mcelroy is a 69 year old male with PMH significant for bipolar, anxiety, depression, schizophrenia, borderline personality, history of paranoia, slurred speech, history of bladder cancer, ureteral stent, hypertension, hyperlipidemia, and history of noncompliance with medications who presents from [facility] for blood in stool. History limited secondary to patient's slurred speech. Per EMS, two day history of dark red blood in stools. Patient also endorses seeing dark stools in the toilet bowl. Denies any bright red or blood clots. He also mentions some fatigue and lower back pain. Denies any chest pain, SOB, headaches, or dizziness.     Independent Historian   None    Review of External Notes   None     Past Medical History     Medical History and Problem List   Past Medical History:   Diagnosis Date    Anxiety     Bipolar 1 disorder (H)     Cancer (H)     Depressive disorder     Psychiatric diagnosis        Medications   alum & mag hydroxide-simethicone (MAALOX  ES) 400-400-40 MG/5ML SUSP suspension  artificial tears OINT ophthalmic ointment  benztropine (COGENTIN) 1 MG tablet  benztropine (COGENTIN) 2 MG tablet  bisacodyl (DULCOLAX) 10 MG suppository  busPIRone (BUSPAR) 30 MG tablet  diclofenac (VOLTAREN) 1 % topical gel  docusate sodium (COLACE) 100 MG capsule  fluticasone (FLONASE) 50 MCG/ACT nasal spray  guaiFENesin (ROBITUSSIN) 20 mg/mL liquid  hydrOXYzine HCl (ATARAX) 10 MG tablet  hydrOXYzine HCl (ATARAX) 10 MG tablet  lamoTRIgine (LAMICTAL) 200 MG tablet  loperamide (IMODIUM) 2 MG capsule  magnesium hydroxide (MILK OF MAGNESIA) 400 MG/5ML suspension  melatonin 3 MG tablet  menthol-zinc oxide (CALMOSEPTINE) 0.44-20.6 % OINT ointment  naproxen (NAPROSYN) 250 MG tablet  OLANZapine zydis (ZYPREXA) 5 MG ODT  oxyBUTYnin (DITROPAN) 5 MG tablet  polyethylene glycol (MIRALAX) 17  GM/Dose powder  risperiDONE (RISPERDAL M-TABS) 0.5 MG ODT  risperiDONE (RISPERDAL M-TABS) 1 MG ODT  risperiDONE microspheres ER (RISPERDAL CONSTA) 25 MG injection  tamsulosin (FLOMAX) 0.4 MG capsule  vitamin A & D (BABY) external ointment        Surgical History   No past surgical history on file.    Physical Exam     No data found.  Physical Exam  Constitutional:       General: He is not in acute distress.     Appearance: He is not ill-appearing.   HENT:      Head: Normocephalic and atraumatic.   Cardiovascular:      Rate and Rhythm: Normal rate.      Heart sounds: No murmur heard.  Pulmonary:      Effort: Pulmonary effort is normal. No respiratory distress.      Breath sounds: Normal breath sounds.   Abdominal:      General: Abdomen is flat. Bowel sounds are normal.      Palpations: Abdomen is soft.      Tenderness: There is no abdominal tenderness.   Genitourinary:     Comments: Rectal exam notable for dark brown stools.   Skin:     General: Skin is warm and dry.   Neurological:      Mental Status: Mental status is at baseline.   Psychiatric:         Mood and Affect: Mood normal.         Diagnostics     Lab Results   Labs Ordered and Resulted from Time of ED Arrival to Time of ED Departure - No data to display    Imaging   No orders to display       EKG   None     Independent Interpretation   None    ED Course      Medications Administered   Medications - No data to display    Procedures   Procedures     Discussion of Management   None    ED Course        Additional Documentation  None    Medical Decision Making / Diagnosis     CMS Diagnoses: None    MIPS   None               MDM   Harpreet Mcelroy is a 69 year old male with PMH significant for bipolar, anxiety, depression, schizophrenia, borderline personality, history of paranoia, slurred speech, history of bladder cancer, ureteral stent, hypertension, hyperlipidemia, and history of noncompliance with medications who presents from [facility] for  blood in stool. Workup for active GI bleeding pending. I am following the patient with the my supervising provider Dr. De Oliveira who will continue patient's care in the ED.      Disposition   Pending, workup.     Diagnosis   No diagnosis found.     Discharge Medications   New Prescriptions    No medications on file            Arnie Wayne MD   [facility] Resident   06/18/25 1206       Arnie Wayne MD  Resident  06/18/25 1214

## 2025-06-18 NOTE — PLAN OF CARE
"Care From: 1600 - 2300  PRIMARY DIAGNOSIS: Rectal Bleeding/Upper GI Bleed?  OUTPATIENT/OBSERVATION GOALS TO BE MET BEFORE DISCHARGE:  1. Pain Status: Pain free.    2. Return to near baseline physical activity: No    3. Cleared for discharge by consultants (if involved): No    Discharge Planner Nurse   Safe discharge environment identified: Yes  Barriers to discharge: Yes       Entered by: Sheila Snow RN 06/18/2025     Patient A & O x 4 with intermittent orientations/forgetful. Pt noted repeatedly calling for same questions. Denies, pain, SOB, & NV, rectal bleed report or noted during this shift. Patient tried to refuse the clear liquid diet, education offered and pt was in agreement. GI consulted. Plan is to motor for any rectal bleed & NPO after midnight for a possible medical procedure tomorrow.     BP (!) 158/72   Pulse 68   Temp 98.1  F (36.7  C) (Oral)   Resp 18   Ht 1.753 m (5' 9\")   Wt 65.9 kg (145 lb 4.8 oz)   SpO2 100%   BMI 21.46 kg/m       Please review provider order for any additional goals.   Nurse to notify provider when observation goals have been met and patient is ready for discharge.Problem: Adult Inpatient Plan of Care  Goal: Plan of Care Review  Description: The Plan of Care Review/Shift note should be completed every shift.  The Outcome Evaluation is a brief statement about your assessment that the patient is improving, declining, or no change.  This information will be displayed automatically on your shift  note.  Outcome: Progressing  Goal: Patient-Specific Goal (Individualized)  Description: You can add care plan individualizations to a care plan. Examples of Individualization might be:  \"Parent requests to be called daily at 9am for status\", \"I have a hard time hearing out of my right ear\", or \"Do not touch me to wake me up as it startles  me\".  Outcome: Progressing  Flowsheets (Taken 6/18/2025 1619)  Individualized Care Needs: None  Anxieties, Fears or Concerns: None  Goal: " Absence of Hospital-Acquired Illness or Injury  Outcome: Progressing  Intervention: Identify and Manage Fall Risk  Recent Flowsheet Documentation  Taken 6/18/2025 1651 by Sheila Snow RN  Safety Promotion/Fall Prevention:   activity supervised   clutter free environment maintained   lighting adjusted   mobility aid in reach   nonskid shoes/slippers when out of bed  Intervention: Prevent Skin Injury  Recent Flowsheet Documentation  Taken 6/18/2025 1651 by Sheila Snow RN  Body Position: position changed independently  Intervention: Prevent and Manage VTE (Venous Thromboembolism) Risk  Recent Flowsheet Documentation  Taken 6/18/2025 1651 by Sheila Snow RN  VTE Prevention/Management: SCDs off (sequential compression devices)  Intervention: Prevent Infection  Recent Flowsheet Documentation  Taken 6/18/2025 1651 by Sheila Snow RN  Infection Prevention:   cohorting utilized   hand hygiene promoted   rest/sleep promoted   single patient room provided  Goal: Optimal Comfort and Wellbeing  Outcome: Progressing  Intervention: Provide Person-Centered Care  Recent Flowsheet Documentation  Taken 6/18/2025 1651 by Sheila Snow RN  Trust Relationship/Rapport:   care explained   reassurance provided   choices provided   thoughts/feelings acknowledged  Goal: Readiness for Transition of Care  Outcome: Progressing  Intervention: Mutually Develop Transition Plan  Recent Flowsheet Documentation  Taken 6/18/2025 1619 by Sheila Snow RN  Patient/Family Anticipated Services at Transition: other (see comments)  Patient/Family Anticipates Transition to: assisted living  Equipment Currently Used at Home: cane, quad     Problem: Delirium  Goal: Optimal Coping  Outcome: Progressing  Goal: Improved Behavioral Control  Outcome: Progressing  Intervention: Minimize Safety Risk  Recent Flowsheet Documentation  Taken 6/18/2025 1651 by Sheila Snow RN  Enhanced Safety Measures: room near unit station  Trust  Relationship/Rapport:   care explained   reassurance provided   choices provided   thoughts/feelings acknowledged  Goal: Improved Attention and Thought Clarity  Outcome: Progressing  Intervention: Maximize Cognitive Function  Recent Flowsheet Documentation  Taken 6/18/2025 1651 by Sheila Snow, RN  Sensory Stimulation Regulation: care clustered  Goal: Improved Sleep  Outcome: Progressing

## 2025-06-18 NOTE — ED NOTES
[facility]  ED Nurse Handoff Report    ED Chief complaint: Rectal Bleeding  . ED Diagnosis:   Final diagnoses:   Upper GI bleed   Anemia, unspecified type       Allergies:   Allergies   Allergen Reactions    Lactose Anxiety     Lactose intolerance    Aspirin     Cucumber Extract Unknown     pickles    Diphenhydramine Other (See Comments)     Lock jaw. Could not open mouth    Pollen Extract Other (See Comments)     Sneezing and congestion    Acetaminophen Rash     Fixed drug reaction       Code Status: Full Code    Activity level - Baseline/Home:  independent.  Activity Level - Current:   assist of 1.   Lift room needed: No.   Bariatric: No   Needed: No   Isolation: Yes.   Infection: Not Applicable.     Respiratory status: Room air    Vital Signs (within 30 minutes):   Vitals:    06/18/25 1200 06/18/25 1215 06/18/25 1230 06/18/25 1245   BP: (!) 150/72 (!) 151/73 136/70    Pulse: 68 68 69    Resp:       Temp:       TempSrc:       SpO2: 100% 99% 100% 100%   Weight:           Cardiac Rhythm:  ,      Pain level:    Patient confused: No.   Patient Falls Risk: bed/chair alarm on, nonskid shoes/slippers when out of bed, arm band in place, and patient and family education.   Elimination Status: Has voided     Patient Report - Initial Complaint: Rectal Bleeding; dark red in stool.   Focused Assessment:     GastrointestinalGastrointestinal WDL: .WDL except; stool color (Staff at [facility] where pt resides report dark red blood in stool for 2 days. Pt denies N/V/D and constipation.)Last Bowel Movement: 06/18/25Stool Color: red, darkAbdominal Appearance: nondistended        Abnormal Results:   Labs Ordered and Resulted from Time of ED Arrival to Time of ED Departure   COMPREHENSIVE METABOLIC PANEL - Abnormal       Result Value    Sodium 136      Potassium 4.2      Carbon Dioxide (CO2) 25      Anion Gap 11      Urea Nitrogen 27.7 (*)     Creatinine 1.16      GFR Estimate 69      Calcium 9.1       Chloride 100      Glucose 100 (*)     Alkaline Phosphatase 111      AST 40      ALT 24      Protein Total 7.0      Albumin 3.9      Bilirubin Total 0.2     CBC WITH PLATELETS AND DIFFERENTIAL - Abnormal    WBC Count 7.0      RBC Count 3.54 (*)     Hemoglobin 10.0 (*)     Hematocrit 30.9 (*)     MCV 87      MCH 28.2      MCHC 32.4      RDW 15.7 (*)     Platelet Count 313      % Neutrophils 68      % Lymphocytes 18      % Monocytes 9      % Eosinophils 5      % Basophils 0      % Immature Granulocytes 0      NRBCs per 100 WBC 0      Absolute Neutrophils 4.7      Absolute Lymphocytes 1.2      Absolute Monocytes 0.6      Absolute Eosinophils 0.4      Absolute Basophils 0.0      Absolute Immature Granulocytes 0.0      Absolute NRBCs 0.0          No orders to display       Treatments provided: Protonix  Family Comments: N/A  OBS brochure/video discussed/provided to patient:  Yes  ED Medications:   Medications   pantoprazole (PROTONIX) injection 40 mg (40 mg Intravenous $Given 6/18/25 1144)       Drips infusing:  No  For the majority of the shift this patient was Green.   Interventions performed were N/A.    Sepsis treatment initiated: No    Cares/treatment/interventions/medications to be completed following ED care: Monitor pt for rectal bleeding; continue care per provider orders.     ED Nurse Name: Brenda Sharpe RN  12:51 PM

## 2025-06-18 NOTE — PROGRESS NOTES
Provided cares for pt from [id number]. Pt c/o pain when urinating, informed yang Betancourt RN, she will get bladder scan. Clear liquids, drinking water.     BP (!) 149/67   Pulse 66   Temp 98  F (36.7  C) (Oral)   Resp 18   Wt 69.1 kg (152 lb 5.4 oz)   SpO2 100%   BMI 22.50 kg/m

## 2025-06-19 ENCOUNTER — ANESTHESIA EVENT (OUTPATIENT)
Dept: SURGERY | Facility: CLINIC | Age: 70
End: 2025-06-19
Payer: COMMERCIAL

## 2025-06-19 ENCOUNTER — ANESTHESIA (OUTPATIENT)
Dept: SURGERY | Facility: CLINIC | Age: 70
End: 2025-06-19
Payer: COMMERCIAL

## 2025-06-19 VITALS
DIASTOLIC BLOOD PRESSURE: 69 MMHG | SYSTOLIC BLOOD PRESSURE: 129 MMHG | BODY MASS INDEX: 21.52 KG/M2 | WEIGHT: 145.3 LBS | HEART RATE: 68 BPM | RESPIRATION RATE: 18 BRPM | HEIGHT: 69 IN | OXYGEN SATURATION: 99 % | TEMPERATURE: 97.9 F

## 2025-06-19 LAB
ANION GAP SERPL CALCULATED.3IONS-SCNC: 6 MMOL/L (ref 7–15)
BUN SERPL-MCNC: 20 MG/DL (ref 8–23)
CALCIUM SERPL-MCNC: 8.9 MG/DL (ref 8.8–10.4)
CHLORIDE SERPL-SCNC: 113 MMOL/L (ref 98–107)
CREAT SERPL-MCNC: 1.19 MG/DL (ref 0.67–1.17)
EGFRCR SERPLBLD CKD-EPI 2021: 67 ML/MIN/1.73M2
ERYTHROCYTE [DISTWIDTH] IN BLOOD BY AUTOMATED COUNT: 15.9 % (ref 10–15)
FOLATE SERPL-MCNC: 19.9 NG/ML (ref 4.6–34.8)
GLUCOSE SERPL-MCNC: 90 MG/DL (ref 70–99)
HCO3 SERPL-SCNC: 25 MMOL/L (ref 22–29)
HCT VFR BLD AUTO: 31.7 % (ref 40–53)
HGB BLD-MCNC: 10.1 G/DL (ref 13.3–17.7)
MCH RBC QN AUTO: 28 PG (ref 26.5–33)
MCHC RBC AUTO-ENTMCNC: 31.9 G/DL (ref 31.5–36.5)
MCV RBC AUTO: 88 FL (ref 78–100)
PLATELET # BLD AUTO: 298 10E3/UL (ref 150–450)
POTASSIUM SERPL-SCNC: 4.3 MMOL/L (ref 3.4–5.3)
RBC # BLD AUTO: 3.61 10E6/UL (ref 4.4–5.9)
SODIUM SERPL-SCNC: 144 MMOL/L (ref 135–145)
UPPER GI ENDOSCOPY: NORMAL
VIT B12 SERPL-MCNC: 971 PG/ML (ref 232–1245)
WBC # BLD AUTO: 5.5 10E3/UL (ref 4–11)

## 2025-06-19 PROCEDURE — 85014 HEMATOCRIT: CPT | Performed by: NURSE PRACTITIONER

## 2025-06-19 PROCEDURE — 710N000012 HC RECOVERY PHASE 2, PER MINUTE: Performed by: INTERNAL MEDICINE

## 2025-06-19 PROCEDURE — 250N000013 HC RX MED GY IP 250 OP 250 PS 637: Performed by: NURSE PRACTITIONER

## 2025-06-19 PROCEDURE — 82746 ASSAY OF FOLIC ACID SERUM: CPT | Performed by: NURSE PRACTITIONER

## 2025-06-19 PROCEDURE — 99232 SBSQ HOSP IP/OBS MODERATE 35: CPT | Performed by: NURSE PRACTITIONER

## 2025-06-19 PROCEDURE — 82607 VITAMIN B-12: CPT | Performed by: NURSE PRACTITIONER

## 2025-06-19 PROCEDURE — G0378 HOSPITAL OBSERVATION PER HR: HCPCS

## 2025-06-19 PROCEDURE — 250N000011 HC RX IP 250 OP 636: Performed by: NURSE PRACTITIONER

## 2025-06-19 PROCEDURE — 360N000076 HC SURGERY LEVEL 3, PER MIN: Performed by: INTERNAL MEDICINE

## 2025-06-19 PROCEDURE — 250N000009 HC RX 250: Performed by: NURSE ANESTHETIST, CERTIFIED REGISTERED

## 2025-06-19 PROCEDURE — 370N000017 HC ANESTHESIA TECHNICAL FEE, PER MIN: Performed by: INTERNAL MEDICINE

## 2025-06-19 PROCEDURE — 272N000001 HC OR GENERAL SUPPLY STERILE: Performed by: INTERNAL MEDICINE

## 2025-06-19 PROCEDURE — 999N000141 HC STATISTIC PRE-PROCEDURE NURSING ASSESSMENT: Performed by: INTERNAL MEDICINE

## 2025-06-19 PROCEDURE — 85018 HEMOGLOBIN: CPT | Performed by: NURSE PRACTITIONER

## 2025-06-19 PROCEDURE — 96376 TX/PRO/DX INJ SAME DRUG ADON: CPT

## 2025-06-19 PROCEDURE — 80048 BASIC METABOLIC PNL TOTAL CA: CPT | Performed by: NURSE PRACTITIONER

## 2025-06-19 PROCEDURE — 36415 COLL VENOUS BLD VENIPUNCTURE: CPT | Performed by: NURSE PRACTITIONER

## 2025-06-19 PROCEDURE — 258N000003 HC RX IP 258 OP 636: Performed by: ANESTHESIOLOGY

## 2025-06-19 PROCEDURE — 250N000011 HC RX IP 250 OP 636: Performed by: NURSE ANESTHETIST, CERTIFIED REGISTERED

## 2025-06-19 PROCEDURE — 258N000003 HC RX IP 258 OP 636: Performed by: NURSE PRACTITIONER

## 2025-06-19 RX ORDER — PROPOFOL 10 MG/ML
INJECTION, EMULSION INTRAVENOUS CONTINUOUS PRN
Status: DISCONTINUED | OUTPATIENT
Start: 2025-06-19 | End: 2025-06-19

## 2025-06-19 RX ORDER — ONDANSETRON 2 MG/ML
4 INJECTION INTRAMUSCULAR; INTRAVENOUS EVERY 30 MIN PRN
Status: DISCONTINUED | OUTPATIENT
Start: 2025-06-19 | End: 2025-06-19 | Stop reason: HOSPADM

## 2025-06-19 RX ORDER — ONDANSETRON 4 MG/1
4 TABLET, ORALLY DISINTEGRATING ORAL EVERY 30 MIN PRN
Status: DISCONTINUED | OUTPATIENT
Start: 2025-06-19 | End: 2025-06-19 | Stop reason: HOSPADM

## 2025-06-19 RX ORDER — SODIUM CHLORIDE, SODIUM LACTATE, POTASSIUM CHLORIDE, CALCIUM CHLORIDE 600; 310; 30; 20 MG/100ML; MG/100ML; MG/100ML; MG/100ML
INJECTION, SOLUTION INTRAVENOUS CONTINUOUS
Status: DISCONTINUED | OUTPATIENT
Start: 2025-06-19 | End: 2025-06-19 | Stop reason: HOSPADM

## 2025-06-19 RX ORDER — NALOXONE HYDROCHLORIDE 0.4 MG/ML
0.2 INJECTION, SOLUTION INTRAMUSCULAR; INTRAVENOUS; SUBCUTANEOUS
Status: CANCELLED | OUTPATIENT
Start: 2025-06-19

## 2025-06-19 RX ORDER — NALOXONE HYDROCHLORIDE 0.4 MG/ML
0.1 INJECTION, SOLUTION INTRAMUSCULAR; INTRAVENOUS; SUBCUTANEOUS
Status: DISCONTINUED | OUTPATIENT
Start: 2025-06-19 | End: 2025-06-19 | Stop reason: HOSPADM

## 2025-06-19 RX ORDER — NALOXONE HYDROCHLORIDE 0.4 MG/ML
0.4 INJECTION, SOLUTION INTRAMUSCULAR; INTRAVENOUS; SUBCUTANEOUS
Status: CANCELLED | OUTPATIENT
Start: 2025-06-19

## 2025-06-19 RX ORDER — LIDOCAINE 40 MG/G
CREAM TOPICAL
Status: DISCONTINUED | OUTPATIENT
Start: 2025-06-19 | End: 2025-06-19 | Stop reason: HOSPADM

## 2025-06-19 RX ORDER — DEXAMETHASONE SODIUM PHOSPHATE 4 MG/ML
4 INJECTION, SOLUTION INTRA-ARTICULAR; INTRALESIONAL; INTRAMUSCULAR; INTRAVENOUS; SOFT TISSUE
Status: DISCONTINUED | OUTPATIENT
Start: 2025-06-19 | End: 2025-06-19 | Stop reason: HOSPADM

## 2025-06-19 RX ORDER — LIDOCAINE HYDROCHLORIDE 20 MG/ML
INJECTION, SOLUTION INFILTRATION; PERINEURAL PRN
Status: DISCONTINUED | OUTPATIENT
Start: 2025-06-19 | End: 2025-06-19

## 2025-06-19 RX ORDER — FLUMAZENIL 0.1 MG/ML
0.2 INJECTION, SOLUTION INTRAVENOUS
Status: CANCELLED | OUTPATIENT
Start: 2025-06-19 | End: 2025-06-19

## 2025-06-19 RX ADMIN — OLANZAPINE 5 MG: 5 TABLET, ORALLY DISINTEGRATING ORAL at 08:26

## 2025-06-19 RX ADMIN — HYDROXYZINE HYDROCHLORIDE 10 MG: 10 TABLET, FILM COATED ORAL at 21:33

## 2025-06-19 RX ADMIN — SODIUM CHLORIDE: 0.9 INJECTION, SOLUTION INTRAVENOUS at 21:36

## 2025-06-19 RX ADMIN — PANTOPRAZOLE SODIUM 40 MG: 40 INJECTION, POWDER, FOR SOLUTION INTRAVENOUS at 21:32

## 2025-06-19 RX ADMIN — MIDAZOLAM 1 MG: 1 INJECTION INTRAMUSCULAR; INTRAVENOUS at 14:36

## 2025-06-19 RX ADMIN — PANTOPRAZOLE SODIUM 40 MG: 40 INJECTION, POWDER, FOR SOLUTION INTRAVENOUS at 08:27

## 2025-06-19 RX ADMIN — HYDROXYZINE HYDROCHLORIDE 10 MG: 10 TABLET, FILM COATED ORAL at 08:26

## 2025-06-19 RX ADMIN — RISPERIDONE 1 MG: 0.5 TABLET, ORALLY DISINTEGRATING ORAL at 21:37

## 2025-06-19 RX ADMIN — BUSPIRONE HYDROCHLORIDE 30 MG: 15 TABLET ORAL at 08:26

## 2025-06-19 RX ADMIN — SODIUM CHLORIDE, SODIUM LACTATE, POTASSIUM CHLORIDE, AND CALCIUM CHLORIDE: .6; .31; .03; .02 INJECTION, SOLUTION INTRAVENOUS at 14:06

## 2025-06-19 RX ADMIN — BUSPIRONE HYDROCHLORIDE 30 MG: 15 TABLET ORAL at 21:32

## 2025-06-19 RX ADMIN — TAMSULOSIN HYDROCHLORIDE 0.4 MG: 0.4 CAPSULE ORAL at 08:26

## 2025-06-19 RX ADMIN — RISPERIDONE 1 MG: 0.5 TABLET, ORALLY DISINTEGRATING ORAL at 08:27

## 2025-06-19 RX ADMIN — SODIUM CHLORIDE: 0.9 INJECTION, SOLUTION INTRAVENOUS at 08:34

## 2025-06-19 RX ADMIN — PROPOFOL 150 MCG/KG/MIN: 10 INJECTION, EMULSION INTRAVENOUS at 14:36

## 2025-06-19 RX ADMIN — LIDOCAINE HYDROCHLORIDE 20 MG: 20 INJECTION, SOLUTION INFILTRATION; PERINEURAL at 14:44

## 2025-06-19 RX ADMIN — BENZTROPINE MESYLATE 2 MG: 0.5 TABLET ORAL at 21:32

## 2025-06-19 RX ADMIN — MINERAL OIL, WHITE PETROLATUM: .03; .94 OINTMENT OPHTHALMIC at 21:37

## 2025-06-19 RX ADMIN — MIDAZOLAM 1 MG: 1 INJECTION INTRAMUSCULAR; INTRAVENOUS at 14:38

## 2025-06-19 RX ADMIN — LAMOTRIGINE 200 MG: 25 TABLET ORAL at 08:26

## 2025-06-19 RX ADMIN — OLANZAPINE 5 MG: 5 TABLET, ORALLY DISINTEGRATING ORAL at 21:33

## 2025-06-19 ASSESSMENT — ACTIVITIES OF DAILY LIVING (ADL)
ADLS_ACUITY_SCORE: 67
ADLS_ACUITY_SCORE: 63
ADLS_ACUITY_SCORE: 57
ADLS_ACUITY_SCORE: 63
ADLS_ACUITY_SCORE: 67
ADLS_ACUITY_SCORE: 63
ADLS_ACUITY_SCORE: 63
ADLS_ACUITY_SCORE: 68
ADLS_ACUITY_SCORE: 57
ADLS_ACUITY_SCORE: 57
ADLS_ACUITY_SCORE: 67
ADLS_ACUITY_SCORE: 63
ADLS_ACUITY_SCORE: 68
ADLS_ACUITY_SCORE: 68
ADLS_ACUITY_SCORE: 57
ADLS_ACUITY_SCORE: 67
ADLS_ACUITY_SCORE: 57

## 2025-06-19 NOTE — PLAN OF CARE
PRIMARY DIAGNOSIS: GI BLEED    OUTPATIENT/OBSERVATION GOALS TO BE MET BEFORE DISCHARGE  Orthostatic performed: No    Stable Hgb Yes.   Recent Labs   Lab Test 06/19/25  0647 06/18/25  1736 06/18/25  1119   HGB 10.1* 10.8* 10.0*       Resolved or declined bleeding episodes: Yes Last episode: 6/17    Appropriate testing complete: No    Cleared for discharge by consultants (if involved): No    Safe discharge environment identified: Yes    Discharge Planner Nurse   Safe discharge environment identified: Yes  Barriers to discharge: Yes, waiting for GI        Entered by: Jorgito Angel RN 06/19/2025 11:20 AM     Please review provider order for any additional goals.   Nurse to notify provider when observation goals have been met and patient is ready for discharge.Goal Outcome Evaluation:

## 2025-06-19 NOTE — CONSULTS
Gastroenterology Consultation     Consulting Physician   Silver Levine MD     Reason For Consultation   Black stool     Chief Complaint   Harpreet Mcelroy came to the hospital for evaluation of black stool     History of Present Illness    Harpreet Mcelroy is a pleasant 69 year old male who we have been asked to see regarding dark-colored stool.  He is a poor historian therefore information has been obtained from the chart.  He had a 2-day history of dark black stools.  No red blood or clots.  Hemoglobin is 10.  Baseline is 11-12.  On naproxen.    He has a very complex psychiatric history with a history of being bipolar, anxiety, depression, schizophrenia, borderline personality, history of paranoia who also has a history of bladder cancer and ureteral stent, hypertension, hyperlipidemia, noncompliance with medications.     Past History   Past Medical History:   Diagnosis Date    Anxiety     Bipolar 1 disorder (H)     Cancer (H)     Depressive disorder     Psychiatric diagnosis       History reviewed. No pertinent surgical history.     Family History Social History   History reviewed. No pertinent family history.  Tobacco: former   Alcohol: not currently   Recreational Drugs: None     Medications    Medications Prior to Admission   Medication Sig Dispense Refill Last Dose/Taking    alum & mag hydroxide-simethicone (MAALOX  ES) 400-400-40 MG/5ML SUSP suspension Take 20 mLs by mouth every 6 hours as needed for indigestion.   More than a month    ARTIFICIAL SALIVA MT Take 3 sprays by mouth 3 times daily as needed (dry mouth).   More than a month    artificial tears OINT ophthalmic ointment Place Into the left eye at bedtime.   6/17/2025 Bedtime    benztropine (COGENTIN) 1 MG tablet Take 1 tablet (1 mg) by mouth 2 times daily as needed for extrapyramidal symptoms (EPS). 60 tablet 0 Past Month    benztropine (COGENTIN) 2 MG tablet Take 1 tablet (2 mg) by mouth at bedtime. 30 tablet 0 6/17/2025 Bedtime     bisacodyl (DULCOLAX) 10 MG suppository Place 10 mg rectally daily as needed for constipation.   Taking As Needed    busPIRone (BUSPAR) 30 MG tablet Take 1 tablet (30 mg) by mouth 2 times daily. 60 tablet 2 6/18/2025 Morning    diclofenac (VOLTAREN) 1 % topical gel Apply 2 g topically 4 times daily as needed for moderate pain. 60 g 0 Taking As Needed    docusate sodium (COLACE) 100 MG capsule Take 100 mg by mouth 2 times daily as needed for constipation.   Past Month    fluticasone (FLONASE) 50 MCG/ACT nasal spray Spray 1 spray into both nostrils daily as needed.   Taking As Needed    guaiFENesin (ROBITUSSIN) 20 mg/mL liquid Take 200 mg by mouth every 4 hours as needed for cough.   More than a month    hydrOXYzine HCl (ATARAX) 10 MG tablet Take 1 tablet (10 mg) by mouth 2 times daily. 60 tablet 2 6/17/2025 Evening    lamoTRIgine (LAMICTAL) 200 MG tablet Take 1 tablet (200 mg) by mouth daily. 30 tablet 2 6/18/2025 Morning    loperamide (IMODIUM) 2 MG capsule Take 2 mg by mouth 4 times daily as needed for diarrhea.   Past Month    magnesium hydroxide (MILK OF MAGNESIA) 400 MG/5ML suspension Take 30 mLs by mouth daily as needed for constipation or heartburn.   More than a month    melatonin 3 MG tablet Take 3 mg by mouth at bedtime.   Past Month    menthol-zinc oxide (CALMOSEPTINE) 0.44-20.6 % OINT ointment Apply topically 4 times daily as needed for skin protection.   Taking As Needed    naproxen (NAPROSYN) 250 MG tablet Take 250 mg by mouth 2 times daily (with meals).   6/18/2025 Morning    neomycin-bacitracin-polymyxin (NEOSPORIN) 5-400-5000 ointment Apply topically as needed (Redness or drainage).   Taking As Needed    OLANZapine zydis (ZYPREXA) 5 MG ODT Take 1 tablet (5 mg) by mouth 3 times daily. 90 tablet 2 6/18/2025 Morning    ondansetron (ZOFRAN ODT) 4 MG ODT tab Take 4 mg by mouth every 6 hours as needed for nausea.   Taking As Needed    oxyBUTYnin (DITROPAN) 5 MG tablet Take 2.5 mg by mouth 3 times daily as  "needed for bladder spasms.   6/17/2025 Evening    polyethylene glycol (MIRALAX) 17 GM/Dose powder Take 17 g (1 Capful) by mouth daily. 510 g 1 6/18/2025 Morning    risperiDONE (RISPERDAL M-TABS) 1 MG ODT Take 1 tablet (1 mg) by mouth 2 times daily. 60 tablet 0 6/18/2025 Morning    sodium phosphate (FLEET ENEMA) 7-19 GM/118ML rectal enema Place 1 enema rectally once as needed for constipation.   Taking As Needed    tamsulosin (FLOMAX) 0.4 MG capsule Take 0.4 mg by mouth daily   6/18/2025 Morning    vitamin A & D (BABY) external ointment Apply topically as needed for dry skin or irritation.   Taking As Needed    vitamin A & D (BABY) external ointment Apply topically 4 times daily as needed for dry skin or irritation.   More than a month    white petrolatum gel Apply topically as needed for dry skin.   Taking As Needed    White Petrolatum-Mineral Oil (REFRESH P.M.) OINT Apply to eye nightly as needed. Apply a small amount to left eye at bedtime as needed   Taking As Needed        Allergies   Allergies   Allergen Reactions    Lactose Anxiety     Lactose intolerance    Aspirin     Cucumber Extract Unknown     pickles    Diphenhydramine Other (See Comments)     Lock jaw. Could not open mouth    Pollen Extract Other (See Comments)     Sneezing and congestion    Acetaminophen Rash     Fixed drug reaction         Review of Systems   A comprehensive review of systems was performed and was otherwise noncontributory.     Objective     Vitals Blood pressure 138/68, pulse 71, temperature 97.2  F (36.2  C), resp. rate 16, height 1.753 m (5' 9\"), weight 65.9 kg (145 lb 4.8 oz), SpO2 100%.          Physical   Exam  GENERAL: well nourished in no apparent distress   SKIN: warm and dry, no rashes   HEENT: atraumatic, anicteric, moist mucous membranes, neck soft/supple    PULMONARY: normal resp effort, breath sounds clear to auscultation bilaterally   CARDIOVASCULAR: normal rate and rhythm, no murmurs   ABDOMEN: no tenderness, no " distention, bowel sounds normal   NEUROLOGICAL: grossly intact        Laboratory     Electrolytes    Recent Labs   Lab 06/19/25  0647 06/18/25  1119    136   POTASSIUM 4.3 4.2   CHLORIDE 113* 100   CO2 25 25   GLC 90 100*   CR 1.19* 1.16   BUN 20.0 27.7*      Hematology    Recent Labs   Lab 06/19/25  0647 06/18/25  1736 06/18/25  1119   HGB 10.1* 10.8* 10.0*   MCV 88 86 87   WBC 5.5  --  7.0     --  313      LFTs & Lipase    Recent Labs   Lab 06/18/25  1119   AST 40   ALT 24   ALKPHOS 111   BILITOTAL 0.2       I have reviewed the current diagnostic and laboratory tests.           Impression and Plan    Black stool suspected melena.  Concern for upper GI bleeding.  Has been taking naproxen.  Concern for peptic ulcer disease or gastritis.  Will proceed with EGD.       24 minutes of total time was spent providing patient care including patient evaluation, reviewing documentation/test results, and .           Gallo Story MD  Thank you for the opportunity to participate in the care of this patient.   Please feel free to call me with any questions or concerns.  Phone number [phone].

## 2025-06-19 NOTE — ANESTHESIA POSTPROCEDURE EVALUATION
Patient: Harpreet Mcelroy    Procedure: Procedure(s):  ESOPHAGOGASTRODUODENOSCOPY       Anesthesia Type:  MAC    Note:     Postop Pain Control: Uneventful            Sign Out: Well controlled pain   PONV: No   Neuro/Psych: Uneventful            Sign Out: Acceptable/Baseline neuro status   Airway/Respiratory: Uneventful            Sign Out: Acceptable/Baseline resp. status   CV/Hemodynamics: Uneventful            Sign Out: Acceptable CV status   Other NRE: NONE   DID A NON-ROUTINE EVENT OCCUR? No           Last vitals:  Vitals Value Taken Time   /61 06/19/25 16:00   Temp 98.3  F (36.8  C) 06/19/25 16:00   Pulse 64 06/19/25 16:00   Resp 14 06/19/25 16:00   SpO2 100 % 06/19/25 16:00       Electronically Signed By: Mikey Vega MD  June 19, 2025  4:40 PM

## 2025-06-19 NOTE — PROGRESS NOTES
[facility]    Medicine Progress Note - Hospitalist Service    Date of Admission:  6/18/2025    Assessment & Plan      Harpreet Mcelroy is a 69 year old male who has a very complex psychiatric history with a history of being bipolar, anxiety, depression, schizophrenia, borderline personality, history of paranoia who also has a history of bladder cancer and ureteral stent, hypertension, hyperlipidemia, noncompliance with medications is presenting from his [facility] with concerns for concerns of dark colored stools.  He is a poor historian at baseline and collateral information is obtained from the EM team and care facility.  Per EMS, he reportedly has had a two day history of dark bloody stools.   No evidence of acute bleeding.  Denies any bright red blood or clots.  Endorses ongoing fatigue and lower back pain.  No chest pain, SOB, HA, or dizziness.      Interval events: No acute interval events overnight.  No further episodes of significant stool or appreciable bleeding.     Acute medical issues:  #Concern for possible upper GI bleed: Given recent history of dark colored stools.  Hgb 10.1 on 6/19/2025 (range this stay 10-10.8 with a baseline 11-12 range).  Rectal exam in the ED negative for bloody stools.   Poor historian at baseline.     #Anemia of chronic disease:  Acute on chronic.  Suspected GI bleed.  Ferritin, folate and B12 WNL.  Iron studies low c/w chronic disease.    -- Admit to Obs  -- Repeat hgb in the AM.   -- Monitor stools.  -- Transfuse PRN hgb </= 7.  -- GI consult.  Not on anticoagulation but has been taking Naproxen BID scheduled per facility records.   -- NPO for now.  Resume diet after procedure.    -- Change PPI to PO.     -- Recommend not using NSAIDs.   -- Anticipate being able to discharge back to [facility] on 6/20/25 (today is a holiday).     Addendum: 6/19/2025 3:31 PM d/w GI.  Appreciate their assistance. Normal esophagus, stomach, and examined duodenum.  No bx.  Colonoscopy done 6/30/2021 with 10 year recall recommended.  Can hold off on repeat colonoscopy at this time.  Monitor stools and hgb.     ----------------------------------------------------------------------  Chronic medical issue:   #BPH  #Bladder cancer  #Hx ureteral stent:    No acute issues.  Followed by [facility].  Per chart, appears he was last seen in 2/2025.   -- Continue tamsulosin.     # HTN: VSS. Monitor.     #Bipolar, ARLYN/schizophrenia, paranoia, BPD:   -- Continue PTA benztropine, buspirone, lamotrigine, olanzapine, and risperidone PO.  Per chart it appears that he is no longer on risperidone microspheres ER IM.   No longer on hydroxyzine PRN.   Will monitor closely as he has had issues with encephalopathy in the past related to medications/over sedation.    -- Follow up with PCP/Psychiatry as OP.    #Hx of medication noncompliance: This too has been an issue in the past.  Appears to be living in a facility -- [facility] and they manage medications.    -- Noted.      Observation Goals: -diagnostic tests and consults completed and resulted, -vital signs normal or at patient baseline, -tolerating oral intake to maintain hydration, -returns to baseline functional status, -safe disposition plan has been identified, Nurse to notify provider when observation goals have been met and patient is ready for discharge.  Diet: NPO for now.  ADAT post procedure.   DVT Prophylaxis: Pneumatic Compression Devices and Ambulate every shift  Hernandez Catheter: Not present  Lines: None     Cardiac Monitoring: None  Code Status: Full Code       Observation Goals: -diagnostic tests and consults completed and resulted, -vital signs normal or at patient baseline, -tolerating oral intake to maintain hydration, -returns to baseline functional status, -safe disposition plan has been identified, Nurse to notify provider when observation goals have been met and patient is ready for discharge.  Diet: NPO for  Procedure/Surgery per Anesthesia Guidelines    DVT Prophylaxis: Ambulate every shift  Hernandez Catheter: Not present  Lines: None     Cardiac Monitoring: ACTIVE order. Indication: Procedural area  Code Status: Full Code      Clinically Significant Risk Factors Present on Admission          # Hyperchloremia: Highest Cl = 113 mmol/L in last 2 days, will monitor as appropriate                   # Anemia: based on hgb <11  # Anemia: based on hgb <11           # Financial/Environmental Concerns:           Social Drivers of Health    Housing Stability: High Risk (6/18/2025)    Housing Stability     Do you have housing? : No     Are you worried about losing your housing?: No   Tobacco Use: High Risk (6/19/2025)    Patient History     Smoking Tobacco Use: Former     Smokeless Tobacco Use: Current   Financial Resource Strain: Unknown (6/18/2025)    Financial Resource Strain     Within the past 12 months, have you or your family members you live with been unable to get utilities (heat, electricity) when it was really needed?: Patient unable to answer   Transportation Needs: Unknown (6/18/2025)    Transportation Needs     Within the past 12 months, has lack of transportation kept you from medical appointments, getting your medicines, non-medical meetings or appointments, work, or from getting things that you need?: Patient unable to answer          Disposition Plan     Medically Ready for Discharge: Anticipated Tomorrow           The patient's care was discussed with the Bedside Nurse, Care Coordinator/, Patient, and GI Consultant(s).    [facility]  Hospitalist Service  [facility]  Securely message with [employer] (more info)  Text page via [employer] Paging/Directory   ______________________________________________________________________    Interval History   Resting comfortably when seen earlier in the AM.  On call for EGD under MAC anesthesia.   No CP or SOB.  No further reported  incidents of dark colored stool.  Last colonoscopy 4 years ago.     Physical Exam   Vital Signs: Temp: (!) 96.2  F (35.7  C) Temp src: Temporal BP: 101/89 Pulse: 62   Resp: 10 SpO2: 100 % O2 Device: Nasal cannula Oxygen Delivery: 2 LPM  Weight: 145 lbs 4.8 oz      GEN:   Alert, oriented x 3, appears comfortable, NAD. Chronic speech dysarthria  NECK:   Supple ,no mass or thyromegaly   HEENT:  Normocephalic/atraumatic, no scleral icterus, no nasal discharge, mouth moist.  CV:   Regular rate and rhythm, no murmur or JVD.  S1 + S2 noted, no S3 or S4.  LUNGS:   Clear to auscultation bilaterally without rales/rhonchi/wheezing/retractions.  Symmetric chest rise on inhalation noted.  ABD:   Active bowel sounds, soft, non-tender/non-distended.  No rebound/guarding/rigidity.  EXT:   No edema.  No cyanosis.  No joint synovitis noted.  SKIN:   Dry to touch, no exanthems noted in the visualized areas.  Neurologic: Grossly intact,non focal.   Neuropsychiatric:  General: normal, calm and normal eye contact  Level of consciousness: alert / normal  Affect: normal  Orientation: oriented to self, place, time and situation     Medical Decision Making       45 MINUTES SPENT BY ME on the date of service doing chart review, history, exam, documentation & further activities per the note.      Data   ------------------------- PAST 24 HR DATA REVIEWED -----------------------------------------------    I have personally reviewed the following data over the past 24 hrs:    5.5  \   10.1 (L)   / 298     144 113 (H) 20.0 /  90   4.3 25 1.19 (H) \     Ferritin:  N/A % Retic:  N/A LDH:  N/A       Imaging results reviewed over the past 24 hrs:   No results found for this or any previous visit (from the past 24 hours).

## 2025-06-19 NOTE — CONSULTS
Care Management Initial Consult    General Information  Assessment completed with: Care Team MemberAbdulaziz RN at [facility]  Type of CM/SW Visit: Initial Assessment    Primary Care Provider verified and updated as needed:     Readmission within the last 30 days:           Advance Care Planning:            Communication Assessment  Patient's communication style: spoken language (English or Bilingual)    Hearing Difficulty or Deaf: no   Wear Glasses or Blind: no    Cognitive  Cognitive/Neuro/Behavioral: .WDL except, speech  Level of Consciousness: alert  Arousal Level: opens eyes spontaneously  Orientation: oriented x 4  Mood/Behavior: anxious, flat affect, calm  Best Language: 1 - Mild to moderate  Speech: garbled, logical (difficult to understand at times without dentures)    Living Environment:   People in home: facility resident     Current living Arrangements: assisted living  Name of Facility: [facility]   Able to return to prior arrangements: yes       Family/Social Support:  Care provided by:    Provides care for: no one, unable/limited ability to care for self  Marital Status: Single  Support system:            Description of Support System:           Current Resources:   Patient receiving home care services: No        Community Resources:    Equipment currently used at home: cane, straight  Supplies currently used at home:      Employment/Financial:  Employment Status:          Financial Concerns:             Does the patient's insurance plan have a 3 day qualifying hospital stay waiver?  No    Lifestyle & Psychosocial Needs:  Social Drivers of Health     Food Insecurity: Low Risk  (6/18/2025)    Food Insecurity     Within the past 12 months, did you worry that your food would run out before you got money to buy more?: No     Within the past 12 months, did the food you bought just not last and you didn t have money to get more?: No   Depression: Not at risk (1/29/2025)    Received from  [facility]    PHQ-2     PHQ-2 Subtotal: 0   Housing Stability: High Risk (6/18/2025)    Housing Stability     Do you have housing? : No     Are you worried about losing your housing?: No   Tobacco Use: High Risk (6/19/2025)    Patient History     Smoking Tobacco Use: Former     Smokeless Tobacco Use: Current     Passive Exposure: Not on file   Financial Resource Strain: Unknown (6/18/2025)    Financial Resource Strain     Within the past 12 months, have you or your family members you live with been unable to get utilities (heat, electricity) when it was really needed?: Patient unable to answer   Alcohol Use: Not on file   Transportation Needs: Unknown (6/18/2025)    Transportation Needs     Within the past 12 months, has lack of transportation kept you from medical appointments, getting your medicines, non-medical meetings or appointments, work, or from getting things that you need?: Patient unable to answer   Physical Activity: Not on file   Interpersonal Safety: Low Risk  (6/19/2025)    Interpersonal Safety     Do you feel physically and emotionally safe where you currently live?: Yes     Within the past 12 months, have you been hit, slapped, kicked or otherwise physically hurt by someone?: No     Within the past 12 months, have you been humiliated or emotionally abused in other ways by your partner or ex-partner?: No   Stress: Not on file   Social Connections: Not on file   Health Literacy: Not on file       Functional Status:  Prior to admission patient needed assistance:   Dependent ADLs:: Ambulation-cane, Dressing, Toileting, Bathing  Dependent IADLs:: Medication Management, Cleaning, Cooking, Laundry, Meal Preparation       Discussed  Partnership in Safe Discharge Planning  document with patient/family: No    Additional Information:  Patient from [facility] in [place].    Pharmacy: [facility] in [place]; if needing med immediately, to be filled at hospital. If can wait 1-2 days, send to [facility].    Fax: [phone]  RN number: [phone], ext. [phone].  If NO med changes, can return on weekend.   If med changes, cannot return on weekend.     Patient at EGD and unable to discuss PLASCENCIA. SW attempted to call patient's sibling, phone does not go to  and no answer.     Next Steps: CM following for discharge coordination.     HOLA Maldonado   Social Work Care Manager   [facility]   [phone]

## 2025-06-19 NOTE — ANESTHESIA PREPROCEDURE EVALUATION
Anesthesia Pre-Procedure Evaluation    Patient: Harpreet Mcelroy   MRN: [id number] : 1955          Procedure : Procedure(s):  ESOPHAGOGASTRODUODENOSCOPY         Past Medical History:   Diagnosis Date    Anxiety     Bipolar 1 disorder (H)     Cancer (H)     Depressive disorder     Psychiatric diagnosis       History reviewed. No pertinent surgical history.   Allergies   Allergen Reactions    Lactose Anxiety     Lactose intolerance    Aspirin     Cucumber Extract Unknown     pickles    Diphenhydramine Other (See Comments)     Lock jaw. Could not open mouth    Pollen Extract Other (See Comments)     Sneezing and congestion    Acetaminophen Rash     Fixed drug reaction      Social History     Tobacco Use    Smoking status: Former    Smokeless tobacco: Current   Substance Use Topics    Alcohol use: Not Currently      Wt Readings from Last 1 Encounters:   25 65.9 kg (145 lb 4.8 oz)        Anesthesia Evaluation            ROS/MED HX  ENT/Pulmonary:  - neg pulmonary ROS     Neurologic:       Cardiovascular:  - neg cardiovascular ROS     METS/Exercise Tolerance:     Hematologic:     (+)      anemia (acute on chronic anemia),          Musculoskeletal:       GI/Hepatic: Comment: Suspected upper gi bleed        Renal/Genitourinary:     (+) renal disease, type: ARF,            Endo:       Psychiatric/Substance Use:     (+) psychiatric history bipolar, anxiety, depression and schizophrenia       Infectious Disease:       Malignancy:       Other:              Physical Exam  Airway  Mallampati: II  TM distance: <3 FB  Neck ROM: full  Mouth opening: >= 4 cm    Cardiovascular - normal exam   Dental     Pulmonary - normal exam      Neurological   Other Findings       OUTSIDE LABS:  CBC:   Lab Results   Component Value Date    WBC 5.5 2025    WBC 7.0 2025    HGB 10.1 (L) 2025    HGB 10.8 (L) 2025    HCT 31.7 (L) 2025    HCT 30.9 (L) 2025     2025     2025  "    BMP:   Lab Results   Component Value Date     06/19/2025     06/18/2025    POTASSIUM 4.3 06/19/2025    POTASSIUM 4.2 06/18/2025    CHLORIDE 113 (H) 06/19/2025    CHLORIDE 100 06/18/2025    CO2 25 06/19/2025    CO2 25 06/18/2025    BUN 20.0 06/19/2025    BUN 27.7 (H) 06/18/2025    CR 1.19 (H) 06/19/2025    CR 1.16 06/18/2025    GLC 90 06/19/2025     (H) 06/18/2025     COAGS:   Lab Results   Component Value Date    PTT 28 11/28/2020    INR 0.96 12/23/2024     POC: No results found for: \"BGM\", \"HCG\", \"HCGS\"  HEPATIC:   Lab Results   Component Value Date    ALBUMIN 3.9 06/18/2025    PROTTOTAL 7.0 06/18/2025    ALT 24 06/18/2025    AST 40 06/18/2025    ALKPHOS 111 06/18/2025    BILITOTAL 0.2 06/18/2025    FRANCESCO 13 (L) 12/23/2024     OTHER:   Lab Results   Component Value Date    LACT 0.4 (L) 04/19/2025    A1C 5.1 06/05/2025    STEPHY 8.9 06/19/2025    PHOS 3.8 12/28/2024    MAG 2.2 12/28/2024    TSH 1.58 06/05/2025    T4 1.44 06/05/2025       Anesthesia Plan    ASA Status:  2      NPO Status: NPO Appropriate   Anesthesia Type: MAC.  Airway: natural airway.  Induction: intravenous.  Maintenance: TIVA.   Techniques and Equipment:       - Monitoring Plan: standard ASA monitoring     Consents    Anesthesia Plan(s) and associated risks, benefits, and realistic alternatives discussed. Questions answered and patient/representative(s) expressed understanding.     - Discussed: CRNA     - Discussed with:  Patient               Postoperative Care    Pain management: non-narcotic analgesics.     Comments:                   Cassia Moya MD    I have reviewed the pertinent notes and labs in the chart from the past 30 days and (re)examined the patient.  Any updates or changes from those notes are reflected in this note.    Clinically Significant Risk Factors Present on Admission          # Hyperchloremia: Highest Cl = 113 mmol/L in last 2 days, will monitor as appropriate                   # Anemia: based on hgb " <11  # Anemia: based on hgb <11           # Financial/Environmental Concerns:

## 2025-06-19 NOTE — PLAN OF CARE
PRIMARY DIAGNOSIS: POSSIBLE UPPER GI BLEED    OUTPATIENT/OBSERVATION GOALS TO BE MET BEFORE DISCHARGE  Orthostatic performed: N/A    Stable Hgb Yes.   Recent Labs   Lab Test 06/18/25  1736 06/18/25  1119 06/05/25  0920   HGB 10.8* 10.0* 11.8*       Resolved or declined bleeding episodes: Yes Last episode: Prior to admission per patient     Appropriate testing complete: GI to see in the morning- patient NPO at midnight per orders.     Cleared for discharge by consultants (if involved): No    Safe discharge environment identified: Yes    Discharge Planner Nurse   Safe discharge environment identified: Yes  Barriers to discharge: Yes       Entered by: Angie Vera RN 06/19/2025 4:32 AM   Patient alert and oriented-forgetful. Vitally stable on room air. No bowel movements or rectal bleeding overnight. Patient incontinent of bladder- male external catheter in place. Denies pain. IV fluids infusing per orders. Up assist of 1 with rolling walker and gait belt. NPO at midnight for GI consult. Denies shortness of breath/trouble breathing. Resting comfortably overnight- respirations even and unlabored.      Please review provider order for any additional goals.   Nurse to notify provider when observation goals have been met and patient is ready for discharge.    Goal Outcome Evaluation:      Plan of Care Reviewed With: patient    Overall Patient Progress: improvingOverall Patient Progress: improving    Problem: Adult Inpatient Plan of Care  Goal: Plan of Care Review  Description: The Plan of Care Review/Shift note should be completed every shift.  The Outcome Evaluation is a brief statement about your assessment that the patient is improving, declining, or no change.  This information will be displayed automatically on your shift  note.  Outcome: Progressing  Flowsheets (Taken 6/19/2025 0200)  Plan of Care Reviewed With: patient  Overall Patient Progress: improving  Goal: Patient-Specific Goal (Individualized)  Description:  "You can add care plan individualizations to a care plan. Examples of Individualization might be:  \"Parent requests to be called daily at 9am for status\", \"I have a hard time hearing out of my right ear\", or \"Do not touch me to wake me up as it startles  me\".  Outcome: Progressing  Goal: Absence of Hospital-Acquired Illness or Injury  Outcome: Progressing  Intervention: Identify and Manage Fall Risk  Recent Flowsheet Documentation  Taken 6/18/2025 2300 by Angie Vera, RN  Safety Promotion/Fall Prevention:   activity supervised   clutter free environment maintained   lighting adjusted   mobility aid in reach   nonskid shoes/slippers when out of bed  Intervention: Prevent Skin Injury  Recent Flowsheet Documentation  Taken 6/18/2025 2300 by Angie Vera, RN  Body Position: position changed independently  Intervention: Prevent Infection  Recent Flowsheet Documentation  Taken 6/18/2025 2300 by Angie Vera, RN  Infection Prevention:   cohorting utilized   hand hygiene promoted   rest/sleep promoted   single patient room provided  Goal: Optimal Comfort and Wellbeing  Outcome: Progressing  Goal: Readiness for Transition of Care  Outcome: Progressing     Problem: Delirium  Goal: Optimal Coping  Outcome: Progressing  Goal: Improved Behavioral Control  Outcome: Progressing  Intervention: Minimize Safety Risk  Recent Flowsheet Documentation  Taken 6/18/2025 2300 by Angie Vera, RN  Enhanced Safety Measures: pain management  Goal: Improved Attention and Thought Clarity  Outcome: Progressing  Goal: Improved Sleep  Outcome: Progressing                "

## 2025-06-19 NOTE — ANESTHESIA CARE TRANSFER NOTE
Patient: Harpreet Mcelroy    Procedure: Procedure(s):  ESOPHAGOGASTRODUODENOSCOPY       Diagnosis: Upper GI bleed [K92.2]  Diagnosis Additional Information: No value filed.    Anesthesia Type:   MAC     Note:    Oropharynx: oropharynx clear of all foreign objects and spontaneously breathing  Level of Consciousness: drowsy  Oxygen Supplementation: face mask  Level of Supplemental Oxygen (L/min / FiO2): 5    Dentition: dentition unchanged  Vital Signs Stable: post-procedure vital signs reviewed and stable  Report to RN Given: handoff report given  Patient transferred to: PACU    Handoff Report: Identifed the Patient, Identified the Reponsible Provider, Reviewed the pertinent medical history, Discussed the surgical course, Reviewed Intra-OP anesthesia mangement and issues during anesthesia, Set expectations for post-procedure period and Allowed opportunity for questions and acknowledgement of understanding      Vitals:  Vitals Value Taken Time   /55 06/19/25 14:53   Temp     Pulse 67 06/19/25 14:53   Resp     SpO2 100 % 06/19/25 14:54   Vitals shown include unfiled device data.    Electronically Signed By: Dean Dennis Severson, APRN CRNA  June 19, 2025  2:56 PM

## 2025-06-20 VITALS
OXYGEN SATURATION: 99 % | SYSTOLIC BLOOD PRESSURE: 134 MMHG | HEART RATE: 80 BPM | RESPIRATION RATE: 18 BRPM | BODY MASS INDEX: 21.52 KG/M2 | WEIGHT: 145.3 LBS | HEIGHT: 69 IN | DIASTOLIC BLOOD PRESSURE: 57 MMHG | TEMPERATURE: 98.3 F

## 2025-06-20 LAB
ANION GAP SERPL CALCULATED.3IONS-SCNC: 6 MMOL/L (ref 7–15)
BUN SERPL-MCNC: 11.6 MG/DL (ref 8–23)
CALCIUM SERPL-MCNC: 8.4 MG/DL (ref 8.8–10.4)
CHLORIDE SERPL-SCNC: 112 MMOL/L (ref 98–107)
CREAT SERPL-MCNC: 1.08 MG/DL (ref 0.67–1.17)
EGFRCR SERPLBLD CKD-EPI 2021: 75 ML/MIN/1.73M2
ERYTHROCYTE [DISTWIDTH] IN BLOOD BY AUTOMATED COUNT: 16.1 % (ref 10–15)
GLUCOSE SERPL-MCNC: 81 MG/DL (ref 70–99)
HCO3 SERPL-SCNC: 23 MMOL/L (ref 22–29)
HCT VFR BLD AUTO: 31.4 % (ref 40–53)
HGB BLD-MCNC: 10.1 G/DL (ref 13.3–17.7)
MCH RBC QN AUTO: 28.3 PG (ref 26.5–33)
MCHC RBC AUTO-ENTMCNC: 32.2 G/DL (ref 31.5–36.5)
MCV RBC AUTO: 88 FL (ref 78–100)
PLATELET # BLD AUTO: 299 10E3/UL (ref 150–450)
POTASSIUM SERPL-SCNC: 4.3 MMOL/L (ref 3.4–5.3)
RBC # BLD AUTO: 3.57 10E6/UL (ref 4.4–5.9)
SODIUM SERPL-SCNC: 141 MMOL/L (ref 135–145)
WBC # BLD AUTO: 5.7 10E3/UL (ref 4–11)

## 2025-06-20 PROCEDURE — G0378 HOSPITAL OBSERVATION PER HR: HCPCS

## 2025-06-20 PROCEDURE — 96376 TX/PRO/DX INJ SAME DRUG ADON: CPT

## 2025-06-20 PROCEDURE — 36415 COLL VENOUS BLD VENIPUNCTURE: CPT | Performed by: NURSE PRACTITIONER

## 2025-06-20 PROCEDURE — 80048 BASIC METABOLIC PNL TOTAL CA: CPT | Performed by: NURSE PRACTITIONER

## 2025-06-20 PROCEDURE — 82435 ASSAY OF BLOOD CHLORIDE: CPT | Performed by: NURSE PRACTITIONER

## 2025-06-20 PROCEDURE — 85014 HEMATOCRIT: CPT | Performed by: NURSE PRACTITIONER

## 2025-06-20 PROCEDURE — 250N000013 HC RX MED GY IP 250 OP 250 PS 637: Performed by: NURSE PRACTITIONER

## 2025-06-20 PROCEDURE — 99239 HOSP IP/OBS DSCHRG MGMT >30: CPT | Performed by: PHYSICIAN ASSISTANT

## 2025-06-20 PROCEDURE — 250N000011 HC RX IP 250 OP 636: Performed by: NURSE PRACTITIONER

## 2025-06-20 PROCEDURE — 85027 COMPLETE CBC AUTOMATED: CPT | Performed by: NURSE PRACTITIONER

## 2025-06-20 RX ADMIN — BUSPIRONE HYDROCHLORIDE 30 MG: 15 TABLET ORAL at 08:32

## 2025-06-20 RX ADMIN — TAMSULOSIN HYDROCHLORIDE 0.4 MG: 0.4 CAPSULE ORAL at 08:33

## 2025-06-20 RX ADMIN — RISPERIDONE 1 MG: 0.5 TABLET, ORALLY DISINTEGRATING ORAL at 08:35

## 2025-06-20 RX ADMIN — POLYETHYLENE GLYCOL 3350 17 G: 17 POWDER, FOR SOLUTION ORAL at 08:32

## 2025-06-20 RX ADMIN — OLANZAPINE 5 MG: 5 TABLET, ORALLY DISINTEGRATING ORAL at 08:32

## 2025-06-20 RX ADMIN — LAMOTRIGINE 200 MG: 25 TABLET ORAL at 08:31

## 2025-06-20 RX ADMIN — PANTOPRAZOLE SODIUM 40 MG: 40 INJECTION, POWDER, FOR SOLUTION INTRAVENOUS at 08:29

## 2025-06-20 RX ADMIN — HYDROXYZINE HYDROCHLORIDE 10 MG: 10 TABLET, FILM COATED ORAL at 08:32

## 2025-06-20 ASSESSMENT — ACTIVITIES OF DAILY LIVING (ADL)
ADLS_ACUITY_SCORE: 63
ADLS_ACUITY_SCORE: 64
ADLS_ACUITY_SCORE: 63

## 2025-06-20 NOTE — PLAN OF CARE
Goal Outcome Evaluation:      PRIMARY DIAGNOSIS: GI BLEED    OUTPATIENT/OBSERVATION GOALS TO BE MET BEFORE DISCHARGE  Orthostatic performed: No    Stable Hgb Yes.   Recent Labs   Lab Test 06/20/25  0637 06/19/25  0647 06/18/25  1736   HGB 10.1* 10.1* 10.8*       Resolved or declined bleeding episodes: Yes Last episode: before admit    Appropriate testing complete: Yes    Cleared for discharge by consultants (if involved): Yes    Safe discharge environment identified: Yes    Discharge Planner Nurse   Safe discharge environment identified: Yes  Barriers to discharge: No       Entered by: Carolin Davis RN 06/20/2025 9:38 AM   A&Ox4, mild aphasia. VSS on RA. Mild back pain. Tolerating regular diet, no N/V, no blood stools, incontinent of urine. PIV removed. No PI. Up with a cane SBA, WC bound at BL. Discharging between 3955-8802       Please review provider order for any additional goals.   Nurse to notify provider when observation goals have been met and patient is ready for discharge.    Plan of Care Reviewed With: patient    Overall Patient Progress: improvingOverall Patient Progress: improving    Outcome Evaluation: A&Ox4, VSS. Mild pain. No bloody stool since admit. Plan to DC back to facility today.        Problem: Adult Inpatient Plan of Care  Goal: Plan of Care Review  Description: The Plan of Care Review/Shift note should be completed every shift.  The Outcome Evaluation is a brief statement about your assessment that the patient is improving, declining, or no change.  This information will be displayed automatically on your shift  note.  Outcome: Progressing  Flowsheets (Taken 6/20/2025 0930)  Outcome Evaluation: A&Ox4, VSS. Mild pain. No bloody stool since admit. Plan to DC back to facility today.  Plan of Care Reviewed With: patient  Overall Patient Progress: improving  Goal: Patient-Specific Goal (Individualized)  Description: You can add care plan individualizations to a care plan. Examples of  "Individualization might be:  \"Parent requests to be called daily at 9am for status\", \"I have a hard time hearing out of my right ear\", or \"Do not touch me to wake me up as it startles  me\".  Outcome: Progressing  Goal: Absence of Hospital-Acquired Illness or Injury  Outcome: Progressing  Intervention: Identify and Manage Fall Risk  Recent Flowsheet Documentation  Taken 6/20/2025 0800 by Carolin Davis RN  Safety Promotion/Fall Prevention:   activity supervised   clutter free environment maintained   increased rounding and observation   increase visualization of patient   nonskid shoes/slippers when out of bed   room organization consistent   safety round/check completed  Intervention: Prevent and Manage VTE (Venous Thromboembolism) Risk  Recent Flowsheet Documentation  Taken 6/20/2025 0800 by Carolin Davis RN  VTE Prevention/Management: SCDs off (sequential compression devices)  Goal: Optimal Comfort and Wellbeing  Outcome: Progressing  Intervention: Provide Person-Centered Care  Recent Flowsheet Documentation  Taken 6/20/2025 0800 by Carolin Davis RN  Trust Relationship/Rapport:   care explained   choices provided  Goal: Readiness for Transition of Care  Outcome: Progressing     Problem: Delirium  Goal: Optimal Coping  Outcome: Progressing  Goal: Improved Behavioral Control  Outcome: Progressing  Intervention: Minimize Safety Risk  Recent Flowsheet Documentation  Taken 6/20/2025 0800 by Carolin Davis RN  Enhanced Safety Measures: review medications for side effects with activity  Trust Relationship/Rapport:   care explained   choices provided  Goal: Improved Attention and Thought Clarity  Outcome: Progressing  Goal: Improved Sleep  Outcome: Progressing       "

## 2025-06-20 NOTE — CARE PLAN
"PRIMARY DIAGNOSIS:  upper GI BLEED    OUTPATIENT/OBSERVATION GOALS TO BE MET BEFORE DISCHARGE  Orthostatic performed: No    Stable Hgb Yes.   Recent Labs   Lab Test 06/19/25  0647 06/18/25  1736 06/18/25  1119   HGB 10.1* 10.8* 10.0*       Resolved or declined bleeding episodes: No,    Last episode: before 2 days ago    Appropriate testing complete: Yes    Cleared for discharge by consultants (if involved): No    Safe discharge environment identified: Yes    Discharge Planner Nurse   Safe discharge environment identified: No  Barriers to discharge: Yes       Entered by: Anuj Parmar RN 06/19/2025    Please review provider order for any additional goals.   Nurse to notify provider when observation goals have been met and patient is ready for discharge.    Alert and oriented x 4 forgetful,VSS on RA,denies pain,tolerated clear and full liquid diet advanced to regular diet,NS @ 100 ml/hr,EGD done ,    /69 (BP Location: Left arm)   Pulse 68   Temp 97.9  F (36.6  C) (Oral)   Resp 18   Ht 1.753 m (5' 9\")   Wt 65.9 kg (145 lb 4.8 oz)   SpO2 99%   BMI 21.46 kg/m     "

## 2025-06-20 NOTE — DISCHARGE SUMMARY
"[facility]  Hospitalist Discharge Summary      Date of Admission:  6/18/2025  Date of Discharge:  6/20/2025  Discharging Provider: Aisha Orantes PA-C  Discharge Service: Hospitalist Service    Discharge Diagnoses   ***    Clinically Significant Risk Factors          Follow-ups Needed After Discharge   Follow-up Appointments       Hospital Follow-up with Existing Primary Care Provider (PCP)          Schedule Primary Care visit within: 7 Days   Recommended labs and Imaging (to be ordered by Primary Care Provider): Repeat CBC           {Additional follow-up instructions/to-do's for PCP    :***}    Unresulted Labs Ordered in the Past 30 Days of this Admission       No orders found from 5/19/2025 to 6/19/2025.        These results will be followed up by ***    Discharge Disposition   {Discharge to:9983804::\"Discharged to home\"}  Condition at discharge: {CONDITION:905905::\"Stable\"}    Hospital Course   {OPTIONAL -- use to select A&P section   :352924}    Consultations This Hospital Stay   GASTROENTEROLOGY IP CONSULT  CARE MANAGEMENT / SOCIAL WORK IP CONSULT    Code Status   Full Code    Time Spent on this Encounter   {How much time did you spend on discharge?:54963521}       Aisha Orantes PA-C  [facility] OBSERVATION DEPT  [address] [place] [address]  Phone: [phone]  ______________________________________________________________________    Physical Exam   Vital Signs: Temp: 98.3  F (36.8  C) Temp src: Oral BP: 134/57 Pulse: 80   Resp: 18 SpO2: 99 % O2 Device: None (Room air) Oxygen Delivery: 2 LPM  Weight: 145 lbs 4.8 oz  {Recommend personal SmartPhrase or Notewriter for exam (OPTIONAL)   :286460}       Primary Care Physician   [facility]    Discharge Orders      Reason for your hospital stay    You were admitted for concerns of GIB. Your hemoglobin was normal during admission and you were seen by GI that showed normal " endoscopy. We recommend that you discontinue Naproxen as it can cause Gi upset and potential bleeding. Follow up with PCP in 2 weeks if needed.     Activity    Your activity upon discharge: activity as tolerated     Diet    Follow this diet upon discharge: Current Diet:Orders Placed This Encounter      Advance Diet as Tolerated: Regular Diet Adult; Regular Diet Adult     Hospital Follow-up with Existing Primary Care Provider (PCP)            Significant Results and Procedures   {Data for Discharge Summary:704345}    Discharge Medications      Review of your medicines        CONTINUE these medicines which have NOT CHANGED        Dose / Directions   alum & mag hydroxide-simethicone 400-400-40 MG/5ML Susp suspension  Commonly known as: MAALOX  ES      Dose: 20 mL  Take 20 mLs by mouth every 6 hours as needed for indigestion.  Refills: 0     ARTIFICIAL SALIVA MT      Dose: 3 spray  Take 3 sprays by mouth 3 times daily as needed (dry mouth).  Refills: 0     artificial tears Oint ophthalmic ointment      Place Into the left eye at bedtime.  Refills: 0     * benztropine 1 MG tablet  Commonly known as: COGENTIN  Used for: Schizoaffective disorder, bipolar type (H)      Dose: 1 mg  Take 1 tablet (1 mg) by mouth 2 times daily as needed for extrapyramidal symptoms (EPS).  Quantity: 60 tablet  Refills: 0     * benztropine 2 MG tablet  Commonly known as: COGENTIN  Used for: Schizoaffective disorder, bipolar type (H)      Dose: 2 mg  Take 1 tablet (2 mg) by mouth at bedtime.  Quantity: 30 tablet  Refills: 0     bisacodyl 10 MG suppository  Commonly known as: DULCOLAX      Dose: 10 mg  Place 10 mg rectally daily as needed for constipation.  Refills: 0     busPIRone HCl 30 MG tablet  Commonly known as: BUSPAR  Used for: Schizoaffective disorder, bipolar type (H)      Dose: 30 mg  Take 1 tablet (30 mg) by mouth 2 times daily.  Quantity: 60 tablet  Refills: 2     diclofenac 1 % topical gel  Commonly known as: VOLTAREN  Used for:  Chronic right-sided low back pain without sciatica      Dose: 2 g  Apply 2 g topically 4 times daily as needed for moderate pain.  Quantity: 60 g  Refills: 0     docusate sodium 100 MG capsule  Commonly known as: COLACE      Dose: 100 mg  Take 100 mg by mouth 2 times daily as needed for constipation.  Refills: 0     Flomax 0.4 MG capsule  Generic drug: tamsulosin      Dose: 0.4 mg  Take 0.4 mg by mouth daily  Refills: 0     fluticasone 50 MCG/ACT nasal spray  Commonly known as: FLONASE      Dose: 1 spray  Spray 1 spray into both nostrils daily as needed.  Refills: 0     guaiFENesin 20 mg/mL liquid  Commonly known as: ROBITUSSIN      Dose: 200 mg  Take 200 mg by mouth every 4 hours as needed for cough.  Refills: 0     hydrOXYzine HCl 10 MG tablet  Commonly known as: ATARAX  Used for: Schizoaffective disorder, bipolar type (H)      Dose: 10 mg  Take 1 tablet (10 mg) by mouth 2 times daily.  Quantity: 60 tablet  Refills: 2     lamoTRIgine 200 MG tablet  Commonly known as: LaMICtal  Used for: Schizoaffective disorder, bipolar type (H)      Dose: 200 mg  Take 1 tablet (200 mg) by mouth daily.  Quantity: 30 tablet  Refills: 2     loperamide 2 MG capsule  Commonly known as: IMODIUM      Dose: 2 mg  Take 2 mg by mouth 4 times daily as needed for diarrhea.  Refills: 0     magnesium hydroxide 400 MG/5ML suspension  Commonly known as: MILK OF MAGNESIA      Dose: 30 mL  Take 30 mLs by mouth daily as needed for constipation or heartburn.  Refills: 0     melatonin 3 MG tablet      Dose: 3 mg  Take 3 mg by mouth at bedtime.  Refills: 0     menthol-zinc oxide 0.44-20.6 % Oint ointment  Commonly known as: CALMOSEPTINE      Apply topically 4 times daily as needed for skin protection.  Refills: 0     neomycin-bacitracin-polymyxin 5-400-5000 ointment      Apply topically as needed (Redness or drainage).  Refills: 0     OLANZapine zydis 5 MG ODT  Commonly known as: zyPREXA  Used for: Schizoaffective disorder, bipolar type (H)      Dose:  5 mg  Take 1 tablet (5 mg) by mouth 3 times daily.  Quantity: 90 tablet  Refills: 2     ondansetron 4 MG ODT tab  Commonly known as: ZOFRAN ODT      Dose: 4 mg  Take 4 mg by mouth every 6 hours as needed for nausea.  Refills: 0     oxyBUTYnin 5 MG tablet  Commonly known as: DITROPAN      Dose: 2.5 mg  Take 2.5 mg by mouth 3 times daily as needed for bladder spasms.  Refills: 0     polyethylene glycol 17 GM/Dose powder  Commonly known as: MIRALAX  Used for: Constipation, unspecified constipation type      Dose: 1 Capful  Take 17 g (1 Capful) by mouth daily.  Quantity: 510 g  Refills: 1     Refresh P.M. Oint      Apply to eye nightly as needed. Apply a small amount to left eye at bedtime as needed  Refills: 0     risperiDONE 1 MG ODT  Commonly known as: risperDAL M-TABS  Used for: Schizoaffective disorder, bipolar type (H)      Dose: 1 mg  Take 1 tablet (1 mg) by mouth 2 times daily.  Quantity: 60 tablet  Refills: 0     sodium phosphate 7-19 GM/118ML rectal enema  Commonly known as: FLEET ENEMA      Dose: 1 enema  Place 1 enema rectally once as needed for constipation.  Refills: 0     * vitamin A & D external ointment  Commonly known as: BABY      Apply topically 4 times daily as needed for dry skin or irritation.  Refills: 0     * vitamin A & D external ointment  Commonly known as: BABY      Apply topically as needed for dry skin or irritation.  Refills: 0     white petrolatum gel      Apply topically as needed for dry skin.  Refills: 0           * This list has 4 medication(s) that are the same as other medications prescribed for you. Read the directions carefully, and ask your doctor or other care provider to review them with you.                STOP taking      naproxen 250 MG tablet  Commonly known as: NAPROSYN               Allergies   Allergies   Allergen Reactions    Lactose Anxiety     Lactose intolerance    Aspirin     Cucumber Extract Unknown     pickles    Diphenhydramine Other (See Comments)     Lock jaw.  Could not open mouth    Pollen Extract Other (See Comments)     Sneezing and congestion    Acetaminophen Rash     Fixed drug reaction      g  Apply 2 g topically 4 times daily as needed for moderate pain.  Quantity: 60 g  Refills: 0     docusate sodium 100 MG capsule  Commonly known as: COLACE      Dose: 100 mg  Take 100 mg by mouth 2 times daily as needed for constipation.  Refills: 0     Flomax 0.4 MG capsule  Generic drug: tamsulosin      Dose: 0.4 mg  Take 0.4 mg by mouth daily  Refills: 0     fluticasone 50 MCG/ACT nasal spray  Commonly known as: FLONASE      Dose: 1 spray  Spray 1 spray into both nostrils daily as needed.  Refills: 0     guaiFENesin 20 mg/mL liquid  Commonly known as: ROBITUSSIN      Dose: 200 mg  Take 200 mg by mouth every 4 hours as needed for cough.  Refills: 0     hydrOXYzine HCl 10 MG tablet  Commonly known as: ATARAX  Used for: Schizoaffective disorder, bipolar type (H)      Dose: 10 mg  Take 1 tablet (10 mg) by mouth 2 times daily.  Quantity: 60 tablet  Refills: 2     lamoTRIgine 200 MG tablet  Commonly known as: LaMICtal  Used for: Schizoaffective disorder, bipolar type (H)      Dose: 200 mg  Take 1 tablet (200 mg) by mouth daily.  Quantity: 30 tablet  Refills: 2     loperamide 2 MG capsule  Commonly known as: IMODIUM      Dose: 2 mg  Take 2 mg by mouth 4 times daily as needed for diarrhea.  Refills: 0     magnesium hydroxide 400 MG/5ML suspension  Commonly known as: MILK OF MAGNESIA      Dose: 30 mL  Take 30 mLs by mouth daily as needed for constipation or heartburn.  Refills: 0     melatonin 3 MG tablet      Dose: 3 mg  Take 3 mg by mouth at bedtime.  Refills: 0     menthol-zinc oxide 0.44-20.6 % Oint ointment  Commonly known as: CALMOSEPTINE      Apply topically 4 times daily as needed for skin protection.  Refills: 0     neomycin-bacitracin-polymyxin 5-400-5000 ointment      Apply topically as needed (Redness or drainage).  Refills: 0     OLANZapine zydis 5 MG ODT  Commonly known as: zyPREXA  Used for: Schizoaffective disorder, bipolar type (H)      Dose: 5 mg  Take 1 tablet (5 mg) by mouth 3 times daily.  Quantity:  90 tablet  Refills: 2     ondansetron 4 MG ODT tab  Commonly known as: ZOFRAN ODT      Dose: 4 mg  Take 4 mg by mouth every 6 hours as needed for nausea.  Refills: 0     oxyBUTYnin 5 MG tablet  Commonly known as: DITROPAN      Dose: 2.5 mg  Take 2.5 mg by mouth 3 times daily as needed for bladder spasms.  Refills: 0     polyethylene glycol 17 GM/Dose powder  Commonly known as: MIRALAX  Used for: Constipation, unspecified constipation type      Dose: 1 Capful  Take 17 g (1 Capful) by mouth daily.  Quantity: 510 g  Refills: 1     Refresh P.M. Oint      Apply to eye nightly as needed. Apply a small amount to left eye at bedtime as needed  Refills: 0     risperiDONE 1 MG ODT  Commonly known as: risperDAL M-TABS  Used for: Schizoaffective disorder, bipolar type (H)      Dose: 1 mg  Take 1 tablet (1 mg) by mouth 2 times daily.  Quantity: 60 tablet  Refills: 0     sodium phosphate 7-19 GM/118ML rectal enema  Commonly known as: FLEET ENEMA      Dose: 1 enema  Place 1 enema rectally once as needed for constipation.  Refills: 0     * vitamin A & D external ointment  Commonly known as: BABY      Apply topically 4 times daily as needed for dry skin or irritation.  Refills: 0     * vitamin A & D external ointment  Commonly known as: BABY      Apply topically as needed for dry skin or irritation.  Refills: 0     white petrolatum gel      Apply topically as needed for dry skin.  Refills: 0           * This list has 4 medication(s) that are the same as other medications prescribed for you. Read the directions carefully, and ask your doctor or other care provider to review them with you.                STOP taking      naproxen 250 MG tablet  Commonly known as: NAPROSYN               Allergies   Allergies   Allergen Reactions    Lactose Anxiety     Lactose intolerance    Aspirin     Cucumber Extract Unknown     pickles    Diphenhydramine Other (See Comments)     Lock jaw. Could not open mouth    Pollen Extract Other (See Comments)      Sneezing and congestion    Acetaminophen Rash     Fixed drug reaction

## 2025-06-20 NOTE — PLAN OF CARE
Goal Outcome Evaluation:  PRIMARY DIAGNOSIS: GI BLEED    OUTPATIENT/OBSERVATION GOALS TO BE MET BEFORE DISCHARGE  Orthostatic performed: N/A    Stable Hgb Yes.   Recent Labs   Lab Test 06/19/25  0647 06/18/25  1736 06/18/25  1119   HGB 10.1* 10.8* 10.0*       Resolved or declined bleeding episodes: Yes Last episode: before admission    Appropriate testing complete: Yes    Cleared for discharge by consultants (if involved): No    Safe discharge environment identified: Yes    Discharge Planner Nurse   Safe discharge environment identified: Yes  Barriers to discharge: Yes       Entered by: DEMETRI BLACKWELL RN 06/20/2025    Vital signs:  Temp: 97.9  F (36.6  C) Temp src: Oral BP: 118/59 Pulse: 70   Resp: 18 SpO2: 97 % O2 Device: None (Room air) Please review provider order for any additional goals.   Nurse to notify provider when observation goals have been met and patient is ready for discharge.    Plan of Care Reviewed With: patient    Overall Patient Progress: improvingOverall Patient Progress: improving    Outcome Evaluation: A&Ox4. Denies pain. No dark/bloody stool since admission.Hgb stable. EGD done. Result normal. Advanced to regular diet. NS infusing @100mL/hr. Incontient of urine. Assist of x1 in bed. GI and SW following.      Problem: Adult Inpatient Plan of Care  Goal: Plan of Care Review  Description: The Plan of Care Review/Shift note should be completed every shift.  The Outcome Evaluation is a brief statement about your assessment that the patient is improving, declining, or no change.  This information will be displayed automatically on your shift  note.  Outcome: Progressing  Flowsheets (Taken 6/20/2025 0059)  Outcome Evaluation: A&Ox4. Denies pain. No dark/bloody stool since admission.Hgb stable. EGD done. Result normal. Advanced to regular diet. NS infusing @100mL/hr. Incontient of urine. Assist of x1 in bed. GI and SW following.  Plan of Care Reviewed With: patient  Overall Patient Progress:  "improving  Goal: Patient-Specific Goal (Individualized)  Description: You can add care plan individualizations to a care plan. Examples of Individualization might be:  \"Parent requests to be called daily at 9am for status\", \"I have a hard time hearing out of my right ear\", or \"Do not touch me to wake me up as it startles  me\".  Outcome: Progressing  Goal: Absence of Hospital-Acquired Illness or Injury  Outcome: Progressing  Goal: Optimal Comfort and Wellbeing  Outcome: Progressing  Goal: Readiness for Transition of Care  Outcome: Progressing     Problem: Delirium  Goal: Optimal Coping  Outcome: Progressing  Goal: Improved Behavioral Control  Outcome: Progressing  Goal: Improved Attention and Thought Clarity  Outcome: Progressing  Goal: Improved Sleep  Outcome: Progressing         "

## 2025-06-20 NOTE — PLAN OF CARE
Goal Outcome Evaluation:  PRIMARY DIAGNOSIS: GI BLEED    OUTPATIENT/OBSERVATION GOALS TO BE MET BEFORE DISCHARGE  Orthostatic performed: No    Stable Hgb Yes.   Recent Labs   Lab Test 06/19/25  0647 06/18/25  1736 06/18/25  1119   HGB 10.1* 10.8* 10.0*       Resolved or declined bleeding episodes: Yes Last episode: before admission     Appropriate testing complete: Yes    Cleared for discharge by consultants (if involved): Yes    Safe discharge environment identified: Yes    Discharge Planner Nurse   Safe discharge environment identified: Yes  Barriers to discharge: Yes       Entered by: DEMETRI BLACKWELL RN 06/20/2025 4:57 AM   Vital signs:  Temp: 97.9  F (36.6  C) Temp src: Oral BP: 134/68 Pulse: 73   Resp: 16 SpO2: 98 % O2 Device: None (Room air)      Please review provider order for any additional goals.   Nurse to notify provider when observation goals have been met and patient is ready for discharge.    Plan of Care Reviewed With: patient    Overall Patient Progress: improvingOverall Patient Progress: improving    Outcome Evaluation: A&Ox4. Denies pain. No dark/bloody stool since admission.Hgb stable. EGD done. Result normal. Advanced to regular diet. NS infusing @100mL/hr. Incontient of urine. Assist of x1 in bed. GI and SW following.    Problem: Adult Inpatient Plan of Care  Goal: Plan of Care Review  Description: The Plan of Care Review/Shift note should be completed every shift.  The Outcome Evaluation is a brief statement about your assessment that the patient is improving, declining, or no change.  This information will be displayed automatically on your shift  note.  6/20/2025 0457 by Demetri Blackwell RN  Outcome: Progressing  Flowsheets (Taken 6/20/2025 0457)  Plan of Care Reviewed With: patient  Overall Patient Progress: improving  6/20/2025 0059 by Demetri Blackwell RN  Outcome: Progressing  Flowsheets (Taken 6/20/2025 0059)  Outcome Evaluation: A&Ox4. Denies pain. No dark/bloody stool  "since admission.Hgb stable. EGD done. Result normal. Advanced to regular diet. NS infusing @100mL/hr. Incontient of urine. Assist of x1 in bed. GI and SW following.  Plan of Care Reviewed With: patient  Overall Patient Progress: improving  Goal: Patient-Specific Goal (Individualized)  Description: You can add care plan individualizations to a care plan. Examples of Individualization might be:  \"Parent requests to be called daily at 9am for status\", \"I have a hard time hearing out of my right ear\", or \"Do not touch me to wake me up as it startles  me\".  6/20/2025 0457 by Sigifredo Hamilton RN  Outcome: Progressing  6/20/2025 0059 by Sigifredo Hamilton RN  Outcome: Progressing  Goal: Absence of Hospital-Acquired Illness or Injury  6/20/2025 0457 by Sigifredo Hamilton RN  Outcome: Progressing  6/20/2025 0059 by Sigifredo Hamilton RN  Outcome: Progressing  Intervention: Identify and Manage Fall Risk  Recent Flowsheet Documentation  Taken 6/20/2025 0100 by Sigifredo Hamilton RN  Safety Promotion/Fall Prevention:   activity supervised   clutter free environment maintained   increased rounding and observation   increase visualization of patient   nonskid shoes/slippers when out of bed   room organization consistent   safety round/check completed  Intervention: Prevent and Manage VTE (Venous Thromboembolism) Risk  Recent Flowsheet Documentation  Taken 6/20/2025 0100 by Sigifredo Hamilton RN  VTE Prevention/Management: SCDs off (sequential compression devices)  Goal: Optimal Comfort and Wellbeing  6/20/2025 0457 by Sigifredo Hamilton RN  Outcome: Progressing  6/20/2025 0059 by Sigifredo Hamilton RN  Outcome: Progressing  Intervention: Provide Person-Centered Care  Recent Flowsheet Documentation  Taken 6/20/2025 0100 by Sigifredo Hamilton RN  Trust Relationship/Rapport:   care explained   choices provided  Goal: Readiness for Transition of Care  6/20/2025 0457 by Sigifredo Hamilton RN  Outcome: " Progressing  6/20/2025 0059 by Sigifredo Hamilton RN  Outcome: Progressing     Problem: Delirium  Goal: Optimal Coping  6/20/2025 0457 by Sigifredo Hamilton RN  Outcome: Progressing  6/20/2025 0059 by Sigifredo Hamilton RN  Outcome: Progressing  Goal: Improved Behavioral Control  6/20/2025 0457 by Sigifredo Hamilton RN  Outcome: Progressing  6/20/2025 0059 by Sigifredo Hamilton RN  Outcome: Progressing  Intervention: Minimize Safety Risk  Recent Flowsheet Documentation  Taken 6/20/2025 0100 by Sigifredo Hamilton RN  Enhanced Safety Measures: review medications for side effects with activity  Trust Relationship/Rapport:   care explained   choices provided  Goal: Improved Attention and Thought Clarity  6/20/2025 0457 by Sigifredo Hamilton RN  Outcome: Progressing  6/20/2025 0059 by Sigifredo Hamilton RN  Outcome: Progressing  Goal: Improved Sleep  6/20/2025 0457 by Sigifredo Hamilton RN  Outcome: Progressing  6/20/2025 0059 by Sigifredo Hamilton RN  Outcome: Progressing

## 2025-06-20 NOTE — PROGRESS NOTES
Care Management Discharge Note    Discharge Date: 06/20/2025       Discharge Disposition:  home; [facility]    Discharge Services:  none    Discharge DME:  none    Discharge Transportation:  [facility]; [id number]    Private pay costs discussed: transportation costs    Does the patient's insurance plan have a 3 day qualifying hospital stay waiver?  No    Education Provided on the Discharge Plan:  yes  Persons Notified of Discharge Plans: patient, care team  Patient/Family in Agreement with the Plan:  yes    Handoff Referral Completed: No, handoff not indicated or clinically appropriate    Additional Information:  Patient discharging to [facility] via [facility] at [phone]. Transportation costs and options discussed, all parties agreeable. Discharge discussed and confirmed with patient, nursing, hospitalist, and accepting facility, [facility]. Orders sent via fax to [phone] .       HOLA Maldonado   Social Work Care Manager   [facility]   [phone]

## 2025-06-21 ENCOUNTER — PATIENT OUTREACH (OUTPATIENT)
Dept: CARE COORDINATION | Facility: CLINIC | Age: 70
End: 2025-06-21
Payer: COMMERCIAL

## 2025-06-21 NOTE — PROGRESS NOTES
Connected Care Resource Center: Connected [facility]    Background: Transitional Care Management program identified per system criteria and reviewed by Connected Care Resource Center team for possible outreach.    Assessment: Upon chart review, CCRC Team member will not proceed with patient outreach related to this episode of Transitional Care Management program due to reason below:    Non-[facility] TCU: CCRC team member noted patient discharged to TCU/ARU/LTACH. Patient is not established with a [facility] currently supported by Primary Care-Care Coordination therefore handoff to Primary Care-Care Coordination is not appropriate at this time.    Plan: Transitional Care Management episode addressed appropriately per reason noted above.      Marly Saldana MA  [facility], [facility]    *Connected Care Resource Team does NOT follow patient ongoing. Referrals are identified based on internal discharge reports and the outreach is to ensure patient has an understanding of their discharge instructions.

## 2025-07-30 ENCOUNTER — LAB REQUISITION (OUTPATIENT)
Dept: LAB | Facility: CLINIC | Age: 70
End: 2025-07-30
Payer: COMMERCIAL

## 2025-07-30 DIAGNOSIS — N39.0 URINARY TRACT INFECTION, SITE NOT SPECIFIED: ICD-10-CM

## 2025-07-30 LAB
ALBUMIN UR-MCNC: 10 MG/DL
APPEARANCE UR: ABNORMAL
BACTERIA #/AREA URNS HPF: ABNORMAL /HPF
BILIRUB UR QL STRIP: NEGATIVE
COLOR UR AUTO: ABNORMAL
GLUCOSE UR STRIP-MCNC: NEGATIVE MG/DL
HGB UR QL STRIP: ABNORMAL
KETONES UR STRIP-MCNC: NEGATIVE MG/DL
LEUKOCYTE ESTERASE UR QL STRIP: ABNORMAL
NITRATE UR QL: NEGATIVE
PH UR STRIP: 7 [PH] (ref 5–7)
RBC URINE: 4 /HPF
SP GR UR STRIP: 1.01 (ref 1–1.03)
SQUAMOUS EPITHELIAL: <1 /HPF
TRANSITIONAL EPI: <1 /HPF
UROBILINOGEN UR STRIP-MCNC: NORMAL MG/DL
WBC CLUMPS #/AREA URNS HPF: PRESENT /HPF
WBC URINE: 143 /HPF

## 2025-07-30 PROCEDURE — 81001 URINALYSIS AUTO W/SCOPE: CPT | Mod: ORL | Performed by: PHYSICIAN ASSISTANT

## 2025-07-30 PROCEDURE — 87086 URINE CULTURE/COLONY COUNT: CPT | Mod: ORL | Performed by: PHYSICIAN ASSISTANT

## 2025-07-31 LAB — BACTERIA UR CULT: NORMAL

## 2025-08-01 LAB — BACTERIA UR CULT: NORMAL

## (undated) DEVICE — TUBING SUCTION MEDI-VAC SOFT 3/16"X20' N520A

## (undated) DEVICE — UNDERPAD 36X30 PREMIERPRO MAX ABS NS LF 676111

## (undated) DEVICE — GOWN XLG DISP 9545

## (undated) DEVICE — SOLUTION WATER 1000ML BOTTLE R5000-01

## (undated) DEVICE — SUCTION MANIFOLD NEPTUNE 2 SYS 4 PORT 0702-020-000

## (undated) DEVICE — BAG CLEAR TRASH 1.3M 39X33" P4040C